# Patient Record
Sex: MALE | Race: WHITE | NOT HISPANIC OR LATINO | Employment: FULL TIME | URBAN - METROPOLITAN AREA
[De-identification: names, ages, dates, MRNs, and addresses within clinical notes are randomized per-mention and may not be internally consistent; named-entity substitution may affect disease eponyms.]

---

## 2017-10-18 ENCOUNTER — HOSPITAL ENCOUNTER (EMERGENCY)
Facility: HOSPITAL | Age: 62
Discharge: HOME | End: 2017-10-18
Attending: EMERGENCY MEDICINE | Admitting: EMERGENCY MEDICINE

## 2017-10-18 ENCOUNTER — APPOINTMENT (OUTPATIENT)
Dept: GENERAL RADIOLOGY | Facility: HOSPITAL | Age: 62
End: 2017-10-18

## 2017-10-18 VITALS
SYSTOLIC BLOOD PRESSURE: 97 MMHG | WEIGHT: 230 LBS | HEIGHT: 70 IN | BODY MASS INDEX: 32.93 KG/M2 | OXYGEN SATURATION: 94 % | HEART RATE: 94 BPM | DIASTOLIC BLOOD PRESSURE: 63 MMHG | TEMPERATURE: 98.7 F | RESPIRATION RATE: 20 BRPM

## 2017-10-18 DIAGNOSIS — Z72.0 TOBACCO ABUSE: ICD-10-CM

## 2017-10-18 DIAGNOSIS — J98.01 ACUTE BRONCHOSPASM: Primary | ICD-10-CM

## 2017-10-18 LAB
ALBUMIN SERPL-MCNC: 4.7 G/DL (ref 3.2–4.8)
ALBUMIN/GLOB SERPL: 1.5 G/DL (ref 1.5–2.5)
ALP SERPL-CCNC: 82 U/L (ref 25–100)
ALT SERPL W P-5'-P-CCNC: 38 U/L (ref 7–40)
ANION GAP SERPL CALCULATED.3IONS-SCNC: 10 MMOL/L (ref 3–11)
AST SERPL-CCNC: 37 U/L (ref 0–33)
BASOPHILS # BLD AUTO: 0.02 10*3/MM3 (ref 0–0.2)
BASOPHILS NFR BLD AUTO: 0.3 % (ref 0–1)
BILIRUB SERPL-MCNC: 0.4 MG/DL (ref 0.3–1.2)
BUN BLD-MCNC: 14 MG/DL (ref 9–23)
BUN/CREAT SERPL: 15.6 (ref 7–25)
CALCIUM SPEC-SCNC: 9.4 MG/DL (ref 8.7–10.4)
CHLORIDE SERPL-SCNC: 99 MMOL/L (ref 99–109)
CO2 SERPL-SCNC: 26 MMOL/L (ref 20–31)
CREAT BLD-MCNC: 0.9 MG/DL (ref 0.6–1.3)
D-LACTATE SERPL-SCNC: 0.7 MMOL/L (ref 0.5–2)
DEPRECATED RDW RBC AUTO: 43.2 FL (ref 37–54)
EOSINOPHIL # BLD AUTO: 0.04 10*3/MM3 (ref 0–0.3)
EOSINOPHIL NFR BLD AUTO: 0.6 % (ref 0–3)
ERYTHROCYTE [DISTWIDTH] IN BLOOD BY AUTOMATED COUNT: 13.1 % (ref 11.3–14.5)
GFR SERPL CREATININE-BSD FRML MDRD: 85 ML/MIN/1.73
GLOBULIN UR ELPH-MCNC: 3.2 GM/DL
GLUCOSE BLD-MCNC: 115 MG/DL (ref 70–100)
HCT VFR BLD AUTO: 48.2 % (ref 38.9–50.9)
HGB BLD-MCNC: 16.8 G/DL (ref 13.1–17.5)
HOLD SPECIMEN: NORMAL
HOLD SPECIMEN: NORMAL
IMM GRANULOCYTES # BLD: 0.04 10*3/MM3 (ref 0–0.03)
IMM GRANULOCYTES NFR BLD: 0.6 % (ref 0–0.6)
LYMPHOCYTES # BLD AUTO: 0.99 10*3/MM3 (ref 0.6–4.8)
LYMPHOCYTES NFR BLD AUTO: 14.6 % (ref 24–44)
MCH RBC QN AUTO: 31.4 PG (ref 27–31)
MCHC RBC AUTO-ENTMCNC: 34.9 G/DL (ref 32–36)
MCV RBC AUTO: 90.1 FL (ref 80–99)
MONOCYTES # BLD AUTO: 1.11 10*3/MM3 (ref 0–1)
MONOCYTES NFR BLD AUTO: 16.3 % (ref 0–12)
NEUTROPHILS # BLD AUTO: 4.6 10*3/MM3 (ref 1.5–8.3)
NEUTROPHILS NFR BLD AUTO: 67.6 % (ref 41–71)
PLATELET # BLD AUTO: 199 10*3/MM3 (ref 150–450)
PMV BLD AUTO: 9.9 FL (ref 6–12)
POTASSIUM BLD-SCNC: 4.4 MMOL/L (ref 3.5–5.5)
PROT SERPL-MCNC: 7.9 G/DL (ref 5.7–8.2)
RBC # BLD AUTO: 5.35 10*6/MM3 (ref 4.2–5.76)
SODIUM BLD-SCNC: 135 MMOL/L (ref 132–146)
TROPONIN I SERPL-MCNC: 0 NG/ML (ref 0–0.07)
TROPONIN I SERPL-MCNC: <0.006 NG/ML
WBC NRBC COR # BLD: 6.8 10*3/MM3 (ref 3.5–10.8)
WHOLE BLOOD HOLD SPECIMEN: NORMAL
WHOLE BLOOD HOLD SPECIMEN: NORMAL

## 2017-10-18 PROCEDURE — 84484 ASSAY OF TROPONIN QUANT: CPT

## 2017-10-18 PROCEDURE — 84484 ASSAY OF TROPONIN QUANT: CPT | Performed by: EMERGENCY MEDICINE

## 2017-10-18 PROCEDURE — 83605 ASSAY OF LACTIC ACID: CPT | Performed by: EMERGENCY MEDICINE

## 2017-10-18 PROCEDURE — 93005 ELECTROCARDIOGRAM TRACING: CPT | Performed by: EMERGENCY MEDICINE

## 2017-10-18 PROCEDURE — 63710000001 PREDNISONE PER 1 MG: Performed by: EMERGENCY MEDICINE

## 2017-10-18 PROCEDURE — 94760 N-INVAS EAR/PLS OXIMETRY 1: CPT

## 2017-10-18 PROCEDURE — 87040 BLOOD CULTURE FOR BACTERIA: CPT | Performed by: EMERGENCY MEDICINE

## 2017-10-18 PROCEDURE — 80053 COMPREHEN METABOLIC PANEL: CPT | Performed by: EMERGENCY MEDICINE

## 2017-10-18 PROCEDURE — 71020 HC CHEST PA AND LATERAL: CPT

## 2017-10-18 PROCEDURE — 85025 COMPLETE CBC W/AUTO DIFF WBC: CPT | Performed by: EMERGENCY MEDICINE

## 2017-10-18 PROCEDURE — 99284 EMERGENCY DEPT VISIT MOD MDM: CPT

## 2017-10-18 PROCEDURE — 94640 AIRWAY INHALATION TREATMENT: CPT

## 2017-10-18 PROCEDURE — 94799 UNLISTED PULMONARY SVC/PX: CPT

## 2017-10-18 RX ORDER — PREDNISONE 20 MG/1
60 TABLET ORAL ONCE
Status: COMPLETED | OUTPATIENT
Start: 2017-10-18 | End: 2017-10-18

## 2017-10-18 RX ORDER — IPRATROPIUM BROMIDE AND ALBUTEROL SULFATE 2.5; .5 MG/3ML; MG/3ML
3 SOLUTION RESPIRATORY (INHALATION) ONCE
Status: COMPLETED | OUTPATIENT
Start: 2017-10-18 | End: 2017-10-18

## 2017-10-18 RX ORDER — SODIUM CHLORIDE 0.9 % (FLUSH) 0.9 %
10 SYRINGE (ML) INJECTION AS NEEDED
Status: DISCONTINUED | OUTPATIENT
Start: 2017-10-18 | End: 2017-10-19 | Stop reason: HOSPADM

## 2017-10-18 RX ORDER — PREDNISONE 20 MG/1
40 TABLET ORAL DAILY
Qty: 8 TABLET | Refills: 0 | Status: SHIPPED | OUTPATIENT
Start: 2017-10-18 | End: 2017-11-02

## 2017-10-18 RX ORDER — ALBUTEROL SULFATE 90 UG/1
2 AEROSOL, METERED RESPIRATORY (INHALATION) EVERY 4 HOURS PRN
Qty: 18 G | Refills: 0 | Status: SHIPPED | OUTPATIENT
Start: 2017-10-18 | End: 2017-11-02 | Stop reason: SDUPTHER

## 2017-10-18 RX ORDER — ALBUTEROL SULFATE 2.5 MG/3ML
2.5 SOLUTION RESPIRATORY (INHALATION) ONCE
Status: COMPLETED | OUTPATIENT
Start: 2017-10-18 | End: 2017-10-18

## 2017-10-18 RX ADMIN — ALBUTEROL SULFATE 2.5 MG: 2.5 SOLUTION RESPIRATORY (INHALATION) at 23:05

## 2017-10-18 RX ADMIN — PREDNISONE 60 MG: 20 TABLET ORAL at 22:53

## 2017-10-18 RX ADMIN — IPRATROPIUM BROMIDE AND ALBUTEROL SULFATE 3 ML: .5; 3 SOLUTION RESPIRATORY (INHALATION) at 21:21

## 2017-10-19 NOTE — DISCHARGE INSTRUCTIONS
Start the prednisone tomorrow evening.  Use the chamber device with your inhaler.  You need to stop smoking.  Call 1 [phone] for assistance.  Return to the ER if you have worsening problem breathing or any other worrisome health problems.    Follow up with one of the [facility] physician groups below to setup primary care. If you have trouble following up, please call Maral Borrero, our transitional care nurse, at [phone].    (Dr. Bruno, Dr. Walden, Dr. Reilly, and Dr. Maradiaga.)  [facility], Primary Care, [phone], [address], [place] [address]    [facility], Primary Care, [phone], [address] [place], [address] [facility], Primary Care, [phone], [address] [place], [address] [facility], Primary Care, [phone], [address] [place], [address]     [place] [address] [facility], Primary Care, [phone], [address], [address] [place], [address]    [place] [address] [facility], Primary Care, [phone], [address]    [place], [address] [facility], Primary Care, [phone], [address], [address] [place], [address] [facility], Primary Care, [phone], [address] [place], [address] [facility], Primary Care, [phone], [address] [place], [address] [facility], Primary Care, [phone], [address], [address] [place], [address] [facility], Primary Care, [phone], [address], [address], [place], [address]     [facility]  [facility], Primary Care, [phone], [address] [id number]

## 2017-10-19 NOTE — ED PROVIDER NOTES
Subjective   HPI Comments: Thien Gray is a 62 y.o. male who presents to the ED with c/o SoA. The patient reports that he has been SoA for about 1 week now and also notes of associated congestion and a cough with onset yesterday. He also complains of diarrhea with onset 3 days ago, but denies any N/V, chest pain, fever, chills, or any other complaints at this time. He has no hx of breathing problems or CHF. The patient does have a hx of smoking and had an MI in 2004 with a stent placement afterwards.       Patient is a 62 y.o. male presenting with shortness of breath.   History provided by:  Patient  Shortness of Breath   Severity:  Unable to specify  Onset quality:  Gradual  Duration:  1 week  Timing:  Constant  Progression:  Worsening  Chronicity:  New  Relieved by:  None tried  Worsened by:  Nothing  Ineffective treatments:  None tried  Associated symptoms: cough    Associated symptoms: no chest pain, no fever and no vomiting    Risk factors: tobacco use        Review of Systems   Constitutional: Negative for chills and fever.   HENT: Positive for congestion.    Respiratory: Positive for cough and shortness of breath.    Cardiovascular: Negative for chest pain.   Gastrointestinal: Positive for diarrhea. Negative for nausea and vomiting.   All other systems reviewed and are negative.      Past Medical History:   Diagnosis Date   • Coronary artery disease    • Hyperlipidemia    • Myocardial infarction        Allergies   Allergen Reactions   • Augmentin [Amoxicillin-Pot Clavulanate] Confusion   • Plavix [Clopidogrel Bisulfate] Hives       History reviewed. No pertinent surgical history.    History reviewed. No pertinent family history.    Social History     Social History   • Marital status:      Spouse name: N/A   • Number of children: N/A   • Years of education: N/A     Social History Main Topics   • Smoking status: Current Every Day Smoker     Packs/day: 1.50     Types: Cigarettes   • Smokeless tobacco:  None   • Alcohol use 3.6 oz/week     6 Cans of beer per week      Comment: 3 nights a week   • Drug use: No   • Sexual activity: Not Asked     Other Topics Concern   • None     Social History Narrative   • None         Objective   Physical Exam   Constitutional: He is oriented to person, place, and time. He appears well-developed and well-nourished.   HENT:   Head: Normocephalic and atraumatic.   Nose: Nose normal.   Pharynx benign. Airway patent     Eyes: Conjunctivae are normal. No scleral icterus.   Good color in conjunctivae.    Neck: Normal range of motion. Neck supple.   Cardiovascular: Regular rhythm and normal heart sounds.  Tachycardia present.    Pulmonary/Chest: Effort normal. No respiratory distress. He has decreased breath sounds (Moderately). He has wheezes (faint). He has rhonchi (faint).   Abdominal: Soft. There is no tenderness.   Musculoskeletal: He exhibits no edema (Traces of pretibial edema with small hints of fluid collection).   Lymphadenopathy:     He has no cervical adenopathy.   Neurological: He is alert and oriented to person, place, and time.   Skin: Skin is warm and dry.   Psychiatric: He has a normal mood and affect. His behavior is normal.   Nursing note and vitals reviewed.      Procedures         ED Course  ED Course   Comment By Time   He met tachycardic and tachypneic parameters for sepsis but his presentation appears to me to be that of a bronchospastic problem or possibly CHF.   Doubt sepsis at this time. Diaz Mitchell MD 10/18 2106   He feels that his breathing is a little better after the Duoneb.  Auscultation reveals better air movement, almost no wheezing, but some diffuse rhonchi.  O2 sats staying upper 80s, low 90s. Diaz Mitchell MD 10/18 2212   He likely has COPD with this being his first exacerbation but no history of same and no PFTs to confirm.  He needs a PCP. Diaz Mitchell MD 10/18 2214   Well's criteria for PE risk scored 1.5 points for HR > 100, indicating low  risk at 1.3% chance of PE in an ED population, per [email or web address]. Diaz Mitchell MD 10/18 2320                     [facility]    Final diagnoses:   Acute bronchospasm   Tobacco abuse       Documentation assistance provided by fabio Espinoza.  Information recorded by the scribe was done at my direction and has been verified and validated by me.     Mando Espinoza  10/18/17 2107       Diaz Mitchell MD  10/19/17 0001

## 2017-10-23 LAB
BACTERIA SPEC AEROBE CULT: NORMAL
BACTERIA SPEC AEROBE CULT: NORMAL

## 2017-11-02 ENCOUNTER — OFFICE VISIT (OUTPATIENT)
Dept: FAMILY MEDICINE CLINIC | Facility: CLINIC | Age: 62
End: 2017-11-02

## 2017-11-02 VITALS
RESPIRATION RATE: 18 BRPM | OXYGEN SATURATION: 99 % | DIASTOLIC BLOOD PRESSURE: 74 MMHG | HEART RATE: 96 BPM | WEIGHT: 230 LBS | HEIGHT: 70 IN | BODY MASS INDEX: 32.93 KG/M2 | SYSTOLIC BLOOD PRESSURE: 132 MMHG

## 2017-11-02 DIAGNOSIS — R63.4 WEIGHT LOSS: ICD-10-CM

## 2017-11-02 DIAGNOSIS — Z87.891 HISTORY OF TOBACCO ABUSE: ICD-10-CM

## 2017-11-02 DIAGNOSIS — E78.49 OTHER HYPERLIPIDEMIA: ICD-10-CM

## 2017-11-02 DIAGNOSIS — Z11.59 NEED FOR HEPATITIS C SCREENING TEST: ICD-10-CM

## 2017-11-02 DIAGNOSIS — Z12.11 SCREENING FOR COLON CANCER: ICD-10-CM

## 2017-11-02 DIAGNOSIS — R74.01 ELEVATED AST (SGOT): ICD-10-CM

## 2017-11-02 DIAGNOSIS — R06.02 SHORTNESS OF BREATH: ICD-10-CM

## 2017-11-02 DIAGNOSIS — Z76.89 ENCOUNTER TO ESTABLISH CARE WITH NEW DOCTOR: ICD-10-CM

## 2017-11-02 DIAGNOSIS — I25.2 HISTORY OF MI (MYOCARDIAL INFARCTION): ICD-10-CM

## 2017-11-02 DIAGNOSIS — I25.10 CORONARY ARTERY DISEASE INVOLVING NATIVE HEART WITHOUT ANGINA PECTORIS, UNSPECIFIED VESSEL OR LESION TYPE: ICD-10-CM

## 2017-11-02 PROCEDURE — 99204 OFFICE O/P NEW MOD 45 MIN: CPT | Performed by: FAMILY MEDICINE

## 2017-11-02 RX ORDER — ALBUTEROL SULFATE 90 UG/1
2 AEROSOL, METERED RESPIRATORY (INHALATION) EVERY 4 HOURS PRN
Qty: 18 G | Refills: 5 | Status: SHIPPED | OUTPATIENT
Start: 2017-11-02 | End: 2019-06-07

## 2017-11-02 NOTE — PATIENT INSTRUCTIONS
Go to the nearest ER or return to clinic if symptoms worsen, fever/chill develop      Chronic Obstructive Pulmonary Disease  Chronic obstructive pulmonary disease (COPD) is a common lung problem. In COPD, the flow of air from the lungs is limited. The way your lungs work will probably never return to normal, but there are things you can do to improve your lungs and make yourself feel better. Your doctor may treat your condition with:  · Medicines.  · Oxygen.  · Lung surgery.  · Changes to your diet.  · Rehabilitation. This may involve a team of specialists.  HOME CARE  · Take all medicines as told by your doctor.  · Avoid medicines or cough syrups that dry up your airway (such as antihistamines) and do not allow you to get rid of thick spit. You do not need to avoid them if told differently by your doctor.  · If you smoke, stop. Smoking makes the problem worse.  · Avoid being around things that make your breathing worse (like smoke, chemicals, and fumes).  · Use oxygen therapy and therapy to help improve your lungs (pulmonary rehabilitation) if told by your doctor. If you need home oxygen therapy, ask your doctor if you should buy a tool to measure your oxygen level (oximeter).  · Avoid people who have a sickness you can catch (contagious).  · Avoid going outside when it is very hot, cold, or humid.  · Eat healthy foods. Eat smaller meals more often. Rest before meals.  · Stay active, but remember to also rest.  · Make sure to get all the shots (vaccines) your doctor recommends. Ask your doctor if you need a pneumonia shot.  · Learn and use tips on how to relax.  · Learn and use tips on how to control your breathing as told by your doctor. Try:    Breathing in (inhaling) through your nose for 1 second. Then, pucker your lips and breath out (exhale) through your lips for 2 seconds.    Putting one hand on your belly (abdomen). Breathe in slowly through your nose for 1 second. Your hand on your belly should move out.  Pucker your lips and breathe out slowly through your lips. Your hand on your belly should move in as you breathe out.  · Learn and use controlled coughing to clear thick spit from your lungs. The steps are:  . Lean your head a little forward.  . Breathe in deeply.  . Try to hold your breath for 3 seconds.  . Keep your mouth slightly open while coughing 2 times.  . Spit any thick spit out into a tissue.  . Rest and do the steps again 1 or 2 times as needed.  GET HELP IF:  · You cough up more thick spit than usual.  · There is a change in the color or thickness of the spit.  · It is harder to breathe than usual.  · Your breathing is faster than usual.  GET HELP RIGHT AWAY IF:  · You have shortness of breath while resting.  · You have shortness of breath that stops you from:    Being able to talk.    Doing normal activities.  · You chest hurts for longer than 5 minutes.  · Your skin color is more blue than usual.  · Your pulse oximeter shows that you have low oxygen for longer than 5 minutes.  MAKE SURE YOU:  · Understand these instructions.  · Will watch your condition.  · Will get help right away if you are not doing well or get worse.     This information is not intended to replace advice given to you by your health care provider. Make sure you discuss any questions you have with your health care provider.     Document Released: 06/05/2009 Document Revised: 01/08/2016 Document Reviewed: 08/14/2014  [employer] Interactive Interactive Patient Education ©2017 [employer]

## 2017-11-02 NOTE — PROGRESS NOTES
Subjective   Thien Gray is a 62 y.o. male.     History of Present Illness   Here to establish care  He was recently seen and diagnosed with acute bronchitis [facility] Jose Raul. He was treated with Albuterol and prednisone.  Continues to have a dry cough, treating with Nyquil at night because was unable to sleep. Concerned about allergies related to his apartment, infested with cockroaches. At home, he has dry cough, sneezing, watery eyes, itchy eyes. At work, symptoms improve.     Hx of tobacco abuse, recently stopped again on 10/30/17. He uses nicotine lozenges periodically to help with cravings, no having any issues quitting.   Started smoking at age 16, 1-1.5ppd.     Concerned about recently weight loss. He lost 10 lbs quickly without any effort.   He has been sick recently.   Has gained 4 lbs of the 10 lbs lost.     History of MI with 1 stent placed. He currently isn't taking any statin therapy.     The following portions of the patient's history were reviewed and updated as appropriate: allergies, current medications, past family history, past medical history, past social history, past surgical history and problem list.    Review of Systems   Constitutional: Positive for unexpected weight change. Negative for chills and fever.   HENT: Negative for congestion, ear pain and hearing loss.    Eyes: Negative for pain and visual disturbance.   Respiratory: Positive for cough and shortness of breath. Negative for chest tightness.    Cardiovascular: Negative for chest pain, palpitations and leg swelling.   Gastrointestinal: Negative for abdominal pain, blood in stool, constipation, diarrhea and vomiting.   Endocrine: Negative for cold intolerance and heat intolerance.   Genitourinary: Negative for dysuria, frequency and hematuria.   Musculoskeletal: Negative for back pain and gait problem.   Skin: Negative for color change, rash and wound.   Allergic/Immunologic: Positive for environmental allergies. Negative for food allergies.    Neurological: Negative for dizziness, weakness, numbness and headaches.   Hematological: Negative for adenopathy. Does not bruise/bleed easily.   Psychiatric/Behavioral: Negative for confusion and sleep disturbance.       Objective   Physical Exam   Constitutional: He is oriented to person, place, and time. He appears well-developed and well-nourished.   HENT:   Head: Normocephalic and atraumatic.   Right Ear: Hearing, tympanic membrane, external ear and ear canal normal.   Left Ear: Hearing, tympanic membrane, external ear and ear canal normal.   Nose: Nose normal.   Mouth/Throat: Uvula is midline, oropharynx is clear and moist and mucous membranes are normal.   Eyes: Conjunctivae and EOM are normal.   Neck: Normal range of motion. Neck supple. No tracheal deviation present. No thyromegaly present.   Cardiovascular: Normal rate, regular rhythm and normal heart sounds.    No murmur heard.  Pulmonary/Chest: Effort normal. No respiratory distress. He has decreased breath sounds in the right upper field and the left upper field. He has no wheezes. He has no rhonchi.   Abdominal: Soft. Bowel sounds are normal. He exhibits no distension. There is no tenderness.   Musculoskeletal: Normal range of motion. He exhibits no edema, tenderness or deformity.   Lymphadenopathy:     He has no cervical adenopathy.   Neurological: He is alert and oriented to person, place, and time. No cranial nerve deficit.   Skin: Skin is warm and dry. No rash noted.   Psychiatric: He has a normal mood and affect. His behavior is normal. Judgment and thought content normal.   Nursing note and vitals reviewed.      Assessment/Plan   Thien was seen today for establish care and shortness of breath.    Diagnoses and all orders for this visit:    Shortness of breath  -     albuterol (PROVENTIL HFA;VENTOLIN HFA) 108 (90 Base) MCG/ACT inhaler; Inhale 2 puffs Every 4 (Four) Hours As Needed for Wheezing or Shortness of Air.  -     Comprehensive Metabolic  Panel; Future  -     Tiotropium Bromide Monohydrate 2.5 MCG/ACT aerosol solution; Inhale 2 puffs Daily.    Coronary artery disease involving native heart without angina pectoris, unspecified vessel or lesion type  -     Lipid Panel; Future  -     Comprehensive Metabolic Panel; Future    Other hyperlipidemia  -     Lipid Panel; Future  -     Comprehensive Metabolic Panel; Future    Weight loss  -     Thyroid Panel With TSH; Future  -     Comprehensive Metabolic Panel; Future    Elevated AST (SGOT)  -     Comprehensive Metabolic Panel; Future    Encounter to establish care with new doctor  -     Comprehensive Metabolic Panel; Future    History of tobacco abuse  -     Comprehensive Metabolic Panel; Future  -     Tiotropium Bromide Monohydrate 2.5 MCG/ACT aerosol solution; Inhale 2 puffs Daily.    History of MI (myocardial infarction)  -     Comprehensive Metabolic Panel; Future    Need for hepatitis C screening test  -     Hepatitis C Antibody; Future    Screening for colon cancer  -     Ambulatory Referral For Screening Colonoscopy    With years of tobacco abuse, it is likely that he has underlying COPD. He doesn't want to proceed with PFT at this time, still recovering from acute illness. Refilled prn albuterol inhaler, sample of Spiriva given to patient   Start daily ASA 81mg  Labs will be completed when fasting. Will start statin therapy once liver function is known.

## 2017-11-07 ENCOUNTER — RESULTS ENCOUNTER (OUTPATIENT)
Dept: FAMILY MEDICINE CLINIC | Facility: CLINIC | Age: 62
End: 2017-11-07

## 2017-11-07 DIAGNOSIS — Z11.59 NEED FOR HEPATITIS C SCREENING TEST: ICD-10-CM

## 2017-11-07 DIAGNOSIS — E78.49 OTHER HYPERLIPIDEMIA: ICD-10-CM

## 2017-11-07 DIAGNOSIS — I25.10 CORONARY ARTERY DISEASE INVOLVING NATIVE HEART WITHOUT ANGINA PECTORIS, UNSPECIFIED VESSEL OR LESION TYPE: ICD-10-CM

## 2017-11-07 DIAGNOSIS — R06.02 SHORTNESS OF BREATH: ICD-10-CM

## 2017-11-07 DIAGNOSIS — Z87.891 HISTORY OF TOBACCO ABUSE: ICD-10-CM

## 2017-11-07 DIAGNOSIS — R63.4 WEIGHT LOSS: ICD-10-CM

## 2017-11-07 DIAGNOSIS — Z76.89 ENCOUNTER TO ESTABLISH CARE WITH NEW DOCTOR: ICD-10-CM

## 2017-11-07 DIAGNOSIS — R74.01 ELEVATED AST (SGOT): ICD-10-CM

## 2017-11-07 DIAGNOSIS — I25.2 HISTORY OF MI (MYOCARDIAL INFARCTION): ICD-10-CM

## 2017-11-08 LAB
ALBUMIN SERPL-MCNC: 4.4 G/DL (ref 3.2–4.8)
ALBUMIN/GLOB SERPL: 1.4 G/DL (ref 1.5–2.5)
ALP SERPL-CCNC: 79 U/L (ref 25–100)
ALT SERPL-CCNC: 32 U/L (ref 7–40)
AST SERPL-CCNC: 26 U/L (ref 0–33)
BILIRUB SERPL-MCNC: 0.5 MG/DL (ref 0.3–1.2)
BUN SERPL-MCNC: 11 MG/DL (ref 9–23)
BUN/CREAT SERPL: 15.7 (ref 7–25)
CALCIUM SERPL-MCNC: 9.5 MG/DL (ref 8.7–10.4)
CHLORIDE SERPL-SCNC: 100 MMOL/L (ref 99–109)
CHOLEST SERPL-MCNC: 206 MG/DL (ref 0–200)
CO2 SERPL-SCNC: 26 MMOL/L (ref 20–31)
CREAT SERPL-MCNC: 0.7 MG/DL (ref 0.6–1.3)
FT4I SERPL CALC-MCNC: 2.1 TBI
GFR SERPLBLD CREATININE-BSD FMLA CKD-EPI: 114 ML/MIN/1.73
GFR SERPLBLD CREATININE-BSD FMLA CKD-EPI: 138 ML/MIN/1.73
GLOBULIN SER CALC-MCNC: 3.2 GM/DL
GLUCOSE SERPL-MCNC: 91 MG/DL (ref 70–100)
HCV AB S/CO SERPL IA: <0.1 S/CO RATIO (ref 0–0.9)
HDLC SERPL-MCNC: 42 MG/DL (ref 40–60)
LDLC SERPL CALC-MCNC: 121 MG/DL (ref 0–100)
POTASSIUM SERPL-SCNC: 4.5 MMOL/L (ref 3.5–5.5)
PROT SERPL-MCNC: 7.6 G/DL (ref 5.7–8.2)
SODIUM SERPL-SCNC: 135 MMOL/L (ref 132–146)
T3RU NFR SERPL: 33.5 % (ref 23–37)
T4 SERPL-MCNC: 6.4 MCG/DL (ref 4.7–11.4)
TRIGL SERPL-MCNC: 217 MG/DL (ref 0–150)
TSH SERPL DL<=0.005 MIU/L-ACNC: 1.75 MIU/ML (ref 0.35–5.35)
VLDLC SERPL CALC-MCNC: 43.4 MG/DL

## 2017-11-08 RX ORDER — ATORVASTATIN CALCIUM 40 MG/1
40 TABLET, FILM COATED ORAL DAILY
Qty: 30 TABLET | Refills: 5 | Status: SHIPPED | OUTPATIENT
Start: 2017-11-08 | End: 2018-05-30 | Stop reason: SINTOL

## 2017-11-30 ENCOUNTER — OFFICE VISIT (OUTPATIENT)
Dept: FAMILY MEDICINE CLINIC | Facility: CLINIC | Age: 62
End: 2017-11-30

## 2017-11-30 VITALS
WEIGHT: 238 LBS | TEMPERATURE: 96.8 F | RESPIRATION RATE: 20 BRPM | DIASTOLIC BLOOD PRESSURE: 68 MMHG | SYSTOLIC BLOOD PRESSURE: 114 MMHG | HEART RATE: 100 BPM | HEIGHT: 70 IN | BODY MASS INDEX: 34.07 KG/M2

## 2017-11-30 DIAGNOSIS — E78.2 MIXED HYPERLIPIDEMIA: Primary | ICD-10-CM

## 2017-11-30 DIAGNOSIS — Z87.891 HISTORY OF TOBACCO ABUSE: ICD-10-CM

## 2017-11-30 DIAGNOSIS — Z23 NEED FOR STREPTOCOCCUS PNEUMONIAE VACCINATION: ICD-10-CM

## 2017-11-30 DIAGNOSIS — L85.3 DRY SKIN DERMATITIS: ICD-10-CM

## 2017-11-30 PROCEDURE — 3008F BODY MASS INDEX DOCD: CPT | Performed by: FAMILY MEDICINE

## 2017-11-30 PROCEDURE — 99214 OFFICE O/P EST MOD 30 MIN: CPT | Performed by: FAMILY MEDICINE

## 2017-11-30 PROCEDURE — 90471 IMMUNIZATION ADMIN: CPT | Performed by: FAMILY MEDICINE

## 2017-11-30 PROCEDURE — 90670 PCV13 VACCINE IM: CPT | Performed by: FAMILY MEDICINE

## 2017-11-30 RX ORDER — ASPIRIN 81 MG/1
81 TABLET ORAL DAILY
COMMUNITY
End: 2021-07-09 | Stop reason: HOSPADM

## 2017-11-30 RX ORDER — EMOLLIENT BASE
CREAM (GRAM) TOPICAL AS NEEDED
Qty: 454 G | Refills: 1 | Status: SHIPPED | OUTPATIENT
Start: 2017-11-30 | End: 2018-05-30

## 2017-11-30 NOTE — PROGRESS NOTES
Subjective   Thien Gray is a 62 y.o. male.     History of Present Illness   He has recently stopped smoking, doing well. Currently replacing with nicotine lozenges.   Overall, breathing is doing well. On his initial visit, he was just getting over bronchitis, so still was experiencing some dyspnea while using Albuterol inhaler throughout the day. I started him on Tudorza due to shortness of breath, however he states that he hasn't been using it and breathing is back at his baseline. He denies dyspnea with exertion, chronic cough. Only using Albuterol inhaler prn now.     He completed labs in Nov 2017, elevated cholesterol panel. I restarted him on Lipitor, he has had history of MI. Tolerating treatment without side effect.    He does complain of dry skin, worse in the winter. Sometimes, the skin on his hands will crack due to dryness. Currently not applying any moisturizer to treat.    The following portions of the patient's history were reviewed and updated as appropriate: allergies, current medications, past family history, past medical history, past social history, past surgical history and problem list.    Review of Systems   Constitutional: Negative.    Respiratory: Negative for cough, chest tightness, shortness of breath and wheezing.    Cardiovascular: Negative for chest pain, palpitations and leg swelling.   Gastrointestinal: Negative.    Musculoskeletal: Negative for myalgias.   Skin:        Dry skin   Allergic/Immunologic: Negative for environmental allergies, food allergies and immunocompromised state.   Hematological: Negative.    Psychiatric/Behavioral: Negative.        Objective   Physical Exam   Constitutional: He is oriented to person, place, and time. He appears well-developed and well-nourished.   HENT:   Head: Normocephalic and atraumatic.   Right Ear: External ear normal.   Left Ear: External ear normal.   Nose: Nose normal.   Eyes: Conjunctivae and EOM are normal.   Neck: Normal range of  motion. Neck supple.   Cardiovascular: Normal rate, regular rhythm and normal heart sounds.    Pulmonary/Chest: Effort normal. He has decreased breath sounds in the right upper field, the right middle field, the left upper field and the left middle field. He has no wheezes.   Musculoskeletal: He exhibits no edema or deformity.   Lymphadenopathy:     He has no cervical adenopathy.   Neurological: He is alert and oriented to person, place, and time. No cranial nerve deficit.   Skin: Skin is warm and dry.   Psychiatric: He has a normal mood and affect. His behavior is normal. Judgment and thought content normal.   Nursing note and vitals reviewed.      Assessment/Plan   Thien was seen today for bronchitis.    Diagnoses and all orders for this visit:    Mixed hyperlipidemia    Dry skin dermatitis  -     emollient (BIAFINE) cream; Apply  topically As Needed for Dry Skin.    History of tobacco abuse    Need for Streptococcus pneumoniae vaccination  -     pneumococcal conj. 13-valent (PREVNAR-13) vaccine 0.5 mL; Inject 0.5 mL into the shoulder, thigh, or buttocks 1 (One) Time.    BMI 34.0-34.9,adult      Emollient to use prn for dry skin  Continue statin therapy to improve HLD. Repeat FLP on next visit, in 6 months. Encouraged overall healthy diet and routine exercise as tolerated.   Prevnar given today, tolerated well.  Referred for screening colonoscopy last visit, states that he was contacted to schedule but he didn't.

## 2017-11-30 NOTE — PATIENT INSTRUCTIONS
Go to the nearest ER or return to clinic if symptoms worsen, fever/chill develop      Cholesterol  Cholesterol is a fat. Your body needs a small amount of cholesterol. Cholesterol may build up in your blood vessels. This increases your chance of having a heart attack or stroke.  You cannot feel your cholesterol levels. The only way to know your cholesterol level is high is with a blood test. Keep your test results. Work with your doctor to keep your cholesterol at a good level.  WHAT DO THE TEST RESULTS MEAN?  · Total cholesterol is how much cholesterol is in your blood.  · LDL is bad cholesterol. This is the type that can build up. You want LDL to be low.  · HDL is good cholesterol. It cleans your blood vessels and carries LDL away. You want HDL to be high.  · Triglycerides are fat that the body can burn for energy or store.  WHAT ARE GOOD LEVELS OF CHOLESTEROL?  · Total cholesterol below 200.  · LDL below 100 for people at risk. Below 70 for those at very high risk.  · HDL above 50 is good. Above 60 is best.  · Triglycerides below 150.  HOW CAN I LOWER MY CHOLESTEROL?  · Diet. Follow your diet programs as told by your doctor.    Choose fish, white meat chicken, roasted turkey, or baked turkey. Try not to eat red meat, fried foods, or processed meats such as sausage and lunch meats.    Eat lots of fresh fruits and vegetables.    Choose whole grains, beans, pasta, potatoes, and cereals.    Use only small amounts of olive, corn, or canola oils.    Try not to eat butter, mayonnaise, shortening, or palm kernel oils.    Try not to eat foods with trans fats.    Drink skim or nonfat milk. Eat low-fat or nonfat yogurt and cheeses. Try not to drink whole milk or cream. Try not to eat ice cream, egg yolks, and full-fat cheeses.    Healthy desserts include catalina food cake, jose armando snaps, animal crackers, hard candy, popsicles, and low-fat or nonfat frozen yogurt. Try not to eat pastries, cakes, pies, and cookies.  · Exercise.  Follow your exercise programs as told by your doctor.    Be more active. You can try gardening, walking, or taking the stairs. Ask your doctor about how you can be more active.  · Medicine. Take medicine as told by your doctor.     This information is not intended to replace advice given to you by your health care provider. Make sure you discuss any questions you have with your health care provider.     Document Released: 03/16/2010 Document Revised: 01/08/2016 Document Reviewed: 10/01/2014  [employer] Interactive Patient Education ©2017 [employer].

## 2018-05-30 ENCOUNTER — OFFICE VISIT (OUTPATIENT)
Dept: FAMILY MEDICINE CLINIC | Facility: CLINIC | Age: 63
End: 2018-05-30

## 2018-05-30 VITALS
TEMPERATURE: 97.7 F | DIASTOLIC BLOOD PRESSURE: 70 MMHG | BODY MASS INDEX: 36.48 KG/M2 | RESPIRATION RATE: 20 BRPM | WEIGHT: 254.8 LBS | HEART RATE: 72 BPM | SYSTOLIC BLOOD PRESSURE: 106 MMHG | HEIGHT: 70 IN

## 2018-05-30 DIAGNOSIS — E78.2 MIXED HYPERLIPIDEMIA: Primary | ICD-10-CM

## 2018-05-30 DIAGNOSIS — I25.2 HISTORY OF MI (MYOCARDIAL INFARCTION): ICD-10-CM

## 2018-05-30 DIAGNOSIS — I25.10 CORONARY ARTERY DISEASE INVOLVING NATIVE HEART WITHOUT ANGINA PECTORIS, UNSPECIFIED VESSEL OR LESION TYPE: ICD-10-CM

## 2018-05-30 DIAGNOSIS — J06.9 ACUTE URI: ICD-10-CM

## 2018-05-30 DIAGNOSIS — Z23 NEED FOR SHINGLES VACCINE: ICD-10-CM

## 2018-05-30 LAB
ALBUMIN SERPL-MCNC: 4.5 G/DL (ref 3.2–4.8)
ALBUMIN/GLOB SERPL: 1.6 G/DL (ref 1.5–2.5)
ALP SERPL-CCNC: 73 U/L (ref 25–100)
ALT SERPL-CCNC: 38 U/L (ref 7–40)
AST SERPL-CCNC: 25 U/L (ref 0–33)
BASOPHILS # BLD AUTO: 0.02 10*3/MM3 (ref 0–0.2)
BASOPHILS NFR BLD AUTO: 0.3 % (ref 0–1)
BILIRUB SERPL-MCNC: 0.6 MG/DL (ref 0.3–1.2)
BUN SERPL-MCNC: 13 MG/DL (ref 9–23)
BUN/CREAT SERPL: 16.3 (ref 7–25)
CALCIUM SERPL-MCNC: 9.2 MG/DL (ref 8.7–10.4)
CHLORIDE SERPL-SCNC: 105 MMOL/L (ref 99–109)
CHOLEST SERPL-MCNC: 162 MG/DL (ref 0–200)
CO2 SERPL-SCNC: 29 MMOL/L (ref 20–31)
CREAT SERPL-MCNC: 0.8 MG/DL (ref 0.6–1.3)
EOSINOPHIL # BLD AUTO: 1.05 10*3/MM3 (ref 0–0.3)
EOSINOPHIL NFR BLD AUTO: 14.1 % (ref 0–3)
ERYTHROCYTE [DISTWIDTH] IN BLOOD BY AUTOMATED COUNT: 13.4 % (ref 11.3–14.5)
GFR SERPLBLD CREATININE-BSD FMLA CKD-EPI: 118 ML/MIN/1.73
GFR SERPLBLD CREATININE-BSD FMLA CKD-EPI: 97 ML/MIN/1.73
GLOBULIN SER CALC-MCNC: 2.8 GM/DL
GLUCOSE SERPL-MCNC: 96 MG/DL (ref 70–100)
HCT VFR BLD AUTO: 46.9 % (ref 38.9–50.9)
HDLC SERPL-MCNC: 38 MG/DL (ref 40–60)
HGB BLD-MCNC: 15.6 G/DL (ref 13.1–17.5)
IMM GRANULOCYTES # BLD: 0.03 10*3/MM3 (ref 0–0.03)
IMM GRANULOCYTES NFR BLD: 0.4 % (ref 0–0.6)
LDLC SERPL CALC-MCNC: 86 MG/DL (ref 0–100)
LYMPHOCYTES # BLD AUTO: 2.04 10*3/MM3 (ref 0.6–4.8)
LYMPHOCYTES NFR BLD AUTO: 27.4 % (ref 24–44)
MCH RBC QN AUTO: 30.5 PG (ref 27–31)
MCHC RBC AUTO-ENTMCNC: 33.3 G/DL (ref 32–36)
MCV RBC AUTO: 91.6 FL (ref 80–99)
MONOCYTES # BLD AUTO: 0.6 10*3/MM3 (ref 0–1)
MONOCYTES NFR BLD AUTO: 8.1 % (ref 0–12)
NEUTROPHILS # BLD AUTO: 3.71 10*3/MM3 (ref 1.5–8.3)
NEUTROPHILS NFR BLD AUTO: 49.7 % (ref 41–71)
PLATELET # BLD AUTO: 258 10*3/MM3 (ref 150–450)
POTASSIUM SERPL-SCNC: 4.9 MMOL/L (ref 3.5–5.5)
PROT SERPL-MCNC: 7.3 G/DL (ref 5.7–8.2)
RBC # BLD AUTO: 5.12 10*6/MM3 (ref 4.2–5.76)
SODIUM SERPL-SCNC: 141 MMOL/L (ref 132–146)
TRIGL SERPL-MCNC: 192 MG/DL (ref 0–150)
VLDLC SERPL CALC-MCNC: 38.4 MG/DL
WBC # BLD AUTO: 7.45 10*3/MM3 (ref 3.5–10.8)

## 2018-05-30 PROCEDURE — 99214 OFFICE O/P EST MOD 30 MIN: CPT | Performed by: FAMILY MEDICINE

## 2018-05-30 RX ORDER — AZITHROMYCIN 250 MG/1
TABLET, FILM COATED ORAL
Qty: 6 TABLET | Refills: 0 | Status: SHIPPED | OUTPATIENT
Start: 2018-05-30 | End: 2018-06-11

## 2018-05-30 NOTE — PATIENT INSTRUCTIONS
Go to the nearest ER or return  Cholesterol  Cholesterol is a fat. Your body needs a small amount of cholesterol. Cholesterol (plaque) may build up in your blood vessels (arteries). That makes you more likely to have a heart attack or stroke.  You cannot feel your cholesterol level. Having a blood test is the only way to find out if your level is high. Keep your test results. Work with your doctor to keep your cholesterol at a good level.  What do the results mean?  · Total cholesterol is how much cholesterol is in your blood.  · LDL is bad cholesterol. This is the type that can build up. Try to have low LDL.  · HDL is good cholesterol. It cleans your blood vessels and carries LDL away. Try to have high HDL.  · Triglycerides are fat that the body can store or burn for energy.  What are good levels of cholesterol?  · Total cholesterol below 200.  · LDL below 100 is good for people who have health risks. LDL below 70 is good for people who have very high risks.  · HDL above 40 is good. It is best to have HDL of 60 or higher.  · Triglycerides below 150.  How can I lower my cholesterol?  Diet   Follow your diet program as told by your doctor.  · Choose fish, white meat chicken, or turkey that is roasted or baked. Try not to eat red meat, fried foods, sausage, or lunch meats.  · Eat lots of fresh fruits and vegetables.  · Choose whole grains, beans, pasta, potatoes, and cereals.  · Choose olive oil, corn oil, or canola oil. Only use small amounts.  · Try not to eat butter, mayonnaise, shortening, or palm kernel oils.  · Try not to eat foods with trans fats.  · Choose low-fat or nonfat dairy foods.  ¨ Drink skim or nonfat milk.  ¨ Eat low-fat or nonfat yogurt and cheeses.  ¨ Try not to drink whole milk or cream.  ¨ Try not to eat ice cream, egg yolks, or full-fat cheeses.  · Healthy desserts include catalina food cake, jose armando snaps, animal crackers, hard candy, popsicles, and low-fat or nonfat frozen yogurt. Try not to eat  pastries, cakes, pies, and cookies.  Exercise   Follow your exercise program as told by your doctor.  · Be more active. Try gardening, walking, and taking the stairs.  · Ask your doctor about ways that you can be more active.  Medicine  · Take over-the-counter and prescription medicines only as told by your doctor.  This information is not intended to replace advice given to you by your health care provider. Make sure you discuss any questions you have with your health care provider.  Document Released: 03/16/2010 Document Revised: 07/19/2017 Document Reviewed: 06/29/2017  [employer] Interactive Patient Education © 2017 [employer]   to clinic if symptoms worsen, fever/chill develop

## 2018-05-30 NOTE — PROGRESS NOTES
Subjective    Chief Complaint   Patient presents with   • Hyperlipidemia     6 mo f/u, labs, refills     Thien Gray is a 62 y.o. male.     Hyperlipidemia   This is a chronic problem. The current episode started more than 1 year ago. The problem is uncontrolled. Recent lipid tests were reviewed and are high. Exacerbating diseases include obesity. He has no history of chronic renal disease or hypothyroidism. Factors aggravating his hyperlipidemia include fatty foods. Associated symptoms include myalgias (joint pain). Pertinent negatives include no chest pain or shortness of breath. (Stopped atorvastatin due to joint pain  ) He is currently on no antihyperlipidemic treatment. Compliance problems include medication side effects, adherence to diet and adherence to exercise.  Risk factors for coronary artery disease include dyslipidemia, male sex, obesity and a sedentary lifestyle.   URI    This is a new problem. The current episode started 1 to 4 weeks ago (2 weeks). The problem has been unchanged. There has been no fever. Associated symptoms include congestion, a plugged ear sensation, rhinorrhea and sneezing. Pertinent negatives include no abdominal pain, chest pain, coughing, diarrhea, ear pain, rash, sore throat, vomiting or wheezing. He has tried antihistamine (Flonase nasal spray) for the symptoms. The treatment provided no relief.        The following portions of the patient's history were reviewed and updated as appropriate: allergies, current medications, past family history, past medical history, past social history, past surgical history and problem list.    Review of Systems   Constitutional: Negative for chills, fatigue and fever.   HENT: Positive for congestion, postnasal drip, rhinorrhea and sneezing. Negative for ear pain and sore throat.    Eyes: Negative for pain and visual disturbance.   Respiratory: Negative for cough, chest tightness, shortness of breath and wheezing.    Cardiovascular: Negative  for chest pain and palpitations.   Gastrointestinal: Negative for abdominal pain, diarrhea and vomiting.   Endocrine: Negative for polydipsia, polyphagia and polyuria.   Musculoskeletal: Positive for myalgias (joint pain). Negative for gait problem and joint swelling.   Skin: Negative for rash.   Allergic/Immunologic: Positive for environmental allergies.   Neurological: Negative for dizziness, weakness and confusion.   Hematological: Negative for adenopathy. Does not bruise/bleed easily.   Psychiatric/Behavioral: Negative for agitation and stress. The patient is not nervous/anxious.        Objective   Physical Exam   Constitutional: He is oriented to person, place, and time. He appears well-developed and well-nourished. No distress.   HENT:   Head: Normocephalic.   Right Ear: Hearing, external ear and ear canal normal. Tympanic membrane is erythematous. No middle ear effusion.   Left Ear: Hearing, tympanic membrane, external ear and ear canal normal.   Nose: Mucosal edema and rhinorrhea present.   Mouth/Throat: Uvula is midline, oropharynx is clear and moist and mucous membranes are normal.   Eyes: Conjunctivae are normal.   Neck: Normal range of motion. Neck supple.   Cardiovascular: Normal rate, regular rhythm and normal heart sounds.    No murmur heard.  Pulmonary/Chest: Effort normal and breath sounds normal. He has no wheezes.   Musculoskeletal: He exhibits no edema or deformity.   Lymphadenopathy:     He has no cervical adenopathy.   Neurological: He is alert and oriented to person, place, and time.   Skin: Skin is warm and dry.   Psychiatric: He has a normal mood and affect. His behavior is normal.   Nursing note and vitals reviewed.        Assessment/Plan   Thien was seen today for hyperlipidemia.    Diagnoses and all orders for this visit:    Mixed hyperlipidemia  -     CBC & Differential  -     Comprehensive Metabolic Panel  -     Lipid Panel    Coronary artery disease involving native heart without  angina pectoris, unspecified vessel or lesion type  -     CBC & Differential  -     Comprehensive Metabolic Panel  -     Lipid Panel    History of MI (myocardial infarction)  -     CBC & Differential  -     Comprehensive Metabolic Panel  -     Lipid Panel    Acute URI  -     azithromycin (ZITHROMAX Z-DANIEL) 250 MG tablet; Take 2 tablets the first day, then 1 tablet daily for 4 days.    Need for shingles vaccine  -     Zoster Vac Recomb Adjuvanted 50 MCG reconstituted suspension; Inject 50 mcg into the shoulder, thigh, or buttocks 1 (One) Time for 1 dose. Repeat 2nd dose in 2-6 months after the initial    BMI 36.0-36.9,adult      Will need to resume statin therapy, will determine which one once lab results are known. I advised him to stop medication if side effects occur and notify me.   Encouraged low fat diet and routine exercise as tolerated.   Zpak to improve URI. Ok to continue OTC benadryl and flonase nasal spray as directed.   Labs completed today.

## 2018-06-02 RX ORDER — PRAVASTATIN SODIUM 40 MG
40 TABLET ORAL DAILY
Qty: 90 TABLET | Refills: 1 | Status: SHIPPED | OUTPATIENT
Start: 2018-06-02 | End: 2018-11-30 | Stop reason: SDUPTHER

## 2018-06-07 ENCOUNTER — TELEPHONE (OUTPATIENT)
Dept: FAMILY MEDICINE CLINIC | Facility: CLINIC | Age: 63
End: 2018-06-07

## 2018-06-07 NOTE — TELEPHONE ENCOUNTER
----- Message from Amanda Meza sent at 6/7/2018  3:35 PM EDT -----  Contact: VALDEMAR  PATIENT HAS A QUESTION:  ASKING IF YOU CAN WRITE HIM A RX FOR A DECONGESTANT:  HIS HEAD AND NOSE IS REALLY STUFFED UP      [phone]  [facility];[address].

## 2018-06-11 ENCOUNTER — OFFICE VISIT (OUTPATIENT)
Dept: FAMILY MEDICINE CLINIC | Facility: CLINIC | Age: 63
End: 2018-06-11

## 2018-06-11 VITALS
WEIGHT: 254.2 LBS | BODY MASS INDEX: 36.39 KG/M2 | HEART RATE: 100 BPM | DIASTOLIC BLOOD PRESSURE: 80 MMHG | TEMPERATURE: 97.8 F | SYSTOLIC BLOOD PRESSURE: 100 MMHG | RESPIRATION RATE: 20 BRPM | HEIGHT: 70 IN

## 2018-06-11 DIAGNOSIS — J06.9 ACUTE URI: ICD-10-CM

## 2018-06-11 DIAGNOSIS — L03.213 PRESEPTAL CELLULITIS: Primary | ICD-10-CM

## 2018-06-11 DIAGNOSIS — H10.32 ACUTE BACTERIAL CONJUNCTIVITIS OF LEFT EYE: ICD-10-CM

## 2018-06-11 PROCEDURE — 99213 OFFICE O/P EST LOW 20 MIN: CPT | Performed by: FAMILY MEDICINE

## 2018-06-11 RX ORDER — CLINDAMYCIN HYDROCHLORIDE 300 MG/1
300 CAPSULE ORAL 4 TIMES DAILY
Qty: 28 CAPSULE | Refills: 0 | Status: SHIPPED | OUTPATIENT
Start: 2018-06-11 | End: 2018-06-18

## 2018-06-11 RX ORDER — PREDNISONE 10 MG/1
TABLET ORAL
Qty: 21 TABLET | Refills: 0 | Status: SHIPPED | OUTPATIENT
Start: 2018-06-11 | End: 2018-10-10

## 2018-06-11 RX ORDER — CIPROFLOXACIN HYDROCHLORIDE 3.5 MG/ML
SOLUTION/ DROPS TOPICAL
Qty: 5 ML | Refills: 0 | Status: SHIPPED | OUTPATIENT
Start: 2018-06-11 | End: 2018-10-10

## 2018-06-11 NOTE — PROGRESS NOTES
Subjective   Thien Gray is a 62 y.o. male.   Chief Complaint   Patient presents with   • Facial Swelling     L eye edema, redness. It was swollen shut. Denies itching, pain. he has had allergy Sx for a few weeks and has been taking antihistamines. The L side of his face got very congested, which the antihistamines did not help. The congestion eventually got better     History of Present Illness   Symptoms started 2 days ago with left eye edema, left sided facial congestion.   Treating with Stahist AD, warm compresses without resolution. He recently completed Zpak for treatment of URI.   States that he has had some improvement since symptoms started, able to open left eye today.   Vision isn't affected. He is having purulent discharge from the left eye.   Doesn't wear contact lenses.     The following portions of the patient's history were reviewed and updated as appropriate: allergies, current medications, past family history, past medical history, past social history, past surgical history and problem list.    Review of Systems   Constitutional: Negative for diaphoresis, fatigue and fever.   HENT: Positive for congestion and sinus pressure. Negative for postnasal drip and sore throat.    Eyes: Positive for discharge and redness. Negative for blurred vision and visual disturbance.   Respiratory: Negative for cough and shortness of breath.    Cardiovascular: Negative for chest pain and palpitations.   Gastrointestinal: Negative for nausea and vomiting.   Allergic/Immunologic: Positive for environmental allergies.   Neurological: Negative for facial asymmetry, headache and confusion.       Objective   Physical Exam   Constitutional: He is oriented to person, place, and time. He appears well-developed and well-nourished. No distress.   HENT:   Head: Normocephalic.   Right Ear: External ear normal.   Left Ear: External ear normal.   Nose: Nose normal.   Eyes: EOM are normal. Left eye exhibits discharge and exudate.  Left conjunctiva is injected. Right pupil is round. Left pupil is round. Pupils are equal.   Left eye: periorbital edema and erythema    Neck: Normal range of motion.   Cardiovascular: Normal rate, regular rhythm and normal heart sounds.    No murmur heard.  Pulmonary/Chest: Effort normal and breath sounds normal. He has no wheezes.   Musculoskeletal: He exhibits no deformity.   Neurological: He is alert and oriented to person, place, and time.   Skin: Skin is warm and dry.   Psychiatric: His behavior is normal.   Nursing note and vitals reviewed.        Assessment/Plan   Thien was seen today for facial swelling.    Diagnoses and all orders for this visit:    Preseptal cellulitis  -     clindamycin (CLEOCIN) 300 MG capsule; Take 1 capsule by mouth 4 (Four) Times a Day for 7 days.    Acute bacterial conjunctivitis of left eye  -     ciprofloxacin (CILOXAN) 0.3 % ophthalmic solution; 1 gtt left eye every 2 hours while awake x 2 days, then every 4 hours x 5 days  -     predniSONE (DELTASONE) 10 MG tablet; Take 60 mg po day 1, 50 mg po day 2, 40 mg po day 3, 30 mg po day 4, 20 mg po day 5, 10 mg po day 6    Acute URI  -     ciprofloxacin (CILOXAN) 0.3 % ophthalmic solution; 1 gtt left eye every 2 hours while awake x 2 days, then every 4 hours x 5 days  -     predniSONE (DELTASONE) 10 MG tablet; Take 60 mg po day 1, 50 mg po day 2, 40 mg po day 3, 30 mg po day 4, 20 mg po day 5, 10 mg po day 6      Cipro eye drop, clindamycin and prednisone taper to improve condition. If symptoms worse, go to ER for evaluation.

## 2018-06-11 NOTE — PATIENT INSTRUCTIONS
Go to the nearest ER or return to clinic if symptoms worsen, fever/chill develop      Bacterial Conjunctivitis  Bacterial conjunctivitis is an infection of your conjunctiva. This is the clear membrane that covers the white part of your eye and the inner surface of your eyelid. This condition can make your eye:  · Red or pink.  · Itchy.  This condition is caused by bacteria. This condition spreads very easily from person to person (is contagious) and from one eye to the other eye.  Follow these instructions at home:  Medicines   · Take or apply your antibiotic medicine as told by your doctor. Do not stop taking or applying the antibiotic even if you start to feel better.  · Take or apply over-the-counter and prescription medicines only as told by your doctor.  · Do not touch your eyelid with the eye drop bottle or the ointment tube.  Managing discomfort   · Wipe any fluid from your eye with a warm, wet washcloth or a cotton ball.  · Place a cool, clean washcloth on your eye. Do this for 10-20 minutes, 3-4 times per day.  General instructions   · Do not wear contact lenses until the irritation is gone. Wear glasses until your doctor says it is okay to wear contacts.  · Do not wear eye makeup until your symptoms are gone. Throw away any old makeup.  · Change or wash your pillowcase every day.  · Do not share towels or washcloths with anyone.  · Wash your hands often with soap and water. Use paper towels to dry your hands.  · Do not touch or rub your eyes.  · Do not drive or use heavy machinery if your vision is blurry.  Contact a doctor if:  · You have a fever.  · Your symptoms do not get better after 10 days.  Get help right away if:  · You have a fever and your symptoms suddenly get worse.  · You have very bad pain when you move your eye.  · Your face:  ¨ Hurts.  ¨ Is red.  ¨ Is swollen.  · You have sudden loss of vision.  This information is not intended to replace advice given to you by your health care provider.  Make sure you discuss any questions you have with your health care provider.  Document Released: 09/26/2009 Document Revised: 05/25/2017 Document Reviewed: 09/29/2016  [employer] Interactive Patient Education © 2017 [employer]

## 2018-10-10 ENCOUNTER — OFFICE VISIT (OUTPATIENT)
Dept: FAMILY MEDICINE CLINIC | Facility: CLINIC | Age: 63
End: 2018-10-10

## 2018-10-10 VITALS
SYSTOLIC BLOOD PRESSURE: 122 MMHG | OXYGEN SATURATION: 91 % | TEMPERATURE: 98.6 F | BODY MASS INDEX: 37.22 KG/M2 | HEART RATE: 79 BPM | DIASTOLIC BLOOD PRESSURE: 64 MMHG | WEIGHT: 260 LBS | HEIGHT: 70 IN

## 2018-10-10 DIAGNOSIS — Z23 NEED FOR IMMUNIZATION AGAINST INFLUENZA: ICD-10-CM

## 2018-10-10 DIAGNOSIS — Z12.11 SCREENING FOR COLON CANCER: ICD-10-CM

## 2018-10-10 DIAGNOSIS — H60.332 ACUTE SWIMMER'S EAR OF LEFT SIDE: Primary | ICD-10-CM

## 2018-10-10 DIAGNOSIS — Z87.891 H/O TOBACCO USE, PRESENTING HAZARDS TO HEALTH: ICD-10-CM

## 2018-10-10 PROCEDURE — G0008 ADMIN INFLUENZA VIRUS VAC: HCPCS | Performed by: FAMILY MEDICINE

## 2018-10-10 PROCEDURE — 99213 OFFICE O/P EST LOW 20 MIN: CPT | Performed by: FAMILY MEDICINE

## 2018-10-10 PROCEDURE — 90662 IIV NO PRSV INCREASED AG IM: CPT | Performed by: FAMILY MEDICINE

## 2018-10-10 NOTE — PROGRESS NOTES
Subjective   Thien Gray is a 63 y.o. male.   Chief Complaint   Patient presents with   • Earache     Left ear     Earache    There is pain in the left ear. This is a new problem. The current episode started in the past 7 days. The problem occurs constantly. The problem has been unchanged. There has been no fever. Associated symptoms include ear discharge. Pertinent negatives include no coughing, headaches, hearing loss, rash, rhinorrhea or sore throat. Treatments tried: Debrox, Peroxide. The treatment provided no relief.     He would also like to update screening colonoscopy.      He has been tobacco free x 1 year.  He hasn't completed screening US AAA.    The following portions of the patient's history were reviewed and updated as appropriate: allergies, current medications, past family history, past medical history, past social history, past surgical history and problem list.    Review of Systems   Constitutional: Negative for chills, fatigue and fever.   HENT: Positive for ear discharge and ear pain. Negative for hearing loss, postnasal drip, rhinorrhea, sinus pressure and sore throat.    Eyes: Negative.    Respiratory: Negative for cough, chest tightness and shortness of breath.    Cardiovascular: Negative for chest pain and palpitations.   Gastrointestinal: Negative.    Skin: Negative for rash.   Neurological: Negative for light-headedness, headache and confusion.   Hematological: Negative for adenopathy.       Objective   Physical Exam   Constitutional: He is oriented to person, place, and time. He appears well-developed and well-nourished. No distress.   HENT:   Head: Normocephalic.   Right Ear: Hearing, tympanic membrane, external ear and ear canal normal.   Left Ear: Hearing, tympanic membrane and external ear normal. There is drainage and swelling.   Nose: Nose normal.   Eyes: Conjunctivae are normal.   Neck: Normal range of motion. Neck supple.   Cardiovascular: Normal rate, regular rhythm and normal  heart sounds.    No murmur heard.  Pulmonary/Chest: Effort normal and breath sounds normal. He has no wheezes.   Musculoskeletal: He exhibits no edema or deformity.   Lymphadenopathy:     He has no cervical adenopathy.   Neurological: He is alert and oriented to person, place, and time. No cranial nerve deficit.   Skin: Skin is warm and dry.   Psychiatric: His behavior is normal.   Nursing note and vitals reviewed.        Assessment/Plan   Thien was seen today for earache.    Diagnoses and all orders for this visit:    Acute swimmer's ear of left side  -     ciprofloxacin-hydrocortisone (CIPRO HC) 0.2-1 % otic suspension; Administer 3 drops into the left ear 2 (Two) Times a Day for 7 days.    Screening for colon cancer  -     Ambulatory Referral For Screening Colonoscopy    H/O tobacco use, presenting hazards to health  -     US aorta limited    Need for immunization against influenza  -     Flu Vaccine High Dose PF 65YR+ ([id number])      Abx gtt to improve otitis externa. Follow up if no improvement.   Screening colonoscopy ordered, along with screening AAA US  Influenza updated today.

## 2018-10-10 NOTE — PATIENT INSTRUCTIONS
"Go to the nearest ER or return to clinic if symptoms worsen, fever/chill develop      Otitis Externa  Otitis externa is an infection of the outer ear canal. The outer ear canal is the area between the outside of the ear and the eardrum. Otitis externa is sometimes called \"swimmer's ear.\"  Follow these instructions at home:  · If you were given antibiotic ear drops, use them as told by your doctor. Do not stop using them even if your condition gets better.  · Take over-the-counter and prescription medicines only as told by your doctor.  · Keep all follow-up visits as told by your doctor. This is important.  How is this prevented?  · Keep your ear dry. Use the corner of a towel to dry your ear after you swim or bathe.  · Try not to scratch or put things in your ear. Doing these things makes it easier for germs to grow in your ear.  · Avoid swimming in lakes, dirty water, or pools that may not have the right amount of a chemical called chlorine.  · Consider making ear drops and putting 3 or 4 drops in each ear after you swim. Ask your doctor about how you can make ear drops.  Contact a doctor if:  · You have a fever.  · After 3 days your ear is still red, swollen, or painful.  · After 3 days you still have pus coming from your ear.  · Your redness, swelling, or pain gets worse.  · You have a really bad headache.  · You have redness, swelling, pain, or tenderness behind your ear.  This information is not intended to replace advice given to you by your health care provider. Make sure you discuss any questions you have with your health care provider.  Document Released: 06/05/2009 Document Revised: 01/12/2017 Document Reviewed: 09/26/2016  [employer] Interactive Patient Education © 2018 [employer]    "

## 2018-10-17 ENCOUNTER — HOSPITAL ENCOUNTER (OUTPATIENT)
Dept: ULTRASOUND IMAGING | Facility: HOSPITAL | Age: 63
Discharge: HOME | End: 2018-10-17
Attending: FAMILY MEDICINE | Admitting: FAMILY MEDICINE

## 2018-10-17 PROCEDURE — 76775 US EXAM ABDO BACK WALL LIM: CPT

## 2018-11-30 ENCOUNTER — OFFICE VISIT (OUTPATIENT)
Dept: FAMILY MEDICINE CLINIC | Facility: CLINIC | Age: 63
End: 2018-11-30

## 2018-11-30 ENCOUNTER — RESULTS ENCOUNTER (OUTPATIENT)
Dept: FAMILY MEDICINE CLINIC | Facility: CLINIC | Age: 63
End: 2018-11-30

## 2018-11-30 VITALS
SYSTOLIC BLOOD PRESSURE: 128 MMHG | DIASTOLIC BLOOD PRESSURE: 76 MMHG | HEART RATE: 64 BPM | BODY MASS INDEX: 36.45 KG/M2 | RESPIRATION RATE: 20 BRPM | TEMPERATURE: 98.8 F | WEIGHT: 254 LBS

## 2018-11-30 DIAGNOSIS — Z23 NEED FOR 23-POLYVALENT PNEUMOCOCCAL POLYSACCHARIDE VACCINE: ICD-10-CM

## 2018-11-30 DIAGNOSIS — Z12.11 SCREENING FOR COLON CANCER: ICD-10-CM

## 2018-11-30 DIAGNOSIS — E78.2 MIXED HYPERLIPIDEMIA: Primary | ICD-10-CM

## 2018-11-30 DIAGNOSIS — I25.10 CORONARY ARTERY DISEASE INVOLVING NATIVE HEART WITHOUT ANGINA PECTORIS, UNSPECIFIED VESSEL OR LESION TYPE: ICD-10-CM

## 2018-11-30 DIAGNOSIS — H60.332 ACUTE SWIMMER'S EAR OF LEFT SIDE: ICD-10-CM

## 2018-11-30 PROCEDURE — 99214 OFFICE O/P EST MOD 30 MIN: CPT | Performed by: FAMILY MEDICINE

## 2018-11-30 PROCEDURE — G0009 ADMIN PNEUMOCOCCAL VACCINE: HCPCS | Performed by: FAMILY MEDICINE

## 2018-11-30 PROCEDURE — 90732 PPSV23 VACC 2 YRS+ SUBQ/IM: CPT | Performed by: FAMILY MEDICINE

## 2018-11-30 RX ORDER — PRAVASTATIN SODIUM 40 MG
40 TABLET ORAL DAILY
Qty: 90 TABLET | Refills: 3 | Status: SHIPPED | OUTPATIENT
Start: 2018-11-30 | End: 2019-12-11 | Stop reason: SDUPTHER

## 2018-11-30 NOTE — PROGRESS NOTES
Subjective   Thien Gray is a 63 y.o. male.   Chief Complaint   Patient presents with   • Follow-up     cholesterol      Hyperlipidemia   This is a chronic problem. The current episode started more than 1 year ago. The problem is controlled. Recent lipid tests were reviewed and are normal. Exacerbating diseases include obesity. Factors aggravating his hyperlipidemia include fatty foods (History of tobacco abuse). Pertinent negatives include no myalgias or shortness of breath. Current antihyperlipidemic treatment includes statins. The current treatment provides significant improvement of lipids. Compliance problems include adherence to exercise.  Risk factors for coronary artery disease include obesity, male sex, dyslipidemia and a sedentary lifestyle.     He continues to have pain in his left ear. He had left over abx gtt from a previous treatment of otitis externa, resumed that and it did improve. He now feels like fluid is built up in his left ear, some drainage is still present.     The following portions of the patient's history were reviewed and updated as appropriate: allergies, current medications, past family history, past medical history, past social history, past surgical history and problem list.    Review of Systems   Constitutional: Negative for chills, fatigue and fever.   HENT: Positive for ear pain.    Eyes: Negative.    Respiratory: Negative for cough, chest tightness and shortness of breath.    Musculoskeletal: Negative for myalgias.   Skin: Negative for rash.   Neurological: Negative for light-headedness, headache and confusion.   Hematological: Negative for adenopathy. Does not bruise/bleed easily.   Psychiatric/Behavioral: Negative.        Objective   Physical Exam   Constitutional: He is oriented to person, place, and time. He appears well-developed and well-nourished. No distress.   HENT:   Head: Normocephalic.   Right Ear: Hearing, tympanic membrane, external ear and ear canal normal.    Left Ear: Tympanic membrane and external ear normal. There is drainage (purulent white drainage).   Nose: Nose normal.   Eyes: Conjunctivae are normal.   Neck: Normal range of motion. Neck supple.   Cardiovascular: Normal rate, regular rhythm and normal heart sounds.   No murmur heard.  Pulmonary/Chest: Effort normal and breath sounds normal. He has no wheezes.   Abdominal: Soft.   Musculoskeletal: He exhibits no edema or deformity.   Lymphadenopathy:     He has no cervical adenopathy.   Neurological: He is alert and oriented to person, place, and time.   Skin: Skin is warm and dry.   Psychiatric: He has a normal mood and affect. His behavior is normal.   Nursing note and vitals reviewed.        Assessment/Plan   Thien was seen today for follow-up.    Diagnoses and all orders for this visit:    Mixed hyperlipidemia  -     CBC & Differential; Future  -     Comprehensive Metabolic Panel; Future  -     Lipid Panel; Future  -     pravastatin (PRAVACHOL) 40 MG tablet; Take 1 tablet by mouth Daily.  -     Lipid Panel  -     Comprehensive Metabolic Panel  -     CBC & Differential    Coronary artery disease involving native heart without angina pectoris, unspecified vessel or lesion type  -     CBC & Differential; Future  -     Comprehensive Metabolic Panel; Future  -     Lipid Panel; Future  -     pravastatin (PRAVACHOL) 40 MG tablet; Take 1 tablet by mouth Daily.  -     Lipid Panel  -     Comprehensive Metabolic Panel  -     CBC & Differential    Acute swimmer's ear of left side    Screening for colon cancer  -     Cologuard - Stool, Per Rectum; Future    Need for 23-polyvalent pneumococcal polysaccharide vaccine  -     Pneumococcal Polysaccharide Vaccine 23-Valent (PPSV23) Greater Than or Equal To 1yo Subcutaneous / IM      Labs ordered, will complete fasting.   Pneumovax 23 updated.   Advised him to resume antibiotic treatment of left ear to resolve otitis externa. Advised to keep ear canal dry to prevent future  infections.   He only recently started pravastatin, advised to continue.   Unable to complete screening colonoscopy due to no transportation. He would like to complete cologuard instead.

## 2018-11-30 NOTE — PATIENT INSTRUCTIONS
"Go to the nearest ER or return to clinic if symptoms worsen, fever/chill develop      Otitis Externa  Otitis externa is an infection of the outer ear canal. The outer ear canal is the area between the outside of the ear and the eardrum. Otitis externa is sometimes called \"swimmer's ear.\"  Follow these instructions at home:  · If you were given antibiotic ear drops, use them as told by your doctor. Do not stop using them even if your condition gets better.  · Take over-the-counter and prescription medicines only as told by your doctor.  · Keep all follow-up visits as told by your doctor. This is important.  How is this prevented?  · Keep your ear dry. Use the corner of a towel to dry your ear after you swim or bathe.  · Try not to scratch or put things in your ear. Doing these things makes it easier for germs to grow in your ear.  · Avoid swimming in lakes, dirty water, or pools that may not have the right amount of a chemical called chlorine.  · Consider making ear drops and putting 3 or 4 drops in each ear after you swim. Ask your doctor about how you can make ear drops.  Contact a doctor if:  · You have a fever.  · After 3 days your ear is still red, swollen, or painful.  · After 3 days you still have pus coming from your ear.  · Your redness, swelling, or pain gets worse.  · You have a really bad headache.  · You have redness, swelling, pain, or tenderness behind your ear.  This information is not intended to replace advice given to you by your health care provider. Make sure you discuss any questions you have with your health care provider.  Document Released: 06/05/2009 Document Revised: 01/12/2017 Document Reviewed: 09/26/2016  [employer] Interactive Patient Education © 2018 [employer]    "

## 2019-01-29 ENCOUNTER — RESULTS ENCOUNTER (OUTPATIENT)
Dept: FAMILY MEDICINE CLINIC | Facility: CLINIC | Age: 64
End: 2019-01-29

## 2019-01-29 ENCOUNTER — TELEPHONE (OUTPATIENT)
Dept: FAMILY MEDICINE CLINIC | Facility: CLINIC | Age: 64
End: 2019-01-29

## 2019-01-29 DIAGNOSIS — Z12.11 SCREENING FOR COLON CANCER: Primary | ICD-10-CM

## 2019-01-29 DIAGNOSIS — Z12.11 SCREENING FOR COLON CANCER: ICD-10-CM

## 2019-01-29 NOTE — TELEPHONE ENCOUNTER
He would need to be seen and have ears looked at again to determine if he still needs ear drops. I haven't seen him since Nov 2018    Cologuard ordered. Please fax.

## 2019-01-29 NOTE — TELEPHONE ENCOUNTER
----- Message from Fior Castaneda sent at 1/29/2019 11:03 AM EST -----  Contact: margareth, patient  Refill Cipro ear drops, [facility] [address] (usual pharm), [phone] may leave a message. Patient aware Margareth out today

## 2019-01-29 NOTE — TELEPHONE ENCOUNTER
----- Message from Fior Castaneda sent at 1/29/2019 11:06 AM EST -----  Contact: nataliia, patient  Patient would like to have a cologuard rather that a colonoscopy, can you order? [phone] may leave a message

## 2019-01-31 NOTE — TELEPHONE ENCOUNTER
Patient informed. He used the last of the drops and said it feels better. Will call back or appt if it doesn't continue to improve. Informed him order sent for cologuard as well.

## 2019-03-27 ENCOUNTER — OFFICE VISIT (OUTPATIENT)
Dept: FAMILY MEDICINE CLINIC | Facility: CLINIC | Age: 64
End: 2019-03-27

## 2019-03-27 VITALS
HEART RATE: 94 BPM | BODY MASS INDEX: 37.51 KG/M2 | SYSTOLIC BLOOD PRESSURE: 130 MMHG | OXYGEN SATURATION: 98 % | HEIGHT: 70 IN | DIASTOLIC BLOOD PRESSURE: 78 MMHG | WEIGHT: 262 LBS | RESPIRATION RATE: 18 BRPM | TEMPERATURE: 97.6 F

## 2019-03-27 DIAGNOSIS — L08.9 INFECTION OF SKIN OF FINGER: Primary | ICD-10-CM

## 2019-03-27 PROCEDURE — 99213 OFFICE O/P EST LOW 20 MIN: CPT | Performed by: PHYSICIAN ASSISTANT

## 2019-03-27 RX ORDER — CEPHALEXIN 500 MG/1
500 CAPSULE ORAL 2 TIMES DAILY
Qty: 20 CAPSULE | Refills: 0 | Status: SHIPPED | OUTPATIENT
Start: 2019-03-27 | End: 2019-06-07

## 2019-03-27 NOTE — PROGRESS NOTES
"Priyanka Gray is a 63 y.o. male.     History of Present Illness   Skin of R index finger around nail has been red, inflamed, and tender X 2 days. Hx of similar infections. No drainage. No nail abnormalities. No cuts in skin or suspected injury    No systemic symptoms   Pt is not a diabetic   Has not been doing anything for current symptoms     The following portions of the patient's history were reviewed and updated as appropriate: allergies, current medications, past family history, past medical history, past social history, past surgical history and problem list.    Review of Systems   Constitutional: Negative.  Negative for chills, diaphoresis, fatigue and fever.   HENT: Negative.  Negative for congestion, ear discharge, ear pain, hearing loss, nosebleeds, postnasal drip, sinus pressure, sneezing and sore throat.    Eyes: Negative.    Respiratory: Negative.  Negative for cough, chest tightness, shortness of breath and wheezing.    Cardiovascular: Negative.  Negative for chest pain, palpitations and leg swelling.   Gastrointestinal: Negative for abdominal distention, abdominal pain, blood in stool, constipation, diarrhea, nausea and vomiting.   Genitourinary: Negative.  Negative for difficulty urinating, dysuria, flank pain, frequency, hematuria and urgency.   Musculoskeletal: Negative.  Negative for arthralgias, back pain, gait problem, joint swelling, myalgias, neck pain and neck stiffness.   Skin:        As noted per HPI    Neurological: Negative for dizziness, syncope, weakness, light-headedness, numbness and headaches.       Objective    Blood pressure 130/78, pulse 94, temperature 97.6 °F (36.4 °C), temperature source Temporal, resp. rate 18, height 177.8 cm (70\"), weight 119 kg (262 lb), SpO2 98 %.     Physical Exam   Constitutional: He is oriented to person, place, and time. He appears well-developed and well-nourished.   HENT:   Head: Normocephalic and atraumatic.   Right Ear: External ear " normal.   Left Ear: External ear normal.   Nose: Nose normal.   Mouth/Throat: Oropharynx is clear and moist.   Eyes: Conjunctivae are normal.   Cardiovascular: Normal rate, regular rhythm, normal heart sounds and intact distal pulses. Exam reveals no gallop and no friction rub.   No murmur heard.  Pulmonary/Chest: Effort normal and breath sounds normal. No respiratory distress. He has no wheezes. He has no rales. He exhibits no tenderness.   Neurological: He is alert and oriented to person, place, and time.   Skin: Skin is warm and dry.   Erythema and edema of skin surrounding nail of R index finger   No drainage  Mild TTP   Skin is intact   No obvious nail deformity or ingrown nail    Psychiatric: He has a normal mood and affect. His behavior is normal. Judgment and thought content normal.   Nursing note and vitals reviewed.      Assessment/Plan   Thien was seen today for hand pain.    Diagnoses and all orders for this visit:    Infection of skin of finger  -     cephalexin (KEFLEX) 500 MG capsule; Take 1 capsule by mouth 2 (Two) Times a Day.      Treatment as outlined in plan along with warm epsom water soaks as directed. F/U INB

## 2019-04-01 ENCOUNTER — TELEPHONE (OUTPATIENT)
Dept: FAMILY MEDICINE CLINIC | Facility: CLINIC | Age: 64
End: 2019-04-01

## 2019-04-01 RX ORDER — SULFAMETHOXAZOLE AND TRIMETHOPRIM 800; 160 MG/1; MG/1
1 TABLET ORAL 2 TIMES DAILY
Qty: 14 TABLET | Refills: 0 | Status: SHIPPED | OUTPATIENT
Start: 2019-04-01 | End: 2019-04-08

## 2019-04-01 NOTE — TELEPHONE ENCOUNTER
----- Message from Huey Martin Rep sent at 4/1/2019  8:54 AM EDT -----  Contact: PT; VALDEMAR  PATIENT SAW [facility] LAST WEEK FOR A FINGER INFECTION AND HE STATES IT HAS GOT WORSE HE WANTS TO KNOW CAN HE GET ANOTHER ANTIBIOTIC CALLED IN.    PENNY ALEJANDRE    PT: [phone]

## 2019-05-17 DIAGNOSIS — Z12.11 COLON CANCER SCREENING: ICD-10-CM

## 2019-05-17 DIAGNOSIS — R19.5 POSITIVE COLORECTAL CANCER SCREENING USING COLOGUARD TEST: Primary | ICD-10-CM

## 2019-06-07 ENCOUNTER — OFFICE VISIT (OUTPATIENT)
Dept: FAMILY MEDICINE CLINIC | Facility: CLINIC | Age: 64
End: 2019-06-07

## 2019-06-07 VITALS
RESPIRATION RATE: 20 BRPM | TEMPERATURE: 97.5 F | WEIGHT: 260 LBS | HEIGHT: 70 IN | SYSTOLIC BLOOD PRESSURE: 118 MMHG | HEART RATE: 90 BPM | DIASTOLIC BLOOD PRESSURE: 76 MMHG | OXYGEN SATURATION: 96 % | BODY MASS INDEX: 37.22 KG/M2

## 2019-06-07 DIAGNOSIS — Z23 NEED FOR DIPHTHERIA-TETANUS-PERTUSSIS (TDAP) VACCINE: ICD-10-CM

## 2019-06-07 DIAGNOSIS — E78.2 MIXED HYPERLIPIDEMIA: Primary | ICD-10-CM

## 2019-06-07 DIAGNOSIS — I25.10 CORONARY ARTERY DISEASE INVOLVING NATIVE HEART WITHOUT ANGINA PECTORIS, UNSPECIFIED VESSEL OR LESION TYPE: ICD-10-CM

## 2019-06-07 DIAGNOSIS — L82.1 SEBORRHEIC KERATOSIS: ICD-10-CM

## 2019-06-07 DIAGNOSIS — Z12.5 SCREENING FOR PROSTATE CANCER: ICD-10-CM

## 2019-06-07 LAB
ALBUMIN SERPL-MCNC: 4.8 G/DL (ref 3.5–5.2)
ALBUMIN/GLOB SERPL: 1.7 G/DL
ALP SERPL-CCNC: 70 U/L (ref 39–117)
ALT SERPL-CCNC: 53 U/L (ref 1–41)
AST SERPL-CCNC: 32 U/L (ref 1–40)
BASOPHILS # BLD AUTO: 0.03 10*3/MM3 (ref 0–0.2)
BASOPHILS NFR BLD AUTO: 0.5 % (ref 0–1.5)
BILIRUB SERPL-MCNC: 0.5 MG/DL (ref 0.2–1.2)
BUN SERPL-MCNC: 14 MG/DL (ref 8–23)
BUN/CREAT SERPL: 16.5 (ref 7–25)
CALCIUM SERPL-MCNC: 10.3 MG/DL (ref 8.6–10.5)
CHLORIDE SERPL-SCNC: 101 MMOL/L (ref 98–107)
CHOLEST SERPL-MCNC: 151 MG/DL (ref 0–200)
CO2 SERPL-SCNC: 27.1 MMOL/L (ref 22–29)
CREAT SERPL-MCNC: 0.85 MG/DL (ref 0.76–1.27)
EOSINOPHIL # BLD AUTO: 0.35 10*3/MM3 (ref 0–0.4)
EOSINOPHIL NFR BLD AUTO: 5.4 % (ref 0.3–6.2)
ERYTHROCYTE [DISTWIDTH] IN BLOOD BY AUTOMATED COUNT: 13.3 % (ref 12.3–15.4)
GLOBULIN SER CALC-MCNC: 2.8 GM/DL
GLUCOSE SERPL-MCNC: 119 MG/DL (ref 65–99)
HCT VFR BLD AUTO: 48.9 % (ref 37.5–51)
HDLC SERPL-MCNC: 44 MG/DL (ref 40–60)
HGB BLD-MCNC: 15.8 G/DL (ref 13–17.7)
IMM GRANULOCYTES # BLD AUTO: 0.04 10*3/MM3 (ref 0–0.05)
IMM GRANULOCYTES NFR BLD AUTO: 0.6 % (ref 0–0.5)
LDLC SERPL CALC-MCNC: 74 MG/DL (ref 0–100)
LYMPHOCYTES # BLD AUTO: 1.66 10*3/MM3 (ref 0.7–3.1)
LYMPHOCYTES NFR BLD AUTO: 25.7 % (ref 19.6–45.3)
MCH RBC QN AUTO: 30.4 PG (ref 26.6–33)
MCHC RBC AUTO-ENTMCNC: 32.3 G/DL (ref 31.5–35.7)
MCV RBC AUTO: 94 FL (ref 79–97)
MONOCYTES # BLD AUTO: 0.64 10*3/MM3 (ref 0.1–0.9)
MONOCYTES NFR BLD AUTO: 9.9 % (ref 5–12)
NEUTROPHILS # BLD AUTO: 3.75 10*3/MM3 (ref 1.7–7)
NEUTROPHILS NFR BLD AUTO: 57.9 % (ref 42.7–76)
NRBC BLD AUTO-RTO: 0 /100 WBC (ref 0–0.2)
PLATELET # BLD AUTO: 243 10*3/MM3 (ref 140–450)
POTASSIUM SERPL-SCNC: 4.5 MMOL/L (ref 3.5–5.2)
PROT SERPL-MCNC: 7.6 G/DL (ref 6–8.5)
PSA SERPL-MCNC: 2.54 NG/ML (ref 0–4)
RBC # BLD AUTO: 5.2 10*6/MM3 (ref 4.14–5.8)
SODIUM SERPL-SCNC: 138 MMOL/L (ref 136–145)
TRIGL SERPL-MCNC: 165 MG/DL (ref 0–150)
VLDLC SERPL CALC-MCNC: 33 MG/DL
WBC # BLD AUTO: 6.47 10*3/MM3 (ref 3.4–10.8)

## 2019-06-07 PROCEDURE — 90471 IMMUNIZATION ADMIN: CPT | Performed by: FAMILY MEDICINE

## 2019-06-07 PROCEDURE — 99214 OFFICE O/P EST MOD 30 MIN: CPT | Performed by: FAMILY MEDICINE

## 2019-06-07 PROCEDURE — 90715 TDAP VACCINE 7 YRS/> IM: CPT | Performed by: FAMILY MEDICINE

## 2019-06-07 NOTE — PATIENT INSTRUCTIONS
"Go to the nearest ER or return to clinic if symptoms worsen, fever/chill develop  Seborrheic Keratosis  Seborrheic keratosis is a common, noncancerous (benign) skin growth. This condition causes waxy, rough, tan, brown, or black spots to appear on the skin. These skin growths can be flat or raised.  What are the causes?  The cause of this condition is not known.  What increases the risk?  This condition is more likely to develop in:  · People who have a family history of seborrheic keratosis.  · People who are 50 or older.  · People who are pregnant.  · People who have had estrogen replacement therapy.    What are the signs or symptoms?  This condition often occurs on the face, chest, shoulders, back, or other areas. These growths:  · Are usually painless, but may become irritated and itchy.  · Can be yellow, brown, black, or other colors.  · Are slightly raised or have a flat surface.  · Are sometimes rough or wart-like in texture.  · Are often waxy on the surface.  · Are round or oval-shaped.  · Sometimes look like they are \"stuck on.”  · Often occur in groups, but may occur as a single growth.    How is this diagnosed?  This condition is diagnosed with a medical history and physical exam. A sample of the growth may be tested (skin biopsy). You may need to see a skin specialist (dermatologist).  How is this treated?  Treatment is not usually needed for this condition, unless the growths are irritated or are often bleeding. You may also choose to have the growths removed if you do not like their appearance. Most commonly, these growths are treated with a procedure in which liquid nitrogen is applied to “freeze” off the growth (cryosurgery). They may also be burned off with electricity or cut off.  Follow these instructions at home:  · Watch your growth for any changes.  · Keep all follow-up visits as told by your health care provider. This is important.  · Do not scratch or pick at the growth or growths. This can " cause them to become irritated or infected.  Contact a health care provider if:  · You suddenly have many new growths.  · Your growth bleeds, itches, or hurts.  · Your growth suddenly becomes larger or changes color.  This information is not intended to replace advice given to you by your health care provider. Make sure you discuss any questions you have with your health care provider.  Document Released: 01/20/2012 Document Revised: 05/25/2017 Document Reviewed: 05/04/2016  Elsevier Interactive Patient Education © 2019 [employer]

## 2019-06-07 NOTE — PROGRESS NOTES
Subjective   Thien Gray is a 63 y.o. male.   Chief Complaint   Patient presents with   • Hyperlipidemia     6 month follow up      Hyperlipidemia   This is a chronic problem. The current episode started more than 1 year ago. The problem is controlled. Recent lipid tests were reviewed and are normal. Exacerbating diseases include obesity. There are no known factors aggravating his hyperlipidemia. Pertinent negatives include no chest pain or shortness of breath. Current antihyperlipidemic treatment includes statins. The current treatment provides significant improvement of lipids. Compliance problems include adherence to exercise.  Risk factors for coronary artery disease include dyslipidemia, male sex and obesity.      He has a skin lesion on his right lower back for 4-5 months. It hasn't been changing, but is itchy.   He is unable to monitor it for changes, since it is on his back.     Recently had +Cologuard screening. He is struggling to schedule a colonoscopy due to not having a . He has been instructed to contact [employer], which could possibly provide a volunteer or help from local churches. He has yet to do this.     The following portions of the patient's history were reviewed and updated as appropriate: allergies, current medications, past family history, past medical history, past social history, past surgical history and problem list.    Review of Systems   Constitutional: Negative for fatigue and fever.   HENT: Negative.    Eyes: Negative.    Respiratory: Negative for cough, chest tightness and shortness of breath.    Cardiovascular: Negative for chest pain, palpitations and leg swelling.   Skin: Positive for skin lesions. Negative for rash.   Neurological: Negative for light-headedness and headache.   Hematological: Negative for adenopathy.   Psychiatric/Behavioral: Negative.        Objective   Physical Exam   Constitutional: He is oriented to person, place, and time. He appears well-developed and  well-nourished. No distress.   HENT:   Head: Normocephalic.   Right Ear: External ear normal.   Left Ear: External ear normal.   Nose: Nose normal.   Eyes: Conjunctivae are normal.   Neck: Neck supple.   Cardiovascular: Normal rate, regular rhythm and normal heart sounds.   No murmur heard.  Pulmonary/Chest: Effort normal and breath sounds normal. He has no wheezes.   Musculoskeletal: He exhibits no edema.   Lymphadenopathy:     He has no cervical adenopathy.   Neurological: He is alert and oriented to person, place, and time.   Skin: Skin is warm and dry.        Psychiatric: He has a normal mood and affect. His behavior is normal.   Nursing note and vitals reviewed.        Assessment/Plan   Thien was seen today for hyperlipidemia.    Diagnoses and all orders for this visit:    Mixed hyperlipidemia  -     CBC & Differential  -     Comprehensive Metabolic Panel  -     Lipid Panel    Coronary artery disease involving native heart without angina pectoris, unspecified vessel or lesion type    Seborrheic keratosis    Screening for prostate cancer  -     PSA Screen    Need for diphtheria-tetanus-pertussis (Tdap) vaccine  -     Tdap Vaccine Greater Than or Equal To 8yo IM      Recommend cardiac diet, start routine exercise as tolerated.   Tdap updated today.   Reassured him about skin lesion on back. Cryotherapy offered to treat, however he declined at this time.

## 2019-06-10 LAB
HBA1C MFR BLD: 5.6 % (ref 4.8–5.6)
Lab: NORMAL
WRITTEN AUTHORIZATION: NORMAL

## 2019-08-15 ENCOUNTER — OFFICE VISIT (OUTPATIENT)
Dept: FAMILY MEDICINE CLINIC | Facility: CLINIC | Age: 64
End: 2019-08-15

## 2019-08-15 VITALS
WEIGHT: 260 LBS | RESPIRATION RATE: 18 BRPM | SYSTOLIC BLOOD PRESSURE: 112 MMHG | TEMPERATURE: 97.2 F | HEART RATE: 88 BPM | BODY MASS INDEX: 37.31 KG/M2 | DIASTOLIC BLOOD PRESSURE: 78 MMHG

## 2019-08-15 DIAGNOSIS — H60.332 ACUTE SWIMMER'S EAR OF LEFT SIDE: ICD-10-CM

## 2019-08-15 DIAGNOSIS — H25.9 SENILE CATARACT, UNSPECIFIED AGE-RELATED CATARACT TYPE, UNSPECIFIED LATERALITY: ICD-10-CM

## 2019-08-15 DIAGNOSIS — H66.002 ACUTE SUPPURATIVE OTITIS MEDIA OF LEFT EAR WITHOUT SPONTANEOUS RUPTURE OF TYMPANIC MEMBRANE, RECURRENCE NOT SPECIFIED: ICD-10-CM

## 2019-08-15 DIAGNOSIS — M17.0 PRIMARY OSTEOARTHRITIS OF BOTH KNEES: Primary | ICD-10-CM

## 2019-08-15 PROCEDURE — 99214 OFFICE O/P EST MOD 30 MIN: CPT | Performed by: FAMILY MEDICINE

## 2019-08-15 PROCEDURE — 20610 DRAIN/INJ JOINT/BURSA W/O US: CPT | Performed by: FAMILY MEDICINE

## 2019-08-15 RX ORDER — LEVOFLOXACIN 500 MG/1
500 TABLET, FILM COATED ORAL DAILY
Qty: 7 TABLET | Refills: 0 | Status: SHIPPED | OUTPATIENT
Start: 2019-08-15 | End: 2019-08-22

## 2019-08-15 RX ORDER — TRIAMCINOLONE ACETONIDE 40 MG/ML
40 INJECTION, SUSPENSION INTRA-ARTICULAR; INTRAMUSCULAR
Status: DISCONTINUED | OUTPATIENT
Start: 2019-08-15 | End: 2019-08-15 | Stop reason: HOSPADM

## 2019-08-15 RX ORDER — CIPROFLOXACIN AND DEXAMETHASONE 3; 1 MG/ML; MG/ML
4 SUSPENSION/ DROPS AURICULAR (OTIC) 2 TIMES DAILY
Qty: 7.5 ML | Refills: 0 | Status: SHIPPED | OUTPATIENT
Start: 2019-08-15 | End: 2019-08-22

## 2019-08-15 RX ORDER — LIDOCAINE HYDROCHLORIDE 10 MG/ML
1 INJECTION, SOLUTION INFILTRATION; PERINEURAL
Status: DISCONTINUED | OUTPATIENT
Start: 2019-08-15 | End: 2019-08-15 | Stop reason: HOSPADM

## 2019-08-15 RX ADMIN — TRIAMCINOLONE ACETONIDE 40 MG: 40 INJECTION, SUSPENSION INTRA-ARTICULAR; INTRAMUSCULAR at 14:32

## 2019-08-15 RX ADMIN — LIDOCAINE HYDROCHLORIDE 1 ML: 10 INJECTION, SOLUTION INFILTRATION; PERINEURAL at 14:32

## 2019-08-15 NOTE — PATIENT INSTRUCTIONS
"Go to the nearest ER or return to clinic if symptoms worsen, fever/chill develop    Otitis Externa    Otitis externa is an infection of the outer ear canal. The outer ear canal is the area between the outside of the ear and the eardrum. Otitis externa is sometimes called \"swimmer's ear.\"  Follow these instructions at home:  · If you were given antibiotic ear drops, use them as told by your doctor. Do not stop using them even if your condition gets better.  · Take over-the-counter and prescription medicines only as told by your doctor.  · Keep all follow-up visits as told by your doctor. This is important.  How is this prevented?  · Keep your ear dry. Use the corner of a towel to dry your ear after you swim or bathe.  · Try not to scratch or put things in your ear. Doing these things makes it easier for germs to grow in your ear.  · Avoid swimming in lakes, dirty water, or pools that may not have the right amount of a chemical called chlorine.  · Consider making ear drops and putting 3 or 4 drops in each ear after you swim. Ask your doctor about how you can make ear drops.  Contact a doctor if:  · You have a fever.  · After 3 days your ear is still red, swollen, or painful.  · After 3 days you still have pus coming from your ear.  · Your redness, swelling, or pain gets worse.  · You have a really bad headache.  · You have redness, swelling, pain, or tenderness behind your ear.  This information is not intended to replace advice given to you by your health care provider. Make sure you discuss any questions you have with your health care provider.  Document Released: 06/05/2009 Document Revised: 01/12/2017 Document Reviewed: 09/26/2016  [employer] Interactive Patient Education © 2019 [employer].    "

## 2019-08-15 NOTE — PROGRESS NOTES
Subjective   Thien Gray is a 64 y.o. male.   Chief Complaint   Patient presents with   • Knee Pain     requesting referral for injections    • Earache     left      Knee Pain    There was no injury mechanism. The pain is present in the right knee and left knee. The quality of the pain is described as aching. The pain is at a severity of 5/10. The pain is moderate. The pain has been constant since onset. Pertinent negatives include no inability to bear weight or numbness. He reports no foreign bodies present. The symptoms are aggravated by movement. He has tried nothing for the symptoms. The treatment provided no relief.   Earache    There is pain in the left ear. This is a new problem. The current episode started in the past 7 days. The problem occurs constantly. The problem has been unchanged. There has been no fever. Associated symptoms include ear discharge. Pertinent negatives include no coughing, rash, rhinorrhea or sore throat. He has tried nothing for the symptoms. The treatment provided no relief.     He was told years ago during a routine eye exam he does have cataracts present.  He is requesting a referral to ophthalmology for further evaluation and discuss removal of cataracts.    The following portions of the patient's history were reviewed and updated as appropriate: allergies, current medications, past family history, past medical history, past social history, past surgical history and problem list.    Review of Systems   Constitutional: Negative for fever.   HENT: Positive for ear discharge and ear pain. Negative for congestion, rhinorrhea and sore throat.    Respiratory: Negative for cough.    Musculoskeletal: Positive for arthralgias. Negative for gait problem and joint swelling.   Skin: Negative for color change and rash.   Neurological: Negative for numbness.       Objective   Physical Exam   Constitutional: He is oriented to person, place, and time. He appears well-developed and  "well-nourished. No distress.   HENT:   Head: Normocephalic.   Right Ear: Tympanic membrane, external ear and ear canal normal.   Left Ear: Hearing and external ear normal. There is drainage, swelling and tenderness. Tympanic membrane is injected. A middle ear effusion is present.   Nose: Nose normal.   Mouth/Throat: Uvula is midline, oropharynx is clear and moist and mucous membranes are normal.   Eyes: Conjunctivae are normal.   Neck: Neck supple.   Cardiovascular: Normal rate, regular rhythm and normal heart sounds.   No murmur heard.  Pulmonary/Chest: Effort normal and breath sounds normal. He has no wheezes.   Musculoskeletal: He exhibits no edema or deformity.        Right knee: He exhibits normal range of motion, no swelling and no effusion. No tenderness found.        Left knee: He exhibits normal range of motion, no swelling and no effusion. No tenderness found.   Crepitus present in bilateral knees   Lymphadenopathy:     He has no cervical adenopathy.   Neurological: He is alert and oriented to person, place, and time.   Skin: Skin is warm and dry.   Psychiatric: His behavior is normal.   Nursing note and vitals reviewed.    Arthrocentesis  Date/Time: 8/15/2019 2:32 PM  Performed by: Sanaz Zuluaga DO  Authorized by: Sanaz Zuluaga DO   Consent: Verbal consent obtained. Written consent obtained.  Risks and benefits: risks, benefits and alternatives were discussed  Consent given by: patient  Patient understanding: patient states understanding of the procedure being performed  Patient consent: the patient's understanding of the procedure matches consent given  Procedure consent: procedure consent matches procedure scheduled  Relevant documents: relevant documents present and verified  Required items: required blood products, implants, devices, and special equipment available  Patient identity confirmed: verbally with patient  Time out: Immediately prior to procedure a \"time out\" was called to verify " the correct patient, procedure, equipment, support staff and site/side marked as required.  Indications: pain   Body area: knee  Joint: right knee  Local anesthesia used: no    Anesthesia:  Local anesthesia used: no    Sedation:  Patient sedated: no    Preparation: Patient was prepped and draped in the usual sterile fashion.  Needle size: 22 G  Ultrasound guidance: no  Approach: anterior  Patient tolerance: Patient tolerated the procedure well with no immediate complications            Assessment/Plan   Thien was seen today for knee pain and earache.    Diagnoses and all orders for this visit:    Primary osteoarthritis of both knees  -     XR Knee 1 or 2 View Bilateral  -     Arthrocentesis    Senile cataract, unspecified age-related cataract type, unspecified laterality  -     Ambulatory Referral to Ophthalmology    Acute swimmer's ear of left side  -     ciprofloxacin-dexamethasone (CIPRODEX) 0.3-0.1 % otic suspension; Administer 4 drops into the left ear 2 (Two) Times a Day for 7 days.    Acute suppurative otitis media of left ear without spontaneous rupture of tympanic membrane, recurrence not specified  -     levoFLOXacin (LEVAQUIN) 500 MG tablet; Take 1 tablet by mouth Daily for 7 days.      Joint injection performed today on the right knee.  He will complete a x-ray of bilateral knees.  Consider consult with orthopedist if symptoms persist.  Antibiotic drops and oral antibiotic to treat both acute otitis externa and media.  Referred to ophthalmology per his request.

## 2019-12-11 ENCOUNTER — OFFICE VISIT (OUTPATIENT)
Dept: FAMILY MEDICINE CLINIC | Facility: CLINIC | Age: 64
End: 2019-12-11

## 2019-12-11 VITALS
HEIGHT: 70 IN | BODY MASS INDEX: 38.2 KG/M2 | RESPIRATION RATE: 16 BRPM | TEMPERATURE: 97.4 F | DIASTOLIC BLOOD PRESSURE: 66 MMHG | SYSTOLIC BLOOD PRESSURE: 118 MMHG | HEART RATE: 86 BPM | WEIGHT: 266.8 LBS | OXYGEN SATURATION: 97 %

## 2019-12-11 DIAGNOSIS — I25.10 CORONARY ARTERY DISEASE INVOLVING NATIVE HEART WITHOUT ANGINA PECTORIS, UNSPECIFIED VESSEL OR LESION TYPE: ICD-10-CM

## 2019-12-11 DIAGNOSIS — M17.0 PRIMARY OSTEOARTHRITIS OF BOTH KNEES: ICD-10-CM

## 2019-12-11 DIAGNOSIS — E78.2 MIXED HYPERLIPIDEMIA: Primary | ICD-10-CM

## 2019-12-11 DIAGNOSIS — R35.1 NOCTURIA: ICD-10-CM

## 2019-12-11 PROCEDURE — 99214 OFFICE O/P EST MOD 30 MIN: CPT | Performed by: FAMILY MEDICINE

## 2019-12-11 RX ORDER — PRAVASTATIN SODIUM 40 MG
40 TABLET ORAL DAILY
Qty: 90 TABLET | Refills: 3 | Status: SHIPPED | OUTPATIENT
Start: 2019-12-11 | End: 2020-05-15 | Stop reason: SDUPTHER

## 2019-12-11 NOTE — PATIENT INSTRUCTIONS
Go to the nearest ER or return to clinic if symptoms worsen, fever/chill develop    Benign Prostatic Hyperplasia    Benign prostatic hyperplasia (BPH) is an enlarged prostate gland that is caused by the normal aging process and not by cancer. The prostate is a walnut-sized gland that is involved in the production of semen. It is located in front of the rectum and below the bladder. The bladder stores urine and the urethra is the tube that carries the urine out of the body. The prostate may get bigger as a man gets older.  An enlarged prostate can press on the urethra. This can make it harder to pass urine. The build-up of urine in the bladder can cause infection. Back pressure and infection may progress to bladder damage and kidney (renal) failure.  What are the causes?  This condition is part of a normal aging process. However, not all men develop problems from this condition. If the prostate enlarges away from the urethra, urine flow will not be blocked. If it enlarges toward the urethra and compresses it, there will be problems passing urine.  What increases the risk?  This condition is more likely to develop in men over the age of 50 years.  What are the signs or symptoms?  Symptoms of this condition include:  · Getting up often during the night to urinate.  · Needing to urinate frequently during the day.  · Difficulty starting urine flow.  · Decrease in size and strength of your urine stream.  · Leaking (dribbling) after urinating.  · Inability to pass urine. This needs immediate treatment.  · Inability to completely empty your bladder.  · Pain when you pass urine. This is more common if there is also an infection.  · Urinary tract infection (UTI).  How is this diagnosed?  This condition is diagnosed based on your medical history, a physical exam, and your symptoms. Tests will also be done, such as:  · A post-void bladder scan. This measures any amount of urine that may remain in your bladder after you finish  urinating.  · A digital rectal exam. In a rectal exam, your health care provider checks your prostate by putting a lubricated, gloved finger into your rectum to feel the back of your prostate gland. This exam detects the size of your gland and any abnormal lumps or growths.  · An exam of your urine (urinalysis).  · A prostate specific antigen (PSA) screening. This is a blood test used to screen for prostate cancer.  · An ultrasound. This test uses sound waves to electronically produce a picture of your prostate gland.  Your health care provider may refer you to a specialist in kidney and prostate diseases (urologist).  How is this treated?  Once symptoms begin, your health care provider will monitor your condition (active surveillance or watchful waiting). Treatment for this condition will depend on the severity of your condition. Treatment may include:  · Observation and yearly exams. This may be the only treatment needed if your condition and symptoms are mild.  · Medicines to relieve your symptoms, including:  ? Medicines to shrink the prostate.  ? Medicines to relax the muscle of the prostate.  · Surgery in severe cases. Surgery may include:  ? Prostatectomy. In this procedure, the prostate tissue is removed completely through an open incision or with a laparascope or robotics.  ? Transurethral resection of the prostate (TURP). In this procedure, a tool is inserted through the opening at the tip of the penis (urethra). It is used to cut away tissue of the inner core of the prostate. The pieces are removed through the same opening of the penis. This removes the blockage.  ? Transurethral incision (TUIP). In this procedure, small cuts are made in the prostate. This lessens the prostate's pressure on the urethra.  ? Transurethral microwave thermotherapy (TUMT). This procedure uses microwaves to create heat. The heat destroys and removes a small amount of prostate tissue.  ? Transurethral needle ablation (TUNA).  This procedure uses radio frequencies to destroy and remove a small amount of prostate tissue.  ? Interstitial laser coagulation (ILC). This procedure uses a laser to destroy and remove a small amount of prostate tissue.  ? Transurethral electrovaporization (TUVP). This procedure uses electrodes to destroy and remove a small amount of prostate tissue.  ? Prostatic urethral lift. This procedure inserts an implant to push the lobes of the prostate away from the urethra.  Follow these instructions at home:  · Take over-the-counter and prescription medicines only as told by your health care provider.  · Monitor your symptoms for any changes. Contact your health care provider with any changes.  · Avoid drinking large amounts of liquid before going to bed or out in public.  · Avoid or reduce how much caffeine or alcohol you drink.  · Give yourself time when you urinate.  · Keep all follow-up visits as told by your health care provider. This is important.  Contact a health care provider if:  · You have unexplained back pain.  · Your symptoms do not get better with treatment.  · You develop side effects from the medicine you are taking.  · Your urine becomes very dark or has a bad smell.  · Your lower abdomen becomes distended and you have trouble passing your urine.  Get help right away if:  · You have a fever or chills.  · You suddenly cannot urinate.  · You feel lightheaded, or very dizzy, or you faint.  · There are large amounts of blood or clots in the urine.  · Your urinary problems become hard to manage.  · You develop moderate to severe low back or flank pain. The flank is the side of your body between the ribs and the hip.  These symptoms may represent a serious problem that is an emergency. Do not wait to see if the symptoms will go away. Get medical help right away. Call your local emergency services (911 in the [place] Do not drive yourself to the hospital.  Summary  · Benign prostatic hyperplasia (BPH) is an  enlarged prostate that is caused by the normal aging process and not by cancer.  · An enlarged prostate can press on the urethra. This can make it hard to pass urine.  · This condition is part of a normal aging process and is more likely to develop in men over the age of 50 years.  · Get help right away if you suddenly cannot urinate.  This information is not intended to replace advice given to you by your health care provider. Make sure you discuss any questions you have with your health care provider.  Document Released: 12/18/2006 Document Revised: 01/22/2018 Document Reviewed: 01/22/2018  [employer] Interactive Patient Education © 2019 [employer]

## 2019-12-11 NOTE — PROGRESS NOTES
Subjective   Thien Gray is a 64 y.o. male.   Chief Complaint   Patient presents with   • Hyperlipidemia     follow up    • Osteoarthritis   • Difficulty Urinating     at HS takes      Hyperlipidemia   This is a chronic problem. The current episode started more than 1 year ago. The problem is controlled. Recent lipid tests were reviewed and are normal. Exacerbating diseases include obesity. There are no known factors aggravating his hyperlipidemia. Pertinent negatives include no chest pain or shortness of breath. Current antihyperlipidemic treatment includes statins. The current treatment provides significant improvement of lipids. Compliance problems include adherence to exercise.  Risk factors for coronary artery disease include dyslipidemia, male sex and obesity.     He has chronic knee pain, bilateral. Completed steroid injection June 2019, which did provide relief.   Pain is worse after he sits for prolonged period, stiffness present. Once he gets up and walks, knee joints loosen up.   Currently, symptoms are doing well.    For years, he has been experiencing nocturia, getting more frequent.   He is experiencing dribbling, incomplete emptying of bladder, decreased urine stream.  Normal PSA June 2019. JOS is due.   The following portions of the patient's history were reviewed and updated as appropriate: allergies, current medications, past family history, past medical history, past social history, past surgical history and problem list.    Review of Systems   Constitutional: Negative for activity change, appetite change and fatigue.   HENT: Negative.    Respiratory: Negative for cough, chest tightness and shortness of breath.    Cardiovascular: Negative for chest pain and palpitations.   Genitourinary: Positive for difficulty urinating, frequency and nocturia. Negative for decreased urine volume and dysuria.   Musculoskeletal: Positive for arthralgias. Negative for gait problem and joint swelling.   Skin:  Negative for rash.   Neurological: Negative for light-headedness and headache.   Psychiatric/Behavioral: Negative.        Objective   Physical Exam   Constitutional: He is oriented to person, place, and time. He appears well-developed and well-nourished. No distress.   HENT:   Head: Normocephalic.   Right Ear: External ear normal.   Left Ear: External ear normal.   Nose: Nose normal.   Eyes: Conjunctivae are normal.   Cardiovascular: Normal rate, regular rhythm and normal heart sounds.   No murmur heard.  Pulmonary/Chest: Effort normal and breath sounds normal. He has no wheezes.   Musculoskeletal: He exhibits no edema or deformity.   Neurological: He is alert and oriented to person, place, and time.   Skin: Skin is warm and dry.   Psychiatric: He has a normal mood and affect. His behavior is normal.   Nursing note and vitals reviewed.        Assessment/Plan   Thien was seen today for hyperlipidemia, osteoarthritis and difficulty urinating.    Diagnoses and all orders for this visit:    Mixed hyperlipidemia  -     CBC & Differential  -     Comprehensive Metabolic Panel  -     Lipid Panel  -     pravastatin (PRAVACHOL) 40 MG tablet; Take 1 tablet by mouth Daily.    Nocturia  -     CBC & Differential  -     Comprehensive Metabolic Panel  -     Ambulatory Referral to Urology    Primary osteoarthritis of both knees    Coronary artery disease involving native heart without angina pectoris, unspecified vessel or lesion type  -     pravastatin (PRAVACHOL) 40 MG tablet; Take 1 tablet by mouth Daily.      Labs updated today.   OA is stable. Advised to take OTC acetaminophen as directed if pain returns. Avoid NSAID due to hx of MI.   Based on description, likely dealing with BPH. Discussed starting Flomax. He declined JOS today, prefers male provider. Will refer to urologist.

## 2019-12-12 LAB
ALBUMIN SERPL-MCNC: 4.7 G/DL (ref 3.5–5.2)
ALBUMIN/GLOB SERPL: 1.8 G/DL
ALP SERPL-CCNC: 69 U/L (ref 39–117)
ALT SERPL-CCNC: 91 U/L (ref 1–41)
AST SERPL-CCNC: 56 U/L (ref 1–40)
BASOPHILS # BLD AUTO: 0.04 10*3/MM3 (ref 0–0.2)
BASOPHILS NFR BLD AUTO: 0.7 % (ref 0–1.5)
BILIRUB SERPL-MCNC: 0.5 MG/DL (ref 0.2–1.2)
BUN SERPL-MCNC: 13 MG/DL (ref 8–23)
BUN/CREAT SERPL: 17.8 (ref 7–25)
CALCIUM SERPL-MCNC: 9.1 MG/DL (ref 8.6–10.5)
CHLORIDE SERPL-SCNC: 97 MMOL/L (ref 98–107)
CHOLEST SERPL-MCNC: 155 MG/DL (ref 0–200)
CO2 SERPL-SCNC: 27.3 MMOL/L (ref 22–29)
CREAT SERPL-MCNC: 0.73 MG/DL (ref 0.76–1.27)
EOSINOPHIL # BLD AUTO: 0.14 10*3/MM3 (ref 0–0.4)
EOSINOPHIL NFR BLD AUTO: 2.4 % (ref 0.3–6.2)
ERYTHROCYTE [DISTWIDTH] IN BLOOD BY AUTOMATED COUNT: 12.4 % (ref 12.3–15.4)
GLOBULIN SER CALC-MCNC: 2.6 GM/DL
GLUCOSE SERPL-MCNC: 101 MG/DL (ref 65–99)
HCT VFR BLD AUTO: 46.3 % (ref 37.5–51)
HDLC SERPL-MCNC: 50 MG/DL (ref 40–60)
HGB BLD-MCNC: 16 G/DL (ref 13–17.7)
IMM GRANULOCYTES # BLD AUTO: 0.02 10*3/MM3 (ref 0–0.05)
IMM GRANULOCYTES NFR BLD AUTO: 0.3 % (ref 0–0.5)
LDLC SERPL CALC-MCNC: 71 MG/DL (ref 0–100)
LYMPHOCYTES # BLD AUTO: 1.7 10*3/MM3 (ref 0.7–3.1)
LYMPHOCYTES NFR BLD AUTO: 28.9 % (ref 19.6–45.3)
MCH RBC QN AUTO: 30.9 PG (ref 26.6–33)
MCHC RBC AUTO-ENTMCNC: 34.6 G/DL (ref 31.5–35.7)
MCV RBC AUTO: 89.4 FL (ref 79–97)
MONOCYTES # BLD AUTO: 0.69 10*3/MM3 (ref 0.1–0.9)
MONOCYTES NFR BLD AUTO: 11.7 % (ref 5–12)
NEUTROPHILS # BLD AUTO: 3.3 10*3/MM3 (ref 1.7–7)
NEUTROPHILS NFR BLD AUTO: 56 % (ref 42.7–76)
NRBC BLD AUTO-RTO: 0 /100 WBC (ref 0–0.2)
PLATELET # BLD AUTO: 240 10*3/MM3 (ref 140–450)
POTASSIUM SERPL-SCNC: 4.5 MMOL/L (ref 3.5–5.2)
PROT SERPL-MCNC: 7.3 G/DL (ref 6–8.5)
RBC # BLD AUTO: 5.18 10*6/MM3 (ref 4.14–5.8)
SODIUM SERPL-SCNC: 137 MMOL/L (ref 136–145)
TRIGL SERPL-MCNC: 171 MG/DL (ref 0–150)
VLDLC SERPL CALC-MCNC: 34.2 MG/DL
WBC # BLD AUTO: 5.89 10*3/MM3 (ref 3.4–10.8)

## 2020-01-20 ENCOUNTER — OFFICE VISIT (OUTPATIENT)
Dept: FAMILY MEDICINE CLINIC | Facility: CLINIC | Age: 65
End: 2020-01-20

## 2020-01-20 VITALS
SYSTOLIC BLOOD PRESSURE: 118 MMHG | RESPIRATION RATE: 18 BRPM | OXYGEN SATURATION: 97 % | HEART RATE: 104 BPM | WEIGHT: 267 LBS | TEMPERATURE: 96.3 F | DIASTOLIC BLOOD PRESSURE: 70 MMHG | HEIGHT: 70 IN | BODY MASS INDEX: 38.22 KG/M2

## 2020-01-20 DIAGNOSIS — H25.9 SENILE CATARACT OF RIGHT EYE, UNSPECIFIED AGE-RELATED CATARACT TYPE: Primary | ICD-10-CM

## 2020-01-20 DIAGNOSIS — Z01.818 PRE-OP EXAMINATION: ICD-10-CM

## 2020-01-20 DIAGNOSIS — I45.10 RIGHT BUNDLE BRANCH BLOCK: ICD-10-CM

## 2020-01-20 PROCEDURE — 93000 ELECTROCARDIOGRAM COMPLETE: CPT | Performed by: FAMILY MEDICINE

## 2020-01-20 PROCEDURE — 99214 OFFICE O/P EST MOD 30 MIN: CPT | Performed by: FAMILY MEDICINE

## 2020-01-20 RX ORDER — TAMSULOSIN HYDROCHLORIDE 0.4 MG/1
CAPSULE ORAL
COMMUNITY
Start: 2020-01-02 | End: 2020-05-15 | Stop reason: SDUPTHER

## 2020-01-20 NOTE — PATIENT INSTRUCTIONS
Cataract Surgery  Cataract surgery is a procedure to remove a cloudy lens (cataract) in the eye. The lens focuses light inside the eye. When a lens becomes cloudy, your vision is affected. Another lens (intraocular lens or IOL) is usually inserted to replace the cloudy lens and to properly focus light in the eye. Cataract surgery is usually recommended when the cataract is causing problems with daily functioning, such as reading, watching television, or driving.  Tell a health care provider about:  · Any allergies you have.  · All medicines you are taking, including vitamins, herbs, eye drops, creams, and over-the-counter medicines.  · Any problems you or family members have had with anesthetic medicines.  · Any blood disorders you have.  · Any surgeries you have had, especially eye surgeries that include refractive surgery, such as PRK and LASIK.  · Any medical conditions you have.  · Whether you are pregnant or may be pregnant.  What are the risks?  Generally, this is a safe procedure. However, problems may occur, including:  · Infection.  · Bleeding.  · High pressure in the eye (glaucoma).  · Detachment of the retina.  · Corneal swelling.  · Allergic reactions to medicines.  · Damage to other structures or organs.  · Inflammation of the eye.  · Clouding of the part of your eye that holds an IOL in place (after-cataract). This is common and can be treated at a later date with laser surgery.  · An IOL moving out of position (rare).  · Loss of vision (rare).  What happens before the procedure?  Staying hydrated  Follow instructions from your health care provider about hydration, which may include:  · Up to 2 to 6 hours before the procedure - you may continue to drink clear liquids, such as water, clear fruit juice, black coffee, and plain tea. Exact instructions will be given by your health care provider.    Eating and drinking restrictions  Follow instructions from your health care provider about eating and  drinking, which may include:  · 8 hours before the procedure - stop eating heavy meals or foods, such as meat, fried foods, or fatty foods.  · 6-8 hours before the procedure - stop eating light meals or foods, such as toast or cereal.  · 6-8 hours before the procedure - stop drinking milk or drinks that contain milk.  · 2-6 hours before the procedure - stop drinking clear liquids.  Medicines  Ask your health care provider about:  · Changing or stopping your regular medicines. This is especially important if you are taking diabetes medicines or blood thinners.  · Taking medicines such as aspirin and ibuprofen. These medicines can thin your blood. Do not take these medicines unless your health care provider tells you to take them.  · Taking over-the-counter medicines, vitamins, herbs, and supplements.  General instructions  · Do not put contact lenses in either eye on the day of your surgery.  · Plan to have someone take you home from the hospital or clinic.  · If you will be going home right after the procedure, plan to have someone with you for 24 hours.  · Ask your health care provider what steps will be taken to help prevent infection. These may include:  ? Washing skin with a germ-killing soap.  ? Taking antibiotic medicine.  What happens during the procedure?         · An IV may be inserted into one of your veins.  · You will be given one or both of the following:  ? A medicine to help you relax (sedative).  ? A medicine to numb the area (local anesthetic). This may be numbing eye drops or an injection that is given behind the eye.  · A small cut (incision) will be made to the edge of the clear surface that covers the front of the eye (cornea).  · A small probe will be inserted into the eye. This device gives off ultrasound waves that soften and break up the cloudy center of the lens. This makes it easier for the cataract to be removed.  · The cataract will be removed by suction.  · An IOL may be  implanted.  · Part of the capsule that surrounds the lens will be left in the eye to support the IOL.  · Your surgeon may use stitches (sutures) to close the incision.  The procedure may vary among health care providers and hospitals.  What happens after the procedure?  · Your blood pressure, heart rate, breathing rate, and blood oxygen level will be monitored until you leave the hospital or clinic.  · You may be given a protective shield to wear over your eye.  · You may be given medicines to relieve discomfort and swelling. Some medicines may be in the form of eye drops.  · Do not drive for 24 hours if you were given a sedative during your procedure.  Summary  · Cataract surgery is a procedure to remove a cloudy lens (cataract) in the eye.  · This is a safe procedure. However, problems may occur, including infection, bleeding, glaucoma, inflammation, and loss of vision.  · Follow your health care provider's instructions about when to stop eating and drinking and whether to change or stop certain medicines.  · After the procedure, you may be given medicines or an eye shield to wear over your eye.  · Do not drive for 24 hours if you were given a sedative during your procedure.  This information is not intended to replace advice given to you by your health care provider. Make sure you discuss any questions you have with your health care provider.  Document Released: 12/06/2012 Document Revised: 06/17/2019 Document Reviewed: 06/17/2019  [email or web address] Interactive Patient Education © 2019 [employer]

## 2020-01-20 NOTE — PROGRESS NOTES
Subjective   Thien Gray is a 64 y.o. male.   Chief Complaint   Patient presents with   • Pre-op Exam     History of Present Illness   Pre-Op Evaluation  Thien Gray is a 64 y.o. male who presents to the office today for a preoperative consultation at the request of surgeon Dr. Mckeon who plans on performing cataract removal on right eye February 2020 (exact date is unknown). This consultation is requested for the specific conditions prompting preoperative evaluation (i.e. because of potential affect on operative risk): CAD, history of MI, hyperlipidemia, former tobacco user. Planned anesthesia is local and IV sedation. The patient has the following known anesthesia issues: none. Patient has a bleeding risk of: no recent abnormal bleeding and no use of Ca-channel blockers.       The following portions of the patient's history were reviewed and updated as appropriate: allergies, current medications, past family history, past medical history, past social history, past surgical history and problem list.    Review of Systems   Constitutional: Negative for fever.   Eyes: Positive for visual disturbance.   Respiratory: Negative for chest tightness and shortness of breath.    Cardiovascular: Negative for chest pain, palpitations and leg swelling.   Gastrointestinal: Negative.    Skin: Negative.    Allergic/Immunologic: Negative for immunocompromised state.   Neurological: Negative for syncope, light-headedness and headache.       Objective   Physical Exam   Constitutional: He is oriented to person, place, and time. He appears well-developed and well-nourished. No distress.   HENT:   Head: Normocephalic.   Right Ear: External ear normal.   Left Ear: External ear normal.   Nose: Nose normal.   Eyes: Conjunctivae are normal.   Neck: Neck supple.   Cardiovascular: Normal rate, regular rhythm and normal heart sounds.   No murmur heard.  Pulmonary/Chest: Effort normal. No respiratory distress. He has decreased breath  sounds in the right upper field and the left upper field. He has no wheezes. He has no rhonchi.   Abdominal:   obese   Musculoskeletal: He exhibits no edema.   Lymphadenopathy:     He has no cervical adenopathy.   Neurological: He is alert and oriented to person, place, and time.   Skin: Skin is warm and dry.   Psychiatric: He has a normal mood and affect. His behavior is normal.   Nursing note and vitals reviewed.      ECG 12 Lead  Date/Time: 1/20/2020 7:39 PM  Performed by: Sanaz Zuluaga DO  Authorized by: Sanaz Zuluaga DO   Comparison: compared with previous ECG from 10/18/2017  Comparison to previous ECG: Sinus tachycardia  Rhythm: sinus rhythm  Rate: normal  BPM: 89  Conduction: right bundle branch block  ST Segments: ST segments normal  T Waves: T waves normal  QRS axis: normal  Other: no other findings    Clinical impression: abnormal EKG              Assessment/Plan   Thien was seen today for pre-op exam.    Diagnoses and all orders for this visit:    Senile cataract of right eye, unspecified age-related cataract type  -     CBC & Differential  -     Comprehensive Metabolic Panel    Right bundle branch block  -     ECG 12 Lead  -     Ambulatory Referral to Cardiology    Pre-op examination  -     CBC & Differential  -     Comprehensive Metabolic Panel      Revised cardiac risk index  Estimated risk of adverse outcome with noncardiac surgery: Low risk  Estimated rate of MI, pulmonary edema, ventricular fibrillation, cardiac arrest, or complete heart block: 0.9%    Patient is okay to proceed with planned procedure.  Due to EKG changes and his history of coronary artery disease, he will reestablish care with cardiology for routine evaluation.

## 2020-01-21 LAB
ALBUMIN SERPL-MCNC: 4.7 G/DL (ref 3.5–5.2)
ALBUMIN/GLOB SERPL: 2 G/DL
ALP SERPL-CCNC: 65 U/L (ref 39–117)
ALT SERPL-CCNC: 58 U/L (ref 1–41)
AST SERPL-CCNC: 42 U/L (ref 1–40)
BASOPHILS # BLD AUTO: 0.02 10*3/MM3 (ref 0–0.2)
BASOPHILS NFR BLD AUTO: 0.4 % (ref 0–1.5)
BILIRUB SERPL-MCNC: 0.6 MG/DL (ref 0.2–1.2)
BUN SERPL-MCNC: 13 MG/DL (ref 8–23)
BUN/CREAT SERPL: 18.3 (ref 7–25)
CALCIUM SERPL-MCNC: 9.3 MG/DL (ref 8.6–10.5)
CHLORIDE SERPL-SCNC: 97 MMOL/L (ref 98–107)
CO2 SERPL-SCNC: 24.8 MMOL/L (ref 22–29)
CREAT SERPL-MCNC: 0.71 MG/DL (ref 0.76–1.27)
EOSINOPHIL # BLD AUTO: 0.12 10*3/MM3 (ref 0–0.4)
EOSINOPHIL NFR BLD AUTO: 2.4 % (ref 0.3–6.2)
ERYTHROCYTE [DISTWIDTH] IN BLOOD BY AUTOMATED COUNT: 12.7 % (ref 12.3–15.4)
GLOBULIN SER CALC-MCNC: 2.4 GM/DL
GLUCOSE SERPL-MCNC: 111 MG/DL (ref 65–99)
HCT VFR BLD AUTO: 47 % (ref 37.5–51)
HGB BLD-MCNC: 16.6 G/DL (ref 13–17.7)
IMM GRANULOCYTES # BLD AUTO: 0.02 10*3/MM3 (ref 0–0.05)
IMM GRANULOCYTES NFR BLD AUTO: 0.4 % (ref 0–0.5)
LYMPHOCYTES # BLD AUTO: 1.34 10*3/MM3 (ref 0.7–3.1)
LYMPHOCYTES NFR BLD AUTO: 26.7 % (ref 19.6–45.3)
MCH RBC QN AUTO: 30.6 PG (ref 26.6–33)
MCHC RBC AUTO-ENTMCNC: 35.3 G/DL (ref 31.5–35.7)
MCV RBC AUTO: 86.6 FL (ref 79–97)
MONOCYTES # BLD AUTO: 0.54 10*3/MM3 (ref 0.1–0.9)
MONOCYTES NFR BLD AUTO: 10.8 % (ref 5–12)
NEUTROPHILS # BLD AUTO: 2.98 10*3/MM3 (ref 1.7–7)
NEUTROPHILS NFR BLD AUTO: 59.3 % (ref 42.7–76)
NRBC BLD AUTO-RTO: 0 /100 WBC (ref 0–0.2)
PLATELET # BLD AUTO: 211 10*3/MM3 (ref 140–450)
POTASSIUM SERPL-SCNC: 4.3 MMOL/L (ref 3.5–5.2)
PROT SERPL-MCNC: 7.1 G/DL (ref 6–8.5)
RBC # BLD AUTO: 5.43 10*6/MM3 (ref 4.14–5.8)
SODIUM SERPL-SCNC: 137 MMOL/L (ref 136–145)
WBC # BLD AUTO: 5.02 10*3/MM3 (ref 3.4–10.8)

## 2020-01-24 DIAGNOSIS — M17.0 PRIMARY OSTEOARTHRITIS OF BOTH KNEES: Primary | ICD-10-CM

## 2020-01-27 ENCOUNTER — OFFICE VISIT (OUTPATIENT)
Dept: FAMILY MEDICINE CLINIC | Facility: CLINIC | Age: 65
End: 2020-01-27

## 2020-01-27 VITALS
SYSTOLIC BLOOD PRESSURE: 112 MMHG | HEIGHT: 70 IN | OXYGEN SATURATION: 99 % | BODY MASS INDEX: 38.22 KG/M2 | RESPIRATION RATE: 20 BRPM | TEMPERATURE: 97.8 F | HEART RATE: 92 BPM | WEIGHT: 267 LBS | DIASTOLIC BLOOD PRESSURE: 76 MMHG

## 2020-01-27 DIAGNOSIS — H25.9 SENILE CATARACT OF RIGHT EYE, UNSPECIFIED AGE-RELATED CATARACT TYPE: Primary | ICD-10-CM

## 2020-01-27 DIAGNOSIS — Z01.818 PREOP EXAMINATION: ICD-10-CM

## 2020-01-27 PROCEDURE — 99213 OFFICE O/P EST LOW 20 MIN: CPT | Performed by: FAMILY MEDICINE

## 2020-01-27 NOTE — PATIENT INSTRUCTIONS
Go to the nearest ER or return to clinic if symptoms worsen, fever/chill develop    Cataract    A cataract is a buildup of protein that causes the lens of your eye to become cloudy. The lens is normally clear. It is the part of the eye that is behind your iris and pupil. The lens focuses light on the retina, which lets you see clearly. When a lens becomes cloudy, your vision may become blurry. The clouding can range from a tiny dot to complete cloudiness.  As some cataracts develop, they can make it harder for you to see things that are far away. (You become more nearsighted.) Other cataracts increase glare. Cataracts can worsen over time, and sometimes the pupil can look white. As cataracts get worse, they cloud more of the lens, making it difficult to see. Cataracts can affect one eye or both eyes.  What are the causes?  This condition may be caused by age-related eye changes. The lens of the eye is mostly made up of water and protein. Normally, this protein is arranged in a way that keeps the lens clear. Cataracts develop when protein begins to clump together over time. This buildup of protein clouds the lens and lets less light pass through to the retina, which causes blurry vision.  What increases the risk?  You are more likely to develop this condition if you:  · Are 60 years of age or older.  · Have diabetes.  · Have high blood pressure.  · Take certain medicines, such as steroids or hormone replacement therapy.  · Have had an eye injury.  · Have or have had eye inflammation.  · Have a family history of cataracts.  · Smoke.  · Drink alcohol heavily.  · Are frequently exposed to sun or very strong light without eye protection.  · Are obese.  · Have been exposed to large amounts of radiation, lead, or other toxic substances.  · Have had eye surgery.  What are the signs or symptoms?  The main symptom of a cataract is blurry vision. Your vision may change or get worse over time. Other symptoms  "include:  · Increased glare.  · Seeing a bright ring or halo around light.  · Poor night vision.  · Double or \"shadow\" vision in one eye or both eyes.  · Having trouble seeing, even while wearing contact lenses or glasses.  · Seeing colors that appear faded.  · Having trouble telling the difference between blue and purple.  · Needing frequent changes to your prescription glasses or contacts.  How is this diagnosed?  This condition is diagnosed with a medical history and eye exam.  · You should see an eye specialist (optometrist or ophthalmologist).  · Your health care provider may enlarge (dilate) your pupils with eye drops to see the back of your eye more clearly and look for signs of cataracts or other eye damage.  You may also have tests, including:  · A visual acuity test. This uses a chart to determine the smallest letters that you can see from a specific distance.  · A slit-lamp exam. This uses a microscope to examine small sections of your eye for abnormalities.  · Tonometry. This test measures the pressure of the fluid inside your eye.  · Glare testing. This test shines a light in your eye while you view letters to see whether the bright light affects your vision.  How is this treated?  Treatment depends on the stage of your cataract. You may:  · Wear eyeglasses or use stronger light. This is for an early-stage cataract.  · Have surgery if the condition is severely affecting your vision. This is needed for late-stage cataract.  · Stop or change certain medicines. This is recommended if your health care provider thinks your cataract may be linked to your medicines.  Follow these instructions at home:  Lifestyle  · Use stronger or brighter lighting.  · Consider using a magnifying glass for reading or other activities.  · Become familiar with your surroundings. Having poor vision can put you at greater risk for tripping, falling, or bumping into things.  · Wear sunglasses and a hat if you are sensitive to " bright light or are having problems with glare.  · Do not use any products that contain nicotine or tobacco, such as cigarettes, e-cigarettes, and chewing tobacco. If you need help quitting, ask your health care provider.  General instructions  · If you are prescribed new eyeglasses, wear them as told by your health care provider.  · Take over-the-counter and prescription medicines only as told by your health care provider. Do not change your medicines unless told by your health care provider.  · Do not drive or use heavy machinery if your vision is blurry, particularly at night.  · Keep your blood sugar under control if you have diabetes.  · Keep all follow-up visits as told by your health care provider. This is important.  Contact a health care provider if:  · Your symptoms get worse.  · Your vision affects your ability to perform daily activities.  · You have new symptoms.  · You have a fever.  Get help right away if:  · You have sudden vision loss.  · You have redness, swelling, or increasing pain in your eye.  · You develop a headache and sensitivity to light.  Summary  · A cataract is a buildup of protein that causes the lens of your eye to become cloudy. Cataracts are very common, especially as people age.  · Mild cataracts cause mild visual symptoms, while more severe cataracts can cause a significant decrease in quality of life.  · Mild cataracts can often be treated with a prescription for new glasses or contact lenses, while surgery is often recommended for more severe cataracts.  · Contact a health care provider if your symptoms get worse, your vision affects your ability to do daily activities, or you have a fever.  · Get help right away if you have sudden vision loss, redness, swelling, or increasing pain in the eye, or you develop a headache or sensitivity to light.  This information is not intended to replace advice given to you by your health care provider. Make sure you discuss any questions you  have with your health care provider.  Document Released: 12/18/2006 Document Revised: 06/17/2019 Document Reviewed: 06/17/2019  [employer] Interactive Patient Education © 2019 [employer]

## 2020-01-27 NOTE — PROGRESS NOTES
Subjective   Thien Gray is a 64 y.o. male.   Chief Complaint   Patient presents with   • Pre-op Exam     R Cataract for mid february     History of Present Illness   Pre-Op Evaluation  Thien Gray is a 64 y.o. male who presents to the office today for a preoperative consultation at the request of surgeon Dr. Mckeon who plans on performing right cataract removal on February 20. This consultation is requested for the specific conditions prompting preoperative evaluation (i.e. because of potential affect on operative risk): CAD, history of MI, hyperlipidemia, former tobacco user.  Planned anesthesia is local. The patient has the following known anesthesia issues: none. Patient has a bleeding risk of: no recent abnormal bleeding, no remote history of abnormal bleeding and no use of Ca-channel blockers.     The following portions of the patient's history were reviewed and updated as appropriate: allergies, current medications, past family history, past medical history, past social history, past surgical history and problem list.    Review of Systems   Constitutional: Negative for fever.   Eyes: Positive for visual disturbance.   Respiratory: Negative for chest tightness and shortness of breath.    Cardiovascular: Negative for chest pain, palpitations and leg swelling.   Gastrointestinal: Negative.    Skin: Negative.    Allergic/Immunologic: Negative for immunocompromised state.   Neurological: Negative for syncope, light-headedness and headache.       Objective   Physical Exam   Constitutional: He is oriented to person, place, and time. He appears well-developed and well-nourished. No distress.   HENT:   Head: Normocephalic.   Right Ear: External ear normal.   Left Ear: External ear normal.   Nose: Nose normal.   Eyes: Conjunctivae are normal.   Neck: Neck supple.   Cardiovascular: Normal rate, regular rhythm and normal heart sounds.   No murmur heard.  Pulmonary/Chest: Effort normal. No respiratory distress. He  has decreased breath sounds in the right upper field and the left upper field. He has no wheezes. He has no rhonchi.   Abdominal:   obese   Musculoskeletal: He exhibits no edema.   Lymphadenopathy:     He has no cervical adenopathy.   Neurological: He is alert and oriented to person, place, and time.   Skin: Skin is warm and dry.   Psychiatric: He has a normal mood and affect. His behavior is normal.   Nursing note and vitals reviewed.        Assessment/Plan   Thien was seen today for pre-op exam.    Diagnoses and all orders for this visit:    Senile cataract of right eye, unspecified age-related cataract type  -     CBC & Differential  -     Comprehensive Metabolic Panel    Preop examination  -     CBC & Differential  -     Comprehensive Metabolic Panel    Revised cardiac risk index  Estimated risk of adverse outcome with noncardiac surgery: Low risk  Estimated rate of MI, pulmonary edema, ventricular fibrillation, cardiac arrest, or complete heart block: 0.9%     He does have EKG changes, RBBB. This was performed at encounter last week. He is scheduled with cardiology 2/10/2020 to establish care.

## 2020-01-28 LAB
ALBUMIN SERPL-MCNC: 4.6 G/DL (ref 3.5–5.2)
ALBUMIN/GLOB SERPL: 1.8 G/DL
ALP SERPL-CCNC: 65 U/L (ref 39–117)
ALT SERPL-CCNC: 61 U/L (ref 1–41)
AST SERPL-CCNC: 38 U/L (ref 1–40)
BASOPHILS # BLD AUTO: 0.04 10*3/MM3 (ref 0–0.2)
BASOPHILS NFR BLD AUTO: 0.7 % (ref 0–1.5)
BILIRUB SERPL-MCNC: 0.6 MG/DL (ref 0.2–1.2)
BUN SERPL-MCNC: 15 MG/DL (ref 8–23)
BUN/CREAT SERPL: 18.8 (ref 7–25)
CALCIUM SERPL-MCNC: 9.1 MG/DL (ref 8.6–10.5)
CHLORIDE SERPL-SCNC: 98 MMOL/L (ref 98–107)
CO2 SERPL-SCNC: 23.7 MMOL/L (ref 22–29)
CREAT SERPL-MCNC: 0.8 MG/DL (ref 0.76–1.27)
EOSINOPHIL # BLD AUTO: 0.13 10*3/MM3 (ref 0–0.4)
EOSINOPHIL NFR BLD AUTO: 2.4 % (ref 0.3–6.2)
ERYTHROCYTE [DISTWIDTH] IN BLOOD BY AUTOMATED COUNT: 12.7 % (ref 12.3–15.4)
GLOBULIN SER CALC-MCNC: 2.5 GM/DL
GLUCOSE SERPL-MCNC: 99 MG/DL (ref 65–99)
HCT VFR BLD AUTO: 46.3 % (ref 37.5–51)
HGB BLD-MCNC: 15.8 G/DL (ref 13–17.7)
IMM GRANULOCYTES # BLD AUTO: 0.03 10*3/MM3 (ref 0–0.05)
IMM GRANULOCYTES NFR BLD AUTO: 0.6 % (ref 0–0.5)
LYMPHOCYTES # BLD AUTO: 1.46 10*3/MM3 (ref 0.7–3.1)
LYMPHOCYTES NFR BLD AUTO: 26.9 % (ref 19.6–45.3)
MCH RBC QN AUTO: 29.6 PG (ref 26.6–33)
MCHC RBC AUTO-ENTMCNC: 34.1 G/DL (ref 31.5–35.7)
MCV RBC AUTO: 86.9 FL (ref 79–97)
MONOCYTES # BLD AUTO: 0.57 10*3/MM3 (ref 0.1–0.9)
MONOCYTES NFR BLD AUTO: 10.5 % (ref 5–12)
NEUTROPHILS # BLD AUTO: 3.2 10*3/MM3 (ref 1.7–7)
NEUTROPHILS NFR BLD AUTO: 58.9 % (ref 42.7–76)
NRBC BLD AUTO-RTO: 0.2 /100 WBC (ref 0–0.2)
PLATELET # BLD AUTO: 236 10*3/MM3 (ref 140–450)
POTASSIUM SERPL-SCNC: 4.2 MMOL/L (ref 3.5–5.2)
PROT SERPL-MCNC: 7.1 G/DL (ref 6–8.5)
RBC # BLD AUTO: 5.33 10*6/MM3 (ref 4.14–5.8)
SODIUM SERPL-SCNC: 139 MMOL/L (ref 136–145)
WBC # BLD AUTO: 5.43 10*3/MM3 (ref 3.4–10.8)

## 2020-01-30 ENCOUNTER — OFFICE VISIT (OUTPATIENT)
Dept: ORTHOPEDIC SURGERY | Facility: CLINIC | Age: 65
End: 2020-01-30

## 2020-01-30 VITALS — OXYGEN SATURATION: 98 % | HEART RATE: 79 BPM | BODY MASS INDEX: 38.37 KG/M2 | WEIGHT: 268 LBS | HEIGHT: 70 IN

## 2020-01-30 DIAGNOSIS — M25.561 PAIN IN BOTH KNEES, UNSPECIFIED CHRONICITY: ICD-10-CM

## 2020-01-30 DIAGNOSIS — M17.0 PRIMARY OSTEOARTHRITIS OF BOTH KNEES: Primary | ICD-10-CM

## 2020-01-30 DIAGNOSIS — M25.562 PAIN IN BOTH KNEES, UNSPECIFIED CHRONICITY: ICD-10-CM

## 2020-01-30 PROCEDURE — 99204 OFFICE O/P NEW MOD 45 MIN: CPT | Performed by: ORTHOPAEDIC SURGERY

## 2020-01-30 PROCEDURE — 20610 DRAIN/INJ JOINT/BURSA W/O US: CPT | Performed by: ORTHOPAEDIC SURGERY

## 2020-01-30 RX ORDER — MELOXICAM 7.5 MG/1
TABLET ORAL
Qty: 60 TABLET | Refills: 0 | Status: SHIPPED | OUTPATIENT
Start: 2020-01-30 | End: 2020-05-15 | Stop reason: SDUPTHER

## 2020-01-30 RX ORDER — BUPIVACAINE HYDROCHLORIDE 2.5 MG/ML
3 INJECTION, SOLUTION EPIDURAL; INFILTRATION; INTRACAUDAL
Status: COMPLETED | OUTPATIENT
Start: 2020-01-30 | End: 2020-01-30

## 2020-01-30 RX ORDER — TRIAMCINOLONE ACETONIDE 40 MG/ML
80 INJECTION, SUSPENSION INTRA-ARTICULAR; INTRAMUSCULAR
Status: COMPLETED | OUTPATIENT
Start: 2020-01-30 | End: 2020-01-30

## 2020-01-30 RX ORDER — LIDOCAINE HYDROCHLORIDE 10 MG/ML
3 INJECTION, SOLUTION EPIDURAL; INFILTRATION; INTRACAUDAL; PERINEURAL
Status: COMPLETED | OUTPATIENT
Start: 2020-01-30 | End: 2020-01-30

## 2020-01-30 RX ADMIN — TRIAMCINOLONE ACETONIDE 80 MG: 40 INJECTION, SUSPENSION INTRA-ARTICULAR; INTRAMUSCULAR at 08:53

## 2020-01-30 RX ADMIN — BUPIVACAINE HYDROCHLORIDE 3 ML: 2.5 INJECTION, SOLUTION EPIDURAL; INFILTRATION; INTRACAUDAL at 08:53

## 2020-01-30 RX ADMIN — LIDOCAINE HYDROCHLORIDE 3 ML: 10 INJECTION, SOLUTION EPIDURAL; INFILTRATION; INTRACAUDAL; PERINEURAL at 08:53

## 2020-01-30 NOTE — PROGRESS NOTES
Procedure   Large Joint Arthrocentesis: R knee  Date/Time: 1/30/2020 8:53 AM  Consent given by: patient  Site marked: site marked  Timeout: Immediately prior to procedure a time out was called to verify the correct patient, procedure, equipment, support staff and site/side marked as required   Supporting Documentation  Indications: pain   Procedure Details  Location: knee - R knee  Preparation: Patient was prepped and draped in the usual sterile fashion  Needle size: 22 G  Approach: anterolateral  Medications administered: 3 mL lidocaine PF 1% 1 %; 3 mL bupivacaine (PF) 0.25 %; 80 mg triamcinolone acetonide 40 MG/ML  Patient tolerance: patient tolerated the procedure well with no immediate complications

## 2020-02-10 ENCOUNTER — CONSULT (OUTPATIENT)
Dept: CARDIOLOGY | Facility: CLINIC | Age: 65
End: 2020-02-10

## 2020-02-10 VITALS
DIASTOLIC BLOOD PRESSURE: 70 MMHG | SYSTOLIC BLOOD PRESSURE: 132 MMHG | BODY MASS INDEX: 38.42 KG/M2 | HEART RATE: 78 BPM | HEIGHT: 70 IN | WEIGHT: 268.4 LBS | OXYGEN SATURATION: 98 %

## 2020-02-10 DIAGNOSIS — R06.09 DYSPNEA ON EXERTION: ICD-10-CM

## 2020-02-10 DIAGNOSIS — I25.10 CORONARY ARTERY DISEASE INVOLVING NATIVE CORONARY ARTERY OF NATIVE HEART WITHOUT ANGINA PECTORIS: ICD-10-CM

## 2020-02-10 DIAGNOSIS — E78.2 MIXED HYPERLIPIDEMIA: ICD-10-CM

## 2020-02-10 DIAGNOSIS — I45.10 RBBB: Primary | ICD-10-CM

## 2020-02-10 PROCEDURE — 93000 ELECTROCARDIOGRAM COMPLETE: CPT | Performed by: INTERNAL MEDICINE

## 2020-02-10 PROCEDURE — 99204 OFFICE O/P NEW MOD 45 MIN: CPT | Performed by: INTERNAL MEDICINE

## 2020-02-10 NOTE — PROGRESS NOTES
[facility] at [facility]  Consultation H&P  Thien Gray  1955    There is no work phone number on file..    VISIT DATE:  02/10/2020    PCP: Sanaz Zuluaga  [address]  [place] [address]    CC:  Chief Complaint   Patient presents with   • RBBB     Consult   • Surgery clearance       ASSESSMENT:   Diagnosis Plan   1. RBBB  ECG 12 Lead   2. Dyspnea on exertion  Adult Transthoracic Echo Complete W/ Cont if Necessary Per Protocol   3. Coronary artery disease involving native coronary artery of native heart without angina pectoris     4. Mixed hyperlipidemia           PLAN:  Coronary artery disease: Appears stable and asymptomatic.  Continue low-dose aspirin and pravastatin.  Afterload well controlled.    Right bundle branch block: Echo pending to assess underlying myocardial structure and function.  May consider sleep evaluation if there right sided structural or functional issues or any evidence of pulmonary pretension.    Hyperlipidemia: Goal LDL less than 70, currently reasonably well controlled.  Continue current dose of pravastatin.    Obesity: Dietary and lifestyle modifications to achieve weight loss.    Will be at low risk for major perioperative cardiovascular complications with upcoming cataract surgery.    History of Present Illness   64-year-old gentleman with a history of coronary artery disease, status post remote percutaneous intervention with stenting in the setting of myocardial infarction in 2004.  Currently denies chest discomfort, palpitations, presyncope or syncope.  Has some shortness of breath and a class II pattern.  Previous history of mild venous insufficiency.  Previous smoker.  Blood pressures running less than 130/80 mmHg.  He is compliant with medical therapy.  Reviewed most recent twelve-lead EKG which revealed a new onset right bundle branch block compared to EKG obtained in 2017.  He is unclear whether he is a prominent snorer or stops breathing  "in his sleep.  No previous sleep evaluation.  Upcoming cataract surgery.    PHYSICAL EXAMINATION:  Vitals:    02/10/20 0817   BP: 132/70   BP Location: Left arm   Patient Position: Sitting   Pulse: 78   SpO2: 98%   Weight: 122 kg (268 lb 6.4 oz)   Height: 177.8 cm (70\")     General Appearance:    Alert, cooperative, no distress, appears stated age   Head:    Normocephalic, without obvious abnormality, atraumatic   Eyes:    conjunctiva/corneas clear, EOM's intact, fundi     benign, both eyes   Ears:    Normal TM's and external ear canals, both ears   Nose:   Nares normal, septum midline, mucosa normal, no drainage    or sinus tenderness   Throat:   Lips, mucosa, and tongue normal; teeth and gums normal   Neck:   Supple, symmetrical, trachea midline, no adenopathy;     thyroid:  no enlargement/tenderness/nodules; no carotid    bruit or JVD   Back:     Symmetric, no curvature, ROM normal, no CVA tenderness   Lungs:     Clear to auscultation bilaterally, respirations unlabored   Chest Wall:    No tenderness or deformity    Heart:    Regular rate and rhythm, S1 and S2 normal, no murmur, rub   or gallop, normal carotid impulse bilaterally without bruit.   Abdomen:     Soft, non-tender, bowel sounds active all four quadrants,     no masses, no organomegaly   Extremities:   Extremities normal, atraumatic, no cyanosis or edema   Pulses:   2+ and symmetric all extremities   Skin:   Skin color, texture, turgor normal, no rashes or lesions   Lymph nodes:   Cervical, supraclavicular, and axillary nodes normal   Neurologic:   normal strength, sensation intact     throughout       Diagnostic Data:    ECG 12 Lead  Date/Time: 2/10/2020 8:25 AM  Performed by: Phillip Eason III, MD  Authorized by: Phillip Eason III, MD   Previous ECG: no previous ECG available  Rhythm: sinus tachycardia  Conduction: right bundle branch block    Clinical impression: abnormal EKG          Lab Results   Component Value Date    CHLPL 155 12/11/2019    " TRIG 171 (H) 12/11/2019    HDL 50 12/11/2019     Lab Results   Component Value Date    GLUCOSE 115 (H) 10/18/2017    BUN 15 01/27/2020    CREATININE 0.80 01/27/2020     01/27/2020    K 4.2 01/27/2020    CL 98 01/27/2020    CO2 23.7 01/27/2020     Lab Results   Component Value Date    HGBA1C 5.60 06/07/2019     Lab Results   Component Value Date    WBC 5.43 01/27/2020    HGB 15.8 01/27/2020    HCT 46.3 01/27/2020     01/27/2020       PROBLEM LIST:  Patient Active Problem List   Diagnosis   • History of tobacco abuse   • Mixed hyperlipidemia   • Coronary artery disease involving native coronary artery of native heart without angina pectoris   • Dyspnea on exertion   • RBBB       PAST MEDICAL HX  Past Medical History:   Diagnosis Date   • Abnormal ECG 1/20/2020    Get from Dr. Kayser   • Coronary artery disease    • History of heart attack 2004   • History of MI (myocardial infarction)    • Hyperlipidemia    • Myocardial infarction (CMS/HCC)    • RBBB 2/10/2020       Allergies  Allergies   Allergen Reactions   • Bactrim [Sulfamethoxazole-Trimethoprim] Other (See Comments)     Causes weakness, fatigue   • Plavix [Clopidogrel Bisulfate] Hives   • Augmentin [Amoxicillin-Pot Clavulanate] Confusion       Current Medications    Current Outpatient Medications:   •  aspirin 81 MG EC tablet, Take 81 mg by mouth Daily., Disp: , Rfl:   •  pravastatin (PRAVACHOL) 40 MG tablet, Take 1 tablet by mouth Daily., Disp: 90 tablet, Rfl: 3  •  tamsulosin (FLOMAX) 0.4 MG capsule 24 hr capsule, TAKE 1 CAPSULE BY MOUTH ONCE DAILY FOR 30 DAYS, Disp: , Rfl:   •  meloxicam (MOBIC) 7.5 MG tablet, 1 Oral Daily with food., Disp: 60 tablet, Rfl: 0         ROS  Review of Systems   Constitution: Positive for weight gain.   Cardiovascular: Positive for dyspnea on exertion and leg swelling. Negative for irregular heartbeat, near-syncope, orthopnea and palpitations.   Respiratory: Positive for shortness of breath. Negative for cough and  wheezing.    Musculoskeletal: Positive for arthritis.     All other body systems reviewed and are negative    SOCIAL HX  Social History     Socioeconomic History   • Marital status:      Spouse name: Not on file   • Number of children: Not on file   • Years of education: Not on file   • Highest education level: Not on file   Tobacco Use   • Smoking status: Former Smoker     Packs/day: 1.50     Years: 45.00     Pack years: 67.50     Types: Cigarettes     Start date: 1971     Last attempt to quit: 2017     Years since quittin.5   • Smokeless tobacco: Never Used   Substance and Sexual Activity   • Alcohol use: Yes     Alcohol/week: 6.0 standard drinks     Types: 6 Cans of beer per week     Comment: 3 nights a week   • Drug use: No   • Sexual activity: Never       FAMILY HX  Family History   Problem Relation Age of Onset   • Cancer Mother    • Hypertension Mother    • Hyperlipidemia Mother         Takes medication   • Heart attack Mother    • Diabetes Sister    • Heart failure Father         .   • Stroke Father              Phillip Eason III, MD, FACC

## 2020-03-03 ENCOUNTER — HOSPITAL ENCOUNTER (OUTPATIENT)
Dept: CARDIOLOGY | Facility: HOSPITAL | Age: 65
Discharge: HOME | End: 2020-03-03
Admitting: INTERNAL MEDICINE

## 2020-03-03 VITALS — HEIGHT: 70 IN | WEIGHT: 268 LBS | BODY MASS INDEX: 38.37 KG/M2

## 2020-03-03 DIAGNOSIS — R06.09 DYSPNEA ON EXERTION: ICD-10-CM

## 2020-03-03 LAB
BH CV ECHO MEAS - AO MAX PG (FULL): 1.2 MMHG
BH CV ECHO MEAS - AO MAX PG: 8 MMHG
BH CV ECHO MEAS - AO MEAN PG (FULL): 1 MMHG
BH CV ECHO MEAS - AO MEAN PG: 5 MMHG
BH CV ECHO MEAS - AO V2 MAX: 145 CM/SEC
BH CV ECHO MEAS - AO V2 MEAN: 107 CM/SEC
BH CV ECHO MEAS - AO V2 VTI: 24 CM
BH CV ECHO MEAS - AVA(I,A): 3.2 CM^2
BH CV ECHO MEAS - AVA(I,D): 3.2 CM^2
BH CV ECHO MEAS - AVA(V,A): 3.4 CM^2
BH CV ECHO MEAS - AVA(V,D): 3.4 CM^2
BH CV ECHO MEAS - BSA(HAYCOCK): 2.5 M^2
BH CV ECHO MEAS - BSA: 2.4 M^2
BH CV ECHO MEAS - BZI_BMI: 38.5 KILOGRAMS/M^2
BH CV ECHO MEAS - BZI_METRIC_HEIGHT: 177.8 CM
BH CV ECHO MEAS - BZI_METRIC_WEIGHT: 121.6 KG
BH CV ECHO MEAS - EDV(CUBED): 66.9 ML
BH CV ECHO MEAS - EDV(TEICH): 72.5 ML
BH CV ECHO MEAS - EF(CUBED): 62 %
BH CV ECHO MEAS - EF(TEICH): 54.1 %
BH CV ECHO MEAS - ESV(CUBED): 25.4 ML
BH CV ECHO MEAS - ESV(TEICH): 33.3 ML
BH CV ECHO MEAS - FS: 27.6 %
BH CV ECHO MEAS - IVS/LVPW: 0.96
BH CV ECHO MEAS - IVSD: 1.2 CM
BH CV ECHO MEAS - LA DIMENSION: 3.4 CM
BH CV ECHO MEAS - LAD MAJOR: 4 CM
BH CV ECHO MEAS - LAT PEAK E' VEL: 9.9 CM/SEC
BH CV ECHO MEAS - LATERAL E/E' RATIO: 7.8
BH CV ECHO MEAS - LV MASS(C)D: 168.2 GRAMS
BH CV ECHO MEAS - LV MASS(C)DI: 71.1 GRAMS/M^2
BH CV ECHO MEAS - LV MAX PG: 6.8 MMHG
BH CV ECHO MEAS - LV MEAN PG: 4 MMHG
BH CV ECHO MEAS - LV V1 MAX: 130 CM/SEC
BH CV ECHO MEAS - LV V1 MEAN: 89.9 CM/SEC
BH CV ECHO MEAS - LV V1 VTI: 20 CM
BH CV ECHO MEAS - LVIDD: 4.1 CM
BH CV ECHO MEAS - LVIDS: 2.9 CM
BH CV ECHO MEAS - LVOT AREA (M): 3.8 CM^2
BH CV ECHO MEAS - LVOT AREA: 3.8 CM^2
BH CV ECHO MEAS - LVOT DIAM: 2.2 CM
BH CV ECHO MEAS - LVPWD: 1.2 CM
BH CV ECHO MEAS - MED PEAK E' VEL: 5.9 CM/SEC
BH CV ECHO MEAS - MEDIAL E/E' RATIO: 12.9
BH CV ECHO MEAS - MV A MAX VEL: 111 CM/SEC
BH CV ECHO MEAS - MV DEC TIME: 0.1 SEC
BH CV ECHO MEAS - MV E MAX VEL: 76.5 CM/SEC
BH CV ECHO MEAS - MV E/A: 0.69
BH CV ECHO MEAS - PA ACC SLOPE: 1604 CM/SEC^2
BH CV ECHO MEAS - PA ACC TIME: 0.07 SEC
BH CV ECHO MEAS - PA MAX PG: 9.4 MMHG
BH CV ECHO MEAS - PA PR(ACCEL): 45.7 MMHG
BH CV ECHO MEAS - PA V2 MAX: 153 CM/SEC
BH CV ECHO MEAS - SI(CUBED): 17.6 ML/M^2
BH CV ECHO MEAS - SI(LVOT): 32.2 ML/M^2
BH CV ECHO MEAS - SI(TEICH): 16.6 ML/M^2
BH CV ECHO MEAS - SV(CUBED): 41.5 ML
BH CV ECHO MEAS - SV(LVOT): 76 ML
BH CV ECHO MEAS - SV(TEICH): 39.2 ML
BH CV ECHO MEASUREMENTS AVERAGE E/E' RATIO: 9.68
BH CV VAS BP RIGHT ARM: NORMAL MMHG

## 2020-03-03 PROCEDURE — 93306 TTE W/DOPPLER COMPLETE: CPT

## 2020-03-03 PROCEDURE — 93306 TTE W/DOPPLER COMPLETE: CPT | Performed by: INTERNAL MEDICINE

## 2020-03-18 ENCOUNTER — PATIENT MESSAGE (OUTPATIENT)
Dept: CARDIOLOGY | Facility: CLINIC | Age: 65
End: 2020-03-18

## 2020-03-18 ENCOUNTER — TELEPHONE (OUTPATIENT)
Dept: CARDIOLOGY | Facility: CLINIC | Age: 65
End: 2020-03-18

## 2020-03-18 NOTE — TELEPHONE ENCOUNTER
Strength of heart muscle is normal.  Mild stiffening of the heart muscle and mild thickening of the heart muscle.

## 2020-05-11 ENCOUNTER — E-VISIT (OUTPATIENT)
Dept: FAMILY MEDICINE CLINIC | Facility: CLINIC | Age: 65
End: 2020-05-11

## 2020-05-11 DIAGNOSIS — Z76.89 RETURN TO WORK EVALUATION: Primary | ICD-10-CM

## 2020-05-11 PROCEDURE — 99421 OL DIG E/M SVC 5-10 MIN: CPT | Performed by: FAMILY MEDICINE

## 2020-05-11 NOTE — PATIENT INSTRUCTIONS
Cough, Adult    A cough helps to clear your throat and lungs. A cough may last only 2-3 weeks (acute), or it may last longer than 8 weeks (chronic). Many different things can cause a cough. A cough may be a sign of an illness or another medical condition.  Follow these instructions at home:  · Pay attention to any changes in your cough.  · Take medicines only as told by your doctor.  ? If you were prescribed an antibiotic medicine, take it as told by your doctor. Do not stop taking it even if you start to feel better.  ? Talk with your doctor before you try using a cough medicine.  · Drink enough fluid to keep your pee (urine) clear or pale yellow.  · If the air is dry, use a cold steam vaporizer or humidifier in your home.  · Stay away from things that make you cough at work or at home.  · If your cough is worse at night, try using extra pillows to raise your head up higher while you sleep.  · Do not smoke, and try not to be around smoke. If you need help quitting, ask your doctor.  · Do not have caffeine.  · Do not drink alcohol.  · Rest as needed.  Contact a doctor if:  · You have new problems (symptoms).  · You cough up yellow fluid (pus).  · Your cough does not get better after 2-3 weeks, or your cough gets worse.  · Medicine does not help your cough and you are not sleeping well.  · You have pain that gets worse or pain that is not helped with medicine.  · You have a fever.  · You are losing weight and you do not know why.  · You have night sweats.  Get help right away if:  · You cough up blood.  · You have trouble breathing.  · Your heartbeat is very fast.  This information is not intended to replace advice given to you by your health care provider. Make sure you discuss any questions you have with your health care provider.  Document Released: 08/30/2012 Document Revised: 05/25/2017 Document Reviewed: 02/24/2016  [employer] Interactive Patient Education © 2019 [employer]

## 2020-05-11 NOTE — PROGRESS NOTES
I have reviewed the patient entered information above. Based on this review my recommendations, orders and follow up are noted below.       Time spent during encounter: 5 minutes

## 2020-05-15 ENCOUNTER — OFFICE VISIT (OUTPATIENT)
Dept: FAMILY MEDICINE CLINIC | Facility: CLINIC | Age: 65
End: 2020-05-15

## 2020-05-15 ENCOUNTER — TELEPHONE (OUTPATIENT)
Dept: ORTHOPEDIC SURGERY | Facility: CLINIC | Age: 65
End: 2020-05-15

## 2020-05-15 VITALS
TEMPERATURE: 97.7 F | HEIGHT: 70 IN | WEIGHT: 273 LBS | OXYGEN SATURATION: 98 % | SYSTOLIC BLOOD PRESSURE: 106 MMHG | HEART RATE: 102 BPM | BODY MASS INDEX: 39.08 KG/M2 | DIASTOLIC BLOOD PRESSURE: 74 MMHG

## 2020-05-15 DIAGNOSIS — I25.10 CORONARY ARTERY DISEASE INVOLVING NATIVE HEART WITHOUT ANGINA PECTORIS, UNSPECIFIED VESSEL OR LESION TYPE: ICD-10-CM

## 2020-05-15 DIAGNOSIS — Z87.891 PERSONAL HISTORY OF TOBACCO USE, PRESENTING HAZARDS TO HEALTH: ICD-10-CM

## 2020-05-15 DIAGNOSIS — E78.2 MIXED HYPERLIPIDEMIA: ICD-10-CM

## 2020-05-15 DIAGNOSIS — Z12.2 ENCOUNTER FOR SCREENING FOR LUNG CANCER: ICD-10-CM

## 2020-05-15 DIAGNOSIS — M17.0 PRIMARY OSTEOARTHRITIS OF BOTH KNEES: ICD-10-CM

## 2020-05-15 DIAGNOSIS — Z00.00 MEDICARE ANNUAL WELLNESS VISIT, INITIAL: ICD-10-CM

## 2020-05-15 DIAGNOSIS — R06.02 SHORTNESS OF BREATH: Primary | ICD-10-CM

## 2020-05-15 DIAGNOSIS — E66.9 OBESITY (BMI 30-39.9): ICD-10-CM

## 2020-05-15 PROCEDURE — G0438 PPPS, INITIAL VISIT: HCPCS | Performed by: FAMILY MEDICINE

## 2020-05-15 RX ORDER — ALBUTEROL SULFATE 90 UG/1
2 AEROSOL, METERED RESPIRATORY (INHALATION) EVERY 4 HOURS PRN
Qty: 8 G | Refills: 2 | Status: CANCELLED | OUTPATIENT
Start: 2020-05-15

## 2020-05-15 RX ORDER — MELOXICAM 7.5 MG/1
TABLET ORAL
Qty: 60 TABLET | Refills: 0 | Status: SHIPPED | OUTPATIENT
Start: 2020-05-15 | End: 2020-09-28 | Stop reason: SDUPTHER

## 2020-05-15 RX ORDER — PRAVASTATIN SODIUM 40 MG
40 TABLET ORAL DAILY
Qty: 90 TABLET | Refills: 3 | Status: SHIPPED | OUTPATIENT
Start: 2020-05-15 | End: 2020-05-22 | Stop reason: SDUPTHER

## 2020-05-15 RX ORDER — TAMSULOSIN HYDROCHLORIDE 0.4 MG/1
1 CAPSULE ORAL DAILY
Qty: 90 CAPSULE | Refills: 3 | Status: SHIPPED | OUTPATIENT
Start: 2020-05-15 | End: 2020-05-22 | Stop reason: SDUPTHER

## 2020-05-15 NOTE — PATIENT INSTRUCTIONS
Medicare Wellness  Personal Prevention Plan of Service     Date of Office Visit:  05/15/2020  Encounter Provider:  Sanaz Zuluaga DO  Place of Service:  [facility]  Patient Name: Thien Gray  :  1955    As part of the Medicare Wellness portion of your visit today, we are providing you with this personalized preventive plan of services (PPPS). This plan is based upon recommendations of the [place] Preventive Services Task Force (USPSTF) and the Advisory Committee on Immunization Practices (ACIP).    This lists the preventive care services that should be considered, and provides dates of when you are due. Items listed as completed are up-to-date and do not require any further intervention.    Health Maintenance   Topic Date Due   • LUNG CANCER SCREENING  2008   • INFLUENZA VACCINE  2020   • LIPID PANEL  2020   • MEDICARE ANNUAL WELLNESS  05/15/2021   • COLONOSCOPY  2027   • TDAP/TD VACCINES (2 - Td) 2029   • HEPATITIS C SCREENING  Completed   • Pneumococcal Vaccine Once at 65 Years Old  Completed   • AAA SCREEN (ONE-TIME)  Completed   • ZOSTER VACCINE  Discontinued       No orders of the defined types were placed in this encounter.      No follow-ups on file.        Please review the decision aid used during our discussion regarding the Low dose lung cancer screening visit today.

## 2020-05-15 NOTE — PROGRESS NOTES
The ABCs of the Annual Wellness Visit  Initial Medicare Wellness Visit    Chief Complaint   Patient presents with   • Medicare Wellness-subsequent       Subjective   History of Present Illness:  Thien Gray is a 64 y.o. male who presents for an Initial Medicare Wellness Visit.    HEALTH RISK ASSESSMENT    Recent Hospitalizations:  No hospitalization(s) within the last year.    Current Medical Providers:  Patient Care Team:  Sanaz Zuluaga DO as PCP - General (Family Medicine)    Smoking Status:  Social History     Tobacco Use   Smoking Status Former Smoker   • Packs/day: 1.50   • Years: 45.00   • Pack years: 67.50   • Types: Cigarettes   • Start date: 1971   • Last attempt to quit: 2017   • Years since quittin.8   Smokeless Tobacco Never Used       Alcohol Consumption:  Social History     Substance and Sexual Activity   Alcohol Use Yes   • Alcohol/week: 15.0 standard drinks   • Types: 15 drink(s) per week    Comment: 3 nights a week       Depression Screen:   PHQ-2/PHQ-9 Depression Screening 5/15/2020   Little interest or pleasure in doing things 0   Feeling down, depressed, or hopeless 0   Total Score 0       Fall Risk Screen:  STEADI Fall Risk Assessment was completed, and patient is at LOW risk for falls.Assessment completed on:5/15/2020    Health Habits and Functional and Cognitive Screening:  Functional & Cognitive Status 5/15/2020   Do you have difficulty preparing food and eating? No   Do you have difficulty bathing yourself, getting dressed or grooming yourself? No   Do you have difficulty using the toilet? No   Do you have difficulty moving around from place to place? No   Do you have trouble with steps or getting out of a bed or a chair? No   Current Diet Limited Junk Food   Dental Exam Not up to date   Eye Exam Up to date   Exercise (times per week) 0 times per week   Current Exercise Activities Include None   Do you need help using the phone?  No   Are you deaf or do you have serious  difficulty hearing?  No   Do you need help with transportation? No   Do you need help shopping? No   Do you need help preparing meals?  No   Do you need help with housework?  No   Do you need help with laundry? No   Do you need help taking your medications? No   Do you need help managing money? No   Do you ever drive or ride in a car without wearing a seat belt? No   Have you felt unusual stress, anger or loneliness in the last month? No   Who do you live with? Alone   Have you been bothered in the last four weeks by sexual problems? No   Do you have difficulty concentrating, remembering or making decisions? No         Does the patient have evidence of cognitive impairment? No    Asprin use counseling:Taking ASA appropriately as indicated    Age-appropriate Screening Schedule:  Refer to the list below for future screening recommendations based on patient's age, sex and/or medical conditions. Orders for these recommended tests are listed in the plan section. The patient has been provided with a written plan.    Health Maintenance   Topic Date Due   • INFLUENZA VACCINE  08/01/2020   • LIPID PANEL  12/11/2020   • COLONOSCOPY  11/30/2027   • TDAP/TD VACCINES (2 - Td) 06/07/2029   • ZOSTER VACCINE  Discontinued          He has been experiencing shortness of breath, present for years but worse over the last 2-3 weeks.   Recently, has had to use albuterol inhaler, which does relieve symptoms.   No wheezing, but feels like he is unable to take a full breath.   History of tobacco use, stopped 3 years ago.  Echocardiogram updated March 2020.     The following portions of the patient's history were reviewed and updated as appropriate: allergies, current medications, past family history, past medical history, past social history, past surgical history and problem list.    Outpatient Medications Prior to Visit   Medication Sig Dispense Refill   • aspirin 81 MG EC tablet Take 81 mg by mouth Daily.     • meloxicam (MOBIC) 7.5 MG  "tablet 1 Oral Daily with food. 60 tablet 0   • pravastatin (PRAVACHOL) 40 MG tablet Take 1 tablet by mouth Daily. 90 tablet 3   • tamsulosin (FLOMAX) 0.4 MG capsule 24 hr capsule TAKE 1 CAPSULE BY MOUTH ONCE DAILY FOR 30 DAYS       No facility-administered medications prior to visit.        Patient Active Problem List   Diagnosis   • History of tobacco abuse   • Mixed hyperlipidemia   • Coronary artery disease involving native coronary artery of native heart without angina pectoris   • Dyspnea on exertion   • RBBB       Advanced Care Planning:  ACP discussion was held with the patient during this visit. Patient does not have an advance directive, information provided.    Review of Systems   Constitutional: Negative for appetite change, chills and fever.   HENT: Negative.    Eyes: Negative.    Respiratory: Positive for chest tightness and shortness of breath. Negative for choking and wheezing.    Cardiovascular: Negative for chest pain, palpitations and leg swelling.   Gastrointestinal: Negative for abdominal pain.   Endocrine: Negative for cold intolerance and heat intolerance.   Musculoskeletal: Negative.    Neurological: Negative for dizziness and numbness.   Hematological: Negative.    Psychiatric/Behavioral: Negative.        Compared to one year ago, the patient feels his physical health is the same.  Compared to one year ago, the patient feels his mental health is the same.    Reviewed chart for potential of high risk medication in the elderly: yes  Reviewed chart for potential of harmful drug interactions in the elderly:yes    Objective         Vitals:    05/15/20 0832   BP: 106/74   Pulse: 102   Temp: 97.7 °F (36.5 °C)   SpO2: 98%   Weight: 124 kg (273 lb)   Height: 177.8 cm (70\")   PainSc: 0-No pain       Body mass index is 39.17 kg/m².  Discussed the patient's BMI with him. The BMI is above average; BMI management plan is completed.    Physical Exam   Constitutional: He is oriented to person, place, and " time. He appears well-developed and well-nourished. No distress.   HENT:   Head: Normocephalic and atraumatic.   Right Ear: External ear normal.   Left Ear: External ear normal.   Eyes: Conjunctivae are normal.   Neck: Neck supple.   Cardiovascular: Normal rate, regular rhythm and normal heart sounds.   No murmur heard.  Pulmonary/Chest: Effort normal. He has decreased breath sounds in the right upper field and the left upper field. He has no wheezes. He has no rhonchi.   Musculoskeletal: He exhibits no edema or deformity.   Neurological: He is alert and oriented to person, place, and time.   Psychiatric: He has a normal mood and affect. His behavior is normal.   Nursing note and vitals reviewed.            Assessment/Plan   Medicare Risks and Personalized Health Plan  CMS Preventative Services Quick Reference  Advance Directive Discussion  Cardiovascular risk  Lung Cancer Risk  Obesity/Overweight     The above risks/problems have been discussed with the patient.  Pertinent information has been shared with the patient in the After Visit Summary.  Follow up plans and orders are seen below in the Assessment/Plan Section.    Diagnoses and all orders for this visit:    1. Shortness of breath (Primary)  -     Full Pulmonary Function Test With Bronchodilator    2. Obesity (BMI 30-39.9)    3. Encounter for screening for lung cancer  -     CT chest low dose wo    4. Personal history of tobacco use, presenting hazards to health  -     CT chest low dose wo  -     Full Pulmonary Function Test With Bronchodilator    5. Medicare annual wellness visit, initial    6. BMI 39.0-39.9,adult    Other orders  -     Cancel: albuterol sulfate  (90 Base) MCG/ACT inhaler; Inhale 2 puffs Every 4 (Four) Hours As Needed for Shortness of Air.  Dispense: 8 g; Refill: 2      Follow Up:  Return in about 1 year (around 5/15/2021) for Medicare Wellness.     An After Visit Summary and PPPS were given to the patient.

## 2020-05-15 NOTE — TELEPHONE ENCOUNTER
PATIENT CALLED AND ASKED IF HE SHOULD CALL AND REQUEST A PRESCRIPTION FOR MELOXICAM OR NOT. PATIENT FINISHED TAKING MELOXICAM 2 DAYS AGO. PATIENT USES [facility]. PATIENT WOULD LIKE A CALL BACK -[phone].

## 2020-05-15 NOTE — TELEPHONE ENCOUNTER
Patient would like to know if you can refill the Meloxicam. He said he thinks it is helping along with the cortisone injection he got. PLease advise thank you!  Zaynab

## 2020-05-22 DIAGNOSIS — I25.10 CORONARY ARTERY DISEASE INVOLVING NATIVE HEART WITHOUT ANGINA PECTORIS, UNSPECIFIED VESSEL OR LESION TYPE: ICD-10-CM

## 2020-05-22 DIAGNOSIS — E78.2 MIXED HYPERLIPIDEMIA: ICD-10-CM

## 2020-05-22 RX ORDER — TAMSULOSIN HYDROCHLORIDE 0.4 MG/1
1 CAPSULE ORAL DAILY
Qty: 90 CAPSULE | Refills: 3 | Status: SHIPPED | OUTPATIENT
Start: 2020-05-22 | End: 2020-09-28

## 2020-05-22 RX ORDER — PRAVASTATIN SODIUM 40 MG
40 TABLET ORAL DAILY
Qty: 90 TABLET | Refills: 3 | Status: SHIPPED | OUTPATIENT
Start: 2020-05-22 | End: 2021-02-15 | Stop reason: SDUPTHER

## 2020-05-22 NOTE — TELEPHONE ENCOUNTER
[facility] called requesting a verbal refill request for tamsulosin and pravastatin. Please advise and call back    [phone]  Ref: [id number]

## 2020-05-28 ENCOUNTER — TELEPHONE (OUTPATIENT)
Dept: FAMILY MEDICINE CLINIC | Facility: CLINIC | Age: 65
End: 2020-05-28

## 2020-05-28 NOTE — TELEPHONE ENCOUNTER
Hannah from [facility] mailservice is calling stating that they faxed over a request for two medications on May 15th and they have not heard back. It looks like they were sent on May 22nd but was a blank fax. Hannah stated that they can take a verbal over the phone. Please advise and call back    Tamsulosin 40mg  Pravastatin 40mg    [facility]    Ref: [id number]

## 2020-05-31 ENCOUNTER — APPOINTMENT (OUTPATIENT)
Dept: PREADMISSION TESTING | Facility: HOSPITAL | Age: 65
End: 2020-05-31

## 2020-05-31 PROCEDURE — U0002 COVID-19 LAB TEST NON-CDC: HCPCS

## 2020-05-31 PROCEDURE — C9803 HOPD COVID-19 SPEC COLLECT: HCPCS

## 2020-05-31 PROCEDURE — U0004 COV-19 TEST NON-CDC HGH THRU: HCPCS

## 2020-06-01 LAB
REF LAB TEST METHOD: NORMAL
SARS-COV-2 RNA RESP QL NAA+PROBE: NOT DETECTED

## 2020-06-03 ENCOUNTER — HOSPITAL ENCOUNTER (OUTPATIENT)
Dept: CT IMAGING | Facility: HOSPITAL | Age: 65
Discharge: HOME | End: 2020-06-03

## 2020-06-03 ENCOUNTER — HOSPITAL ENCOUNTER (OUTPATIENT)
Dept: PULMONOLOGY | Facility: HOSPITAL | Age: 65
Discharge: HOME | End: 2020-06-03
Admitting: FAMILY MEDICINE

## 2020-06-03 PROCEDURE — 94727 GAS DIL/WSHOT DETER LNG VOL: CPT | Performed by: INTERNAL MEDICINE

## 2020-06-03 PROCEDURE — 94729 DIFFUSING CAPACITY: CPT

## 2020-06-03 PROCEDURE — G0297 LDCT FOR LUNG CA SCREEN: HCPCS

## 2020-06-03 PROCEDURE — 94010 BREATHING CAPACITY TEST: CPT | Performed by: INTERNAL MEDICINE

## 2020-06-03 PROCEDURE — 94729 DIFFUSING CAPACITY: CPT | Performed by: INTERNAL MEDICINE

## 2020-06-03 PROCEDURE — 94727 GAS DIL/WSHOT DETER LNG VOL: CPT

## 2020-06-03 PROCEDURE — 94010 BREATHING CAPACITY TEST: CPT

## 2020-06-15 ENCOUNTER — OFFICE VISIT (OUTPATIENT)
Dept: CARDIOLOGY | Facility: CLINIC | Age: 65
End: 2020-06-15

## 2020-06-15 VITALS
OXYGEN SATURATION: 98 % | SYSTOLIC BLOOD PRESSURE: 118 MMHG | DIASTOLIC BLOOD PRESSURE: 70 MMHG | BODY MASS INDEX: 38.94 KG/M2 | WEIGHT: 272 LBS | HEIGHT: 70 IN | HEART RATE: 98 BPM | TEMPERATURE: 97.4 F

## 2020-06-15 DIAGNOSIS — E78.2 MIXED HYPERLIPIDEMIA: ICD-10-CM

## 2020-06-15 DIAGNOSIS — R06.09 DYSPNEA ON EXERTION: ICD-10-CM

## 2020-06-15 DIAGNOSIS — I25.10 CORONARY ARTERY DISEASE INVOLVING NATIVE CORONARY ARTERY OF NATIVE HEART WITHOUT ANGINA PECTORIS: Primary | ICD-10-CM

## 2020-06-15 PROCEDURE — 99214 OFFICE O/P EST MOD 30 MIN: CPT | Performed by: PHYSICIAN ASSISTANT

## 2020-06-15 NOTE — PROGRESS NOTES
"     [facility] - Office Note  Thien Gray         [address] [facility] [address]  1955   [phone] (home)      LOCATION:  [place] office.  Visit Type: Follow Up.    PCP:  Sanaz Zuluaga,     06/15/20   Thien Gray is a 64 y.o.  male  retired.      Chief Complaint:   FU on CAD, Dyspnea, HLP    PROBLEM LIST/PMHx:  1.  Coronary Artery Disease   A.  Remote MI and stenting, 2004;idb   B.  Exertional dyspnea:  Echo 3/3/20,  Left ventricular systolic function is hyperdynamic (EF > 70).  Left ventricular diastolic dysfunction (grade I) consistent with impaired relaxation.  Left ventricular wall thickness is consistent with mild concentric hypertrophy.  2.  Right Bundle Branch Block (RBBB)  3.  Mixed Hyperlipidemia  4.  Former Heavy smoking tobacco abuse  5.  BPH  6.  Lung Nodules           Allergies   Allergen Reactions   • Bactrim [Sulfamethoxazole-Trimethoprim] Other (See Comments)     Causes weakness, fatigue   • Plavix [Clopidogrel Bisulfate] Hives   • Augmentin [Amoxicillin-Pot Clavulanate] Confusion         Current Outpatient Medications:   •  aspirin 81 MG EC tablet, Take 81 mg by mouth Daily., Disp: , Rfl:   •  meloxicam (MOBIC) 7.5 MG tablet, 1 Oral Daily with food., Disp: 60 tablet, Rfl: 0  •  pravastatin (Pravachol) 40 MG tablet, Take 1 tablet by mouth Daily., Disp: 90 tablet, Rfl: 3  •  tamsulosin (FLOMAX) 0.4 MG capsule 24 hr capsule, Take 1 capsule by mouth Daily., Disp: 90 capsule, Rfl: 3    HPI  Thien Gray is here today for a 6 week follow up on exertional dyspnea with known RBBB.  Patient underwent echo for pre operative clearance back in February - normal EF, grade I diastolic dysfunction.  He was cleared but did not have the surgery - and states he wants to continue to wait.  He still has exertional dyspnea - sometimes worse than others.  Denies chest pain, but really states the dyspnea can \"get to him.\"  He has some BLE edema " "and mentions tingling of his lateral toes on the right side.  He denies numbness or falls.    Since we saw him last, he has undergone evaluation for respiratory status with CT Chest and PFTs.  He has follow up on both of these with his PCP this week.          The following portions of the patient's history were reviewed in the chart and updated as appropriate: allergies, current medications, past family history, past medical history, past social history, past surgical history and problem list.    Review of Systems   Constitution: Negative for fever, malaise/fatigue and weight loss.   Cardiovascular: Positive for dyspnea on exertion and leg swelling. Negative for chest pain, near-syncope and palpitations.   Respiratory: Positive for shortness of breath. Negative for cough, hemoptysis, sleep disturbances due to breathing and wheezing.    Musculoskeletal: Positive for arthritis.   Neurological: Negative for numbness.   All other systems reviewed and are negative.            height is 177.8 cm (70\") and weight is 123 kg (272 lb). His temperature is 97.4 °F (36.3 °C). His blood pressure is 118/70 and his pulse is 98. His oxygen saturation is 98%.   Physical Exam   Constitutional: Vital signs are normal. He appears well-developed and well-nourished.   Cardiovascular: Normal rate, regular rhythm, S1 normal, S2 normal, normal heart sounds, intact distal pulses and normal pulses.   Pulmonary/Chest: Effort normal and breath sounds normal. He has no wheezes. He has no rhonchi. He has no rales.   Abdominal: Soft. Normal appearance and bowel sounds are normal. There is no hepatosplenomegaly.   Musculoskeletal: He exhibits edema.   Neurological: He is alert.         Procedures     Assessment/ Plan   Coronary artery disease involving native coronary artery of native heart without angina pectoris:  Patient does not currently have chest pain but has persistent exertional dyspnea that he claims is bad at times.  He was cleared for " cataract removal based on echo/consultation, but did not pursue the actual surgery.  At this time, I will schedule him for nuclear MPS.    Mixed hyperlipidemia:  Continue pravachol and appropriate diet.  Body mass index is 39.03 kg/m².  Weight loss encouraged.      Dyspnea on exertion:  Echo from 2/2020 reviewed with patient once again, along with discussing what a RBBB finding was.  He continues to have symptoms of dyspnea, at times significant.  I will proceed with nuclear MPS, Dr. Eason to read.  We discussed the continuation of ASA and statin.  He has been educated on weight loss as well.  He states he may have knee surgery in future and would like to pursue this to get ready for that.  RTC 6 months with EKG or sooner PRN.  We will call patient with results.        Mercy Dukes PA-C functioning independently.  6/15/2020 10:34

## 2020-06-16 ENCOUNTER — TELEMEDICINE (OUTPATIENT)
Dept: ORTHOPEDIC SURGERY | Facility: CLINIC | Age: 65
End: 2020-06-16

## 2020-06-16 DIAGNOSIS — M17.11 PRIMARY OSTEOARTHRITIS OF RIGHT KNEE: Primary | ICD-10-CM

## 2020-06-16 PROCEDURE — 99212 OFFICE O/P EST SF 10 MIN: CPT | Performed by: ORTHOPAEDIC SURGERY

## 2020-06-16 NOTE — PROGRESS NOTES
Orthopaedic Clinic Note: Knee Established Patient    You have chosen to receive care through a telehealth visit.  Do you consent to use a video/audio connection for your medical care today? Yes    Chief Complaint   Patient presents with   • Follow-up     right knee osteoarthritis        HPI    It has been 5  month(s) since Mr. Gray's last visit. He returns for video encounter today for follow-up of right knee osteoarthritis.  He rates his pain 2/10 on the pain scale.  He states that since the right knee cortisone injection given at his last clinic visit, his pain has improved.  He is not taking meloxicam on an as-needed basis 7.5 mg.  He does have some occasional discomfort but is overall able to do most activities without any significant limitations.  Overall he is doing better.  Denies fevers chills or constitutional symptoms.  He is ambulatory with no assistive device.     Past Medical History:   Diagnosis Date   • Abnormal ECG 2020    Get from Dr. Kayser   • Coronary artery disease    • History of heart attack    • History of MI (myocardial infarction)    • Hyperlipidemia    • Myocardial infarction (CMS/HCC)    • RBBB 2/10/2020      Past Surgical History:   Procedure Laterality Date   • APPENDECTOMY     • CARDIAC CATHETERIZATION  2004    They used a catheter to put in my stent.   • CORONARY ANGIOPLASTY      I think that's what the bill for my stent said.   • CORONARY STENT PLACEMENT        Family History   Problem Relation Age of Onset   • Cancer Mother    • Hypertension Mother    • Hyperlipidemia Mother         Takes medication   • Heart attack Mother    • Diabetes Sister    • Heart failure Father         .   • Stroke Father      Social History     Socioeconomic History   • Marital status:      Spouse name: Not on file   • Number of children: Not on file   • Years of education: Not on file   • Highest education level: Not on file   Tobacco Use   • Smoking status: Former  Smoker     Packs/day: 1.50     Years: 45.00     Pack years: 67.50     Types: Cigarettes     Start date: 1971     Last attempt to quit: 2017     Years since quittin.9   • Smokeless tobacco: Never Used   Substance and Sexual Activity   • Alcohol use: Yes     Alcohol/week: 15.0 standard drinks     Types: 15 Standard drinks or equivalent per week     Comment: 3 nights a week   • Drug use: No   • Sexual activity: Not Currently     Partners: Female     Birth control/protection: Diaphragm, Post-menopausal, Spermicide     Comment:  twice, both . No STVs. Not planning to resume.      Current Outpatient Medications on File Prior to Visit   Medication Sig Dispense Refill   • aspirin 81 MG EC tablet Take 81 mg by mouth Daily.     • meloxicam (MOBIC) 7.5 MG tablet 1 Oral Daily with food. 60 tablet 0   • pravastatin (Pravachol) 40 MG tablet Take 1 tablet by mouth Daily. 90 tablet 3   • tamsulosin (FLOMAX) 0.4 MG capsule 24 hr capsule Take 1 capsule by mouth Daily. 90 capsule 3     No current facility-administered medications on file prior to visit.       Allergies   Allergen Reactions   • Bactrim [Sulfamethoxazole-Trimethoprim] Other (See Comments)     Causes weakness, fatigue   • Plavix [Clopidogrel Bisulfate] Hives   • Augmentin [Amoxicillin-Pot Clavulanate] Confusion        Review of Systems   Constitutional: Negative.    HENT: Negative.    Eyes: Negative.    Respiratory: Negative.    Cardiovascular: Negative.    Gastrointestinal: Negative.    Endocrine: Negative.    Genitourinary: Negative.    Musculoskeletal: Positive for arthralgias and gait problem.   Skin: Negative.    Allergic/Immunologic: Negative.    Hematological: Negative.    Psychiatric/Behavioral: Negative.         The patient's Review of Systems was personally reviewed and confirmed as accurate.    Physical Exam  There were no vitals taken for this visit.    There is no height or weight on file to calculate BMI.    GENERAL APPEARANCE:  awake, alert, oriented, in no acute distress and well developed, well nourished  LUNGS:  breathing nonlabored  EXTREMITIES: no clubbing, cyanosis  PERIPHERAL PULSES: palpable dorsalis pedis and posterior tibial pulses bilaterally.    GAIT:  Normal        ----------  Right Knee Exam:  ----------  ALIGNMENT: Varus alignment  ----------  RANGE OF MOTION:   Near full range of motion  LIGAMENTOUS STABILITY:   Unable to assess  ----------  STRENGTH:   Unable to assess  ----------  PAIN WITH PALPATION:medial joint line  KNEE EFFUSION: No visible effusion  PAIN WITH KNEE ROM: yes  PATELLAR CREPITUS: Unable to assess   ----------  SENSATION TO LIGHT TOUCH:  DEEP PERONEAL/SUPERFICIAL PERONEAL/SURAL/SAPHENOUS/TIBIAL:    Grossly intact per patient  ----------  EDEMA:  no  ERYTHEMA:    no  WOUNDS/INCISIONS:   no  _____________________________________________________________________  _____________________________________________________________________    RADIOGRAPHIC FINDINGS:   No new imaging today    Assessment/Plan:   Diagnosis Plan   1. Primary osteoarthritis of right knee       I discussed with patient that his symptoms appear to be adequately improved with a prescription anti-inflammatory and a prior injection.  At this point, no further intervention warranted at this time.  Patient will follow-up as needed.    A total of 5 minutes 22 seconds was spent via video encounter for this patient.      Pavan Suazo MD  06/16/20  12:37

## 2020-06-17 ENCOUNTER — OFFICE VISIT (OUTPATIENT)
Dept: FAMILY MEDICINE CLINIC | Facility: CLINIC | Age: 65
End: 2020-06-17

## 2020-06-17 VITALS
TEMPERATURE: 97.5 F | RESPIRATION RATE: 20 BRPM | OXYGEN SATURATION: 98 % | BODY MASS INDEX: 38.94 KG/M2 | HEIGHT: 70 IN | SYSTOLIC BLOOD PRESSURE: 116 MMHG | DIASTOLIC BLOOD PRESSURE: 68 MMHG | WEIGHT: 272 LBS | HEART RATE: 96 BPM

## 2020-06-17 DIAGNOSIS — L40.9 PSORIASIS: ICD-10-CM

## 2020-06-17 DIAGNOSIS — E78.2 MIXED HYPERLIPIDEMIA: Primary | ICD-10-CM

## 2020-06-17 DIAGNOSIS — Z12.5 SCREENING FOR PROSTATE CANCER: ICD-10-CM

## 2020-06-17 DIAGNOSIS — L30.9 DERMATITIS: ICD-10-CM

## 2020-06-17 DIAGNOSIS — I25.10 CORONARY ARTERY DISEASE INVOLVING NATIVE CORONARY ARTERY OF NATIVE HEART WITHOUT ANGINA PECTORIS: ICD-10-CM

## 2020-06-17 PROCEDURE — 99214 OFFICE O/P EST MOD 30 MIN: CPT | Performed by: FAMILY MEDICINE

## 2020-06-17 RX ORDER — TRIAMCINOLONE ACETONIDE 0.25 MG/G
CREAM TOPICAL 2 TIMES DAILY
Qty: 15 G | Refills: 0 | Status: SHIPPED | OUTPATIENT
Start: 2020-06-17 | End: 2020-06-27

## 2020-06-17 NOTE — PROGRESS NOTES
Subjective   Thien Gray is a 64 y.o. male.   Chief Complaint   Patient presents with   • 6mo f/u CAD   • Red itchy spot-L arm     x 2d      Hyperlipidemia   This is a chronic problem. The current episode started more than 1 year ago. The problem is controlled. Recent lipid tests were reviewed and are normal. Exacerbating diseases include obesity. He has no history of hypothyroidism. Factors aggravating his hyperlipidemia include fatty foods. Associated symptoms include shortness of breath. Pertinent negatives include no chest pain. Current antihyperlipidemic treatment includes statins. The current treatment provides significant improvement of lipids. Compliance problems include adherence to exercise and adherence to diet.  Risk factors for coronary artery disease include dyslipidemia, male sex, obesity and a sedentary lifestyle.      He has a rash on left forearm x 2 days. Rash is bumpy, only a little itching. Hasn't had any new exposures to medications, plants, foods, soaps, lotions.   Not applied any treatment.   Also, has patches of psoriasis on forearm.    He has had some dyspnea, but has been improving over time. Recently complete PFT for evaluation.   Screening CT lung revealed lung nodules and emphysema changes, recommended to repeat imaging in 1 year.   Uses albuterol inhaler sparingly to improve shortness of breath, doesn't require this daily.   Saw cardiology recently, they have ordered a stress test for further evaluation of dyspnea.     The following portions of the patient's history were reviewed and updated as appropriate: allergies, current medications, past family history, past medical history, past social history, past surgical history and problem list.    Review of Systems   Constitutional: Negative for chills, fatigue and fever.   HENT: Negative.    Eyes: Negative.    Respiratory: Positive for shortness of breath. Negative for cough and chest tightness.    Cardiovascular: Negative for chest  pain and palpitations.   Gastrointestinal: Negative for abdominal pain.   Skin: Positive for rash.   Neurological: Negative for light-headedness, headache and confusion.   Hematological: Negative for adenopathy. Does not bruise/bleed easily.   Psychiatric/Behavioral: Negative.        Objective   Physical Exam   Constitutional: He is oriented to person, place, and time. He appears well-developed and well-nourished. No distress.   HENT:   Head: Normocephalic.   Right Ear: External ear normal.   Left Ear: External ear normal.   Nose: Nose normal.   Eyes: Conjunctivae are normal.   Cardiovascular: Normal rate, regular rhythm and normal heart sounds.   No murmur heard.  Pulmonary/Chest: Effort normal and breath sounds normal. No respiratory distress. He has no wheezes. He has no rhonchi.   Musculoskeletal: He exhibits no edema or deformity.   Neurological: He is alert and oriented to person, place, and time.   Skin: Skin is warm and dry. Rash noted. Rash is papular (left dorsal forearm, single small patch).        Psychiatric: He has a normal mood and affect. His behavior is normal.   Nursing note and vitals reviewed.        Assessment/Plan   Thien was seen today for 6mo f/u cad and red itchy spot-l arm.    Diagnoses and all orders for this visit:    Mixed hyperlipidemia  Comments:  Continue statin. Will return to update fasting labs.  Orders:  -     Comprehensive Metabolic Panel; Future  -     CBC & Differential; Future  -     Lipid Panel; Future    Coronary artery disease involving native coronary artery of native heart without angina pectoris    Dermatitis  Comments:  Topical steroid to improve  Orders:  -     triamcinolone (KENALOG) 0.025 % cream; Apply  topically to the appropriate area as directed 2 (Two) Times a Day for 10 days. Avoid applying to face, axilla, and groin    Psoriasis  Comments:  Topical steroid prn to improve    Screening for prostate cancer  -     PSA Screen; Future      Dyspnea has improved with  time. Only uses albuterol inhaler as needed, doesn't require daily.

## 2020-06-17 NOTE — PATIENT INSTRUCTIONS
Go to the nearest ER or return to clinic if symptoms worsen, fever/chill develop    Chronic Obstructive Pulmonary Disease    Chronic obstructive pulmonary disease (COPD) is a long-term (chronic) condition that affects the lungs. COPD is a general term that can be used to describe many different lung problems that cause lung swelling (inflammation) and limit airflow, including chronic bronchitis and emphysema. If you have COPD, your lung function will probably never return to normal. In most cases, it gets worse over time. However, there are steps you can take to slow the progression of the disease and improve your quality of life.  What are the causes?  This condition may be caused by:  · Smoking. This is the most common cause.  · Certain genes passed down through families.  What increases the risk?  The following factors may make you more likely to develop this condition:  · Secondhand smoke from cigarettes, pipes, or cigars.  · Exposure to chemicals and other irritants such as fumes and dust in the work environment.  · Chronic lung conditions or infections.  What are the signs or symptoms?  Symptoms of this condition include:  · Shortness of breath, especially during physical activity.  · Chronic cough with a large amount of thick mucus. Sometimes the cough may not have any mucus (dry cough).  · Wheezing.  · Rapid breaths.  · Gray or bluish discoloration (cyanosis) of the skin, especially in your fingers, toes, or lips.  · Feeling tired (fatigue).  · Weight loss.  · Chest tightness.  · Frequent infections.  · Episodes when breathing symptoms become much worse (exacerbations).  · Swelling in the ankles, feet, or legs. This may occur in later stages of the disease.  How is this diagnosed?  This condition is diagnosed based on:  · Your medical history.  · A physical exam.  You may also have tests, including:  · Lung (pulmonary) function tests. This may include a spirometry test, which measures your ability to exhale  properly.  · Chest X-ray.  · CT scan.  · Blood tests.  How is this treated?  This condition may be treated with:  · Medicines. These may include inhaled rescue medicines to treat acute exacerbations as well as long-term, or maintenance, medicines to prevent flare-ups of COPD.  ? Bronchodilators help treat COPD by dilating the airways to allow increased airflow and make your breathing more comfortable.  ? Steroids can reduce airway inflammation and help prevent exacerbations.  · Smoking cessation. If you smoke, your health care provider may ask you to quit, and may also recommend therapy or replacement products to help you quit.  · Pulmonary rehabilitation. This may involve working with a team of health care providers and specialists, such as respiratory, occupational, and physical therapists.  · Exercise and physical activity. These are beneficial for nearly all people with COPD.  · Nutrition therapy to gain weight, if you are underweight.  · Oxygen. Supplemental oxygen therapy is only helpful if you have a low oxygen level in your blood (hypoxemia).  · Lung surgery or transplant.  · Palliative care. This is to help people with COPD feel comfortable when treatment is no longer working.  Follow these instructions at home:  Medicines  · Take over-the-counter and prescription medicines (inhaled or pills) only as told by your health care provider.  · Talk to your health care provider before taking any cough or allergy medicines. You may need to avoid certain medicines that dry out your airways.  Lifestyle  · If you are a smoker, the most important thing that you can do is to stop smoking. Do not use any products that contain nicotine or tobacco, such as cigarettes and e-cigarettes. If you need help quitting, ask your health care provider. Continuing to smoke will cause the disease to progress faster.  · Avoid exposure to things that irritate your lungs, such as smoke, chemicals, and fumes.  · Stay active, but balance  activity with periods of rest. Exercise and physical activity will help you maintain your ability to do things you want to do.  · Learn and use relaxation techniques to manage stress and to control your breathing.  · Get the right amount of sleep and get quality sleep. Most adults need 7 or more hours per night.  · Eat healthy foods. Eating smaller, more frequent meals and resting before meals may help you maintain your strength.  Controlled breathing  Learn and use controlled breathing techniques as directed by your health care provider. Controlled breathing techniques include:  · Pursed lip breathing. Start by breathing in (inhaling) through your nose for 1 second. Then, purse your lips as if you were going to whistle and breathe out (exhale) through the pursed lips for 2 seconds.  · Diaphragmatic breathing. Start by putting one hand on your abdomen just above your waist. Inhale slowly through your nose. The hand on your abdomen should move out. Then purse your lips and exhale slowly. You should be able to feel the hand on your abdomen moving in as you exhale.  Controlled coughing  Learn and use controlled coughing to clear mucus from your lungs. Controlled coughing is a series of short, progressive coughs. The steps of controlled coughing are:  1. Lean your head slightly forward.  2. Breathe in deeply using diaphragmatic breathing.  3. Try to hold your breath for 3 seconds.  4. Keep your mouth slightly open while coughing twice.  5. Spit any mucus out into a tissue.  6. Rest and repeat the steps once or twice as needed.  General instructions  · Make sure you receive all the vaccines that your health care provider recommends, especially the pneumococcal and influenza vaccines. Preventing infection and hospitalization is very important when you have COPD.  · Use oxygen therapy and pulmonary rehabilitation if directed to by your health care provider. If you require home oxygen therapy, ask your health care provider  whether you should purchase a pulse oximeter to measure your oxygen level at home.  · Work with your health care provider to develop a COPD action plan. This will help you know what steps to take if your condition gets worse.  · Keep other chronic health conditions under control as told by your health care provider.  · Avoid extreme temperature and humidity changes.  · Avoid contact with people who have an illness that spreads from person to person (is contagious), such as viral infections or pneumonia.  · Keep all follow-up visits as told by your health care provider. This is important.  Contact a health care provider if:  · You are coughing up more mucus than usual.  · There is a change in the color or thickness of your mucus.  · Your breathing is more labored than usual.  · Your breathing is faster than usual.  · You have difficulty sleeping.  · You need to use your rescue medicines or inhalers more often than expected.  · You have trouble doing routine activities such as getting dressed or walking around the house.  Get help right away if:  · You have shortness of breath while you are resting.  · You have shortness of breath that prevents you from:  ? Being able to talk.  ? Performing your usual physical activities.  · You have chest pain lasting longer than 5 minutes.  · Your skin color is more blue (cyanotic) than usual.  · You measure low oxygen saturations for longer than 5 minutes with a pulse oximeter.  · You have a fever.  · You feel too tired to breathe normally.  Summary  · Chronic obstructive pulmonary disease (COPD) is a long-term (chronic) condition that affects the lungs.  · Your lung function will probably never return to normal. In most cases, it gets worse over time. However, there are steps you can take to slow the progression of the disease and improve your quality of life.  · Treatment for COPD may include taking medicines, quitting smoking, pulmonary rehabilitation, and changes to diet and  exercise. As the disease progresses, you may need oxygen therapy, a lung transplant, or palliative care.  · To help manage your condition, do not smoke, avoid exposure to things that irritate your lungs, stay up to date on all vaccines, and follow your health care provider's instructions for taking medicines.  This information is not intended to replace advice given to you by your health care provider. Make sure you discuss any questions you have with your health care provider.  Document Released: 09/27/2006 Document Revised: 11/30/2018 Document Reviewed: 01/22/2018  [employer] Patient Education © 2020 [employer]

## 2020-07-01 ENCOUNTER — RESULTS ENCOUNTER (OUTPATIENT)
Dept: FAMILY MEDICINE CLINIC | Facility: CLINIC | Age: 65
End: 2020-07-01

## 2020-07-01 DIAGNOSIS — Z12.5 SCREENING FOR PROSTATE CANCER: ICD-10-CM

## 2020-07-01 DIAGNOSIS — E78.2 MIXED HYPERLIPIDEMIA: ICD-10-CM

## 2020-07-15 ENCOUNTER — TELEPHONE (OUTPATIENT)
Dept: FAMILY MEDICINE CLINIC | Facility: CLINIC | Age: 65
End: 2020-07-15

## 2020-07-15 RX ORDER — ALBUTEROL SULFATE 2.5 MG/3ML
2.5 SOLUTION RESPIRATORY (INHALATION) EVERY 4 HOURS PRN
Qty: 120 ML | Refills: 2 | Status: SHIPPED | OUTPATIENT
Start: 2020-07-15 | End: 2020-07-16 | Stop reason: ALTCHOICE

## 2020-07-15 NOTE — TELEPHONE ENCOUNTER
PATIENT IS HOPING YOU CAN SEND IN REFILL OF HIS      albuterol (PROVENTIL) nebulizer solution 0.083% 2.5 mg/3mL [250]    HE HAS STARTED USING IT AGAIN BUT IT'S BEEN ABOUT THREE YEARS SINCE LAST REFILL    NAN ALEJANDRE PH: [phone]

## 2020-07-16 ENCOUNTER — TELEPHONE (OUTPATIENT)
Dept: FAMILY MEDICINE CLINIC | Facility: CLINIC | Age: 65
End: 2020-07-16

## 2020-07-16 RX ORDER — ALBUTEROL SULFATE 90 UG/1
2 AEROSOL, METERED RESPIRATORY (INHALATION) EVERY 4 HOURS PRN
Qty: 8 G | Refills: 5 | Status: SHIPPED | OUTPATIENT
Start: 2020-07-16 | End: 2021-09-09

## 2020-07-16 NOTE — TELEPHONE ENCOUNTER
Pharmacy Name: [id number] - [place], [place] - [address]  AT [address] [place] [facility] - [phone]  - [phone]      Pharmacy representative name: Donnell     Pharmacy representative phone number: [phone]    What medication are you calling in regards to: Albuterol nebulizer solution    What question does the pharmacy have: Patient is asking for an albuterol inhaler not nebulizer solution     Who is the provider that prescribed the medication: Margareth    Additional notes:

## 2020-07-16 NOTE — TELEPHONE ENCOUNTER
Nebulizer solution refill requested yesterday. Please cancel this at pharmacy.   Albuterol inhaler sent to pharmacy.

## 2020-07-23 ENCOUNTER — HOSPITAL ENCOUNTER (OUTPATIENT)
Dept: CARDIOLOGY | Facility: HOSPITAL | Age: 65
Discharge: HOME | End: 2020-07-23
Admitting: PHYSICIAN ASSISTANT

## 2020-07-23 VITALS
HEIGHT: 70 IN | BODY MASS INDEX: 38.94 KG/M2 | RESPIRATION RATE: 18 BRPM | WEIGHT: 272 LBS | DIASTOLIC BLOOD PRESSURE: 70 MMHG | HEART RATE: 85 BPM | SYSTOLIC BLOOD PRESSURE: 101 MMHG | OXYGEN SATURATION: 94 %

## 2020-07-23 PROCEDURE — 0 RUBIDIUM CHLORIDE: Performed by: PHYSICIAN ASSISTANT

## 2020-07-23 PROCEDURE — A9555 RB82 RUBIDIUM: HCPCS | Performed by: PHYSICIAN ASSISTANT

## 2020-07-23 PROCEDURE — 78492 MYOCRD IMG PET MLT RST&STRS: CPT | Performed by: INTERNAL MEDICINE

## 2020-07-23 PROCEDURE — 78492 MYOCRD IMG PET MLT RST&STRS: CPT

## 2020-07-23 PROCEDURE — 93018 CV STRESS TEST I&R ONLY: CPT | Performed by: INTERNAL MEDICINE

## 2020-07-23 PROCEDURE — 93017 CV STRESS TEST TRACING ONLY: CPT

## 2020-07-23 PROCEDURE — 25010000002 REGADENOSON 0.4 MG/5ML SOLUTION: Performed by: PHYSICIAN ASSISTANT

## 2020-07-23 RX ADMIN — RUBIDIUM CHLORIDE RB-82 1 DOSE: 150 INJECTION, SOLUTION INTRAVENOUS at 13:40

## 2020-07-23 RX ADMIN — RUBIDIUM CHLORIDE RB-82 1 DOSE: 150 INJECTION, SOLUTION INTRAVENOUS at 13:53

## 2020-07-23 RX ADMIN — REGADENOSON 0.4 MG: 0.08 INJECTION, SOLUTION INTRAVENOUS at 13:55

## 2020-07-24 LAB
BH CV STRESS BP STAGE 1: NORMAL
BH CV STRESS BP STAGE 3: NORMAL
BH CV STRESS BP STAGE 4: NORMAL
BH CV STRESS COMMENTS STAGE 1: NORMAL
BH CV STRESS DOSE REGADENOSON STAGE 1: 0.4
BH CV STRESS DURATION MIN STAGE 1: 1
BH CV STRESS DURATION MIN STAGE 2: 1
BH CV STRESS DURATION MIN STAGE 3: 1
BH CV STRESS DURATION MIN STAGE 4: 1
BH CV STRESS HR STAGE 1: 100
BH CV STRESS HR STAGE 2: 97
BH CV STRESS HR STAGE 3: 90
BH CV STRESS HR STAGE 4: 91
BH CV STRESS O2 STAGE 1: 97
BH CV STRESS O2 STAGE 2: 97
BH CV STRESS O2 STAGE 3: 94
BH CV STRESS O2 STAGE 4: 94
BH CV STRESS PROTOCOL 1: NORMAL
BH CV STRESS RECOVERY BP: NORMAL MMHG
BH CV STRESS RECOVERY HR: 89 BPM
BH CV STRESS RECOVERY O2: 94 %
BH CV STRESS STAGE 1: 1
BH CV STRESS STAGE 2: 2
BH CV STRESS STAGE 3: 3
BH CV STRESS STAGE 4: 4
MAXIMAL PREDICTED HEART RATE: 154 BPM
PERCENT MAX PREDICTED HR: 64.94 %
STRESS BASELINE BP: NORMAL MMHG
STRESS BASELINE HR: 82 BPM
STRESS O2 SAT REST: 94 %
STRESS PERCENT HR: 76 %
STRESS POST O2 SAT PEAK: 97 %
STRESS POST PEAK BP: NORMAL MMHG
STRESS POST PEAK HR: 100 BPM
STRESS TARGET HR: 131 BPM

## 2020-07-28 ENCOUNTER — TELEPHONE (OUTPATIENT)
Dept: CARDIOLOGY | Facility: CLINIC | Age: 65
End: 2020-07-28

## 2020-07-28 NOTE — TELEPHONE ENCOUNTER
Mercy Dukes PA [place], Susan D RN             Please let patient know that his stress test shows no evidence of ischemia, normal EF.  Read as a low risk study.   Thanks.   Mercy FONTANEZ. GEO Dukes      Patient notified of message above from Nette GUARDADO. Verbalized understanding.

## 2020-09-28 ENCOUNTER — OFFICE VISIT (OUTPATIENT)
Dept: FAMILY MEDICINE CLINIC | Facility: CLINIC | Age: 65
End: 2020-09-28

## 2020-09-28 VITALS
BODY MASS INDEX: 38.65 KG/M2 | HEART RATE: 104 BPM | WEIGHT: 270 LBS | SYSTOLIC BLOOD PRESSURE: 106 MMHG | HEIGHT: 70 IN | TEMPERATURE: 97.1 F | DIASTOLIC BLOOD PRESSURE: 64 MMHG | RESPIRATION RATE: 18 BRPM | OXYGEN SATURATION: 98 %

## 2020-09-28 DIAGNOSIS — M17.11 PRIMARY OSTEOARTHRITIS OF RIGHT KNEE: Primary | ICD-10-CM

## 2020-09-28 DIAGNOSIS — N40.1 BENIGN PROSTATIC HYPERPLASIA WITH URINARY FREQUENCY: ICD-10-CM

## 2020-09-28 DIAGNOSIS — Z23 NEED FOR IMMUNIZATION AGAINST INFLUENZA: ICD-10-CM

## 2020-09-28 DIAGNOSIS — R35.0 BENIGN PROSTATIC HYPERPLASIA WITH URINARY FREQUENCY: ICD-10-CM

## 2020-09-28 PROCEDURE — 99214 OFFICE O/P EST MOD 30 MIN: CPT | Performed by: FAMILY MEDICINE

## 2020-09-28 PROCEDURE — 90694 VACC AIIV4 NO PRSRV 0.5ML IM: CPT | Performed by: FAMILY MEDICINE

## 2020-09-28 PROCEDURE — 90471 IMMUNIZATION ADMIN: CPT | Performed by: FAMILY MEDICINE

## 2020-09-28 PROCEDURE — 20610 DRAIN/INJ JOINT/BURSA W/O US: CPT | Performed by: FAMILY MEDICINE

## 2020-09-28 RX ORDER — MELOXICAM 7.5 MG/1
TABLET ORAL
Qty: 90 TABLET | Refills: 2 | Status: SHIPPED | OUTPATIENT
Start: 2020-09-28 | End: 2021-04-20

## 2020-09-28 RX ORDER — TRIAMCINOLONE ACETONIDE 40 MG/ML
40 INJECTION, SUSPENSION INTRA-ARTICULAR; INTRAMUSCULAR
Status: COMPLETED | OUTPATIENT
Start: 2020-09-28 | End: 2020-09-28

## 2020-09-28 RX ORDER — TAMSULOSIN HYDROCHLORIDE 0.4 MG/1
2 CAPSULE ORAL DAILY
Qty: 180 CAPSULE | Refills: 3 | Status: SHIPPED | OUTPATIENT
Start: 2020-09-28 | End: 2021-10-28

## 2020-09-28 RX ADMIN — TRIAMCINOLONE ACETONIDE 40 MG: 40 INJECTION, SUSPENSION INTRA-ARTICULAR; INTRAMUSCULAR at 10:15

## 2020-09-28 NOTE — PROGRESS NOTES
Subjective   Thien Gray is a 65 y.o. male.   Chief Complaint   Patient presents with   • Shot in R knee   • Flu Vaccine     History of Present Illness   He has osteoarthritis in right knee, chronic condition. Established with Dr. Suazo for care, following prn at this point.   January 2020 was the last injection, which does give him improvement of joint pain for a couple of months.   Pain has worsened over the last month. No new injury.   Managing pain with topical Blue-emu cream and Meloxicam. Needs refill of Meloxicam.   Sitting for prolonged periods makes joint stiff and pain worse. Once he is up and moving some, pain improves.  No edema of joint, no skin color change.     He has BPH, treated with Flomax. When he initially started treatment, had improvement of urinary frequency, hesitancy.   Over the last few months, medication doesn't seem to be as effective, as he is urinating more often.   No dysuria or fever.   Normal PSA June 2020: 2.54    The following portions of the patient's history were reviewed and updated as appropriate: allergies, current medications, past family history, past medical history, past social history, past surgical history and problem list.    Review of Systems   Constitutional: Negative for appetite change and fever.   Respiratory: Negative.    Cardiovascular: Negative.    Genitourinary: Positive for frequency. Negative for dysuria and hematuria.   Musculoskeletal: Positive for arthralgias. Negative for joint swelling.   Neurological: Negative for headache.       Objective   Physical Exam  Vitals signs and nursing note reviewed.   Constitutional:       Appearance: He is well-developed.   HENT:      Head: Normocephalic and atraumatic.      Nose: Nose normal.   Eyes:      Conjunctiva/sclera: Conjunctivae normal.   Cardiovascular:      Rate and Rhythm: Normal rate and regular rhythm.      Heart sounds: Normal heart sounds.   Pulmonary:      Effort: Pulmonary effort is normal. No  "respiratory distress.      Breath sounds: Normal breath sounds. No wheezing.   Musculoskeletal:         General: No swelling or deformity.   Skin:     General: Skin is warm and dry.   Neurological:      General: No focal deficit present.      Mental Status: He is alert and oriented to person, place, and time.   Psychiatric:         Behavior: Behavior normal.       Arthrocentesis    Date/Time: 9/28/2020 10:15 AM  Performed by: Sanaz Zuluaga DO  Authorized by: Sanaz Zuluaga DO   Consent: Verbal consent obtained. Written consent obtained.  Risks and benefits: risks, benefits and alternatives were discussed  Consent given by: patient  Patient understanding: patient states understanding of the procedure being performed  Patient consent: the patient's understanding of the procedure matches consent given  Procedure consent: procedure consent matches procedure scheduled  Patient identity confirmed: verbally with patient  Time out: Immediately prior to procedure a \"time out\" was called to verify the correct patient, procedure, equipment, support staff and site/side marked as required.  Indications: pain   Body area: knee  Joint: right knee  Local anesthesia used: yes    Anesthesia:  Local anesthesia used: yes  Local Anesthetic: topical anesthetic  Needle size: 22 G  Ultrasound guidance: no  Approach: anterior  Patient tolerance: patient tolerated the procedure well with no immediate complications              Assessment/Plan   Thien was seen today for shot in r knee and flu vaccine.    Diagnoses and all orders for this visit:    Primary osteoarthritis of right knee  -     meloxicam (MOBIC) 7.5 MG tablet; 1 Oral Daily with food.  -     Arthrocentesis    Benign prostatic hyperplasia with urinary frequency  -     tamsulosin (FLOMAX) 0.4 MG capsule 24 hr capsule; Take 2 capsules by mouth Daily.    Need for immunization against influenza  -     Fluad Quad 65+ yrs ([id number])      Joint arthrocentesis right knee " provided today, tolerated without complication.   Continue meloxicam prn for joint pain relief.   Increase dose of tamsulosin to 0.8 mg daily. Needs to update labs, which includes PSA. If symptoms persist, will refer to urology for prostate exam.

## 2020-09-28 NOTE — PATIENT INSTRUCTIONS
"Go to the nearest ER or return to clinic if symptoms worsen, fever/chill develop    Journal for Nurse Practitioners, 15(2), [id number]. Retrieved October 7, 2019 from [email or web address]">   Knee Exercises  Ask your health care provider which exercises are safe for you. Do exercises exactly as told by your health care provider and adjust them as directed. It is normal to feel mild stretching, pulling, tightness, or discomfort as you do these exercises. Stop right away if you feel sudden pain or your pain gets worse. Do not begin these exercises until told by your health care provider.  Stretching and range-of-motion exercises  These exercises warm up your muscles and joints and improve the movement and flexibility of your knee. These exercises also help to relieve pain and swelling.  Knee extension, prone  1. Lie on your abdomen (prone position) on a bed.  2. Place your left / right knee just beyond the edge of the surface so your knee is not on the bed. You can put a towel under your left / right thigh just above your kneecap for comfort.  3. Relax your leg muscles and allow gravity to straighten your knee (extension). You should feel a stretch behind your left / right knee.  4. Hold this position for __________ seconds.  5. Scoot up so your knee is supported between repetitions.  Repeat __________ times. Complete this exercise __________ times a day.  Knee flexion, active    1. Lie on your back with both legs straight. If this causes back discomfort, bend your left / right knee so your foot is flat on the floor.  2. Slowly slide your left / right heel back toward your buttocks. Stop when you feel a gentle stretch in the front of your knee or thigh (flexion).  3. Hold this position for __________ seconds.  4. Slowly slide your left / right heel back to the starting position.  Repeat __________ times. Complete this exercise __________ times a day.  Quadriceps stretch, prone    1. Lie on your abdomen on a " firm surface, such as a bed or padded floor.  2. Bend your left / right knee and hold your ankle. If you cannot reach your ankle or pant leg, loop a belt around your foot and grab the belt instead.  3. Gently pull your heel toward your buttocks. Your knee should not slide out to the side. You should feel a stretch in the front of your thigh and knee (quadriceps).  4. Hold this position for __________ seconds.  Repeat __________ times. Complete this exercise __________ times a day.  Hamstring, supine  1. Lie on your back (supine position).  2. Loop a belt or towel over the ball of your left / right foot. The ball of your foot is on the walking surface, right under your toes.  3. Straighten your left / right knee and slowly pull on the belt to raise your leg until you feel a gentle stretch behind your knee (hamstring).  ? Do not let your knee bend while you do this.  ? Keep your other leg flat on the floor.  4. Hold this position for __________ seconds.  Repeat __________ times. Complete this exercise __________ times a day.  Strengthening exercises  These exercises build strength and endurance in your knee. Endurance is the ability to use your muscles for a long time, even after they get tired.  Quadriceps, isometric  This exercise stretches the muscles in front of your thigh (quadriceps) without moving your knee joint (isometric).  1. Lie on your back with your left / right leg extended and your other knee bent. Put a rolled towel or small pillow under your knee if told by your health care provider.  2. Slowly tense the muscles in the front of your left / right thigh. You should see your kneecap slide up toward your hip or see increased dimpling just above the knee. This motion will push the back of the knee toward the floor.  3. For __________ seconds, hold the muscle as tight as you can without increasing your pain.  4. Relax the muscles slowly and completely.  Repeat __________ times. Complete this exercise  "__________ times a day.  Straight leg raises  This exercise stretches the muscles in front of your thigh (quadriceps) and the muscles that move your hips (hip flexors).  1. Lie on your back with your left / right leg extended and your other knee bent.  2. Tense the muscles in the front of your left / right thigh. You should see your kneecap slide up or see increased dimpling just above the knee. Your thigh may even shake a bit.  3. Keep these muscles tight as you raise your leg 4-6 inches (10-15 cm) off the floor. Do not let your knee bend.  4. Hold this position for __________ seconds.  5. Keep these muscles tense as you lower your leg.  6. Relax your muscles slowly and completely after each repetition.  Repeat __________ times. Complete this exercise __________ times a day.  Hamstring, isometric  1. Lie on your back on a firm surface.  2. Bend your left / right knee about __________ degrees.  3. Dig your left / right heel into the surface as if you are trying to pull it toward your buttocks. Tighten the muscles in the back of your thighs (hamstring) to \"dig\" as hard as you can without increasing any pain.  4. Hold this position for __________ seconds.  5. Release the tension gradually and allow your muscles to relax completely for __________ seconds after each repetition.  Repeat __________ times. Complete this exercise __________ times a day.  Hamstring curls  If told by your health care provider, do this exercise while wearing ankle weights. Begin with __________ lb weights. Then increase the weight by 1 lb (0.5 kg) increments. Do not wear ankle weights that are more than __________ lb.  1. Lie on your abdomen with your legs straight.  2. Bend your left / right knee as far as you can without feeling pain. Keep your hips flat against the floor.  3. Hold this position for __________ seconds.  4. Slowly lower your leg to the starting position.  Repeat __________ times. Complete this exercise __________ times a " day.  Squats  This exercise strengthens the muscles in front of your thigh and knee (quadriceps).  1.  front of a table, with your feet and knees pointing straight ahead. You may rest your hands on the table for balance but not for support.  2. Slowly bend your knees and lower your hips like you are going to sit in a chair.  ? Keep your weight over your heels, not over your toes.  ? Keep your lower legs upright so they are parallel with the table legs.  ? Do not let your hips go lower than your knees.  ? Do not bend lower than told by your health care provider.  ? If your knee pain increases, do not bend as low.  3. Hold the squat position for __________ seconds.  4. Slowly push with your legs to return to standing. Do not use your hands to pull yourself to standing.  Repeat __________ times. Complete this exercise __________ times a day.  Wall slides  This exercise strengthens the muscles in front of your thigh and knee (quadriceps).  1. Lean your back against a smooth wall or door, and walk your feet out 18-24 inches (46-61 cm) from it.  2. Place your feet hip-width apart.  3. Slowly slide down the wall or door until your knees bend __________ degrees. Keep your knees over your heels, not over your toes. Keep your knees in line with your hips.  4. Hold this position for __________ seconds.  Repeat __________ times. Complete this exercise __________ times a day.  Straight leg raises  This exercise strengthens the muscles that rotate the leg at the hip and move it away from your body (hip abductors).  1. Lie on your side with your left / right leg in the top position. Lie so your head, shoulder, knee, and hip line up. You may bend your bottom knee to help you keep your balance.  2. Roll your hips slightly forward so your hips are stacked directly over each other and your left / right knee is facing forward.  3. Leading with your heel, lift your top leg 4-6 inches (10-15 cm). You should feel the muscles in  your outer hip lifting.  ? Do not let your foot drift forward.  ? Do not let your knee roll toward the ceiling.  4. Hold this position for __________ seconds.  5. Slowly return your leg to the starting position.  6. Let your muscles relax completely after each repetition.  Repeat __________ times. Complete this exercise __________ times a day.  Straight leg raises  This exercise stretches the muscles that move your hips away from the front of the pelvis (hip extensors).  1. Lie on your abdomen on a firm surface. You can put a pillow under your hips if that is more comfortable.  2. Tense the muscles in your buttocks and lift your left / right leg about 4-6 inches (10-15 cm). Keep your knee straight as you lift your leg.  3. Hold this position for __________ seconds.  4. Slowly lower your leg to the starting position.  5. Let your leg relax completely after each repetition.  Repeat __________ times. Complete this exercise __________ times a day.  This information is not intended to replace advice given to you by your health care provider. Make sure you discuss any questions you have with your health care provider.  Document Released: 11/01/2006 Document Revised: 10/08/2019 Document Reviewed: 10/08/2019  Elsevier Patient Education © 2020 [employer]

## 2020-10-19 ENCOUNTER — TELEPHONE (OUTPATIENT)
Dept: FAMILY MEDICINE CLINIC | Facility: CLINIC | Age: 65
End: 2020-10-19

## 2020-10-19 NOTE — TELEPHONE ENCOUNTER
PATIENT CALLED AND SAID HE HAS BEEN RUNNING A FEVER SINCE EARLY THIS MORNING AND HAS NO OTHER SYMTPOMS; PATIENT WOULD LIKE TO KNOW WHERE HE CAN COULD GO TO GET RAPID COVD TESTING; PLEASE ADVISE    PATT:  [phone]

## 2020-10-19 NOTE — TELEPHONE ENCOUNTER
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 Id encourage him to try calling [facility] to see if they have any of the rapid testing, they will usually set him up with an appointment time to come get tested. He can also try contacting local health department for other locations near him. ANDREEA

## 2020-11-24 ENCOUNTER — LAB (OUTPATIENT)
Dept: FAMILY MEDICINE CLINIC | Facility: CLINIC | Age: 65
End: 2020-11-24

## 2020-11-25 LAB
ALBUMIN SERPL-MCNC: 4.8 G/DL (ref 3.5–5.2)
ALBUMIN/GLOB SERPL: 1.9 G/DL
ALP SERPL-CCNC: 70 U/L (ref 39–117)
ALT SERPL-CCNC: 55 U/L (ref 1–41)
AST SERPL-CCNC: 45 U/L (ref 1–40)
BASOPHILS # BLD AUTO: 0.03 10*3/MM3 (ref 0–0.2)
BASOPHILS NFR BLD AUTO: 0.5 % (ref 0–1.5)
BILIRUB SERPL-MCNC: 0.7 MG/DL (ref 0–1.2)
BUN SERPL-MCNC: 11 MG/DL (ref 8–23)
BUN/CREAT SERPL: 13.9 (ref 7–25)
CALCIUM SERPL-MCNC: 9.3 MG/DL (ref 8.6–10.5)
CHLORIDE SERPL-SCNC: 97 MMOL/L (ref 98–107)
CHOLEST SERPL-MCNC: 144 MG/DL (ref 0–200)
CO2 SERPL-SCNC: 25.1 MMOL/L (ref 22–29)
CREAT SERPL-MCNC: 0.79 MG/DL (ref 0.76–1.27)
EOSINOPHIL # BLD AUTO: 0.14 10*3/MM3 (ref 0–0.4)
EOSINOPHIL NFR BLD AUTO: 2.2 % (ref 0.3–6.2)
ERYTHROCYTE [DISTWIDTH] IN BLOOD BY AUTOMATED COUNT: 12.9 % (ref 12.3–15.4)
GLOBULIN SER CALC-MCNC: 2.5 GM/DL
GLUCOSE SERPL-MCNC: 103 MG/DL (ref 65–99)
HCT VFR BLD AUTO: 47.3 % (ref 37.5–51)
HDLC SERPL-MCNC: 64 MG/DL (ref 40–60)
HGB BLD-MCNC: 16.7 G/DL (ref 13–17.7)
IMM GRANULOCYTES # BLD AUTO: 0.05 10*3/MM3 (ref 0–0.05)
IMM GRANULOCYTES NFR BLD AUTO: 0.8 % (ref 0–0.5)
LDLC SERPL CALC-MCNC: 56 MG/DL (ref 0–100)
LYMPHOCYTES # BLD AUTO: 1.7 10*3/MM3 (ref 0.7–3.1)
LYMPHOCYTES NFR BLD AUTO: 26.2 % (ref 19.6–45.3)
MCH RBC QN AUTO: 31.2 PG (ref 26.6–33)
MCHC RBC AUTO-ENTMCNC: 35.3 G/DL (ref 31.5–35.7)
MCV RBC AUTO: 88.2 FL (ref 79–97)
MONOCYTES # BLD AUTO: 0.57 10*3/MM3 (ref 0.1–0.9)
MONOCYTES NFR BLD AUTO: 8.8 % (ref 5–12)
NEUTROPHILS # BLD AUTO: 4.01 10*3/MM3 (ref 1.7–7)
NEUTROPHILS NFR BLD AUTO: 61.5 % (ref 42.7–76)
NRBC BLD AUTO-RTO: 0 /100 WBC (ref 0–0.2)
PLATELET # BLD AUTO: 231 10*3/MM3 (ref 140–450)
POTASSIUM SERPL-SCNC: 4.4 MMOL/L (ref 3.5–5.2)
PROT SERPL-MCNC: 7.3 G/DL (ref 6–8.5)
PSA SERPL-MCNC: 2.73 NG/ML (ref 0–4)
RBC # BLD AUTO: 5.36 10*6/MM3 (ref 4.14–5.8)
SODIUM SERPL-SCNC: 135 MMOL/L (ref 136–145)
TRIGL SERPL-MCNC: 139 MG/DL (ref 0–150)
VLDLC SERPL CALC-MCNC: 24 MG/DL (ref 5–40)
WBC # BLD AUTO: 6.5 10*3/MM3 (ref 3.4–10.8)

## 2020-12-01 LAB
HBA1C MFR BLD: 6 % (ref 4.8–5.6)
Lab: NORMAL
WRITTEN AUTHORIZATION: NORMAL

## 2020-12-17 ENCOUNTER — OFFICE VISIT (OUTPATIENT)
Dept: FAMILY MEDICINE CLINIC | Facility: CLINIC | Age: 65
End: 2020-12-17

## 2020-12-17 VITALS
SYSTOLIC BLOOD PRESSURE: 112 MMHG | WEIGHT: 274 LBS | BODY MASS INDEX: 39.22 KG/M2 | TEMPERATURE: 97.4 F | RESPIRATION RATE: 16 BRPM | HEART RATE: 98 BPM | DIASTOLIC BLOOD PRESSURE: 74 MMHG | OXYGEN SATURATION: 98 % | HEIGHT: 70 IN

## 2020-12-17 DIAGNOSIS — R73.03 PREDIABETES: ICD-10-CM

## 2020-12-17 DIAGNOSIS — E78.2 MIXED HYPERLIPIDEMIA: Primary | ICD-10-CM

## 2020-12-17 PROCEDURE — 99213 OFFICE O/P EST LOW 20 MIN: CPT | Performed by: FAMILY MEDICINE

## 2020-12-17 NOTE — PATIENT INSTRUCTIONS
Go to the nearest ER or return to clinic if symptoms worsen, fever/chill develop    Diabetes Mellitus and Nutrition, Adult  When you have diabetes (diabetes mellitus), it is very important to have healthy eating habits because your blood sugar (glucose) levels are greatly affected by what you eat and drink. Eating healthy foods in the appropriate amounts, at about the same times every day, can help you:  · Control your blood glucose.  · Lower your risk of heart disease.  · Improve your blood pressure.  · Reach or maintain a healthy weight.  Every person with diabetes is different, and each person has different needs for a meal plan. Your health care provider may recommend that you work with a diet and nutrition specialist (dietitian) to make a meal plan that is best for you. Your meal plan may vary depending on factors such as:  · The calories you need.  · The medicines you take.  · Your weight.  · Your blood glucose, blood pressure, and cholesterol levels.  · Your activity level.  · Other health conditions you have, such as heart or kidney disease.  How do carbohydrates affect me?  Carbohydrates, also called carbs, affect your blood glucose level more than any other type of food. Eating carbs naturally raises the amount of glucose in your blood. Carb counting is a method for keeping track of how many carbs you eat. Counting carbs is important to keep your blood glucose at a healthy level, especially if you use insulin or take certain oral diabetes medicines.  It is important to know how many carbs you can safely have in each meal. This is different for every person. Your dietitian can help you calculate how many carbs you should have at each meal and for each snack.  Foods that contain carbs include:  · Bread, cereal, rice, pasta, and crackers.  · Potatoes and corn.  · Peas, beans, and lentils.  · Milk and yogurt.  · Fruit and juice.  · Desserts, such as cakes, cookies, ice cream, and candy.  How does alcohol  "affect me?  Alcohol can cause a sudden decrease in blood glucose (hypoglycemia), especially if you use insulin or take certain oral diabetes medicines. Hypoglycemia can be a life-threatening condition. Symptoms of hypoglycemia (sleepiness, dizziness, and confusion) are similar to symptoms of having too much alcohol.  If your health care provider says that alcohol is safe for you, follow these guidelines:  · Limit alcohol intake to no more than 1 drink per day for nonpregnant women and 2 drinks per day for men. One drink equals 12 oz of beer, 5 oz of wine, or 1½ oz of hard liquor.  · Do not drink on an empty stomach.  · Keep yourself hydrated with water, diet soda, or unsweetened iced tea.  · Keep in mind that regular soda, juice, and other mixers may contain a lot of sugar and must be counted as carbs.  What are tips for following this plan?    Reading food labels  · Start by checking the serving size on the \"Nutrition Facts\" label of packaged foods and drinks. The amount of calories, carbs, fats, and other nutrients listed on the label is based on one serving of the item. Many items contain more than one serving per package.  · Check the total grams (g) of carbs in one serving. You can calculate the number of servings of carbs in one serving by dividing the total carbs by 15. For example, if a food has 30 g of total carbs, it would be equal to 2 servings of carbs.  · Check the number of grams (g) of saturated and trans fats in one serving. Choose foods that have low or no amount of these fats.  · Check the number of milligrams (mg) of salt (sodium) in one serving. Most people should limit total sodium intake to less than 2,300 mg per day.  · Always check the nutrition information of foods labeled as \"low-fat\" or \"nonfat\". These foods may be higher in added sugar or refined carbs and should be avoided.  · Talk to your dietitian to identify your daily goals for nutrients listed on the label.  Shopping  · Avoid buying " canned, premade, or processed foods. These foods tend to be high in fat, sodium, and added sugar.  · Shop around the outside edge of the grocery store. This includes fresh fruits and vegetables, bulk grains, fresh meats, and fresh dairy.  Cooking  · Use low-heat cooking methods, such as baking, instead of high-heat cooking methods like deep frying.  · Cook using healthy oils, such as olive, canola, or sunflower oil.  · Avoid cooking with butter, cream, or high-fat meats.  Meal planning  · Eat meals and snacks regularly, preferably at the same times every day. Avoid going long periods of time without eating.  · Eat foods high in fiber, such as fresh fruits, vegetables, beans, and whole grains. Talk to your dietitian about how many servings of carbs you can eat at each meal.  · Eat 4-6 ounces (oz) of lean protein each day, such as lean meat, chicken, fish, eggs, or tofu. One oz of lean protein is equal to:  ? 1 oz of meat, chicken, or fish.  ? 1 egg.  ? ¼ cup of tofu.  · Eat some foods each day that contain healthy fats, such as avocado, nuts, seeds, and fish.  Lifestyle  · Check your blood glucose regularly.  · Exercise regularly as told by your health care provider. This may include:  ? 150 minutes of moderate-intensity or vigorous-intensity exercise each week. This could be brisk walking, biking, or water aerobics.  ? Stretching and doing strength exercises, such as yoga or weightlifting, at least 2 times a week.  · Take medicines as told by your health care provider.  · Do not use any products that contain nicotine or tobacco, such as cigarettes and e-cigarettes. If you need help quitting, ask your health care provider.  · Work with a counselor or diabetes educator to identify strategies to manage stress and any emotional and social challenges.  Questions to ask a health care provider  · Do I need to meet with a diabetes educator?  · Do I need to meet with a dietitian?  · What number can I call if I have  questions?  · When are the best times to check my blood glucose?  Where to find more information:  · American Diabetes Association: diabetes.org  · Academy of Nutrition and Dietetics: www.eatright.org  · National [place] of Diabetes and Digestive and Kidney Diseases (NIH): www.niddk.nih.gov  Summary  · A healthy meal plan will help you control your blood glucose and maintain a healthy lifestyle.  · Working with a diet and nutrition specialist (dietitian) can help you make a meal plan that is best for you.  · Keep in mind that carbohydrates (carbs) and alcohol have immediate effects on your blood glucose levels. It is important to count carbs and to use alcohol carefully.  This information is not intended to replace advice given to you by your health care provider. Make sure you discuss any questions you have with your health care provider.  Document Revised: 11/30/2018 Document Reviewed: 01/22/2018  Elsevier Patient Education © 2020 [employer]

## 2020-12-17 NOTE — PROGRESS NOTES
Subjective   Thien Gray is a 65 y.o. male.   Chief Complaint   Patient presents with   • 6mo f/u hyperlipidemia     pt is fasting      Hyperlipidemia  This is a chronic problem. The current episode started more than 1 year ago. The problem is controlled. Recent lipid tests were reviewed and are normal. Exacerbating diseases include obesity. He has no history of diabetes. There are no known factors aggravating his hyperlipidemia. Pertinent negatives include no chest pain or shortness of breath. Current antihyperlipidemic treatment includes statins. The current treatment provides significant improvement of lipids. Compliance problems include adherence to diet and adherence to exercise.  Risk factors for coronary artery disease include dyslipidemia, obesity and male sex.     HgA1c elevated on recent labs from Nov 2020  No history of diabetes  He has changed to low carb bread and low carb beer.      The following portions of the patient's history were reviewed and updated as appropriate: allergies, current medications, past family history, past medical history, past social history, past surgical history and problem list.    Review of Systems   Constitutional: Negative for chills, fatigue and fever.   HENT: Negative.    Eyes: Negative.    Respiratory: Negative for cough, chest tightness and shortness of breath.    Cardiovascular: Negative for chest pain and palpitations.   Skin: Negative for rash.   Neurological: Negative for light-headedness, headache and confusion.   Hematological: Negative for adenopathy. Does not bruise/bleed easily.   Psychiatric/Behavioral: Negative.        Objective   Physical Exam  Vitals signs and nursing note reviewed.   Constitutional:       Appearance: He is well-developed.   HENT:      Head: Normocephalic and atraumatic.      Nose: Nose normal.   Eyes:      Conjunctiva/sclera: Conjunctivae normal.   Neck:      Musculoskeletal: Normal range of motion and neck supple.    Cardiovascular:      Rate and Rhythm: Normal rate and regular rhythm.      Heart sounds: Normal heart sounds.   Pulmonary:      Effort: Pulmonary effort is normal. No respiratory distress.      Breath sounds: Normal breath sounds. No wheezing.   Abdominal:      General: Bowel sounds are normal.      Palpations: Abdomen is soft.   Musculoskeletal:         General: No deformity.   Skin:     General: Skin is warm and dry.   Neurological:      Mental Status: He is alert and oriented to person, place, and time.      Cranial Nerves: No cranial nerve deficit.   Psychiatric:         Behavior: Behavior normal.           Assessment/Plan   Diagnoses and all orders for this visit:    1. Mixed hyperlipidemia (Primary)  Comments:  Continue statin therapy. Follow up with cardiology as scheduled     2. Prediabetes  Comments:  Encourage low carb diet, decrease alcohol intake               Answers for HPI/ROS submitted by the patient on 12/15/2020   What is the primary reason for your visit?: Other  Please describe your symptoms.: None, feeling great. This is a follow-up. Maybe for my prostrate? my knees? my pre-diabetic problem? my alcohol abuse?  Have you had these symptoms before?: Yes  How long have you been having these symptoms?: Greater than 2 weeks  Please list any medications you are currently taking for this condition.: Pravastatin for cholesterol, generic Flowmax for my prostate, some anti-inflammatory for my knees, baby aspirin because everybody tells me to. I switched to low-carb/low-alcohol beer for the rest of it.  Please describe any probable cause for these symptoms. : Lack of exercise, gluttony, old age, and I like beer.

## 2021-01-25 ENCOUNTER — OFFICE VISIT (OUTPATIENT)
Dept: CARDIOLOGY | Facility: CLINIC | Age: 66
End: 2021-01-25

## 2021-01-25 VITALS
HEART RATE: 105 BPM | DIASTOLIC BLOOD PRESSURE: 68 MMHG | SYSTOLIC BLOOD PRESSURE: 94 MMHG | BODY MASS INDEX: 38.51 KG/M2 | WEIGHT: 269 LBS | HEIGHT: 70 IN

## 2021-01-25 DIAGNOSIS — E78.2 MIXED HYPERLIPIDEMIA: ICD-10-CM

## 2021-01-25 DIAGNOSIS — I45.10 RBBB: ICD-10-CM

## 2021-01-25 DIAGNOSIS — I25.10 CORONARY ARTERY DISEASE INVOLVING NATIVE CORONARY ARTERY OF NATIVE HEART WITHOUT ANGINA PECTORIS: Primary | ICD-10-CM

## 2021-01-25 PROCEDURE — 99214 OFFICE O/P EST MOD 30 MIN: CPT | Performed by: INTERNAL MEDICINE

## 2021-01-25 NOTE — PROGRESS NOTES
[facility] at [facility]  Office visit  Thien Gray  1955    There is no work phone number on file.    VISIT DATE:  1/25/2021    PCP: Sanaz Zuluaga  [address]  [place] [address]    CC:  Chief Complaint   Patient presents with   • Coronary Artery Disease       Previous cardiac studies and procedures:  March 2020 transthoracic echo  · Left ventricular systolic function is hyperdynamic (EF > 70).  · Left ventricular diastolic dysfunction (grade I) consistent with impaired relaxation.  · Left ventricular wall thickness is consistent with mild concentric hypertrophy.    July 2020 myocardial perfusion imaging  · Extensive calcification of all 3 major epicardial vessels.  · REST EF = 61% STRESS EF = 68%.  · Left ventricular ejection fraction is normal.  · Myocardial perfusion imaging indicates a normal myocardial perfusion study with no evidence of ischemia.    ASSESSMENT:   Diagnosis Plan   1. Coronary artery disease involving native coronary artery of native heart without angina pectoris     2. Mixed hyperlipidemia     3. RBBB         PLAN:  Coronary artery disease: Appears stable and asymptomatic.  Continue low-dose aspirin and pravastatin.  Afterload well controlled.     Right bundle branch block: Stable, continue clinical follow-up.  No evidence of significant structural or functional heart disease noted on most recent transthoracic echo.     Hyperlipidemia: Goal LDL less than 70, currently reasonably well controlled.  Continue current dose of pravastatin.     Obesity/prediabetes: Dietary and lifestyle modifications to achieve weight loss.      Subjective  Interval assessment: Stable functional capacity.  Denies chest pain, palpitations or dyspnea on exertion.  Reviewed most recent CMP, lipid profile, CBC and hemoglobin A1c.  Compliant with medical therapy.    Initial evaluation: 64-year-old gentleman with a history of coronary artery disease, status post remote  "percutaneous intervention with stenting in the setting of myocardial infarction in 2004.  Currently denies chest discomfort, palpitations, presyncope or syncope.  Has some shortness of breath and a class II pattern.  Previous history of mild venous insufficiency.  Previous smoker.  Blood pressures running less than 130/80 mmHg.  He is compliant with medical therapy.  Reviewed most recent twelve-lead EKG which revealed a new onset right bundle branch block compared to EKG obtained in 2017.  He is unclear whether he is a prominent snorer or stops breathing in his sleep.  No previous sleep evaluation.  Upcoming cataract surgery.    PHYSICAL EXAMINATION:  Vitals:    01/25/21 0839   BP: 94/68   BP Location: Left arm   Patient Position: Sitting   Pulse: 105   Weight: 122 kg (269 lb)   Height: 177.8 cm (70\")     General Appearance:    Alert, cooperative, no distress, appears stated age   Head:    Normocephalic, without obvious abnormality, atraumatic   Eyes:    conjunctiva/corneas clear   Nose:   Nares normal, septum midline, mucosa normal, no drainage   Throat:   Lips, teeth and gums normal   Neck:   Supple, symmetrical, trachea midline, no carotid    bruit or JVD   Lungs:     Clear to auscultation bilaterally, respirations unlabored   Chest Wall:    No tenderness or deformity    Heart:    Regular rate and rhythm, S1 and S2 normal, no murmur, rub   or gallop, normal carotid impulse bilaterally without bruit.   Abdomen:     Soft, non-tender   Extremities:   Extremities normal, atraumatic, no cyanosis or edema   Pulses:   2+ and symmetric all extremities   Skin:   Skin color, texture, turgor normal, no rashes or lesions       Diagnostic Data:  Procedures  Lab Results   Component Value Date    CHLPL 144 11/24/2020    TRIG 139 11/24/2020    HDL 64 (H) 11/24/2020     Lab Results   Component Value Date    GLUCOSE 115 (H) 10/18/2017    BUN 11 11/24/2020    CREATININE 0.79 11/24/2020     (L) 11/24/2020    K 4.4 11/24/2020 "    CL 97 (L) 11/24/2020    CO2 25.1 11/24/2020     Lab Results   Component Value Date    HGBA1C 6.00 (H) 11/24/2020     Lab Results   Component Value Date    WBC 6.50 11/24/2020    HGB 16.7 11/24/2020    HCT 47.3 11/24/2020     11/24/2020       Allergies  Allergies   Allergen Reactions   • Bactrim [Sulfamethoxazole-Trimethoprim] Other (See Comments)     Causes weakness, fatigue   • Plavix [Clopidogrel Bisulfate] Hives   • Augmentin [Amoxicillin-Pot Clavulanate] Confusion       Current Medications    Current Outpatient Medications:   •  albuterol sulfate  (90 Base) MCG/ACT inhaler, Inhale 2 puffs Every 4 (Four) Hours As Needed for Wheezing or Shortness of Air., Disp: 8 g, Rfl: 5  •  aspirin 81 MG EC tablet, Take 81 mg by mouth Daily., Disp: , Rfl:   •  meloxicam (MOBIC) 7.5 MG tablet, 1 Oral Daily with food., Disp: 90 tablet, Rfl: 2  •  pravastatin (Pravachol) 40 MG tablet, Take 1 tablet by mouth Daily., Disp: 90 tablet, Rfl: 3  •  tamsulosin (FLOMAX) 0.4 MG capsule 24 hr capsule, Take 2 capsules by mouth Daily., Disp: 180 capsule, Rfl: 3          ROS  Review of Systems   Cardiovascular: Negative for chest pain, dyspnea on exertion and irregular heartbeat.   Respiratory: Negative for shortness of breath.          SOCIAL HX  Social History     Socioeconomic History   • Marital status:      Spouse name: Not on file   • Number of children: Not on file   • Years of education: Not on file   • Highest education level: Not on file   Tobacco Use   • Smoking status: Former Smoker     Packs/day: 1.50     Years: 45.00     Pack years: 67.50     Types: Cigarettes     Start date: 1/1/1971     Quit date: 7/14/2017     Years since quitting: 3.5   • Smokeless tobacco: Never Used   Substance and Sexual Activity   • Alcohol use: Yes     Alcohol/week: 15.0 standard drinks     Types: 15 Standard drinks or equivalent per week     Comment: 3 nights a week   • Drug use: No   • Sexual activity: Not Currently      Partners: Female     Birth control/protection: Diaphragm, Post-menopausal, Spermicide     Comment:  twice, both . No STVs. Not planning to resume.       FAMILY HX  Family History   Problem Relation Age of Onset   • Cancer Mother    • Hypertension Mother    • Hyperlipidemia Mother         Takes medication   • Heart attack Mother    • Diabetes Sister    • Heart failure Father         .   • Stroke Father              Phillip Eason III, MD, FACC

## 2021-02-15 DIAGNOSIS — I25.10 CORONARY ARTERY DISEASE INVOLVING NATIVE HEART WITHOUT ANGINA PECTORIS, UNSPECIFIED VESSEL OR LESION TYPE: ICD-10-CM

## 2021-02-15 DIAGNOSIS — E78.2 MIXED HYPERLIPIDEMIA: ICD-10-CM

## 2021-02-15 RX ORDER — PRAVASTATIN SODIUM 40 MG
40 TABLET ORAL DAILY
Qty: 90 TABLET | Refills: 3 | Status: SHIPPED | OUTPATIENT
Start: 2021-02-15 | End: 2021-07-09 | Stop reason: HOSPADM

## 2021-02-15 NOTE — TELEPHONE ENCOUNTER
PT CALLED TO REQUEST REFILL FOR RX  pravastatin (Pravachol) 40 MG tablet.    PLEASE ADVISE.  CALL BACK:[phone]      [facility] - [place], [place] - [address] AT [address] [facility]

## 2021-03-08 ENCOUNTER — TELEPHONE (OUTPATIENT)
Dept: FAMILY MEDICINE CLINIC | Facility: CLINIC | Age: 66
End: 2021-03-08

## 2021-03-08 RX ORDER — TRIAMCINOLONE ACETONIDE 0.25 MG/G
OINTMENT TOPICAL
Qty: 15 G | Refills: 1 | Status: SHIPPED | OUTPATIENT
Start: 2021-03-08 | End: 2022-11-29 | Stop reason: SDUPTHER

## 2021-03-08 NOTE — TELEPHONE ENCOUNTER
Caller: MarinaThien    Relationship: Self    Best call back number:     [phone]     Medication needed:     TRIAMCINOLONE ACETONIDE (.025%) TOPICAL CREAM    When do you need the refill by:     ASAP    What details did the patient provide when requesting the medication:     PATIENT STATED THIS IS A PRESCRIPTION THAT DR ALDRICH HAD PRESCRIBED IN THE PAST, AND SYMPTOMS HAVE RETURNED. PATIENT REQUESTS DR ALDRICH TO RENEW THIS MEDICATION CREAM    What is the patient's preferred pharmacy:      [facility] - [place], [place]    TELEPHONE CONTACT:    [phone]    FAX:    [phone]    DR ALDRICH, DO

## 2021-04-03 DIAGNOSIS — R73.03 PREDIABETES: ICD-10-CM

## 2021-04-03 DIAGNOSIS — H25.9 SENILE CATARACT OF RIGHT EYE, UNSPECIFIED AGE-RELATED CATARACT TYPE: Primary | ICD-10-CM

## 2021-04-03 DIAGNOSIS — I25.10 CORONARY ARTERY DISEASE INVOLVING NATIVE HEART WITHOUT ANGINA PECTORIS, UNSPECIFIED VESSEL OR LESION TYPE: ICD-10-CM

## 2021-04-03 DIAGNOSIS — E78.2 MIXED HYPERLIPIDEMIA: ICD-10-CM

## 2021-04-20 ENCOUNTER — OFFICE VISIT (OUTPATIENT)
Dept: ORTHOPEDIC SURGERY | Facility: CLINIC | Age: 66
End: 2021-04-20

## 2021-04-20 ENCOUNTER — IMMUNIZATION (OUTPATIENT)
Dept: VACCINE CLINIC | Facility: HOSPITAL | Age: 66
End: 2021-04-20

## 2021-04-20 VITALS
HEIGHT: 70 IN | WEIGHT: 256 LBS | SYSTOLIC BLOOD PRESSURE: 154 MMHG | DIASTOLIC BLOOD PRESSURE: 86 MMHG | BODY MASS INDEX: 36.65 KG/M2 | HEART RATE: 127 BPM

## 2021-04-20 DIAGNOSIS — M17.0 PRIMARY OSTEOARTHRITIS OF BOTH KNEES: Primary | ICD-10-CM

## 2021-04-20 PROCEDURE — 99214 OFFICE O/P EST MOD 30 MIN: CPT | Performed by: ORTHOPAEDIC SURGERY

## 2021-04-20 PROCEDURE — 91300 HC SARSCOV02 VAC 30MCG/0.3ML IM: CPT | Performed by: INTERNAL MEDICINE

## 2021-04-20 PROCEDURE — 0001A: CPT | Performed by: INTERNAL MEDICINE

## 2021-04-20 RX ORDER — MELOXICAM 7.5 MG/1
TABLET ORAL
Qty: 60 TABLET | Refills: 0 | Status: SHIPPED | OUTPATIENT
Start: 2021-04-20 | End: 2021-06-14

## 2021-04-20 NOTE — PROGRESS NOTES
Orthopaedic Clinic Note: Knee Established Patient    Chief Complaint   Patient presents with   • Follow-up     7 month recheck - Primary osteoarthritis of right knee   Left knee pain    HPI    It has been 7  month(s) since Mr. Gray's last visit. He returns to clinic today for follow-up right knee osteoarthritis.  He was last seen approximately 7 months ago at which point he was given a prescription anti-inflammatory and had previously undergone cortisone injection.  Since his last visit, he has stopped taking the anti-inflammatory several months ago as his prescription was completed.  He has since noticed a recurrence of swelling and stiffness in the knee and pain that he rates a 5/10 on the pain scale.  He is ambulating with no assistive device.  He is complaining of swelling and stiffness in the knee and pain is worse with walking weightbearing activities.  He is here today due to his worsening knee pain which is limiting daily activities.    Past Medical History:   Diagnosis Date   • Abnormal ECG 2020    Get from Dr. Kayser   • Coronary artery disease    • History of heart attack    • History of MI (myocardial infarction)    • Hyperlipidemia    • Myocardial infarction (CMS/HCC)    • RBBB 2/10/2020      Past Surgical History:   Procedure Laterality Date   • APPENDECTOMY     • CARDIAC CATHETERIZATION  2004    They used a catheter to put in my stent.   • CORONARY ANGIOPLASTY      I think that's what the bill for my stent said.   • CORONARY STENT PLACEMENT        Family History   Problem Relation Age of Onset   • Cancer Mother    • Hypertension Mother    • Hyperlipidemia Mother         Takes medication   • Heart attack Mother    • Diabetes Sister    • Heart failure Father         .   • Stroke Father      Social History     Socioeconomic History   • Marital status:      Spouse name: Not on file   • Number of children: Not on file   • Years of education: Not on file   • Highest  education level: Not on file   Tobacco Use   • Smoking status: Former Smoker     Packs/day: 1.50     Years: 45.00     Pack years: 67.50     Types: Cigarettes     Start date: 1971     Quit date: 2017     Years since quitting: 3.7   • Smokeless tobacco: Never Used   Substance and Sexual Activity   • Alcohol use: Yes     Alcohol/week: 15.0 standard drinks     Types: 15 Standard drinks or equivalent per week     Comment: 3 nights a week   • Drug use: No   • Sexual activity: Not Currently     Partners: Female     Birth control/protection: Diaphragm, Post-menopausal, Spermicide     Comment:  twice, both . No STVs. Not planning to resume.      Current Outpatient Medications on File Prior to Visit   Medication Sig Dispense Refill   • albuterol sulfate  (90 Base) MCG/ACT inhaler Inhale 2 puffs Every 4 (Four) Hours As Needed for Wheezing or Shortness of Air. 8 g 5   • aspirin 81 MG EC tablet Take 81 mg by mouth Daily.     • pravastatin (Pravachol) 40 MG tablet Take 1 tablet by mouth Daily. 90 tablet 3   • tamsulosin (FLOMAX) 0.4 MG capsule 24 hr capsule Take 2 capsules by mouth Daily. 180 capsule 3   • triamcinolone (KENALOG) 0.025 % ointment Apply  topically to the appropriate area as directed 2 Times a Day for 10 days. Avoid applying to face, axilla, and groin 15 g 1   • [DISCONTINUED] meloxicam (MOBIC) 7.5 MG tablet 1 Oral Daily with food. 90 tablet 2     No current facility-administered medications on file prior to visit.      Allergies   Allergen Reactions   • Bactrim [Sulfamethoxazole-Trimethoprim] Other (See Comments)     Causes weakness, fatigue   • Plavix [Clopidogrel Bisulfate] Hives   • Augmentin [Amoxicillin-Pot Clavulanate] Confusion        Review of Systems   Constitutional: Negative.    HENT: Negative.    Eyes: Negative.    Respiratory: Negative.    Cardiovascular: Negative.    Gastrointestinal: Negative.    Endocrine: Negative.    Genitourinary: Negative.    Musculoskeletal: Positive  "for arthralgias and joint swelling.   Skin: Negative.    Allergic/Immunologic: Negative.    Neurological: Negative.    Hematological: Negative.    Psychiatric/Behavioral: Negative.         The patient's Review of Systems was personally reviewed and confirmed as accurate.    Physical Exam  Blood pressure 154/86, pulse (!) 127, height 177.8 cm (70\"), weight 116 kg (256 lb).    Body mass index is 36.73 kg/m².    GENERAL APPEARANCE: awake, alert, oriented, in no acute distress and well developed, well nourished  LUNGS:  breathing nonlabored  EXTREMITIES: no clubbing, cyanosis  PERIPHERAL PULSES: palpable dorsalis pedis and posterior tibial pulses bilaterally.    GAIT:  Antalgic        ----------  Right Knee Exam:  ----------  ALIGNMENT: severe varus, correctable to neutral  ----------  RANGE OF MOTION:  Decreased (5 - 115 degrees) with no extensor lag  LIGAMENTOUS STABILITY:   stable to varus and valgus stress at terminal extension and 30 degrees; retensioning of the MCL is appreciated with valgus stress at 30 degrees consistent with medial compartment degeneration  ----------  STRENGTH:  KNEE FLEXION 4/5  KNEE EXTENSION  4/5  ANKLE DORSIFLEXION  5/5  ANKLE PLANTARFLEXION  5/5  ----------  PAIN WITH PALPATION:global  KNEE EFFUSION: yes, moderate effusion  PAIN WITH KNEE ROM: yes  PATELLAR CREPITUS:  yes, painful and symptomatic  ----------  SENSATION TO LIGHT TOUCH:  DEEP PERONEAL/SUPERFICIAL PERONEAL/SURAL/SAPHENOUS/TIBIAL:    intact  ----------  EDEMA:  no  ERYTHEMA:    no  WOUNDS/INCISIONS:   No  -------  Left knee Exam:  ----------  ALIGNMENT: Moderate varus, correctable to neutral  ----------  RANGE OF MOTION:  Decreased (3 - 120 degrees) with no extensor lag  LIGAMENTOUS STABILITY:   stable to varus and valgus stress at terminal extension and 30 degrees; retensioning of the MCL is appreciated with valgus stress at 30 degrees consistent with medial compartment degeneration  ----------  STRENGTH:  KNEE FLEXION " 5/5  KNEE EXTENSION  5/5  ANKLE DORSIFLEXION  5/5  ANKLE PLANTARFLEXION  5/5  ----------  PAIN WITH PALPATION: Slight tenderness globally  KNEE EFFUSION: yes, trace effusion  PAIN WITH KNEE ROM: yes  PATELLAR CREPITUS:  yes, painful and symptomatic  ----------  SENSATION TO LIGHT TOUCH:  DEEP PERONEAL/SUPERFICIAL PERONEAL/SURAL/SAPHENOUS/TIBIAL:    intact  ----------  EDEMA:  no  ERYTHEMA:    no  WOUNDS/INCISIONS:   no    _____________________________________________________________________  _____________________________________________________________________    RADIOGRAPHIC FINDINGS:   Indication: Right knee pain    Comparison: Todays xrays were compared to previous xrays from 1/30/2020    Knee films: Right: Moderate to severe tricompartmental arthritis with genu varum alignment, periarticular osteophytes visualized in all compartments and Radiographs demonstrate worsening deformity with advancing arthritic changes and wear compared to prior radiographs.  Left: Mild to moderate tricompartmental osteoarthritis with genu varum alignment and medial compartment joint space narrowing.  Small periarticular osteophytes visualized in all compartments.  No significant change compared to prior radiographs.    Assessment/Plan:   Diagnosis Plan   1. Primary osteoarthritis of both knees  XR Knee 4+ View Bilateral    meloxicam (MOBIC) 7.5 MG tablet     Patient has advanced arthritis of the right knee and moderate arthritis of the left knee.  I discussed surgical treatment versus continued conservative treatment options.  Patient is scheduled to have cataract surgery next month.  He is interested in total knee arthroplasty and therefore I suggested we not proceed to cortisone injection.  We discussed alternative treatment options.  He is agreeable to meloxicam prescription.  This will be provided.  He will follow-up in 6 weeks for repeat evaluation and possible scheduling of surgery at that time.      Pavan Suazo,  MD  04/20/21  09:28 EDT

## 2021-06-01 ENCOUNTER — OFFICE VISIT (OUTPATIENT)
Dept: ORTHOPEDIC SURGERY | Facility: CLINIC | Age: 66
End: 2021-06-01

## 2021-06-01 VITALS
SYSTOLIC BLOOD PRESSURE: 117 MMHG | DIASTOLIC BLOOD PRESSURE: 84 MMHG | BODY MASS INDEX: 36.71 KG/M2 | HEART RATE: 127 BPM | HEIGHT: 70 IN | WEIGHT: 256.4 LBS

## 2021-06-01 DIAGNOSIS — M17.0 PRIMARY OSTEOARTHRITIS OF BOTH KNEES: Primary | ICD-10-CM

## 2021-06-01 PROCEDURE — 99214 OFFICE O/P EST MOD 30 MIN: CPT | Performed by: ORTHOPAEDIC SURGERY

## 2021-06-01 RX ORDER — ERYTHROMYCIN 5 MG/G
OINTMENT OPHTHALMIC
Status: ON HOLD | COMMUNITY
Start: 2021-05-05 | End: 2021-07-06

## 2021-06-01 RX ORDER — ACETAMINOPHEN 325 MG/1
1000 TABLET ORAL ONCE
Status: CANCELLED | OUTPATIENT
Start: 2021-06-01 | End: 2021-06-01

## 2021-06-01 RX ORDER — MELOXICAM 7.5 MG/1
15 TABLET ORAL ONCE
Status: CANCELLED | OUTPATIENT
Start: 2021-06-01 | End: 2021-06-01

## 2021-06-01 RX ORDER — PREGABALIN 75 MG/1
75 CAPSULE ORAL ONCE
Status: CANCELLED | OUTPATIENT
Start: 2021-06-01 | End: 2021-06-01

## 2021-06-01 RX ORDER — ATROPINE SULFATE 10 MG/ML
SOLUTION/ DROPS OPHTHALMIC
Status: ON HOLD | COMMUNITY
Start: 2021-05-05 | End: 2021-07-06

## 2021-06-01 RX ORDER — CHLORHEXIDINE GLUCONATE 4 G/100ML
SOLUTION TOPICAL DAILY PRN
Qty: 236 ML | Refills: 0 | Status: ON HOLD | OUTPATIENT
Start: 2021-06-01 | End: 2021-07-06

## 2021-06-01 NOTE — PROGRESS NOTES
Orthopaedic Clinic Note: Knee Established Patient    Chief Complaint   Patient presents with   • Follow-up     6 week f/u Primary osteoarthritis of both knees        HPI    It has been 6  week(s) since Mr. Gray's last visit. He returns to clinic today for follow-up bilateral knee osteoarthritis.  He underwent treatment with anti-inflammatory at his last visit.  He comes to clinic today about 6 weeks from his last visit continuing to complain of severe pain in the knee that is limiting daily activities.  He rates his pain a 5/10 on the pain scale today.  He is ambulating with no assistive device.  He denies fevers chills or constitutional symptoms.  Overall he is doing worse and is ready to proceed to surgery.    Past Medical History:   Diagnosis Date   • Abnormal ECG 2020    Get from Dr. Kayser   • Coronary artery disease    • History of heart attack    • History of MI (myocardial infarction)    • Hyperlipidemia    • Myocardial infarction (CMS/HCC)    • RBBB 2/10/2020      Past Surgical History:   Procedure Laterality Date   • APPENDECTOMY     • CARDIAC CATHETERIZATION  2004    They used a catheter to put in my stent.   • CORONARY ANGIOPLASTY      I think that's what the bill for my stent said.   • CORONARY STENT PLACEMENT        Family History   Problem Relation Age of Onset   • Cancer Mother    • Hypertension Mother    • Hyperlipidemia Mother         Takes medication   • Heart attack Mother    • Diabetes Sister    • Heart failure Father         .   • Stroke Father      Social History     Socioeconomic History   • Marital status:      Spouse name: Not on file   • Number of children: Not on file   • Years of education: Not on file   • Highest education level: Not on file   Tobacco Use   • Smoking status: Former Smoker     Packs/day: 1.50     Years: 45.00     Pack years: 67.50     Types: Cigarettes     Start date: 1971     Quit date: 2017     Years since quitting: 3.8    • Smokeless tobacco: Never Used   Substance and Sexual Activity   • Alcohol use: Yes     Alcohol/week: 15.0 standard drinks     Types: 15 Standard drinks or equivalent per week     Comment: 3 nights a week   • Drug use: No   • Sexual activity: Not Currently     Partners: Female     Birth control/protection: Diaphragm, Post-menopausal, Spermicide     Comment:  twice, both . No STVs. Not planning to resume.      Current Outpatient Medications on File Prior to Visit   Medication Sig Dispense Refill   • albuterol sulfate  (90 Base) MCG/ACT inhaler Inhale 2 puffs Every 4 (Four) Hours As Needed for Wheezing or Shortness of Air. 8 g 5   • aspirin 81 MG EC tablet Take 81 mg by mouth Daily.     • atropine 1 % ophthalmic solution      • erythromycin (ROMYCIN) 5 MG/GM ophthalmic ointment      • meloxicam (MOBIC) 7.5 MG tablet 1 Oral Daily with food. 60 tablet 0   • pravastatin (Pravachol) 40 MG tablet Take 1 tablet by mouth Daily. 90 tablet 3   • tamsulosin (FLOMAX) 0.4 MG capsule 24 hr capsule Take 2 capsules by mouth Daily. 180 capsule 3   • triamcinolone (KENALOG) 0.025 % ointment Apply  topically to the appropriate area as directed 2 Times a Day for 10 days. Avoid applying to face, axilla, and groin 15 g 1     No current facility-administered medications on file prior to visit.      Allergies   Allergen Reactions   • Bactrim [Sulfamethoxazole-Trimethoprim] Other (See Comments)     Causes weakness, fatigue   • Plavix [Clopidogrel Bisulfate] Hives   • Augmentin [Amoxicillin-Pot Clavulanate] Confusion        Review of Systems   Constitutional: Negative.    HENT: Negative.    Eyes: Negative.    Respiratory: Negative.    Cardiovascular: Negative.    Gastrointestinal: Negative.    Endocrine: Negative.    Genitourinary: Negative.    Musculoskeletal: Positive for arthralgias.   Skin: Negative.    Allergic/Immunologic: Negative.    Neurological: Negative.    Hematological: Negative.    Psychiatric/Behavioral:  "Negative.         The patient's Review of Systems was personally reviewed and confirmed as accurate.    Physical Exam  Blood pressure 117/84, pulse (!) 127, height 177.8 cm (70\"), weight 116 kg (256 lb 6.4 oz).    Body mass index is 36.79 kg/m².    GENERAL APPEARANCE: awake, alert, oriented, in no acute distress and well developed, well nourished  LUNGS:  breathing nonlabored  EXTREMITIES: no clubbing, cyanosis  PERIPHERAL PULSES: palpable dorsalis pedis and posterior tibial pulses bilaterally.    GAIT:  Antalgic        ----------  Right Knee Exam:  ----------  ALIGNMENT: severe varus, correctable to neutral  ----------  RANGE OF MOTION:  Decreased (5 - 115 degrees) with no extensor lag  LIGAMENTOUS STABILITY:   stable to varus and valgus stress at terminal extension and 30 degrees; retensioning of the MCL is appreciated with valgus stress at 30 degrees consistent with medial compartment degeneration  ----------  STRENGTH:  KNEE FLEXION 4/5  KNEE EXTENSION  4/5  ANKLE DORSIFLEXION  5/5  ANKLE PLANTARFLEXION  5/5  ----------  PAIN WITH PALPATION:global  KNEE EFFUSION: yes, mild effusion  PAIN WITH KNEE ROM: yes  PATELLAR CREPITUS:  yes, painful and symptomatic  ----------  SENSATION TO LIGHT TOUCH:  DEEP PERONEAL/SUPERFICIAL PERONEAL/SURAL/SAPHENOUS/TIBIAL:    intact  ----------  EDEMA:   1+ edema in ankle  ERYTHEMA:    no  WOUNDS/INCISIONS:   No  -------  Left knee Exam:  ----------  ALIGNMENT: Moderate varus, correctable to neutral  ----------  RANGE OF MOTION:  Decreased (3 - 120 degrees) with no extensor lag  LIGAMENTOUS STABILITY:   stable to varus and valgus stress at terminal extension and 30 degrees; retensioning of the MCL is appreciated with valgus stress at 30 degrees consistent with medial compartment degeneration  ----------  STRENGTH:  KNEE FLEXION 5/5  KNEE EXTENSION  5/5  ANKLE DORSIFLEXION  5/5  ANKLE PLANTARFLEXION  5/5  ----------  PAIN WITH PALPATION: Slight tenderness globally  KNEE EFFUSION: " yes, trace effusion  PAIN WITH KNEE ROM: yes  PATELLAR CREPITUS:  yes, painful and symptomatic  ----------  SENSATION TO LIGHT TOUCH:  DEEP PERONEAL/SUPERFICIAL PERONEAL/SURAL/SAPHENOUS/TIBIAL:    intact  ----------  EDEMA:   1+ edema in ankle  ERYTHEMA:    no  WOUNDS/INCISIONS:   no  _____________________________________________________________________  _____________________________________________________________________    RADIOGRAPHIC FINDINGS:   No new imaging today.  Prior imaging from 4/20/2021 demonstrated end-stage osteoarthritis of the right knee with bone-on-bone articulation medial compartment.  Left knee demonstrated moderate varus arthritis with significant medial compartment joint space narrowing.    Assessment/Plan:   Diagnosis Plan   1. Primary osteoarthritis of both knees  Case Request    CBC and Differential    Basic metabolic panel    Protime-INR    APTT    Hemoglobin A1c    ECG 12 Lead    Nicotine & Metabolite, Quant    Tranexamic Acid 1,000 mg in sodium chloride 0.9 % 100 mL    Tranexamic Acid 1,000 mg in sodium chloride 0.9 % 100 mL    ceFAZolin (ANCEF) 2 g in sodium chloride 0.9 % 100 mL IVPB    acetaminophen (TYLENOL) tablet 975 mg    meloxicam (MOBIC) tablet 15 mg    pregabalin (LYRICA) capsule 75 mg    mupirocin (BACTROBAN) 2 % nasal ointment 1 application    Case Request     Patient is wishing to pursue surgical intervention of the right knee as it is the most symptomatic.  He has failed conservative treatment.  We will proceed with scheduling surgery at his convenience.    The patient has clinical and radiographic evidence of end-stage right knee joint degeneration. Conservative measures have been tried for 3 months or longer, but have failed to adequately treat or improve the patient's symptoms. Pain is restricting the patient's daily activities as well as quality of life. The recommendation at this time is to proceed with a right total knee arthroplasty with the goal to improve patient  function and pain. The risks, benefits, potential complications, and alternatives were discussed with the patient in detail. Risks included but were not limited to bleeding, infection, anesthesia risks, damage to neurovascular structures, osteolysis, aseptic loosening, instability, dislocation, pain, continued pain, iatrogenic fracture, possible need for future surgery including the potential for amputation, blood clots, myocardial infarction, stroke, and death. Yulissa-operative blood management and the potential for blood transfusion were discussed with risks and options clearly outlined. Specific details of the surgical procedure, hospitalization, recovery, rehabilitation, and long-term precautions were also presented. Pre-operative teaching was provided. Implant/prosthesis selection was outlined, and the many options available were explained; the final choice will be made at the time of the procedure to match the anatomy and condition of the bone, ligaments, tendons, and muscles. Given this instruction, the patient elected to proceed with the right total knee arthroplasty. The patient will be seen by pre-admission testing for pre-operative optimization and risk assessment and will be scheduled for surgery once this is completed.    The patient is considered standard risk for DVT based on patient risk factors and will be placed on aspirin postoperatively for DVT prophylaxis.        Pavan Suazo MD  06/01/21  09:39 EDT

## 2021-06-12 DIAGNOSIS — M17.0 PRIMARY OSTEOARTHRITIS OF BOTH KNEES: ICD-10-CM

## 2021-06-14 RX ORDER — MELOXICAM 7.5 MG/1
TABLET ORAL
Qty: 90 TABLET | Refills: 2 | Status: SHIPPED | OUTPATIENT
Start: 2021-06-14 | End: 2022-03-02

## 2021-06-16 ENCOUNTER — LAB (OUTPATIENT)
Dept: FAMILY MEDICINE CLINIC | Facility: CLINIC | Age: 66
End: 2021-06-16

## 2021-06-16 DIAGNOSIS — E78.2 MIXED HYPERLIPIDEMIA: ICD-10-CM

## 2021-06-16 DIAGNOSIS — I25.10 CORONARY ARTERY DISEASE INVOLVING NATIVE HEART WITHOUT ANGINA PECTORIS, UNSPECIFIED VESSEL OR LESION TYPE: ICD-10-CM

## 2021-06-16 DIAGNOSIS — R73.03 PREDIABETES: ICD-10-CM

## 2021-06-16 DIAGNOSIS — H25.9 SENILE CATARACT OF RIGHT EYE, UNSPECIFIED AGE-RELATED CATARACT TYPE: ICD-10-CM

## 2021-06-17 ENCOUNTER — OFFICE VISIT (OUTPATIENT)
Dept: FAMILY MEDICINE CLINIC | Facility: CLINIC | Age: 66
End: 2021-06-17

## 2021-06-17 VITALS
BODY MASS INDEX: 37.22 KG/M2 | WEIGHT: 260 LBS | OXYGEN SATURATION: 97 % | HEART RATE: 82 BPM | SYSTOLIC BLOOD PRESSURE: 132 MMHG | DIASTOLIC BLOOD PRESSURE: 84 MMHG | HEIGHT: 70 IN | TEMPERATURE: 97.1 F | RESPIRATION RATE: 20 BRPM

## 2021-06-17 DIAGNOSIS — R19.5 POSITIVE COLORECTAL CANCER SCREENING USING COLOGUARD TEST: ICD-10-CM

## 2021-06-17 DIAGNOSIS — Z87.891 PERSONAL HISTORY OF TOBACCO USE, PRESENTING HAZARDS TO HEALTH: ICD-10-CM

## 2021-06-17 DIAGNOSIS — E66.9 OBESITY (BMI 30-39.9): ICD-10-CM

## 2021-06-17 DIAGNOSIS — Z00.00 MEDICARE ANNUAL WELLNESS VISIT, SUBSEQUENT: Primary | ICD-10-CM

## 2021-06-17 DIAGNOSIS — R22.1 NECK MASS: ICD-10-CM

## 2021-06-17 LAB
ALBUMIN SERPL-MCNC: 4.6 G/DL (ref 3.5–5.2)
ALBUMIN/GLOB SERPL: 2.1 G/DL
ALP SERPL-CCNC: 63 U/L (ref 39–117)
ALT SERPL-CCNC: 34 U/L (ref 1–41)
AST SERPL-CCNC: 23 U/L (ref 1–40)
BASOPHILS # BLD AUTO: 0.03 10*3/MM3 (ref 0–0.2)
BASOPHILS NFR BLD AUTO: 0.7 % (ref 0–1.5)
BILIRUB SERPL-MCNC: 0.8 MG/DL (ref 0–1.2)
BUN SERPL-MCNC: 10 MG/DL (ref 8–23)
BUN/CREAT SERPL: 13 (ref 7–25)
CALCIUM SERPL-MCNC: 9.6 MG/DL (ref 8.6–10.5)
CHLORIDE SERPL-SCNC: 100 MMOL/L (ref 98–107)
CHOLEST SERPL-MCNC: 125 MG/DL (ref 0–200)
CHOLEST/HDLC SERPL: 2.36 {RATIO}
CO2 SERPL-SCNC: 29.4 MMOL/L (ref 22–29)
CREAT SERPL-MCNC: 0.77 MG/DL (ref 0.76–1.27)
EOSINOPHIL # BLD AUTO: 0.06 10*3/MM3 (ref 0–0.4)
EOSINOPHIL NFR BLD AUTO: 1.3 % (ref 0.3–6.2)
ERYTHROCYTE [DISTWIDTH] IN BLOOD BY AUTOMATED COUNT: 13 % (ref 12.3–15.4)
GLOBULIN SER CALC-MCNC: 2.2 GM/DL
GLUCOSE SERPL-MCNC: 96 MG/DL (ref 65–99)
HBA1C MFR BLD: 5.7 % (ref 4.8–5.6)
HCT VFR BLD AUTO: 48.1 % (ref 37.5–51)
HDLC SERPL-MCNC: 53 MG/DL (ref 40–60)
HGB BLD-MCNC: 16.6 G/DL (ref 13–17.7)
IMM GRANULOCYTES # BLD AUTO: 0.03 10*3/MM3 (ref 0–0.05)
IMM GRANULOCYTES NFR BLD AUTO: 0.7 % (ref 0–0.5)
LDLC SERPL CALC-MCNC: 52 MG/DL (ref 0–100)
LYMPHOCYTES # BLD AUTO: 1.51 10*3/MM3 (ref 0.7–3.1)
LYMPHOCYTES NFR BLD AUTO: 33.9 % (ref 19.6–45.3)
MCH RBC QN AUTO: 31.5 PG (ref 26.6–33)
MCHC RBC AUTO-ENTMCNC: 34.5 G/DL (ref 31.5–35.7)
MCV RBC AUTO: 91.3 FL (ref 79–97)
MONOCYTES # BLD AUTO: 0.44 10*3/MM3 (ref 0.1–0.9)
MONOCYTES NFR BLD AUTO: 9.9 % (ref 5–12)
NEUTROPHILS # BLD AUTO: 2.38 10*3/MM3 (ref 1.7–7)
NEUTROPHILS NFR BLD AUTO: 53.5 % (ref 42.7–76)
NRBC BLD AUTO-RTO: 0 /100 WBC (ref 0–0.2)
PLATELET # BLD AUTO: 203 10*3/MM3 (ref 140–450)
POTASSIUM SERPL-SCNC: 5.1 MMOL/L (ref 3.5–5.2)
PROT SERPL-MCNC: 6.8 G/DL (ref 6–8.5)
RBC # BLD AUTO: 5.27 10*6/MM3 (ref 4.14–5.8)
SODIUM SERPL-SCNC: 138 MMOL/L (ref 136–145)
TRIGL SERPL-MCNC: 111 MG/DL (ref 0–150)
VLDLC SERPL CALC-MCNC: 20 MG/DL (ref 5–40)
WBC # BLD AUTO: 4.45 10*3/MM3 (ref 3.4–10.8)

## 2021-06-17 PROCEDURE — 99397 PER PM REEVAL EST PAT 65+ YR: CPT | Performed by: FAMILY MEDICINE

## 2021-06-17 NOTE — PATIENT INSTRUCTIONS
Go to the nearest ER or return to clinic if symptoms worsen, fever/chill develop    Medicare Wellness  Personal Prevention Plan of Service     Date of Office Visit:  2021  Encounter Provider:  Sanaz Zuluaga DO  Place of Service:  [facility]  Patient Name: Thien Gray  :  1955    As part of the Medicare Wellness portion of your visit today, we are providing you with this personalized preventive plan of services (PPPS). This plan is based upon recommendations of the [place] Preventive Services Task Force (USPSTF) and the Advisory Committee on Immunization Practices (ACIP).    This lists the preventive care services that should be considered, and provides dates of when you are due. Items listed as completed are up-to-date and do not require any further intervention.    Health Maintenance   Topic Date Due   • LUNG CANCER SCREENING  2021   • INFLUENZA VACCINE  2021   • LIPID PANEL  2022   • ANNUAL WELLNESS VISIT  2022   • COLORECTAL CANCER SCREENING  2027   • TDAP/TD VACCINES (2 - Td or Tdap) 2029   • HEPATITIS C SCREENING  Completed   • COVID-19 Vaccine  Completed   • Pneumococcal Vaccine 65+  Completed   • AAA SCREEN (ONE-TIME)  Completed   • ZOSTER VACCINE  Discontinued       Orders Placed This Encounter   Procedures   • CT Chest Low Dose Wo     Standing Status:   Future     Standing Expiration Date:   2022     Order Specific Question:   The patient is age 50-80:     Answer:   65     Order Specific Question:   The patient is a current smoker?     Answer:   No     Order Specific Question:   The patient is a former smoker who has quit within the last 15 years?     Answer:   Yes     Order Specific Question:   The number of years since quitting smoking.     Answer:   4     Order Specific Question:   The patient has a smoking history of 20 pack-years or greater:     Answer:   Yes     Order Specific Question:    Actual pack - year smoking history (number):     Answer:   67.5     Order Specific Question:   Has the Patient had a Chest CT scan within the past 12 months?     Answer:   No     Order Specific Question:   Does the patient have any clinical signs/symptoms of lung cancer?     Answer:   No     Order Specific Question:   The patient was engaged in shared decision-making for this test:     Answer:   Yes     Order Specific Question:   Release to patient     Answer:   Immediate   • US Head Neck Soft Tissue     Standing Status:   Future     Standing Expiration Date:   6/17/2022     Order Specific Question:   Reason for Exam:     Answer:   anterior right side lump     Order Specific Question:   Release to patient     Answer:   Immediate   • Ambulatory Referral For Screening Colonoscopy     Referral Priority:   Routine     Referral Type:   Diagnostic Medical     Referral Reason:   Specialty Services Required     Number of Visits Requested:   1       Return in about 1 year (around 6/17/2022) for Medicare Wellness, 6 months for routine follow up .

## 2021-06-17 NOTE — PROGRESS NOTES
The ABCs of the Annual Wellness Visit  Subsequent Medicare Wellness Visit    Chief Complaint   Patient presents with   • Medicare Wellness-subsequent       Subjective   History of Present Illness:  Thien Gray is a 65 y.o. male who presents for a Subsequent Medicare Wellness Visit.    HEALTH RISK ASSESSMENT    Recent Hospitalizations:  No hospitalization(s) within the last year.    Current Medical Providers:  Patient Care Team:  Sanaz Zuluaga DO as PCP - General (Family Medicine)   Pavan Suazo MD - orthopedist  Phillip Eason MD - cardiologist    Smoking Status:  Social History     Tobacco Use   Smoking Status Former Smoker   • Packs/day: 1.50   • Years: 45.00   • Pack years: 67.50   • Types: Cigarettes   • Start date: 1/1/1971   • Quit date: 7/14/2017   • Years since quitting: 3.9   Smokeless Tobacco Never Used       Alcohol Consumption:  Social History     Substance and Sexual Activity   Alcohol Use Yes   • Alcohol/week: 15.0 standard drinks   • Types: 15 Standard drinks or equivalent per week    Comment: 3 nights a week       Depression Screen:   PHQ-2/PHQ-9 Depression Screening 6/17/2021   Little interest or pleasure in doing things 0   Feeling down, depressed, or hopeless 0   Total Score 0       Fall Risk Screen:  STEADI Fall Risk Assessment has not been completed.    Health Habits and Functional and Cognitive Screening:  Functional & Cognitive Status 6/17/2021   Do you have difficulty preparing food and eating? No   Do you have difficulty bathing yourself, getting dressed or grooming yourself? No   Do you have difficulty using the toilet? No   Do you have difficulty moving around from place to place? Yes   Do you have trouble with steps or getting out of a bed or a chair? Yes   Current Diet Low Carb Diet   Dental Exam Not up to date   Eye Exam Up to date   Exercise (times per week) 0 times per week   Current Exercises Include No Regular Exercise   Current Exercise Activities Include -   Do  you need help using the phone?  No   Are you deaf or do you have serious difficulty hearing?  No   Do you need help with transportation? No   Do you need help shopping? No   Do you need help preparing meals?  No   Do you need help with housework?  No   Do you need help with laundry? No   Do you need help taking your medications? No   Do you need help managing money? No   Do you ever drive or ride in a car without wearing a seat belt? No   Have you felt unusual stress, anger or loneliness in the last month? No   Who do you live with? Alone   If you need help, do you have trouble finding someone available to you? No   Have you been bothered in the last four weeks by sexual problems? No   Do you have difficulty concentrating, remembering or making decisions? Yes         Does the patient have evidence of cognitive impairment? No    Asprin use counseling:Taking ASA appropriately as indicated    Age-appropriate Screening Schedule:  Refer to the list below for future screening recommendations based on patient's age, sex and/or medical conditions. Orders for these recommended tests are listed in the plan section. The patient has been provided with a written plan.    Health Maintenance   Topic Date Due   • INFLUENZA VACCINE  08/01/2021   • LIPID PANEL  06/16/2022   • TDAP/TD VACCINES (2 - Td or Tdap) 06/07/2029   • ZOSTER VACCINE  Discontinued          The following portions of the patient's history were reviewed and updated as appropriate: allergies, current medications, past family history, past medical history, past social history, past surgical history and problem list.    Outpatient Medications Prior to Visit   Medication Sig Dispense Refill   • albuterol sulfate  (90 Base) MCG/ACT inhaler Inhale 2 puffs Every 4 (Four) Hours As Needed for Wheezing or Shortness of Air. 8 g 5   • aspirin 81 MG EC tablet Take 81 mg by mouth Daily.     • atropine 1 % ophthalmic solution      • chlorhexidine (HIBICLENS) 4 % external  liquid Apply  topically to the appropriate area as directed Daily As Needed for Wound Care. Shower daily with hibiclens solution as directed 5 days prior to surgery. 236 mL 0   • erythromycin (ROMYCIN) 5 MG/GM ophthalmic ointment      • meloxicam (MOBIC) 7.5 MG tablet TAKE 1 TABLET DAILY WITH   FOOD 90 tablet 2   • mupirocin (Bactroban Nasal) 2 % nasal ointment Apply pea-sized amount to each nostril twice daily for 5 days prior to surgery 22 g 0   • pravastatin (Pravachol) 40 MG tablet Take 1 tablet by mouth Daily. 90 tablet 3   • tamsulosin (FLOMAX) 0.4 MG capsule 24 hr capsule Take 2 capsules by mouth Daily. 180 capsule 3   • triamcinolone (KENALOG) 0.025 % ointment Apply  topically to the appropriate area as directed 2 Times a Day for 10 days. Avoid applying to face, axilla, and groin 15 g 1     No facility-administered medications prior to visit.       Patient Active Problem List   Diagnosis   • History of tobacco abuse   • Mixed hyperlipidemia   • Coronary artery disease involving native coronary artery of native heart without angina pectoris   • Dyspnea on exertion   • RBBB   • Psoriasis   • Primary osteoarthritis of both knees       Advanced Care Planning:  ACP discussion was held with the patient during this visit. Patient has an advance directive (not in EMR), copy requested.    Review of Systems   Constitutional: Negative for activity change and unexpected weight change.   Respiratory: Negative for cough and shortness of breath.    Cardiovascular: Negative for chest pain.   Musculoskeletal: Positive for arthralgias.       Compared to one year ago, the patient feels his physical health is better.  Compared to one year ago, the patient feels his mental health is the same.    Reviewed chart for potential of high risk medication in the elderly: yes  Reviewed chart for potential of harmful drug interactions in the elderly:yes    Objective         Vitals:    06/17/21 0812   BP: 132/84   Pulse: 82   Resp: 20  "  Temp: 97.1 °F (36.2 °C)   SpO2: 97%   Weight: 118 kg (260 lb)   Height: 177.8 cm (70\")   PainSc: 3  Comment: R knee       Body mass index is 37.31 kg/m².  Discussed the patient's BMI with him. The BMI is above average; BMI management plan is completed.    Physical Exam  Vitals and nursing note reviewed.   Constitutional:       General: He is not in acute distress.     Appearance: He is well-developed.   HENT:      Head: Normocephalic and atraumatic.      Right Ear: External ear normal.      Left Ear: External ear normal.   Eyes:      Conjunctiva/sclera: Conjunctivae normal.   Neck:     Cardiovascular:      Rate and Rhythm: Normal rate and regular rhythm.      Heart sounds: No murmur heard.     Pulmonary:      Effort: Pulmonary effort is normal.      Breath sounds: Normal breath sounds. No wheezing.   Musculoskeletal:         General: No swelling or deformity.      Cervical back: Neck supple. No tenderness.   Neurological:      Mental Status: He is alert and oriented to person, place, and time.   Psychiatric:         Mood and Affect: Mood normal.         Behavior: Behavior normal.         Lab Results   Component Value Date    GLU 96 06/16/2021    CHLPL 125 06/16/2021    TRIG 111 06/16/2021    HDL 53 06/16/2021    LDL 52 06/16/2021    VLDL 20 06/16/2021    HGBA1C 5.70 (H) 06/16/2021        Assessment/Plan   Medicare Risks and Personalized Health Plan  CMS Preventative Services Quick Reference  Cardiovascular risk  Lung Cancer Risk  Obesity/Overweight     The above risks/problems have been discussed with the patient.  Pertinent information has been shared with the patient in the After Visit Summary.  Follow up plans and orders are seen below in the Assessment/Plan Section.    Diagnoses and all orders for this visit:    1. Medicare annual wellness visit, subsequent (Primary)    2. Positive colorectal cancer screening using Cologuard test  -     Ambulatory Referral For Screening Colonoscopy    3. Neck mass  -     US " Head Neck Soft Tissue; Future    4. Obesity (BMI 30-39.9)    5. Body mass index (BMI) of 37.0 to 37.9 in adult    6. Personal history of tobacco use, presenting hazards to health  -     CT Chest Low Dose Wo; Future    Recommended that he follow through with screening colonoscopy since he does have a history of polyps positive Cologuard test.  He is due for routine screening CT chest.  Abnormality palpated on today's exam of his neck, will evaluate this with ultrasound.    Follow Up:  Return in about 1 year (around 6/17/2022) for Medicare Wellness, 6 months for routine follow up .     An After Visit Summary and PPPS were given to the patient.

## 2021-06-21 DIAGNOSIS — M17.0 PRIMARY OSTEOARTHRITIS OF BOTH KNEES: Primary | ICD-10-CM

## 2021-06-22 ENCOUNTER — HOSPITAL ENCOUNTER (INPATIENT)
Facility: HOSPITAL | Age: 66
LOS: 17 days | Discharge: NURSING, REHAB OR LONG-TERM CARE | End: 2021-07-09
Attending: EMERGENCY MEDICINE | Admitting: INTERNAL MEDICINE

## 2021-06-22 ENCOUNTER — APPOINTMENT (OUTPATIENT)
Dept: GENERAL RADIOLOGY | Facility: HOSPITAL | Age: 66
End: 2021-06-22

## 2021-06-22 ENCOUNTER — ANCILLARY PROCEDURE (OUTPATIENT)
Dept: PERIOP | Facility: HOSPITAL | Age: 66
End: 2021-06-22

## 2021-06-22 ENCOUNTER — ANESTHESIA EVENT (OUTPATIENT)
Dept: PERIOP | Facility: HOSPITAL | Age: 66
End: 2021-06-22

## 2021-06-22 ENCOUNTER — ANESTHESIA (OUTPATIENT)
Dept: PERIOP | Facility: HOSPITAL | Age: 66
End: 2021-06-22

## 2021-06-22 DIAGNOSIS — M17.0 PRIMARY OSTEOARTHRITIS OF BOTH KNEES: ICD-10-CM

## 2021-06-22 DIAGNOSIS — I21.3 ST ELEVATION MYOCARDIAL INFARCTION (STEMI), UNSPECIFIED ARTERY (HCC): ICD-10-CM

## 2021-06-22 DIAGNOSIS — R07.9 CHEST PAIN, UNSPECIFIED TYPE: ICD-10-CM

## 2021-06-22 DIAGNOSIS — R41.841 COGNITIVE COMMUNICATION DEFICIT: ICD-10-CM

## 2021-06-22 DIAGNOSIS — I25.10 CORONARY ARTERY DISEASE INVOLVING NATIVE CORONARY ARTERY OF NATIVE HEART WITHOUT ANGINA PECTORIS: Primary | ICD-10-CM

## 2021-06-22 PROBLEM — E66.9 OBESITY (BMI 30-39.9): Status: ACTIVE | Noted: 2021-06-22

## 2021-06-22 PROBLEM — E78.5 DYSLIPIDEMIA: Chronic | Status: ACTIVE | Noted: 2021-06-22

## 2021-06-22 PROBLEM — E78.5 DYSLIPIDEMIA: Status: ACTIVE | Noted: 2021-06-22

## 2021-06-22 LAB
ABO GROUP BLD: NORMAL
ABO GROUP BLD: NORMAL
ACT BLD: 109 SECONDS (ref 82–152)
ACT BLD: 164 SECONDS (ref 82–152)
ACT BLD: 428 SECONDS (ref 82–152)
ACT BLD: 450 SECONDS (ref 82–152)
ACT BLD: 532 SECONDS (ref 82–152)
ACT BLD: 549 SECONDS (ref 82–152)
ACT BLD: 593 SECONDS (ref 82–152)
ALBUMIN SERPL-MCNC: 4.1 G/DL (ref 3.5–5.2)
ALBUMIN SERPL-MCNC: 4.3 G/DL (ref 3.5–5.2)
ALBUMIN SERPL-MCNC: 4.4 G/DL (ref 3.5–5.2)
ALBUMIN/GLOB SERPL: 1.5 G/DL
ALP SERPL-CCNC: 68 U/L (ref 39–117)
ALT SERPL W P-5'-P-CCNC: 37 U/L (ref 1–41)
ANION GAP SERPL CALCULATED.3IONS-SCNC: 12 MMOL/L (ref 5–15)
ANION GAP SERPL CALCULATED.3IONS-SCNC: 15 MMOL/L (ref 5–15)
ANION GAP SERPL CALCULATED.3IONS-SCNC: 15 MMOL/L (ref 5–15)
APTT PPP: 34.9 SECONDS (ref 22–39)
ARTERIAL PATENCY WRIST A: ABNORMAL
ARTERIAL PATENCY WRIST A: ABNORMAL
AST SERPL-CCNC: 33 U/L (ref 1–40)
ATMOSPHERIC PRESS: ABNORMAL MM[HG]
ATMOSPHERIC PRESS: ABNORMAL MM[HG]
BASE EXCESS BLDA CALC-SCNC: -0.2 MMOL/L (ref 0–2)
BASE EXCESS BLDA CALC-SCNC: -1 MMOL/L (ref -5–5)
BASE EXCESS BLDA CALC-SCNC: -4 MMOL/L (ref -5–5)
BASE EXCESS BLDA CALC-SCNC: -4 MMOL/L (ref -5–5)
BASE EXCESS BLDA CALC-SCNC: -4.5 MMOL/L (ref 0–2)
BASE EXCESS BLDA CALC-SCNC: 0 MMOL/L (ref -5–5)
BASE EXCESS BLDA CALC-SCNC: 1 MMOL/L (ref -5–5)
BASE EXCESS BLDA CALC-SCNC: 3 MMOL/L (ref -5–5)
BASE EXCESS BLDA CALC-SCNC: 4 MMOL/L (ref -5–5)
BASOPHILS # BLD AUTO: 0.04 10*3/MM3 (ref 0–0.2)
BASOPHILS NFR BLD AUTO: 0.6 % (ref 0–1.5)
BDY SITE: ABNORMAL
BDY SITE: ABNORMAL
BILIRUB SERPL-MCNC: 0.7 MG/DL (ref 0–1.2)
BLD GP AB SCN SERPL QL: NEGATIVE
BODY TEMPERATURE: 37 C
BODY TEMPERATURE: 37 C
BUN SERPL-MCNC: 13 MG/DL (ref 8–23)
BUN SERPL-MCNC: 14 MG/DL (ref 8–23)
BUN SERPL-MCNC: 15 MG/DL (ref 8–23)
BUN/CREAT SERPL: 14.3 (ref 7–25)
BUN/CREAT SERPL: 15.6 (ref 7–25)
BUN/CREAT SERPL: 16.5 (ref 7–25)
CA-I BLDA-SCNC: 0.92 MMOL/L (ref 1.2–1.32)
CA-I BLDA-SCNC: 0.96 MMOL/L (ref 1.2–1.32)
CA-I BLDA-SCNC: 1.03 MMOL/L (ref 1.2–1.32)
CA-I BLDA-SCNC: 1.04 MMOL/L (ref 1.2–1.32)
CA-I BLDA-SCNC: 1.07 MMOL/L (ref 1.2–1.32)
CA-I BLDA-SCNC: 1.12 MMOL/L (ref 1.2–1.32)
CA-I BLDA-SCNC: 1.14 MMOL/L (ref 1.2–1.32)
CA-I BLDA-SCNC: 1.39 MMOL/L (ref 1.2–1.32)
CA-I BLDA-SCNC: 1.45 MMOL/L (ref 1.2–1.32)
CA-I SERPL ISE-MCNC: 1.35 MMOL/L (ref 1.12–1.32)
CALCIUM SPEC-SCNC: 8.9 MG/DL (ref 8.6–10.5)
CALCIUM SPEC-SCNC: 9.7 MG/DL (ref 8.6–10.5)
CALCIUM SPEC-SCNC: 9.9 MG/DL (ref 8.6–10.5)
CHLORIDE SERPL-SCNC: 101 MMOL/L (ref 98–107)
CHLORIDE SERPL-SCNC: 103 MMOL/L (ref 98–107)
CHLORIDE SERPL-SCNC: 104 MMOL/L (ref 98–107)
CO2 BLDA-SCNC: 24 MMOL/L (ref 22–33)
CO2 BLDA-SCNC: 24 MMOL/L (ref 24–29)
CO2 BLDA-SCNC: 26.4 MMOL/L (ref 22–33)
CO2 BLDA-SCNC: 27 MMOL/L (ref 24–29)
CO2 BLDA-SCNC: 27 MMOL/L (ref 24–29)
CO2 BLDA-SCNC: 28 MMOL/L (ref 24–29)
CO2 BLDA-SCNC: 29 MMOL/L (ref 24–29)
CO2 BLDA-SCNC: 30 MMOL/L (ref 24–29)
CO2 BLDA-SCNC: 30 MMOL/L (ref 24–29)
CO2 SERPL-SCNC: 21 MMOL/L (ref 22–29)
CO2 SERPL-SCNC: 22 MMOL/L (ref 22–29)
CO2 SERPL-SCNC: 25 MMOL/L (ref 22–29)
COHGB MFR BLD: 0.7 % (ref 0–2)
COHGB MFR BLD: 1 % (ref 0–2)
CREAT SERPL-MCNC: 0.9 MG/DL (ref 0.76–1.27)
CREAT SERPL-MCNC: 0.91 MG/DL (ref 0.76–1.27)
CREAT SERPL-MCNC: 0.91 MG/DL (ref 0.76–1.27)
DEPRECATED RDW RBC AUTO: 43.3 FL (ref 37–54)
DEPRECATED RDW RBC AUTO: 43.5 FL (ref 37–54)
DEPRECATED RDW RBC AUTO: 43.6 FL (ref 37–54)
EOSINOPHIL # BLD AUTO: 0.06 10*3/MM3 (ref 0–0.4)
EOSINOPHIL NFR BLD AUTO: 0.9 % (ref 0.3–6.2)
EPAP: 0
ERYTHROCYTE [DISTWIDTH] IN BLOOD BY AUTOMATED COUNT: 13.1 % (ref 12.3–15.4)
ERYTHROCYTE [DISTWIDTH] IN BLOOD BY AUTOMATED COUNT: 13.1 % (ref 12.3–15.4)
ERYTHROCYTE [DISTWIDTH] IN BLOOD BY AUTOMATED COUNT: 13.2 % (ref 12.3–15.4)
FIBRINOGEN PPP-MCNC: 182 MG/DL (ref 220–470)
FSP PPP LA-ACNC: NORMAL
GFR SERPL CREATININE-BSD FRML MDRD: 83 ML/MIN/1.73
GFR SERPL CREATININE-BSD FRML MDRD: 83 ML/MIN/1.73
GFR SERPL CREATININE-BSD FRML MDRD: 84 ML/MIN/1.73
GLOBULIN UR ELPH-MCNC: 2.8 GM/DL
GLUCOSE BLDC GLUCOMTR-MCNC: 130 MG/DL (ref 70–130)
GLUCOSE BLDC GLUCOMTR-MCNC: 136 MG/DL (ref 70–130)
GLUCOSE BLDC GLUCOMTR-MCNC: 142 MG/DL (ref 70–130)
GLUCOSE BLDC GLUCOMTR-MCNC: 145 MG/DL (ref 70–130)
GLUCOSE BLDC GLUCOMTR-MCNC: 147 MG/DL (ref 70–130)
GLUCOSE BLDC GLUCOMTR-MCNC: 170 MG/DL (ref 70–130)
GLUCOSE BLDC GLUCOMTR-MCNC: 171 MG/DL (ref 70–130)
GLUCOSE BLDC GLUCOMTR-MCNC: 176 MG/DL (ref 70–130)
GLUCOSE BLDC GLUCOMTR-MCNC: 181 MG/DL (ref 70–130)
GLUCOSE BLDC GLUCOMTR-MCNC: 196 MG/DL (ref 70–130)
GLUCOSE BLDC GLUCOMTR-MCNC: 197 MG/DL (ref 70–130)
GLUCOSE BLDC GLUCOMTR-MCNC: 198 MG/DL (ref 70–130)
GLUCOSE BLDC GLUCOMTR-MCNC: 206 MG/DL (ref 70–130)
GLUCOSE BLDC GLUCOMTR-MCNC: 215 MG/DL (ref 70–130)
GLUCOSE BLDC GLUCOMTR-MCNC: 217 MG/DL (ref 70–130)
GLUCOSE BLDC GLUCOMTR-MCNC: 225 MG/DL (ref 70–130)
GLUCOSE SERPL-MCNC: 143 MG/DL (ref 65–99)
GLUCOSE SERPL-MCNC: 185 MG/DL (ref 65–99)
GLUCOSE SERPL-MCNC: 221 MG/DL (ref 65–99)
HCO3 BLDA-SCNC: 22.1 MMOL/L (ref 22–26)
HCO3 BLDA-SCNC: 22.5 MMOL/L (ref 20–26)
HCO3 BLDA-SCNC: 22.9 MMOL/L (ref 22–26)
HCO3 BLDA-SCNC: 23 MMOL/L (ref 22–26)
HCO3 BLDA-SCNC: 25.1 MMOL/L (ref 20–26)
HCO3 BLDA-SCNC: 25.6 MMOL/L (ref 22–26)
HCO3 BLDA-SCNC: 26.1 MMOL/L (ref 22–26)
HCO3 BLDA-SCNC: 26.2 MMOL/L (ref 22–26)
HCO3 BLDA-SCNC: 26.9 MMOL/L (ref 22–26)
HCO3 BLDA-SCNC: 28.2 MMOL/L (ref 22–26)
HCO3 BLDA-SCNC: 28.8 MMOL/L (ref 22–26)
HCT VFR BLD AUTO: 30.1 % (ref 37.5–51)
HCT VFR BLD AUTO: 36 % (ref 37.5–51)
HCT VFR BLD AUTO: 49.3 % (ref 37.5–51)
HCT VFR BLD CALC: 32.1 %
HCT VFR BLD CALC: 39.5 %
HCT VFR BLDA CALC: 31 % (ref 38–51)
HCT VFR BLDA CALC: 31 % (ref 38–51)
HCT VFR BLDA CALC: 33 % (ref 38–51)
HCT VFR BLDA CALC: 33 % (ref 38–51)
HCT VFR BLDA CALC: 34 % (ref 38–51)
HCT VFR BLDA CALC: 34 % (ref 38–51)
HCT VFR BLDA CALC: 35 % (ref 38–51)
HCT VFR BLDA CALC: 38 % (ref 38–51)
HCT VFR BLDA CALC: 45 % (ref 38–51)
HGB BLD-MCNC: 10.2 G/DL (ref 13–17.7)
HGB BLD-MCNC: 12.3 G/DL (ref 13–17.7)
HGB BLD-MCNC: 16.8 G/DL (ref 13–17.7)
HGB BLDA-MCNC: 10.5 G/DL (ref 12–17)
HGB BLDA-MCNC: 10.5 G/DL (ref 12–17)
HGB BLDA-MCNC: 10.5 G/DL (ref 13.5–17.5)
HGB BLDA-MCNC: 11.2 G/DL (ref 12–17)
HGB BLDA-MCNC: 11.2 G/DL (ref 12–17)
HGB BLDA-MCNC: 11.6 G/DL (ref 12–17)
HGB BLDA-MCNC: 11.6 G/DL (ref 12–17)
HGB BLDA-MCNC: 11.9 G/DL (ref 12–17)
HGB BLDA-MCNC: 12.9 G/DL (ref 12–17)
HGB BLDA-MCNC: 12.9 G/DL (ref 13.5–17.5)
HGB BLDA-MCNC: 15.3 G/DL (ref 12–17)
HOLD SPECIMEN: NORMAL
IMM GRANULOCYTES # BLD AUTO: 0.04 10*3/MM3 (ref 0–0.05)
IMM GRANULOCYTES NFR BLD AUTO: 0.6 % (ref 0–0.5)
INHALED O2 CONCENTRATION: 100 %
INHALED O2 CONCENTRATION: 40 %
INR PPP: 1.47 (ref 0.85–1.16)
IPAP: 0
LIPASE SERPL-CCNC: 37 U/L (ref 13–60)
LYMPHOCYTES # BLD AUTO: 2.74 10*3/MM3 (ref 0.7–3.1)
LYMPHOCYTES NFR BLD AUTO: 40.5 % (ref 19.6–45.3)
MAGNESIUM SERPL-MCNC: 1.8 MG/DL (ref 1.6–2.4)
MAGNESIUM SERPL-MCNC: 2.1 MG/DL (ref 1.6–2.4)
MCH RBC QN AUTO: 30.7 PG (ref 26.6–33)
MCH RBC QN AUTO: 31 PG (ref 26.6–33)
MCH RBC QN AUTO: 31 PG (ref 26.6–33)
MCHC RBC AUTO-ENTMCNC: 33.9 G/DL (ref 31.5–35.7)
MCHC RBC AUTO-ENTMCNC: 34.1 G/DL (ref 31.5–35.7)
MCHC RBC AUTO-ENTMCNC: 34.2 G/DL (ref 31.5–35.7)
MCV RBC AUTO: 90.1 FL (ref 79–97)
MCV RBC AUTO: 90.7 FL (ref 79–97)
MCV RBC AUTO: 91.5 FL (ref 79–97)
METHGB BLD QL: 0.9 % (ref 0–1.5)
METHGB BLD QL: 1 % (ref 0–1.5)
MODALITY: ABNORMAL
MODALITY: ABNORMAL
MONOCYTES # BLD AUTO: 0.68 10*3/MM3 (ref 0.1–0.9)
MONOCYTES NFR BLD AUTO: 10.1 % (ref 5–12)
NEUTROPHILS NFR BLD AUTO: 3.2 10*3/MM3 (ref 1.7–7)
NEUTROPHILS NFR BLD AUTO: 47.3 % (ref 42.7–76)
NOTE: ABNORMAL
NOTE: ABNORMAL
NRBC BLD AUTO-RTO: 0 /100 WBC (ref 0–0.2)
NT-PROBNP SERPL-MCNC: 18 PG/ML (ref 0–900)
OXYHGB MFR BLDV: 97 % (ref 94–99)
OXYHGB MFR BLDV: 98.2 % (ref 94–99)
PAW @ PEAK INSP FLOW SETTING VENT: 0 CMH2O
PCO2 BLDA: 24.7 MM HG (ref 35–45)
PCO2 BLDA: 42.7 MM HG (ref 35–45)
PCO2 BLDA: 45.3 MM HG (ref 35–45)
PCO2 BLDA: 45.9 MM HG (ref 35–45)
PCO2 BLDA: 47.3 MM HG (ref 35–45)
PCO2 BLDA: 47.9 MM HG (ref 35–45)
PCO2 BLDA: 48.6 MM HG (ref 35–45)
PCO2 BLDA: 48.6 MM HG (ref 35–45)
PCO2 BLDA: 49.1 MM HG (ref 35–45)
PCO2 BLDA: 52.4 MM HG (ref 35–45)
PCO2 BLDA: 52.5 MM HG (ref 35–45)
PCO2 TEMP ADJ BLD: 42.7 MM HG (ref 35–48)
PCO2 TEMP ADJ BLD: 48.6 MM HG (ref 35–48)
PEEP RESPIRATORY: 10 CM[H2O]
PEEP RESPIRATORY: 10 CM[H2O]
PH BLDA: 7.27 PH UNITS (ref 7.35–7.45)
PH BLDA: 7.29 PH UNITS (ref 7.35–7.6)
PH BLDA: 7.3 PH UNITS (ref 7.35–7.6)
PH BLDA: 7.3 PH UNITS (ref 7.35–7.6)
PH BLDA: 7.32 PH UNITS (ref 7.35–7.6)
PH BLDA: 7.34 PH UNITS (ref 7.35–7.6)
PH BLDA: 7.36 PH UNITS (ref 7.35–7.6)
PH BLDA: 7.37 PH UNITS (ref 7.35–7.6)
PH BLDA: 7.38 PH UNITS (ref 7.35–7.45)
PH BLDA: 7.39 PH UNITS (ref 7.35–7.6)
PH BLDA: 7.58 PH UNITS (ref 7.35–7.6)
PH, TEMP CORRECTED: 7.27 PH UNITS
PH, TEMP CORRECTED: 7.38 PH UNITS
PHOSPHATE SERPL-MCNC: 2.9 MG/DL (ref 2.5–4.5)
PHOSPHATE SERPL-MCNC: 5.3 MG/DL (ref 2.5–4.5)
PLATELET # BLD AUTO: 220 10*3/MM3 (ref 140–450)
PLATELET # BLD AUTO: 230 10*3/MM3 (ref 140–450)
PLATELET # BLD AUTO: 241 10*3/MM3 (ref 140–450)
PMV BLD AUTO: 10.2 FL (ref 6–12)
PMV BLD AUTO: 10.3 FL (ref 6–12)
PMV BLD AUTO: 9.8 FL (ref 6–12)
PO2 BLDA: 116 MM HG (ref 83–108)
PO2 BLDA: 183 MMHG (ref 80–105)
PO2 BLDA: 208 MM HG (ref 83–108)
PO2 BLDA: 243 MMHG (ref 80–105)
PO2 BLDA: 320 MMHG (ref 80–105)
PO2 BLDA: 321 MMHG (ref 80–105)
PO2 BLDA: 329 MMHG (ref 80–105)
PO2 BLDA: 330 MMHG (ref 80–105)
PO2 BLDA: 361 MMHG (ref 80–105)
PO2 BLDA: 389 MMHG (ref 80–105)
PO2 BLDA: 50 MMHG (ref 80–105)
PO2 TEMP ADJ BLD: 116 MM HG (ref 83–108)
PO2 TEMP ADJ BLD: 208 MM HG (ref 83–108)
POTASSIUM BLDA-SCNC: 3.5 MMOL/L (ref 3.5–4.9)
POTASSIUM BLDA-SCNC: 4 MMOL/L (ref 3.5–4.9)
POTASSIUM BLDA-SCNC: 4.2 MMOL/L (ref 3.5–4.9)
POTASSIUM BLDA-SCNC: 5 MMOL/L (ref 3.5–4.9)
POTASSIUM BLDA-SCNC: 5.7 MMOL/L (ref 3.5–4.9)
POTASSIUM BLDA-SCNC: 5.8 MMOL/L (ref 3.5–4.9)
POTASSIUM BLDA-SCNC: 5.9 MMOL/L (ref 3.5–4.9)
POTASSIUM BLDA-SCNC: 6.1 MMOL/L (ref 3.5–4.9)
POTASSIUM BLDA-SCNC: 6.7 MMOL/L (ref 3.5–4.9)
POTASSIUM SERPL-SCNC: 3.7 MMOL/L (ref 3.5–5.2)
POTASSIUM SERPL-SCNC: 3.9 MMOL/L (ref 3.5–5.2)
POTASSIUM SERPL-SCNC: 4.8 MMOL/L (ref 3.5–5.2)
PROT SERPL-MCNC: 7.1 G/DL (ref 6–8.5)
PROTHROMBIN TIME: 17.3 SECONDS (ref 11.4–14.4)
PSV: 10 CMH2O
QT INTERVAL: 440 MS
QT INTERVAL: 454 MS
QTC INTERVAL: 513 MS
QTC INTERVAL: 544 MS
RBC # BLD AUTO: 3.29 10*6/MM3 (ref 4.14–5.8)
RBC # BLD AUTO: 3.97 10*6/MM3 (ref 4.14–5.8)
RBC # BLD AUTO: 5.47 10*6/MM3 (ref 4.14–5.8)
RH BLD: POSITIVE
RH BLD: POSITIVE
SAO2 % BLDA: 100 % (ref 95–98)
SAO2 % BLDA: 83 % (ref 95–98)
SAO2 % BLDA: 99 % (ref 95–98)
SARS-COV-2 RDRP RESP QL NAA+PROBE: NORMAL
SET MECH RESP RATE: 14
SODIUM BLD-SCNC: 136 MMOL/L (ref 138–146)
SODIUM BLD-SCNC: 137 MMOL/L (ref 138–146)
SODIUM BLD-SCNC: 137 MMOL/L (ref 138–146)
SODIUM BLD-SCNC: 138 MMOL/L (ref 138–146)
SODIUM SERPL-SCNC: 137 MMOL/L (ref 136–145)
SODIUM SERPL-SCNC: 140 MMOL/L (ref 136–145)
SODIUM SERPL-SCNC: 141 MMOL/L (ref 136–145)
T&S EXPIRATION DATE: NORMAL
TOTAL RATE: 0 BREATHS/MINUTE
TOTAL RATE: 14 BREATHS/MINUTE
TROPONIN T SERPL-MCNC: 1.73 NG/ML (ref 0–0.03)
TROPONIN T SERPL-MCNC: <0.01 NG/ML (ref 0–0.03)
VT ON VENT VENT: 600 ML
WBC # BLD AUTO: 12.88 10*3/MM3 (ref 3.4–10.8)
WBC # BLD AUTO: 6.76 10*3/MM3 (ref 3.4–10.8)
WBC # BLD AUTO: 9.98 10*3/MM3 (ref 3.4–10.8)
WHOLE BLOOD HOLD SPECIMEN: NORMAL

## 2021-06-22 PROCEDURE — 25010000003 LIDOCAINE 1 % SOLUTION: Performed by: INTERNAL MEDICINE

## 2021-06-22 PROCEDURE — 93005 ELECTROCARDIOGRAM TRACING: CPT | Performed by: PHYSICIAN ASSISTANT

## 2021-06-22 PROCEDURE — 85384 FIBRINOGEN ACTIVITY: CPT | Performed by: THORACIC SURGERY (CARDIOTHORACIC VASCULAR SURGERY)

## 2021-06-22 PROCEDURE — 85347 COAGULATION TIME ACTIVATED: CPT

## 2021-06-22 PROCEDURE — B2151ZZ FLUOROSCOPY OF LEFT HEART USING LOW OSMOLAR CONTRAST: ICD-10-PCS | Performed by: INTERNAL MEDICINE

## 2021-06-22 PROCEDURE — 99284 EMERGENCY DEPT VISIT MOD MDM: CPT

## 2021-06-22 PROCEDURE — 83735 ASSAY OF MAGNESIUM: CPT | Performed by: PHYSICIAN ASSISTANT

## 2021-06-22 PROCEDURE — 94799 UNLISTED PULMONARY SVC/PX: CPT

## 2021-06-22 PROCEDURE — 06BP4ZZ EXCISION OF RIGHT SAPHENOUS VEIN, PERCUTANEOUS ENDOSCOPIC APPROACH: ICD-10-PCS | Performed by: THORACIC SURGERY (CARDIOTHORACIC VASCULAR SURGERY)

## 2021-06-22 PROCEDURE — 87635 SARS-COV-2 COVID-19 AMP PRB: CPT | Performed by: EMERGENCY MEDICINE

## 2021-06-22 PROCEDURE — 83690 ASSAY OF LIPASE: CPT | Performed by: EMERGENCY MEDICINE

## 2021-06-22 PROCEDURE — 99255 IP/OBS CONSLTJ NEW/EST HI 80: CPT | Performed by: THORACIC SURGERY (CARDIOTHORACIC VASCULAR SURGERY)

## 2021-06-22 PROCEDURE — 25810000003 DEXTROSE 5 % WITH KCL 20 MEQ 20-5 MEQ/L-% SOLUTION: Performed by: PHYSICIAN ASSISTANT

## 2021-06-22 PROCEDURE — 25010000003 MILRINONE LACTATE 10 MG/10ML SOLUTION: Performed by: ANESTHESIOLOGY

## 2021-06-22 PROCEDURE — 33508 ENDOSCOPIC VEIN HARVEST: CPT | Performed by: THORACIC SURGERY (CARDIOTHORACIC VASCULAR SURGERY)

## 2021-06-22 PROCEDURE — C1751 CATH, INF, PER/CENT/MIDLINE: HCPCS | Performed by: ANESTHESIOLOGY

## 2021-06-22 PROCEDURE — A4648 IMPLANTABLE TISSUE MARKER: HCPCS | Performed by: THORACIC SURGERY (CARDIOTHORACIC VASCULAR SURGERY)

## 2021-06-22 PROCEDURE — 021209W BYPASS CORONARY ARTERY, THREE ARTERIES FROM AORTA WITH AUTOLOGOUS VENOUS TISSUE, OPEN APPROACH: ICD-10-PCS | Performed by: THORACIC SURGERY (CARDIOTHORACIC VASCULAR SURGERY)

## 2021-06-22 PROCEDURE — 86927 PLASMA FRESH FROZEN: CPT

## 2021-06-22 PROCEDURE — C1729 CATH, DRAINAGE: HCPCS | Performed by: THORACIC SURGERY (CARDIOTHORACIC VASCULAR SURGERY)

## 2021-06-22 PROCEDURE — 25010000002 ALBUMIN HUMAN 5% PER 50 ML: Performed by: PHYSICIAN ASSISTANT

## 2021-06-22 PROCEDURE — 94002 VENT MGMT INPAT INIT DAY: CPT

## 2021-06-22 PROCEDURE — 84295 ASSAY OF SERUM SODIUM: CPT

## 2021-06-22 PROCEDURE — P9041 ALBUMIN (HUMAN),5%, 50ML: HCPCS | Performed by: PHYSICIAN ASSISTANT

## 2021-06-22 PROCEDURE — 25010000002 ANTI-INHIBITOR COAGULANT COMPLEX: Performed by: THORACIC SURGERY (CARDIOTHORACIC VASCULAR SURGERY)

## 2021-06-22 PROCEDURE — P9017 PLASMA 1 DONOR FRZ W/IN 8 HR: HCPCS

## 2021-06-22 PROCEDURE — 82803 BLOOD GASES ANY COMBINATION: CPT

## 2021-06-22 PROCEDURE — 86900 BLOOD TYPING SEROLOGIC ABO: CPT | Performed by: INTERNAL MEDICINE

## 2021-06-22 PROCEDURE — 25010000002 FENTANYL CITRATE (PF) 50 MCG/ML SOLUTION: Performed by: INTERNAL MEDICINE

## 2021-06-22 PROCEDURE — 85025 COMPLETE CBC W/AUTO DIFF WBC: CPT | Performed by: EMERGENCY MEDICINE

## 2021-06-22 PROCEDURE — 25010000002 CEFUROXIME: Performed by: PHYSICIAN ASSISTANT

## 2021-06-22 PROCEDURE — P9035 PLATELET PHERES LEUKOREDUCED: HCPCS

## 2021-06-22 PROCEDURE — 25010000002 PROTAMINE SULFATE PER 10 MG: Performed by: PHYSICIAN ASSISTANT

## 2021-06-22 PROCEDURE — P9037 PLATE PHERES LEUKOREDU IRRAD: HCPCS

## 2021-06-22 PROCEDURE — 82375 ASSAY CARBOXYHB QUANT: CPT

## 2021-06-22 PROCEDURE — 71045 X-RAY EXAM CHEST 1 VIEW: CPT

## 2021-06-22 PROCEDURE — 99222 1ST HOSP IP/OBS MODERATE 55: CPT | Performed by: INTERNAL MEDICINE

## 2021-06-22 PROCEDURE — 0 IOPAMIDOL PER 1 ML: Performed by: INTERNAL MEDICINE

## 2021-06-22 PROCEDURE — 86901 BLOOD TYPING SEROLOGIC RH(D): CPT

## 2021-06-22 PROCEDURE — 25010000002 MORPHINE PER 10 MG: Performed by: EMERGENCY MEDICINE

## 2021-06-22 PROCEDURE — 33518 CABG ARTERY-VEIN TWO: CPT | Performed by: PHYSICIAN ASSISTANT

## 2021-06-22 PROCEDURE — 83050 HGB METHEMOGLOBIN QUAN: CPT

## 2021-06-22 PROCEDURE — C1889 IMPLANT/INSERT DEVICE, NOC: HCPCS | Performed by: THORACIC SURGERY (CARDIOTHORACIC VASCULAR SURGERY)

## 2021-06-22 PROCEDURE — 25010000002 PROTAMINE SULFATE PER 10 MG: Performed by: ANESTHESIOLOGY

## 2021-06-22 PROCEDURE — 25010000002 HEPARIN (PORCINE) PER 1000 UNITS: Performed by: THORACIC SURGERY (CARDIOTHORACIC VASCULAR SURGERY)

## 2021-06-22 PROCEDURE — 33533 CABG ARTERIAL SINGLE: CPT | Performed by: THORACIC SURGERY (CARDIOTHORACIC VASCULAR SURGERY)

## 2021-06-22 PROCEDURE — 93010 ELECTROCARDIOGRAM REPORT: CPT | Performed by: INTERNAL MEDICINE

## 2021-06-22 PROCEDURE — 33518 CABG ARTERY-VEIN TWO: CPT | Performed by: THORACIC SURGERY (CARDIOTHORACIC VASCULAR SURGERY)

## 2021-06-22 PROCEDURE — 25010000002 MIDAZOLAM PER 1 MG: Performed by: ANESTHESIOLOGY

## 2021-06-22 PROCEDURE — 93005 ELECTROCARDIOGRAM TRACING: CPT | Performed by: EMERGENCY MEDICINE

## 2021-06-22 PROCEDURE — C1751 CATH, INF, PER/CENT/MIDLINE: HCPCS | Performed by: THORACIC SURGERY (CARDIOTHORACIC VASCULAR SURGERY)

## 2021-06-22 PROCEDURE — 25010000002 HEPARIN (PORCINE) PER 1000 UNITS: Performed by: EMERGENCY MEDICINE

## 2021-06-22 PROCEDURE — 86900 BLOOD TYPING SEROLOGIC ABO: CPT

## 2021-06-22 PROCEDURE — 4A023N7 MEASUREMENT OF CARDIAC SAMPLING AND PRESSURE, LEFT HEART, PERCUTANEOUS APPROACH: ICD-10-PCS | Performed by: INTERNAL MEDICINE

## 2021-06-22 PROCEDURE — 82947 ASSAY GLUCOSE BLOOD QUANT: CPT

## 2021-06-22 PROCEDURE — 25010000002 SUCCINYLCHOLINE PER 20 MG: Performed by: ANESTHESIOLOGY

## 2021-06-22 PROCEDURE — 85730 THROMBOPLASTIN TIME PARTIAL: CPT | Performed by: PHYSICIAN ASSISTANT

## 2021-06-22 PROCEDURE — 86901 BLOOD TYPING SEROLOGIC RH(D): CPT | Performed by: INTERNAL MEDICINE

## 2021-06-22 PROCEDURE — 85027 COMPLETE CBC AUTOMATED: CPT | Performed by: THORACIC SURGERY (CARDIOTHORACIC VASCULAR SURGERY)

## 2021-06-22 PROCEDURE — 02100Z9 BYPASS CORONARY ARTERY, ONE ARTERY FROM LEFT INTERNAL MAMMARY, OPEN APPROACH: ICD-10-PCS | Performed by: THORACIC SURGERY (CARDIOTHORACIC VASCULAR SURGERY)

## 2021-06-22 PROCEDURE — 25010000002 PROPOFOL 10 MG/ML EMULSION: Performed by: ANESTHESIOLOGY

## 2021-06-22 PROCEDURE — 25010000002 FENTANYL CITRATE (PF) 50 MCG/ML SOLUTION: Performed by: THORACIC SURGERY (CARDIOTHORACIC VASCULAR SURGERY)

## 2021-06-22 PROCEDURE — 83880 ASSAY OF NATRIURETIC PEPTIDE: CPT | Performed by: EMERGENCY MEDICINE

## 2021-06-22 PROCEDURE — C1769 GUIDE WIRE: HCPCS | Performed by: INTERNAL MEDICINE

## 2021-06-22 PROCEDURE — 33533 CABG ARTERIAL SINGLE: CPT | Performed by: PHYSICIAN ASSISTANT

## 2021-06-22 PROCEDURE — 85027 COMPLETE CBC AUTOMATED: CPT | Performed by: PHYSICIAN ASSISTANT

## 2021-06-22 PROCEDURE — 86850 RBC ANTIBODY SCREEN: CPT | Performed by: INTERNAL MEDICINE

## 2021-06-22 PROCEDURE — 85362 FIBRIN DEGRADATION PRODUCTS: CPT | Performed by: THORACIC SURGERY (CARDIOTHORACIC VASCULAR SURGERY)

## 2021-06-22 PROCEDURE — 25010000002 PROPOFOL 10 MG/ML EMULSION: Performed by: THORACIC SURGERY (CARDIOTHORACIC VASCULAR SURGERY)

## 2021-06-22 PROCEDURE — 25010000002 PAPAVERINE PER 60 MG: Performed by: THORACIC SURGERY (CARDIOTHORACIC VASCULAR SURGERY)

## 2021-06-22 PROCEDURE — 80053 COMPREHEN METABOLIC PANEL: CPT | Performed by: EMERGENCY MEDICINE

## 2021-06-22 PROCEDURE — 93458 L HRT ARTERY/VENTRICLE ANGIO: CPT | Performed by: INTERNAL MEDICINE

## 2021-06-22 PROCEDURE — C1894 INTRO/SHEATH, NON-LASER: HCPCS | Performed by: THORACIC SURGERY (CARDIOTHORACIC VASCULAR SURGERY)

## 2021-06-22 PROCEDURE — 25010000003 MAGNESIUM SULFATE 4 GM/100ML SOLUTION: Performed by: NURSE PRACTITIONER

## 2021-06-22 PROCEDURE — 33508 ENDOSCOPIC VEIN HARVEST: CPT | Performed by: PHYSICIAN ASSISTANT

## 2021-06-22 PROCEDURE — 84132 ASSAY OF SERUM POTASSIUM: CPT

## 2021-06-22 PROCEDURE — C1894 INTRO/SHEATH, NON-LASER: HCPCS | Performed by: INTERNAL MEDICINE

## 2021-06-22 PROCEDURE — 85610 PROTHROMBIN TIME: CPT | Performed by: PHYSICIAN ASSISTANT

## 2021-06-22 PROCEDURE — B2111ZZ FLUOROSCOPY OF MULTIPLE CORONARY ARTERIES USING LOW OSMOLAR CONTRAST: ICD-10-PCS | Performed by: INTERNAL MEDICINE

## 2021-06-22 PROCEDURE — 5A02210 ASSISTANCE WITH CARDIAC OUTPUT USING BALLOON PUMP, CONTINUOUS: ICD-10-PCS | Performed by: THORACIC SURGERY (CARDIOTHORACIC VASCULAR SURGERY)

## 2021-06-22 PROCEDURE — 33967 INSERT I-AORT PERCUT DEVICE: CPT | Performed by: THORACIC SURGERY (CARDIOTHORACIC VASCULAR SURGERY)

## 2021-06-22 PROCEDURE — 93318 ECHO TRANSESOPHAGEAL INTRAOP: CPT | Performed by: ANESTHESIOLOGY

## 2021-06-22 PROCEDURE — 84484 ASSAY OF TROPONIN QUANT: CPT | Performed by: EMERGENCY MEDICINE

## 2021-06-22 PROCEDURE — 36430 TRANSFUSION BLD/BLD COMPNT: CPT

## 2021-06-22 PROCEDURE — 85014 HEMATOCRIT: CPT

## 2021-06-22 PROCEDURE — 25010000002 DOBUTAMINE 250 MG/20ML SOLUTION 20 ML VIAL: Performed by: ANESTHESIOLOGY

## 2021-06-22 PROCEDURE — 82330 ASSAY OF CALCIUM: CPT

## 2021-06-22 PROCEDURE — 86923 COMPATIBILITY TEST ELECTRIC: CPT

## 2021-06-22 PROCEDURE — 82805 BLOOD GASES W/O2 SATURATION: CPT

## 2021-06-22 PROCEDURE — 82330 ASSAY OF CALCIUM: CPT | Performed by: PHYSICIAN ASSISTANT

## 2021-06-22 PROCEDURE — 25010000002 HEPARIN (PORCINE) PER 1000 UNITS: Performed by: ANESTHESIOLOGY

## 2021-06-22 PROCEDURE — 25010000002 MIDAZOLAM PER 1 MG: Performed by: INTERNAL MEDICINE

## 2021-06-22 PROCEDURE — 80069 RENAL FUNCTION PANEL: CPT | Performed by: PHYSICIAN ASSISTANT

## 2021-06-22 DEVICE — LIGACLIP MCA MULTIPLE CLIP APPLIERS, 20 SMALL CLIPS
Type: IMPLANTABLE DEVICE | Site: HEART | Status: FUNCTIONAL
Brand: LIGACLIP

## 2021-06-22 DEVICE — DISK-SHAPED STYLE, SILICONE (1 PER STERILE PKG)
Type: IMPLANTABLE DEVICE | Site: HEART | Status: FUNCTIONAL
Brand: SCANLAN® RADIOMARK® GRAFT MARKERS

## 2021-06-22 RX ORDER — POTASSIUM CHLORIDE 1.5 G/1.77G
40 POWDER, FOR SOLUTION ORAL AS NEEDED
Status: DISCONTINUED | OUTPATIENT
Start: 2021-06-22 | End: 2021-06-29

## 2021-06-22 RX ORDER — TAMSULOSIN HYDROCHLORIDE 0.4 MG/1
0.8 CAPSULE ORAL DAILY
Status: DISCONTINUED | OUTPATIENT
Start: 2021-06-22 | End: 2021-06-25

## 2021-06-22 RX ORDER — DOBUTAMINE HYDROCHLORIDE 100 MG/100ML
2-20 INJECTION INTRAVENOUS CONTINUOUS PRN
Status: DISCONTINUED | OUTPATIENT
Start: 2021-06-22 | End: 2021-06-24

## 2021-06-22 RX ORDER — PROPOFOL 10 MG/ML
VIAL (ML) INTRAVENOUS AS NEEDED
Status: DISCONTINUED | OUTPATIENT
Start: 2021-06-22 | End: 2021-06-22 | Stop reason: SURG

## 2021-06-22 RX ORDER — MAGNESIUM SULFATE HEPTAHYDRATE 40 MG/ML
2 INJECTION, SOLUTION INTRAVENOUS AS NEEDED
Status: DISCONTINUED | OUTPATIENT
Start: 2021-06-22 | End: 2021-06-23

## 2021-06-22 RX ORDER — MAGNESIUM HYDROXIDE 1200 MG/15ML
LIQUID ORAL AS NEEDED
Status: DISCONTINUED | OUTPATIENT
Start: 2021-06-22 | End: 2021-06-22 | Stop reason: HOSPADM

## 2021-06-22 RX ORDER — NALOXONE HCL 0.4 MG/ML
0.4 VIAL (ML) INJECTION
Status: DISCONTINUED | OUTPATIENT
Start: 2021-06-22 | End: 2021-06-23

## 2021-06-22 RX ORDER — MIDAZOLAM HYDROCHLORIDE 1 MG/ML
INJECTION INTRAMUSCULAR; INTRAVENOUS AS NEEDED
Status: DISCONTINUED | OUTPATIENT
Start: 2021-06-22 | End: 2021-06-22 | Stop reason: HOSPADM

## 2021-06-22 RX ORDER — ALBUTEROL SULFATE 2.5 MG/3ML
2.5 SOLUTION RESPIRATORY (INHALATION) EVERY 6 HOURS PRN
Status: DISCONTINUED | OUTPATIENT
Start: 2021-06-22 | End: 2021-07-09 | Stop reason: HOSPADM

## 2021-06-22 RX ORDER — POTASSIUM CHLORIDE 750 MG/1
40 CAPSULE, EXTENDED RELEASE ORAL AS NEEDED
Status: DISCONTINUED | OUTPATIENT
Start: 2021-06-22 | End: 2021-06-29

## 2021-06-22 RX ORDER — MEPERIDINE HYDROCHLORIDE 25 MG/ML
25 INJECTION INTRAMUSCULAR; INTRAVENOUS; SUBCUTANEOUS EVERY 4 HOURS PRN
Status: DISCONTINUED | OUTPATIENT
Start: 2021-06-22 | End: 2021-06-23

## 2021-06-22 RX ORDER — METOPROLOL TARTRATE 5 MG/5ML
INJECTION INTRAVENOUS AS NEEDED
Status: DISCONTINUED | OUTPATIENT
Start: 2021-06-22 | End: 2021-06-22 | Stop reason: HOSPADM

## 2021-06-22 RX ORDER — CHLORHEXIDINE GLUCONATE 0.12 MG/ML
15 RINSE ORAL EVERY 12 HOURS SCHEDULED
Status: DISCONTINUED | OUTPATIENT
Start: 2021-06-22 | End: 2021-06-24

## 2021-06-22 RX ORDER — PROPOFOL 10 MG/ML
VIAL (ML) INTRAVENOUS CONTINUOUS PRN
Status: DISCONTINUED | OUTPATIENT
Start: 2021-06-22 | End: 2021-06-22 | Stop reason: SURG

## 2021-06-22 RX ORDER — SODIUM CHLORIDE 9 MG/ML
INJECTION, SOLUTION INTRAVENOUS CONTINUOUS PRN
Status: DISCONTINUED | OUTPATIENT
Start: 2021-06-22 | End: 2021-06-22 | Stop reason: SURG

## 2021-06-22 RX ORDER — MILRINONE LACTATE 1 MG/ML
INJECTION, SOLUTION INTRAVENOUS AS NEEDED
Status: DISCONTINUED | OUTPATIENT
Start: 2021-06-22 | End: 2021-06-22 | Stop reason: SURG

## 2021-06-22 RX ORDER — PROTAMINE SULFATE 10 MG/ML
INJECTION, SOLUTION INTRAVENOUS AS NEEDED
Status: DISCONTINUED | OUTPATIENT
Start: 2021-06-22 | End: 2021-06-22 | Stop reason: SURG

## 2021-06-22 RX ORDER — NOREPINEPHRINE BIT/0.9 % NACL 8 MG/250ML
.02-.3 INFUSION BOTTLE (ML) INTRAVENOUS CONTINUOUS PRN
Status: DISCONTINUED | OUTPATIENT
Start: 2021-06-22 | End: 2021-06-27

## 2021-06-22 RX ORDER — NITROGLYCERIN 20 MG/100ML
5-200 INJECTION INTRAVENOUS CONTINUOUS PRN
Status: DISCONTINUED | OUTPATIENT
Start: 2021-06-22 | End: 2021-06-26

## 2021-06-22 RX ORDER — ASPIRIN 325 MG
325 TABLET, DELAYED RELEASE (ENTERIC COATED) ORAL DAILY
Status: DISCONTINUED | OUTPATIENT
Start: 2021-06-23 | End: 2021-06-25

## 2021-06-22 RX ORDER — DOPAMINE HYDROCHLORIDE 160 MG/100ML
2-20 INJECTION, SOLUTION INTRAVENOUS CONTINUOUS PRN
Status: DISCONTINUED | OUTPATIENT
Start: 2021-06-22 | End: 2021-06-24

## 2021-06-22 RX ORDER — NITROGLYCERIN 0.4 MG/1
TABLET SUBLINGUAL
Status: COMPLETED | OUTPATIENT
Start: 2021-06-22 | End: 2021-06-22

## 2021-06-22 RX ORDER — MORPHINE SULFATE 4 MG/ML
INJECTION, SOLUTION INTRAMUSCULAR; INTRAVENOUS
Status: COMPLETED | OUTPATIENT
Start: 2021-06-22 | End: 2021-06-22

## 2021-06-22 RX ORDER — NOREPINEPHRINE BITARTRATE 1 MG/ML
INJECTION, SOLUTION INTRAVENOUS CONTINUOUS PRN
Status: DISCONTINUED | OUTPATIENT
Start: 2021-06-22 | End: 2021-06-22 | Stop reason: SURG

## 2021-06-22 RX ORDER — MIDAZOLAM HYDROCHLORIDE 1 MG/ML
INJECTION INTRAMUSCULAR; INTRAVENOUS AS NEEDED
Status: DISCONTINUED | OUTPATIENT
Start: 2021-06-22 | End: 2021-06-22 | Stop reason: SURG

## 2021-06-22 RX ORDER — MAGNESIUM SULFATE HEPTAHYDRATE 40 MG/ML
4 INJECTION, SOLUTION INTRAVENOUS AS NEEDED
Status: DISCONTINUED | OUTPATIENT
Start: 2021-06-22 | End: 2021-06-23

## 2021-06-22 RX ORDER — FENTANYL CITRATE 50 UG/ML
INJECTION, SOLUTION INTRAMUSCULAR; INTRAVENOUS AS NEEDED
Status: DISCONTINUED | OUTPATIENT
Start: 2021-06-22 | End: 2021-06-22 | Stop reason: HOSPADM

## 2021-06-22 RX ORDER — ALBUTEROL SULFATE 2.5 MG/3ML
2.5 SOLUTION RESPIRATORY (INHALATION) EVERY 4 HOURS PRN
Status: ACTIVE | OUTPATIENT
Start: 2021-06-22 | End: 2021-06-23

## 2021-06-22 RX ORDER — SODIUM CHLORIDE 0.9 % (FLUSH) 0.9 %
30 SYRINGE (ML) INJECTION ONCE AS NEEDED
Status: DISCONTINUED | OUTPATIENT
Start: 2021-06-22 | End: 2021-06-22

## 2021-06-22 RX ORDER — AMINOCAPROIC ACID 250 MG/ML
INJECTION, SOLUTION INTRAVENOUS AS NEEDED
Status: DISCONTINUED | OUTPATIENT
Start: 2021-06-22 | End: 2021-06-22 | Stop reason: SURG

## 2021-06-22 RX ORDER — SODIUM CHLORIDE 0.9 % (FLUSH) 0.9 %
10 SYRINGE (ML) INJECTION AS NEEDED
Status: DISCONTINUED | OUTPATIENT
Start: 2021-06-22 | End: 2021-06-22

## 2021-06-22 RX ORDER — PAPAVERINE HYDROCHLORIDE 30 MG/ML
INJECTION INTRAMUSCULAR; INTRAVENOUS AS NEEDED
Status: DISCONTINUED | OUTPATIENT
Start: 2021-06-22 | End: 2021-06-22 | Stop reason: HOSPADM

## 2021-06-22 RX ORDER — ALBUMIN, HUMAN INJ 5% 5 %
500 SOLUTION INTRAVENOUS AS NEEDED
Status: COMPLETED | OUTPATIENT
Start: 2021-06-22 | End: 2021-06-22

## 2021-06-22 RX ORDER — HEPARIN SODIUM 5000 [USP'U]/ML
INJECTION, SOLUTION INTRAVENOUS; SUBCUTANEOUS
Status: COMPLETED | OUTPATIENT
Start: 2021-06-22 | End: 2021-06-22

## 2021-06-22 RX ORDER — SUFENTANIL CITRATE 50 UG/ML
INJECTION EPIDURAL; INTRAVENOUS AS NEEDED
Status: DISCONTINUED | OUTPATIENT
Start: 2021-06-22 | End: 2021-06-22 | Stop reason: SURG

## 2021-06-22 RX ORDER — OXYCODONE HYDROCHLORIDE AND ACETAMINOPHEN 5; 325 MG/1; MG/1
2 TABLET ORAL EVERY 4 HOURS PRN
Status: DISCONTINUED | OUTPATIENT
Start: 2021-06-22 | End: 2021-06-23

## 2021-06-22 RX ORDER — LIDOCAINE HYDROCHLORIDE 10 MG/ML
INJECTION, SOLUTION INFILTRATION; PERINEURAL AS NEEDED
Status: DISCONTINUED | OUTPATIENT
Start: 2021-06-22 | End: 2021-06-22 | Stop reason: HOSPADM

## 2021-06-22 RX ORDER — FENTANYL CITRATE 50 UG/ML
25 INJECTION, SOLUTION INTRAMUSCULAR; INTRAVENOUS
Status: DISCONTINUED | OUTPATIENT
Start: 2021-06-22 | End: 2021-06-23

## 2021-06-22 RX ORDER — ROCURONIUM BROMIDE 10 MG/ML
INJECTION, SOLUTION INTRAVENOUS AS NEEDED
Status: DISCONTINUED | OUTPATIENT
Start: 2021-06-22 | End: 2021-06-22 | Stop reason: SURG

## 2021-06-22 RX ORDER — VECURONIUM BROMIDE 1 MG/ML
INJECTION, POWDER, LYOPHILIZED, FOR SOLUTION INTRAVENOUS AS NEEDED
Status: DISCONTINUED | OUTPATIENT
Start: 2021-06-22 | End: 2021-06-22 | Stop reason: SURG

## 2021-06-22 RX ORDER — ALBUMIN, HUMAN INJ 5% 5 %
SOLUTION INTRAVENOUS
Status: COMPLETED
Start: 2021-06-22 | End: 2021-06-23

## 2021-06-22 RX ORDER — CALCIUM CHLORIDE 100 MG/ML
INJECTION INTRAVENOUS; INTRAVENTRICULAR AS NEEDED
Status: DISCONTINUED | OUTPATIENT
Start: 2021-06-22 | End: 2021-06-22 | Stop reason: SURG

## 2021-06-22 RX ORDER — DEXMEDETOMIDINE HYDROCHLORIDE 4 UG/ML
.2-1.5 INJECTION, SOLUTION INTRAVENOUS CONTINUOUS PRN
Status: DISCONTINUED | OUTPATIENT
Start: 2021-06-22 | End: 2021-06-24

## 2021-06-22 RX ORDER — POTASSIUM CHLORIDE 29.8 MG/ML
20 INJECTION INTRAVENOUS
Status: DISCONTINUED | OUTPATIENT
Start: 2021-06-22 | End: 2021-06-23

## 2021-06-22 RX ORDER — ONDANSETRON 2 MG/ML
4 INJECTION INTRAMUSCULAR; INTRAVENOUS EVERY 6 HOURS PRN
Status: DISCONTINUED | OUTPATIENT
Start: 2021-06-22 | End: 2021-07-09 | Stop reason: HOSPADM

## 2021-06-22 RX ORDER — METOPROLOL TARTRATE 5 MG/5ML
2.5 INJECTION INTRAVENOUS EVERY 6 HOURS SCHEDULED
Status: ACTIVE | OUTPATIENT
Start: 2021-06-22 | End: 2021-06-23

## 2021-06-22 RX ORDER — ASPIRIN 325 MG
325 TABLET ORAL ONCE
Status: DISCONTINUED | OUTPATIENT
Start: 2021-06-22 | End: 2021-06-23

## 2021-06-22 RX ORDER — SUCCINYLCHOLINE CHLORIDE 20 MG/ML
INJECTION INTRAMUSCULAR; INTRAVENOUS AS NEEDED
Status: DISCONTINUED | OUTPATIENT
Start: 2021-06-22 | End: 2021-06-22 | Stop reason: SURG

## 2021-06-22 RX ORDER — SODIUM CHLORIDE 9 MG/ML
30 INJECTION, SOLUTION INTRAVENOUS CONTINUOUS PRN
Status: DISCONTINUED | OUTPATIENT
Start: 2021-06-22 | End: 2021-06-22

## 2021-06-22 RX ORDER — HEPARIN SODIUM 1000 [USP'U]/ML
INJECTION, SOLUTION INTRAVENOUS; SUBCUTANEOUS AS NEEDED
Status: DISCONTINUED | OUTPATIENT
Start: 2021-06-22 | End: 2021-06-22 | Stop reason: SURG

## 2021-06-22 RX ORDER — ATORVASTATIN CALCIUM 40 MG/1
40 TABLET, FILM COATED ORAL NIGHTLY
Status: DISCONTINUED | OUTPATIENT
Start: 2021-06-22 | End: 2021-06-25

## 2021-06-22 RX ORDER — POTASSIUM CHLORIDE, DEXTROSE MONOHYDRATE 150; 5 MG/100ML; G/100ML
30 INJECTION, SOLUTION INTRAVENOUS CONTINUOUS
Status: DISCONTINUED | OUTPATIENT
Start: 2021-06-22 | End: 2021-06-24

## 2021-06-22 RX ORDER — HYDROCODONE BITARTRATE AND ACETAMINOPHEN 7.5; 325 MG/1; MG/1
1 TABLET ORAL EVERY 4 HOURS PRN
Status: DISCONTINUED | OUTPATIENT
Start: 2021-06-22 | End: 2021-06-23

## 2021-06-22 RX ORDER — MORPHINE SULFATE 2 MG/ML
2 INJECTION, SOLUTION INTRAMUSCULAR; INTRAVENOUS
Status: DISCONTINUED | OUTPATIENT
Start: 2021-06-22 | End: 2021-06-23

## 2021-06-22 RX ORDER — PRAVASTATIN SODIUM 20 MG
40 TABLET ORAL DAILY
Status: DISCONTINUED | OUTPATIENT
Start: 2021-06-22 | End: 2021-06-22 | Stop reason: ALTCHOICE

## 2021-06-22 RX ORDER — SODIUM CHLORIDE 9 MG/ML
INJECTION, SOLUTION INTRAVENOUS AS NEEDED
Status: DISCONTINUED | OUTPATIENT
Start: 2021-06-22 | End: 2021-06-22 | Stop reason: HOSPADM

## 2021-06-22 RX ORDER — PROTAMINE SULFATE 10 MG/ML
50 INJECTION, SOLUTION INTRAVENOUS ONCE
Status: COMPLETED | OUTPATIENT
Start: 2021-06-22 | End: 2021-06-22

## 2021-06-22 RX ORDER — PHENYLEPHRINE HCL IN 0.9% NACL 0.5 MG/5ML
.5-3 SYRINGE (ML) INTRAVENOUS CONTINUOUS PRN
Status: DISCONTINUED | OUTPATIENT
Start: 2021-06-22 | End: 2021-06-26

## 2021-06-22 RX ADMIN — ROCURONIUM BROMIDE 95 MG: 10 INJECTION, SOLUTION INTRAVENOUS at 11:11

## 2021-06-22 RX ADMIN — ANTI-INHIBITOR COAGULANT COMPLEX 1000 UNITS: KIT at 20:45

## 2021-06-22 RX ADMIN — PROTAMINE SULFATE 400 MG: 10 INJECTION, SOLUTION INTRAVENOUS at 15:46

## 2021-06-22 RX ADMIN — ALBUMIN HUMAN 500 ML: 0.05 INJECTION, SOLUTION INTRAVENOUS at 20:05

## 2021-06-22 RX ADMIN — SUFENTANIL CITRATE 50 MCG: 50 INJECTION, SOLUTION EPIDURAL; INTRAVENOUS at 11:41

## 2021-06-22 RX ADMIN — POTASSIUM CHLORIDE AND DEXTROSE MONOHYDRATE 30 ML/HR: 150; 5 INJECTION, SOLUTION INTRAVENOUS at 18:18

## 2021-06-22 RX ADMIN — PROPOFOL 70 MCG/KG/MIN: 10 INJECTION, EMULSION INTRAVENOUS at 20:39

## 2021-06-22 RX ADMIN — MILRINONE LACTATE 6 MCG: 1 INJECTION, SOLUTION INTRAVENOUS at 15:23

## 2021-06-22 RX ADMIN — SUFENTANIL CITRATE 100 MCG: 50 INJECTION, SOLUTION EPIDURAL; INTRAVENOUS at 11:07

## 2021-06-22 RX ADMIN — PROPOFOL 100 MG: 10 INJECTION, EMULSION INTRAVENOUS at 11:07

## 2021-06-22 RX ADMIN — INSULIN HUMAN 2.8 UNITS/HR: 1 INJECTION, SOLUTION INTRAVENOUS at 18:00

## 2021-06-22 RX ADMIN — AMINOCAPROIC ACID 10 G: 250 INJECTION, SOLUTION INTRAVENOUS at 15:48

## 2021-06-22 RX ADMIN — ROCURONIUM BROMIDE 5 MG: 10 INJECTION, SOLUTION INTRAVENOUS at 11:06

## 2021-06-22 RX ADMIN — ALBUMIN HUMAN 500 ML: 0.05 INJECTION, SOLUTION INTRAVENOUS at 23:23

## 2021-06-22 RX ADMIN — CALCIUM CHLORIDE 1 G: 100 INJECTION INTRAVENOUS; INTRAVENTRICULAR at 15:46

## 2021-06-22 RX ADMIN — SUCCINYLCHOLINE CHLORIDE 160 MG: 20 INJECTION, SOLUTION INTRAMUSCULAR; INTRAVENOUS at 11:07

## 2021-06-22 RX ADMIN — MIDAZOLAM HYDROCHLORIDE 2 MG: 1 INJECTION, SOLUTION INTRAMUSCULAR; INTRAVENOUS at 13:17

## 2021-06-22 RX ADMIN — ATORVASTATIN CALCIUM 40 MG: 40 TABLET, FILM COATED ORAL at 20:21

## 2021-06-22 RX ADMIN — SODIUM CHLORIDE: 9 INJECTION, SOLUTION INTRAVENOUS at 11:00

## 2021-06-22 RX ADMIN — DOBUTAMINE 5 MCG/KG/MIN: 12.5 INJECTION, SOLUTION, CONCENTRATE INTRAVENOUS at 15:19

## 2021-06-22 RX ADMIN — SUFENTANIL CITRATE 50 MCG: 50 INJECTION, SOLUTION EPIDURAL; INTRAVENOUS at 12:04

## 2021-06-22 RX ADMIN — NOREPINEPHRINE BITARTRATE 0.1 MCG/KG/MIN: 1 INJECTION, SOLUTION, CONCENTRATE INTRAVENOUS at 15:19

## 2021-06-22 RX ADMIN — VECURONIUM BROMIDE 12 MG: 1 INJECTION, POWDER, LYOPHILIZED, FOR SOLUTION INTRAVENOUS at 13:17

## 2021-06-22 RX ADMIN — HEPARIN SODIUM 10000 UNITS: 1000 INJECTION, SOLUTION INTRAVENOUS; SUBCUTANEOUS at 13:15

## 2021-06-22 RX ADMIN — PROPOFOL 70 MCG/KG/MIN: 10 INJECTION, EMULSION INTRAVENOUS at 22:38

## 2021-06-22 RX ADMIN — PROTAMINE SULFATE 50 MG: 10 INJECTION, SOLUTION INTRAVENOUS at 17:05

## 2021-06-22 RX ADMIN — PROPOFOL 25 MCG/KG/MIN: 10 INJECTION, EMULSION INTRAVENOUS at 16:20

## 2021-06-22 RX ADMIN — Medication 0.1 MCG/KG/MIN: at 17:05

## 2021-06-22 RX ADMIN — NITROGLYCERIN 0.4 MG: 0.4 TABLET SUBLINGUAL at 10:12

## 2021-06-22 RX ADMIN — FENTANYL CITRATE 25 MCG: 50 INJECTION INTRAMUSCULAR; INTRAVENOUS at 17:22

## 2021-06-22 RX ADMIN — CEFUROXIME 1.5 G: 1.5 INJECTION, POWDER, FOR SOLUTION INTRAVENOUS at 11:30

## 2021-06-22 RX ADMIN — PROPOFOL 50 MCG/KG/MIN: 10 INJECTION, EMULSION INTRAVENOUS at 17:05

## 2021-06-22 RX ADMIN — MIDAZOLAM HYDROCHLORIDE 3 MG: 1 INJECTION, SOLUTION INTRAMUSCULAR; INTRAVENOUS at 11:07

## 2021-06-22 RX ADMIN — AMINOCAPROIC ACID 10 G: 250 INJECTION, SOLUTION INTRAVENOUS at 12:05

## 2021-06-22 RX ADMIN — HEPARIN SODIUM 7080 UNITS: 5000 INJECTION, SOLUTION INTRAVENOUS; SUBCUTANEOUS at 10:06

## 2021-06-22 RX ADMIN — SODIUM BICARBONATE 50 MEQ: 84 INJECTION, SOLUTION INTRAVENOUS at 17:35

## 2021-06-22 RX ADMIN — CHLORHEXIDINE GLUCONATE 15 ML: 1.2 SOLUTION ORAL at 20:21

## 2021-06-22 RX ADMIN — HEPARIN SODIUM 42000 UNITS: 1000 INJECTION, SOLUTION INTRAVENOUS; SUBCUTANEOUS at 12:22

## 2021-06-22 RX ADMIN — MAGNESIUM SULFATE HEPTAHYDRATE 4 G: 40 INJECTION, SOLUTION INTRAVENOUS at 20:30

## 2021-06-22 RX ADMIN — METOPROLOL TARTRATE 2.5 MG: 5 INJECTION INTRAVENOUS at 11:07

## 2021-06-22 RX ADMIN — MORPHINE SULFATE 4 MG: 4 INJECTION, SOLUTION INTRAMUSCULAR; INTRAVENOUS at 10:15

## 2021-06-22 RX ADMIN — CEFUROXIME 1.5 G: 1.5 INJECTION, POWDER, FOR SOLUTION INTRAVENOUS at 15:30

## 2021-06-22 RX ADMIN — SUFENTANIL CITRATE 50 MCG: 50 INJECTION, SOLUTION EPIDURAL; INTRAVENOUS at 13:17

## 2021-06-22 RX ADMIN — MORPHINE SULFATE 4 MG: 4 INJECTION, SOLUTION INTRAMUSCULAR; INTRAVENOUS at 10:11

## 2021-06-22 RX ADMIN — PROPOFOL 50 MCG/KG/MIN: 10 INJECTION, EMULSION INTRAVENOUS at 18:31

## 2021-06-22 NOTE — ANESTHESIA PROCEDURE NOTES
Airway  Urgency: elective    Date/Time: 6/22/2021 11:08 AM  Airway not difficult    General Information and Staff    Patient location during procedure: OR  Anesthesiologist: Jose Sprague MD    Indications and Patient Condition  Indications for airway management: airway protection    Preoxygenated: yes  MILS not maintained throughout  Mask difficulty assessment: 0 - not attempted    Final Airway Details  Final airway type: endotracheal airway      Successful airway: ETT  Cuffed: yes   Successful intubation technique: direct laryngoscopy  Facilitating devices/methods: cricoid pressure  Endotracheal tube insertion site: oral  Blade: Hull  Blade size: 2  ETT size (mm): 7.5  Cormack-Lehane Classification: grade I - full view of glottis  Placement verified by: chest auscultation and capnometry   Measured from: lips  ETT/EBT  to lips (cm): 23  Number of attempts at approach: 1  Assessment: lips, teeth, and gum same as pre-op and atraumatic intubation    Additional Comments  Negative epigastric sounds, Breath sound equal bilaterally with symmetric chest rise and fall, RSI,  7.5 EVAC, EASY

## 2021-06-22 NOTE — ANESTHESIA PREPROCEDURE EVALUATION
Anesthesia Evaluation     Patient summary reviewed and Nursing notes reviewed                Airway   Mallampati: II  TM distance: >3 FB  Neck ROM: full  No difficulty expected  Dental - normal exam     Pulmonary - normal exam   (+) a smoker Former, shortness of breath,   Cardiovascular - normal exam    (+) past MI , CAD, cardiac stents dysrhythmias (RBBB), hyperlipidemia,       Neuro/Psych- negative ROS  GI/Hepatic/Renal/Endo    (+) obesity,       Musculoskeletal     Abdominal  - normal exam    Bowel sounds: normal.   Substance History   (+) alcohol use,      OB/GYN negative ob/gyn ROS         Other   arthritis,                      Anesthesia Plan    ASA 4 - emergent     general   (GEO HOLLEY CATH, IVAN)  intravenous induction     Anesthetic plan, all risks, benefits, and alternatives have been provided, discussed and informed consent has been obtained with: patient.

## 2021-06-22 NOTE — ANESTHESIA PROCEDURE NOTES
Preanesthesia Checklist:  Patient identified, IV assessed, risks and benefits discussed, monitors and equipment assessed, procedure being performed at surgeon's request and anesthesia consent obtained.    General Procedure Information  Diagnostic Indications for Echo:  assessment of ascending aorta, assessment of surgical repair, defect repair evaluation and hemodynamic monitoring  Intubated  Bite block not placed  Heart visualized  Probe Insertion:  Easy  Probe Type:  Multiplane  Modalities:  Color flow mapping and 2D only    Echocardiographic and Doppler Measurements    Ventricles    Left Ventricle:  Cavity size normal.  Hypertrophy not present.  Thrombus not present.  Global Function normal.  Ejection Fraction 50%.      Ventricular Regional Function:    Wall Motion Comments:       NEAR NORMAL LV SYSTOLIC FUNCTION, EF 50%    Valves    Aortic Valve:  Annulus normal.  Stenosis not present.  Regurgitation absent.  Leaflets normal.  Leaflet motions normal.      Mitral Valve:  Annulus normal.  Stenosis not present.  Regurgitation absent.  Leaflets normal.  Leaflet motions normal.      Tricuspid Valve:  Annulus normal.  Stenosis not present.  Regurgitation trace.  Leaflets normal.  Leaflet motions normal.          Aorta    Ascending Aorta:  Size normal.  Dissection not present.  Plaque thickness less than 3 mm.  Mobile plaque not present.    Aortic Arch:  Size normal.  Diameter 3.1 cm.  Dissection not present.  Plaque thickness less than 3 mm.  Mobile plaque not present.    Descending Aorta:  Size normal.  Diameter 2.6 cm.  Dissection not present.  Plaque thickness less than 3 mm.  Mobile plaque not present.                          Anesthesia Information  Performed Personally  Anesthesiologist:  Jose Sprague MD      Echocardiogram Comments:         POST CPB:  NEAR NORMAL EF ON PRESSORS  NO SWMAS  EXAM O/W UNCHANGED

## 2021-06-22 NOTE — ANESTHESIA POSTPROCEDURE EVALUATION
Patient: Thien Gray    Procedure Summary     Date: 06/22/21 Room / Location:  [facility] / [facility]    Anesthesia Start: 1100 Anesthesia Stop: 1655    Procedure: MEDIAN STERNOTOMY, CORONARY ARTERY BYPASS GRAFTING X 4 WITH LEFT INTERNAL MAMMARY ARTERY GRAFT, ENDOSCOPIC VEIN HARVESTING OF THE RIGHT GREATER SAPHENOUS VEIN, INTRA-AORTIC BALLOON PUMP INSERTION, IVAN PER ANESTHESIA (N/A Chest) Diagnosis:     Surgeons: Miguel Angel Acosta MD Provider: Jose Sprague MD    Anesthesia Type: general ASA Status: 4 - Emergent          Anesthesia Type: general    Vitals  Vitals Value Taken Time   /64 06/22/21 1652   Temp     Pulse 122 06/22/21 1656   Resp     SpO2     Vitals shown include unvalidated device data.        Post Anesthesia Care and Evaluation    Patient location during evaluation: ICU  Patient participation: complete - patient cannot participate  Level of consciousness: obtunded/minimal responses  Pain management: adequate  Airway patency: patent  Anesthetic complications: No anesthetic complications  PONV Status: none  Cardiovascular status: hemodynamically stable and acceptable  Respiratory status: acceptable, ETT, intubated and ventilator  Hydration status: acceptable

## 2021-06-22 NOTE — ANESTHESIA PROCEDURE NOTES
Central Line      Patient reassessed immediately prior to procedure    Patient location during procedure: OR  Indications: vascular access  Staff  Anesthesiologist: Jose Sprague MD  Preanesthetic Checklist  Completed: patient identified, IV checked, site marked, risks and benefits discussed, surgical consent, monitors and equipment checked, pre-op evaluation and timeout performed  Central Line Prep  Sterile Tech:cap, gloves, gown, mask and sterile barriers  Prep: chloraprep  Patient monitoring: blood pressure monitoring, continuous pulse oximetry and EKG  Central Line Procedure  Laterality:right  Location:internal jugular  Catheter Type:Cordis, [place]isha, MAC and double lumen  Catheter Size:9 Fr  Guidance:ultrasound guided  PROCEDURE NOTE/ULTRASOUND INTERPRETATION.  Using ultrasound guidance the potential vascular sites for insertion of the catheter were visualized to determine the patency of the vessel to be used for vascular access.  After selecting the appropriate site for insertion, the needle was visualized under ultrasound being inserted into the internal jugular vein, followed by ultrasound confirmation of wire and catheter placement. There were no abnormalities seen on ultrasound; an image was taken; and the patient tolerated the procedure with no complications. Images: still images obtained, printed/placed on chart  Assessment  Post procedure:biopatch applied, line sutured, occlusive dressing applied and secured with tape  Assessement:blood return through all ports, free fluid flow, chest x-ray ordered and Jeremias Test  Complications:no  Patient Tolerance:patient tolerated the procedure well with no apparent complications

## 2021-06-23 ENCOUNTER — ANESTHESIA EVENT (OUTPATIENT)
Dept: PERIOP | Facility: HOSPITAL | Age: 66
End: 2021-06-23

## 2021-06-23 ENCOUNTER — APPOINTMENT (OUTPATIENT)
Dept: GENERAL RADIOLOGY | Facility: HOSPITAL | Age: 66
End: 2021-06-23

## 2021-06-23 ENCOUNTER — TELEPHONE (OUTPATIENT)
Dept: GASTROENTEROLOGY | Facility: CLINIC | Age: 66
End: 2021-06-23

## 2021-06-23 ENCOUNTER — ANESTHESIA (OUTPATIENT)
Dept: PERIOP | Facility: HOSPITAL | Age: 66
End: 2021-06-23

## 2021-06-23 LAB
ACT BLD: 125 SECONDS (ref 82–152)
ALBUMIN SERPL-MCNC: 3.8 G/DL (ref 3.5–5.2)
ALBUMIN SERPL-MCNC: 4.2 G/DL (ref 3.5–5.2)
ANION GAP SERPL CALCULATED.3IONS-SCNC: 10 MMOL/L (ref 5–15)
ANION GAP SERPL CALCULATED.3IONS-SCNC: 12 MMOL/L (ref 5–15)
APTT PPP: 33.6 SECONDS (ref 22–39)
ARTERIAL PATENCY WRIST A: ABNORMAL
ARTERIAL PATENCY WRIST A: ABNORMAL
ATMOSPHERIC PRESS: ABNORMAL MM[HG]
ATMOSPHERIC PRESS: ABNORMAL MM[HG]
BASE EXCESS BLDA CALC-SCNC: 2.6 MMOL/L (ref 0–2)
BASE EXCESS BLDA CALC-SCNC: 7.5 MMOL/L (ref 0–2)
BASOPHILS # BLD MANUAL: 0 10*3/MM3 (ref 0–0.2)
BASOPHILS NFR BLD AUTO: 0 % (ref 0–1.5)
BDY SITE: ABNORMAL
BDY SITE: ABNORMAL
BH BB BLOOD EXPIRATION DATE: NORMAL
BH BB BLOOD TYPE BARCODE: 600
BH BB BLOOD TYPE BARCODE: 600
BH BB BLOOD TYPE BARCODE: 6200
BH BB DISPENSE STATUS: NORMAL
BH BB PRODUCT CODE: NORMAL
BH BB UNIT NUMBER: NORMAL
BODY TEMPERATURE: 37 C
BODY TEMPERATURE: 37 C
BUN SERPL-MCNC: 12 MG/DL (ref 8–23)
BUN SERPL-MCNC: 15 MG/DL (ref 8–23)
BUN/CREAT SERPL: 16.7 (ref 7–25)
BUN/CREAT SERPL: 17.9 (ref 7–25)
CALCIUM SPEC-SCNC: 8.7 MG/DL (ref 8.6–10.5)
CALCIUM SPEC-SCNC: 8.7 MG/DL (ref 8.6–10.5)
CHLORIDE SERPL-SCNC: 104 MMOL/L (ref 98–107)
CHLORIDE SERPL-SCNC: 104 MMOL/L (ref 98–107)
CO2 BLDA-SCNC: 29.3 MMOL/L (ref 22–33)
CO2 BLDA-SCNC: 32.4 MMOL/L (ref 22–33)
CO2 SERPL-SCNC: 27 MMOL/L (ref 22–29)
CO2 SERPL-SCNC: 28 MMOL/L (ref 22–29)
COHGB MFR BLD: 0.8 % (ref 0–2)
COHGB MFR BLD: 1.2 % (ref 0–2)
CREAT SERPL-MCNC: 0.67 MG/DL (ref 0.76–1.27)
CREAT SERPL-MCNC: 0.9 MG/DL (ref 0.76–1.27)
CROSSMATCH INTERPRETATION: NORMAL
CROSSMATCH INTERPRETATION: NORMAL
DEPRECATED RDW RBC AUTO: 42.9 FL (ref 37–54)
DEPRECATED RDW RBC AUTO: 49.1 FL (ref 37–54)
EOSINOPHIL # BLD MANUAL: 0 10*3/MM3 (ref 0–0.4)
EOSINOPHIL NFR BLD MANUAL: 0 % (ref 0.3–6.2)
EPAP: 0
EPAP: 0
ERYTHROCYTE [DISTWIDTH] IN BLOOD BY AUTOMATED COUNT: 13 % (ref 12.3–15.4)
ERYTHROCYTE [DISTWIDTH] IN BLOOD BY AUTOMATED COUNT: 15.6 % (ref 12.3–15.4)
FIBRINOGEN PPP-MCNC: 198 MG/DL (ref 220–470)
FSP PPP LA-ACNC: NORMAL
GFR SERPL CREATININE-BSD FRML MDRD: 118 ML/MIN/1.73
GFR SERPL CREATININE-BSD FRML MDRD: 84 ML/MIN/1.73
GLUCOSE BLDC GLUCOMTR-MCNC: 112 MG/DL (ref 70–130)
GLUCOSE BLDC GLUCOMTR-MCNC: 113 MG/DL (ref 70–130)
GLUCOSE BLDC GLUCOMTR-MCNC: 113 MG/DL (ref 70–130)
GLUCOSE BLDC GLUCOMTR-MCNC: 115 MG/DL (ref 70–130)
GLUCOSE BLDC GLUCOMTR-MCNC: 118 MG/DL (ref 70–130)
GLUCOSE BLDC GLUCOMTR-MCNC: 119 MG/DL (ref 70–130)
GLUCOSE BLDC GLUCOMTR-MCNC: 123 MG/DL (ref 70–130)
GLUCOSE BLDC GLUCOMTR-MCNC: 130 MG/DL (ref 70–130)
GLUCOSE BLDC GLUCOMTR-MCNC: 131 MG/DL (ref 70–130)
GLUCOSE BLDC GLUCOMTR-MCNC: 132 MG/DL (ref 70–130)
GLUCOSE BLDC GLUCOMTR-MCNC: 133 MG/DL (ref 70–130)
GLUCOSE BLDC GLUCOMTR-MCNC: 134 MG/DL (ref 70–130)
GLUCOSE BLDC GLUCOMTR-MCNC: 137 MG/DL (ref 70–130)
GLUCOSE BLDC GLUCOMTR-MCNC: 144 MG/DL (ref 70–130)
GLUCOSE BLDC GLUCOMTR-MCNC: 159 MG/DL (ref 70–130)
GLUCOSE BLDC GLUCOMTR-MCNC: 167 MG/DL (ref 70–130)
GLUCOSE BLDC GLUCOMTR-MCNC: 171 MG/DL (ref 70–130)
GLUCOSE BLDC GLUCOMTR-MCNC: 173 MG/DL (ref 70–130)
GLUCOSE BLDC GLUCOMTR-MCNC: 173 MG/DL (ref 70–130)
GLUCOSE BLDC GLUCOMTR-MCNC: 175 MG/DL (ref 70–130)
GLUCOSE BLDC GLUCOMTR-MCNC: 178 MG/DL (ref 70–130)
GLUCOSE BLDC GLUCOMTR-MCNC: 188 MG/DL (ref 70–130)
GLUCOSE SERPL-MCNC: 110 MG/DL (ref 65–99)
GLUCOSE SERPL-MCNC: 148 MG/DL (ref 65–99)
HCO3 BLDA-SCNC: 27.9 MMOL/L (ref 20–26)
HCO3 BLDA-SCNC: 31.2 MMOL/L (ref 20–26)
HCT VFR BLD AUTO: 23.9 % (ref 37.5–51)
HCT VFR BLD AUTO: 30 % (ref 37.5–51)
HCT VFR BLD CALC: 26.7 %
HCT VFR BLD CALC: 31.7 %
HGB BLD-MCNC: 10 G/DL (ref 13–17.7)
HGB BLD-MCNC: 8.3 G/DL (ref 13–17.7)
HGB BLDA-MCNC: 10.3 G/DL (ref 13.5–17.5)
HGB BLDA-MCNC: 8.7 G/DL (ref 13.5–17.5)
INHALED O2 CONCENTRATION: 30 %
INHALED O2 CONCENTRATION: 60 %
INR PPP: 1.17 (ref 0.85–1.16)
INR PPP: 1.28 (ref 0.85–1.16)
IPAP: 0
IPAP: 0
LYMPHOCYTES # BLD MANUAL: 0.63 10*3/MM3 (ref 0.7–3.1)
LYMPHOCYTES NFR BLD MANUAL: 21 % (ref 5–12)
LYMPHOCYTES NFR BLD MANUAL: 8 % (ref 19.6–45.3)
MAGNESIUM SERPL-MCNC: 2.5 MG/DL (ref 1.6–2.4)
MCH RBC QN AUTO: 28.9 PG (ref 26.6–33)
MCH RBC QN AUTO: 31.4 PG (ref 26.6–33)
MCHC RBC AUTO-ENTMCNC: 33.3 G/DL (ref 31.5–35.7)
MCHC RBC AUTO-ENTMCNC: 34.7 G/DL (ref 31.5–35.7)
MCV RBC AUTO: 86.7 FL (ref 79–97)
MCV RBC AUTO: 90.5 FL (ref 79–97)
METHGB BLD QL: 0.5 % (ref 0–1.5)
METHGB BLD QL: 0.7 % (ref 0–1.5)
MODALITY: ABNORMAL
MODALITY: ABNORMAL
MONOCYTES # BLD AUTO: 1.65 10*3/MM3 (ref 0.1–0.9)
NEUTROPHILS # BLD AUTO: 5.59 10*3/MM3 (ref 1.7–7)
NEUTROPHILS NFR BLD MANUAL: 61 % (ref 42.7–76)
NEUTS BAND NFR BLD MANUAL: 10 % (ref 0–5)
NOTE: ABNORMAL
NOTE: ABNORMAL
OXYHGB MFR BLDV: 96.9 % (ref 94–99)
OXYHGB MFR BLDV: 97.2 % (ref 94–99)
PAW @ PEAK INSP FLOW SETTING VENT: 0 CMH2O
PAW @ PEAK INSP FLOW SETTING VENT: 0 CMH2O
PCO2 BLDA: 39.7 MM HG (ref 35–45)
PCO2 BLDA: 45.4 MM HG (ref 35–45)
PCO2 TEMP ADJ BLD: 39.7 MM HG (ref 35–48)
PCO2 TEMP ADJ BLD: 45.4 MM HG (ref 35–48)
PEEP RESPIRATORY: 5 CM[H2O]
PH BLDA: 7.4 PH UNITS (ref 7.35–7.45)
PH BLDA: 7.5 PH UNITS (ref 7.35–7.45)
PH, TEMP CORRECTED: 7.4 PH UNITS
PH, TEMP CORRECTED: 7.5 PH UNITS
PHOSPHATE SERPL-MCNC: 3 MG/DL (ref 2.5–4.5)
PHOSPHATE SERPL-MCNC: 4.7 MG/DL (ref 2.5–4.5)
PLAT MORPH BLD: NORMAL
PLATELET # BLD AUTO: 210 10*3/MM3 (ref 140–450)
PLATELET # BLD AUTO: 225 10*3/MM3 (ref 140–450)
PMV BLD AUTO: 10.2 FL (ref 6–12)
PMV BLD AUTO: 10.5 FL (ref 6–12)
PO2 BLDA: 113 MM HG (ref 83–108)
PO2 BLDA: 87.2 MM HG (ref 83–108)
PO2 TEMP ADJ BLD: 113 MM HG (ref 83–108)
PO2 TEMP ADJ BLD: 87.2 MM HG (ref 83–108)
POTASSIUM SERPL-SCNC: 3.5 MMOL/L (ref 3.5–5.2)
POTASSIUM SERPL-SCNC: 4 MMOL/L (ref 3.5–5.2)
PROTHROMBIN TIME: 14.6 SECONDS (ref 11.4–14.4)
PROTHROMBIN TIME: 15.6 SECONDS (ref 11.4–14.4)
QT INTERVAL: 328 MS
QT INTERVAL: 398 MS
QTC INTERVAL: 471 MS
QTC INTERVAL: 518 MS
RBC # BLD AUTO: 2.64 10*6/MM3 (ref 4.14–5.8)
RBC # BLD AUTO: 3.46 10*6/MM3 (ref 4.14–5.8)
RBC MORPH BLD: NORMAL
SODIUM SERPL-SCNC: 142 MMOL/L (ref 136–145)
SODIUM SERPL-SCNC: 143 MMOL/L (ref 136–145)
TOTAL RATE: 0 BREATHS/MINUTE
TOTAL RATE: 0 BREATHS/MINUTE
UNIT  ABO: NORMAL
UNIT  RH: NORMAL
VENTILATOR MODE: ABNORMAL
WBC # BLD AUTO: 11.8 10*3/MM3 (ref 3.4–10.8)
WBC # BLD AUTO: 7.87 10*3/MM3 (ref 3.4–10.8)
WBC MORPH BLD: NORMAL

## 2021-06-23 PROCEDURE — 94003 VENT MGMT INPAT SUBQ DAY: CPT

## 2021-06-23 PROCEDURE — 71045 X-RAY EXAM CHEST 1 VIEW: CPT

## 2021-06-23 PROCEDURE — 82803 BLOOD GASES ANY COMBINATION: CPT

## 2021-06-23 PROCEDURE — 85610 PROTHROMBIN TIME: CPT | Performed by: THORACIC SURGERY (CARDIOTHORACIC VASCULAR SURGERY)

## 2021-06-23 PROCEDURE — 82805 BLOOD GASES W/O2 SATURATION: CPT

## 2021-06-23 PROCEDURE — 99024 POSTOP FOLLOW-UP VISIT: CPT | Performed by: THORACIC SURGERY (CARDIOTHORACIC VASCULAR SURGERY)

## 2021-06-23 PROCEDURE — 85347 COAGULATION TIME ACTIVATED: CPT

## 2021-06-23 PROCEDURE — 83735 ASSAY OF MAGNESIUM: CPT | Performed by: PHYSICIAN ASSISTANT

## 2021-06-23 PROCEDURE — 82330 ASSAY OF CALCIUM: CPT

## 2021-06-23 PROCEDURE — 93005 ELECTROCARDIOGRAM TRACING: CPT | Performed by: THORACIC SURGERY (CARDIOTHORACIC VASCULAR SURGERY)

## 2021-06-23 PROCEDURE — P9017 PLASMA 1 DONOR FRZ W/IN 8 HR: HCPCS

## 2021-06-23 PROCEDURE — 85362 FIBRIN DEGRADATION PRODUCTS: CPT | Performed by: THORACIC SURGERY (CARDIOTHORACIC VASCULAR SURGERY)

## 2021-06-23 PROCEDURE — 36620 INSERTION CATHETER ARTERY: CPT | Performed by: NURSE PRACTITIONER

## 2021-06-23 PROCEDURE — 36430 TRANSFUSION BLD/BLD COMPNT: CPT

## 2021-06-23 PROCEDURE — 99291 CRITICAL CARE FIRST HOUR: CPT | Performed by: INTERNAL MEDICINE

## 2021-06-23 PROCEDURE — 85007 BL SMEAR W/DIFF WBC COUNT: CPT | Performed by: PHYSICIAN ASSISTANT

## 2021-06-23 PROCEDURE — 33971 AORTIC CIRCULATION ASSIST: CPT | Performed by: THORACIC SURGERY (CARDIOTHORACIC VASCULAR SURGERY)

## 2021-06-23 PROCEDURE — 85384 FIBRINOGEN ACTIVITY: CPT | Performed by: THORACIC SURGERY (CARDIOTHORACIC VASCULAR SURGERY)

## 2021-06-23 PROCEDURE — 85014 HEMATOCRIT: CPT

## 2021-06-23 PROCEDURE — 25010000002 ALBUMIN HUMAN 5% PER 50 ML: Performed by: ANESTHESIOLOGY

## 2021-06-23 PROCEDURE — P9016 RBC LEUKOCYTES REDUCED: HCPCS

## 2021-06-23 PROCEDURE — 86900 BLOOD TYPING SEROLOGIC ABO: CPT

## 2021-06-23 PROCEDURE — 35820 EXPLORE CHEST VESSELS: CPT | Performed by: THORACIC SURGERY (CARDIOTHORACIC VASCULAR SURGERY)

## 2021-06-23 PROCEDURE — 82947 ASSAY GLUCOSE BLOOD QUANT: CPT

## 2021-06-23 PROCEDURE — 63710000001 INSULIN REGULAR HUMAN PER 5 UNITS: Performed by: ANESTHESIOLOGY

## 2021-06-23 PROCEDURE — 25010000003 CEFUROXIME SODIUM 1.5 G RECONSTITUTED SOLUTION: Performed by: ANESTHESIOLOGY

## 2021-06-23 PROCEDURE — 85730 THROMBOPLASTIN TIME PARTIAL: CPT | Performed by: THORACIC SURGERY (CARDIOTHORACIC VASCULAR SURGERY)

## 2021-06-23 PROCEDURE — 82962 GLUCOSE BLOOD TEST: CPT

## 2021-06-23 PROCEDURE — 25010000002 MORPHINE PER 10 MG: Performed by: THORACIC SURGERY (CARDIOTHORACIC VASCULAR SURGERY)

## 2021-06-23 PROCEDURE — P9035 PLATELET PHERES LEUKOREDUCED: HCPCS

## 2021-06-23 PROCEDURE — P9041 ALBUMIN (HUMAN),5%, 50ML: HCPCS | Performed by: ANESTHESIOLOGY

## 2021-06-23 PROCEDURE — 35820 EXPLORE CHEST VESSELS: CPT | Performed by: PHYSICIAN ASSISTANT

## 2021-06-23 PROCEDURE — 94799 UNLISTED PULMONARY SVC/PX: CPT

## 2021-06-23 PROCEDURE — 80069 RENAL FUNCTION PANEL: CPT | Performed by: PHYSICIAN ASSISTANT

## 2021-06-23 PROCEDURE — 82375 ASSAY CARBOXYHB QUANT: CPT

## 2021-06-23 PROCEDURE — 25010000002 PROPOFOL 10 MG/ML EMULSION: Performed by: THORACIC SURGERY (CARDIOTHORACIC VASCULAR SURGERY)

## 2021-06-23 PROCEDURE — 83050 HGB METHEMOGLOBIN QUAN: CPT

## 2021-06-23 PROCEDURE — 85025 COMPLETE CBC W/AUTO DIFF WBC: CPT | Performed by: PHYSICIAN ASSISTANT

## 2021-06-23 PROCEDURE — 84132 ASSAY OF SERUM POTASSIUM: CPT

## 2021-06-23 PROCEDURE — 0WJC0ZZ INSPECTION OF MEDIASTINUM, OPEN APPROACH: ICD-10-PCS | Performed by: THORACIC SURGERY (CARDIOTHORACIC VASCULAR SURGERY)

## 2021-06-23 PROCEDURE — 25010000002 FENTANYL CITRATE (PF) 50 MCG/ML SOLUTION: Performed by: THORACIC SURGERY (CARDIOTHORACIC VASCULAR SURGERY)

## 2021-06-23 PROCEDURE — 84295 ASSAY OF SERUM SODIUM: CPT

## 2021-06-23 PROCEDURE — 80069 RENAL FUNCTION PANEL: CPT | Performed by: THORACIC SURGERY (CARDIOTHORACIC VASCULAR SURGERY)

## 2021-06-23 PROCEDURE — 85610 PROTHROMBIN TIME: CPT | Performed by: PHYSICIAN ASSISTANT

## 2021-06-23 PROCEDURE — 86927 PLASMA FRESH FROZEN: CPT

## 2021-06-23 PROCEDURE — 25010000002 FENTANYL CITRATE (PF) 50 MCG/ML SOLUTION: Performed by: ANESTHESIOLOGY

## 2021-06-23 PROCEDURE — 93010 ELECTROCARDIOGRAM REPORT: CPT | Performed by: INTERNAL MEDICINE

## 2021-06-23 PROCEDURE — 85027 COMPLETE CBC AUTOMATED: CPT | Performed by: THORACIC SURGERY (CARDIOTHORACIC VASCULAR SURGERY)

## 2021-06-23 PROCEDURE — 25010000003 CEFUROXIME SODIUM 1.5 G RECONSTITUTED SOLUTION: Performed by: PHYSICIAN ASSISTANT

## 2021-06-23 PROCEDURE — 25010000002 PROPOFOL 10 MG/ML EMULSION: Performed by: ANESTHESIOLOGY

## 2021-06-23 RX ORDER — MIDAZOLAM HYDROCHLORIDE 1 MG/ML
0.5 INJECTION INTRAMUSCULAR; INTRAVENOUS
Status: DISCONTINUED | OUTPATIENT
Start: 2021-06-23 | End: 2021-06-23

## 2021-06-23 RX ORDER — ROCURONIUM BROMIDE 10 MG/ML
INJECTION, SOLUTION INTRAVENOUS AS NEEDED
Status: DISCONTINUED | OUTPATIENT
Start: 2021-06-23 | End: 2021-06-23 | Stop reason: SURG

## 2021-06-23 RX ORDER — AMOXICILLIN 250 MG
2 CAPSULE ORAL 2 TIMES DAILY
Status: DISCONTINUED | OUTPATIENT
Start: 2021-06-23 | End: 2021-06-25

## 2021-06-23 RX ORDER — LIDOCAINE HYDROCHLORIDE 10 MG/ML
0.5 INJECTION, SOLUTION EPIDURAL; INFILTRATION; INTRACAUDAL; PERINEURAL ONCE AS NEEDED
Status: DISCONTINUED | OUTPATIENT
Start: 2021-06-23 | End: 2021-06-23

## 2021-06-23 RX ORDER — CEFUROXIME SODIUM 1.5 G/16ML
INJECTION, POWDER, FOR SOLUTION INTRAVENOUS AS NEEDED
Status: DISCONTINUED | OUTPATIENT
Start: 2021-06-23 | End: 2021-06-23 | Stop reason: SURG

## 2021-06-23 RX ORDER — PROPOFOL 10 MG/ML
VIAL (ML) INTRAVENOUS CONTINUOUS PRN
Status: DISCONTINUED | OUTPATIENT
Start: 2021-06-23 | End: 2021-06-23 | Stop reason: SURG

## 2021-06-23 RX ORDER — FAMOTIDINE 10 MG/ML
20 INJECTION, SOLUTION INTRAVENOUS ONCE
Status: COMPLETED | OUTPATIENT
Start: 2021-06-23 | End: 2021-06-23

## 2021-06-23 RX ORDER — OXYCODONE HYDROCHLORIDE AND ACETAMINOPHEN 5; 325 MG/1; MG/1
2 TABLET ORAL EVERY 4 HOURS PRN
Status: DISCONTINUED | OUTPATIENT
Start: 2021-06-23 | End: 2021-06-25

## 2021-06-23 RX ORDER — FENTANYL CITRATE 50 UG/ML
25 INJECTION, SOLUTION INTRAMUSCULAR; INTRAVENOUS
Status: DISCONTINUED | OUTPATIENT
Start: 2021-06-23 | End: 2021-06-24

## 2021-06-23 RX ORDER — MAGNESIUM HYDROXIDE 1200 MG/15ML
LIQUID ORAL AS NEEDED
Status: DISCONTINUED | OUTPATIENT
Start: 2021-06-23 | End: 2021-06-23 | Stop reason: HOSPADM

## 2021-06-23 RX ORDER — FENTANYL CITRATE 50 UG/ML
INJECTION, SOLUTION INTRAMUSCULAR; INTRAVENOUS AS NEEDED
Status: DISCONTINUED | OUTPATIENT
Start: 2021-06-23 | End: 2021-06-23 | Stop reason: SURG

## 2021-06-23 RX ORDER — NALOXONE HCL 0.4 MG/ML
0.4 VIAL (ML) INJECTION
Status: DISCONTINUED | OUTPATIENT
Start: 2021-06-23 | End: 2021-06-24

## 2021-06-23 RX ORDER — HYDROCODONE BITARTRATE AND ACETAMINOPHEN 7.5; 325 MG/1; MG/1
1 TABLET ORAL EVERY 4 HOURS PRN
Status: DISCONTINUED | OUTPATIENT
Start: 2021-06-23 | End: 2021-06-25

## 2021-06-23 RX ORDER — FAMOTIDINE 10 MG/ML
20 INJECTION, SOLUTION INTRAVENOUS EVERY 12 HOURS SCHEDULED
Status: DISCONTINUED | OUTPATIENT
Start: 2021-06-23 | End: 2021-06-25

## 2021-06-23 RX ORDER — MORPHINE SULFATE 2 MG/ML
2 INJECTION, SOLUTION INTRAMUSCULAR; INTRAVENOUS
Status: DISCONTINUED | OUTPATIENT
Start: 2021-06-23 | End: 2021-06-24

## 2021-06-23 RX ORDER — SODIUM CHLORIDE, SODIUM LACTATE, POTASSIUM CHLORIDE, CALCIUM CHLORIDE 600; 310; 30; 20 MG/100ML; MG/100ML; MG/100ML; MG/100ML
9 INJECTION, SOLUTION INTRAVENOUS CONTINUOUS
Status: DISCONTINUED | OUTPATIENT
Start: 2021-06-23 | End: 2021-06-23

## 2021-06-23 RX ORDER — SODIUM CHLORIDE 0.9 % (FLUSH) 0.9 %
10 SYRINGE (ML) INJECTION EVERY 12 HOURS SCHEDULED
Status: DISCONTINUED | OUTPATIENT
Start: 2021-06-23 | End: 2021-06-24

## 2021-06-23 RX ORDER — MAGNESIUM SULFATE HEPTAHYDRATE 40 MG/ML
4 INJECTION, SOLUTION INTRAVENOUS AS NEEDED
Status: DISCONTINUED | OUTPATIENT
Start: 2021-06-23 | End: 2021-06-25

## 2021-06-23 RX ORDER — FAMOTIDINE 20 MG/1
20 TABLET, FILM COATED ORAL ONCE
Status: DISCONTINUED | OUTPATIENT
Start: 2021-06-23 | End: 2021-06-23

## 2021-06-23 RX ORDER — ALBUMIN, HUMAN INJ 5% 5 %
SOLUTION INTRAVENOUS CONTINUOUS PRN
Status: DISCONTINUED | OUTPATIENT
Start: 2021-06-23 | End: 2021-06-23 | Stop reason: SURG

## 2021-06-23 RX ORDER — MAGNESIUM SULFATE HEPTAHYDRATE 40 MG/ML
2 INJECTION, SOLUTION INTRAVENOUS AS NEEDED
Status: DISCONTINUED | OUTPATIENT
Start: 2021-06-23 | End: 2021-06-25

## 2021-06-23 RX ORDER — SODIUM CHLORIDE 0.9 % (FLUSH) 0.9 %
10 SYRINGE (ML) INJECTION AS NEEDED
Status: DISCONTINUED | OUTPATIENT
Start: 2021-06-23 | End: 2021-06-24

## 2021-06-23 RX ADMIN — PROPOFOL 35 MCG/KG/MIN: 10 INJECTION, EMULSION INTRAVENOUS at 15:48

## 2021-06-23 RX ADMIN — PROPOFOL 50 MCG/KG/MIN: 10 INJECTION, EMULSION INTRAVENOUS at 02:20

## 2021-06-23 RX ADMIN — CHLORHEXIDINE GLUCONATE 15 ML: 1.2 SOLUTION ORAL at 20:11

## 2021-06-23 RX ADMIN — FENTANYL CITRATE 50 MCG: 50 INJECTION, SOLUTION INTRAMUSCULAR; INTRAVENOUS at 02:30

## 2021-06-23 RX ADMIN — ASPIRIN 325 MG: 325 TABLET, COATED ORAL at 10:07

## 2021-06-23 RX ADMIN — ALBUMIN HUMAN: 0.05 INJECTION, SOLUTION INTRAVENOUS at 03:40

## 2021-06-23 RX ADMIN — INSULIN HUMAN 8.4 UNITS/HR: 1 INJECTION, SOLUTION INTRAVENOUS at 01:46

## 2021-06-23 RX ADMIN — FENTANYL CITRATE 25 MCG: 50 INJECTION INTRAMUSCULAR; INTRAVENOUS at 13:40

## 2021-06-23 RX ADMIN — HYDROCODONE BITARTRATE AND ACETAMINOPHEN 1 TABLET: 7.5; 325 TABLET ORAL at 16:23

## 2021-06-23 RX ADMIN — MORPHINE SULFATE 2 MG: 2 INJECTION, SOLUTION INTRAMUSCULAR; INTRAVENOUS at 16:34

## 2021-06-23 RX ADMIN — FENTANYL CITRATE 25 MCG: 50 INJECTION INTRAMUSCULAR; INTRAVENOUS at 18:04

## 2021-06-23 RX ADMIN — SODIUM CHLORIDE 9.6 UNITS/HR: 9 INJECTION, SOLUTION INTRAVENOUS at 02:22

## 2021-06-23 RX ADMIN — OXYCODONE HYDROCHLORIDE AND ACETAMINOPHEN 2 TABLET: 5; 325 TABLET ORAL at 22:06

## 2021-06-23 RX ADMIN — CEFUROXIME SODIUM 1.5 G: 1.5 INJECTION, POWDER, FOR SOLUTION INTRAVENOUS at 23:15

## 2021-06-23 RX ADMIN — NOREPINEPHRINE BITARTRATE 0.15 MCG/KG/MIN: 1 INJECTION, SOLUTION, CONCENTRATE INTRAVENOUS at 02:20

## 2021-06-23 RX ADMIN — PROPOFOL 50 MCG/KG/MIN: 10 INJECTION, EMULSION INTRAVENOUS at 20:14

## 2021-06-23 RX ADMIN — CEFUROXIME SODIUM 1.5 G: 1.5 INJECTION, POWDER, FOR SOLUTION INTRAVENOUS at 00:32

## 2021-06-23 RX ADMIN — CEFUROXIME SODIUM 1.5 EACH: 1.5 INJECTION, POWDER, FOR SOLUTION INTRAVENOUS at 02:40

## 2021-06-23 RX ADMIN — SODIUM CHLORIDE, PRESERVATIVE FREE 10 ML: 5 INJECTION INTRAVENOUS at 20:13

## 2021-06-23 RX ADMIN — PROPOFOL 50 MCG/KG/MIN: 10 INJECTION, EMULSION INTRAVENOUS at 19:18

## 2021-06-23 RX ADMIN — DOCUSATE SODIUM 50MG AND SENNOSIDES 8.6MG 2 TABLET: 8.6; 5 TABLET, FILM COATED ORAL at 10:07

## 2021-06-23 RX ADMIN — ROCURONIUM BROMIDE 100 MG: 10 INJECTION INTRAVENOUS at 02:33

## 2021-06-23 RX ADMIN — PROPOFOL 70 MCG/KG/MIN: 10 INJECTION, EMULSION INTRAVENOUS at 00:34

## 2021-06-23 RX ADMIN — PROPOFOL 50 MCG/KG/MIN: 10 INJECTION, EMULSION INTRAVENOUS at 23:15

## 2021-06-23 RX ADMIN — CEFUROXIME SODIUM 1.5 G: 1.5 INJECTION, POWDER, FOR SOLUTION INTRAVENOUS at 10:06

## 2021-06-23 RX ADMIN — FENTANYL CITRATE 50 MCG: 50 INJECTION, SOLUTION INTRAMUSCULAR; INTRAVENOUS at 02:58

## 2021-06-23 RX ADMIN — FENTANYL CITRATE 50 MCG: 50 INJECTION, SOLUTION INTRAMUSCULAR; INTRAVENOUS at 02:48

## 2021-06-23 RX ADMIN — ATORVASTATIN CALCIUM 40 MG: 40 TABLET, FILM COATED ORAL at 20:11

## 2021-06-23 RX ADMIN — PROPOFOL 50 MCG/KG/MIN: 10 INJECTION, EMULSION INTRAVENOUS at 12:45

## 2021-06-23 RX ADMIN — DEXMEDETOMIDINE HYDROCHLORIDE 0.2 MCG/KG/HR: 4 INJECTION, SOLUTION INTRAVENOUS at 16:23

## 2021-06-23 RX ADMIN — FAMOTIDINE 20 MG: 10 INJECTION, SOLUTION INTRAVENOUS at 06:34

## 2021-06-23 RX ADMIN — CEFUROXIME SODIUM 1.5 G: 1.5 INJECTION, POWDER, FOR SOLUTION INTRAVENOUS at 15:48

## 2021-06-23 RX ADMIN — Medication 0.07 MCG/KG/MIN: at 16:24

## 2021-06-23 RX ADMIN — FENTANYL CITRATE 50 MCG: 50 INJECTION, SOLUTION INTRAMUSCULAR; INTRAVENOUS at 02:25

## 2021-06-23 RX ADMIN — CHLORHEXIDINE GLUCONATE 15 ML: 1.2 SOLUTION ORAL at 10:06

## 2021-06-23 RX ADMIN — FAMOTIDINE 20 MG: 10 INJECTION, SOLUTION INTRAVENOUS at 20:11

## 2021-06-23 RX ADMIN — DOCUSATE SODIUM 50MG AND SENNOSIDES 8.6MG 2 TABLET: 8.6; 5 TABLET, FILM COATED ORAL at 20:11

## 2021-06-23 RX ADMIN — PROPOFOL 50 MCG/KG/MIN: 10 INJECTION, EMULSION INTRAVENOUS at 07:20

## 2021-06-23 RX ADMIN — Medication 0.16 MCG/KG/MIN: at 00:33

## 2021-06-23 RX ADMIN — PROPOFOL 50 MCG/KG/MIN: 10 INJECTION, EMULSION INTRAVENOUS at 05:11

## 2021-06-23 RX ADMIN — INSULIN HUMAN 8 UNITS/HR: 1 INJECTION, SOLUTION INTRAVENOUS at 11:31

## 2021-06-23 RX ADMIN — FENTANYL CITRATE 25 MCG: 50 INJECTION INTRAMUSCULAR; INTRAVENOUS at 20:12

## 2021-06-23 RX ADMIN — PROPOFOL 50 MCG/KG/MIN: 10 INJECTION, EMULSION INTRAVENOUS at 10:07

## 2021-06-23 NOTE — ANESTHESIA POSTPROCEDURE EVALUATION
Patient: Thien Gray    Procedure Summary     Date: 06/23/21 Room / Location:  [facility]    Anesthesia Start: 0220 Anesthesia Stop: 0419    Procedure: BRING BACK FOR EXPLORATION OPEN HEART (N/A Chest) Diagnosis:     Surgeons: Miguel Angel Acosta MD Provider: Rigoberto Hooper MD    Anesthesia Type: general ASA Status: 4 - Emergent          Anesthesia Type: general    Vitals  No vitals data found for the desired time range.          Post Anesthesia Care and Evaluation    Patient location during evaluation: ICU  Patient participation: complete - patient cannot participate  Level of consciousness: obtunded/minimal responses  Pain management: adequate  Airway patency: patent  Anesthetic complications: No anesthetic complications  PONV Status: none  Cardiovascular status: acceptable  Respiratory status: acceptable  Hydration status: acceptable

## 2021-06-23 NOTE — ANESTHESIA PREPROCEDURE EVALUATION
Anesthesia Evaluation     Patient summary reviewed and Nursing notes reviewed   NPO Solid Status: > 8 hours  NPO Liquid Status: > 8 hours           Airway   Dental      Pulmonary    (+) decreased breath sounds,   Cardiovascular   Exercise tolerance: poor (<4 METS)    Rhythm: regular  Rate: normal        Neuro/Psych  GI/Hepatic/Renal/Endo      Musculoskeletal     Abdominal    Substance History      OB/GYN          Other        ROS/Med Hx Other: Pt is intubated                  Anesthesia Plan    ASA 4 - emergent     general         return to icu post op  Post op bleeding

## 2021-06-24 ENCOUNTER — APPOINTMENT (OUTPATIENT)
Dept: GENERAL RADIOLOGY | Facility: HOSPITAL | Age: 66
End: 2021-06-24

## 2021-06-24 LAB
ANION GAP SERPL CALCULATED.3IONS-SCNC: 6 MMOL/L (ref 5–15)
ARTERIAL PATENCY WRIST A: ABNORMAL
ATMOSPHERIC PRESS: ABNORMAL MM[HG]
BASE EXCESS BLDA CALC-SCNC: 6.5 MMOL/L (ref 0–2)
BDY SITE: ABNORMAL
BH BB BLOOD EXPIRATION DATE: NORMAL
BH BB BLOOD TYPE BARCODE: 6200
BH BB DISPENSE STATUS: NORMAL
BH BB PRODUCT CODE: NORMAL
BH BB UNIT NUMBER: NORMAL
BODY TEMPERATURE: 37 C
BUN SERPL-MCNC: 10 MG/DL (ref 8–23)
BUN/CREAT SERPL: 15.9 (ref 7–25)
CALCIUM SPEC-SCNC: 8.4 MG/DL (ref 8.6–10.5)
CHLORIDE SERPL-SCNC: 104 MMOL/L (ref 98–107)
CO2 BLDA-SCNC: 31.4 MMOL/L (ref 22–33)
CO2 SERPL-SCNC: 30 MMOL/L (ref 22–29)
COHGB MFR BLD: 1.1 % (ref 0–2)
CREAT SERPL-MCNC: 0.63 MG/DL (ref 0.76–1.27)
CROSSMATCH INTERPRETATION: NORMAL
DEPRECATED RDW RBC AUTO: 52 FL (ref 37–54)
ERYTHROCYTE [DISTWIDTH] IN BLOOD BY AUTOMATED COUNT: 16.4 % (ref 12.3–15.4)
GFR SERPL CREATININE-BSD FRML MDRD: 127 ML/MIN/1.73
GLUCOSE BLDC GLUCOMTR-MCNC: 100 MG/DL (ref 70–130)
GLUCOSE BLDC GLUCOMTR-MCNC: 108 MG/DL (ref 70–130)
GLUCOSE BLDC GLUCOMTR-MCNC: 109 MG/DL (ref 70–130)
GLUCOSE BLDC GLUCOMTR-MCNC: 109 MG/DL (ref 70–130)
GLUCOSE BLDC GLUCOMTR-MCNC: 113 MG/DL (ref 70–130)
GLUCOSE BLDC GLUCOMTR-MCNC: 116 MG/DL (ref 70–130)
GLUCOSE BLDC GLUCOMTR-MCNC: 118 MG/DL (ref 70–130)
GLUCOSE BLDC GLUCOMTR-MCNC: 118 MG/DL (ref 70–130)
GLUCOSE BLDC GLUCOMTR-MCNC: 119 MG/DL (ref 70–130)
GLUCOSE BLDC GLUCOMTR-MCNC: 130 MG/DL (ref 70–130)
GLUCOSE BLDC GLUCOMTR-MCNC: 131 MG/DL (ref 70–130)
GLUCOSE BLDC GLUCOMTR-MCNC: 140 MG/DL (ref 70–130)
GLUCOSE BLDC GLUCOMTR-MCNC: 89 MG/DL (ref 70–130)
GLUCOSE BLDC GLUCOMTR-MCNC: 95 MG/DL (ref 70–130)
GLUCOSE BLDC GLUCOMTR-MCNC: 97 MG/DL (ref 70–130)
GLUCOSE SERPL-MCNC: 105 MG/DL (ref 65–99)
HCO3 BLDA-SCNC: 30.3 MMOL/L (ref 20–26)
HCT VFR BLD AUTO: 24.9 % (ref 37.5–51)
HCT VFR BLD CALC: 25.8 %
HGB BLD-MCNC: 8.5 G/DL (ref 13–17.7)
HGB BLDA-MCNC: 8.4 G/DL (ref 13.5–17.5)
INHALED O2 CONCENTRATION: 30 %
MCH RBC QN AUTO: 29.6 PG (ref 26.6–33)
MCHC RBC AUTO-ENTMCNC: 34.1 G/DL (ref 31.5–35.7)
MCV RBC AUTO: 86.8 FL (ref 79–97)
METHGB BLD QL: 0.4 % (ref 0–1.5)
MODALITY: ABNORMAL
NOTE: ABNORMAL
OXYHGB MFR BLDV: 93.6 % (ref 94–99)
PCO2 BLDA: 39 MM HG (ref 35–45)
PCO2 TEMP ADJ BLD: 39 MM HG (ref 35–48)
PEEP RESPIRATORY: 5 CM[H2O]
PH BLDA: 7.5 PH UNITS (ref 7.35–7.45)
PH, TEMP CORRECTED: 7.5 PH UNITS
PLATELET # BLD AUTO: 170 10*3/MM3 (ref 140–450)
PMV BLD AUTO: 10.4 FL (ref 6–12)
PO2 BLDA: 64.5 MM HG (ref 83–108)
PO2 TEMP ADJ BLD: 64.5 MM HG (ref 83–108)
POTASSIUM SERPL-SCNC: 3.5 MMOL/L (ref 3.5–5.2)
POTASSIUM SERPL-SCNC: 3.9 MMOL/L (ref 3.5–5.2)
PSV: 10 CMH2O
QT INTERVAL: 332 MS
QTC INTERVAL: 436 MS
RBC # BLD AUTO: 2.87 10*6/MM3 (ref 4.14–5.8)
SET MECH RESP RATE: 0
SODIUM SERPL-SCNC: 140 MMOL/L (ref 136–145)
TOTAL RATE: 28 BREATHS/MINUTE
UNIT  ABO: NORMAL
UNIT  RH: NORMAL
VENTILATOR MODE: ABNORMAL
WBC # BLD AUTO: 8.82 10*3/MM3 (ref 3.4–10.8)

## 2021-06-24 PROCEDURE — 71045 X-RAY EXAM CHEST 1 VIEW: CPT

## 2021-06-24 PROCEDURE — 99291 CRITICAL CARE FIRST HOUR: CPT | Performed by: INTERNAL MEDICINE

## 2021-06-24 PROCEDURE — 99024 POSTOP FOLLOW-UP VISIT: CPT | Performed by: THORACIC SURGERY (CARDIOTHORACIC VASCULAR SURGERY)

## 2021-06-24 PROCEDURE — 25010000003 POTASSIUM CHLORIDE PER 2 MEQ: Performed by: THORACIC SURGERY (CARDIOTHORACIC VASCULAR SURGERY)

## 2021-06-24 PROCEDURE — 25010000002 HALOPERIDOL LACTATE PER 5 MG: Performed by: INTERNAL MEDICINE

## 2021-06-24 PROCEDURE — 84132 ASSAY OF SERUM POTASSIUM: CPT | Performed by: THORACIC SURGERY (CARDIOTHORACIC VASCULAR SURGERY)

## 2021-06-24 PROCEDURE — 94799 UNLISTED PULMONARY SVC/PX: CPT

## 2021-06-24 PROCEDURE — 82805 BLOOD GASES W/O2 SATURATION: CPT

## 2021-06-24 PROCEDURE — 25010000002 MORPHINE PER 10 MG: Performed by: THORACIC SURGERY (CARDIOTHORACIC VASCULAR SURGERY)

## 2021-06-24 PROCEDURE — 25010000003 CEFUROXIME SODIUM 1.5 G RECONSTITUTED SOLUTION: Performed by: PHYSICIAN ASSISTANT

## 2021-06-24 PROCEDURE — 82962 GLUCOSE BLOOD TEST: CPT

## 2021-06-24 PROCEDURE — 80048 BASIC METABOLIC PNL TOTAL CA: CPT | Performed by: PHYSICIAN ASSISTANT

## 2021-06-24 PROCEDURE — 94003 VENT MGMT INPAT SUBQ DAY: CPT

## 2021-06-24 PROCEDURE — 83050 HGB METHEMOGLOBIN QUAN: CPT

## 2021-06-24 PROCEDURE — 25010000002 FENTANYL CITRATE (PF) 50 MCG/ML SOLUTION: Performed by: THORACIC SURGERY (CARDIOTHORACIC VASCULAR SURGERY)

## 2021-06-24 PROCEDURE — 93005 ELECTROCARDIOGRAM TRACING: CPT | Performed by: INTERNAL MEDICINE

## 2021-06-24 PROCEDURE — 82375 ASSAY CARBOXYHB QUANT: CPT

## 2021-06-24 PROCEDURE — 85027 COMPLETE CBC AUTOMATED: CPT | Performed by: PHYSICIAN ASSISTANT

## 2021-06-24 PROCEDURE — 25010000002 PROPOFOL 10 MG/ML EMULSION: Performed by: THORACIC SURGERY (CARDIOTHORACIC VASCULAR SURGERY)

## 2021-06-24 RX ORDER — POTASSIUM CHLORIDE 29.8 MG/ML
20 INJECTION INTRAVENOUS
Status: DISCONTINUED | OUTPATIENT
Start: 2021-06-24 | End: 2021-06-24

## 2021-06-24 RX ORDER — HALOPERIDOL 5 MG/ML
5 INJECTION INTRAMUSCULAR ONCE
Status: COMPLETED | OUTPATIENT
Start: 2021-06-24 | End: 2021-06-24

## 2021-06-24 RX ORDER — MORPHINE SULFATE 2 MG/ML
2 INJECTION, SOLUTION INTRAMUSCULAR; INTRAVENOUS
Status: DISCONTINUED | OUTPATIENT
Start: 2021-06-24 | End: 2021-07-09 | Stop reason: HOSPADM

## 2021-06-24 RX ORDER — METOPROLOL TARTRATE 5 MG/5ML
5 INJECTION INTRAVENOUS EVERY 6 HOURS
Status: DISCONTINUED | OUTPATIENT
Start: 2021-06-24 | End: 2021-06-25

## 2021-06-24 RX ORDER — METOPROLOL TARTRATE 5 MG/5ML
2.5 INJECTION INTRAVENOUS ONCE
Status: COMPLETED | OUTPATIENT
Start: 2021-06-24 | End: 2021-06-24

## 2021-06-24 RX ADMIN — POTASSIUM CHLORIDE 20 MEQ: 400 INJECTION, SOLUTION INTRAVENOUS at 05:17

## 2021-06-24 RX ADMIN — METOPROLOL TARTRATE 5 MG: 5 INJECTION INTRAVENOUS at 17:12

## 2021-06-24 RX ADMIN — PROPOFOL 50 MCG/KG/MIN: 10 INJECTION, EMULSION INTRAVENOUS at 05:18

## 2021-06-24 RX ADMIN — INSULIN HUMAN 5.6 UNITS/HR: 1 INJECTION, SOLUTION INTRAVENOUS at 20:19

## 2021-06-24 RX ADMIN — FAMOTIDINE 20 MG: 10 INJECTION, SOLUTION INTRAVENOUS at 09:35

## 2021-06-24 RX ADMIN — ASPIRIN 325 MG: 325 TABLET, COATED ORAL at 09:35

## 2021-06-24 RX ADMIN — MORPHINE SULFATE 2 MG: 2 INJECTION, SOLUTION INTRAMUSCULAR; INTRAVENOUS at 14:10

## 2021-06-24 RX ADMIN — PROPOFOL 50 MCG/KG/MIN: 10 INJECTION, EMULSION INTRAVENOUS at 03:30

## 2021-06-24 RX ADMIN — Medication 0.07 MCG/KG/MIN: at 04:31

## 2021-06-24 RX ADMIN — FENTANYL CITRATE 25 MCG: 50 INJECTION INTRAMUSCULAR; INTRAVENOUS at 01:00

## 2021-06-24 RX ADMIN — FENTANYL CITRATE 25 MCG: 50 INJECTION INTRAMUSCULAR; INTRAVENOUS at 06:39

## 2021-06-24 RX ADMIN — INSULIN HUMAN 6 UNITS/HR: 1 INJECTION, SOLUTION INTRAVENOUS at 03:29

## 2021-06-24 RX ADMIN — TAMSULOSIN HYDROCHLORIDE 0.8 MG: 0.4 CAPSULE ORAL at 09:35

## 2021-06-24 RX ADMIN — OXYCODONE HYDROCHLORIDE AND ACETAMINOPHEN 2 TABLET: 5; 325 TABLET ORAL at 09:35

## 2021-06-24 RX ADMIN — ATORVASTATIN CALCIUM 40 MG: 40 TABLET, FILM COATED ORAL at 21:25

## 2021-06-24 RX ADMIN — FAMOTIDINE 20 MG: 10 INJECTION, SOLUTION INTRAVENOUS at 20:18

## 2021-06-24 RX ADMIN — DOCUSATE SODIUM 50MG AND SENNOSIDES 8.6MG 2 TABLET: 8.6; 5 TABLET, FILM COATED ORAL at 09:35

## 2021-06-24 RX ADMIN — PROPOFOL 50 MCG/KG/MIN: 10 INJECTION, EMULSION INTRAVENOUS at 08:10

## 2021-06-24 RX ADMIN — CEFUROXIME SODIUM 1.5 G: 1.5 INJECTION, POWDER, FOR SOLUTION INTRAVENOUS at 08:29

## 2021-06-24 RX ADMIN — MORPHINE SULFATE 2 MG: 2 INJECTION, SOLUTION INTRAMUSCULAR; INTRAVENOUS at 15:25

## 2021-06-24 RX ADMIN — CHLORHEXIDINE GLUCONATE 15 ML: 1.2 SOLUTION ORAL at 09:35

## 2021-06-24 RX ADMIN — HALOPERIDOL LACTATE 5 MG: 5 INJECTION, SOLUTION INTRAMUSCULAR at 15:22

## 2021-06-24 RX ADMIN — DOCUSATE SODIUM 50MG AND SENNOSIDES 8.6MG 2 TABLET: 8.6; 5 TABLET, FILM COATED ORAL at 21:25

## 2021-06-24 RX ADMIN — POTASSIUM CHLORIDE 20 MEQ: 400 INJECTION, SOLUTION INTRAVENOUS at 06:11

## 2021-06-24 RX ADMIN — MORPHINE SULFATE 2 MG: 2 INJECTION, SOLUTION INTRAMUSCULAR; INTRAVENOUS at 20:18

## 2021-06-24 RX ADMIN — MORPHINE SULFATE 2 MG: 2 INJECTION, SOLUTION INTRAMUSCULAR; INTRAVENOUS at 12:09

## 2021-06-24 RX ADMIN — FENTANYL CITRATE 25 MCG: 50 INJECTION INTRAMUSCULAR; INTRAVENOUS at 00:36

## 2021-06-24 RX ADMIN — METOPROLOL TARTRATE 2.5 MG: 5 INJECTION INTRAVENOUS at 11:31

## 2021-06-25 ENCOUNTER — APPOINTMENT (OUTPATIENT)
Dept: GENERAL RADIOLOGY | Facility: HOSPITAL | Age: 66
End: 2021-06-25

## 2021-06-25 LAB
ACT BLD: 131 SECONDS (ref 82–152)
ANION GAP SERPL CALCULATED.3IONS-SCNC: 8 MMOL/L (ref 5–15)
BASE EXCESS BLDA CALC-SCNC: 1 MMOL/L (ref -5–5)
BASE EXCESS BLDA CALC-SCNC: 2 MMOL/L (ref -5–5)
BASOPHILS # BLD MANUAL: 0.07 10*3/MM3 (ref 0–0.2)
BASOPHILS NFR BLD AUTO: 1 % (ref 0–1.5)
BUN SERPL-MCNC: 10 MG/DL (ref 8–23)
BUN/CREAT SERPL: 14.5 (ref 7–25)
CA-I BLDA-SCNC: 1.09 MMOL/L (ref 1.2–1.32)
CA-I BLDA-SCNC: 1.15 MMOL/L (ref 1.2–1.32)
CALCIUM SPEC-SCNC: 8.5 MG/DL (ref 8.6–10.5)
CHLORIDE SERPL-SCNC: 104 MMOL/L (ref 98–107)
CO2 BLDA-SCNC: 27 MMOL/L (ref 24–29)
CO2 BLDA-SCNC: 29 MMOL/L (ref 24–29)
CO2 SERPL-SCNC: 29 MMOL/L (ref 22–29)
CREAT SERPL-MCNC: 0.69 MG/DL (ref 0.76–1.27)
DEPRECATED RDW RBC AUTO: 53.2 FL (ref 37–54)
EOSINOPHIL # BLD MANUAL: 0 10*3/MM3 (ref 0–0.4)
EOSINOPHIL NFR BLD MANUAL: 0 % (ref 0.3–6.2)
ERYTHROCYTE [DISTWIDTH] IN BLOOD BY AUTOMATED COUNT: 15.9 % (ref 12.3–15.4)
GFR SERPL CREATININE-BSD FRML MDRD: 114 ML/MIN/1.73
GLUCOSE BLDC GLUCOMTR-MCNC: 110 MG/DL (ref 70–130)
GLUCOSE BLDC GLUCOMTR-MCNC: 111 MG/DL (ref 70–130)
GLUCOSE BLDC GLUCOMTR-MCNC: 111 MG/DL (ref 70–130)
GLUCOSE BLDC GLUCOMTR-MCNC: 117 MG/DL (ref 70–130)
GLUCOSE BLDC GLUCOMTR-MCNC: 120 MG/DL (ref 70–130)
GLUCOSE BLDC GLUCOMTR-MCNC: 120 MG/DL (ref 70–130)
GLUCOSE BLDC GLUCOMTR-MCNC: 124 MG/DL (ref 70–130)
GLUCOSE BLDC GLUCOMTR-MCNC: 128 MG/DL (ref 70–130)
GLUCOSE BLDC GLUCOMTR-MCNC: 131 MG/DL (ref 70–130)
GLUCOSE BLDC GLUCOMTR-MCNC: 160 MG/DL (ref 70–130)
GLUCOSE BLDC GLUCOMTR-MCNC: 166 MG/DL (ref 70–130)
GLUCOSE BLDC GLUCOMTR-MCNC: 98 MG/DL (ref 70–130)
GLUCOSE SERPL-MCNC: 99 MG/DL (ref 65–99)
HCO3 BLDA-SCNC: 26.2 MMOL/L (ref 22–26)
HCO3 BLDA-SCNC: 27.4 MMOL/L (ref 22–26)
HCT VFR BLD AUTO: 23 % (ref 37.5–51)
HCT VFR BLDA CALC: 23 % (ref 38–51)
HCT VFR BLDA CALC: 29 % (ref 38–51)
HGB BLD-MCNC: 7.4 G/DL (ref 13–17.7)
HGB BLDA-MCNC: 7.8 G/DL (ref 12–17)
HGB BLDA-MCNC: 9.9 G/DL (ref 12–17)
LYMPHOCYTES # BLD MANUAL: 1.02 10*3/MM3 (ref 0.7–3.1)
LYMPHOCYTES NFR BLD MANUAL: 15 % (ref 19.6–45.3)
LYMPHOCYTES NFR BLD MANUAL: 6 % (ref 5–12)
MAGNESIUM SERPL-MCNC: 2 MG/DL (ref 1.6–2.4)
MCH RBC QN AUTO: 29.2 PG (ref 26.6–33)
MCHC RBC AUTO-ENTMCNC: 32.2 G/DL (ref 31.5–35.7)
MCV RBC AUTO: 90.9 FL (ref 79–97)
MONOCYTES # BLD AUTO: 0.41 10*3/MM3 (ref 0.1–0.9)
NEUTROPHILS # BLD AUTO: 5.33 10*3/MM3 (ref 1.7–7)
NEUTROPHILS NFR BLD MANUAL: 65 % (ref 42.7–76)
NEUTS BAND NFR BLD MANUAL: 13 % (ref 0–5)
PCO2 BLDA: 41.9 MM HG (ref 35–45)
PCO2 BLDA: 45.6 MM HG (ref 35–45)
PH BLDA: 7.39 PH UNITS (ref 7.35–7.6)
PH BLDA: 7.4 PH UNITS (ref 7.35–7.6)
PLAT MORPH BLD: NORMAL
PLATELET # BLD AUTO: 133 10*3/MM3 (ref 140–450)
PMV BLD AUTO: 11 FL (ref 6–12)
PO2 BLDA: 133 MMHG (ref 80–105)
PO2 BLDA: 358 MMHG (ref 80–105)
POTASSIUM BLDA-SCNC: 3.3 MMOL/L (ref 3.5–4.9)
POTASSIUM BLDA-SCNC: 3.7 MMOL/L (ref 3.5–4.9)
POTASSIUM SERPL-SCNC: 3.8 MMOL/L (ref 3.5–5.2)
RBC # BLD AUTO: 2.53 10*6/MM3 (ref 4.14–5.8)
RBC MORPH BLD: NORMAL
SAO2 % BLDA: 100 % (ref 95–98)
SAO2 % BLDA: 99 % (ref 95–98)
SCAN SLIDE: NORMAL
SODIUM BLD-SCNC: 142 MMOL/L (ref 138–146)
SODIUM BLD-SCNC: 143 MMOL/L (ref 138–146)
SODIUM SERPL-SCNC: 141 MMOL/L (ref 136–145)
WBC # BLD AUTO: 6.83 10*3/MM3 (ref 3.4–10.8)
WBC MORPH BLD: NORMAL

## 2021-06-25 PROCEDURE — 25010000002 THIAMINE PER 100 MG: Performed by: INTERNAL MEDICINE

## 2021-06-25 PROCEDURE — 83735 ASSAY OF MAGNESIUM: CPT | Performed by: INTERNAL MEDICINE

## 2021-06-25 PROCEDURE — 25010000002 LORAZEPAM PER 2 MG: Performed by: INTERNAL MEDICINE

## 2021-06-25 PROCEDURE — 97162 PT EVAL MOD COMPLEX 30 MIN: CPT

## 2021-06-25 PROCEDURE — 80048 BASIC METABOLIC PNL TOTAL CA: CPT | Performed by: PHYSICIAN ASSISTANT

## 2021-06-25 PROCEDURE — 25010000002 MAGNESIUM SULFATE 2 GM/50ML SOLUTION: Performed by: INTERNAL MEDICINE

## 2021-06-25 PROCEDURE — 92612 ENDOSCOPY SWALLOW (FEES) VID: CPT

## 2021-06-25 PROCEDURE — 85025 COMPLETE CBC W/AUTO DIFF WBC: CPT | Performed by: INTERNAL MEDICINE

## 2021-06-25 PROCEDURE — 63710000001 INSULIN DETEMIR PER 5 UNITS: Performed by: INTERNAL MEDICINE

## 2021-06-25 PROCEDURE — 99291 CRITICAL CARE FIRST HOUR: CPT | Performed by: INTERNAL MEDICINE

## 2021-06-25 PROCEDURE — 99024 POSTOP FOLLOW-UP VISIT: CPT | Performed by: THORACIC SURGERY (CARDIOTHORACIC VASCULAR SURGERY)

## 2021-06-25 PROCEDURE — 85007 BL SMEAR W/DIFF WBC COUNT: CPT | Performed by: INTERNAL MEDICINE

## 2021-06-25 PROCEDURE — 25010000002 MORPHINE PER 10 MG: Performed by: THORACIC SURGERY (CARDIOTHORACIC VASCULAR SURGERY)

## 2021-06-25 PROCEDURE — 71045 X-RAY EXAM CHEST 1 VIEW: CPT

## 2021-06-25 PROCEDURE — 92610 EVALUATE SWALLOWING FUNCTION: CPT

## 2021-06-25 PROCEDURE — 82962 GLUCOSE BLOOD TEST: CPT

## 2021-06-25 RX ORDER — LORAZEPAM 2 MG/ML
1 INJECTION INTRAMUSCULAR
Status: DISCONTINUED | OUTPATIENT
Start: 2021-06-25 | End: 2021-06-29

## 2021-06-25 RX ORDER — TERAZOSIN 5 MG/1
5 CAPSULE ORAL DAILY
Status: DISCONTINUED | OUTPATIENT
Start: 2021-06-26 | End: 2021-06-26

## 2021-06-25 RX ORDER — ASPIRIN 325 MG
325 TABLET ORAL DAILY
Status: DISCONTINUED | OUTPATIENT
Start: 2021-06-26 | End: 2021-07-05

## 2021-06-25 RX ORDER — MAGNESIUM SULFATE HEPTAHYDRATE 40 MG/ML
6 INJECTION, SOLUTION INTRAVENOUS ONCE
Status: COMPLETED | OUTPATIENT
Start: 2021-06-25 | End: 2021-06-25

## 2021-06-25 RX ORDER — DOCUSATE SODIUM 50 MG/5 ML
100 LIQUID (ML) ORAL 2 TIMES DAILY
Status: DISCONTINUED | OUTPATIENT
Start: 2021-06-25 | End: 2021-06-26

## 2021-06-25 RX ORDER — ATORVASTATIN CALCIUM 40 MG/1
40 TABLET, FILM COATED ORAL NIGHTLY
Status: DISCONTINUED | OUTPATIENT
Start: 2021-06-25 | End: 2021-07-05

## 2021-06-25 RX ORDER — OXYCODONE HYDROCHLORIDE AND ACETAMINOPHEN 5; 325 MG/1; MG/1
2 TABLET ORAL EVERY 4 HOURS PRN
Status: DISPENSED | OUTPATIENT
Start: 2021-06-25 | End: 2021-07-03

## 2021-06-25 RX ORDER — HYDROCODONE BITARTRATE AND ACETAMINOPHEN 7.5; 325 MG/1; MG/1
1 TABLET ORAL EVERY 4 HOURS PRN
Status: DISPENSED | OUTPATIENT
Start: 2021-06-25 | End: 2021-07-03

## 2021-06-25 RX ORDER — LORAZEPAM 2 MG/ML
2 INJECTION INTRAMUSCULAR
Status: DISCONTINUED | OUTPATIENT
Start: 2021-06-25 | End: 2021-06-29

## 2021-06-25 RX ORDER — FAMOTIDINE 20 MG/1
20 TABLET, FILM COATED ORAL 2 TIMES DAILY
Status: DISCONTINUED | OUTPATIENT
Start: 2021-06-25 | End: 2021-07-05

## 2021-06-25 RX ORDER — LORAZEPAM 2 MG/ML
1 INJECTION INTRAMUSCULAR
Status: DISCONTINUED | OUTPATIENT
Start: 2021-06-25 | End: 2021-06-25

## 2021-06-25 RX ORDER — LORAZEPAM 2 MG/ML
0.5 INJECTION INTRAMUSCULAR
Status: DISCONTINUED | OUTPATIENT
Start: 2021-06-25 | End: 2021-06-29

## 2021-06-25 RX ADMIN — MORPHINE SULFATE 2 MG: 2 INJECTION, SOLUTION INTRAMUSCULAR; INTRAVENOUS at 01:06

## 2021-06-25 RX ADMIN — FAMOTIDINE 20 MG: 20 TABLET ORAL at 21:43

## 2021-06-25 RX ADMIN — FOLIC ACID 1 MG: 5 INJECTION, SOLUTION INTRAMUSCULAR; INTRAVENOUS; SUBCUTANEOUS at 11:14

## 2021-06-25 RX ADMIN — FAMOTIDINE 20 MG: 10 INJECTION, SOLUTION INTRAVENOUS at 08:32

## 2021-06-25 RX ADMIN — METOPROLOL TARTRATE 25 MG: 25 TABLET, FILM COATED ORAL at 21:43

## 2021-06-25 RX ADMIN — METOPROLOL TARTRATE 25 MG: 25 TABLET, FILM COATED ORAL at 11:14

## 2021-06-25 RX ADMIN — LORAZEPAM 2 MG: 2 INJECTION INTRAMUSCULAR; INTRAVENOUS at 17:07

## 2021-06-25 RX ADMIN — INSULIN DETEMIR 30 UNITS: 100 INJECTION, SOLUTION SUBCUTANEOUS at 11:14

## 2021-06-25 RX ADMIN — METOPROLOL TARTRATE 5 MG: 5 INJECTION INTRAVENOUS at 00:11

## 2021-06-25 RX ADMIN — OXYCODONE HYDROCHLORIDE AND ACETAMINOPHEN 2 TABLET: 5; 325 TABLET ORAL at 08:32

## 2021-06-25 RX ADMIN — DOCUSATE SODIUM 100 MG: 50 LIQUID ORAL at 21:43

## 2021-06-25 RX ADMIN — THIAMINE HYDROCHLORIDE 500 MG: 100 INJECTION, SOLUTION INTRAMUSCULAR; INTRAVENOUS at 15:23

## 2021-06-25 RX ADMIN — MAGNESIUM SULFATE HEPTAHYDRATE 6 G: 2 INJECTION, SOLUTION INTRAVENOUS at 15:38

## 2021-06-25 RX ADMIN — OXYCODONE HYDROCHLORIDE AND ACETAMINOPHEN 2 TABLET: 5; 325 TABLET ORAL at 01:13

## 2021-06-25 RX ADMIN — THIAMINE HYDROCHLORIDE 500 MG: 100 INJECTION, SOLUTION INTRAMUSCULAR; INTRAVENOUS at 21:44

## 2021-06-25 RX ADMIN — METOPROLOL TARTRATE 5 MG: 5 INJECTION INTRAVENOUS at 06:15

## 2021-06-25 RX ADMIN — ATORVASTATIN CALCIUM 40 MG: 40 TABLET, FILM COATED ORAL at 21:43

## 2021-06-25 RX ADMIN — LORAZEPAM 2 MG: 2 INJECTION INTRAMUSCULAR; INTRAVENOUS at 21:09

## 2021-06-25 RX ADMIN — ASPIRIN 325 MG: 325 TABLET, COATED ORAL at 08:32

## 2021-06-25 RX ADMIN — LORAZEPAM 2 MG: 2 INJECTION INTRAMUSCULAR; INTRAVENOUS at 23:32

## 2021-06-26 LAB
ANION GAP SERPL CALCULATED.3IONS-SCNC: 6 MMOL/L (ref 5–15)
BASOPHILS # BLD MANUAL: 0 10*3/MM3 (ref 0–0.2)
BASOPHILS NFR BLD AUTO: 0 % (ref 0–1.5)
BH BB BLOOD EXPIRATION DATE: NORMAL
BH BB BLOOD EXPIRATION DATE: NORMAL
BH BB BLOOD TYPE BARCODE: 6200
BH BB BLOOD TYPE BARCODE: 6200
BH BB DISPENSE STATUS: NORMAL
BH BB DISPENSE STATUS: NORMAL
BH BB PRODUCT CODE: NORMAL
BH BB PRODUCT CODE: NORMAL
BH BB UNIT NUMBER: NORMAL
BH BB UNIT NUMBER: NORMAL
BUN SERPL-MCNC: 13 MG/DL (ref 8–23)
BUN/CREAT SERPL: 19.7 (ref 7–25)
CALCIUM SPEC-SCNC: 8.6 MG/DL (ref 8.6–10.5)
CHLORIDE SERPL-SCNC: 104 MMOL/L (ref 98–107)
CO2 SERPL-SCNC: 29 MMOL/L (ref 22–29)
CREAT SERPL-MCNC: 0.66 MG/DL (ref 0.76–1.27)
CROSSMATCH INTERPRETATION: NORMAL
CROSSMATCH INTERPRETATION: NORMAL
DEPRECATED RDW RBC AUTO: 50.6 FL (ref 37–54)
EOSINOPHIL # BLD MANUAL: 0.07 10*3/MM3 (ref 0–0.4)
EOSINOPHIL NFR BLD MANUAL: 1 % (ref 0.3–6.2)
ERYTHROCYTE [DISTWIDTH] IN BLOOD BY AUTOMATED COUNT: 15.4 % (ref 12.3–15.4)
GFR SERPL CREATININE-BSD FRML MDRD: 120 ML/MIN/1.73
GLUCOSE BLDC GLUCOMTR-MCNC: 111 MG/DL (ref 70–130)
GLUCOSE BLDC GLUCOMTR-MCNC: 113 MG/DL (ref 70–130)
GLUCOSE BLDC GLUCOMTR-MCNC: 119 MG/DL (ref 70–130)
GLUCOSE BLDC GLUCOMTR-MCNC: 125 MG/DL (ref 70–130)
GLUCOSE SERPL-MCNC: 111 MG/DL (ref 65–99)
HCT VFR BLD AUTO: 23.1 % (ref 37.5–51)
HGB BLD-MCNC: 7.5 G/DL (ref 13–17.7)
LYMPHOCYTES # BLD MANUAL: 0.94 10*3/MM3 (ref 0.7–3.1)
LYMPHOCYTES NFR BLD MANUAL: 14 % (ref 19.6–45.3)
LYMPHOCYTES NFR BLD MANUAL: 8 % (ref 5–12)
MAGNESIUM SERPL-MCNC: 2.4 MG/DL (ref 1.6–2.4)
MCH RBC QN AUTO: 29.5 PG (ref 26.6–33)
MCHC RBC AUTO-ENTMCNC: 32.5 G/DL (ref 31.5–35.7)
MCV RBC AUTO: 90.9 FL (ref 79–97)
MONOCYTES # BLD AUTO: 0.54 10*3/MM3 (ref 0.1–0.9)
NEUTROPHILS # BLD AUTO: 5.19 10*3/MM3 (ref 1.7–7)
NEUTROPHILS NFR BLD MANUAL: 69 % (ref 42.7–76)
NEUTS BAND NFR BLD MANUAL: 8 % (ref 0–5)
PLAT MORPH BLD: NORMAL
PLATELET # BLD AUTO: 171 10*3/MM3 (ref 140–450)
PMV BLD AUTO: 10.4 FL (ref 6–12)
POTASSIUM SERPL-SCNC: 3.5 MMOL/L (ref 3.5–5.2)
POTASSIUM SERPL-SCNC: 4.3 MMOL/L (ref 3.5–5.2)
RBC # BLD AUTO: 2.54 10*6/MM3 (ref 4.14–5.8)
RBC MORPH BLD: NORMAL
SCAN SLIDE: NORMAL
SODIUM SERPL-SCNC: 139 MMOL/L (ref 136–145)
TOXIC GRANULATION: ABNORMAL
UNIT  ABO: NORMAL
UNIT  ABO: NORMAL
UNIT  RH: NORMAL
UNIT  RH: NORMAL
WBC # BLD AUTO: 6.74 10*3/MM3 (ref 3.4–10.8)

## 2021-06-26 PROCEDURE — 25010000002 LORAZEPAM PER 2 MG: Performed by: INTERNAL MEDICINE

## 2021-06-26 PROCEDURE — 83735 ASSAY OF MAGNESIUM: CPT | Performed by: INTERNAL MEDICINE

## 2021-06-26 PROCEDURE — 25010000002 ALBUMIN HUMAN 25% PER 50 ML: Performed by: NURSE PRACTITIONER

## 2021-06-26 PROCEDURE — 85007 BL SMEAR W/DIFF WBC COUNT: CPT | Performed by: INTERNAL MEDICINE

## 2021-06-26 PROCEDURE — 82962 GLUCOSE BLOOD TEST: CPT

## 2021-06-26 PROCEDURE — 99233 SBSQ HOSP IP/OBS HIGH 50: CPT | Performed by: INTERNAL MEDICINE

## 2021-06-26 PROCEDURE — 84132 ASSAY OF SERUM POTASSIUM: CPT | Performed by: INTERNAL MEDICINE

## 2021-06-26 PROCEDURE — 85025 COMPLETE CBC W/AUTO DIFF WBC: CPT | Performed by: INTERNAL MEDICINE

## 2021-06-26 PROCEDURE — 25010000002 THIAMINE PER 100 MG: Performed by: INTERNAL MEDICINE

## 2021-06-26 PROCEDURE — 99291 CRITICAL CARE FIRST HOUR: CPT | Performed by: INTERNAL MEDICINE

## 2021-06-26 PROCEDURE — 63710000001 INSULIN DETEMIR PER 5 UNITS: Performed by: INTERNAL MEDICINE

## 2021-06-26 PROCEDURE — 80048 BASIC METABOLIC PNL TOTAL CA: CPT | Performed by: PHYSICIAN ASSISTANT

## 2021-06-26 PROCEDURE — P9047 ALBUMIN (HUMAN), 25%, 50ML: HCPCS | Performed by: NURSE PRACTITIONER

## 2021-06-26 RX ORDER — TAMSULOSIN HYDROCHLORIDE 0.4 MG/1
0.8 CAPSULE ORAL DAILY
Status: DISCONTINUED | OUTPATIENT
Start: 2021-06-26 | End: 2021-06-29

## 2021-06-26 RX ORDER — LORAZEPAM 2 MG/ML
2 INJECTION INTRAMUSCULAR EVERY 6 HOURS
Status: DISCONTINUED | OUTPATIENT
Start: 2021-06-26 | End: 2021-06-27

## 2021-06-26 RX ORDER — DIGOXIN 0.25 MG/ML
125 INJECTION INTRAMUSCULAR; INTRAVENOUS ONCE
Status: COMPLETED | OUTPATIENT
Start: 2021-06-27 | End: 2021-06-27

## 2021-06-26 RX ORDER — DEXMEDETOMIDINE HYDROCHLORIDE 4 UG/ML
.2-1.5 INJECTION, SOLUTION INTRAVENOUS CONTINUOUS
Status: DISCONTINUED | OUTPATIENT
Start: 2021-06-26 | End: 2021-06-29

## 2021-06-26 RX ORDER — METOPROLOL TARTRATE 50 MG/1
50 TABLET, FILM COATED ORAL EVERY 12 HOURS SCHEDULED
Status: DISCONTINUED | OUTPATIENT
Start: 2021-06-26 | End: 2021-06-27

## 2021-06-26 RX ORDER — ALBUMIN (HUMAN) 12.5 G/50ML
50 SOLUTION INTRAVENOUS ONCE
Status: COMPLETED | OUTPATIENT
Start: 2021-06-26 | End: 2021-06-26

## 2021-06-26 RX ADMIN — FAMOTIDINE 20 MG: 20 TABLET ORAL at 09:36

## 2021-06-26 RX ADMIN — FOLIC ACID 1 MG: 5 INJECTION, SOLUTION INTRAMUSCULAR; INTRAVENOUS; SUBCUTANEOUS at 09:39

## 2021-06-26 RX ADMIN — THIAMINE HYDROCHLORIDE 500 MG: 100 INJECTION, SOLUTION INTRAMUSCULAR; INTRAVENOUS at 15:14

## 2021-06-26 RX ADMIN — ATORVASTATIN CALCIUM 40 MG: 40 TABLET, FILM COATED ORAL at 21:49

## 2021-06-26 RX ADMIN — FAMOTIDINE 20 MG: 20 TABLET ORAL at 21:49

## 2021-06-26 RX ADMIN — LORAZEPAM 1 MG: 2 INJECTION INTRAMUSCULAR; INTRAVENOUS at 03:31

## 2021-06-26 RX ADMIN — METOPROLOL TARTRATE 25 MG: 25 TABLET, FILM COATED ORAL at 06:51

## 2021-06-26 RX ADMIN — POTASSIUM CHLORIDE 40 MEQ: 1.5 POWDER, FOR SOLUTION ORAL at 09:39

## 2021-06-26 RX ADMIN — SENNOSIDES 10 ML: 8.8 LIQUID ORAL at 09:36

## 2021-06-26 RX ADMIN — LORAZEPAM 1 MG: 2 INJECTION INTRAMUSCULAR; INTRAVENOUS at 06:50

## 2021-06-26 RX ADMIN — INSULIN DETEMIR 30 UNITS: 100 INJECTION, SOLUTION SUBCUTANEOUS at 09:36

## 2021-06-26 RX ADMIN — ASPIRIN 325 MG ORAL TABLET 325 MG: 325 PILL ORAL at 09:36

## 2021-06-26 RX ADMIN — POTASSIUM CHLORIDE 40 MEQ: 1.5 POWDER, FOR SOLUTION ORAL at 13:01

## 2021-06-26 RX ADMIN — TERAZOSIN HYDROCHLORIDE 5 MG: 5 CAPSULE ORAL at 09:36

## 2021-06-26 RX ADMIN — THIAMINE HYDROCHLORIDE 500 MG: 100 INJECTION, SOLUTION INTRAMUSCULAR; INTRAVENOUS at 21:50

## 2021-06-26 RX ADMIN — ALBUMIN HUMAN 50 G: 0.25 SOLUTION INTRAVENOUS at 21:49

## 2021-06-26 RX ADMIN — THIAMINE HYDROCHLORIDE 500 MG: 100 INJECTION, SOLUTION INTRAMUSCULAR; INTRAVENOUS at 06:51

## 2021-06-26 RX ADMIN — LORAZEPAM 2 MG: 2 INJECTION INTRAMUSCULAR; INTRAVENOUS at 19:54

## 2021-06-26 RX ADMIN — LORAZEPAM 2 MG: 2 INJECTION INTRAMUSCULAR; INTRAVENOUS at 00:56

## 2021-06-26 RX ADMIN — SENNOSIDES 10 ML: 8.8 LIQUID ORAL at 21:49

## 2021-06-27 ENCOUNTER — APPOINTMENT (OUTPATIENT)
Dept: GENERAL RADIOLOGY | Facility: HOSPITAL | Age: 66
End: 2021-06-27

## 2021-06-27 LAB
ABO GROUP BLD: NORMAL
ABO GROUP BLD: NORMAL
ANION GAP SERPL CALCULATED.3IONS-SCNC: 9 MMOL/L (ref 5–15)
BASOPHILS # BLD MANUAL: 0 10*3/MM3 (ref 0–0.2)
BASOPHILS NFR BLD AUTO: 0 % (ref 0–1.5)
BH BB BLOOD EXPIRATION DATE: NORMAL
BH BB BLOOD EXPIRATION DATE: NORMAL
BH BB BLOOD TYPE BARCODE: 6200
BH BB BLOOD TYPE BARCODE: 6200
BH BB DISPENSE STATUS: NORMAL
BH BB DISPENSE STATUS: NORMAL
BH BB PRODUCT CODE: NORMAL
BH BB PRODUCT CODE: NORMAL
BH BB UNIT NUMBER: NORMAL
BH BB UNIT NUMBER: NORMAL
BLD GP AB SCN SERPL QL: NEGATIVE
BLD GP AB SCN SERPL QL: NEGATIVE
BUN SERPL-MCNC: 16 MG/DL (ref 8–23)
BUN/CREAT SERPL: 25 (ref 7–25)
CALCIUM SPEC-SCNC: 9.1 MG/DL (ref 8.6–10.5)
CHLORIDE SERPL-SCNC: 107 MMOL/L (ref 98–107)
CO2 SERPL-SCNC: 25 MMOL/L (ref 22–29)
CREAT SERPL-MCNC: 0.64 MG/DL (ref 0.76–1.27)
CROSSMATCH INTERPRETATION: NORMAL
CROSSMATCH INTERPRETATION: NORMAL
DEPRECATED RDW RBC AUTO: 50.5 FL (ref 37–54)
EOSINOPHIL # BLD MANUAL: 0.05 10*3/MM3 (ref 0–0.4)
EOSINOPHIL NFR BLD MANUAL: 1 % (ref 0.3–6.2)
ERYTHROCYTE [DISTWIDTH] IN BLOOD BY AUTOMATED COUNT: 15.4 % (ref 12.3–15.4)
GFR SERPL CREATININE-BSD FRML MDRD: 125 ML/MIN/1.73
GLUCOSE BLDC GLUCOMTR-MCNC: 121 MG/DL (ref 70–130)
GLUCOSE BLDC GLUCOMTR-MCNC: 126 MG/DL (ref 70–130)
GLUCOSE BLDC GLUCOMTR-MCNC: 130 MG/DL (ref 70–130)
GLUCOSE BLDC GLUCOMTR-MCNC: 132 MG/DL (ref 70–130)
GLUCOSE SERPL-MCNC: 128 MG/DL (ref 65–99)
HCT VFR BLD AUTO: 21.1 % (ref 37.5–51)
HCT VFR BLD AUTO: 26.1 % (ref 37.5–51)
HGB BLD-MCNC: 6.8 G/DL (ref 13–17.7)
HGB BLD-MCNC: 8.3 G/DL (ref 13–17.7)
LYMPHOCYTES # BLD MANUAL: 0.69 10*3/MM3 (ref 0.7–3.1)
LYMPHOCYTES NFR BLD MANUAL: 13 % (ref 19.6–45.3)
LYMPHOCYTES NFR BLD MANUAL: 6 % (ref 5–12)
MAGNESIUM SERPL-MCNC: 1.8 MG/DL (ref 1.6–2.4)
MCH RBC QN AUTO: 29.3 PG (ref 26.6–33)
MCHC RBC AUTO-ENTMCNC: 32.2 G/DL (ref 31.5–35.7)
MCV RBC AUTO: 90.9 FL (ref 79–97)
MONOCYTES # BLD AUTO: 0.32 10*3/MM3 (ref 0.1–0.9)
NEUTROPHILS # BLD AUTO: 4.24 10*3/MM3 (ref 1.7–7)
NEUTROPHILS NFR BLD MANUAL: 67 % (ref 42.7–76)
NEUTS BAND NFR BLD MANUAL: 13 % (ref 0–5)
PHOSPHATE SERPL-MCNC: 2.6 MG/DL (ref 2.5–4.5)
PLAT MORPH BLD: NORMAL
PLATELET # BLD AUTO: 173 10*3/MM3 (ref 140–450)
PMV BLD AUTO: 10.5 FL (ref 6–12)
POTASSIUM SERPL-SCNC: 3.8 MMOL/L (ref 3.5–5.2)
PROCALCITONIN SERPL-MCNC: 0.33 NG/ML (ref 0–0.25)
RBC # BLD AUTO: 2.32 10*6/MM3 (ref 4.14–5.8)
RBC MORPH BLD: NORMAL
RH BLD: POSITIVE
RH BLD: POSITIVE
SCAN SLIDE: NORMAL
SODIUM SERPL-SCNC: 141 MMOL/L (ref 136–145)
T&S EXPIRATION DATE: NORMAL
T&S EXPIRATION DATE: NORMAL
UNIT  ABO: NORMAL
UNIT  ABO: NORMAL
UNIT  RH: NORMAL
UNIT  RH: NORMAL
WBC # BLD AUTO: 5.3 10*3/MM3 (ref 3.4–10.8)
WBC MORPH BLD: NORMAL

## 2021-06-27 PROCEDURE — 86923 COMPATIBILITY TEST ELECTRIC: CPT

## 2021-06-27 PROCEDURE — 86850 RBC ANTIBODY SCREEN: CPT | Performed by: PHYSICIAN ASSISTANT

## 2021-06-27 PROCEDURE — 85014 HEMATOCRIT: CPT | Performed by: INTERNAL MEDICINE

## 2021-06-27 PROCEDURE — 85007 BL SMEAR W/DIFF WBC COUNT: CPT | Performed by: INTERNAL MEDICINE

## 2021-06-27 PROCEDURE — 71045 X-RAY EXAM CHEST 1 VIEW: CPT

## 2021-06-27 PROCEDURE — 84145 PROCALCITONIN (PCT): CPT | Performed by: INTERNAL MEDICINE

## 2021-06-27 PROCEDURE — 25010000002 DIGOXIN PER 500 MCG: Performed by: NURSE PRACTITIONER

## 2021-06-27 PROCEDURE — 86900 BLOOD TYPING SEROLOGIC ABO: CPT

## 2021-06-27 PROCEDURE — 86900 BLOOD TYPING SEROLOGIC ABO: CPT | Performed by: PHYSICIAN ASSISTANT

## 2021-06-27 PROCEDURE — 63710000001 INSULIN DETEMIR PER 5 UNITS: Performed by: INTERNAL MEDICINE

## 2021-06-27 PROCEDURE — 85025 COMPLETE CBC W/AUTO DIFF WBC: CPT | Performed by: INTERNAL MEDICINE

## 2021-06-27 PROCEDURE — 25010000003 MAGNESIUM SULFATE 4 GM/100ML SOLUTION: Performed by: INTERNAL MEDICINE

## 2021-06-27 PROCEDURE — 36430 TRANSFUSION BLD/BLD COMPNT: CPT

## 2021-06-27 PROCEDURE — 83735 ASSAY OF MAGNESIUM: CPT | Performed by: INTERNAL MEDICINE

## 2021-06-27 PROCEDURE — P9016 RBC LEUKOCYTES REDUCED: HCPCS

## 2021-06-27 PROCEDURE — 99233 SBSQ HOSP IP/OBS HIGH 50: CPT | Performed by: INTERNAL MEDICINE

## 2021-06-27 PROCEDURE — 86850 RBC ANTIBODY SCREEN: CPT | Performed by: THORACIC SURGERY (CARDIOTHORACIC VASCULAR SURGERY)

## 2021-06-27 PROCEDURE — 25010000002 THIAMINE PER 100 MG: Performed by: INTERNAL MEDICINE

## 2021-06-27 PROCEDURE — 25010000002 LORAZEPAM PER 2 MG: Performed by: INTERNAL MEDICINE

## 2021-06-27 PROCEDURE — 86901 BLOOD TYPING SEROLOGIC RH(D): CPT | Performed by: THORACIC SURGERY (CARDIOTHORACIC VASCULAR SURGERY)

## 2021-06-27 PROCEDURE — 99291 CRITICAL CARE FIRST HOUR: CPT | Performed by: INTERNAL MEDICINE

## 2021-06-27 PROCEDURE — 86901 BLOOD TYPING SEROLOGIC RH(D): CPT | Performed by: PHYSICIAN ASSISTANT

## 2021-06-27 PROCEDURE — 82962 GLUCOSE BLOOD TEST: CPT

## 2021-06-27 PROCEDURE — 80048 BASIC METABOLIC PNL TOTAL CA: CPT | Performed by: INTERNAL MEDICINE

## 2021-06-27 PROCEDURE — 84100 ASSAY OF PHOSPHORUS: CPT | Performed by: INTERNAL MEDICINE

## 2021-06-27 PROCEDURE — 85018 HEMOGLOBIN: CPT | Performed by: INTERNAL MEDICINE

## 2021-06-27 PROCEDURE — 86900 BLOOD TYPING SEROLOGIC ABO: CPT | Performed by: THORACIC SURGERY (CARDIOTHORACIC VASCULAR SURGERY)

## 2021-06-27 RX ORDER — METOPROLOL TARTRATE 50 MG/1
100 TABLET, FILM COATED ORAL EVERY 12 HOURS SCHEDULED
Status: DISCONTINUED | OUTPATIENT
Start: 2021-06-27 | End: 2021-07-03

## 2021-06-27 RX ORDER — LORAZEPAM 1 MG/1
1 TABLET ORAL EVERY 6 HOURS
Status: DISCONTINUED | OUTPATIENT
Start: 2021-06-27 | End: 2021-06-28

## 2021-06-27 RX ORDER — CHLORHEXIDINE GLUCONATE 0.12 MG/ML
15 RINSE ORAL EVERY 12 HOURS SCHEDULED
Status: CANCELLED | OUTPATIENT
Start: 2021-06-27

## 2021-06-27 RX ORDER — MAGNESIUM SULFATE HEPTAHYDRATE 40 MG/ML
4 INJECTION, SOLUTION INTRAVENOUS AS NEEDED
Status: DISCONTINUED | OUTPATIENT
Start: 2021-06-27 | End: 2021-07-09 | Stop reason: HOSPADM

## 2021-06-27 RX ADMIN — FOLIC ACID 1 MG: 5 INJECTION, SOLUTION INTRAMUSCULAR; INTRAVENOUS; SUBCUTANEOUS at 08:48

## 2021-06-27 RX ADMIN — DIGOXIN 125 MCG: 0.25 INJECTION INTRAMUSCULAR; INTRAVENOUS at 00:17

## 2021-06-27 RX ADMIN — THIAMINE HYDROCHLORIDE 500 MG: 100 INJECTION, SOLUTION INTRAMUSCULAR; INTRAVENOUS at 06:02

## 2021-06-27 RX ADMIN — LORAZEPAM 2 MG: 2 INJECTION INTRAMUSCULAR; INTRAVENOUS at 08:48

## 2021-06-27 RX ADMIN — FAMOTIDINE 20 MG: 20 TABLET ORAL at 20:09

## 2021-06-27 RX ADMIN — LORAZEPAM 1 MG: 1 TABLET ORAL at 14:53

## 2021-06-27 RX ADMIN — LORAZEPAM 1 MG: 1 TABLET ORAL at 20:08

## 2021-06-27 RX ADMIN — METOPROLOL TARTRATE 50 MG: 50 TABLET, FILM COATED ORAL at 08:48

## 2021-06-27 RX ADMIN — FAMOTIDINE 20 MG: 20 TABLET ORAL at 08:48

## 2021-06-27 RX ADMIN — INSULIN DETEMIR 30 UNITS: 100 INJECTION, SOLUTION SUBCUTANEOUS at 08:49

## 2021-06-27 RX ADMIN — THIAMINE HYDROCHLORIDE 500 MG: 100 INJECTION, SOLUTION INTRAMUSCULAR; INTRAVENOUS at 21:17

## 2021-06-27 RX ADMIN — THIAMINE HYDROCHLORIDE 500 MG: 100 INJECTION, SOLUTION INTRAMUSCULAR; INTRAVENOUS at 14:14

## 2021-06-27 RX ADMIN — ATORVASTATIN CALCIUM 40 MG: 40 TABLET, FILM COATED ORAL at 20:08

## 2021-06-27 RX ADMIN — ASPIRIN 325 MG ORAL TABLET 325 MG: 325 PILL ORAL at 08:48

## 2021-06-27 RX ADMIN — MAGNESIUM SULFATE HEPTAHYDRATE 4 G: 40 INJECTION, SOLUTION INTRAVENOUS at 11:14

## 2021-06-27 RX ADMIN — LORAZEPAM 1 MG: 2 INJECTION INTRAMUSCULAR; INTRAVENOUS at 11:00

## 2021-06-27 RX ADMIN — METOPROLOL TARTRATE 100 MG: 50 TABLET, FILM COATED ORAL at 20:09

## 2021-06-27 RX ADMIN — SENNOSIDES 10 ML: 8.8 LIQUID ORAL at 08:48

## 2021-06-28 ENCOUNTER — APPOINTMENT (OUTPATIENT)
Dept: NEUROLOGY | Facility: HOSPITAL | Age: 66
End: 2021-06-28

## 2021-06-28 ENCOUNTER — APPOINTMENT (OUTPATIENT)
Dept: CARDIOLOGY | Facility: HOSPITAL | Age: 66
End: 2021-06-28

## 2021-06-28 ENCOUNTER — APPOINTMENT (OUTPATIENT)
Dept: GENERAL RADIOLOGY | Facility: HOSPITAL | Age: 66
End: 2021-06-28

## 2021-06-28 PROBLEM — R73.9 HYPERGLYCEMIA: Status: ACTIVE | Noted: 2021-06-28

## 2021-06-28 LAB
ALBUMIN SERPL-MCNC: 3.5 G/DL (ref 3.5–5.2)
ALBUMIN/GLOB SERPL: 1.4 G/DL
ALP SERPL-CCNC: 56 U/L (ref 39–117)
ALT SERPL W P-5'-P-CCNC: 18 U/L (ref 1–41)
ANION GAP SERPL CALCULATED.3IONS-SCNC: 10 MMOL/L (ref 5–15)
AST SERPL-CCNC: 26 U/L (ref 1–40)
BASOPHILS # BLD AUTO: 0.02 10*3/MM3 (ref 0–0.2)
BASOPHILS NFR BLD AUTO: 0.3 % (ref 0–1.5)
BH BB BLOOD EXPIRATION DATE: NORMAL
BH BB BLOOD TYPE BARCODE: 6200
BH BB DISPENSE STATUS: NORMAL
BH BB PRODUCT CODE: NORMAL
BH BB UNIT NUMBER: NORMAL
BILIRUB SERPL-MCNC: 0.8 MG/DL (ref 0–1.2)
BUN SERPL-MCNC: 16 MG/DL (ref 8–23)
BUN/CREAT SERPL: 26.2 (ref 7–25)
CALCIUM SPEC-SCNC: 8.9 MG/DL (ref 8.6–10.5)
CHLORIDE SERPL-SCNC: 105 MMOL/L (ref 98–107)
CO2 SERPL-SCNC: 24 MMOL/L (ref 22–29)
CREAT SERPL-MCNC: 0.61 MG/DL (ref 0.76–1.27)
CROSSMATCH INTERPRETATION: NORMAL
CRP SERPL-MCNC: 12.86 MG/DL (ref 0–0.5)
DEPRECATED RDW RBC AUTO: 50.8 FL (ref 37–54)
EOSINOPHIL # BLD AUTO: 0.03 10*3/MM3 (ref 0–0.4)
EOSINOPHIL NFR BLD AUTO: 0.4 % (ref 0.3–6.2)
ERYTHROCYTE [DISTWIDTH] IN BLOOD BY AUTOMATED COUNT: 15.9 % (ref 12.3–15.4)
GFR SERPL CREATININE-BSD FRML MDRD: 132 ML/MIN/1.73
GLOBULIN UR ELPH-MCNC: 2.5 GM/DL
GLUCOSE BLDC GLUCOMTR-MCNC: 125 MG/DL (ref 70–130)
GLUCOSE BLDC GLUCOMTR-MCNC: 136 MG/DL (ref 70–130)
GLUCOSE BLDC GLUCOMTR-MCNC: 154 MG/DL (ref 70–130)
GLUCOSE BLDC GLUCOMTR-MCNC: 166 MG/DL (ref 70–130)
GLUCOSE SERPL-MCNC: 122 MG/DL (ref 65–99)
HCT VFR BLD AUTO: 26.3 % (ref 37.5–51)
HGB BLD-MCNC: 8.4 G/DL (ref 13–17.7)
IMM GRANULOCYTES # BLD AUTO: 0.19 10*3/MM3 (ref 0–0.05)
IMM GRANULOCYTES NFR BLD AUTO: 2.4 % (ref 0–0.5)
LYMPHOCYTES # BLD AUTO: 0.77 10*3/MM3 (ref 0.7–3.1)
LYMPHOCYTES NFR BLD AUTO: 9.8 % (ref 19.6–45.3)
MAGNESIUM SERPL-MCNC: 1.8 MG/DL (ref 1.6–2.4)
MCH RBC QN AUTO: 28.7 PG (ref 26.6–33)
MCHC RBC AUTO-ENTMCNC: 31.9 G/DL (ref 31.5–35.7)
MCV RBC AUTO: 89.8 FL (ref 79–97)
MONOCYTES # BLD AUTO: 1.01 10*3/MM3 (ref 0.1–0.9)
MONOCYTES NFR BLD AUTO: 12.8 % (ref 5–12)
NEUTROPHILS NFR BLD AUTO: 5.87 10*3/MM3 (ref 1.7–7)
NEUTROPHILS NFR BLD AUTO: 74.3 % (ref 42.7–76)
NRBC BLD AUTO-RTO: 1.5 /100 WBC (ref 0–0.2)
PHOSPHATE SERPL-MCNC: 4.3 MG/DL (ref 2.5–4.5)
PLATELET # BLD AUTO: 234 10*3/MM3 (ref 140–450)
PMV BLD AUTO: 10.5 FL (ref 6–12)
POTASSIUM SERPL-SCNC: 3.8 MMOL/L (ref 3.5–5.2)
PREALB SERPL-MCNC: 7.2 MG/DL (ref 20–40)
PROCALCITONIN SERPL-MCNC: 0.23 NG/ML (ref 0–0.25)
PROT SERPL-MCNC: 6 G/DL (ref 6–8.5)
RBC # BLD AUTO: 2.93 10*6/MM3 (ref 4.14–5.8)
SODIUM SERPL-SCNC: 139 MMOL/L (ref 136–145)
TRIGL SERPL-MCNC: 70 MG/DL (ref 0–150)
UNIT  ABO: NORMAL
UNIT  RH: NORMAL
WBC # BLD AUTO: 7.89 10*3/MM3 (ref 3.4–10.8)

## 2021-06-28 PROCEDURE — 25010000003 MAGNESIUM SULFATE 4 GM/100ML SOLUTION: Performed by: INTERNAL MEDICINE

## 2021-06-28 PROCEDURE — 93306 TTE W/DOPPLER COMPLETE: CPT

## 2021-06-28 PROCEDURE — 99253 IP/OBS CNSLTJ NEW/EST LOW 45: CPT | Performed by: PSYCHIATRY & NEUROLOGY

## 2021-06-28 PROCEDURE — 83735 ASSAY OF MAGNESIUM: CPT | Performed by: INTERNAL MEDICINE

## 2021-06-28 PROCEDURE — 84134 ASSAY OF PREALBUMIN: CPT | Performed by: INTERNAL MEDICINE

## 2021-06-28 PROCEDURE — 25010000002 THIAMINE PER 100 MG: Performed by: INTERNAL MEDICINE

## 2021-06-28 PROCEDURE — 63710000001 INSULIN DETEMIR PER 5 UNITS: Performed by: INTERNAL MEDICINE

## 2021-06-28 PROCEDURE — 93306 TTE W/DOPPLER COMPLETE: CPT | Performed by: INTERNAL MEDICINE

## 2021-06-28 PROCEDURE — 63710000001 INSULIN REGULAR HUMAN PER 5 UNITS: Performed by: INTERNAL MEDICINE

## 2021-06-28 PROCEDURE — 99233 SBSQ HOSP IP/OBS HIGH 50: CPT | Performed by: INTERNAL MEDICINE

## 2021-06-28 PROCEDURE — 85025 COMPLETE CBC W/AUTO DIFF WBC: CPT | Performed by: INTERNAL MEDICINE

## 2021-06-28 PROCEDURE — 80053 COMPREHEN METABOLIC PANEL: CPT | Performed by: INTERNAL MEDICINE

## 2021-06-28 PROCEDURE — 84145 PROCALCITONIN (PCT): CPT | Performed by: INTERNAL MEDICINE

## 2021-06-28 PROCEDURE — 84478 ASSAY OF TRIGLYCERIDES: CPT | Performed by: INTERNAL MEDICINE

## 2021-06-28 PROCEDURE — 82962 GLUCOSE BLOOD TEST: CPT

## 2021-06-28 PROCEDURE — 97530 THERAPEUTIC ACTIVITIES: CPT

## 2021-06-28 PROCEDURE — 71045 X-RAY EXAM CHEST 1 VIEW: CPT

## 2021-06-28 PROCEDURE — 86140 C-REACTIVE PROTEIN: CPT | Performed by: INTERNAL MEDICINE

## 2021-06-28 PROCEDURE — 95816 EEG AWAKE AND DROWSY: CPT

## 2021-06-28 PROCEDURE — 84100 ASSAY OF PHOSPHORUS: CPT | Performed by: INTERNAL MEDICINE

## 2021-06-28 PROCEDURE — 25010000002 FUROSEMIDE PER 20 MG: Performed by: INTERNAL MEDICINE

## 2021-06-28 PROCEDURE — 99024 POSTOP FOLLOW-UP VISIT: CPT | Performed by: PHYSICIAN ASSISTANT

## 2021-06-28 RX ORDER — QUETIAPINE FUMARATE 25 MG/1
25 TABLET, FILM COATED ORAL ONCE
Status: COMPLETED | OUTPATIENT
Start: 2021-06-28 | End: 2021-06-28

## 2021-06-28 RX ORDER — POTASSIUM CHLORIDE 1.5 G/1.77G
40 POWDER, FOR SOLUTION ORAL ONCE
Status: COMPLETED | OUTPATIENT
Start: 2021-06-28 | End: 2021-06-28

## 2021-06-28 RX ORDER — QUETIAPINE FUMARATE 25 MG/1
25 TABLET, FILM COATED ORAL ONCE
Status: DISCONTINUED | OUTPATIENT
Start: 2021-06-28 | End: 2021-06-29

## 2021-06-28 RX ORDER — FOLIC ACID 1 MG/1
1 TABLET ORAL DAILY
Status: DISCONTINUED | OUTPATIENT
Start: 2021-06-29 | End: 2021-06-28

## 2021-06-28 RX ORDER — LORAZEPAM 1 MG/1
1 TABLET ORAL EVERY 4 HOURS PRN
Status: DISCONTINUED | OUTPATIENT
Start: 2021-06-28 | End: 2021-06-29

## 2021-06-28 RX ORDER — LORAZEPAM 1 MG/1
2 TABLET ORAL NIGHTLY
Status: DISCONTINUED | OUTPATIENT
Start: 2021-06-28 | End: 2021-06-29

## 2021-06-28 RX ORDER — FUROSEMIDE 10 MG/ML
40 INJECTION INTRAMUSCULAR; INTRAVENOUS ONCE
Status: COMPLETED | OUTPATIENT
Start: 2021-06-28 | End: 2021-06-28

## 2021-06-28 RX ORDER — FOLIC ACID 1 MG/1
1 TABLET ORAL DAILY
Status: DISCONTINUED | OUTPATIENT
Start: 2021-06-29 | End: 2021-07-05

## 2021-06-28 RX ADMIN — ASPIRIN 325 MG ORAL TABLET 325 MG: 325 PILL ORAL at 08:18

## 2021-06-28 RX ADMIN — FOLIC ACID 1 MG: 5 INJECTION, SOLUTION INTRAMUSCULAR; INTRAVENOUS; SUBCUTANEOUS at 09:16

## 2021-06-28 RX ADMIN — NICOTINE 1 PATCH: 7 PATCH, EXTENDED RELEASE TRANSDERMAL at 08:17

## 2021-06-28 RX ADMIN — QUETIAPINE FUMARATE 25 MG: 25 TABLET ORAL at 22:24

## 2021-06-28 RX ADMIN — ATORVASTATIN CALCIUM 40 MG: 40 TABLET, FILM COATED ORAL at 20:58

## 2021-06-28 RX ADMIN — FUROSEMIDE 40 MG: 10 INJECTION INTRAMUSCULAR; INTRAVENOUS at 10:27

## 2021-06-28 RX ADMIN — LORAZEPAM 1 MG: 1 TABLET ORAL at 14:19

## 2021-06-28 RX ADMIN — INSULIN DETEMIR 30 UNITS: 100 INJECTION, SOLUTION SUBCUTANEOUS at 08:16

## 2021-06-28 RX ADMIN — INSULIN HUMAN 3 UNITS: 100 INJECTION, SOLUTION PARENTERAL at 12:48

## 2021-06-28 RX ADMIN — THIAMINE HYDROCHLORIDE 500 MG: 100 INJECTION, SOLUTION INTRAMUSCULAR; INTRAVENOUS at 21:04

## 2021-06-28 RX ADMIN — THIAMINE HYDROCHLORIDE 500 MG: 100 INJECTION, SOLUTION INTRAMUSCULAR; INTRAVENOUS at 05:14

## 2021-06-28 RX ADMIN — METOPROLOL TARTRATE 100 MG: 50 TABLET, FILM COATED ORAL at 20:58

## 2021-06-28 RX ADMIN — SENNOSIDES 10 ML: 8.8 LIQUID ORAL at 20:58

## 2021-06-28 RX ADMIN — TAMSULOSIN HYDROCHLORIDE 0.8 MG: 0.4 CAPSULE ORAL at 08:17

## 2021-06-28 RX ADMIN — MAGNESIUM SULFATE HEPTAHYDRATE 4 G: 40 INJECTION, SOLUTION INTRAVENOUS at 06:07

## 2021-06-28 RX ADMIN — LORAZEPAM 2 MG: 1 TABLET ORAL at 20:58

## 2021-06-28 RX ADMIN — LORAZEPAM 1 MG: 1 TABLET ORAL at 01:55

## 2021-06-28 RX ADMIN — POTASSIUM CHLORIDE 40 MEQ: 1.5 POWDER, FOR SOLUTION ORAL at 10:26

## 2021-06-28 RX ADMIN — METOPROLOL TARTRATE 100 MG: 50 TABLET, FILM COATED ORAL at 08:17

## 2021-06-28 RX ADMIN — FAMOTIDINE 20 MG: 20 TABLET ORAL at 20:58

## 2021-06-28 RX ADMIN — FAMOTIDINE 20 MG: 20 TABLET ORAL at 08:18

## 2021-06-28 RX ADMIN — THIAMINE HYDROCHLORIDE 500 MG: 100 INJECTION, SOLUTION INTRAMUSCULAR; INTRAVENOUS at 13:59

## 2021-06-29 ENCOUNTER — APPOINTMENT (OUTPATIENT)
Dept: GENERAL RADIOLOGY | Facility: HOSPITAL | Age: 66
End: 2021-06-29

## 2021-06-29 LAB
ANION GAP SERPL CALCULATED.3IONS-SCNC: 8 MMOL/L (ref 5–15)
ASCENDING AORTA: 2.9 CM
BH CV ECHO MEAS - AO MAX PG (FULL): 1.7 MMHG
BH CV ECHO MEAS - AO MAX PG: 3.2 MMHG
BH CV ECHO MEAS - AO MEAN PG (FULL): 1 MMHG
BH CV ECHO MEAS - AO MEAN PG: 1.8 MMHG
BH CV ECHO MEAS - AO ROOT AREA (BSA CORRECTED): 1.6
BH CV ECHO MEAS - AO ROOT AREA: 10.7 CM^2
BH CV ECHO MEAS - AO ROOT DIAM: 3.7 CM
BH CV ECHO MEAS - AO V2 MAX: 89.6 CM/SEC
BH CV ECHO MEAS - AO V2 MEAN: 64 CM/SEC
BH CV ECHO MEAS - AO V2 VTI: 15 CM
BH CV ECHO MEAS - ASC AORTA: 2.9 CM
BH CV ECHO MEAS - AVA(I,A): 1.8 CM^2
BH CV ECHO MEAS - AVA(I,D): 1.8 CM^2
BH CV ECHO MEAS - AVA(V,A): 2.3 CM^2
BH CV ECHO MEAS - AVA(V,D): 2.3 CM^2
BH CV ECHO MEAS - BSA(HAYCOCK): 2.5 M^2
BH CV ECHO MEAS - BSA: 2.3 M^2
BH CV ECHO MEAS - BZI_BMI: 37.3 KILOGRAMS/M^2
BH CV ECHO MEAS - BZI_METRIC_HEIGHT: 177.8 CM
BH CV ECHO MEAS - BZI_METRIC_WEIGHT: 117.9 KG
BH CV ECHO MEAS - EDV(CUBED): 58.9 ML
BH CV ECHO MEAS - EDV(MOD-SP2): 133 ML
BH CV ECHO MEAS - EDV(MOD-SP4): 173 ML
BH CV ECHO MEAS - EDV(TEICH): 65.5 ML
BH CV ECHO MEAS - EF(CUBED): 76.8 %
BH CV ECHO MEAS - EF(MOD-BP): 42 %
BH CV ECHO MEAS - EF(MOD-SP2): 39.1 %
BH CV ECHO MEAS - EF(MOD-SP4): 43.4 %
BH CV ECHO MEAS - EF(TEICH): 69.5 %
BH CV ECHO MEAS - ESV(CUBED): 13.7 ML
BH CV ECHO MEAS - ESV(MOD-SP2): 81 ML
BH CV ECHO MEAS - ESV(MOD-SP4): 98 ML
BH CV ECHO MEAS - ESV(TEICH): 20 ML
BH CV ECHO MEAS - FS: 38.5 %
BH CV ECHO MEAS - IVS/LVPW: 0.98
BH CV ECHO MEAS - IVSD: 1.3 CM
BH CV ECHO MEAS - LA DIMENSION: 4 CM
BH CV ECHO MEAS - LA/AO: 1.1
BH CV ECHO MEAS - LAD MAJOR: 5.6 CM
BH CV ECHO MEAS - LAT PEAK E' VEL: 8.4 CM/SEC
BH CV ECHO MEAS - LV DIASTOLIC VOL/BSA (35-75): 74.1 ML/M^2
BH CV ECHO MEAS - LV IVRT: 0.09 SEC
BH CV ECHO MEAS - LV MASS(C)D: 180.3 GRAMS
BH CV ECHO MEAS - LV MASS(C)DI: 77.2 GRAMS/M^2
BH CV ECHO MEAS - LV MAX PG: 1.5 MMHG
BH CV ECHO MEAS - LV MEAN PG: 0.81 MMHG
BH CV ECHO MEAS - LV SYSTOLIC VOL/BSA (12-30): 42 ML/M^2
BH CV ECHO MEAS - LV V1 MAX: 61.7 CM/SEC
BH CV ECHO MEAS - LV V1 MEAN: 41 CM/SEC
BH CV ECHO MEAS - LV V1 VTI: 8.2 CM
BH CV ECHO MEAS - LVIDD: 3.9 CM
BH CV ECHO MEAS - LVIDS: 2.4 CM
BH CV ECHO MEAS - LVLD AP2: 9.1 CM
BH CV ECHO MEAS - LVLD AP4: 9.8 CM
BH CV ECHO MEAS - LVLS AP2: 8.9 CM
BH CV ECHO MEAS - LVLS AP4: 9.4 CM
BH CV ECHO MEAS - LVOT AREA (M): 3.1 CM^2
BH CV ECHO MEAS - LVOT AREA: 3.3 CM^2
BH CV ECHO MEAS - LVOT DIAM: 2 CM
BH CV ECHO MEAS - LVPWD: 1.3 CM
BH CV ECHO MEAS - MED PEAK E' VEL: 9.3 CM/SEC
BH CV ECHO MEAS - PA ACC SLOPE: 632.5 CM/SEC^2
BH CV ECHO MEAS - PA ACC TIME: 0.12 SEC
BH CV ECHO MEAS - PA MAX PG: 4.7 MMHG
BH CV ECHO MEAS - PA PR(ACCEL): 25.1 MMHG
BH CV ECHO MEAS - PA V2 MAX: 108.1 CM/SEC
BH CV ECHO MEAS - RAP SYSTOLE: 8 MMHG
BH CV ECHO MEAS - RVSP: 29 MMHG
BH CV ECHO MEAS - SI(AO): 69.2 ML/M^2
BH CV ECHO MEAS - SI(CUBED): 19.4 ML/M^2
BH CV ECHO MEAS - SI(LVOT): 11.5 ML/M^2
BH CV ECHO MEAS - SI(MOD-SP2): 22.3 ML/M^2
BH CV ECHO MEAS - SI(MOD-SP4): 32.1 ML/M^2
BH CV ECHO MEAS - SI(TEICH): 19.5 ML/M^2
BH CV ECHO MEAS - SV(AO): 161.5 ML
BH CV ECHO MEAS - SV(CUBED): 45.2 ML
BH CV ECHO MEAS - SV(LVOT): 26.9 ML
BH CV ECHO MEAS - SV(MOD-SP2): 52 ML
BH CV ECHO MEAS - SV(MOD-SP4): 75 ML
BH CV ECHO MEAS - SV(TEICH): 45.5 ML
BH CV ECHO MEAS - TAPSE (>1.6): 2 CM
BH CV ECHO MEAS - TR MAX PG: 21 MMHG
BH CV ECHO MEAS - TR MAX VEL: 231 CM/SEC
BH CV VAS BP RIGHT ARM: NORMAL MMHG
BH CV XLRA - RV BASE: 4.5 CM
BH CV XLRA - RV LENGTH: 8.4 CM
BH CV XLRA - RV MID: 3.4 CM
BH CV XLRA - TDI S': 9.5 CM/SEC
BUN SERPL-MCNC: 21 MG/DL (ref 8–23)
BUN/CREAT SERPL: 38.2 (ref 7–25)
CALCIUM SPEC-SCNC: 8.6 MG/DL (ref 8.6–10.5)
CHLORIDE SERPL-SCNC: 105 MMOL/L (ref 98–107)
CO2 SERPL-SCNC: 26 MMOL/L (ref 22–29)
CREAT SERPL-MCNC: 0.55 MG/DL (ref 0.76–1.27)
DEPRECATED RDW RBC AUTO: 50.7 FL (ref 37–54)
ERYTHROCYTE [DISTWIDTH] IN BLOOD BY AUTOMATED COUNT: 15.9 % (ref 12.3–15.4)
GFR SERPL CREATININE-BSD FRML MDRD: 149 ML/MIN/1.73
GLUCOSE BLDC GLUCOMTR-MCNC: 129 MG/DL (ref 70–130)
GLUCOSE BLDC GLUCOMTR-MCNC: 144 MG/DL (ref 70–130)
GLUCOSE BLDC GLUCOMTR-MCNC: 145 MG/DL (ref 70–130)
GLUCOSE BLDC GLUCOMTR-MCNC: 162 MG/DL (ref 70–130)
GLUCOSE SERPL-MCNC: 130 MG/DL (ref 65–99)
HCT VFR BLD AUTO: 26.3 % (ref 37.5–51)
HGB BLD-MCNC: 8.5 G/DL (ref 13–17.7)
IVRT: 90 MSEC
LEFT ATRIUM VOLUME INDEX: 24.9 ML/M^2
LEFT ATRIUM VOLUME: 58 ML
MAGNESIUM SERPL-MCNC: 2 MG/DL (ref 1.6–2.4)
MCH RBC QN AUTO: 28.6 PG (ref 26.6–33)
MCHC RBC AUTO-ENTMCNC: 32.3 G/DL (ref 31.5–35.7)
MCV RBC AUTO: 88.6 FL (ref 79–97)
PLATELET # BLD AUTO: 281 10*3/MM3 (ref 140–450)
PMV BLD AUTO: 10.3 FL (ref 6–12)
POTASSIUM SERPL-SCNC: 3.7 MMOL/L (ref 3.5–5.2)
QT INTERVAL: 282 MS
QT INTERVAL: 326 MS
QTC INTERVAL: 454 MS
QTC INTERVAL: 501 MS
RBC # BLD AUTO: 2.97 10*6/MM3 (ref 4.14–5.8)
SODIUM SERPL-SCNC: 139 MMOL/L (ref 136–145)
WBC # BLD AUTO: 9.17 10*3/MM3 (ref 3.4–10.8)

## 2021-06-29 PROCEDURE — 63710000001 INSULIN REGULAR HUMAN PER 5 UNITS: Performed by: INTERNAL MEDICINE

## 2021-06-29 PROCEDURE — 97530 THERAPEUTIC ACTIVITIES: CPT

## 2021-06-29 PROCEDURE — 25010000002 AMIODARONE IN DEXTROSE 5% 360-4.14 MG/200ML-% SOLUTION: Performed by: INTERNAL MEDICINE

## 2021-06-29 PROCEDURE — 63710000001 INSULIN DETEMIR PER 5 UNITS: Performed by: INTERNAL MEDICINE

## 2021-06-29 PROCEDURE — 99233 SBSQ HOSP IP/OBS HIGH 50: CPT | Performed by: INTERNAL MEDICINE

## 2021-06-29 PROCEDURE — 83735 ASSAY OF MAGNESIUM: CPT | Performed by: THORACIC SURGERY (CARDIOTHORACIC VASCULAR SURGERY)

## 2021-06-29 PROCEDURE — 25010000002 AMIODARONE IN DEXTROSE 5% 150-4.21 MG/100ML-% SOLUTION: Performed by: INTERNAL MEDICINE

## 2021-06-29 PROCEDURE — 85027 COMPLETE CBC AUTOMATED: CPT | Performed by: INTERNAL MEDICINE

## 2021-06-29 PROCEDURE — 82962 GLUCOSE BLOOD TEST: CPT

## 2021-06-29 PROCEDURE — 93005 ELECTROCARDIOGRAM TRACING: CPT | Performed by: INTERNAL MEDICINE

## 2021-06-29 PROCEDURE — 25010000002 MAGNESIUM SULFATE 2 GM/50ML SOLUTION: Performed by: NURSE PRACTITIONER

## 2021-06-29 PROCEDURE — 99024 POSTOP FOLLOW-UP VISIT: CPT | Performed by: PHYSICIAN ASSISTANT

## 2021-06-29 PROCEDURE — 71045 X-RAY EXAM CHEST 1 VIEW: CPT

## 2021-06-29 PROCEDURE — 99231 SBSQ HOSP IP/OBS SF/LOW 25: CPT | Performed by: PSYCHIATRY & NEUROLOGY

## 2021-06-29 PROCEDURE — 93010 ELECTROCARDIOGRAM REPORT: CPT | Performed by: INTERNAL MEDICINE

## 2021-06-29 PROCEDURE — 93005 ELECTROCARDIOGRAM TRACING: CPT | Performed by: THORACIC SURGERY (CARDIOTHORACIC VASCULAR SURGERY)

## 2021-06-29 PROCEDURE — 80048 BASIC METABOLIC PNL TOTAL CA: CPT | Performed by: INTERNAL MEDICINE

## 2021-06-29 RX ORDER — POTASSIUM CHLORIDE 1.5 G/1.77G
40 POWDER, FOR SOLUTION ORAL AS NEEDED
Status: DISCONTINUED | OUTPATIENT
Start: 2021-06-29 | End: 2021-07-05

## 2021-06-29 RX ORDER — DOCUSATE SODIUM 50 MG/5 ML
100 LIQUID (ML) ORAL 2 TIMES DAILY
Status: DISCONTINUED | OUTPATIENT
Start: 2021-06-29 | End: 2021-06-29

## 2021-06-29 RX ORDER — AMIODARONE HYDROCHLORIDE 200 MG/1
200 TABLET ORAL EVERY 12 HOURS
Status: DISCONTINUED | OUTPATIENT
Start: 2021-07-07 | End: 2021-07-05

## 2021-06-29 RX ORDER — AMIODARONE HYDROCHLORIDE 200 MG/1
200 TABLET ORAL EVERY 8 HOURS
Status: DISCONTINUED | OUTPATIENT
Start: 2021-06-30 | End: 2021-06-29

## 2021-06-29 RX ORDER — AMIODARONE HYDROCHLORIDE 200 MG/1
200 TABLET ORAL ONCE
Status: COMPLETED | OUTPATIENT
Start: 2021-06-30 | End: 2021-06-30

## 2021-06-29 RX ORDER — AMIODARONE HYDROCHLORIDE 200 MG/1
200 TABLET ORAL ONCE
Status: DISCONTINUED | OUTPATIENT
Start: 2021-06-30 | End: 2021-06-29

## 2021-06-29 RX ORDER — AMIODARONE HYDROCHLORIDE 200 MG/1
200 TABLET ORAL DAILY
Status: DISCONTINUED | OUTPATIENT
Start: 2021-07-21 | End: 2021-06-29

## 2021-06-29 RX ORDER — LORAZEPAM 1 MG/1
1 TABLET ORAL EVERY 6 HOURS PRN
Status: DISCONTINUED | OUTPATIENT
Start: 2021-06-29 | End: 2021-07-03

## 2021-06-29 RX ORDER — LORAZEPAM 1 MG/1
2 TABLET ORAL NIGHTLY
Status: DISCONTINUED | OUTPATIENT
Start: 2021-06-29 | End: 2021-07-03

## 2021-06-29 RX ORDER — POTASSIUM CHLORIDE 1.5 G/1.77G
40 POWDER, FOR SOLUTION ORAL ONCE
Status: DISCONTINUED | OUTPATIENT
Start: 2021-06-29 | End: 2021-06-29

## 2021-06-29 RX ORDER — AMIODARONE HYDROCHLORIDE 200 MG/1
200 TABLET ORAL DAILY
Status: DISCONTINUED | OUTPATIENT
Start: 2021-07-21 | End: 2021-07-05

## 2021-06-29 RX ORDER — TERAZOSIN 1 MG/1
2 CAPSULE ORAL NIGHTLY
Status: DISCONTINUED | OUTPATIENT
Start: 2021-06-29 | End: 2021-07-03

## 2021-06-29 RX ORDER — CHOLECALCIFEROL (VITAMIN D3) 125 MCG
5 CAPSULE ORAL NIGHTLY
Status: DISCONTINUED | OUTPATIENT
Start: 2021-06-29 | End: 2021-06-30

## 2021-06-29 RX ORDER — QUETIAPINE FUMARATE 25 MG/1
25 TABLET, FILM COATED ORAL NIGHTLY
Status: DISCONTINUED | OUTPATIENT
Start: 2021-06-29 | End: 2021-07-02

## 2021-06-29 RX ORDER — AMIODARONE HYDROCHLORIDE 200 MG/1
200 TABLET ORAL EVERY 12 HOURS
Status: DISCONTINUED | OUTPATIENT
Start: 2021-07-07 | End: 2021-06-29

## 2021-06-29 RX ORDER — METOPROLOL TARTRATE 5 MG/5ML
2.5 INJECTION INTRAVENOUS ONCE
Status: COMPLETED | OUTPATIENT
Start: 2021-06-29 | End: 2021-06-29

## 2021-06-29 RX ORDER — QUETIAPINE FUMARATE 25 MG/1
25 TABLET, FILM COATED ORAL NIGHTLY
Status: DISCONTINUED | OUTPATIENT
Start: 2021-06-29 | End: 2021-06-29

## 2021-06-29 RX ORDER — MAGNESIUM SULFATE HEPTAHYDRATE 40 MG/ML
2 INJECTION, SOLUTION INTRAVENOUS ONCE
Status: COMPLETED | OUTPATIENT
Start: 2021-06-29 | End: 2021-06-29

## 2021-06-29 RX ORDER — LORAZEPAM 1 MG/1
1 TABLET ORAL EVERY 6 HOURS PRN
Status: DISCONTINUED | OUTPATIENT
Start: 2021-06-29 | End: 2021-06-29

## 2021-06-29 RX ORDER — QUETIAPINE FUMARATE 25 MG/1
25 TABLET, FILM COATED ORAL ONCE
Status: COMPLETED | OUTPATIENT
Start: 2021-06-30 | End: 2021-06-29

## 2021-06-29 RX ORDER — AMIODARONE HYDROCHLORIDE 200 MG/1
200 TABLET ORAL EVERY 8 HOURS
Status: DISCONTINUED | OUTPATIENT
Start: 2021-06-30 | End: 2021-07-05

## 2021-06-29 RX ADMIN — FAMOTIDINE 20 MG: 20 TABLET ORAL at 20:30

## 2021-06-29 RX ADMIN — METOPROLOL TARTRATE 100 MG: 50 TABLET, FILM COATED ORAL at 20:30

## 2021-06-29 RX ADMIN — AMIODARONE HYDROCHLORIDE 150 MG: 1.5 INJECTION, SOLUTION INTRAVENOUS at 07:44

## 2021-06-29 RX ADMIN — INSULIN HUMAN 3 UNITS: 100 INJECTION, SOLUTION PARENTERAL at 13:25

## 2021-06-29 RX ADMIN — ASPIRIN 325 MG ORAL TABLET 325 MG: 325 PILL ORAL at 08:46

## 2021-06-29 RX ADMIN — MAGNESIUM SULFATE HEPTAHYDRATE 2 G: 2 INJECTION, SOLUTION INTRAVENOUS at 06:18

## 2021-06-29 RX ADMIN — AMIODARONE HYDROCHLORIDE 1 MG/MIN: 1.8 INJECTION, SOLUTION INTRAVENOUS at 07:45

## 2021-06-29 RX ADMIN — Medication 5 MG: at 22:11

## 2021-06-29 RX ADMIN — QUETIAPINE FUMARATE 25 MG: 25 TABLET ORAL at 23:17

## 2021-06-29 RX ADMIN — LORAZEPAM 2 MG: 1 TABLET ORAL at 22:11

## 2021-06-29 RX ADMIN — POTASSIUM CHLORIDE 40 MEQ: 1.5 POWDER, FOR SOLUTION ORAL at 06:18

## 2021-06-29 RX ADMIN — QUETIAPINE FUMARATE 25 MG: 25 TABLET ORAL at 20:30

## 2021-06-29 RX ADMIN — METOPROLOL TARTRATE 2.5 MG: 1 INJECTION, SOLUTION INTRAVENOUS at 05:58

## 2021-06-29 RX ADMIN — THIAMINE HCL TAB 100 MG 100 MG: 100 TAB at 08:46

## 2021-06-29 RX ADMIN — INSULIN HUMAN 3 UNITS: 100 INJECTION, SOLUTION PARENTERAL at 00:08

## 2021-06-29 RX ADMIN — FAMOTIDINE 20 MG: 20 TABLET ORAL at 08:47

## 2021-06-29 RX ADMIN — INSULIN DETEMIR 20 UNITS: 100 INJECTION, SOLUTION SUBCUTANEOUS at 08:47

## 2021-06-29 RX ADMIN — METOPROLOL TARTRATE 100 MG: 50 TABLET, FILM COATED ORAL at 08:46

## 2021-06-29 RX ADMIN — METOPROLOL TARTRATE 2.5 MG: 1 INJECTION, SOLUTION INTRAVENOUS at 06:10

## 2021-06-29 RX ADMIN — ATORVASTATIN CALCIUM 40 MG: 40 TABLET, FILM COATED ORAL at 20:30

## 2021-06-29 RX ADMIN — FOLIC ACID 1 MG: 1 TABLET ORAL at 08:47

## 2021-06-29 RX ADMIN — NICOTINE 1 PATCH: 7 PATCH, EXTENDED RELEASE TRANSDERMAL at 08:47

## 2021-06-29 RX ADMIN — AMIODARONE HYDROCHLORIDE 1 MG/MIN: 1.8 INJECTION, SOLUTION INTRAVENOUS at 13:25

## 2021-06-30 ENCOUNTER — APPOINTMENT (OUTPATIENT)
Dept: GENERAL RADIOLOGY | Facility: HOSPITAL | Age: 66
End: 2021-06-30

## 2021-06-30 LAB
ANION GAP SERPL CALCULATED.3IONS-SCNC: 11 MMOL/L (ref 5–15)
BUN SERPL-MCNC: 26 MG/DL (ref 8–23)
BUN/CREAT SERPL: 49.1 (ref 7–25)
CALCIUM SPEC-SCNC: 8.3 MG/DL (ref 8.6–10.5)
CHLORIDE SERPL-SCNC: 108 MMOL/L (ref 98–107)
CO2 SERPL-SCNC: 22 MMOL/L (ref 22–29)
CREAT SERPL-MCNC: 0.53 MG/DL (ref 0.76–1.27)
DEPRECATED RDW RBC AUTO: 51.9 FL (ref 37–54)
ERYTHROCYTE [DISTWIDTH] IN BLOOD BY AUTOMATED COUNT: 15.7 % (ref 12.3–15.4)
GFR SERPL CREATININE-BSD FRML MDRD: >150 ML/MIN/1.73
GLUCOSE BLDC GLUCOMTR-MCNC: 117 MG/DL (ref 70–130)
GLUCOSE BLDC GLUCOMTR-MCNC: 149 MG/DL (ref 70–130)
GLUCOSE BLDC GLUCOMTR-MCNC: 159 MG/DL (ref 70–130)
GLUCOSE BLDC GLUCOMTR-MCNC: 160 MG/DL (ref 70–130)
GLUCOSE SERPL-MCNC: 125 MG/DL (ref 65–99)
HCT VFR BLD AUTO: 26.2 % (ref 37.5–51)
HGB BLD-MCNC: 8.2 G/DL (ref 13–17.7)
MAGNESIUM SERPL-MCNC: 2.4 MG/DL (ref 1.6–2.4)
MCH RBC QN AUTO: 28.4 PG (ref 26.6–33)
MCHC RBC AUTO-ENTMCNC: 31.3 G/DL (ref 31.5–35.7)
MCV RBC AUTO: 90.7 FL (ref 79–97)
PHOSPHATE SERPL-MCNC: 3.7 MG/DL (ref 2.5–4.5)
PLATELET # BLD AUTO: 305 10*3/MM3 (ref 140–450)
PMV BLD AUTO: 10.1 FL (ref 6–12)
POTASSIUM SERPL-SCNC: 3.9 MMOL/L (ref 3.5–5.2)
RBC # BLD AUTO: 2.89 10*6/MM3 (ref 4.14–5.8)
SODIUM SERPL-SCNC: 141 MMOL/L (ref 136–145)
WBC # BLD AUTO: 10.38 10*3/MM3 (ref 3.4–10.8)

## 2021-06-30 PROCEDURE — 99024 POSTOP FOLLOW-UP VISIT: CPT | Performed by: PHYSICIAN ASSISTANT

## 2021-06-30 PROCEDURE — 94660 CPAP INITIATION&MGMT: CPT

## 2021-06-30 PROCEDURE — 93005 ELECTROCARDIOGRAM TRACING: CPT | Performed by: INTERNAL MEDICINE

## 2021-06-30 PROCEDURE — 25010000002 LORAZEPAM PER 2 MG: Performed by: INTERNAL MEDICINE

## 2021-06-30 PROCEDURE — 25010000002 HALOPERIDOL LACTATE PER 5 MG: Performed by: NURSE PRACTITIONER

## 2021-06-30 PROCEDURE — C1751 CATH, INF, PER/CENT/MIDLINE: HCPCS

## 2021-06-30 PROCEDURE — 99233 SBSQ HOSP IP/OBS HIGH 50: CPT | Performed by: INTERNAL MEDICINE

## 2021-06-30 PROCEDURE — 80048 BASIC METABOLIC PNL TOTAL CA: CPT | Performed by: INTERNAL MEDICINE

## 2021-06-30 PROCEDURE — 82962 GLUCOSE BLOOD TEST: CPT

## 2021-06-30 PROCEDURE — 71045 X-RAY EXAM CHEST 1 VIEW: CPT

## 2021-06-30 PROCEDURE — 63710000001 INSULIN REGULAR HUMAN PER 5 UNITS: Performed by: INTERNAL MEDICINE

## 2021-06-30 PROCEDURE — 84100 ASSAY OF PHOSPHORUS: CPT | Performed by: INTERNAL MEDICINE

## 2021-06-30 PROCEDURE — 85027 COMPLETE CBC AUTOMATED: CPT | Performed by: INTERNAL MEDICINE

## 2021-06-30 PROCEDURE — 25010000002 AMIODARONE IN DEXTROSE 5% 360-4.14 MG/200ML-% SOLUTION: Performed by: INTERNAL MEDICINE

## 2021-06-30 PROCEDURE — 83735 ASSAY OF MAGNESIUM: CPT | Performed by: INTERNAL MEDICINE

## 2021-06-30 PROCEDURE — 97530 THERAPEUTIC ACTIVITIES: CPT

## 2021-06-30 PROCEDURE — C1894 INTRO/SHEATH, NON-LASER: HCPCS

## 2021-06-30 PROCEDURE — 94799 UNLISTED PULMONARY SVC/PX: CPT

## 2021-06-30 RX ORDER — CHOLECALCIFEROL (VITAMIN D3) 125 MCG
5 CAPSULE ORAL NIGHTLY
Status: DISCONTINUED | OUTPATIENT
Start: 2021-06-30 | End: 2021-07-05

## 2021-06-30 RX ORDER — SODIUM CHLORIDE 0.9 % (FLUSH) 0.9 %
10 SYRINGE (ML) INJECTION EVERY 12 HOURS SCHEDULED
Status: DISCONTINUED | OUTPATIENT
Start: 2021-06-30 | End: 2021-07-09 | Stop reason: HOSPADM

## 2021-06-30 RX ORDER — LORAZEPAM 2 MG/ML
1 INJECTION INTRAMUSCULAR ONCE
Status: COMPLETED | OUTPATIENT
Start: 2021-06-30 | End: 2021-06-30

## 2021-06-30 RX ORDER — HALOPERIDOL 5 MG/ML
5 INJECTION INTRAMUSCULAR ONCE
Status: COMPLETED | OUTPATIENT
Start: 2021-06-30 | End: 2021-06-30

## 2021-06-30 RX ORDER — SODIUM CHLORIDE 0.9 % (FLUSH) 0.9 %
10 SYRINGE (ML) INJECTION AS NEEDED
Status: DISCONTINUED | OUTPATIENT
Start: 2021-06-30 | End: 2021-07-09 | Stop reason: HOSPADM

## 2021-06-30 RX ADMIN — FAMOTIDINE 20 MG: 20 TABLET ORAL at 21:23

## 2021-06-30 RX ADMIN — NICOTINE 1 PATCH: 7 PATCH, EXTENDED RELEASE TRANSDERMAL at 08:38

## 2021-06-30 RX ADMIN — Medication 5 MG: at 21:23

## 2021-06-30 RX ADMIN — ATORVASTATIN CALCIUM 40 MG: 40 TABLET, FILM COATED ORAL at 21:23

## 2021-06-30 RX ADMIN — AMIODARONE HYDROCHLORIDE 0.5 MG/MIN: 1.8 INJECTION, SOLUTION INTRAVENOUS at 00:33

## 2021-06-30 RX ADMIN — AMIODARONE HYDROCHLORIDE 200 MG: 200 TABLET ORAL at 08:40

## 2021-06-30 RX ADMIN — INSULIN HUMAN 2 UNITS: 100 INJECTION, SOLUTION PARENTERAL at 11:32

## 2021-06-30 RX ADMIN — QUETIAPINE FUMARATE 25 MG: 25 TABLET ORAL at 21:23

## 2021-06-30 RX ADMIN — LORAZEPAM 2 MG: 1 TABLET ORAL at 21:23

## 2021-06-30 RX ADMIN — HALOPERIDOL LACTATE 5 MG: 5 INJECTION, SOLUTION INTRAMUSCULAR at 00:22

## 2021-06-30 RX ADMIN — FOLIC ACID 1 MG: 1 TABLET ORAL at 08:36

## 2021-06-30 RX ADMIN — METOPROLOL TARTRATE 100 MG: 50 TABLET, FILM COATED ORAL at 08:41

## 2021-06-30 RX ADMIN — AMIODARONE HYDROCHLORIDE 200 MG: 200 TABLET ORAL at 18:31

## 2021-06-30 RX ADMIN — SODIUM CHLORIDE, PRESERVATIVE FREE 10 ML: 5 INJECTION INTRAVENOUS at 21:24

## 2021-06-30 RX ADMIN — LORAZEPAM 1 MG: 1 TABLET ORAL at 01:31

## 2021-06-30 RX ADMIN — METOPROLOL TARTRATE 100 MG: 50 TABLET, FILM COATED ORAL at 21:23

## 2021-06-30 RX ADMIN — THIAMINE HCL TAB 100 MG 100 MG: 100 TAB at 08:39

## 2021-06-30 RX ADMIN — FAMOTIDINE 20 MG: 20 TABLET ORAL at 08:41

## 2021-06-30 RX ADMIN — ASPIRIN 325 MG ORAL TABLET 325 MG: 325 PILL ORAL at 08:37

## 2021-06-30 RX ADMIN — LORAZEPAM 1 MG: 2 INJECTION INTRAMUSCULAR; INTRAVENOUS at 13:05

## 2021-07-01 ENCOUNTER — APPOINTMENT (OUTPATIENT)
Dept: GENERAL RADIOLOGY | Facility: HOSPITAL | Age: 66
End: 2021-07-01

## 2021-07-01 ENCOUNTER — APPOINTMENT (OUTPATIENT)
Dept: CARDIOLOGY | Facility: HOSPITAL | Age: 66
End: 2021-07-01

## 2021-07-01 ENCOUNTER — TRANSCRIBE ORDERS (OUTPATIENT)
Dept: CARDIAC REHAB | Facility: HOSPITAL | Age: 66
End: 2021-07-01

## 2021-07-01 DIAGNOSIS — Z95.1 S/P CABG (CORONARY ARTERY BYPASS GRAFT): Primary | ICD-10-CM

## 2021-07-01 LAB
ANION GAP SERPL CALCULATED.3IONS-SCNC: 9 MMOL/L (ref 5–15)
BUN SERPL-MCNC: 27 MG/DL (ref 8–23)
BUN/CREAT SERPL: 48.2 (ref 7–25)
CALCIUM SPEC-SCNC: 8.9 MG/DL (ref 8.6–10.5)
CHLORIDE SERPL-SCNC: 107 MMOL/L (ref 98–107)
CO2 SERPL-SCNC: 23 MMOL/L (ref 22–29)
CREAT SERPL-MCNC: 0.56 MG/DL (ref 0.76–1.27)
DEPRECATED RDW RBC AUTO: 51.8 FL (ref 37–54)
ERYTHROCYTE [DISTWIDTH] IN BLOOD BY AUTOMATED COUNT: 15.6 % (ref 12.3–15.4)
GFR SERPL CREATININE-BSD FRML MDRD: 146 ML/MIN/1.73
GLUCOSE BLDC GLUCOMTR-MCNC: 131 MG/DL (ref 70–130)
GLUCOSE BLDC GLUCOMTR-MCNC: 145 MG/DL (ref 70–130)
GLUCOSE BLDC GLUCOMTR-MCNC: 145 MG/DL (ref 70–130)
GLUCOSE BLDC GLUCOMTR-MCNC: 163 MG/DL (ref 70–130)
GLUCOSE SERPL-MCNC: 137 MG/DL (ref 65–99)
HCT VFR BLD AUTO: 24.4 % (ref 37.5–51)
HGB BLD-MCNC: 7.7 G/DL (ref 13–17.7)
MAGNESIUM SERPL-MCNC: 2.1 MG/DL (ref 1.6–2.4)
MCH RBC QN AUTO: 28.5 PG (ref 26.6–33)
MCHC RBC AUTO-ENTMCNC: 31.6 G/DL (ref 31.5–35.7)
MCV RBC AUTO: 90.4 FL (ref 79–97)
PHOSPHATE SERPL-MCNC: 4.2 MG/DL (ref 2.5–4.5)
PLATELET # BLD AUTO: 323 10*3/MM3 (ref 140–450)
PMV BLD AUTO: 10.2 FL (ref 6–12)
POTASSIUM SERPL-SCNC: 4.1 MMOL/L (ref 3.5–5.2)
QT INTERVAL: 400 MS
QT INTERVAL: 466 MS
QTC INTERVAL: 502 MS
QTC INTERVAL: 513 MS
RBC # BLD AUTO: 2.7 10*6/MM3 (ref 4.14–5.8)
SODIUM SERPL-SCNC: 139 MMOL/L (ref 136–145)
WBC # BLD AUTO: 10.03 10*3/MM3 (ref 3.4–10.8)

## 2021-07-01 PROCEDURE — 80048 BASIC METABOLIC PNL TOTAL CA: CPT | Performed by: INTERNAL MEDICINE

## 2021-07-01 PROCEDURE — 82962 GLUCOSE BLOOD TEST: CPT

## 2021-07-01 PROCEDURE — 94799 UNLISTED PULMONARY SVC/PX: CPT

## 2021-07-01 PROCEDURE — 99024 POSTOP FOLLOW-UP VISIT: CPT | Performed by: PHYSICIAN ASSISTANT

## 2021-07-01 PROCEDURE — 71045 X-RAY EXAM CHEST 1 VIEW: CPT

## 2021-07-01 PROCEDURE — 99232 SBSQ HOSP IP/OBS MODERATE 35: CPT | Performed by: INTERNAL MEDICINE

## 2021-07-01 PROCEDURE — 94660 CPAP INITIATION&MGMT: CPT

## 2021-07-01 PROCEDURE — 92526 ORAL FUNCTION THERAPY: CPT

## 2021-07-01 PROCEDURE — 93926 LOWER EXTREMITY STUDY: CPT

## 2021-07-01 PROCEDURE — 63710000001 INSULIN REGULAR HUMAN PER 5 UNITS: Performed by: INTERNAL MEDICINE

## 2021-07-01 PROCEDURE — 83735 ASSAY OF MAGNESIUM: CPT | Performed by: INTERNAL MEDICINE

## 2021-07-01 PROCEDURE — 93005 ELECTROCARDIOGRAM TRACING: CPT | Performed by: INTERNAL MEDICINE

## 2021-07-01 PROCEDURE — 92523 SPEECH SOUND LANG COMPREHEN: CPT

## 2021-07-01 PROCEDURE — 97110 THERAPEUTIC EXERCISES: CPT

## 2021-07-01 PROCEDURE — 85027 COMPLETE CBC AUTOMATED: CPT | Performed by: INTERNAL MEDICINE

## 2021-07-01 PROCEDURE — 84100 ASSAY OF PHOSPHORUS: CPT | Performed by: INTERNAL MEDICINE

## 2021-07-01 PROCEDURE — 97116 GAIT TRAINING THERAPY: CPT

## 2021-07-01 PROCEDURE — 25010000002 MORPHINE PER 10 MG: Performed by: THORACIC SURGERY (CARDIOTHORACIC VASCULAR SURGERY)

## 2021-07-01 RX ORDER — AMLODIPINE BESYLATE 5 MG/1
5 TABLET ORAL
Status: DISCONTINUED | OUTPATIENT
Start: 2021-07-01 | End: 2021-07-02

## 2021-07-01 RX ORDER — FUROSEMIDE 40 MG/1
40 TABLET ORAL DAILY
Status: DISCONTINUED | OUTPATIENT
Start: 2021-07-02 | End: 2021-07-04

## 2021-07-01 RX ORDER — LOSARTAN POTASSIUM 50 MG/1
100 TABLET ORAL
Status: DISCONTINUED | OUTPATIENT
Start: 2021-07-01 | End: 2021-07-02

## 2021-07-01 RX ORDER — FUROSEMIDE 40 MG/1
40 TABLET ORAL DAILY
Status: DISCONTINUED | OUTPATIENT
Start: 2021-07-01 | End: 2021-07-01

## 2021-07-01 RX ADMIN — NICARDIPINE HYDROCHLORIDE 5 MG/HR: 0.2 INJECTION, SOLUTION INTRAVENOUS at 14:49

## 2021-07-01 RX ADMIN — INSULIN HUMAN 2 UNITS: 100 INJECTION, SOLUTION PARENTERAL at 05:30

## 2021-07-01 RX ADMIN — Medication 5 MG: at 21:29

## 2021-07-01 RX ADMIN — METOPROLOL TARTRATE 100 MG: 50 TABLET, FILM COATED ORAL at 09:10

## 2021-07-01 RX ADMIN — AMIODARONE HYDROCHLORIDE 200 MG: 200 TABLET ORAL at 09:12

## 2021-07-01 RX ADMIN — MORPHINE SULFATE 2 MG: 2 INJECTION, SOLUTION INTRAMUSCULAR; INTRAVENOUS at 23:21

## 2021-07-01 RX ADMIN — SENNOSIDES 10 ML: 8.8 LIQUID ORAL at 21:29

## 2021-07-01 RX ADMIN — FUROSEMIDE 40 MG: 40 TABLET ORAL at 12:56

## 2021-07-01 RX ADMIN — LORAZEPAM 2 MG: 1 TABLET ORAL at 21:30

## 2021-07-01 RX ADMIN — FOLIC ACID 1 MG: 1 TABLET ORAL at 09:10

## 2021-07-01 RX ADMIN — QUETIAPINE FUMARATE 25 MG: 25 TABLET ORAL at 21:30

## 2021-07-01 RX ADMIN — AMIODARONE HYDROCHLORIDE 200 MG: 200 TABLET ORAL at 01:49

## 2021-07-01 RX ADMIN — AMIODARONE HYDROCHLORIDE 200 MG: 200 TABLET ORAL at 17:10

## 2021-07-01 RX ADMIN — AMLODIPINE BESYLATE 5 MG: 5 TABLET ORAL at 17:10

## 2021-07-01 RX ADMIN — THIAMINE HCL TAB 100 MG 100 MG: 100 TAB at 09:09

## 2021-07-01 RX ADMIN — LORAZEPAM 1 MG: 1 TABLET ORAL at 23:22

## 2021-07-01 RX ADMIN — ASPIRIN 325 MG ORAL TABLET 325 MG: 325 PILL ORAL at 09:09

## 2021-07-01 RX ADMIN — LOSARTAN POTASSIUM 100 MG: 50 TABLET, FILM COATED ORAL at 17:10

## 2021-07-01 RX ADMIN — ATORVASTATIN CALCIUM 40 MG: 40 TABLET, FILM COATED ORAL at 21:29

## 2021-07-01 RX ADMIN — METOPROLOL TARTRATE 100 MG: 50 TABLET, FILM COATED ORAL at 21:29

## 2021-07-01 RX ADMIN — TERAZOSIN HYDROCHLORIDE 2 MG: 1 CAPSULE ORAL at 21:29

## 2021-07-01 RX ADMIN — FAMOTIDINE 20 MG: 20 TABLET ORAL at 09:13

## 2021-07-01 RX ADMIN — NICOTINE 1 PATCH: 7 PATCH, EXTENDED RELEASE TRANSDERMAL at 09:27

## 2021-07-01 RX ADMIN — INSULIN HUMAN 2 UNITS: 100 INJECTION, SOLUTION PARENTERAL at 00:06

## 2021-07-01 RX ADMIN — OXYCODONE HYDROCHLORIDE AND ACETAMINOPHEN 2 TABLET: 5; 325 TABLET ORAL at 21:29

## 2021-07-01 RX ADMIN — FAMOTIDINE 20 MG: 20 TABLET ORAL at 21:30

## 2021-07-01 NOTE — PLAN OF CARE
Goal Outcome Evaluation:  Plan of Care Reviewed With: (P) patient        Progress: (P) improving  Outcome Summary: (P) Pt demonstrated increased independence with transfers and ambulation this session and improved ability to follow commands. Pt performed STS with ModAx2 and ambulated 20' + 5' with ModAx2 +1 for instability. PT plans to continue progressing PT POC as tolerated.

## 2021-07-01 NOTE — PROGRESS NOTES
CTS Progress Note      POD #9 s/p CABG x4  POD #8 s/p Mediastinal exploration      Subjective  In bed, conversant, pleasant.  Still with soft restraints      Objective    Physical Exam:   Vital Signs   Temp:  [97.6 °F (36.4 °C)-98.9 °F (37.2 °C)] 98.9 °F (37.2 °C)  Heart Rate:  [64-99] 72  Resp:  [16-28] 24  BP: ()/(41-97) 146/68   GEN: NAD   CV: Regular rate and rhythm sinus rhythm on telemetry    RESP: Unlabored   EXT: Warm to the touch trace of peripheral edema   Incision: Sternum stable, surgical incisions clean and dry     Results     Results from last 7 days   Lab Units 07/01/21  0447   WBC 10*3/mm3 10.03   HEMOGLOBIN g/dL 7.7*   HEMATOCRIT % 24.4*   PLATELETS 10*3/mm3 323     Results from last 7 days   Lab Units 07/01/21  0447   SODIUM mmol/L 139   POTASSIUM mmol/L 4.1   CHLORIDE mmol/L 107   CO2 mmol/L 23.0   BUN mg/dL 27*   CREATININE mg/dL 0.56*   GLUCOSE mg/dL 137*   CALCIUM mg/dL 8.9           CXR: IMPRESSION:  1. Mild overall improvement in pulmonary interstitial disease, compared  to 06/30/2021 exam. No evidence of pneumothorax or other new chest  pathology.  2. PICC line placement into the mid SVC.      Assessment/Plan   POD #9 s/p CABG x4  POD #8 s/p Mediastinal exploration        CABG 06/22/21    History of tobacco abuse    STEMI    Dyslipidemia    Obesity (BMI 30-39.9)    ST elevation myocardial infarction (STEMI) (CMS/[facility])    Hyperglycemia        Plan   Continue care with nutritional supplementation and tube feeds  Speech pathology possible to see today to reevaluate dysphagia considering the patient's noted improvement in neurological status  Continue evaluation of left and right small stable hematomas.  Patient's intravenous currently controlled on oral amiodarone.  Patient currently not on anticoagulation therapy  Continue medical management with aspirin, beta-blocker and statin    GEO Parekh  07/01/21  08:42 EDT

## 2021-07-01 NOTE — PROGRESS NOTES
Clinical Nutrition     Multidisciplinary Rounds      Patient Name: Thien Gray  Date of Encounter: 07/01/21 10:05 EDT  MRN: [id number]  Admission date: 6/22/2021      Reason for visit: MDR. RD to continue to follow per protocol.     Pt resting in bed, on room air, just taken off Bipap, is more alert, garbled speech    Per RN: pt doing much better today, completely oriented, asking pertinent questions, tolerating TF, has L. groin hematoma    Current diet: NPO Diet  Orders Placed This Encounter      Diet, Tube Feeding Tube Feeding Formula: Peptamen Intense VHP (Peptide Based, Very High Protein)  goal rate @85ml/hr. Free water 30ml Q 2 hours    Intake: 1560ml, 92% goal volume    Intervention:  Follow treatment plan  Care plan reviewed    Suggest SLP zabrina as pt more appropriate today    Follow up:   Per protocol      Negar Corral RD  10:05 EDT  Time: 20min

## 2021-07-01 NOTE — PROGRESS NOTES
"                                 [facility] Progress Note      LOS: 9 days   Patient Care Team:  Sanaz Zuluaga DO as PCP - General (Family Medicine)    Chief Complaint:    Chief Complaint   Patient presents with   • Chest Pain       Subjective     Interval History:     Patient Complaints: Sedated this morning      Review of Systems:   A 14 point review of systems was negative except as was stated in the HPI      Objective     Vital Sign Min/Max for last 24 hours  Temp  Min: 97.6 °F (36.4 °C)  Max: 98.9 °F (37.2 °C)   BP  Min: 71/52  Max: 152/68   Pulse  Min: 64  Max: 99   Resp  Min: 16  Max: 28   SpO2  Min: 78 %  Max: 100 %   Flow (L/min)  Min: 2  Max: 2   Weight  Min: 112 kg (246 lb 4.1 oz)  Max: 112 kg (246 lb 4.1 oz)     Flowsheet Rows      First Filed Value   Admission Height  177.8 cm (70\") Documented at 06/22/2021 1400   Admission Weight  118 kg (260 lb 2.3 oz) Documented at 06/22/2021 2000          Physical Exam:  General Appearance: Poorly responsive  Lungs: Coarse bilateral breath sounds  Heart:: Regular rate and rhythm this morning; no Murmurs, Rubs or Gallops  Abdomen: Soft and nontender with adequate bowel sounds.  No organomegaly  Extremities: No cyanosis, clubbing.  1+ edema  Pulses: Pulses palpable and equal bilaterally  Skin: Warm and dry with no rash  Psych: Normal     Results Review:     I reviewed the patient's new clinical results.  Results from last 7 days   Lab Units 07/01/21 0447 06/30/21  0641 06/29/21  0416   SODIUM mmol/L 139 141 139   POTASSIUM mmol/L 4.1 3.9 3.7   CHLORIDE mmol/L 107 108* 105   CO2 mmol/L 23.0 22.0 26.0   BUN mg/dL 27* 26* 21   CREATININE mg/dL 0.56* 0.53* 0.55*   GLUCOSE mg/dL 137* 125* 130*   CALCIUM mg/dL 8.9 8.3* 8.6     Results from last 7 days   Lab Units 07/01/21 0447 06/30/21  0327 06/29/21  0416   WBC 10*3/mm3 10.03 10.38 9.17   HEMOGLOBIN g/dL 7.7* 8.2* 8.5*   HEMATOCRIT % 24.4* 26.2* 26.3*   PLATELETS 10*3/mm3 323 305 281     Lab Results "   Lab Value Date/Time    TROPONINT 1.730 (C) 06/22/2021 1705    TROPONINT <0.010 06/22/2021 1009     Results from last 7 days   Lab Units 06/28/21  0346   TRIGLYCERIDES mg/dL 70         Results from last 7 days   Lab Units 06/24/21  1104   PH, ARTERIAL pH units 7.498*   PO2 ART mm Hg 64.5*   PCO2, ARTERIAL mm Hg 39.0   HCO3 ART mmol/L 30.3*           Medication Review: yes  Current Facility-Administered Medications   Medication Dose Route Frequency Provider Last Rate Last Admin   • albuterol (PROVENTIL) nebulizer solution 0.083% 2.5 mg/3mL  2.5 mg Nebulization Q6H PRN Zoraida Leon PA-C       • amiodarone (PACERONE) tablet 200 mg  200 mg Nasogastric Q8H Mikey Conrad MD   200 mg at 07/01/21 0149    Followed by   • [START ON 7/7/2021] amiodarone (PACERONE) tablet 200 mg  200 mg Nasogastric Q12H Mikey Conrad MD        Followed by   • [START ON 7/21/2021] amiodarone (PACERONE) tablet 200 mg  200 mg Nasogastric Daily Mikey Conrad MD       • aspirin tablet 325 mg  325 mg Nasogastric Daily Miguel Angel Acosta MD   325 mg at 06/30/21 0837   • atorvastatin (LIPITOR) tablet 40 mg  40 mg Nasogastric Nightly Miguel Angel Acosta MD   40 mg at 06/30/21 2123   • famotidine (PEPCID) tablet 20 mg  20 mg Nasogastric BID Miguel Angel Acosta MD   20 mg at 06/30/21 2123   • folic acid (FOLVITE) tablet 1 mg  1 mg Nasogastric Daily Cedric Murphy MD   1 mg at 06/30/21 0836   • HYDROcodone-acetaminophen (NORCO) 7.5-325 MG per tablet 1 tablet  1 tablet Nasogastric Q4H PRN Miguel Angel Acosta MD       • insulin regular (humuLIN R,novoLIN R) injection 0-9 Units  0-9 Units Subcutaneous Q6H Cedric Murphy MD   2 Units at 07/01/21 0530   • LORazepam (ATIVAN) tablet 1 mg  1 mg Nasogastric Q6H PRN Cedric Murphy MD   1 mg at 06/30/21 0131   • LORazepam (ATIVAN) tablet 2 mg  2 mg Nasogastric Nightly Cedric Murphy MD   2 mg at 06/30/21 2123   • magnesium sulfate 4g/100mL (PREMIX) infusion  4 g Intravenous PRN Dontrell,  MD Glen   4 g at 06/28/21 0607   • melatonin tablet 5 mg  5 mg Nasogastric Nightly Mark Anthony Hawkins, Zack   5 mg at 06/30/21 2123   • metoprolol tartrate (LOPRESSOR) tablet 100 mg  100 mg Nasogastric Q12H Tracy Torres MD   100 mg at 06/30/21 2123   • morphine injection 2 mg  2 mg Intravenous Q2H PRN Miguel Angel Acosta MD   2 mg at 06/25/21 0106   • nicotine (NICODERM CQ) 7 MG/24HR patch 1 patch  1 patch Transdermal Q24H Adria Singh APRN   1 patch at 06/30/21 0838   • ondansetron (ZOFRAN) injection 4 mg  4 mg Intravenous Q6H PRN Zoraida Leon PA-C       • oxyCODONE-acetaminophen (PERCOCET) 5-325 MG per tablet 2 tablet  2 tablet Nasogastric Q4H PRN Miguel Angel Acosta MD       • Pharmacy Consult - MTM   Does not apply Daily Mark Anthony Hawkins, Zack       • potassium chloride (KLOR-CON) packet 40 mEq  40 mEq Nasogastric PRN Cedric Murphy MD       • QUEtiapine (SEROquel) tablet 25 mg  25 mg Nasogastric Nightly Cedric Murphy MD   25 mg at 06/30/21 2123   • sennosides (SENOKOT) 8.8 MG/5ML syrup 10 mL  10 mL Nasogastric BID Miguel Angel Acosta MD   10 mL at 06/28/21 2058   • sodium chloride 0.9 % flush 10 mL  10 mL Intravenous Q12H Cedric Murphy MD   10 mL at 06/30/21 2124   • sodium chloride 0.9 % flush 10 mL  10 mL Intravenous PRN Cedric Murphy MD       • terazosin (HYTRIN) capsule 2 mg  2 mg Nasogastric Nightly Cedric Murphy MD       • thiamine (VITAMIN B-1) tablet 100 mg  100 mg Nasogastric Daily Cedric Murphy MD   100 mg at 06/30/21 0839         CABG 06/22/21    History of tobacco abuse    STEMI    Dyslipidemia    Obesity (BMI 30-39.9)    ST elevation myocardial infarction (STEMI) (CMS/[facility])    Hyperglycemia        Impression      Inferior STEMI 6/22/2021  Emergent CABG for left main and three-vessel coronary artery disease with preserved LV function 6/22/2021  reop for bleeding 6/23/2021 early a.m.  Hypertension  Hyperlipidemia  Remote coronary artery stent [place]  Postop  delirium/EtOH withdrawal  Postop atrial fibrillation/flutter; back in sinus rhythm this morning  Echocardiogram shows mild LV dysfunction with no significant pericardial effusion      Plan     Continue beta-blocker  Pulmonary toilet  Continue amiodarone  Continue aspirin and statin  Gentle diuresis    Mikey Conrad MD   07/01/21  09:08 EDT

## 2021-07-01 NOTE — NURSING NOTE
Pt. Referred for Phase II Cardiac Rehab. Staff discussed benefits of exercise, program protocol, and educational material provided. Teach back verified.  Patient scheduled for orientation at [facility] on 8/23/21 at 1300.

## 2021-07-01 NOTE — THERAPY TREATMENT NOTE
Patient Name: Thien Gray  : 1955    MRN: [id number]                              Today's Date: 2021       Admit Date: 2021    Visit Dx:     ICD-10-CM ICD-9-CM   1. ST elevation myocardial infarction (STEMI), unspecified artery (CMS/HCC)  I21.3 410.90   2. Chest pain, unspecified type  R07.9 786.50   3. Pharyngeal dysphagia  R13.13 787.23   4. Cognitive communication deficit  R41.841 799.52     Patient Active Problem List   Diagnosis   • History of tobacco abuse   • CABG 21   • Dyspnea on exertion   • RBBB   • Psoriasis   • Primary osteoarthritis of both knees   • STEMI   • Dyslipidemia   • Obesity (BMI 30-39.9)   • ST elevation myocardial infarction (STEMI) (CMS/HCC)   • Hyperglycemia     Past Medical History:   Diagnosis Date   • Abnormal ECG 2020    Get from Dr. Kayser   • Coronary artery disease    • History of heart attack    • History of MI (myocardial infarction)    • Hyperlipidemia    • Myocardial infarction (CMS/HCC)    • RBBB 2/10/2020     Past Surgical History:   Procedure Laterality Date   • APPENDECTOMY     • CARDIAC CATHETERIZATION  2004    They used a catheter to put in my stent.   • CARDIAC CATHETERIZATION N/A 2021    Procedure: Left Heart Cath;  Surgeon: Mikey Conrad MD;  Location: [facility] CATH INVASIVE LOCATION;  Service: Cardiovascular;  Laterality: N/A;   • CORONARY ANGIOPLASTY      I think that's what the bill for my stent said.   • CORONARY ARTERY BYPASS BRING BACK FOR EXPLORATION N/A 2021    Procedure: BRING BACK FOR EXPLORATION OPEN HEART;  Surgeon: Miguel Angel Acosta MD;  Location: [facility] OR;  Service: Cardiothoracic;  Laterality: N/A;   • CORONARY ARTERY BYPASS GRAFT N/A 2021    Procedure: MEDIAN STERNOTOMY, CORONARY ARTERY BYPASS GRAFTING X 4 WITH LEFT INTERNAL MAMMARY ARTERY GRAFT, ENDOSCOPIC VEIN HARVESTING OF THE RIGHT GREATER SAPHENOUS VEIN, INTRA-AORTIC BALLOON PUMP INSERTION, IVAN PER ANESTHESIA;  Surgeon: Dave  MD Miguel Angel;  Location: [facility];  Service: Cardiothoracic;  Laterality: N/A;   • CORONARY STENT PLACEMENT  2004     General Information     Row Name 07/01/21 1513          Physical Therapy Time and Intention    Document Type  therapy note (daily note)  (Pended)   -     Mode of Treatment  physical therapy  (Pended)   -[facility] Name 07/01/21 1513          General Information    Existing Precautions/Restrictions  cardiac;fall;oxygen therapy device and L/min;sternal;other (see comments)  (Pended)  NG, confusion, difficulty following commands, impulsive  -[facility] Name 07/01/21 1513          Cognition    Orientation Status (Cognition)  oriented to;person  (Pended)   -       User Key  (r) = Recorded By, (t) = Taken By, (c) = Cosigned By    Initials Name Provider Type     Ned Ortez, PT Student PT Student        Mobility     [facility] Name 07/01/21 1513          Bed Mobility    Bed Mobility  supine-sit  (Pended)   -     Supine-Sit [place] (Bed Mobility)  maximum assist (25% patient effort);2 person assist;verbal cues  (Pended)   -     Sit-Supine [place] (Bed Mobility)  maximum assist (25% patient effort);2 person assist;verbal cues  (Pended)   -     Assistive Device (Bed Mobility)  draw sheet;head of bed elevated  (Pended)   -     Comment (Bed Mobility)  Pt required VCs for proper sequencing and hand placement. Pt with difficulty following commands for sternal precautions. Pt required assist for trunk support and positioning of BLEs in bed.  (Pended)   -[facility] Name 07/01/21 1513          Transfers    Comment (Transfers)  Pt performed STS from EOB x2 and from recliner x1. Pt requires VCs for proper technique and for sternal precautions. Pt initially required ModAx2 for STS although regressed to requiring MaxAx2 for stand pivot transfer from chair to bed secondary to fatigue. Pt demonstrated forward flexed posture and anterior weight shift during standing, requiring VCs to correct.  (Pended)    -     Row Name 07/01/21 1513          Bed-Chair Transfer    Bed-Chair [place] (Transfers)  maximum assist (25% patient effort);2 person assist;1 person to manage equipment;verbal cues  (Pended)   -     Assistive Device (Bed-Chair Transfers)  other (see comments)  (Pended)  BUE support  -     Row Name 07/01/21 1513          Sit-Stand Transfer    Sit-Stand [place] (Transfers)  moderate assist (50% patient effort);2 person assist;verbal cues;1 person to manage equipment  (Pended)   -     Assistive Device (Sit-Stand Transfers)  other (see comments)  (Pended)  BUE support  -     Row Name 07/01/21 1513          Gait/Stairs (Locomotion)    [place] Level (Gait)  moderate assist (50% patient effort);2 person assist;1 person to manage equipment;verbal cues  (Pended)   -     Assistive Device (Gait)  other (see comments)  (Pended)  BUE support  -     Distance in Feet (Gait)  20 + 5  (Pended)   -     Deviations/Abnormal Patterns (Gait)  bilateral deviations;base of support, wide;jp decreased;festinating/shuffling;stride length decreased;gait speed decreased  (Pended)   -     Bilateral Gait Deviations  forward flexed posture;heel strike decreased  (Pended)   -     Comment (Gait/Stairs)  Pt demonstrates significantly slowed gait speed and decreased step length throughout ambulation. Pt ambulated 5 feet forward/backward, with difficulty clearing bilateral feet from floor during retro walking. Pt required ModAx2 +1 due to instability, with distance limited by fatigue.  (Pended)   -       User Key  (r) = Recorded By, (t) = Taken By, (c) = Cosigned By    Initials Name Provider Type     Ned Ortez, PT Student PT Student        Obj/Interventions     [facility] Name 07/01/21 1520          Motor Skills    Therapeutic Exercise  hip;knee;ankle  (Pended)   -[facility] Name 07/01/21 1520          Hip (Therapeutic Exercise)    Hip (Therapeutic Exercise)  AROM (active range of motion)  (Pended)   -      Hip AROM (Therapeutic Exercise)  bilateral;supine;aBduction;aDduction;flexion;extension;10 repetitions  (Pended)   -Count includes the [facility] Name 07/01/21 1520          Knee (Therapeutic Exercise)    Knee (Therapeutic Exercise)  AROM (active range of motion)  (Pended)   -     Knee AROM (Therapeutic Exercise)  bilateral;supine;flexion;extension;10 repetitions  (Pended)   -Count includes the [facility] Name 07/01/21 1520          Ankle (Therapeutic Exercise)    Ankle (Therapeutic Exercise)  AROM (active range of motion)  (Pended)   -     Ankle AROM (Therapeutic Exercise)  bilateral;supine;dorsiflexion;plantarflexion;10 repetitions  (Pended)   -Count includes the [facility] Name 07/01/21 1520          Balance    Balance Assessment  sitting static balance;standing static balance  (Pended)   -     Static Sitting Balance  mild impairment;supported;sitting, edge of bed  (Pended)   -     Static Standing Balance  moderate impairment;supported;standing  (Pended)   -     Comment, Balance  Pt required CGA while sitting EOB  (Pended)   -       User Key  (r) = Recorded By, (t) = Taken By, (c) = Cosigned By    Initials Name Provider Type     Ned Ortez, PT Student PT Student        Goals/Plan    No documentation.       Clinical Impression     [facility] Name 07/01/21 1522          Pain Scale: FACES Pre/Post-Treatment    Pain: FACES Scale, Pretreatment  0-->no hurt  (Pended)   -     Posttreatment Pain Rating  0-->no hurt  (Pended)   -Count includes the [facility] Name 07/01/21 1522          Plan of Care Review    Plan of Care Reviewed With  patient  (Pended)   [facility]     Progress  improving  (Pended)   -     Outcome Summary  Pt demonstrated increased independence with transfers and ambulation this session and improved ability to follow commands. Pt performed STS with ModAx2 and ambulated 20' + 5' with ModAx2 +1 for instability. PT plans to continue progressing PT POC as tolerated.  (Pended)   -Count includes the [facility] Name 07/01/21 1522          Vital Signs    Pre Systolic BP Rehab  170  (Pended)    -     Pre Treatment Diastolic BP  61  (Pended)   -     Post Systolic BP Rehab  154  (Pended)   -     Post Treatment Diastolic BP  77  (Pended)   -     Pretreatment Heart Rate (beats/min)  86  (Pended)   -     Posttreatment Heart Rate (beats/min)  85  (Pended)   -     Pre SpO2 (%)  100  (Pended)   -     O2 Delivery Pre Treatment  room air  (Pended)   -     Post SpO2 (%)  92  (Pended)   -     O2 Delivery Post Treatment  room air  (Pended)   -     Pre Patient Position  Supine  (Pended)   -     Post Patient Position  Supine  (Pended)   -     Row Name 07/01/21 1522          Positioning and Restraints    Pre-Treatment Position  in bed  (Pended)   -     Post Treatment Position  bed  (Pended)   -     In Bed  notified nsg;fowlers;call light within reach;encouraged to call for assist;exit alarm on;RUE elevated;LUE elevated  (Pended)   -       User Key  (r) = Recorded By, (t) = Taken By, (c) = Cosigned By    Initials Name Provider Type    Ned Severino, PT Student PT Student        Outcome Measures     Row Name 07/01/21 1528 07/01/21 0800       How much help from another person do you currently need...    Turning from your back to your side while in flat bed without using bedrails?  2  (Pended)   -LH  2  -MM    Moving from lying on back to sitting on the side of a flat bed without bedrails?  2  (Pended)   -LH  2  -MM    Moving to and from a bed to a chair (including a wheelchair)?  2  (Pended)   -LH  2  -MM    Standing up from a chair using your arms (e.g., wheelchair, bedside chair)?  2  (Pended)   -  2  -MM    Climbing 3-5 steps with a railing?  1  (Pended)   -  1  -MM    To walk in hospital room?  2  (Pended)   -  1  -MM    AM-PAC 6 Clicks Score (PT)  11  (Pended)   -  10  -MM      User Key  (r) = Recorded By, (t) = Taken By, (c) = Cosigned By    Initials Name Provider Type    Ant Barney, RN Registered Nurse    Ned Severino, PT Student PT Student        Physical  Therapy Education                 Title: PT OT SLP Therapies (In Progress)     Topic: Physical Therapy (Done)     Point: Mobility training (Done)     Learning Progress Summary           Patient Acceptance, E, NR,DU by  at 7/1/2021 1529    Acceptance, E, NR by KG at 6/30/2021 1421    Acceptance, E, NR by KG at 6/29/2021 1101    Acceptance, E, NR by KG at 6/28/2021 1040    Acceptance, E, NR by KG at 6/25/2021 1121                   Point: Home exercise program (Done)     Learning Progress Summary           Patient Acceptance, E, NR,DU by  at 7/1/2021 1529    Acceptance, E, NR by KG at 6/30/2021 1421    Acceptance, E, NR by KG at 6/29/2021 1101    Acceptance, E, NR by KG at 6/28/2021 1040                   Point: Body mechanics (Done)     Learning Progress Summary           Patient Acceptance, E, NR,DU by  at 7/1/2021 1529    Acceptance, E, NR by KG at 6/30/2021 1421    Acceptance, E, NR by KG at 6/29/2021 1101    Acceptance, E, NR by KG at 6/28/2021 1040    Acceptance, E, NR by KG at 6/25/2021 1121                   Point: Precautions (Done)     Learning Progress Summary           Patient Acceptance, E, NR,DU by  at 7/1/2021 1529    Acceptance, E, NR by KG at 6/30/2021 1421    Acceptance, E, NR by KG at 6/29/2021 1101    Acceptance, E, NR by KG at 6/28/2021 1040    Acceptance, E, NR by KG at 6/25/2021 1121                               User Key     Initials Effective Dates Name Provider Type Discipline     05/22/20 -  Kassie Comer, PT Physical Therapist PT     05/17/21 -  Ned Ortez, PT Student PT Student PT              PT Recommendation and Plan     Plan of Care Reviewed With: (P) patient  Progress: (P) improving  Outcome Summary: (P) Pt demonstrated increased independence with transfers and ambulation this session and improved ability to follow commands. Pt performed STS with ModAx2 and ambulated 20' + 5' with ModAx2 +1 for instability. PT plans to continue progressing PT POC as  tolerated.     Time Calculation:   PT Charges     Row Name 07/01/21 1530             Time Calculation    Start Time  1409  (Pended)   -      PT Received On  07/01/21  (Pended)   -         Timed Charges    24783 - PT Therapeutic Exercise Minutes  7  (Pended)   -      29128 - Gait Training Minutes   14  (Pended)   -      26925 - PT Therapeutic Activity Minutes  6  (Pended)   -         Total Minutes    Timed Charges Total Minutes  27  (Pended)   -       Total Minutes  27  (Pended)   -        User Key  (r) = Recorded By, (t) = Taken By, (c) = Cosigned By    Initials Name Provider Type     Annie Ortez, PT Student PT Student        Therapy Charges for Today     Code Description Service Date Service Provider Modifiers Qty    28989312048 HC GAIT TRAINING EA 15 MIN 7/1/2021 Annie Ortez, PT Student GP 1    [id number]  PT THER PROC EA 15 MIN 7/1/2021 Annie Ortez, PT Student GP 1          PT G-Codes  Outcome Measure Options: AM-PAC 6 Clicks Basic Mobility (PT)  AM-PAC 6 Clicks Score (PT): (P) 11    ANNIE ORTEZ PT Student  7/1/2021

## 2021-07-01 NOTE — PLAN OF CARE
Goal Outcome Evaluation:  Plan of Care Reviewed With: patient           Outcome Summary: remains confused alternating drowsy/restless, follow commands most times, no prns given. slept pretty well with bipap on, adequate urine.

## 2021-07-01 NOTE — PROGRESS NOTES
Pulmonary/Critical Care Follow-up     LOS: 9 days   Patient Care Team:  Sanaz Zuluaga DO as PCP - General (Family Medicine)    Chief Complaint/reason: Chest pain/medical management of issues including delirium, respiratory management, electrolyte management postoperatively after CABG.      Chief Complaint   Patient presents with   • Chest Pain     Subjective      Initial history (from ICU note 6/22/2021):    65-year-old male with a PMH of prior tobacco use (quit 34 yrs ago), HTN, CAD s/p stent (2004), and obesity.      Per report, approximately 1.5 hrs prior to presentation patient developed severe burning chest pain / pressure with associated shortness of breath and nausea. EKG on arrival revealed inferior ST elevation. He was emergently taken to Cath Lab by Dr. Conrad where he was found to have severe multivessel CAD (60-70% ostial LM with haziness in proximal suggesting thrombus, 70-80% mid-LAD, 70% RCA) not amenable to PCI. LV function was relatively well-preserved at 55%.      CTS was contacted and he was subsequently taken to OR where he underwent a CABG x4 with IABP placement per Dr. Acosta.     (End of copied text).    Patient went back to the operating room on 6/23/2021 due to bleeding and had evacuation of blood from the right pleural space.  Patient extubated 6/24/2021.  Patient developed confusion/agitation on 6/25/2021.  He has been treated for presumed alcohol withdrawal.    Interval History:     Alternating drowsiness/restlessness overnight.  He is oriented and more appropriate today than he has been.      Spoke to the patient's sister, Radha, by phone.    History taken from: Chart, staff    PMH/FH/Social History were reviewed and updated appropriately in the electronic medical record.     Review of Systems:    Review of 14 systems was completed with positives and pertinent negatives noted in the subjective section.  All other systems reviewed and are negative.         Objective     Vital  Signs  Temp:  [98 °F (36.7 °C)-99.1 °F (37.3 °C)] 99.1 °F (37.3 °C)  Heart Rate:  [66-99] 81  Resp:  [16-26] 20  BP: ()/(41-93) 173/68  06/30 0701 - 07/01 0700  In: 2130 [I.V.:110]  Out: 409   Body mass index is 34.48 kg/m².     IV drips:  niCARdipine, Last Rate: 5 mg/hr (07/01/21 1449)       Physical Exam:     Constitutional:   Awake.  Somewhat drowsy.  Very slightly confused.  No acute distress.   Head:   Normocephalic, without obvious abnormality, atraumatic   Eyes:           Lids and lashes normal, conjunctivae and sclerae normal.  No icterus, no pallor, corneas clear, PER   ENMT:  Ears appear intact with no abnormalities noted        Neck:  No adenopathy, supple, trachea midline   Lungs/Resp:    Normal effort, symmetric chest rise, no crepitus, clear bilaterally                Heart/CV:    Regular rate, normal S1 and S2, no murmur   Abdomen/GI:    Normal bowel sounds, no masses, no organomegaly, soft,        nontender, nondistended   :    Deferred   Extremities/MSK:  No clubbing or cyanosis.  Trace bilateral lower extremity edema.  Normal tone.    No deformities.    Pulses:  Pulses palpable and equal bilaterally   Skin:  No bleeding, bruising or rash   Heme/Lymph:  No cervical or supraclavicular adenopathy.   Neurologic:    Psychiatric:    Moves all extremities with no obvious focal motor deficit.  Cranial nerves 2 - 12 grossly intact, dysarthric speech persists.  Much more calm today.    The above physical exam findings were reviewed and reflect exam findings as of today's exam.   Electronically signed by:Cedric Murphy MD 07/01/21 15:09 EDT    Results Review:     I reviewed the patient's new clinical results.   Results from last 7 days   Lab Units 07/01/21  0447 06/30/21  0641 06/29/21  0416 06/28/21  0346   SODIUM mmol/L 139 141 139 139   POTASSIUM mmol/L 4.1 3.9 3.7 3.8   CHLORIDE mmol/L 107 108* 105 105   CO2 mmol/L 23.0 22.0 26.0 24.0   BUN mg/dL 27* 26* 21 16   CREATININE mg/dL 0.56* 0.53*  0.55* 0.61*   CALCIUM mg/dL 8.9 8.3* 8.6 8.9   BILIRUBIN mg/dL  --   --   --  0.8   ALK PHOS U/L  --   --   --  56   ALT (SGPT) U/L  --   --   --  18   AST (SGOT) U/L  --   --   --  26   GLUCOSE mg/dL 137* 125* 130* 122*     Results from last 7 days   Lab Units 07/01/21  0447 06/30/21  0327 06/29/21  0416 06/27/21  1802 06/27/21  0420 06/26/21  0432 06/25/21  0356   WBC 10*3/mm3 10.03 10.38 9.17   < > 5.30 6.74 6.83   HEMOGLOBIN g/dL 7.7* 8.2* 8.5*  --  6.8* 7.5* 7.4*   HEMATOCRIT % 24.4* 26.2* 26.3*  --  21.1* 23.1* 23.0*   PLATELETS 10*3/mm3 323 305 281   < > 173 171 133*   MONOCYTES % %  --   --   --   --  6.0 8.0 6.0    < > = values in this interval not displayed.         Results from last 7 days   Lab Units 07/01/21 0447 06/30/21  0641 06/29/21  0416 06/28/21  0346   MAGNESIUM mg/dL 2.1 2.4 2.0 1.8   PHOSPHORUS mg/dL 4.2 3.7  --  4.3       I reviewed the patient's new imaging including images and reports.    Chest x-ray 6/30/2021 shows cardiomegaly with small pleural effusions and pulmonary congestion which appears slightly worse to me from yesterday.    Medication Review:   amiodarone, 200 mg, Nasogastric, Q8H   Followed by  [START ON 7/7/2021] amiodarone, 200 mg, Nasogastric, Q12H   Followed by  [START ON 7/21/2021] amiodarone, 200 mg, Nasogastric, Daily  aspirin, 325 mg, Nasogastric, Daily  atorvastatin, 40 mg, Nasogastric, Nightly  famotidine, 20 mg, Nasogastric, BID  folic acid, 1 mg, Nasogastric, Daily  furosemide, 40 mg, Oral, Daily  insulin regular, 0-9 Units, Subcutaneous, Q6H  LORazepam, 2 mg, Nasogastric, Nightly  melatonin, 5 mg, Nasogastric, Nightly  metoprolol tartrate, 100 mg, Nasogastric, Q12H  nicotine, 1 patch, Transdermal, Q24H  pharmacy consult - MTM, , Does not apply, Daily  QUEtiapine, 25 mg, Nasogastric, Nightly  sennosides, 10 mL, Nasogastric, BID  sodium chloride, 10 mL, Intravenous, Q12H  terazosin, 2 mg, Nasogastric, Nightly  thiamine, 100 mg, Nasogastric, Daily      niCARdipine,  5-15 mg/hr, Last Rate: 5 mg/hr (07/01/21 1449)        Assessment/Plan         CABG 06/22/21    History of tobacco abuse    STEMI    Dyslipidemia    Obesity (BMI 30-39.9)    ST elevation myocardial infarction (STEMI) (CMS/[facility])    Hyperglycemia    65 y.o. male remote smoker with history of hypertension, coronary artery disease status post stent in 2004, obesity, BPH, alcohol use, admitted on 6/22/2021 with chest pain and found to have severe multivessel coronary artery disease.    Patient underwent CABG x4 with intra-aortic balloon pump placement by Dr. Acosta on 6/22/2021.  He went back to the operating room on 6/23/2021 for bleeding and had evacuation of blood from the right pleural space.    Postoperative issues include delirium/agitation possibly secondary to alcohol withdrawal, hyperglycemia, hypoxemia requiring supplemental oxygen.    Agitation much better today.    Plan:  1.  Continue PICC line.  2.  Management of agitation/delirium per neurology.  3.  Continue correction insulin. Patient does not have history of diabetes.  4.  Supplemental oxygen, wean as tolerated.  5.  Continue vitamin replacement.  6.  Continue nicotine patch.  7.  Continue terazosin.    Level of Risk High due to:  illness with threat to life or bodily function tenuous respiratory and neurologic status in the setting of recent CABG, probable volume overload, possible alcohol withdrawal and delirium.      Cedric Murphy MD  07/01/21  15:09 EDT      *. Please note that portions of this note were completed with [employer] - a voice recognition program.

## 2021-07-01 NOTE — NURSING NOTE
Prior to central line removal, order for the removal of catheter was verified, patient was assessed, necessary materials were gathered and patient was educated regarding procedure .    Patient was positioned supine to ensure that the insertion site was at or below the level of the heart.    Hands were washed, clean gloves were applied and central line dressing was gently removed. Catheter exit site was not cultured.     A new pair of clean gloves were then applied. Insertion site was cleansed with 2% Chlorhexidine swab using a circular motion beginning at the insertion site and moving outward for 30 seconds and allowed to dry.     Clamp on line was present and clamped.     Patient was instructed to perform Valsalva maneuver as catheter was withdrawn.     The central line was grasped at the insertion site and slowly pulled outward parallel to the skin. Resistance was not met.    After central line was completely removed, a sterile 4x4 gauze pad was used to apply light pressure until bleeding stopped. At that time, petroleum-based gauze and a sterile occlusive dressing was applied to exit site.     Patient was instructed to keep dressing in place for at least 24 hours and to remain in a flat or reclining position for 30 minutes post-removal.     Catheter was inspected after removal and was intact. Tip of central line was not sent for culture. Patient tolerated procedure.

## 2021-07-01 NOTE — PLAN OF CARE
Goal Outcome Evaluation:      Pt is alert and oriented, speech at times is garbled but he is asking appropriate questions and behavior is appropriate. Moves all extremities equally, ambulated in gonz[place] with assist of three. Sats >92% on room air. NSR. Hypertensive at times, cardene started and P.O. losartan and norvasc started to maintain SBP <150. Left groin hematoma noted and assessed frequently. Will continue to monitor.

## 2021-07-01 NOTE — THERAPY EVALUATION
Acute Care - Speech Language Pathology Initial Evaluation  [facility]   Cognitive-Communication Evaluation  & Dysphagia Treatment     Patient Name: Thien Gray  : 1955  MRN: [id number]  Today's Date: 2021               Admit Date: 2021     Visit Dx:    ICD-10-CM ICD-9-CM   1. ST elevation myocardial infarction (STEMI), unspecified artery (CMS/HCC)  I21.3 410.90   2. Chest pain, unspecified type  R07.9 786.50   3. Pharyngeal dysphagia  R13.13 787.23   4. Cognitive communication deficit  R41.841 799.52     Patient Active Problem List   Diagnosis   • History of tobacco abuse   • CABG 21   • Dyspnea on exertion   • RBBB   • Psoriasis   • Primary osteoarthritis of both knees   • STEMI   • Dyslipidemia   • Obesity (BMI 30-39.9)   • ST elevation myocardial infarction (STEMI) (CMS/HCC)   • Hyperglycemia     Past Medical History:   Diagnosis Date   • Abnormal ECG 2020    Get from Dr. Kayser   • Coronary artery disease    • History of heart attack    • History of MI (myocardial infarction)    • Hyperlipidemia    • Myocardial infarction (CMS/HCC)    • RBBB 2/10/2020     Past Surgical History:   Procedure Laterality Date   • APPENDECTOMY     • CARDIAC CATHETERIZATION  2004    They used a catheter to put in my stent.   • CARDIAC CATHETERIZATION N/A 2021    Procedure: Left Heart Cath;  Surgeon: Mikey Conrad MD;  Location: [facility] CATH INVASIVE LOCATION;  Service: Cardiovascular;  Laterality: N/A;   • CORONARY ANGIOPLASTY      I think that's what the bill for my stent said.   • CORONARY ARTERY BYPASS BRING BACK FOR EXPLORATION N/A 2021    Procedure: BRING BACK FOR EXPLORATION OPEN HEART;  Surgeon: Miguel Angel Acosta MD;  Location: [facility] OR;  Service: Cardiothoracic;  Laterality: N/A;   • CORONARY ARTERY BYPASS GRAFT N/A 2021    Procedure: MEDIAN STERNOTOMY, CORONARY ARTERY BYPASS GRAFTING X 4 WITH LEFT INTERNAL MAMMARY ARTERY GRAFT, ENDOSCOPIC VEIN  HARVESTING OF THE RIGHT GREATER SAPHENOUS VEIN, INTRA-AORTIC BALLOON PUMP INSERTION, IVAN PER ANESTHESIA;  Surgeon: Miguel Angel Acosta MD;  Location: [facility], [facility]–1983;  Service: Cardiothoracic;  Laterality: N/A;   • CORONARY STENT PLACEMENT  2004        SLP EVALUATION (last 72 hours)      SLP SLC Evaluation     Row Name 07/01/21 0940                   Communication Assessment/Intervention    Document Type  evaluation  -AC        Subjective Information  no complaints  -AC        Patient Observations  alert;cooperative  -AC        Patient/Family/Caregiver Comments/Observations  No family present.  -AC        Patient Effort  adequate  -AC           General Information    Patient Profile Reviewed  yes  -AC        Pertinent History Of Current Problem  See previous eval. Per neuro--alcohol w/d w/ delirium.  -AC        Prior Level of Function-Communication  unknown  -AC        Plans/Goals Discussed with  patient;agreed upon  -AC        Barriers to Rehab  medically complex  -AC        Patient's Goals for Discharge  return to home;return to work  -AC           Pain Scale: FACES Pre/Post-Treatment    Pain: FACES Scale, Pretreatment  0-->no hurt  -AC        Posttreatment Pain Rating  0-->no hurt  -AC           Comprehension Assessment/Intervention    Comprehension Assessment/Intervention  Auditory Comprehension  -AC           Auditory Comprehension Assessment/Intervention    Auditory Comprehension (Communication)  moderate impairment  -AC        Answers Questions (Communication)  moderate impairment;yes/no;wh questions;personal  -AC        Able to Follow Commands (Communication)  moderate impairment;2-step  -AC        Auditory Comprehension Communication, Comment  Affected by poor attention/cog.  -AC           Expression Assessment/Intervention    Expression Assessment/Intervention  verbal expression  -AC           Verbal Expression Assessment/Intervention    Verbal Expression  unable/difficult to assess;other (see comments)  dysarthria/excess oral secretions affecting intelligibility  -AC        Automatic Speech (Communication)  WFL;response to greeting  -AC           Oral Motor Structure and Function    Dentition Assessment  other (see comments) no lower dentition  -AC        Mucosal Quality  dry  -AC           Oral Musculature and Cranial Nerve Assessment    Oral Motor General Assessment  generalized oral motor weakness  -AC           Motor Speech Assessment/Intervention    Motor Speech Function  moderate impairment;severe impairment;unfamiliar listener  -AC        Characteristics Consistent with Dysarthria  decreased articulation  -AC        Motor Speech, Comment  Suspect affected by poor secretion mgt.  -AC           Cognitive Assessment Intervention- SLP    Cognitive Function (Cognition)  severe impairment  -AC        Orientation Status (Cognition)  severe impairment;place;situation;time;WFL;person  -AC        Attention (Cognitive)  severe impairment;sustained;quiet environment  -AC           SLP Clinical Impressions    SLP Diagnosis  Severe cognitive-communication disorder and moderate-severe dysarthria (which appeared to be at least somewhat affected by poor secretion management). Pt would benefit from cont'd assessment of cognitive-linguistic skills in dx tx.  -AC        Rehab Potential/Prognosis  good  -AC        SLC Criteria for Skilled Therapy Interventions Met  yes  -AC        Functional Impact  difficulty communicating wants, needs;difficulty communicating in an emergency;difficulty in expressing complex messages;functional impact in ADLs;restrictions in personal and social life;decreased ability to respond to situations safely  -AC           Recommendations    Therapy Frequency (SLP SLC)  5 days per week  -AC        Predicted Duration Therapy Intervention (Days)  until discharge  -AC        Anticipated Discharge Disposition (SLP)  anticipate therapy at next level of care  -AC          User Key  (r) = Recorded By, (t) =  Taken By, (c) = Cosigned By    Initials Name Effective Dates    AC Calista Agarwal, MS CCC-SLP 06/16/21 -              EDUCATION  The patient has been educated in the following areas:     Cognitive Impairment Communication Impairment.    SLP Recommendation and Plan  SLP Diagnosis: Severe cognitive-communication disorder and moderate-severe dysarthria (which appeared to be at least somewhat affected by poor secretion management). Pt would benefit from cont'd assessment of cognitive-linguistic skills in dx tx.        SLC Criteria for Skilled Therapy Interventions Met: yes  Anticipated Discharge Disposition (SLP): anticipate therapy at next level of care        Predicted Duration Therapy Intervention (Days): until discharge          Plan of Care Reviewed With: patient  Progress: no change      SLP GOALS     Row Name 07/01/21 0940             Oral Nutrition/Hydration Goal 1 (SLP)    Oral Nutrition/Hydration Goal 1, SLP  LTG: Pt will improve swallow function in order to return to PO diet w/ no overt s/sxs aspiration/distress w/ 100% acc and no cues  -AC      Time Frame (Oral Nutrition/Hydration Goal 1, SLP)  by discharge  -AC      Progress/Outcomes (Oral Nutrition/Hydration Goal 1, SLP)  continuing progress toward goal  -AC         Oral Nutrition/Hydration Goal 2 (SLP)    Oral Nutrition/Hydration Goal 2, SLP  Pt will tolerate therapeutic trials of thin H2O & puree w/ no overt s/sxs aspiration/distress w/ 60% acc and no cues in order to assess appropriateness for repeat instrumental eval.  -AC      Time Frame (Oral Nutrition/Hydration Goal 2, SLP)  short term goal (STG)  -AC      Barriers (Oral Nutrition/Hydration Goal 2, SLP)  Trialed ice chips following oral care. Pt exhibited wet vocal quality and required oral suctioning. Poor secretion mgt.  -AC      Progress/Outcomes (Oral Nutrition/Hydration Goal 2, SLP)  continuing progress toward goal  -AC         Pharyngeal Strengthening Exercise Goal 1 (SLP)    Increase Superior  Movement of the Hyolaryngeal Complex  effortful pitch glide (falsetto + pharyngeal squeeze)  -AC      Increase Anterior Movement of the Hyolaryngeal Complex  chin tuck against resistance (CTAR)  -AC      Increase Closure at Entrance to Airway/Closure of Airway at Glottis  supraglottic swallow  -AC      Increase Squeeze/Positive Pressure Generation  hard effortful swallow  -AC      Henry/Accuracy (Pharyngeal Strengthening Goal 1, SLP)  with minimal cues (75-90% accuracy)  -AC      Time Frame (Pharyngeal Strengthening Goal 1, SLP)  short term goal (STG)  -AC      Barriers (Pharyngeal Strengthening Goal 1, SLP)  Effortful swallow x10, Falsetto x5, SSG x5 w/ max cues. Despite max cues, pt u/a to successfully execute CTAR.  -AC      Progress/Outcomes (Pharyngeal Strengthening Goal 1, SLP)  continuing progress toward goal  -AC         Articulation Goal 1 (SLP)    Improve Articulation Goal 1 (SLP)  by over-articulating at word level;90%;with minimal cues (75-90%)  -AC      Time Frame (Articulation Goal 1, SLP)  short term goal (STG)  -AC         Attention Goal 1 (SLP)    Improve Attention by Goal 1 (SLP)  looking at speaker;attending to task;90%;with moderate cues (50-74%)  -AC      Time Frame (Attention Goal 1, SLP)  short term goal (STG)  -AC         Orientation Goal 1 (SLP)    Improve Orientation Through Goal 1 (SLP)  demonstrating orientation to year;demonstrating orientation to place;demonstrating orientation to disease/impairment;demonstrating orientation to month;demonstrating orientation to day;90%;with moderate cues (50-74%)  -AC      Time Frame (Orientation Goal 1, SLP)  short term goal (STG)  -AC         Additional Goal 1 (SLP)    Additional Goal 1, SLP  LTG: Pt will improve cognitive-communication skills in order to participate in care w/ 100% acc w/o cues.  -AC      Time Frame (Additional Goal 1, SLP)  by discharge  -AC        User Key  (r) = Recorded By, (t) = Taken By, (c) = Cosigned By    Initials  Name Provider Type    Calista Mock MS CCC-SLP Speech and Language Pathologist                  Time Calculation:     Time Calculation- SLP     Row Name 21 1248             Time Calculation- SLP    SLP Start Time  0940  -AC      SLP Received On  21  -AC         Untimed Charges    28164-PV Eval Speech and Production w/ Language Minutes  25  -AC      77964-EO Treatment Swallow Minutes  30  -AC         Total Minutes    Untimed Charges Total Minutes  55  -AC       Total Minutes  55  -AC        User Key  (r) = Recorded By, (t) = Taken By, (c) = Cosigned By    Initials Name Provider Type    Calista Mock, MS CCC-SLP Speech and Language Pathologist          Therapy Charges for Today     Code Description Service Date Service Provider Modifiers Qty    09474305611 HC ST EVAL SPEECH AND PROD W LANG  2 2021 Calista Agarwal, MS CCC-SLP GN 1    18845429683 HC ST TREATMENT SWALLOW 2 2021 Calista Agarwal MS CCC-SLP GN 1                     Calista Agarwal MS CCC-SLP  2021 and Acute Care - Speech Language Pathology   Swallow Treatment Note [facility]     Patient Name: Thien Gray  : 1955  MRN: [id number]  Today's Date: 2021               Admit Date: 2021    Visit Dx:     ICD-10-CM ICD-9-CM   1. ST elevation myocardial infarction (STEMI), unspecified artery (CMS/HCC)  I21.3 410.90   2. Chest pain, unspecified type  R07.9 786.50   3. Pharyngeal dysphagia  R13.13 787.23   4. Cognitive communication deficit  R41.841 799.52     Patient Active Problem List   Diagnosis   • History of tobacco abuse   • CABG 21   • Dyspnea on exertion   • RBBB   • Psoriasis   • Primary osteoarthritis of both knees   • STEMI   • Dyslipidemia   • Obesity (BMI 30-39.9)   • ST elevation myocardial infarction (STEMI) (CMS/HCC)   • Hyperglycemia     Past Medical History:   Diagnosis Date   • Abnormal ECG 2020    Get from Dr. Kayser   • Coronary artery disease    • History of heart attack     • History of MI (myocardial infarction)    • Hyperlipidemia    • Myocardial infarction (CMS/HCC)    • RBBB 2/10/2020     Past Surgical History:   Procedure Laterality Date   • APPENDECTOMY  1999   • CARDIAC CATHETERIZATION  1/1/2004    They used a catheter to put in my stent.   • CARDIAC CATHETERIZATION N/A 6/22/2021    Procedure: Left Heart Cath;  Surgeon: Mikey Conrad MD;  Location:  [facility] CATH INVASIVE LOCATION;  Service: Cardiovascular;  Laterality: N/A;   • CORONARY ANGIOPLASTY  2004    I think that's what the bill for my stent said.   • CORONARY ARTERY BYPASS BRING BACK FOR EXPLORATION N/A 6/23/2021    Procedure: BRING BACK FOR EXPLORATION OPEN HEART;  Surgeon: Miguel Angel Acosta MD;  Location:  [facility] OR;  Service: Cardiothoracic;  Laterality: N/A;   • CORONARY ARTERY BYPASS GRAFT N/A 6/22/2021    Procedure: MEDIAN STERNOTOMY, CORONARY ARTERY BYPASS GRAFTING X 4 WITH LEFT INTERNAL MAMMARY ARTERY GRAFT, ENDOSCOPIC VEIN HARVESTING OF THE RIGHT GREATER SAPHENOUS VEIN, INTRA-AORTIC BALLOON PUMP INSERTION, IVAN PER ANESTHESIA;  Surgeon: Miguel Angel Acosta MD;  Location:  [facility] OR;  Service: Cardiothoracic;  Laterality: N/A;   • CORONARY STENT PLACEMENT  2004        SWALLOW EVALUATION (last 72 hours)      SLP Adult Swallow Evaluation     Row Name 07/01/21 0955                   Rehab Evaluation    Document Type  therapy note (daily note)  -AC        Care Plan Review  evaluation/treatment results reviewed;care plan/treatment goals reviewed;risks/benefits reviewed;current/potential barriers reviewed;patient/other agree to care plan  -AC        Patient Effort  adequate  -AC           Pain Scale: FACES Pre/Post-Treatment    Pain: FACES Scale, Pretreatment  0-->no hurt  -AC        Posttreatment Pain Rating  0-->no hurt  -AC           Clinical Impression    Daily Summary of Progress (SLP)  progress towards functional goals is fair  -AC        Barriers to Overall Progress (SLP)  Cog status, poor secretion mgt  -AC         Plan for Continued Treatment (SLP)  Pt cont to exhibit clinical s/sxs aspiration and poor secretion management. Not safe for PO diet. Would benefit from cont'd dysphagia tx.   -AC           Recommendations    Anticipated Discharge Disposition (SLP)  anticipate therapy at next level of care  -AC          User Key  (r) = Recorded By, (t) = Taken By, (c) = Cosigned By    Initials Name Effective Dates     Calista Agarwal, MS CCC-SLP 06/16/21 -           EDUCATION  The patient has been educated in the following areas:   Dysphagia (Swallowing Impairment) Oral Care/Hydration NPO rationale.    SLP Recommendation and Plan      Anticipated Discharge Disposition (SLP): anticipate therapy at next level of care        Predicted Duration Therapy Intervention (Days): until discharge     Daily Summary of Progress (SLP): progress towards functional goals is fair    Plan for Continued Treatment (SLP): Pt cont to exhibit clinical s/sxs aspiration and poor secretion management. Not safe for PO diet. Would benefit from cont'd dysphagia tx.           Plan of Care Reviewed With: patient  Progress: no change    SLP GOALS     Row Name 07/01/21 0940             Oral Nutrition/Hydration Goal 1 (SLP)    Oral Nutrition/Hydration Goal 1, SLP  LTG: Pt will improve swallow function in order to return to PO diet w/ no overt s/sxs aspiration/distress w/ 100% acc and no cues  -AC      Time Frame (Oral Nutrition/Hydration Goal 1, SLP)  by discharge  -AC      Progress/Outcomes (Oral Nutrition/Hydration Goal 1, SLP)  continuing progress toward goal  -AC         Oral Nutrition/Hydration Goal 2 (SLP)    Oral Nutrition/Hydration Goal 2, SLP  Pt will tolerate therapeutic trials of thin H2O & puree w/ no overt s/sxs aspiration/distress w/ 60% acc and no cues in order to assess appropriateness for repeat instrumental eval.  -AC      Time Frame (Oral Nutrition/Hydration Goal 2, SLP)  short term goal (STG)  -AC      Barriers (Oral Nutrition/Hydration Goal  2, SLP)  Trialed ice chips following oral care. Pt exhibited wet vocal quality and required oral suctioning. Poor secretion mgt.  -AC      Progress/Outcomes (Oral Nutrition/Hydration Goal 2, SLP)  continuing progress toward goal  -AC         Pharyngeal Strengthening Exercise Goal 1 (SLP)    Increase Superior Movement of the Hyolaryngeal Complex  effortful pitch glide (falsetto + pharyngeal squeeze)  -AC      Increase Anterior Movement of the Hyolaryngeal Complex  chin tuck against resistance (CTAR)  -AC      Increase Closure at Entrance to Airway/Closure of Airway at Glottis  supraglottic swallow  -AC      Increase Squeeze/Positive Pressure Generation  hard effortful swallow  -AC      Todd/Accuracy (Pharyngeal Strengthening Goal 1, SLP)  with minimal cues (75-90% accuracy)  -AC      Time Frame (Pharyngeal Strengthening Goal 1, SLP)  short term goal (STG)  -AC      Barriers (Pharyngeal Strengthening Goal 1, SLP)  Effortful swallow x10, Falsetto x5, SSG x5 w/ max cues. Despite max cues, pt u/a to successfully execute CTAR.  -AC      Progress/Outcomes (Pharyngeal Strengthening Goal 1, SLP)  continuing progress toward goal  -AC         Articulation Goal 1 (SLP)    Improve Articulation Goal 1 (SLP)  by over-articulating at word level;90%;with minimal cues (75-90%)  -AC      Time Frame (Articulation Goal 1, SLP)  short term goal (STG)  -AC         Attention Goal 1 (SLP)    Improve Attention by Goal 1 (SLP)  looking at speaker;attending to task;90%;with moderate cues (50-74%)  -AC      Time Frame (Attention Goal 1, SLP)  short term goal (STG)  -AC         Orientation Goal 1 (SLP)    Improve Orientation Through Goal 1 (SLP)  demonstrating orientation to year;demonstrating orientation to place;demonstrating orientation to disease/impairment;demonstrating orientation to month;demonstrating orientation to day;90%;with moderate cues (50-74%)  -AC      Time Frame (Orientation Goal 1, SLP)  short term goal (STG)  -AC          Additional Goal 1 (SLP)    Additional Goal 1, SLP  LTG: Pt will improve cognitive-communication skills in order to participate in care w/ 100% acc w/o cues.  -AC      Time Frame (Additional Goal 1, SLP)  by discharge  -AC        User Key  (r) = Recorded By, (t) = Taken By, (c) = Cosigned By    Initials Name Provider Type    Calista Mock MS CCC-SLP Speech and Language Pathologist             Time Calculation:   Time Calculation- SLP     Row Name 07/01/21 1248             Time Calculation- SLP    SLP Start Time  0940  -AC      SLP Received On  07/01/21  -AC         Untimed Charges    71171-ES Eval Speech and Production w/ Language Minutes  25  -AC      [id number]-PU Treatment Swallow Minutes  30  -AC         Total Minutes    Untimed Charges Total Minutes  55  -AC       Total Minutes  55  -AC        User Key  (r) = Recorded By, (t) = Taken By, (c) = Cosigned By    Initials Name Provider Type    Calista Mock MS CCC-SLP Speech and Language Pathologist          Therapy Charges for Today     Code Description Service Date Service Provider Modifiers Qty    36950728555 HC ST EVAL SPEECH AND PROD W LANG  2 7/1/2021 Calista Agarwal MS CCC-SLP GN 1    [id number] HC ST TREATMENT SWALLOW 2 7/1/2021 Calista Agarwal MS CCC-SLP GN 1        Patient was not wearing a face mask and did exhibit coughing during this therapy encounter.  Procedure performed was aerosolizing, involved close contact (within 6 feet for at least 15 minutes or longer), and involved contact with infectious secretions or specimens.  Therapist used appropriate personal protective equipment including gloves, standard procedure mask and eye protection.  Appropriate PPE was worn during the entire therapy session.  Hand hygiene was completed before and after therapy session.          MS DOC Pastor  7/1/2021

## 2021-07-01 NOTE — PLAN OF CARE
Goal Outcome Evaluation:  Plan of Care Reviewed With: patient        Progress: no change   SLP evaluation and treatment completed. Will continue to address dysphagia and cognitive-communication disorder in tx. Please see note for further details and recommendations.

## 2021-07-02 ENCOUNTER — APPOINTMENT (OUTPATIENT)
Dept: GENERAL RADIOLOGY | Facility: HOSPITAL | Age: 66
End: 2021-07-02

## 2021-07-02 ENCOUNTER — APPOINTMENT (OUTPATIENT)
Dept: CARDIOLOGY | Facility: HOSPITAL | Age: 66
End: 2021-07-02

## 2021-07-02 LAB
DEPRECATED RDW RBC AUTO: 57.4 FL (ref 37–54)
ERYTHROCYTE [DISTWIDTH] IN BLOOD BY AUTOMATED COUNT: 16.2 % (ref 12.3–15.4)
GLUCOSE BLDC GLUCOMTR-MCNC: 104 MG/DL (ref 70–130)
GLUCOSE BLDC GLUCOMTR-MCNC: 143 MG/DL (ref 70–130)
GLUCOSE BLDC GLUCOMTR-MCNC: 145 MG/DL (ref 70–130)
HCT VFR BLD AUTO: 28.7 % (ref 37.5–51)
HGB BLD-MCNC: 8.4 G/DL (ref 13–17.7)
MCH RBC QN AUTO: 28.2 PG (ref 26.6–33)
MCHC RBC AUTO-ENTMCNC: 29.3 G/DL (ref 31.5–35.7)
MCV RBC AUTO: 96.3 FL (ref 79–97)
PLATELET # BLD AUTO: 351 10*3/MM3 (ref 140–450)
PMV BLD AUTO: 10.4 FL (ref 6–12)
QT INTERVAL: 422 MS
QTC INTERVAL: 471 MS
RBC # BLD AUTO: 2.98 10*6/MM3 (ref 4.14–5.8)
WBC # BLD AUTO: 12.47 10*3/MM3 (ref 3.4–10.8)

## 2021-07-02 PROCEDURE — 85027 COMPLETE CBC AUTOMATED: CPT | Performed by: THORACIC SURGERY (CARDIOTHORACIC VASCULAR SURGERY)

## 2021-07-02 PROCEDURE — 76942 ECHO GUIDE FOR BIOPSY: CPT | Performed by: RADIOLOGY

## 2021-07-02 PROCEDURE — 74018 RADEX ABDOMEN 1 VIEW: CPT

## 2021-07-02 PROCEDURE — 25010000002 MORPHINE PER 10 MG: Performed by: RADIOLOGY

## 2021-07-02 PROCEDURE — 93005 ELECTROCARDIOGRAM TRACING: CPT | Performed by: INTERNAL MEDICINE

## 2021-07-02 PROCEDURE — 93926 LOWER EXTREMITY STUDY: CPT

## 2021-07-02 PROCEDURE — 94660 CPAP INITIATION&MGMT: CPT

## 2021-07-02 PROCEDURE — 82962 GLUCOSE BLOOD TEST: CPT

## 2021-07-02 PROCEDURE — 36002 PSEUDOANEURYSM INJECTION TRT: CPT | Performed by: RADIOLOGY

## 2021-07-02 PROCEDURE — 99231 SBSQ HOSP IP/OBS SF/LOW 25: CPT | Performed by: PSYCHIATRY & NEUROLOGY

## 2021-07-02 PROCEDURE — 94799 UNLISTED PULMONARY SVC/PX: CPT

## 2021-07-02 PROCEDURE — 04FL3Z0 FRAGMENTATION OF LEFT FEMORAL ARTERY, PERCUTANEOUS APPROACH, ULTRASONIC: ICD-10-PCS | Performed by: RADIOLOGY

## 2021-07-02 PROCEDURE — 97530 THERAPEUTIC ACTIVITIES: CPT

## 2021-07-02 PROCEDURE — 99233 SBSQ HOSP IP/OBS HIGH 50: CPT | Performed by: INTERNAL MEDICINE

## 2021-07-02 PROCEDURE — 99024 POSTOP FOLLOW-UP VISIT: CPT | Performed by: THORACIC SURGERY (CARDIOTHORACIC VASCULAR SURGERY)

## 2021-07-02 PROCEDURE — 92526 ORAL FUNCTION THERAPY: CPT

## 2021-07-02 PROCEDURE — 36002 PSEUDOANEURYSM INJECTION TRT: CPT

## 2021-07-02 RX ORDER — QUETIAPINE FUMARATE 25 MG/1
50 TABLET, FILM COATED ORAL NIGHTLY
Status: DISCONTINUED | OUTPATIENT
Start: 2021-07-02 | End: 2021-07-03

## 2021-07-02 RX ORDER — LIDOCAINE HYDROCHLORIDE 10 MG/ML
INJECTION, SOLUTION EPIDURAL; INFILTRATION; INTRACAUDAL; PERINEURAL AS NEEDED
Status: DISCONTINUED | OUTPATIENT
Start: 2021-07-02 | End: 2021-07-02 | Stop reason: HOSPADM

## 2021-07-02 RX ORDER — LOSARTAN POTASSIUM 50 MG/1
100 TABLET ORAL
Status: DISCONTINUED | OUTPATIENT
Start: 2021-07-03 | End: 2021-07-05

## 2021-07-02 RX ORDER — AMLODIPINE BESYLATE 5 MG/1
5 TABLET ORAL
Status: DISCONTINUED | OUTPATIENT
Start: 2021-07-03 | End: 2021-07-05

## 2021-07-02 RX ADMIN — AMIODARONE HYDROCHLORIDE 200 MG: 200 TABLET ORAL at 09:27

## 2021-07-02 RX ADMIN — QUETIAPINE FUMARATE 50 MG: 25 TABLET ORAL at 21:51

## 2021-07-02 RX ADMIN — FUROSEMIDE 40 MG: 40 TABLET ORAL at 08:39

## 2021-07-02 RX ADMIN — ATORVASTATIN CALCIUM 40 MG: 40 TABLET, FILM COATED ORAL at 20:56

## 2021-07-02 RX ADMIN — METOPROLOL TARTRATE 100 MG: 50 TABLET, FILM COATED ORAL at 20:56

## 2021-07-02 RX ADMIN — THIAMINE HCL TAB 100 MG 100 MG: 100 TAB at 08:39

## 2021-07-02 RX ADMIN — MORPHINE SULFATE 2 MG: 2 INJECTION, SOLUTION INTRAMUSCULAR; INTRAVENOUS at 23:42

## 2021-07-02 RX ADMIN — FAMOTIDINE 20 MG: 20 TABLET ORAL at 08:39

## 2021-07-02 RX ADMIN — FAMOTIDINE 20 MG: 20 TABLET ORAL at 20:57

## 2021-07-02 RX ADMIN — LORAZEPAM 1 MG: 1 TABLET ORAL at 23:42

## 2021-07-02 RX ADMIN — FOLIC ACID 1 MG: 1 TABLET ORAL at 08:39

## 2021-07-02 RX ADMIN — LOSARTAN POTASSIUM 100 MG: 50 TABLET, FILM COATED ORAL at 08:42

## 2021-07-02 RX ADMIN — SENNOSIDES 10 ML: 8.8 LIQUID ORAL at 20:56

## 2021-07-02 RX ADMIN — METOPROLOL TARTRATE 100 MG: 50 TABLET, FILM COATED ORAL at 08:42

## 2021-07-02 RX ADMIN — OXYCODONE HYDROCHLORIDE AND ACETAMINOPHEN 2 TABLET: 5; 325 TABLET ORAL at 03:14

## 2021-07-02 RX ADMIN — LORAZEPAM 2 MG: 1 TABLET ORAL at 20:56

## 2021-07-02 RX ADMIN — AMLODIPINE BESYLATE 5 MG: 5 TABLET ORAL at 08:39

## 2021-07-02 RX ADMIN — Medication 5 MG: at 20:57

## 2021-07-02 RX ADMIN — TERAZOSIN HYDROCHLORIDE 2 MG: 1 CAPSULE ORAL at 20:56

## 2021-07-02 RX ADMIN — ASPIRIN 325 MG ORAL TABLET 325 MG: 325 PILL ORAL at 08:39

## 2021-07-02 RX ADMIN — AMIODARONE HYDROCHLORIDE 200 MG: 200 TABLET ORAL at 20:59

## 2021-07-02 RX ADMIN — AMIODARONE HYDROCHLORIDE 200 MG: 200 TABLET ORAL at 01:33

## 2021-07-02 RX ADMIN — THROMBIN, TOPICAL (BOVINE) 300 UNITS: KIT at 12:58

## 2021-07-02 RX ADMIN — MORPHINE SULFATE 2 MG: 2 INJECTION, SOLUTION INTRAMUSCULAR; INTRAVENOUS at 20:56

## 2021-07-02 NOTE — PROGRESS NOTES
"                                 [facility] Progress Note      LOS: 10 days   Patient Care Team:  Sanaz Zuluaga DO as PCP - General (Family Medicine)    Chief Complaint:    Chief Complaint   Patient presents with   • Chest Pain       Subjective     Interval History:     Patient Complaints: Easily arousable      Review of Systems:   A 14 point review of systems was negative except as was stated in the HPI      Objective     Vital Sign Min/Max for last 24 hours  Temp  Min: 98 °F (36.7 °C)  Max: 99.1 °F (37.3 °C)   BP  Min: 93/52  Max: 174/116   Pulse  Min: 63  Max: 92   Resp  Min: 16  Max: 28   SpO2  Min: 90 %  Max: 100 %   Flow (L/min)  Min: 2  Max: 2   No data recorded     Flowsheet Rows      First Filed Value   Admission Height  177.8 cm (70\") Documented at 06/22/2021 1400   Admission Weight  118 kg (260 lb 2.3 oz) Documented at 06/22/2021 2000          Physical Exam:  General Appearance: More intelligible today  Lungs: Coarse bilateral breath sounds  Heart:: Regular rate and rhythm this morning; no Murmurs, Rubs or Gallops  Abdomen: Soft and nontender with adequate bowel sounds.  No organomegaly  Extremities: No cyanosis, clubbing.  1+ edema  Pulses: Pulses palpable and equal bilaterally/pulsatile mass left groin  Skin: Warm and dry with no rash  Psych: Normal     Results Review:     I reviewed the patient's new clinical results.  Results from last 7 days   Lab Units 07/01/21  0447 06/30/21  0641 06/29/21  0416   SODIUM mmol/L 139 141 139   POTASSIUM mmol/L 4.1 3.9 3.7   CHLORIDE mmol/L 107 108* 105   CO2 mmol/L 23.0 22.0 26.0   BUN mg/dL 27* 26* 21   CREATININE mg/dL 0.56* 0.53* 0.55*   GLUCOSE mg/dL 137* 125* 130*   CALCIUM mg/dL 8.9 8.3* 8.6     Results from last 7 days   Lab Units 07/02/21  0639 07/01/21  0447 06/30/21  0327   WBC 10*3/mm3 12.47* 10.03 10.38   HEMOGLOBIN g/dL 8.4* 7.7* 8.2*   HEMATOCRIT % 28.7* 24.4* 26.2*   PLATELETS 10*3/mm3 351 323 305     Lab Results   Lab Value " Date/Time    TROPONINT 1.730 (C) 06/22/2021 1705    TROPONINT <0.010 06/22/2021 1009     Results from last 7 days   Lab Units 06/28/21  0346   TRIGLYCERIDES mg/dL 70                   Medication Review: yes  Current Facility-Administered Medications   Medication Dose Route Frequency Provider Last Rate Last Admin   • albuterol (PROVENTIL) nebulizer solution 0.083% 2.5 mg/3mL  2.5 mg Nebulization Q6H PRN Zoraida Leon PA-C       • amiodarone (PACERONE) tablet 200 mg  200 mg Nasogastric Q8H Mikey Conrad MD   200 mg at 07/02/21 0133    Followed by   • [START ON 7/7/2021] amiodarone (PACERONE) tablet 200 mg  200 mg Nasogastric Q12H Mikey Conrad MD        Followed by   • [START ON 7/21/2021] amiodarone (PACERONE) tablet 200 mg  200 mg Nasogastric Daily Mikey Conrad MD       • amLODIPine (NORVASC) tablet 5 mg  5 mg Oral Q24H Denilson Shetty PA   5 mg at 07/01/21 1710   • aspirin tablet 325 mg  325 mg Nasogastric Daily Miguel Angel Acosta MD   325 mg at 07/01/21 0909   • atorvastatin (LIPITOR) tablet 40 mg  40 mg Nasogastric Nightly Miguel Angel Acosta MD   40 mg at 07/01/21 2129   • famotidine (PEPCID) tablet 20 mg  20 mg Nasogastric BID Miguel Angel Acosta MD   20 mg at 07/01/21 2130   • folic acid (FOLVITE) tablet 1 mg  1 mg Nasogastric Daily Cedric Murphy MD   1 mg at 07/01/21 0910   • furosemide (LASIX) tablet 40 mg  40 mg Nasogastric Daily Mikey Conrad MD       • HYDROcodone-acetaminophen (NORCO) 7.5-325 MG per tablet 1 tablet  1 tablet Nasogastric Q4H PRN Miguel Angel Acosta MD       • insulin regular (humuLIN R,novoLIN R) injection 0-9 Units  0-9 Units Subcutaneous Q6H Cedric Murphy MD   2 Units at 07/01/21 0530   • LORazepam (ATIVAN) tablet 1 mg  1 mg Nasogastric Q6H PRN Cedric Mruphy MD   1 mg at 07/01/21 2322   • LORazepam (ATIVAN) tablet 2 mg  2 mg Nasogastric Nightly Cedric Murphy MD   2 mg at 07/01/21 2130   • losartan (COZAAR) tablet 100 mg  100 mg Oral Q24H Sena  GEO Mccormick   100 mg at 07/01/21 1710   • magnesium sulfate 4g/100mL (PREMIX) infusion  4 g Intravenous PRN Glen Jon MD   4 g at 06/28/21 0607   • melatonin tablet 5 mg  5 mg Nasogastric Nightly Mark Anthony Hawkins, PharmD   5 mg at 07/01/21 2129   • metoprolol tartrate (LOPRESSOR) tablet 100 mg  100 mg Nasogastric Q12H Tracy Torres MD   100 mg at 07/01/21 2129   • morphine injection 2 mg  2 mg Intravenous Q2H PRN Miguel Angel Acosta MD   2 mg at 07/01/21 2321   • niCARdipine (CARDENE) 40 mg in 200 mL NS infusion  5-15 mg/hr Intravenous Titrated Zoraida Leon PA-C 25 mL/hr at 07/01/21 1449 5 mg/hr at 07/01/21 1449   • nicotine (NICODERM CQ) 7 MG/24HR patch 1 patch  1 patch Transdermal Q24H Adria Singh APRN   1 patch at 07/01/21 0927   • ondansetron (ZOFRAN) injection 4 mg  4 mg Intravenous Q6H PRN Zoraida Leon PA-C       • oxyCODONE-acetaminophen (PERCOCET) 5-325 MG per tablet 2 tablet  2 tablet Nasogastric Q4H PRN Miguel Angel Acosta MD   2 tablet at 07/02/21 0314   • Pharmacy Consult - MTM   Does not apply Daily Mark Anthony Hawkins, PharmD       • potassium chloride (KLOR-CON) packet 40 mEq  40 mEq Nasogastric PRN Cedric Murphy MD       • QUEtiapine (SEROquel) tablet 25 mg  25 mg Nasogastric Nightly Cedric Murphy MD   25 mg at 07/01/21 2130   • sennosides (SENOKOT) 8.8 MG/5ML syrup 10 mL  10 mL Nasogastric BID Miguel Angel Acosta MD   10 mL at 07/01/21 2129   • sodium chloride 0.9 % flush 10 mL  10 mL Intravenous Q12H Cedric Murphy MD   10 mL at 06/30/21 2124   • sodium chloride 0.9 % flush 10 mL  10 mL Intravenous PRN Cedric Murphy MD       • terazosin (HYTRIN) capsule 2 mg  2 mg Nasogastric Nightly Cedric Murphy MD   2 mg at 07/01/21 2129   • thiamine (VITAMIN B-1) tablet 100 mg  100 mg Nasogastric Daily Cedric Murphy MD   100 mg at 07/01/21 0909         CABG 06/22/21    History of tobacco abuse    STEMI    Dyslipidemia    Obesity (BMI 30-39.9)    ST elevation myocardial  infarction (STEMI) (CMS/HCC)    Hyperglycemia        Impression      Inferior STEMI 6/22/2021  Emergent CABG for left main and three-vessel coronary artery disease with preserved LV function 6/22/2021  reop for bleeding 6/23/2021 early a.m.  Hypertension  Hyperlipidemia  Remote coronary artery stent [place]  Postop delirium/EtOH withdrawal  Postop atrial fibrillation/flutter; back in sinus rhythm this morning  Echocardiogram shows mild LV dysfunction with no significant pericardial effusion  Left femoral pseudoaneurysm by duplex 7/1/2021      Plan     Continue beta-blocker  Pulmonary toilet  Continue amiodarone  Continue aspirin and statin  Gentle diuresis  We will discuss pseudoaneurysm treatment with CT surgery    Mikey Conrad MD   07/02/21  07:51 EDT

## 2021-07-02 NOTE — PROGRESS NOTES
Thien Gray  [id number]  1955    POD#10/9 S/P CABG x4/mediastinal exploration    CC: I feel better today    Subjective: Awake and reasonably alert.  Still mildly confused but conversant.  Answers questions appropriately.  Hemodynamics are stable vital signs are normal.  Still unable to take oral feedings.  Duplex of the left groin---> small pseudoaneurysm.        CABG 06/22/21    History of tobacco abuse    STEMI    Dyslipidemia    Obesity (BMI 30-39.9)    ST elevation myocardial infarction (STEMI) (CMS/HCC)    Hyperglycemia      Objective:    Vital Signs:  Temp:  [98 °F (36.7 °C)-99.1 °F (37.3 °C)] 98 °F (36.7 °C)  Heart Rate:  [63-92] 84  Resp:  [16-28] 24  BP: ()/() 134/55    Physical Exam:   General Appearance: Awake, alert, appears stated age, in good spirits with no new complaints   Lungs: clear to auscultation anterior   Heart: regular rhythm & normal rate, normal S1, S2,no murmur    Skin:warm and dry    Ext:pulses intact with Doppler   Neuro: normal       Results from last 7 days   Lab Units 07/02/21  0639   WBC 10*3/mm3 12.47*   HEMOGLOBIN g/dL 8.4*   HEMATOCRIT % 28.7*   PLATELETS 10*3/mm3 351     Results from last 7 days   Lab Units 07/01/21  0447   SODIUM mmol/L 139   POTASSIUM mmol/L 4.1   CHLORIDE mmol/L 107   CO2 mmol/L 23.0   BUN mg/dL 27*   CREATININE mg/dL 0.56*   GLUCOSE mg/dL 137*   CALCIUM mg/dL 8.9       Imaging Results (Last 24 Hours)     Procedure Component Value Units Date/Time    XR Chest 1 View [[id number]] Collected: 07/01/21 0832     Updated: 07/01/21 2331    Narrative:      EXAMINATION: XR CHEST 1 VW- 07/01/2021     INDICATION: f/u respiratory illness.; I21.3-ST elevation (STEMI)  myocardial infarction of unspecified site; R07.9-Chest pain,  unspecified; R13.13-Dysphagia, pharyngeal phase     COMPARISON: 06/30/2021     FINDINGS: NG tube remains below the left hemidiaphragm. Right IJ  catheter has been removed and a right upper extremity PICC line placed,  tip in  the mid SVC. Heart is enlarged. Perihilar interstitial disease  appears mildly improved overall. No pneumothorax or effusion is seen.       Impression:      1. Mild overall improvement in pulmonary interstitial disease, compared  to 06/30/2021 exam. No evidence of pneumothorax or other new chest  pathology.  2. PICC line placement into the mid SVC.      D:  07/01/2021  E:  07/01/2021     This report was finalized on 7/1/2021 11:28 PM by Dr. Domingo Rivera MD.              Diagnosis:    CABG 06/22/21    History of tobacco abuse    STEMI    Dyslipidemia    Obesity (BMI 30-39.9)    ST elevation myocardial infarction (STEMI) (CMS/[facility])    Hyperglycemia      Assessment: Improving course.  Still mild [place] confused but cooperative awake and alert.  Answers questions appropriately.  Left pseudoaneurysm per duplex scan.      Plan: Continue current treatment regimen.  Patient may be moved to another intensive care unit as needed for bed availability into age.  I will discuss pseudoaneurysm treatment with Dr. Conrad before proceeding with a treatment regimen.  I have reviewed, verified, and confirmed the above history and current status.  I have examined the patient and confirmed the above physical findings.         Miguel Angel Acosta MD  CTSurgery  07/02/21   09:12 EDT

## 2021-07-02 NOTE — PROGRESS NOTES
Neurology       Patient Care Team:  Sanaz Zuluaga DO as PCP - General (Family Medicine)    Chief complaint: Delirium    History: The patient continues to wax and wane.    He had a good day yesterday when he slept well overnight.    Today he tends to ramble but is doing reasonably well otherwise.    His blood pressure dropped into the 90s after medication adjustments.    He has been found to have a pseudoaneurysm which is likely be attended to today by Dr. Acosta.      Past Medical History:   Diagnosis Date   • Abnormal ECG 1/20/2020    Get from Dr. Kayser   • Coronary artery disease    • History of heart attack 2004   • History of MI (myocardial infarction)    • Hyperlipidemia    • Myocardial infarction (CMS/HCC)    • RBBB 2/10/2020       Vital Signs   Vitals:    07/02/21 1000 07/02/21 1030 07/02/21 1100 07/02/21 1130   BP: 124/58      BP Location: Left leg      Patient Position: Lying      Pulse: 65 59 56 59   Resp: 22      Temp:       TempSrc:       SpO2: 92% 93% 100% 100%   Weight:       Height:           Physical Exam:   General: Awake and alert              Neuro: Conversational but somewhat rambling and talking inappropriately about going home to do his taxes.    Speech is articulate.    Cranial nerves show loss to be conjugate.  Face is symmetrical.    Moves all extremities well.            Results Review:  Reviewed  Results from last 7 days   Lab Units 07/02/21  0639   WBC 10*3/mm3 12.47*   HEMOGLOBIN g/dL 8.4*   HEMATOCRIT % 28.7*   PLATELETS 10*3/mm3 351     Results from last 7 days   Lab Units 07/01/21  0447 06/30/21  0641 06/29/21  0416 06/28/21  0346   SODIUM mmol/L 139 141 139 139   POTASSIUM mmol/L 4.1 3.9 3.7 3.8   CHLORIDE mmol/L 107 108* 105 105   CO2 mmol/L 23.0 22.0 26.0 24.0   BUN mg/dL 27* 26* 21 16   CREATININE mg/dL 0.56* 0.53* 0.55* 0.61*   CALCIUM mg/dL 8.9 8.3* 8.6 8.9   BILIRUBIN mg/dL  --   --   --  0.8   ALK PHOS U/L  --   --   --  56   ALT (SGPT) U/L  --   --   --  18   AST  (SGOT) U/L  --   --   --  26   GLUCOSE mg/dL 137* 125* 130* 122*       Imaging Results (Last 24 Hours)     Procedure Component Value Units Date/Time    XR Chest 1 View [[id number]] Collected: 07/01/21 0832     Updated: 07/01/21 2331    Narrative:      EXAMINATION: XR CHEST 1 VW- 07/01/2021     INDICATION: f/u respiratory illness.; I21.3-ST elevation (STEMI)  myocardial infarction of unspecified site; R07.9-Chest pain,  unspecified; R13.13-Dysphagia, pharyngeal phase     COMPARISON: 06/30/2021     FINDINGS: NG tube remains below the left hemidiaphragm. Right IJ  catheter has been removed and a right upper extremity PICC line placed,  tip in the mid SVC. Heart is enlarged. Perihilar interstitial disease  appears mildly improved overall. No pneumothorax or effusion is seen.       Impression:      1. Mild overall improvement in pulmonary interstitial disease, compared  to 06/30/2021 exam. No evidence of pneumothorax or other new chest  pathology.  2. PICC line placement into the mid SVC.      D:  07/01/2021  E:  07/01/2021     This report was finalized on 7/1/2021 11:28 PM by Dr. Domingo Rivera MD.             Assessment:  Slowly resolving delirium aggravated by sleep deprivation    Plan:  Continue scheduled Ativan 2 mg at bedtime as before and a milligram of Ativan as needed during the day.  Recently has not needed much of anything.    Seroquel has been discontinued which is appropriate.    Comment:  Slowly improving         I discussed the patients findings and my recommendations with patient and nursing staff    Hector Hernandez MD  07/02/21  12:13 EDT

## 2021-07-02 NOTE — CASE MANAGEMENT/SOCIAL WORK
Continued Stay Note   [facility]     Patient Name: Thien Gray  MRN: [id number]  Today's Date: 7/2/2021    Admit Date: 6/22/2021    Discharge Plan     Row Name 07/02/21 1310       Plan    Plan  ongoing/tbd    Patient/Family in Agreement with Plan  yes    Plan Comments  Patient remains in ICU, confused, and NG in place. Needs Keofeed if he can't pass swallowing test. Duplex showed a lt groin psuedoaneurysm. To be followed up later today. Neurology following for wax/wane confusion. CM following for discharge disposition. Patient may need some kind of placement. Linda @ 3335    Final Discharge Disposition Code  30 - still a patient        Discharge Codes    No documentation.             Roxane Roman RN

## 2021-07-02 NOTE — PROGRESS NOTES
Clinical Nutrition     Nutrition Support Assessment  Reason for Visit:   Identified at risk by screening criteria      Patient Name: Thien Gray  YOB: 1955  MRN: [id number]  Date of Encounter: 07/02/21 10:21 EDT  Admission date: 6/22/2021    Nutrition Assessment   Assessment   STEMI  CAD  Intubated 6-22-21  s/p CABG x4, IABP 6-22-21  Post-op Bleed  s/p Mediastinal Exploration with evacuation of blood + clot from R. pleural space  Removal of IABP 6-23-21  Encephalopathy  Extubated 6-24-21  L. groin pseudoaneurysm    h/o CAD, MI, HTN, HLP, Obesity  PMH: He  has a past medical history of Abnormal ECG (1/20/2020), Coronary artery disease, History of heart attack (2004), History of MI (myocardial infarction), Hyperlipidemia, Myocardial infarction (CMS/[facility]), and RBBB (2/10/2020).   PSxH: He  has a past surgical history that includes Appendectomy (1999); Coronary stent placement (2004); Cardiac catheterization (1/1/2004); Coronary angioplasty (2004); Cardiac catheterization (N/A, 6/22/2021); Coronary artery bypass graft (N/A, 6/22/2021); and Coronary Artery Bypass Graft (N/A, 6/23/2021).     Substance history: Etoh, Tobacco remote    Reported/Observed/Food/Nutrition Related History:     Pt resting in bed, on nasal cannula, pleasantly confused    Per RN: pt tolerating TF, plan to remove ngt and place keofeed, pt intermittently confused, walked yesterday    Anthropometrics     Height: 70in  Weight: 260lb ( MD office)  Weight Method: Bed scale    BMI: 37  Obese Class II: 35-39.9kg/m2    Labs reviewed     Results from last 7 days   Lab Units 07/01/21  0447 06/30/21  0641 06/29/21  0416 06/28/21  0346   GLUCOSE mg/dL 137* 125* 130* 122*   BUN mg/dL 27* 26* 21 16   CREATININE mg/dL 0.56* 0.53* 0.55* 0.61*   SODIUM mmol/L 139 141 139 139   CHLORIDE mmol/L 107 108* 105 105   POTASSIUM mmol/L 4.1 3.9 3.7 3.8   PHOSPHORUS mg/dL 4.2 3.7  --  4.3   MAGNESIUM mg/dL 2.1 2.4 2.0 1.8   ALT (SGPT)  U/L  --   --   --  18     Results from last 7 days   Lab Units 06/28/21  0346   ALBUMIN g/dL 3.50   PREALBUMIN mg/dL 7.2*   CRP mg/dL 12.86*   TRIGLYCERIDES mg/dL 70       Results from last 7 days   Lab Units 07/02/21  0600 07/01/21  2308 07/01/21  1800 07/01/21  1158 07/01/21  0518 06/30/21  2314   GLUCOSE mg/dL 143* 145* 131* 145* 163* 159*     Lab Results   Lab Value Date/Time    HGBA1C 5.70 (H) 06/16/2021 0845    HGBA1C 6.00 (H) 11/24/2020 0852         Medications reviewed   Pertinent: folate, thiamine, pepcid, senokot, insulin, amio, lasix, cardene      Intake/Ouptut 24 hrs (7:00AM - 6:59 AM)     Intake & Output (last day)       07/01 0701 - 07/02 0700 07/02 0701 - 07/03 0700    I.V. (mL/kg)      Other 421     NG/GT 1839     Total Intake(mL/kg) 2260 (20.2)     Urine (mL/kg/hr) 300 (0.1)     Emesis/NG output      Total Output 300     Net +1960           Urine Unmeasured Occurrence 7 x                GI:wnl    SKIN: surgical sites, L. arm- abrasion, L. groin pseudoaneurysm    Needs Assessment 6-23-21       Height used 70in   Weight used ABW 260lb   IBW 166lb     Estimated need Method/Equation used Result    Energy/Calorie need   kcals/d 11-14cal per kg ABW 1300-1655kcal    PSU ABW 2190kcal     Goal ~1700kcal   Protein   g/day 1.2g protein per kg ABW 142g protein    2g protein per kg IBW 151g protein     Goal ~151g protein                    Current Nutrition Prescription     PO: NPO Diet  Orders Placed This Encounter      Diet, Tube Feeding Tube Feeding Formula: Peptamen Intense VHP (Peptide Based, Very High Protein)  goal rate @85ml/hr, free water 30ml Q 2 hours    Intake:1839ml, 108% goal volume    TF at goal volume will provide 1700ml, 1700kcal, 100% kcal needs, 156g protein, 103% protein needs, 1788ml free water including flush    Nutrition Diagnosis     6-24-21, 7-1  Problem Inadequate energy intake   Etiology Per Clinical Status: Encephalopathy/ Dysphagia   Signs/Symptoms 108% goal volume EN   Status:  improved, En started 6-25    Nutrition Intervention   1.  Follow treatment progress    Goal:   General: Nutrition support treatment    Monitoring/Evaluation:   Per protocol, I&O, Pertinent labs, Weight, Skin status, GI status, Symptoms, Hemodynamic stability    Negar Corral RD, [facility]  Time Spent: 30min

## 2021-07-02 NOTE — PLAN OF CARE
Goal Outcome Evaluation:  Plan of Care Reviewed With: patient        Progress: no change  Outcome Summary: Pt performed STSx2 and amb 2ft laterally towards HOB with mod Ax2. Further ambulation deferred d/t lethargy and decreased safety awareness. Pt limited by impaired cognition, decreased functional endurance, generalized weakness, and balance deficit. Continue to progress as able. d/c rec for IRF.

## 2021-07-02 NOTE — BRIEF OP NOTE
FLUOROSCOPY GUIDED NEEDLE PLACEMENT  Progress Note    Thien Gray  7/2/2021    Pre-op Diagnosis:   Left common femoral artery pseudoaneurysm       Post-Op Diagnosis Codes:  Left common femoral artery pseudoaneurysm    Procedure/CPT® Codes:        Procedure(s):  Percutaneous thrombin injection, left common femoral artery pseudoaneurysm.    Surgeon(s):  Abdiel Tamez MD    Anesthesia: * No anesthesia type entered *    Staff:   Scrub Person: Nupur Short; Gayle Jansen  Documenter: Khadra Nguyen  Invasive Nurse: Tammie Trimble RN         Estimated Blood Loss: none    Urine Voided: * No values recorded between 7/2/2021 12:37 PM and 7/2/2021  1:04 PM *    Specimens:                None          Drains:   [REMOVED] Chest Tube 3 Right Pleural (Removed)   Function -20 cm H2O 06/25/21 0800   Air Leak/Fluctuation air leak not present;connections tightened;dependent drainage cleared;fluctuation not present 06/25/21 0800   Patency Intervention Tip/tilt 06/25/21 0800   Drainage Description Serous 06/25/21 0800   Dressing Type Gauze 06/25/21 0800   Dressing Status Clean;Dry;Intact 06/25/21 0800   Dressing Intervention Dressing changed 06/24/21 2200   Site Assessment Not assessed 06/25/21 0800   Surrounding Skin Unable to view 06/25/21 0800   Securement tubing anchored to body distal to insertion site with tape 06/25/21 0800   Left Subcutaneous Emphysema none present 06/25/21 0800   Right Subcutaneous Emphysema none present 06/25/21 0800   Safety all connections secured;suction checked 06/25/21 0800   Output (mL) 10 mL 06/25/21 0800       [REMOVED] Chest Tube 4 Left Pleural (Removed)   Function -20 cm H2O 06/25/21 0800   Air Leak/Fluctuation air leak not present;connections tightened;dependent drainage cleared;fluctuation not present 06/25/21 0800   Patency Intervention Tip/tilt 06/25/21 0800   Drainage Description Serosanguineous 06/25/21 0800   Dressing Type Gauze 06/25/21 0800   Dressing Status Clean;Dry;Intact  06/25/21 0800   Dressing Intervention Dressing changed 06/24/21 2200   Site Assessment Not assessed 06/25/21 0800   Surrounding Skin Unable to view 06/25/21 0800   Securement tubing anchored to body distal to insertion site with tape 06/25/21 0800   Left Subcutaneous Emphysema none present 06/25/21 0800   Right Subcutaneous Emphysema none present 06/25/21 0800   Safety all connections secured;suction checked 06/25/21 0800   Output (mL) 0 mL 06/25/21 0800       [REMOVED] NG/OG Tube Nasogastric Right nostril (Removed)   Placement Verification X-ray 07/01/21 0400   Site Assessment Clean;Dry;Intact 07/02/21 1000   Securement nasal septal tie 07/02/21 1000   Secured at (cm) 82 07/02/21 1000   NG Site Interventions Site assessed;Nasal care performed;Pressure offloaded 07/01/21 2000   Dressing Intervention New dressing 06/22/21 2100   Status Other (Comment) 07/02/21 1000   Drainage Appearance None 07/02/21 1000   Surrounding Skin Dry;Intact 07/02/21 1000   Tube Feeding Frequency Continuous 07/02/21 1000   Tube Feeding Product Peptamen VHP 07/02/21 1000   Tube Feeding Strength Full strength 07/02/21 1000   Tube Feeding Method Continuous per pump 07/02/21 1000   Tube Feeding Rate (mL) 85 mL 07/02/21 1000   Tube Feeding Bag Changed Yes 07/02/21 0800   Tube Feeding Residual Returned (mL) 40 mL 07/01/21 2200   Feeding Tube Flushed With Sterile water 07/01/21 2200   Flush/ Irrigation Intake (mL) 150 07/02/21 0600   Tube Feeding Intake (mL) 850 07/02/21 0600   Intake (mL) 40 mL 07/01/21 0200   Output (mL) 129 mL 06/30/21 1800       [REMOVED] Urethral Catheter Temperature probe;Silicone 16 Fr. (Removed)   Daily Indications Hourly Output in Critical Unstable Patient requiring Frequent Intervention (hemodynamics, titration or life supportive therapy) 06/29/21 1200   Site Assessment Clean;Skin intact 06/29/21 1200   Collection Container Standard drainage bag 06/29/21 1200   Securement Method Securing device 06/29/21 1200   Catheter  care complete Yes 06/29/21 0836   Intake (mL) 200 mL 06/29/21 1200   Output (mL) 325 mL 06/29/21 0600       [REMOVED] Y Chest Tube 1 and 2 1 Mediastinal 32 Fr. 2 Mediastinal 32 Fr. (Removed)   Function -20 cm H2O 06/25/21 0800   Patency Intervention Tip/tilt 06/25/21 0800   Left Subcutaneous Emphysema none present 06/25/21 0800   Right Subcutaneous Emphysema none present 06/25/21 0800   Safety all connections secured;suction checked 06/25/21 0800   Drainage Description 1 Serosanguineous 06/25/21 0800   Air Leak/Fluctuation 1 air leak not present;connections tightened;dependent drainage cleared;fluctuation not present 06/25/21 0800   Dressing Type 1 Gauze 06/25/21 0800   Dressing Status 1 Clean;Dry;Intact 06/25/21 0800   Dressing Intervention 1 Dressing changed 06/24/21 2200   Site Assessment 1 Not assessed 06/25/21 0800   Surrounding Skin 1 Unable to view 06/25/21 0800   Securement 1 tubing anchored to body distal to insertion site with tape 06/25/21 0800   Drainage Description 2 Serosanguineous 06/25/21 0800   Air Leak/Fluctuation 2 air leak not present;connections tightened;dependent drainage cleared;fluctuation not present 06/25/21 0800   Dressing Type 2 Gauze 06/25/21 0800   Dressing Status 2 Clean;Dry;Intact 06/25/21 0800   Dressing Intervention 2 Dressing changed 06/24/21 2200   Site Assessment 2 Clean;Dry;Intact 06/25/21 0800   Surrounding Skin 2 Dry;Intact 06/25/21 0800   Securement 2 tubing anchored to body distal to insertion site with tape 06/25/21 0800   Output (mL) 10 mL 06/25/21 0800       [REMOVED] Y Chest Tube 3 and 4 3 Mediastinal 32 Fr. 4 Mediastinal 32 Fr. (Removed)   Function -20 cm H2O 06/23/21 0415   Patency Intervention Tip/tilt 06/23/21 0200   Left Subcutaneous Emphysema none present 06/23/21 0415   Right Subcutaneous Emphysema none present 06/23/21 0415   Safety all connections secured;suction checked 06/23/21 0200   Drainage Description 3 Sanguineous 06/23/21 0415   Air Leak/Fluctuation 3  air leak not present;fluctuation not present 06/23/21 0415   Dressing Type 3 Gauze 06/23/21 0415   Dressing Status 3 Clean;Dry;Intact 06/23/21 0415   Dressing Intervention 3 New dressing 06/22/21 1425   Site Assessment 3 Clean;Dry;Intact 06/23/21 0415   Surrounding Skin 3 Dry;Intact 06/23/21 0415   Securement 3 tubing anchored to body distal to insertion site with tape 06/23/21 0200   Drainage Description 4 Sanguineous 06/23/21 0415   Air Leak/Fluctuation 4 air leak not present;fluctuation not present 06/23/21 0415   Dressing Type 4 Gauze 06/23/21 0415   Dressing Status 4 Clean;Dry;Intact 06/23/21 0415   Dressing Intervention 4 New dressing 06/22/21 1425   Site Assessment 4 Clean;Dry;Intact 06/23/21 0415   Surrounding Skin 4 Dry;Intact 06/23/21 0415   Securement 4 tubing anchored to body distal to insertion site with tape 06/23/21 0100   Output (mL) 5 mL 06/23/21 0200       Findings: The patient is nearly 3 cm pseudoaneurysm within the left groin, emanating from the left common femoral artery was clearly identified under ultrasound.  With ultrasound guidance, a 22-gauge spinal needle was placed into the pseudoaneurysm, and approximately 300 units of thrombin was injected into the pseudoaneurysm until there was thrombosis and occlusion on color ultrasound.  There is preservation of flow in all the parent vasculature.    Complications: None apparent.    Recommendations: Bedrest today.  Repeat ultrasound tomorrow to document durable result.          Abdiel Tamez MD     Date: 7/2/2021  Time: 13:06 EDT

## 2021-07-02 NOTE — THERAPY TREATMENT NOTE
Patient Name: Thien Gray  : 1955    MRN: [id number]                              Today's Date: 2021       Admit Date: 2021    Visit Dx:     ICD-10-CM ICD-9-CM   1. ST elevation myocardial infarction (STEMI), unspecified artery (CMS/HCC)  I21.3 410.90   2. Chest pain, unspecified type  R07.9 786.50   3. Pharyngeal dysphagia  R13.13 787.23   4. Cognitive communication deficit  R41.841 799.52     Patient Active Problem List   Diagnosis   • History of tobacco abuse   • CABG 21   • Dyspnea on exertion   • RBBB   • Psoriasis   • Primary osteoarthritis of both knees   • STEMI   • Dyslipidemia   • Obesity (BMI 30-39.9)   • ST elevation myocardial infarction (STEMI) (CMS/HCC)   • Hyperglycemia     Past Medical History:   Diagnosis Date   • Abnormal ECG 2020    Get from Dr. Kayser   • Coronary artery disease    • History of heart attack    • History of MI (myocardial infarction)    • Hyperlipidemia    • Myocardial infarction (CMS/HCC)    • RBBB 2/10/2020     Past Surgical History:   Procedure Laterality Date   • APPENDECTOMY     • CARDIAC CATHETERIZATION  2004    They used a catheter to put in my stent.   • CARDIAC CATHETERIZATION N/A 2021    Procedure: Left Heart Cath;  Surgeon: Mikey Conrad MD;  Location: [facility] CATH INVASIVE LOCATION;  Service: Cardiovascular;  Laterality: N/A;   • CORONARY ANGIOPLASTY      I think that's what the bill for my stent said.   • CORONARY ARTERY BYPASS BRING BACK FOR EXPLORATION N/A 2021    Procedure: BRING BACK FOR EXPLORATION OPEN HEART;  Surgeon: Miguel Angel Acosta MD;  Location: [facility] OR;  Service: Cardiothoracic;  Laterality: N/A;   • CORONARY ARTERY BYPASS GRAFT N/A 2021    Procedure: MEDIAN STERNOTOMY, CORONARY ARTERY BYPASS GRAFTING X 4 WITH LEFT INTERNAL MAMMARY ARTERY GRAFT, ENDOSCOPIC VEIN HARVESTING OF THE RIGHT GREATER SAPHENOUS VEIN, INTRA-AORTIC BALLOON PUMP INSERTION, IVAN PER ANESTHESIA;  Surgeon: Dave  MD Miguel Angel;  Location: [facility];  Service: Cardiothoracic;  Laterality: N/A;   • CORONARY STENT PLACEMENT  2004     General Information     Row Name 07/02/21 1504          Physical Therapy Time and Intention    Document Type  therapy note (daily note)  -AY     Mode of Treatment  physical therapy  -AY     Row Name 07/02/21 1503          General Information    Patient Profile Reviewed  yes  -AY     Existing Precautions/Restrictions  cardiac;fall;oxygen therapy device and L/min;sternal;other (see comments) NG, confusion, difficulty following commands, impulsive  -AY     Barriers to Rehab  cognitive status  -AY     Row Name 07/02/21 1506          Cognition    Orientation Status (Cognition)  oriented to;person;other (see comments);disoriented to;place;time Pt able to state name, but further assessment limited d/t lethargy.  -AY     Row Name 07/02/21 1507          Safety Issues, Functional Mobility    Safety Issues Affecting Function (Mobility)  ability to follow commands;insight into deficits/self-awareness;safety precautions follow-through/compliance;sequencing abilities;awareness of need for assistance;safety precaution awareness;impulsivity  -AY     Impairments Affecting Function (Mobility)  balance;cognition;coordination;endurance/activity tolerance;motor control;motor planning;postural/trunk control;shortness of breath;strength  -AY     Cognitive Impairments, Mobility Safety/Performance  awareness, need for assistance;attention;impulsivity;safety precaution awareness;safety precaution follow-through;insight into deficits/self-awareness;sequencing abilities  -AY     Comment, Safety Issues/Impairments (Mobility)  impulsive; decreased safety awareness; lethargic  -AY       User Key  (r) = Recorded By, (t) = Taken By, (c) = Cosigned By    Initials Name Provider Type    AY Guera Hooper PT Physical Therapist        Mobility     Row Name 07/02/21 150          Bed Mobility    Bed Mobility   supine-sit;sit-supine;scooting/bridging  -AY     Scooting/Bridging [place] (Bed Mobility)  dependent (less than 25% patient effort);2 person assist;verbal cues  -AY     Supine-Sit [place] (Bed Mobility)  maximum assist (25% patient effort);2 person assist;verbal cues  -AY     Sit-Supine [place] (Bed Mobility)  maximum assist (25% patient effort);2 person assist;verbal cues  -AY     Assistive Device (Bed Mobility)  draw sheet;head of bed elevated  -AY     Comment (Bed Mobility)  VC for sequencing and hand placement. Command following limited by lethargy and signficant confusion.  -AY     Row Name 07/02/21 1506          Transfers    Comment (Transfers)  STS performed x2 from EOB with B knee/foot blocking required. VC for sequencing, safety awareness, and sternal precautions. Pt very impuslive with standing.  -AY     Row Name 07/02/21 1506          Sit-Stand Transfer    Sit-Stand [place] (Transfers)  moderate assist (50% patient effort);2 person assist;verbal cues;1 person to manage equipment  -AY     Assistive Device (Sit-Stand Transfers)  other (see comments) BUE support  -AY     Row Name 07/02/21 1506          Gait/Stairs (Locomotion)    [place] Level (Gait)  moderate assist (50% patient effort);2 person assist;verbal cues  -AY     Assistive Device (Gait)  other (see comments) BUE support  -AY     Distance in Feet (Gait)  2  -AY     Deviations/Abnormal Patterns (Gait)  bilateral deviations;base of support, wide;jp decreased;festinating/shuffling;stride length decreased;gait speed decreased  -AY     Bilateral Gait Deviations  forward flexed posture;heel strike decreased  -AY     Comment (Gait/Stairs)  pt took steps laterally towards HOB with max VC for sequencing and alertness. Further ambulation deferred d/t lethargy.  -AY       User Key  (r) = Recorded By, (t) = Taken By, (c) = Cosigned By    Initials Name Provider Type    AY Guera Hooper, PT Physical Therapist         Obj/Interventions     Row Name 07/02/21 1508          Balance    Balance Assessment  sitting static balance;sitting dynamic balance;standing static balance;standing dynamic balance  -AY     Static Sitting Balance  mild impairment;sitting, edge of bed  -AY     Dynamic Sitting Balance  moderate impairment;sitting, edge of bed  -AY     Static Standing Balance  moderate impairment;standing;supported  -AY     Dynamic Standing Balance  moderate impairment;supported;standing  -AY     Row Name 07/02/21 1508          Sensory Assessment (Somatosensory)    Sensory Assessment (Somatosensory)  LE sensation intact  -AY       User Key  (r) = Recorded By, (t) = Taken By, (c) = Cosigned By    Initials Name Provider Type    AY Guera Hooper, PT Physical Therapist        Goals/Plan    No documentation.       Clinical Impression     Row Name 07/02/21 1509          Pain    Additional Documentation  Pain Scale: FACES Pre/Post-Treatment (Group)  -AY     [facility] Name 07/02/21 1509          Pain Scale: Numbers Pre/Post-Treatment    Pain Intervention(s)  Ambulation/increased activity;Repositioned  -AY     Row Name 07/02/21 1509          Pain Scale: FACES Pre/Post-Treatment    Pain: FACES Scale, Pretreatment  2-->hurts little bit  -AY     Posttreatment Pain Rating  2-->hurts little bit  -AY     Pain Location - Orientation  incisional  -AY     Pain Location  chest  -AY     Pre/Posttreatment Pain Comment  RN present and aware  -AY     Row Name 07/02/21 1501          Plan of Care Review    Plan of Care Reviewed With  patient  -AY     Progress  no change  -AY     Outcome Summary  Pt performed STSx2 and amb 2ft laterally towards HOB with mod Ax2. Further ambulation deferred d/t lethargy and decreased safety awareness. Pt limited by impaired cognition, decreased functional endurance, generalized weakness, and balance deficit. Continue to progress as able. d/c rec for IRF.  -AY     Row Name 07/02/21 1500          Vital Signs    Pre Systolic BP Rehab   124  -AY     Pre Treatment Diastolic BP  58  -AY     Post Systolic BP Rehab  125  -AY     Post Treatment Diastolic BP  62  -AY     Pretreatment Heart Rate (beats/min)  56  -AY     Posttreatment Heart Rate (beats/min)  61  -AY     Pre SpO2 (%)  96  -AY     O2 Delivery Pre Treatment  room air  -AY     O2 Delivery Intra Treatment  room air  -AY     Post SpO2 (%)  95  -AY     O2 Delivery Post Treatment  room air  -AY     Pre Patient Position  Supine  -AY     Intra Patient Position  Standing  -AY     Post Patient Position  Supine  -AY     Row Name 07/02/21 1509          Positioning and Restraints    Pre-Treatment Position  in bed  -AY     Post Treatment Position  bed  -AY     In Bed  notified nsg;supine;fowlers;patient within staff view;exit alarm on;encouraged to call for assist;call light within reach;legs elevated;side rails up x3;with nsg  -AY       User Key  (r) = Recorded By, (t) = Taken By, (c) = Cosigned By    Initials Name Provider Type    Guera Huston, PT Physical Therapist        Outcome Measures     Row Name 07/02/21 1519 07/02/21 0800       How much help from another person do you currently need...    Turning from your back to your side while in flat bed without using bedrails?  2  -AY  3  -MM    Moving from lying on back to sitting on the side of a flat bed without bedrails?  2  -AY  2  -MM    Moving to and from a bed to a chair (including a wheelchair)?  2  -AY  2  -MM    Standing up from a chair using your arms (e.g., wheelchair, bedside chair)?  2  -AY  1  -MM    Climbing 3-5 steps with a railing?  1  -AY  1  -MM    To walk in hospital room?  2  -AY  2  -MM    AM-PAC 6 Clicks Score (PT)  11  -AY  11  -MM    Row Name 07/02/21 1519          Functional Assessment    Outcome Measure Options  AM-PAC 6 Clicks Basic Mobility (PT)  -AY       User Key  (r) = Recorded By, (t) = Taken By, (c) = Cosigned By    Initials Name Provider Type    Ant Barney, RN Registered Nurse    Guera Huston, PT  Physical Therapist        Physical Therapy Education                 Title: PT OT SLP Therapies (In Progress)     Topic: Physical Therapy (Done)     Point: Mobility training (Done)     Learning Progress Summary           Patient Acceptance, E,TB, VU,NR by  at 7/2/2021 1520    Acceptance, E, NR,DU by  at 7/1/2021 1529    Acceptance, E, NR by KG at 6/30/2021 1421    Acceptance, E, NR by KG at 6/29/2021 1101    Acceptance, E, NR by KG at 6/28/2021 1040    Acceptance, E, NR by KG at 6/25/2021 1121                   Point: Home exercise program (Done)     Learning Progress Summary           Patient Acceptance, E, NR,DU by  at 7/1/2021 1529    Acceptance, E, NR by KG at 6/30/2021 1421    Acceptance, E, NR by KG at 6/29/2021 1101    Acceptance, E, NR by KG at 6/28/2021 1040                   Point: Body mechanics (Done)     Learning Progress Summary           Patient Acceptance, E,TB, VU,NR by AY at 7/2/2021 1520    Acceptance, E, NR,DU by  at 7/1/2021 1529    Acceptance, E, NR by KG at 6/30/2021 1421    Acceptance, E, NR by KG at 6/29/2021 1101    Acceptance, E, NR by KG at 6/28/2021 1040    Acceptance, E, NR by KG at 6/25/2021 1121                   Point: Precautions (Done)     Learning Progress Summary           Patient Acceptance, E,TB, VU,NR by  at 7/2/2021 1520    Acceptance, E, NR,DU by  at 7/1/2021 1529    Acceptance, E, NR by KG at 6/30/2021 1421    Acceptance, E, NR by KG at 6/29/2021 1101    Acceptance, E, NR by KG at 6/28/2021 1040    Acceptance, E, NR by KG at 6/25/2021 1121                               User Key     Initials Effective Dates Name Provider Type Discipline     05/22/20 -  Kassie Comer, PT Physical Therapist PT    AY 11/10/20 -  Guera Hooper, PT Physical Therapist PT     05/17/21 -  Ned Ortez, PT Student PT Student PT              PT Recommendation and Plan     Plan of Care Reviewed With: patient  Progress: no change  Outcome Summary: Pt performed STSx2 and amb  2ft laterally towards HOB with mod Ax2. Further ambulation deferred d/t lethargy and decreased safety awareness. Pt limited by impaired cognition, decreased functional endurance, generalized weakness, and balance deficit. Continue to progress as able. d/c rec for IRF.     Time Calculation:   PT Charges     Row Name 07/02/21 1521             Time Calculation    Start Time  1010  -AY      PT Received On  07/02/21  -AY         Time Calculation- PT    Total Timed Code Minutes- PT  38 minute(s)  -AY         Timed Charges    08738 - PT Therapeutic Activity Minutes  38  -AY         Total Minutes    Timed Charges Total Minutes  38  -AY       Total Minutes  38  -AY        User Key  (r) = Recorded By, (t) = Taken By, (c) = Cosigned By    Initials Name Provider Type    AY Guera Hooper, PT Physical Therapist        Therapy Charges for Today     Code Description Service Date Service Provider Modifiers Qty    46713709380 HC PT THERAPEUTIC ACT EA 15 MIN 7/2/2021 Guera Hooper, PT GP 3    59039888578 HC PT THER SUPP EA 15 MIN 7/2/2021 Guera Hooper, PT GP 3          PT G-Codes  Outcome Measure Options: AM-PAC 6 Clicks Basic Mobility (PT)  AM-PAC 6 Clicks Score (PT): 11    Guera Hooper PT  7/2/2021

## 2021-07-02 NOTE — PLAN OF CARE
Goal Outcome Evaluation:      Pt is intermittently confused and drowsy. Sats >92% on Room air, NSR with BBB. Pt became hypotensive with standing, normotensive throughout shift with scheduled antihypertensives. Pt taken to Cath lab for thrombin injection of left groin hematoma.  Site assessed, no issues. NG d/c'd, Keofeed initiated. Tube feeding continues. Multiple incontinent episodes, no BM. Pt very receptive to diversional activities. Will continue to monitor.

## 2021-07-02 NOTE — THERAPY TREATMENT NOTE
Acute Care - Speech Language Pathology   Swallow Treatment Note [facility]     Patient Name: Thien Gray  : 1955  MRN: [id number]  Today's Date: 2021               Admit Date: 2021    Visit Dx:     ICD-10-CM ICD-9-CM   1. ST elevation myocardial infarction (STEMI), unspecified artery (CMS/HCC)  I21.3 410.90   2. Chest pain, unspecified type  R07.9 786.50   3. Pharyngeal dysphagia  R13.13 787.23   4. Cognitive communication deficit  R41.841 799.52     Patient Active Problem List   Diagnosis   • History of tobacco abuse   • CABG 21   • Dyspnea on exertion   • RBBB   • Psoriasis   • Primary osteoarthritis of both knees   • STEMI   • Dyslipidemia   • Obesity (BMI 30-39.9)   • ST elevation myocardial infarction (STEMI) (CMS/HCC)   • Hyperglycemia     Past Medical History:   Diagnosis Date   • Abnormal ECG 2020    Get from Dr. Kayser   • Coronary artery disease    • History of heart attack    • History of MI (myocardial infarction)    • Hyperlipidemia    • Myocardial infarction (CMS/HCC)    • RBBB 2/10/2020     Past Surgical History:   Procedure Laterality Date   • APPENDECTOMY     • CARDIAC CATHETERIZATION  2004    They used a catheter to put in my stent.   • CARDIAC CATHETERIZATION N/A 2021    Procedure: Left Heart Cath;  Surgeon: Mikey Conrad MD;  Location: [facility] CATH INVASIVE LOCATION;  Service: Cardiovascular;  Laterality: N/A;   • CORONARY ANGIOPLASTY      I think that's what the bill for my stent said.   • CORONARY ARTERY BYPASS BRING BACK FOR EXPLORATION N/A 2021    Procedure: BRING BACK FOR EXPLORATION OPEN HEART;  Surgeon: Miguel Angel Acosta MD;  Location: [facility] OR;  Service: Cardiothoracic;  Laterality: N/A;   • CORONARY ARTERY BYPASS GRAFT N/A 2021    Procedure: MEDIAN STERNOTOMY, CORONARY ARTERY BYPASS GRAFTING X 4 WITH LEFT INTERNAL MAMMARY ARTERY GRAFT, ENDOSCOPIC VEIN HARVESTING OF THE RIGHT GREATER SAPHENOUS VEIN, INTRA-AORTIC  BALLOON PUMP INSERTION, IVAN PER ANESTHESIA;  Surgeon: Miguel Angel Acosta MD;  Location: Critical access hospital;  Service: Cardiothoracic;  Laterality: N/A;   • CORONARY STENT PLACEMENT  2004        SWALLOW EVALUATION (last 72 hours)      SLP Adult Swallow Evaluation     Row Name 07/02/21 1625 07/01/21 0955                Rehab Evaluation    Document Type  therapy note (daily note)  -AC  therapy note (daily note)  -AC       Subjective Information  no complaints  -AC  --       Patient Observations  lethargic  -AC  --       Patient/Family/Caregiver Comments/Observations  VASILE arrived near end of tx.  -AC  --       Care Plan Review  evaluation/treatment results reviewed;risks/benefits reviewed;current/potential barriers reviewed;care plan/treatment goals reviewed;patient/other agree to care plan  -AC  evaluation/treatment results reviewed;care plan/treatment goals reviewed;risks/benefits reviewed;current/potential barriers reviewed;patient/other agree to care plan  -AC       Care Plan Review, Other Participant(s)  family  -AC  --       Patient Effort  poor  -AC  adequate  -AC          Pain Scale: FACES Pre/Post-Treatment    Pain: FACES Scale, Pretreatment  0-->no hurt  -AC  0-->no hurt  -AC       Posttreatment Pain Rating  0-->no hurt  -AC  0-->no hurt  -AC          Clinical Impression    Daily Summary of Progress (SLP)  progress towards functional goals is fair  -AC  progress towards functional goals is fair  -AC       Barriers to Overall Progress (SLP)  Lethargy  -AC  Cog status, poor secretion mgt  -AC       Plan for Continued Treatment (SLP)  Not safe for PO diet. Pt would benefit from cont'd dysphagia and cognitive-communication tx. Rec: NPO, alternate means of nutrition.   -AC  Pt cont to exhibit clinical s/sxs aspiration and poor secretion management. Not safe for PO diet. Would benefit from cont'd dysphagia tx.   -AC          Recommendations    Anticipated Discharge Disposition (SLP)  anticipate therapy at next level of care   -AC  anticipate therapy at next level of care  -AC         User Key  (r) = Recorded By, (t) = Taken By, (c) = Cosigned By    Initials Name Effective Dates    AC Calista Agarwal, MS CCC-SLP 06/16/21 -           EDUCATION  The patient has been educated in the following areas:   Dysphagia (Swallowing Impairment) Oral Care/Hydration NPO rationale.    SLP Recommendation and Plan            Anticipated Discharge Disposition (SLP): anticipate therapy at next level of care              Daily Summary of Progress (SLP): progress towards functional goals is fair    Plan for Continued Treatment (SLP): Not safe for PO diet. Pt would benefit from cont'd dysphagia and cognitive-communication tx. Rec: NPO, alternate means of nutrition.               Plan of Care Reviewed With: patient  Progress: no change    SLP GOALS     Row Name 07/02/21 1625 07/01/21 0940          Oral Nutrition/Hydration Goal 1 (SLP)    Oral Nutrition/Hydration Goal 1, SLP  LTG: Pt will improve swallow function in order to return to PO diet w/ no overt s/sxs aspiration/distress w/ 100% acc and no cues  -AC  LTG: Pt will improve swallow function in order to return to PO diet w/ no overt s/sxs aspiration/distress w/ 100% acc and no cues  -AC     Time Frame (Oral Nutrition/Hydration Goal 1, SLP)  by discharge  -AC  by discharge  -AC     Progress/Outcomes (Oral Nutrition/Hydration Goal 1, SLP)  continuing progress toward goal  -AC  continuing progress toward goal  -AC        Oral Nutrition/Hydration Goal 2 (SLP)    Oral Nutrition/Hydration Goal 2, SLP  Pt will tolerate therapeutic trials of thin H2O & puree w/ no overt s/sxs aspiration/distress w/ 60% acc and no cues in order to assess appropriateness for repeat instrumental eval.  -AC  Pt will tolerate therapeutic trials of thin H2O & puree w/ no overt s/sxs aspiration/distress w/ 60% acc and no cues in order to assess appropriateness for repeat instrumental eval.  -AC     Time Frame (Oral Nutrition/Hydration Goal  2, SLP)  short term goal (STG)  -AC  short term goal (STG)  -AC     Barriers (Oral Nutrition/Hydration Goal 2, SLP)  Trialed ice chip x2. Required max cues to manipulate ice & initiate swallow. U/a to initiate swallow 1/2 chips. DNT further 2' high risk for aspiration/lethargy.  -AC  Trialed ice chips following oral care. Pt exhibited wet vocal quality and required oral suctioning. Poor secretion mgt.  -AC     Progress/Outcomes (Oral Nutrition/Hydration Goal 2, SLP)  progress slower than expected  -AC  continuing progress toward goal  -AC        Pharyngeal Strengthening Exercise Goal 1 (SLP)    Increase Superior Movement of the Hyolaryngeal Complex  effortful pitch glide (falsetto + pharyngeal squeeze)  -AC  effortful pitch glide (falsetto + pharyngeal squeeze)  -AC     Increase Anterior Movement of the Hyolaryngeal Complex  chin tuck against resistance (CTAR)  -AC  chin tuck against resistance (CTAR)  -AC     Increase Closure at Entrance to Airway/Closure of Airway at Glottis  supraglottic swallow  -AC  supraglottic swallow  -AC     Increase Squeeze/Positive Pressure Generation  hard effortful swallow  -AC  hard effortful swallow  -AC     Vaughn/Accuracy (Pharyngeal Strengthening Goal 1, SLP)  with minimal cues (75-90% accuracy)  -AC  with minimal cues (75-90% accuracy)  -AC     Time Frame (Pharyngeal Strengthening Goal 1, SLP)  short term goal (STG)  -AC  short term goal (STG)  -AC     Barriers (Pharyngeal Strengthening Goal 1, SLP)  Too lethargic. Effortful swallow: 0% w/ max cues.  -AC  Effortful swallow x10, Falsetto x5, SSG x5 w/ max cues. Despite max cues, pt u/a to successfully execute CTAR.  -AC     Progress/Outcomes (Pharyngeal Strengthening Goal 1, SLP)  progress slower than expected  -AC  continuing progress toward goal  -AC        Articulation Goal 1 (SLP)    Improve Articulation Goal 1 (SLP)  by over-articulating at word level;90%;with minimal cues (75-90%)  -AC  by over-articulating at word  level;90%;with minimal cues (75-90%)  -AC     Time Frame (Articulation Goal 1, SLP)  short term goal (STG)  -AC  short term goal (STG)  -AC     Progress/Outcomes (Articulation Goal 1, SLP)  goal ongoing  -AC  --        Attention Goal 1 (SLP)    Improve Attention by Goal 1 (SLP)  looking at speaker;attending to task;90%;with moderate cues (50-74%)  -AC  looking at speaker;attending to task;90%;with moderate cues (50-74%)  -AC     Time Frame (Attention Goal 1, SLP)  short term goal (STG)  -AC  short term goal (STG)  -AC     Progress/Outcomes (Attention Goal 1, SLP)  goal ongoing  -AC  --        Orientation Goal 1 (SLP)    Improve Orientation Through Goal 1 (SLP)  demonstrating orientation to year;demonstrating orientation to place;demonstrating orientation to disease/impairment;demonstrating orientation to month;demonstrating orientation to day;90%;with moderate cues (50-74%)  -AC  demonstrating orientation to year;demonstrating orientation to place;demonstrating orientation to disease/impairment;demonstrating orientation to month;demonstrating orientation to day;90%;with moderate cues (50-74%)  -AC     Time Frame (Orientation Goal 1, SLP)  short term goal (STG)  -AC  short term goal (STG)  -AC     Progress/Outcomes (Orientation Goal 1, SLP)  goal ongoing  -AC  --        Additional Goal 1 (SLP)    Additional Goal 1, SLP  LTG: Pt will improve cognitive-communication skills in order to participate in care w/ 100% acc w/o cues.  -AC  LTG: Pt will improve cognitive-communication skills in order to participate in care w/ 100% acc w/o cues.  -AC     Time Frame (Additional Goal 1, SLP)  by discharge  -AC  by discharge  -AC     Progress/Outcomes (Additional Goal 1, SLP)  goal ongoing  -AC  --       User Key  (r) = Recorded By, (t) = Taken By, (c) = Cosigned By    Initials Name Provider Type    Calista Mock MS CCC-SLP Speech and Language Pathologist             Time Calculation:   Time Calculation- SLP     Row Name  07/02/21 1635             Time Calculation- SLP    SLP Start Time  1625  -AC      SLP Received On  07/02/21  -AC         Untimed Charges    59880-RI Treatment Swallow Minutes  15  -AC         Total Minutes    Untimed Charges Total Minutes  15  -AC       Total Minutes  15  -AC        User Key  (r) = Recorded By, (t) = Taken By, (c) = Cosigned By    Initials Name Provider Type    AC Calista Agarwal MS CCC-SLP Speech and Language Pathologist          Therapy Charges for Today     Code Description Service Date Service Provider Modifiers Qty    06680345515 HC ST EVAL SPEECH AND PROD W LANG  2 7/1/2021 Calista Agarwal MS CCC-SLP GN 1    11067780055 HC ST TREATMENT SWALLOW 2 7/1/2021 Calista Agarwal MS CCC-SLP GN 1    64151926897 HC ST TREATMENT SWALLOW 1 7/2/2021 Calista Agarwal MS CCC-SLP GN 1        Patient was not wearing a face mask and did exhibit coughing during this therapy encounter.  Procedure performed was aerosolizing, involved close contact (within 6 feet for at least 15 minutes or longer), and involved contact with infectious secretions or specimens.  Therapist used appropriate personal protective equipment including gloves, standard procedure mask and eye protection.  Appropriate PPE was worn during the entire therapy session.  Hand hygiene was completed before and after therapy session.          MS DOC Pastor  7/2/2021

## 2021-07-02 NOTE — PLAN OF CARE
Goal Outcome Evaluation:  Plan of Care Reviewed With: patient        Progress: no change   SLP treatment completed. Will continue to address dysphagia and cognitive-communication deficits in tx. Please see note for further details and recommendations.

## 2021-07-02 NOTE — PROGRESS NOTES
Pulmonary/Critical Care Follow-up     LOS: 10 days   Patient Care Team:  Sanaz Zuluaga DO as PCP - General (Family Medicine)    Chief Complaint/reason: Chest pain/medical management of issues including delirium, respiratory management, electrolyte management postoperatively after CABG.      Chief Complaint   Patient presents with   • Chest Pain     Subjective      Initial history (from ICU note 6/22/2021):    65-year-old male with a PMH of prior tobacco use (quit 34 yrs ago), HTN, CAD s/p stent (2004), and obesity.      Per report, approximately 1.5 hrs prior to presentation patient developed severe burning chest pain / pressure with associated shortness of breath and nausea. EKG on arrival revealed inferior ST elevation. He was emergently taken to Cath Lab by Dr. Conrad where he was found to have severe multivessel CAD (60-70% ostial LM with haziness in proximal suggesting thrombus, 70-80% mid-LAD, 70% RCA) not amenable to PCI. LV function was relatively well-preserved at 55%.      CTS was contacted and he was subsequently taken to OR where he underwent a CABG x4 with IABP placement per Dr. Acosta.     (End of copied text).    Patient went back to the operating room on 6/23/2021 due to bleeding and had evacuation of blood from the right pleural space.  Patient extubated 6/24/2021.  Patient developed confusion/agitation on 6/25/2021.  He has been treated for presumed alcohol withdrawal.     Interval History:     Still with some agitation overnight.  More appropriate today.  He is somewhat drowsy and slurring words a bit.  He is not at all agitated this morning.   Duplex evaluation 7/1/2021 of left groin showed pseudoaneurysm.    Spoke to the patient's sister, Radha, by phone.    History taken from: Chart, staff    PMH/FH/Social History were reviewed and updated appropriately in the electronic medical record.     Review of Systems:    Review of 14 systems was completed with positives and pertinent negatives  noted in the subjective section.  All other systems reviewed and are negative.         Objective     Vital Signs  Temp:  [98 °F (36.7 °C)-99.1 °F (37.3 °C)] 98.1 °F (36.7 °C)  Heart Rate:  [56-92] 59  Resp:  [16-28] 22  BP: ()/() 124/58  07/01 0701 - 07/02 0700  In: 2260   Out: 300 [Urine:300]  Body mass index is 34.48 kg/m².     IV drips:  niCARdipine, Last Rate: 5 mg/hr (07/01/21 1449)       Physical Exam:     Constitutional:   Awake.  Remains somewhat drowsy.  Very slightly confused.  No acute distress.   Head:   Normocephalic, without obvious abnormality, atraumatic   Eyes:           Lids and lashes normal, conjunctivae and sclerae normal.  No icterus, no pallor, corneas clear, PER   ENMT:  Ears appear intact with no abnormalities noted        Neck:  No adenopathy, supple, trachea midline   Lungs/Resp:    Normal effort, symmetric chest rise, no crepitus, clear bilaterally                Heart/CV:    Regular rhythm, normal S1 and S2, no murmur   Abdomen/GI:    Normal bowel sounds, no masses, no organomegaly, soft,        nontender, nondistended   :    Deferred   Extremities/MSK:  No clubbing or cyanosis.  Trace bilateral lower extremity edema.  Normal tone.    No deformities.    Pulses:  Pulses palpable and equal bilaterally   Skin:  No bleeding, bruising or rash   Heme/Lymph:  No cervical or supraclavicular adenopathy.   Neurologic:    Psychiatric:    Moves all extremities with no obvious focal motor deficit.  Cranial nerves 2 - 12 grossly intact, dysarthric speech persists.  Much more calm today.    The above physical exam findings were reviewed and reflect exam findings as of today's exam.   Electronically signed by:Cedric Murphy MD 07/02/21 11:45 EDT    Results Review:     I reviewed the patient's new clinical results.   Results from last 7 days   Lab Units 07/01/21  0447 06/30/21  0641 06/29/21  0416 06/28/21  0346   SODIUM mmol/L 139 141 139 139   POTASSIUM mmol/L 4.1 3.9 3.7 3.8    CHLORIDE mmol/L 107 108* 105 105   CO2 mmol/L 23.0 22.0 26.0 24.0   BUN mg/dL 27* 26* 21 16   CREATININE mg/dL 0.56* 0.53* 0.55* 0.61*   CALCIUM mg/dL 8.9 8.3* 8.6 8.9   BILIRUBIN mg/dL  --   --   --  0.8   ALK PHOS U/L  --   --   --  56   ALT (SGPT) U/L  --   --   --  18   AST (SGOT) U/L  --   --   --  26   GLUCOSE mg/dL 137* 125* 130* 122*     Results from last 7 days   Lab Units 07/02/21  0639 07/01/21  0447 06/30/21  0327 06/27/21  1802 06/27/21  0420 06/26/21  0432   WBC 10*3/mm3 12.47* 10.03 10.38   < > 5.30 6.74   HEMOGLOBIN g/dL 8.4* 7.7* 8.2*  --  6.8* 7.5*   HEMATOCRIT % 28.7* 24.4* 26.2*  --  21.1* 23.1*   PLATELETS 10*3/mm3 351 323 305   < > 173 171   MONOCYTES % %  --   --   --   --  6.0 8.0    < > = values in this interval not displayed.         Results from last 7 days   Lab Units 07/01/21  0447 06/30/21  0641 06/29/21  0416 06/28/21  0346   MAGNESIUM mg/dL 2.1 2.4 2.0 1.8   PHOSPHORUS mg/dL 4.2 3.7  --  4.3       I reviewed the patient's new imaging including images and reports.    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 Transverse length and width are 3 cm x 3 cm. Neck width 0.45 cm. Multiphasic flow throughout CFA, SFA, and PFA. Unable to visualize distal EIA and EIV due to body habitus. No evidence of DVT in the left groin. YOSHI not performed due to incision site and monitoring equipment on left leg.         Chest x-ray 6/30/2021 shows cardiomegaly with small pleural effusions and pulmonary congestion which appears slightly worse to me from yesterday.    Medication Review:   amiodarone, 200 mg, Nasogastric, Q8H   Followed by  [START ON 7/7/2021] amiodarone, 200 mg, Nasogastric, Q12H   Followed by  [START ON 7/21/2021] amiodarone, 200 mg, Nasogastric, Daily  [START ON 7/3/2021] amLODIPine, 5 mg, Nasogastric, Q24H  aspirin, 325 mg, Nasogastric, Daily  atorvastatin,  40 mg, Nasogastric, Nightly  famotidine, 20 mg, Nasogastric, BID  folic acid, 1 mg, Nasogastric, Daily  furosemide, 40 mg, Nasogastric, Daily  insulin regular, 0-9 Units, Subcutaneous, Q6H  LORazepam, 2 mg, Nasogastric, Nightly  [START ON 7/3/2021] losartan, 100 mg, Nasogastric, Q24H  melatonin, 5 mg, Nasogastric, Nightly  metoprolol tartrate, 100 mg, Nasogastric, Q12H  nicotine, 1 patch, Transdermal, Q24H  pharmacy consult - MTM, , Does not apply, Daily  QUEtiapine, 50 mg, Nasogastric, Nightly  sennosides, 10 mL, Nasogastric, BID  sodium chloride, 10 mL, Intravenous, Q12H  terazosin, 2 mg, Nasogastric, Nightly  thiamine, 100 mg, Nasogastric, Daily      niCARdipine, 5-15 mg/hr, Last Rate: 5 mg/hr (07/01/21 1449)        Assessment/Plan         CABG 06/22/21    History of tobacco abuse    STEMI    Dyslipidemia    Obesity (BMI 30-39.9)    ST elevation myocardial infarction (STEMI) (CMS/[facility])    Hyperglycemia    65 y.o. male remote smoker with history of hypertension, coronary artery disease status post stent in 2004, obesity, BPH, alcohol use, admitted on 6/22/2021 with chest pain and found to have severe multivessel coronary artery disease.    Patient underwent CABG x4 with intra-aortic balloon pump placement by Dr. Acosta on 6/22/2021.  He went back to the operating room on 6/23/2021 for bleeding and had evacuation of blood from the right pleural space.    Postoperative issues include delirium/agitation possibly secondary to alcohol withdrawal, hyperglycemia, hypoxemia requiring supplemental oxygen.    Agitation much better today.  Still having issues at night.    Plan:  1.  Continue PICC line.  2.  Management of agitation/delirium per neurology.  3.  Continue correction insulin. Patient does not have history of diabetes.  4.  Supplemental oxygen, wean as tolerated.  5.  Continue vitamin replacement.  6.  Continue nicotine patch.  7.  Continue terazosin.  8.  Increase nighttime Seroquel dose.  Monitoring QT.  9.   Defer to CTS/cardiology for management of pseudoaneurysm.    Level of Risk High due to:  illness with threat to life or bodily function tenuous respiratory and neurologic status in the setting of recent CABG, probable volume overload, possible alcohol withdrawal and delirium.      Cedric Murphy MD  07/02/21  11:45 EDT      *. Please note that portions of this note were completed with FAD ? IO - a voice recognition program.

## 2021-07-02 NOTE — PLAN OF CARE
Goal Outcome Evaluation:  Pts confusion exacerbated almost immediately following shift change. He began pulling at wires and tubes and speech became delusional. Scheduled meds did not help unfortunately. He also started to try to get up as he thought he was late for a meeting.At approximately midnight patient had to b e restrained.  When I was at the bedside he could answer most questions appropriately but as soon as I would leave the room he would repeat the same problems. When he was able to fall asleep he was restless and seemed to be having sharp pains and would wake immediately, treated with prn pain meds and he was able to get some sleep. Sats >94 % on2L. NSR.   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  Patient had multiple large incontinent voids.

## 2021-07-03 ENCOUNTER — APPOINTMENT (OUTPATIENT)
Dept: CARDIOLOGY | Facility: HOSPITAL | Age: 66
End: 2021-07-03

## 2021-07-03 LAB
ANION GAP SERPL CALCULATED.3IONS-SCNC: 14 MMOL/L (ref 5–15)
BUN SERPL-MCNC: 45 MG/DL (ref 8–23)
BUN/CREAT SERPL: 59.2 (ref 7–25)
CALCIUM SPEC-SCNC: 8.8 MG/DL (ref 8.6–10.5)
CHLORIDE SERPL-SCNC: 104 MMOL/L (ref 98–107)
CO2 SERPL-SCNC: 18 MMOL/L (ref 22–29)
CREAT SERPL-MCNC: 0.76 MG/DL (ref 0.76–1.27)
DEPRECATED RDW RBC AUTO: 58.3 FL (ref 37–54)
ERYTHROCYTE [DISTWIDTH] IN BLOOD BY AUTOMATED COUNT: 16.1 % (ref 12.3–15.4)
GFR SERPL CREATININE-BSD FRML MDRD: 102 ML/MIN/1.73
GLUCOSE BLDC GLUCOMTR-MCNC: 103 MG/DL (ref 70–130)
GLUCOSE BLDC GLUCOMTR-MCNC: 121 MG/DL (ref 70–130)
GLUCOSE BLDC GLUCOMTR-MCNC: 125 MG/DL (ref 70–130)
GLUCOSE BLDC GLUCOMTR-MCNC: 144 MG/DL (ref 70–130)
GLUCOSE SERPL-MCNC: 116 MG/DL (ref 65–99)
HCT VFR BLD AUTO: 28.2 % (ref 37.5–51)
HGB BLD-MCNC: 8 G/DL (ref 13–17.7)
MCH RBC QN AUTO: 28.1 PG (ref 26.6–33)
MCHC RBC AUTO-ENTMCNC: 28.4 G/DL (ref 31.5–35.7)
MCV RBC AUTO: 98.9 FL (ref 79–97)
PLATELET # BLD AUTO: 312 10*3/MM3 (ref 140–450)
PMV BLD AUTO: 10.6 FL (ref 6–12)
POTASSIUM SERPL-SCNC: 4 MMOL/L (ref 3.5–5.2)
RBC # BLD AUTO: 2.85 10*6/MM3 (ref 4.14–5.8)
SODIUM SERPL-SCNC: 136 MMOL/L (ref 136–145)
WBC # BLD AUTO: 8.96 10*3/MM3 (ref 3.4–10.8)

## 2021-07-03 PROCEDURE — 80048 BASIC METABOLIC PNL TOTAL CA: CPT | Performed by: RADIOLOGY

## 2021-07-03 PROCEDURE — 93926 LOWER EXTREMITY STUDY: CPT

## 2021-07-03 PROCEDURE — 97530 THERAPEUTIC ACTIVITIES: CPT

## 2021-07-03 PROCEDURE — 82962 GLUCOSE BLOOD TEST: CPT

## 2021-07-03 PROCEDURE — 99232 SBSQ HOSP IP/OBS MODERATE 35: CPT | Performed by: INTERNAL MEDICINE

## 2021-07-03 PROCEDURE — 85027 COMPLETE CBC AUTOMATED: CPT | Performed by: RADIOLOGY

## 2021-07-03 RX ORDER — DEXMEDETOMIDINE HYDROCHLORIDE 4 UG/ML
.2-1.5 INJECTION, SOLUTION INTRAVENOUS
Status: DISCONTINUED | OUTPATIENT
Start: 2021-07-03 | End: 2021-07-04

## 2021-07-03 RX ORDER — TERAZOSIN 5 MG/1
5 CAPSULE ORAL NIGHTLY
Status: DISCONTINUED | OUTPATIENT
Start: 2021-07-03 | End: 2021-07-05

## 2021-07-03 RX ORDER — METOPROLOL TARTRATE 50 MG/1
50 TABLET, FILM COATED ORAL EVERY 12 HOURS SCHEDULED
Status: DISCONTINUED | OUTPATIENT
Start: 2021-07-03 | End: 2021-07-05

## 2021-07-03 RX ORDER — DEXMEDETOMIDINE HYDROCHLORIDE 4 UG/ML
.2-1.5 INJECTION, SOLUTION INTRAVENOUS
Status: DISCONTINUED | OUTPATIENT
Start: 2021-07-03 | End: 2021-07-03

## 2021-07-03 RX ADMIN — Medication 5 MG: at 20:43

## 2021-07-03 RX ADMIN — FUROSEMIDE 40 MG: 40 TABLET ORAL at 08:33

## 2021-07-03 RX ADMIN — THIAMINE HCL TAB 100 MG 100 MG: 100 TAB at 08:33

## 2021-07-03 RX ADMIN — LOSARTAN POTASSIUM 100 MG: 50 TABLET, FILM COATED ORAL at 08:33

## 2021-07-03 RX ADMIN — FAMOTIDINE 20 MG: 20 TABLET ORAL at 20:44

## 2021-07-03 RX ADMIN — AMIODARONE HYDROCHLORIDE 200 MG: 200 TABLET ORAL at 18:45

## 2021-07-03 RX ADMIN — SODIUM CHLORIDE, PRESERVATIVE FREE 10 ML: 5 INJECTION INTRAVENOUS at 08:35

## 2021-07-03 RX ADMIN — METOPROLOL TARTRATE 100 MG: 50 TABLET, FILM COATED ORAL at 08:33

## 2021-07-03 RX ADMIN — AMIODARONE HYDROCHLORIDE 200 MG: 200 TABLET ORAL at 08:34

## 2021-07-03 RX ADMIN — AMLODIPINE BESYLATE 5 MG: 5 TABLET ORAL at 08:33

## 2021-07-03 RX ADMIN — METOPROLOL TARTRATE 50 MG: 50 TABLET, FILM COATED ORAL at 20:43

## 2021-07-03 RX ADMIN — SENNOSIDES 10 ML: 8.8 LIQUID ORAL at 20:44

## 2021-07-03 RX ADMIN — SODIUM CHLORIDE, PRESERVATIVE FREE 10 ML: 5 INJECTION INTRAVENOUS at 20:45

## 2021-07-03 RX ADMIN — TERAZOSIN HYDROCHLORIDE 5 MG: 5 CAPSULE ORAL at 20:43

## 2021-07-03 RX ADMIN — DEXMEDETOMIDINE HYDROCHLORIDE 0.2 MCG/KG/HR: 4 INJECTION, SOLUTION INTRAVENOUS at 20:44

## 2021-07-03 RX ADMIN — DEXMEDETOMIDINE HYDROCHLORIDE 0.4 MCG/KG/HR: 4 INJECTION, SOLUTION INTRAVENOUS at 21:17

## 2021-07-03 RX ADMIN — SENNOSIDES 10 ML: 8.8 LIQUID ORAL at 08:32

## 2021-07-03 RX ADMIN — FAMOTIDINE 20 MG: 20 TABLET ORAL at 08:33

## 2021-07-03 RX ADMIN — ATORVASTATIN CALCIUM 40 MG: 40 TABLET, FILM COATED ORAL at 20:43

## 2021-07-03 RX ADMIN — AMIODARONE HYDROCHLORIDE 200 MG: 200 TABLET ORAL at 04:19

## 2021-07-03 RX ADMIN — ASPIRIN 325 MG ORAL TABLET 325 MG: 325 PILL ORAL at 08:32

## 2021-07-03 RX ADMIN — FOLIC ACID 1 MG: 1 TABLET ORAL at 08:33

## 2021-07-03 RX ADMIN — NICOTINE 1 PATCH: 7 PATCH, EXTENDED RELEASE TRANSDERMAL at 08:35

## 2021-07-03 NOTE — PROGRESS NOTES
"                                 [facility] Progress Note      LOS: 11 days   Patient Care Team:  Sanaz Zuluaga DO as PCP - General (Family Medicine)    Chief Complaint:    Chief Complaint   Patient presents with   • Chest Pain       Subjective     Interval History:     Patient Complaints: Confused.  Somewhat more intelligible      Review of Systems:   A 14 point review of systems was negative except as was stated in the HPI      Objective     Vital Sign Min/Max for last 24 hours  Temp  Min: 98.1 °F (36.7 °C)  Max: 98.7 °F (37.1 °C)   BP  Min: 92/38  Max: 154/71   Pulse  Min: 56  Max: 85   Resp  Min: 16  Max: 26   SpO2  Min: 85 %  Max: 100 %   No data recorded   Weight  Min: 112 kg (246 lb)  Max: 112 kg (246 lb)     Flowsheet Rows      First Filed Value   Admission Height  177.8 cm (70\") Documented at 06/22/2021 1400   Admission Weight  118 kg (260 lb 2.3 oz) Documented at 06/22/2021 2000          Physical Exam:  General Appearance: Well-nourished well-developed white male no acute distress  Lungs: Coarse bilateral breath sounds  Heart:: Regular rate and rhythm this morning; no Murmurs, Rubs or Gallops  Abdomen: Soft and nontender with adequate bowel sounds.  No organomegaly  Extremities: No cyanosis, clubbing.  1+ edema  Pulses: Pulses palpable and equal bilaterally/pulsatile mass left groin  Skin: Warm and dry with no rash  Psych: Normal     Results Review:     I reviewed the patient's new clinical results.  Results from last 7 days   Lab Units 07/03/21 0415 07/01/21 0447 06/30/21  0641   SODIUM mmol/L 136 139 141   POTASSIUM mmol/L 4.0 4.1 3.9   CHLORIDE mmol/L 104 107 108*   CO2 mmol/L 18.0* 23.0 22.0   BUN mg/dL 45* 27* 26*   CREATININE mg/dL 0.76 0.56* 0.53*   GLUCOSE mg/dL 116* 137* 125*   CALCIUM mg/dL 8.8 8.9 8.3*     Results from last 7 days   Lab Units 07/03/21 0415 07/02/21  0639 07/01/21  0447   WBC 10*3/mm3 8.96 12.47* 10.03   HEMOGLOBIN g/dL 8.0* 8.4* 7.7*   HEMATOCRIT % 28.2* " 28.7* 24.4*   PLATELETS 10*3/mm3 312 351 323     Lab Results   Lab Value Date/Time    TROPONINT 1.730 (C) 06/22/2021 1705    TROPONINT <0.010 06/22/2021 1009     Results from last 7 days   Lab Units 06/28/21  0346   TRIGLYCERIDES mg/dL 70                   Medication Review: yes  Current Facility-Administered Medications   Medication Dose Route Frequency Provider Last Rate Last Admin   • albuterol (PROVENTIL) nebulizer solution 0.083% 2.5 mg/3mL  2.5 mg Nebulization Q6H PRN Given, Abdiel FONTANEZ MD       • amiodarone (PACERONE) tablet 200 mg  200 mg Nasogastric Q8H Given, Abdiel FONTANEZ MD   200 mg at 07/03/21 0419    Followed by   • [START ON 7/7/2021] amiodarone (PACERONE) tablet 200 mg  200 mg Nasogastric Q12H Given, Abdiel FONTANEZ MD        Followed by   • [START ON 7/21/2021] amiodarone (PACERONE) tablet 200 mg  200 mg Nasogastric Daily Given, Abdiel FONTANEZ MD       • amLODIPine (NORVASC) tablet 5 mg  5 mg Nasogastric Q24H Given, Abdiel FONTANEZ MD       • aspirin tablet 325 mg  325 mg Nasogastric Daily Given, Abdiel FONTANEZ MD   325 mg at 07/02/21 0839   • atorvastatin (LIPITOR) tablet 40 mg  40 mg Nasogastric Nightly Given, Abdiel FONTANEZ MD   40 mg at 07/02/21 2056   • famotidine (PEPCID) tablet 20 mg  20 mg Nasogastric BID Given, Abdiel FONTANEZ MD   20 mg at 07/02/21 2057   • folic acid (FOLVITE) tablet 1 mg  1 mg Nasogastric Daily Given, Abdiel FONTANEZ MD   1 mg at 07/02/21 0839   • furosemide (LASIX) tablet 40 mg  40 mg Nasogastric Daily Given, Abdiel FONTANEZ MD   40 mg at 07/02/21 0839   • [MAR Hold] HYDROcodone-acetaminophen (NORCO) 7.5-325 MG per tablet 1 tablet  1 tablet Nasogastric Q4H PRN Miguel Angel Acosta MD       • insulin regular (humuLIN R,novoLIN R) injection 0-9 Units  0-9 Units Subcutaneous Q6H Given, Abdiel FONTANEZ MD   2 Units at 07/01/21 0530   • LORazepam (ATIVAN) tablet 1 mg  1 mg Nasogastric Q6H PRN Given, Abdiel FONTANEZ MD   1 mg at 07/02/21 6582   • LORazepam (ATIVAN) tablet 2 mg  2 mg Nasogastric Nightly Given, Abdiel FONTANEZ MD   2 mg at 07/02/21  2056   • losartan (COZAAR) tablet 100 mg  100 mg Nasogastric Q24H Given, Abdiel FONTANEZ MD       • magnesium sulfate 4g/100mL (PREMIX) infusion  4 g Intravenous PRN Given, Abdiel FONTANEZ MD   4 g at 06/28/21 0607   • melatonin tablet 5 mg  5 mg Nasogastric Nightly Given, Abdiel FONTANEZ MD   5 mg at 07/02/21 2057   • metoprolol tartrate (LOPRESSOR) tablet 100 mg  100 mg Nasogastric Q12H Given, Abdiel FONTANEZ MD   100 mg at 07/02/21 2056   • morphine injection 2 mg  2 mg Intravenous Q2H PRN Given, Abdiel FONTANEZ MD   2 mg at 07/02/21 2342   • niCARdipine (CARDENE) 40 mg in 200 mL NS infusion  5-15 mg/hr Intravenous Titrated Given, Abdiel FONTANEZ MD 25 mL/hr at 07/01/21 1449 5 mg/hr at 07/01/21 1449   • nicotine (NICODERM CQ) 7 MG/24HR patch 1 patch  1 patch Transdermal Q24H Given, Abdiel FONTANEZ MD   1 patch at 07/01/21 0927   • ondansetron (ZOFRAN) injection 4 mg  4 mg Intravenous Q6H PRN Given, Abdiel FONTANEZ MD       • [MAR Hold] oxyCODONE-acetaminophen (PERCOCET) 5-325 MG per tablet 2 tablet  2 tablet Nasogastric Q4H PRN Miguel Angel Acosta MD   2 tablet at 07/02/21 0314   • Pharmacy Consult - MTM   Does not apply Daily Given, Abdiel FONTANEZ MD       • potassium chloride (KLOR-CON) packet 40 mEq  40 mEq Nasogastric PRN Given, Abdiel FONTANEZ MD       • QUEtiapine (SEROquel) tablet 50 mg  50 mg Nasogastric Nightly Given, Abdiel FONTANEZ MD   50 mg at 07/02/21 2151   • sennosides (SENOKOT) 8.8 MG/5ML syrup 10 mL  10 mL Nasogastric BID Given, Abdiel FONTANEZ MD   10 mL at 07/02/21 2056   • sodium chloride 0.9 % flush 10 mL  10 mL Intravenous Q12H Given, Abdiel FONTANEZ MD   10 mL at 06/30/21 2124   • sodium chloride 0.9 % flush 10 mL  10 mL Intravenous PRN Given, Abdiel FONTANEZ MD       • terazosin (HYTRIN) capsule 5 mg  5 mg Nasogastric Nightly Mikey Conrad MD       • thiamine (VITAMIN B-1) tablet 100 mg  100 mg Nasogastric Daily Given, Abdiel FONTANEZ MD   100 mg at 07/02/21 0839         CABG 06/22/21    History of tobacco abuse    STEMI    Dyslipidemia    Obesity (BMI 30-39.9)    ST  elevation myocardial infarction (STEMI) (CMS/HCC)    Hyperglycemia        Impression      Inferior STEMI 6/22/2021  Emergent CABG for left main and three-vessel coronary artery disease with preserved LV function 6/22/2021  reop for bleeding 6/23/2021 early a.m.  Hypertension  Hyperlipidemia  Remote coronary artery stent [place]  Postop delirium/EtOH withdrawal  Postop atrial fibrillation/flutter; back in sinus rhythm this morning  Echocardiogram shows mild LV dysfunction with no significant pericardial effusion  Left femoral pseudoaneurysm by duplex 7/1/2021 status post thrombin occlusion 7/2/21        Plan     Continue beta-blocker  Pulmonary toilet  Continue amiodarone  Continue aspirin and statin  Gentle diuresis  Mobilize      Mikey Conrad MD   07/03/21  07:27 EDT

## 2021-07-03 NOTE — PROGRESS NOTES
Cardiothoracic Surgery Progress Note      6/22 s/p CABG x 4, mediastinal exploration, PPD # 1 left groin pseudoaneurysm injection after IABP     LOS: 11 days      Subjective:  No complaints from patient.  He is yelling in the unit.      Objective:  Vital Signs  Temp:  [98.2 °F (36.8 °C)-98.5 °F (36.9 °C)] 98.4 °F (36.9 °C)  Heart Rate:  [56-92] 66  Resp:  [24-26] 26  BP: ()/(35-76) 133/47    Physical Exam:   General Appearance: alert, appears stated age and cooperative, oriented x 3   Lungs: clear to auscultation, respirations regular, respirations even and respirations unlabored   Heart: regular rhythm & normal rate, normal S1, S2 and no murmur, no gallop, no rub   Skin: Incision c/d/i     Results:  Results from last 7 days   Lab Units 07/03/21  0415   WBC 10*3/mm3 8.96   HEMOGLOBIN g/dL 8.0*   HEMATOCRIT % 28.2*   PLATELETS 10*3/mm3 312     Results from last 7 days   Lab Units 07/03/21  0415   SODIUM mmol/L 136   POTASSIUM mmol/L 4.0   CHLORIDE mmol/L 104   CO2 mmol/L 18.0*   BUN mg/dL 45*   CREATININE mg/dL 0.76   GLUCOSE mg/dL 116*   CALCIUM mg/dL 8.8       Assessment:  6/22 s/p CABG x 4, mediastinal exploration, PPD # 1 left groin pseudoaneurysm injection after IABP    Plan:  D/C ativan  Beer TID  Minimize narcotics  Ambulate  Pulmonary toilet  SLP evaluation  ASA, statin, BB    Phillip Jones MD  07/03/21  16:37 EDT

## 2021-07-03 NOTE — PLAN OF CARE
Goal Outcome Evaluation:  Plan of Care Reviewed With: patient        Progress: improving  Outcome Summary: Pt increased ambulation distance to 60ft with Indira x2 and B UE support on tele monitor. Pt required max cueing for upright posture and slower, increased strides bilaterally. Pt very unsteady throughout ambulation with impaired balance and coordination. Required one seated rest break. Mobility limited by fatigue and weakness. Continue to progress as appropriate.

## 2021-07-03 NOTE — PLAN OF CARE
Goal Outcome Evaluation:  Plan of Care Reviewed With: patient, family           Outcome Summary: Pt able to answer most orientation questions but still has lots of confused conversation throughout the day. Will call out and frequently forgets recent conversations and plan of care. Incontinent of urine today, no BM.  VSS- increased PO BP meds, HR down to 50s and BP down to 90s. Ambulated twice in hallway w 2 assist 60 feet then 180 feet. Unsuccessful finding the patient meaningful sleep today. Plan to give precedex at night only, turning off early in AM. Ativan dc'd per CT surgery. TF back at goal and tolerating well. Follow up psuedoanuerysm ultrasound.

## 2021-07-03 NOTE — THERAPY TREATMENT NOTE
Patient Name: Thien Gray  : 1955    MRN: [id number]                              Today's Date: 7/3/2021       Admit Date: 2021    Visit Dx:     ICD-10-CM ICD-9-CM   1. ST elevation myocardial infarction (STEMI), unspecified artery (CMS/HCC)  I21.3 410.90   2. Chest pain, unspecified type  R07.9 786.50   3. Pharyngeal dysphagia  R13.13 787.23   4. Cognitive communication deficit  R41.841 799.52     Patient Active Problem List   Diagnosis   • History of tobacco abuse   • CABG 21   • Dyspnea on exertion   • RBBB   • Psoriasis   • Primary osteoarthritis of both knees   • STEMI   • Dyslipidemia   • Obesity (BMI 30-39.9)   • ST elevation myocardial infarction (STEMI) (CMS/HCC)   • Hyperglycemia     Past Medical History:   Diagnosis Date   • Abnormal ECG 2020    Get from Dr. Kayser   • Coronary artery disease    • History of heart attack    • History of MI (myocardial infarction)    • Hyperlipidemia    • Myocardial infarction (CMS/HCC)    • RBBB 2/10/2020     Past Surgical History:   Procedure Laterality Date   • APPENDECTOMY     • CARDIAC CATHETERIZATION  2004    They used a catheter to put in my stent.   • CARDIAC CATHETERIZATION N/A 2021    Procedure: Left Heart Cath;  Surgeon: Mikey Conrad MD;  Location: On license of [facility] CATH INVASIVE LOCATION;  Service: Cardiovascular;  Laterality: N/A;   • CORONARY ANGIOPLASTY      I think that's what the bill for my stent said.   • CORONARY ARTERY BYPASS BRING BACK FOR EXPLORATION N/A 2021    Procedure: BRING BACK FOR EXPLORATION OPEN HEART;  Surgeon: Miguel Angel Acosta MD;  Location: On license of [facility] OR;  Service: Cardiothoracic;  Laterality: N/A;   • CORONARY ARTERY BYPASS GRAFT N/A 2021    Procedure: MEDIAN STERNOTOMY, CORONARY ARTERY BYPASS GRAFTING X 4 WITH LEFT INTERNAL MAMMARY ARTERY GRAFT, ENDOSCOPIC VEIN HARVESTING OF THE RIGHT GREATER SAPHENOUS VEIN, INTRA-AORTIC BALLOON PUMP INSERTION, IVAN PER ANESTHESIA;  Surgeon: Dave  MD Miguel Angel;  Location: [facility];  Service: Cardiothoracic;  Laterality: N/A;   • CORONARY STENT PLACEMENT  2004     General Information     Row Name 07/03/21 1349          Physical Therapy Time and Intention    Document Type  therapy note (daily note)  -KG     Mode of Treatment  physical therapy  -KG     Row Name 07/03/21 1349          General Information    Existing Precautions/Restrictions  cardiac;fall;sternal;other (see comments) NG; confusion  -KG     Row Name 07/03/21 1349          Cognition    Orientation Status (Cognition)  oriented to;person;place  -KG     Row Name 07/03/21 1349          Safety Issues, Functional Mobility    Safety Issues Affecting Function (Mobility)  ability to follow commands;awareness of need for assistance;insight into deficits/self-awareness;safety precaution awareness;safety precautions follow-through/compliance  -KG     Impairments Affecting Function (Mobility)  balance;cognition;coordination;endurance/activity tolerance;postural/trunk control;strength  -KG     Cognitive Impairments, Mobility Safety/Performance  attention;awareness, need for assistance;insight into deficits/self-awareness;safety precaution awareness;safety precaution follow-through  -KG       User Key  (r) = Recorded By, (t) = Taken By, (c) = Cosigned By    Initials Name Provider Type    KG Kassie Comer, PT Physical Therapist        Mobility     Row Name 07/03/21 1350          Bed Mobility    Bed Mobility  supine-sit;sit-supine  -KG     Supine-Sit [place] (Bed Mobility)  maximum assist (25% patient effort);2 person assist;verbal cues  -KG     Sit-Supine [place] (Bed Mobility)  moderate assist (50% patient effort);2 person assist;verbal cues  -KG     Assistive Device (Bed Mobility)  draw sheet;head of bed elevated  -KG     Comment (Bed Mobility)  VC's for sequencing and safe hand placement to maintain sternal precautions. Pt required increased assistance at trunk and BLEs. Upon sitting EOB pt  progressed to CGA for static sitting balance.  -KG     Row Name 07/03/21 1350          Transfers    Comment (Transfers)  VC's for sequencing and safe hand placement to maintain sternal precautions.  -KG     Row Name 07/03/21 1350          Sit-Stand Transfer    Sit-Stand [place] (Transfers)  minimum assist (75% patient effort);2 person assist;verbal cues  -KG     Row Name 07/03/21 1350          Gait/Stairs (Locomotion)    [place] Level (Gait)  minimum assist (75% patient effort);2 person assist;verbal cues  -KG     Assistive Device (Gait)  other (see comments) B UE support on tele monitor  -KG     Distance in Feet (Gait)  60  -KG     Deviations/Abnormal Patterns (Gait)  base of support, wide;stride length decreased  -KG     Bilateral Gait Deviations  forward flexed posture;heel strike decreased  -KG     Comment (Gait/Stairs)  Pt demonstrated step to gait pattern with quick speed and wide ADI. Pt unsteady throughout ambulation with tendency to push monitor too far in front. Pt required max cueing for upright posture with slower, increased stride length. Pt required one seated rest break. Mobility limited by fatigue.  -KG       User Key  (r) = Recorded By, (t) = Taken By, (c) = Cosigned By    Initials Name Provider Type    Kassie Molina PT Physical Therapist        Obj/Interventions     Row Name 07/03/21 1352          Balance    Balance Assessment  sitting static balance;standing static balance;standing dynamic balance  -KG     Static Sitting Balance  WFL;supported;sitting, edge of bed  -KG     Static Standing Balance  mild impairment;supported;standing  -KG     Dynamic Standing Balance  moderate impairment;supported;standing  -KG       User Key  (r) = Recorded By, (t) = Taken By, (c) = Cosigned By    Initials Name Provider Type    KG Kassie Comer, PT Physical Therapist        Goals/Plan    No documentation.       Clinical Impression     Row Name 07/03/21 1352          Pain    Additional  Documentation  Pain Scale: Numbers Pre/Post-Treatment (Group)  -KG     Row Name 07/03/21 1352          Pain Scale: Numbers Pre/Post-Treatment    Pretreatment Pain Rating  0/10 - no pain  -KG     Posttreatment Pain Rating  0/10 - no pain  -KG     Row Name 07/03/21 1352          Plan of Care Review    Plan of Care Reviewed With  patient  -KG     Progress  improving  -KG     Outcome Summary  Pt increased ambulation distance to 60ft with Indira x2 and B UE support on tele monitor. Pt required max cueing for upright posture and slower, increased strides bilaterally. Pt very unsteady throughout ambulation with impaired balance and coordination. Required one seated rest break. Mobility limited by fatigue and weakness. Continue to progress as appropriate.  -KG     Row Name 07/03/21 1352          Vital Signs    Pre Systolic BP Rehab  115  -KG     Pre Treatment Diastolic BP  53  -KG     Post Systolic BP Rehab  96  -KG     Post Treatment Diastolic BP  43  -KG     Pretreatment Heart Rate (beats/min)  59  -KG     Posttreatment Heart Rate (beats/min)  63  -KG     Pre SpO2 (%)  96  -KG     O2 Delivery Pre Treatment  room air  -KG     Post SpO2 (%)  100  -KG     O2 Delivery Post Treatment  room air  -KG     Pre Patient Position  Supine  -KG     Intra Patient Position  Standing  -KG     Post Patient Position  Supine  -KG     Row Name 07/03/21 1352          Positioning and Restraints    Pre-Treatment Position  in bed  -KG     Post Treatment Position  bed  -KG     In Bed  notified nsg;supine;call light within reach;encouraged to call for assist;side rails up x3;RUE elevated;LUE elevated  -KG       User Key  (r) = Recorded By, (t) = Taken By, (c) = Cosigned By    Initials Name Provider Type    KG Kassie Comer, PT Physical Therapist        Outcome Measures     Row Name 07/03/21 1357          How much help from another person do you currently need...    Turning from your back to your side while in flat bed without using bedrails?   2  -KG     Moving from lying on back to sitting on the side of a flat bed without bedrails?  2  -KG     Moving to and from a bed to a chair (including a wheelchair)?  3  -KG     Standing up from a chair using your arms (e.g., wheelchair, bedside chair)?  3  -KG     Climbing 3-5 steps with a railing?  1  -KG     To walk in hospital room?  3  -KG     AM-PAC 6 Clicks Score (PT)  14  -KG     Row Name 07/03/21 1354          Functional Assessment    Outcome Measure Options  AM-PAC 6 Clicks Basic Mobility (PT)  -KG       User Key  (r) = Recorded By, (t) = Taken By, (c) = Cosigned By    Initials Name Provider Type    KG Kassie Comer, PT Physical Therapist        Physical Therapy Education                 Title: PT OT SLP Therapies (In Progress)     Topic: Physical Therapy (In Progress)     Point: Mobility training (In Progress)     Learning Progress Summary           Patient Acceptance, E, NR by KG at 7/3/2021 1032    Acceptance, E,TB, VU,NR by  at 7/2/2021 1520    Acceptance, E, NR,DU by  at 7/1/2021 1529    Acceptance, E, NR by KG at 6/30/2021 1421    Acceptance, E, NR by KG at 6/29/2021 1101    Acceptance, E, NR by KG at 6/28/2021 1040    Acceptance, E, NR by KG at 6/25/2021 1121                   Point: Home exercise program (In Progress)     Learning Progress Summary           Patient Acceptance, E, NR by KG at 7/3/2021 1032    Acceptance, E, NR,DU by  at 7/1/2021 1529    Acceptance, E, NR by KG at 6/30/2021 1421    Acceptance, E, NR by KG at 6/29/2021 1101    Acceptance, E, NR by KG at 6/28/2021 1040                   Point: Body mechanics (In Progress)     Learning Progress Summary           Patient Acceptance, E, NR by KG at 7/3/2021 1032    Acceptance, E,TB, VU,NR by  at 7/2/2021 1520    Acceptance, E, NR,DU by  at 7/1/2021 1529    Acceptance, E, NR by KG at 6/30/2021 1421    Acceptance, E, NR by KG at 6/29/2021 1101    Acceptance, E, NR by KG at 6/28/2021 1040    Acceptance, E, NR by KG at  6/25/2021 1121                   Point: Precautions (In Progress)     Learning Progress Summary           Patient Acceptance, E, NR by KG at 7/3/2021 1032    Acceptance, E,TB, VU,NR by AY at 7/2/2021 1520    Acceptance, E, NR,DU by  at 7/1/2021 1529    Acceptance, E, NR by KG at 6/30/2021 1421    Acceptance, E, NR by KG at 6/29/2021 1101    Acceptance, E, NR by KG at 6/28/2021 1040    Acceptance, E, NR by KG at 6/25/2021 1121                               User Key     Initials Effective Dates Name Provider Type Discipline    KG 05/22/20 -  Kassie Comer, PT Physical Therapist PT    AY 11/10/20 -  Guera Hooper, PT Physical Therapist PT     05/17/21 -  Ned Ortez, PT Student PT Student PT              PT Recommendation and Plan  Planned Therapy Interventions (PT): balance training, bed mobility training, gait training, strengthening, transfer training  Plan of Care Reviewed With: patient  Progress: improving  Outcome Summary: Pt increased ambulation distance to 60ft with [place] x2 and B UE support on tele monitor. Pt required max cueing for upright posture and slower, increased strides bilaterally. Pt very unsteady throughout ambulation with impaired balance and coordination. Required one seated rest break. Mobility limited by fatigue and weakness. Continue to progress as appropriate.     Time Calculation:   PT Charges     Row Name 07/03/21 1032             Time Calculation    Start Time  1032  -KG      PT Received On  07/03/21  -KG      PT Goal Re-Cert Due Date  07/05/21  -KG         Time Calculation- PT    Total Timed Code Minutes- PT  24 minute(s)  -KG         Timed Charges    66844 - PT Therapeutic Activity Minutes  24  -KG         Total Minutes    Timed Charges Total Minutes  24  -KG       Total Minutes  24  -KG        User Key  (r) = Recorded By, (t) = Taken By, (c) = Cosigned By    Initials Name Provider Type    KG Kassie Comer, PT Physical Therapist        Therapy Charges for Today      Code Description Service Date Service Provider Modifiers Qty    [id number]  PT THERAPEUTIC ACT EA 15 MIN 7/3/2021 Kassie Comer, PT GP 2          PT G-Codes  Outcome Measure Options: AM-PAC 6 Clicks Basic Mobility (PT)  AM-PAC 6 Clicks Score (PT): 14    Ivania Comer, PT  7/3/2021

## 2021-07-03 NOTE — PROGRESS NOTES
Pulmonary/Critical Care Follow-up     LOS: 11 days   Patient Care Team:  Sanaz Zuluaga DO as PCP - General (Family Medicine)    Chief Complaint/reason: Chest pain/medical management of issues including delirium, respiratory management, electrolyte management postoperatively after CABG.      Chief Complaint   Patient presents with   • Chest Pain     Subjective      Initial history (from ICU note 6/22/2021):    65-year-old male with a PMH of prior tobacco use (quit 34 yrs ago), HTN, CAD s/p stent (2004), and obesity.      Per report, approximately 1.5 hrs prior to presentation patient developed severe burning chest pain / pressure with associated shortness of breath and nausea. EKG on arrival revealed inferior ST elevation. He was emergently taken to Cath Lab by Dr. Conrad where he was found to have severe multivessel CAD (60-70% ostial LM with haziness in proximal suggesting thrombus, 70-80% mid-LAD, 70% RCA) not amenable to PCI. LV function was relatively well-preserved at 55%.      CTS was contacted and he was subsequently taken to OR where he underwent a CABG x4 with IABP placement per Dr. Acosta.     (End of copied text).    Patient went back to the operating room on 6/23/2021 due to bleeding and had evacuation of blood from the right pleural space.  Patient extubated 6/24/2021.  Patient developed confusion/agitation on 6/25/2021.  He has been treated for presumed alcohol withdrawal.     Patient had pseudoaneurysm repair (thrombin injection by Dr. Tamez) on 7/2/2021.    Interval History:     Patient seems more drowsy today.  He is confused but not agitated.  He still drowsy and slurring words a bit.  He is not at all agitated this morning.   Ambulated a short distance in the gonzales.    History taken from: Chart, staff    PMH/FH/Social History were reviewed and updated appropriately in the electronic medical record.     Review of Systems:    Review of 14 systems was completed with positives and pertinent  negatives noted in the subjective section.  All other systems reviewed and are negative.         Objective     Vital Signs  Temp:  [98.2 °F (36.8 °C)-98.5 °F (36.9 °C)] 98.4 °F (36.9 °C)  Heart Rate:  [56-92] 66  Resp:  [24-26] 26  BP: ()/(35-76) 133/47  07/02 0701 - 07/03 0700  In: 258   Out: -   Body mass index is 34.44 kg/m².     IV drips:  niCARdipine, Last Rate: 5 mg/hr (07/01/21 1449)       Physical Exam:     Constitutional:   Awake.  Drowsy but arousable.  Very slightly confused.  No acute distress.   Head:   Normocephalic, without obvious abnormality, atraumatic   Eyes:           Lids and lashes normal, conjunctivae and sclerae normal.  No icterus, no pallor, corneas clear, PER   ENMT:  Ears appear intact with no abnormalities noted        Neck:  No adenopathy, supple, trachea midline   Lungs/Resp:    Normal effort, symmetric chest rise, no crepitus, clear bilaterally                Heart/CV:    Regular rhythm, normal S1 and S2, no murmur   Abdomen/GI:    Normal bowel sounds, no masses, no organomegaly, soft,        nontender, nondistended   :    Deferred   Extremities/MSK:  No clubbing or cyanosis.  Trace bilateral lower extremity edema.  Normal tone.    No deformities.    Pulses:  Pulses palpable and equal bilaterally   Skin:  No bleeding, bruising or rash   Heme/Lymph:  No cervical or supraclavicular adenopathy.   Neurologic:    Psychiatric:    Moves all extremities with no obvious focal motor deficit.  Cranial nerves 2 - 12 grossly intact, dysarthric speech persists.  Much more calm today.    The above physical exam findings were reviewed and reflect exam findings as of today's exam.   Electronically signed by:Cedric Murphy MD 07/03/21 16:26 EDT    Results Review:     I reviewed the patient's new clinical results.   Results from last 7 days   Lab Units 07/03/21  0415 07/01/21  0447 06/30/21  0641 06/28/21  0346   SODIUM mmol/L 136 139 141 139   POTASSIUM mmol/L 4.0 4.1 3.9 3.8   CHLORIDE  mmol/L 104 107 108* 105   CO2 mmol/L 18.0* 23.0 22.0 24.0   BUN mg/dL 45* 27* 26* 16   CREATININE mg/dL 0.76 0.56* 0.53* 0.61*   CALCIUM mg/dL 8.8 8.9 8.3* 8.9   BILIRUBIN mg/dL  --   --   --  0.8   ALK PHOS U/L  --   --   --  56   ALT (SGPT) U/L  --   --   --  18   AST (SGOT) U/L  --   --   --  26   GLUCOSE mg/dL 116* 137* 125* 122*     Results from last 7 days   Lab Units 07/03/21  0415 07/02/21  0639 07/01/21  0447 06/27/21  1802 06/27/21  0420   WBC 10*3/mm3 8.96 12.47* 10.03   < > 5.30   HEMOGLOBIN g/dL 8.0* 8.4* 7.7*  --  6.8*   HEMATOCRIT % 28.2* 28.7* 24.4*  --  21.1*   PLATELETS 10*3/mm3 312 351 323   < > 173   MONOCYTES % %  --   --   --   --  6.0    < > = values in this interval not displayed.         Results from last 7 days   Lab Units 07/01/21  0447 06/30/21  0641 06/29/21  0416 06/28/21  0346   MAGNESIUM mg/dL 2.1 2.4 2.0 1.8   PHOSPHORUS mg/dL 4.2 3.7  --  4.3       I reviewed the patient's new imaging including images and reports.      Chest x-ray 6/30/2021 shows cardiomegaly with small pleural effusions and pulmonary congestion which appears slightly worse to me from yesterday.    Medication Review:   amiodarone, 200 mg, Nasogastric, Q8H   Followed by  [START ON 7/7/2021] amiodarone, 200 mg, Nasogastric, Q12H   Followed by  [START ON 7/21/2021] amiodarone, 200 mg, Nasogastric, Daily  amLODIPine, 5 mg, Nasogastric, Q24H  aspirin, 325 mg, Nasogastric, Daily  atorvastatin, 40 mg, Nasogastric, Nightly  famotidine, 20 mg, Nasogastric, BID  folic acid, 1 mg, Nasogastric, Daily  furosemide, 40 mg, Nasogastric, Daily  insulin regular, 0-9 Units, Subcutaneous, Q6H  LORazepam, 2 mg, Nasogastric, Nightly  losartan, 100 mg, Nasogastric, Q24H  melatonin, 5 mg, Nasogastric, Nightly  metoprolol tartrate, 50 mg, Nasogastric, Q12H  nicotine, 1 patch, Transdermal, Q24H  pharmacy consult - MTM, , Does not apply, Daily  QUEtiapine, 50 mg, Nasogastric, Nightly  sennosides, 10 mL, Nasogastric, BID  sodium chloride, 10  mL, Intravenous, Q12H  terazosin, 5 mg, Nasogastric, Nightly  thiamine, 100 mg, Nasogastric, Daily      niCARdipine, 5-15 mg/hr, Last Rate: 5 mg/hr (07/01/21 1449)        Assessment/Plan         CABG 06/22/21    History of tobacco abuse    STEMI    Dyslipidemia    Obesity (BMI 30-39.9)    ST elevation myocardial infarction (STEMI) (CMS/[facility])    Hyperglycemia    65 y.o. male remote smoker with history of hypertension, coronary artery disease status post stent in 2004, obesity, BPH, alcohol use, admitted on 6/22/2021 with chest pain and found to have severe multivessel coronary artery disease.    Patient underwent CABG x4 with intra-aortic balloon pump placement by Dr. Acosta on 6/22/2021.  He went back to the operating room on 6/23/2021 for bleeding and had evacuation of blood from the right pleural space.    Postoperative issues include delirium/agitation possibly secondary to alcohol withdrawal, hyperglycemia, hypoxemia requiring supplemental oxygen.    Agitation overall improved.  Still some restlessness alternating with somnolence.  Will decrease oral medications.    Plan:  1.  Continue PICC line.  2.  Management of agitation/delirium per neurology.  3.  Continue correction insulin. Patient does not have history of diabetes.  4.  Supplemental oxygen, wean as tolerated.  5.  Continue vitamin replacement.  6.  Continue nicotine patch.  7.  Continue terazosin.  8.  Discontinue Seroquel and Ativan and use Precedex as needed tonight and hold at 4:30 AM.  9.  Continue tube feeding, speech following.  Periodic reassessment.    Level of Risk High due to:  illness with threat to life or bodily function tenuous respiratory and neurologic status in the setting of recent CABG, probable volume overload, possible alcohol withdrawal and delirium.      Cedric Murphy MD  07/03/21  16:26 EDT      *. Please note that portions of this note were completed with [employer] - a voice recognition program.

## 2021-07-04 LAB
ANION GAP SERPL CALCULATED.3IONS-SCNC: 10 MMOL/L (ref 5–15)
BUN SERPL-MCNC: 58 MG/DL (ref 8–23)
BUN/CREAT SERPL: 56.3 (ref 7–25)
CALCIUM SPEC-SCNC: 8.6 MG/DL (ref 8.6–10.5)
CHLORIDE SERPL-SCNC: 110 MMOL/L (ref 98–107)
CO2 SERPL-SCNC: 20 MMOL/L (ref 22–29)
CREAT SERPL-MCNC: 1.03 MG/DL (ref 0.76–1.27)
DEPRECATED RDW RBC AUTO: 54 FL (ref 37–54)
ERYTHROCYTE [DISTWIDTH] IN BLOOD BY AUTOMATED COUNT: 16.1 % (ref 12.3–15.4)
GFR SERPL CREATININE-BSD FRML MDRD: 72 ML/MIN/1.73
GLUCOSE BLDC GLUCOMTR-MCNC: 116 MG/DL (ref 70–130)
GLUCOSE BLDC GLUCOMTR-MCNC: 135 MG/DL (ref 70–130)
GLUCOSE BLDC GLUCOMTR-MCNC: 143 MG/DL (ref 70–130)
GLUCOSE BLDC GLUCOMTR-MCNC: 155 MG/DL (ref 70–130)
GLUCOSE BLDC GLUCOMTR-MCNC: 174 MG/DL (ref 70–130)
GLUCOSE SERPL-MCNC: 123 MG/DL (ref 65–99)
HCT VFR BLD AUTO: 27.4 % (ref 37.5–51)
HGB BLD-MCNC: 8.2 G/DL (ref 13–17.7)
MCH RBC QN AUTO: 27.6 PG (ref 26.6–33)
MCHC RBC AUTO-ENTMCNC: 29.9 G/DL (ref 31.5–35.7)
MCV RBC AUTO: 92.3 FL (ref 79–97)
PLATELET # BLD AUTO: 376 10*3/MM3 (ref 140–450)
PMV BLD AUTO: 10.8 FL (ref 6–12)
POTASSIUM SERPL-SCNC: 4.6 MMOL/L (ref 3.5–5.2)
RBC # BLD AUTO: 2.97 10*6/MM3 (ref 4.14–5.8)
SODIUM SERPL-SCNC: 140 MMOL/L (ref 136–145)
WBC # BLD AUTO: 8.94 10*3/MM3 (ref 3.4–10.8)

## 2021-07-04 PROCEDURE — 93005 ELECTROCARDIOGRAM TRACING: CPT | Performed by: THORACIC SURGERY (CARDIOTHORACIC VASCULAR SURGERY)

## 2021-07-04 PROCEDURE — 82962 GLUCOSE BLOOD TEST: CPT

## 2021-07-04 PROCEDURE — 63710000001 INSULIN REGULAR HUMAN PER 5 UNITS: Performed by: RADIOLOGY

## 2021-07-04 PROCEDURE — 99232 SBSQ HOSP IP/OBS MODERATE 35: CPT | Performed by: INTERNAL MEDICINE

## 2021-07-04 PROCEDURE — 80048 BASIC METABOLIC PNL TOTAL CA: CPT | Performed by: INTERNAL MEDICINE

## 2021-07-04 PROCEDURE — 93010 ELECTROCARDIOGRAM REPORT: CPT | Performed by: INTERNAL MEDICINE

## 2021-07-04 PROCEDURE — 85027 COMPLETE CBC AUTOMATED: CPT | Performed by: INTERNAL MEDICINE

## 2021-07-04 PROCEDURE — 97530 THERAPEUTIC ACTIVITIES: CPT

## 2021-07-04 PROCEDURE — 92610 EVALUATE SWALLOWING FUNCTION: CPT

## 2021-07-04 RX ADMIN — FOLIC ACID 1 MG: 1 TABLET ORAL at 08:08

## 2021-07-04 RX ADMIN — DEXMEDETOMIDINE HYDROCHLORIDE 0.2 MCG/KG/HR: 4 INJECTION, SOLUTION INTRAVENOUS at 03:02

## 2021-07-04 RX ADMIN — FAMOTIDINE 20 MG: 20 TABLET ORAL at 08:08

## 2021-07-04 RX ADMIN — AMIODARONE HYDROCHLORIDE 200 MG: 200 TABLET ORAL at 01:18

## 2021-07-04 RX ADMIN — AMIODARONE HYDROCHLORIDE 200 MG: 200 TABLET ORAL at 18:32

## 2021-07-04 RX ADMIN — METOPROLOL TARTRATE 50 MG: 50 TABLET, FILM COATED ORAL at 08:08

## 2021-07-04 RX ADMIN — AMIODARONE HYDROCHLORIDE 200 MG: 200 TABLET ORAL at 08:08

## 2021-07-04 RX ADMIN — SENNOSIDES 10 ML: 8.8 LIQUID ORAL at 20:30

## 2021-07-04 RX ADMIN — LOSARTAN POTASSIUM 100 MG: 50 TABLET, FILM COATED ORAL at 08:08

## 2021-07-04 RX ADMIN — Medication 5 MG: at 20:30

## 2021-07-04 RX ADMIN — AMLODIPINE BESYLATE 5 MG: 5 TABLET ORAL at 08:08

## 2021-07-04 RX ADMIN — ATORVASTATIN CALCIUM 40 MG: 40 TABLET, FILM COATED ORAL at 20:30

## 2021-07-04 RX ADMIN — THIAMINE HCL TAB 100 MG 100 MG: 100 TAB at 08:08

## 2021-07-04 RX ADMIN — SENNOSIDES 10 ML: 8.8 LIQUID ORAL at 08:09

## 2021-07-04 RX ADMIN — METOPROLOL TARTRATE 50 MG: 50 TABLET, FILM COATED ORAL at 20:30

## 2021-07-04 RX ADMIN — SODIUM CHLORIDE, PRESERVATIVE FREE 10 ML: 5 INJECTION INTRAVENOUS at 20:31

## 2021-07-04 RX ADMIN — ASPIRIN 325 MG ORAL TABLET 325 MG: 325 PILL ORAL at 08:08

## 2021-07-04 RX ADMIN — TERAZOSIN HYDROCHLORIDE 5 MG: 5 CAPSULE ORAL at 20:29

## 2021-07-04 RX ADMIN — INSULIN HUMAN 2 UNITS: 100 INJECTION, SOLUTION PARENTERAL at 01:18

## 2021-07-04 RX ADMIN — FAMOTIDINE 20 MG: 20 TABLET ORAL at 20:30

## 2021-07-04 RX ADMIN — NICOTINE 1 PATCH: 7 PATCH, EXTENDED RELEASE TRANSDERMAL at 08:09

## 2021-07-04 NOTE — THERAPY RE-EVALUATION
Acute Care - Speech Language Pathology   Swallow Re-Evaluation [facility]   Clinical Swallow Re-evaluation       Patient Name: Thien Gray  : 1955  MRN: [id number]  Today's Date: 2021               Admit Date: 2021    Visit Dx:     ICD-10-CM ICD-9-CM   1. ST elevation myocardial infarction (STEMI), unspecified artery (CMS/HCC)  I21.3 410.90   2. Chest pain, unspecified type  R07.9 786.50   3. Pharyngeal dysphagia  R13.13 787.23   4. Cognitive communication deficit  R41.841 799.52     Patient Active Problem List   Diagnosis   • History of tobacco abuse   • CABG 21   • Dyspnea on exertion   • RBBB   • Psoriasis   • Primary osteoarthritis of both knees   • STEMI   • Dyslipidemia   • Obesity (BMI 30-39.9)   • ST elevation myocardial infarction (STEMI) (CMS/HCC)   • Hyperglycemia     Past Medical History:   Diagnosis Date   • Abnormal ECG 2020    Get from Dr. Kayser   • Coronary artery disease    • History of heart attack    • History of MI (myocardial infarction)    • Hyperlipidemia    • Myocardial infarction (CMS/HCC)    • RBBB 2/10/2020     Past Surgical History:   Procedure Laterality Date   • APPENDECTOMY     • CARDIAC CATHETERIZATION  2004    They used a catheter to put in my stent.   • CARDIAC CATHETERIZATION N/A 2021    Procedure: Left Heart Cath;  Surgeon: Mikey Conrad MD;  Location: [facility] CATH INVASIVE LOCATION;  Service: Cardiovascular;  Laterality: N/A;   • CORONARY ANGIOPLASTY      I think that's what the bill for my stent said.   • CORONARY ARTERY BYPASS BRING BACK FOR EXPLORATION N/A 2021    Procedure: BRING BACK FOR EXPLORATION OPEN HEART;  Surgeon: Miguel Angel Acosta MD;  Location: [facility] OR;  Service: Cardiothoracic;  Laterality: N/A;   • CORONARY ARTERY BYPASS GRAFT N/A 2021    Procedure: MEDIAN STERNOTOMY, CORONARY ARTERY BYPASS GRAFTING X 4 WITH LEFT INTERNAL MAMMARY ARTERY GRAFT, ENDOSCOPIC VEIN HARVESTING OF THE RIGHT  GREATER SAPHENOUS VEIN, INTRA-AORTIC BALLOON PUMP INSERTION, IVAN PER ANESTHESIA;  Surgeon: Miguel Angel Acosta MD;  Location: [facility];  Service: Cardiothoracic;  Laterality: N/A;   • CORONARY STENT PLACEMENT  2004        SWALLOW EVALUATION (last 72 hours)      SLP Adult Swallow Evaluation     Row Name 07/04/21 1315 07/02/21 1625                Rehab Evaluation    Document Type  re-evaluation  -VO  therapy note (daily note)  -       Subjective Information  no complaints  -VO  no complaints  -AC       Patient Observations  alert;cooperative  -VO  lethargic  -AC       Patient/Family/Caregiver Comments/Observations  sitting in chair  -VO  VASILE arrived near end of tx.  -AC       Care Plan Review  evaluation/treatment results reviewed  -VO  evaluation/treatment results reviewed;risks/benefits reviewed;current/potential barriers reviewed;care plan/treatment goals reviewed;patient/other agree to care plan  -       Care Plan Review, Other Participant(s)  --  family  -AC       Patient Effort  good  -VO  poor  -AC          General Information    Patient Profile Reviewed  yes  -VO  --       Pertinent History Of Current Problem  s/p CABGx4 6/22, returned to OR 6/23 for mediastinal exploration w/ evacuation of blood & clot from R pleural space. Intubated 6/22 1108 - 6/24 1105. Hx HTN, CAD, former tobacco use.  -VO  --       Current Method of Nutrition  NPO;nasogastric feedings  -VO  --       Precautions/Limitations, Vision  WFL with corrective lenses  -VO  --       Precautions/Limitations, Hearing  WFL;for purposes of eval  -VO  --       Prior Level of Function-Communication  WFL  -VO  --       Prior Level of Function-Swallowing  no diet consistency restrictions  -VO  --       Plans/Goals Discussed with  patient;agreed upon  -VO  --       Barriers to Rehab  medically complex  -VO  --       Patient's Goals for Discharge  return to PO diet  -VO  --          Pain Scale: Numbers Pre/Post-Treatment    Pretreatment Pain Rating  0/10 -  no pain  -VO  --       Posttreatment Pain Rating  0/10 - no pain  -VO  --          Pain Scale: FACES Pre/Post-Treatment    Pain: FACES Scale, Pretreatment  --  0-->no hurt  -AC       Posttreatment Pain Rating  --  0-->no hurt  -AC          Oral Motor Structure and Function    Dentition Assessment  natural, present and adequate  -VO  --       Secretion Management  WNL/WFL  -VO  --       Mucosal Quality  moist, healthy  -VO  --          Oral Musculature and Cranial Nerve Assessment    Oral Motor General Assessment  WFL  -VO  --          General Eating/Swallowing Observations    Respiratory Support Currently in Use  room air  -VO  --       Eating/Swallowing Skills  self-fed;fed by SLP  -VO  --       Positioning During Eating  upright in chair  -VO  --       Utensils Used  spoon;cup;straw  -VO  --       Consistencies Trialed  ice chips;thin liquids;nectar/syrup-thick liquids;pureed  -VO  --          Respiratory    Respiratory Status  WFL  -VO  --          Clinical Swallow Eval    Oral Prep Phase  WFL for consistencies trialed  -VO  --       Oral Residue  WFL for consistencies trialed  -VO  --       Pharyngeal Phase  suspected pharyngeal impairment  -VO  --       Clinical Swallow Evaluation Summary  Repeat CSE per RN request. Reports drastic improvement in alertness and status. Upon arrival, pt alert and sitting in chair. Trialed thins via ice/cup/str, nectar via str, and puree. Delayed cough w/ thins via straw. No overt s/sxs aspiration w/ ice, nectar, or puree. Ok'd ice chips after oral care and will plan for FEES tomorrow to assess swallow function.  -VO  --          Pharyngeal Phase Concerns    Pharyngeal Phase Concerns  cough  -VO  --       Cough  thin  -VO  --          Clinical Impression    Daily Summary of Progress (SLP)  --  progress towards functional goals is fair  -AC       Barriers to Overall Progress (SLP)  --  Lethargy  -AC       Plan for Continued Treatment (SLP)  --  Not safe for PO diet. Pt would  benefit from cont'd dysphagia and cognitive-communication tx. Rec: NPO, alternate means of nutrition.   -AC          Recommendations    Therapy Frequency (Swallow)  5 days per week  -VO  --       Predicted Duration Therapy Intervention (Days)  until discharge  -VO  --       SLP Diet Recommendation  NPO;temporary alternate methods of nutrition/hydration;other (see comments) ok for ice chips after oral care  -VO  --       Recommended Diagnostics  reassess via FEES  -VO  --       Oral Care Recommendations  Oral Care BID/PRN;Suction toothbrush  -VO  --       SLP Rec. for Method of Medication Administration  meds via alternate route  -VO  --       Monitor for Signs of Aspiration  yes  -VO  --       Anticipated Discharge Disposition (SLP)  anticipate therapy at next level of care  -VO  anticipate therapy at next level of care  -         User Key  (r) = Recorded By, (t) = Taken By, (c) = Cosigned By    Initials Name Effective Dates    AC Calista Agarwal, MS CCC-SLP 06/16/21 -     VO Karolina Jiang MA,CCC-SLP 06/16/21 -           EDUCATION  The patient has been educated in the following areas:   Dysphagia (Swallowing Impairment) Oral Care/Hydration NPO rationale.    SLP Recommendation and Plan     SLP Diet Recommendation: NPO, temporary alternate methods of nutrition/hydration, other (see comments) (ok for ice chips after oral care)     SLP Rec. for Method of Medication Administration: meds via alternate route     Monitor for Signs of Aspiration: yes  Recommended Diagnostics: reassess via FEES     Anticipated Discharge Disposition (SLP): anticipate therapy at next level of care     Therapy Frequency (Swallow): 5 days per week  Predicted Duration Therapy Intervention (Days): until discharge                         Plan of Care Reviewed With: patient    SLP GOALS     Row Name 07/02/21 7708             Oral Nutrition/Hydration Goal 1 (SLP)    Oral Nutrition/Hydration Goal 1, SLP  LTG: Pt will improve swallow function in  order to return to PO diet w/ no overt s/sxs aspiration/distress w/ 100% acc and no cues  -AC      Time Frame (Oral Nutrition/Hydration Goal 1, SLP)  by discharge  -AC      Progress/Outcomes (Oral Nutrition/Hydration Goal 1, SLP)  continuing progress toward goal  -AC         Oral Nutrition/Hydration Goal 2 (SLP)    Oral Nutrition/Hydration Goal 2, SLP  Pt will tolerate therapeutic trials of thin H2O & puree w/ no overt s/sxs aspiration/distress w/ 60% acc and no cues in order to assess appropriateness for repeat instrumental eval.  -AC      Time Frame (Oral Nutrition/Hydration Goal 2, SLP)  short term goal (STG)  -AC      Barriers (Oral Nutrition/Hydration Goal 2, SLP)  Trialed ice chip x2. Required max cues to manipulate ice & initiate swallow. U/a to initiate swallow 1/2 chips. DNT further 2' high risk for aspiration/lethargy.  -AC      Progress/Outcomes (Oral Nutrition/Hydration Goal 2, SLP)  progress slower than expected  -AC         Pharyngeal Strengthening Exercise Goal 1 (SLP)    Increase Superior Movement of the Hyolaryngeal Complex  effortful pitch glide (falsetto + pharyngeal squeeze)  -AC      Increase Anterior Movement of the Hyolaryngeal Complex  chin tuck against resistance (CTAR)  -AC      Increase Closure at Entrance to Airway/Closure of Airway at Glottis  supraglottic swallow  -AC      Increase Squeeze/Positive Pressure Generation  hard effortful swallow  -AC      [place]/Accuracy (Pharyngeal Strengthening Goal 1, SLP)  with minimal cues (75-90% accuracy)  -AC      Time Frame (Pharyngeal Strengthening Goal 1, SLP)  short term goal (STG)  -AC      Barriers (Pharyngeal Strengthening Goal 1, SLP)  Too lethargic. Effortful swallow: 0% w/ max cues.  -AC      Progress/Outcomes (Pharyngeal Strengthening Goal 1, SLP)  progress slower than expected  -AC         Articulation Goal 1 (SLP)    Improve Articulation Goal 1 (SLP)  by over-articulating at word level;90%;with minimal cues (75-90%)  -AC      Time  Frame (Articulation Goal 1, SLP)  short term goal (STG)  -AC      Progress/Outcomes (Articulation Goal 1, SLP)  goal ongoing  -AC         Attention Goal 1 (SLP)    Improve Attention by Goal 1 (SLP)  looking at speaker;attending to task;90%;with moderate cues (50-74%)  -AC      Time Frame (Attention Goal 1, SLP)  short term goal (STG)  -AC      Progress/Outcomes (Attention Goal 1, SLP)  goal ongoing  -AC         Orientation Goal 1 (SLP)    Improve Orientation Through Goal 1 (SLP)  demonstrating orientation to year;demonstrating orientation to place;demonstrating orientation to disease/impairment;demonstrating orientation to month;demonstrating orientation to day;90%;with moderate cues (50-74%)  -AC      Time Frame (Orientation Goal 1, SLP)  short term goal (STG)  -AC      Progress/Outcomes (Orientation Goal 1, SLP)  goal ongoing  -AC         Additional Goal 1 (SLP)    Additional Goal 1, SLP  LTG: Pt will improve cognitive-communication skills in order to participate in care w/ 100% acc w/o cues.  -AC      Time Frame (Additional Goal 1, SLP)  by discharge  -AC      Progress/Outcomes (Additional Goal 1, SLP)  goal ongoing  -AC        User Key  (r) = Recorded By, (t) = Taken By, (c) = Cosigned By    Initials Name Provider Type    AC Calista Agarwal MS CCC-SLP Speech and Language Pathologist             Time Calculation:   Time Calculation- SLP     Row Name 07/04/21 1408             Time Calculation- SLP    SLP Start Time  1315  -VO      SLP Received On  07/04/21  -VO         Untimed Charges    90881-OA Eval Oral Pharyng Swallow Minutes  38  -VO         Total Minutes    Untimed Charges Total Minutes  38  -VO       Total Minutes  38  -VO        User Key  (r) = Recorded By, (t) = Taken By, (c) = Cosigned By    Initials Name Provider Type    VO Karolina Jiang MA,CCC-SLP Speech and Language Pathologist          Therapy Charges for Today     Code Description Service Date Service Provider Modifiers Qty    [id number]   ST EVAL ORAL PHARYNG SWALLOW 3 7/4/2021 Karolina Jiang MA,CCC-SLP GN 1               Karolina Jiang MA,BRIANNE-SLP  7/4/2021

## 2021-07-04 NOTE — PROGRESS NOTES
Pulmonary/Critical Care Follow-up     LOS: 12 days   Patient Care Team:  Sanaz Zuluaga DO as PCP - General (Family Medicine)    Chief Complaint/reason: Chest pain/medical management of issues including delirium, respiratory management, electrolyte management postoperatively after CABG.      Chief Complaint   Patient presents with   • Chest Pain     Subjective      Initial history (from ICU note 6/22/2021):    65-year-old male with a PMH of prior tobacco use (quit 34 yrs ago), HTN, CAD s/p stent (2004), and obesity.      Per report, approximately 1.5 hrs prior to presentation patient developed severe burning chest pain / pressure with associated shortness of breath and nausea. EKG on arrival revealed inferior ST elevation. He was emergently taken to Cath Lab by Dr. Conrad where he was found to have severe multivessel CAD (60-70% ostial LM with haziness in proximal suggesting thrombus, 70-80% mid-LAD, 70% RCA) not amenable to PCI. LV function was relatively well-preserved at 55%.      CTS was contacted and he was subsequently taken to OR where he underwent a CABG x4 with IABP placement per Dr. Acosta.     (End of copied text).    Patient went back to the operating room on 6/23/2021 due to bleeding and had evacuation of blood from the right pleural space.  Patient extubated 6/24/2021.  Patient developed confusion/agitation on 6/25/2021.  He has been treated for presumed alcohol withdrawal.     Patient had pseudoaneurysm repair (thrombin injection by Dr. Tamez) on 7/2/2021.    Interval History:     Patient slept overnight.  Seems appropriate today.  No dyspnea, nausea, vomiting.  No new complaints.    History taken from: Chart, staff    PMH/FH/Social History were reviewed and updated appropriately in the electronic medical record.     Review of Systems:    Review of 14 systems was completed with positives and pertinent negatives noted in the subjective section.  All other systems reviewed and are negative.          Objective     Vital Signs  Temp:  [97.7 °F (36.5 °C)] 97.7 °F (36.5 °C)  Heart Rate:  [51-80] 62  Resp:  [18-23] 18  BP: ()/() 127/59  07/03 0701 - 07/04 0700  In: 2104 [I.V.:76]  Out: 300 [Urine:300]  Body mass index is 34.44 kg/m².     IV drips:      Physical Exam:     Constitutional:   Awake.  Alert.  Remains very slightly confused.  No acute distress.   Head:   Normocephalic, without obvious abnormality, atraumatic   Eyes:           Lids and lashes normal, conjunctivae and sclerae normal.  No icterus, no pallor, corneas clear, PER   ENMT:  Ears appear intact with no abnormalities noted        Neck:  No adenopathy, supple, trachea midline   Lungs/Resp:    Normal effort, symmetric chest rise, no crepitus, clear bilaterally                Heart/CV:    Regular rhythm, normal S1 and S2, no murmur   Abdomen/GI:    Normal bowel sounds, no masses, no organomegaly, soft,        nontender, nondistended   :    Deferred   Extremities/MSK:  No clubbing or cyanosis.  Trace bilateral lower extremity edema.  Normal tone.    No deformities.    Pulses:  Pulses palpable and equal bilaterally   Skin:  No bleeding, bruising or rash   Heme/Lymph:  No cervical or supraclavicular adenopathy.   Neurologic:    Psychiatric:    Moves all extremities with no obvious focal motor deficit.  Cranial nerves 2 - 12 grossly intact, dysarthric speech persists.  Much more calm today.    The above physical exam findings were reviewed and reflect exam findings as of today's exam.   Electronically signed by:Cedric Murphy MD 07/04/21 13:48 EDT    Results Review:     I reviewed the patient's new clinical results.   Results from last 7 days   Lab Units 07/04/21  0632 07/03/21  0415 07/01/21  0447 06/28/21  0346   SODIUM mmol/L 140 136 139 139   POTASSIUM mmol/L 4.6 4.0 4.1 3.8   CHLORIDE mmol/L 110* 104 107 105   CO2 mmol/L 20.0* 18.0* 23.0 24.0   BUN mg/dL 58* 45* 27* 16   CREATININE mg/dL 1.03 0.76 0.56* 0.61*   CALCIUM mg/dL  8.6 8.8 8.9 8.9   BILIRUBIN mg/dL  --   --   --  0.8   ALK PHOS U/L  --   --   --  56   ALT (SGPT) U/L  --   --   --  18   AST (SGOT) U/L  --   --   --  26   GLUCOSE mg/dL 123* 116* 137* 122*     Results from last 7 days   Lab Units 07/04/21  0448 07/03/21  0415 07/02/21  0639   WBC 10*3/mm3 8.94 8.96 12.47*   HEMOGLOBIN g/dL 8.2* 8.0* 8.4*   HEMATOCRIT % 27.4* 28.2* 28.7*   PLATELETS 10*3/mm3 376 312 351         Results from last 7 days   Lab Units 07/01/21  0447 06/30/21  0641 06/29/21  0416 06/28/21  0346   MAGNESIUM mg/dL 2.1 2.4 2.0 1.8   PHOSPHORUS mg/dL 4.2 3.7  --  4.3       I reviewed the patient's new imaging including images and reports.      Chest x-ray 6/30/2021 shows cardiomegaly with small pleural effusions and pulmonary congestion which appears slightly worse to me from yesterday.    Medication Review:   amiodarone, 200 mg, Nasogastric, Q8H   Followed by  [START ON 7/7/2021] amiodarone, 200 mg, Nasogastric, Q12H   Followed by  [START ON 7/21/2021] amiodarone, 200 mg, Nasogastric, Daily  amLODIPine, 5 mg, Nasogastric, Q24H  aspirin, 325 mg, Nasogastric, Daily  atorvastatin, 40 mg, Nasogastric, Nightly  famotidine, 20 mg, Nasogastric, BID  folic acid, 1 mg, Nasogastric, Daily  insulin regular, 0-9 Units, Subcutaneous, Q6H  losartan, 100 mg, Nasogastric, Q24H  melatonin, 5 mg, Nasogastric, Nightly  metoprolol tartrate, 50 mg, Nasogastric, Q12H  nicotine, 1 patch, Transdermal, Q24H  pharmacy consult - [place], , Does not apply, Daily  sennosides, 10 mL, Nasogastric, BID  sodium chloride, 10 mL, Intravenous, Q12H  terazosin, 5 mg, Nasogastric, Nightly  thiamine, 100 mg, Nasogastric, Daily           Assessment/Plan         CABG 06/22/21    History of tobacco abuse    STEMI    Dyslipidemia    Obesity (BMI 30-39.9)    ST elevation myocardial infarction (STEMI) (CMS/HCC)    Hyperglycemia    65 y.o. male remote smoker with history of hypertension, coronary artery disease status post stent in 2004, obesity, BPH,  alcohol use, admitted on 6/22/2021 with chest pain and found to have severe multivessel coronary artery disease.    Patient underwent CABG x4 with intra-aortic balloon pump placement by Dr. Acosta on 6/22/2021.  He went back to the operating room on 6/23/2021 for bleeding and had evacuation of blood from the right pleural space.    Postoperative issues include delirium/agitation possibly secondary to alcohol withdrawal, hyperglycemia, hypoxemia requiring supplemental oxygen.    Agitation overall improved.  Calm overnight with Precedex which was stopped early this morning.    Plan to recheck swallow evaluation and hold sedatives.  Patient getting beer.    Plan:  1.  Continue PICC line.  2.  Holding sedatives.  3.  Correction insulin.  4.  Supplemental oxygen, wean as tolerated.  5.  Continue vitamin replacement.  6.  Continue nicotine patch.  7.  Continue terazosin.  8.  Speech dysphagia evaluation.  9.  Continue tube feeding, speech following.           Cedric Murphy MD  07/04/21  13:48 EDT      *. Please note that portions of this note were completed with [employer] - a voice recognition program.

## 2021-07-04 NOTE — PROGRESS NOTES
"                                 [facility] Progress Note      LOS: 12 days   Patient Care Team:  Sanaz Zuluaga DO as PCP - General (Family Medicine)    Chief Complaint:    Chief Complaint   Patient presents with   • Chest Pain       Subjective     Interval History:     Patient Complaints: Much more coherent this morning.  Still easily confused      Review of Systems:   A 14 point review of systems was negative except as was stated in the HPI      Objective     Vital Sign Min/Max for last 24 hours  No data recorded   BP  Min: 83/26  Max: 167/62   Pulse  Min: 51  Max: 92   Resp  Min: 19  Max: 23   SpO2  Min: 85 %  Max: 98 %   No data recorded   No data recorded     Flowsheet Rows      First Filed Value   Admission Height  177.8 cm (70\") Documented at 06/22/2021 1400   Admission Weight  118 kg (260 lb 2.3 oz) Documented at 06/22/2021 2000          Physical Exam:  General Appearance: Well-nourished well-developed white male no acute distress  Lungs: Coarse bilateral breath sounds  Heart:: Regular rate and rhythm this morning; no Murmurs, Rubs or Gallops  Abdomen: Soft and nontender with adequate bowel sounds.  No organomegaly  Extremities: No cyanosis, clubbing.  1+ edema  Pulses: Pulses palpable and equal bilaterally; left groin stable  Skin: Warm and dry with no rash  Psych: Normal     Results Review:     I reviewed the patient's new clinical results.  Results from last 7 days   Lab Units 07/04/21  0632 07/03/21 0415 07/01/21  0447   SODIUM mmol/L 140 136 139   POTASSIUM mmol/L 4.6 4.0 4.1   CHLORIDE mmol/L 110* 104 107   CO2 mmol/L 20.0* 18.0* 23.0   BUN mg/dL 58* 45* 27*   CREATININE mg/dL 1.03 0.76 0.56*   GLUCOSE mg/dL 123* 116* 137*   CALCIUM mg/dL 8.6 8.8 8.9     Results from last 7 days   Lab Units 07/04/21  0448 07/03/21  0415 07/02/21  0639   WBC 10*3/mm3 8.94 8.96 12.47*   HEMOGLOBIN g/dL 8.2* 8.0* 8.4*   HEMATOCRIT % 27.4* 28.2* 28.7*   PLATELETS 10*3/mm3 376 312 351     Lab Results "   Lab Value Date/Time    TROPONINT 1.730 (C) 06/22/2021 1705    TROPONINT <0.010 06/22/2021 1009     Results from last 7 days   Lab Units 06/28/21  0346   TRIGLYCERIDES mg/dL 70                   Medication Review: yes  Current Facility-Administered Medications   Medication Dose Route Frequency Provider Last Rate Last Admin   • albuterol (PROVENTIL) nebulizer solution 0.083% 2.5 mg/3mL  2.5 mg Nebulization Q6H PRN Given, Abdiel FONTANEZ MD       • amiodarone (PACERONE) tablet 200 mg  200 mg Nasogastric Q8H Given, Abdiel FONTANEZ MD   200 mg at 07/04/21 0118    Followed by   • [START ON 7/7/2021] amiodarone (PACERONE) tablet 200 mg  200 mg Nasogastric Q12H Given, Abdiel FONTANEZ MD        Followed by   • [START ON 7/21/2021] amiodarone (PACERONE) tablet 200 mg  200 mg Nasogastric Daily Given, Abdiel FONTANEZ MD       • amLODIPine (NORVASC) tablet 5 mg  5 mg Nasogastric Q24H Given, Abdiel FONTANEZ MD   5 mg at 07/03/21 0833   • aspirin tablet 325 mg  325 mg Nasogastric Daily Given, Abdiel FONTANEZ MD   325 mg at 07/03/21 0832   • atorvastatin (LIPITOR) tablet 40 mg  40 mg Nasogastric Nightly Given, Abdiel FONTANEZ MD   40 mg at 07/03/21 2043   • beer 1 each  1 each Oral TID PRN Phillip Jones MD       • dexmedetomidine (PRECEDEX) 400 mcg in 100 mL NS infusion  0.2-1.5 mcg/kg/hr Intravenous Titrated Cedric Murphy MD 5.6 mL/hr at 07/04/21 0302 0.2 mcg/kg/hr at 07/04/21 0302   • famotidine (PEPCID) tablet 20 mg  20 mg Nasogastric BID Given, Abdiel FONTANEZ MD   20 mg at 07/03/21 2044   • folic acid (FOLVITE) tablet 1 mg  1 mg Nasogastric Daily Given, Abdiel FONTANEZ MD   1 mg at 07/03/21 0833   • insulin regular (humuLIN R,novoLIN R) injection 0-9 Units  0-9 Units Subcutaneous Q6H Given, Abdiel FONTANEZ MD   Stopped at 07/04/21 0553   • losartan (COZAAR) tablet 100 mg  100 mg Nasogastric Q24H Given, Abdiel FONTANEZ MD   100 mg at 07/03/21 0833   • magnesium sulfate 4g/100mL (PREMIX) infusion  4 g Intravenous PRN Given, Abdiel FONTANEZ MD   4 g at 06/28/21 0607   • melatonin tablet 5 mg   5 mg Nasogastric Nightly Given, Abdiel FONTANEZ MD   5 mg at 07/03/21 2043   • metoprolol tartrate (LOPRESSOR) tablet 50 mg  50 mg Nasogastric Q12H Cedric Murphy MD   50 mg at 07/03/21 2043   • morphine injection 2 mg  2 mg Intravenous Q2H PRN Given, Abdiel FONTANEZ MD   2 mg at 07/02/21 2342   • nicotine (NICODERM CQ) 7 MG/24HR patch 1 patch  1 patch Transdermal Q24H Given, Abdiel FONTANEZ MD   1 patch at 07/03/21 0835   • ondansetron (ZOFRAN) injection 4 mg  4 mg Intravenous Q6H PRN Given, Abdiel FONTANEZ MD       • Pharmacy Consult - [facility]   Does not apply Daily Given, Abdiel FONTANEZ MD       • potassium chloride (KLOR-CON) packet 40 mEq  40 mEq Nasogastric PRN Given, Abdiel FONTANEZ MD       • sennosides (SENOKOT) 8.8 MG/5ML syrup 10 mL  10 mL Nasogastric BID Given, Abdiel FONTANEZ MD   10 mL at 07/03/21 2044   • sodium chloride 0.9 % flush 10 mL  10 mL Intravenous Q12H Given, Abdiel FONTANEZ MD   10 mL at 07/03/21 2045   • sodium chloride 0.9 % flush 10 mL  10 mL Intravenous PRN Given, Abdiel FONTANEZ MD       • terazosin (HYTRIN) capsule 5 mg  5 mg Nasogastric Nightly Mikey Conrad MD   5 mg at 07/03/21 2043   • thiamine (VITAMIN B-1) tablet 100 mg  100 mg Nasogastric Daily Given, Abdiel FONTANEZ MD   100 mg at 07/03/21 0833         CABG 06/22/21    History of tobacco abuse    STEMI    Dyslipidemia    Obesity (BMI 30-39.9)    ST elevation myocardial infarction (STEMI) (CMS/[facility])    Hyperglycemia        Impression      Inferior STEMI 6/22/2021  Emergent CABG for left main and three-vessel coronary artery disease with preserved LV function 6/22/2021  reop for bleeding 6/23/2021 early a.m.  Hypertension  Hyperlipidemia  Remote coronary artery stent [place]  Postop delirium/EtOH withdrawal  Postop atrial fibrillation/flutter; back in sinus rhythm this morning  Echocardiogram shows mild LV dysfunction with no significant pericardial effusion  Left femoral pseudoaneurysm by duplex 7/1/2021 status post thrombin occlusion 7/2/21    Delirium is gradually  improving  Azotemia worsening    Plan     Continue beta-blocker  Pulmonary toilet  Continue amiodarone  Continue aspirin and statin  Discontinue Lasix  Mobilize      Mikey Conrad MD   07/04/21  07:24 EDT

## 2021-07-04 NOTE — PLAN OF CARE
Goal Outcome Evaluation:  Plan of Care Reviewed With: patient        Progress: improving  Outcome Summary: Pt increased ambulation distance to 70ft with Indira x2 +1 and B UE support on tele monitor. Chair follow for safety. Pt demonstrated very forward flexed posture with wide ADI and quick, shuffled steps. Frequent cues to correct. Pt required one seated rest break and returned to room in chair. Mobility limited by c/o increased fatigue. Continue to progress as appropriate.

## 2021-07-04 NOTE — PLAN OF CARE
Goal Outcome Evaluation:  Plan of Care Reviewed With: patient            SLP re-evaluation completed. Will continue to address dysphagia w/ re-evaluation via FEES tomorrow. Please see note for further details and recommendations.

## 2021-07-04 NOTE — THERAPY TREATMENT NOTE
Patient Name: Thien Gray  : 1955    MRN: [id number]                              Today's Date: 2021       Admit Date: 2021    Visit Dx:     ICD-10-CM ICD-9-CM   1. ST elevation myocardial infarction (STEMI), unspecified artery (CMS/HCC)  I21.3 410.90   2. Chest pain, unspecified type  R07.9 786.50   3. Pharyngeal dysphagia  R13.13 787.23   4. Cognitive communication deficit  R41.841 799.52     Patient Active Problem List   Diagnosis   • History of tobacco abuse   • CABG 21   • Dyspnea on exertion   • RBBB   • Psoriasis   • Primary osteoarthritis of both knees   • STEMI   • Dyslipidemia   • Obesity (BMI 30-39.9)   • ST elevation myocardial infarction (STEMI) (CMS/HCC)   • Hyperglycemia     Past Medical History:   Diagnosis Date   • Abnormal ECG 2020    Get from Dr. Kayser   • Coronary artery disease    • History of heart attack    • History of MI (myocardial infarction)    • Hyperlipidemia    • Myocardial infarction (CMS/HCC)    • RBBB 2/10/2020     Past Surgical History:   Procedure Laterality Date   • APPENDECTOMY     • CARDIAC CATHETERIZATION  2004    They used a catheter to put in my stent.   • CARDIAC CATHETERIZATION N/A 2021    Procedure: Left Heart Cath;  Surgeon: Mikey Conrad MD;  [facility];  Service: Cardiovascular;  Laterality: N/A;   • CORONARY ANGIOPLASTY      I think that's what the bill for my stent said.   • CORONARY ARTERY BYPASS BRING BACK FOR EXPLORATION N/A 2021    Procedure: BRING BACK FOR EXPLORATION OPEN HEART;  Surgeon: Miguel Angel Acosta MD;  [facility];  Service: Cardiothoracic;  Laterality: N/A;   • CORONARY ARTERY BYPASS GRAFT N/A 2021    Procedure: MEDIAN STERNOTOMY, CORONARY ARTERY BYPASS GRAFTING X 4 WITH LEFT INTERNAL MAMMARY ARTERY GRAFT, ENDOSCOPIC VEIN HARVESTING OF THE RIGHT GREATER SAPHENOUS VEIN, INTRA-AORTIC BALLOON PUMP INSERTION, IVAN PER ANESTHESIA;  Surgeon: Dave  MD Miguel Angel;  Location: [facility];  Service: Cardiothoracic;  Laterality: N/A;   • CORONARY STENT PLACEMENT  2004     General Information     Row Name 07/04/21 1307          Physical Therapy Time and Intention    Document Type  therapy note (daily note)  -KG     Mode of Treatment  physical therapy  -KG     Row Name 07/04/21 1307          General Information    Existing Precautions/Restrictions  cardiac;fall;sternal;other (see comments) NG; confusion  -KG     Row Name 07/04/21 1307          Cognition    Orientation Status (Cognition)  oriented to;person;place  -KG     Row Name 07/04/21 1307          Safety Issues, Functional Mobility    Safety Issues Affecting Function (Mobility)  awareness of need for assistance;insight into deficits/self-awareness;safety precaution awareness;safety precautions follow-through/compliance  -KG     Impairments Affecting Function (Mobility)  balance;cognition;coordination;endurance/activity tolerance;postural/trunk control;strength  -KG     Cognitive Impairments, Mobility Safety/Performance  attention;awareness, need for assistance;insight into deficits/self-awareness;safety precaution awareness;safety precaution follow-through  -KG       User Key  (r) = Recorded By, (t) = Taken By, (c) = Cosigned By    Initials Name Provider Type    KG Kassie Comer, PT Physical Therapist        Mobility     Row Name 07/04/21 1308          Bed Mobility    Comment (Bed Mobility)  UIC  -KG     Row Name 07/04/21 1308          Transfers    Comment (Transfers)  VC's for sequencing and technique. Pt required max cueing for hand placement to maintain sternal precautions.  -KG     Row Name 07/04/21 1308          Sit-Stand Transfer    Sit-Stand [place] (Transfers)  moderate assist (50% patient effort);2 person assist;verbal cues  -KG     Row Name 07/04/21 1308          Gait/Stairs (Locomotion)    [place] Level (Gait)  minimum assist (75% patient effort);2 person assist;1 person to manage  equipment;verbal cues chair follow  -KG     Assistive Device (Gait)  other (see comments) B UE support on tele monitor  -KG     Distance in Feet (Gait)  70  -KG     Deviations/Abnormal Patterns (Gait)  base of support, wide;stride length decreased  -KG     Bilateral Gait Deviations  forward flexed posture;heel strike decreased  -KG     Comment (Gait/Stairs)  Pt demonstrated step to gait pattern with wide ADI and quick, shuffled steps. Pt unsteady throughout ambulation with impaired balance and coordination. Very forward flexed posture; frequent cues required to correct. Pt required one seated rest break and returned to room in chair. Pt's mobility limited by c/o increased fatigue.  -KG       User Key  (r) = Recorded By, (t) = Taken By, (c) = Cosigned By    Initials Name Provider Type    Kassie Molina PT Physical Therapist        Obj/Interventions     Row Name 07/04/21 1311          Balance    Balance Assessment  sitting static balance;standing static balance;standing dynamic balance  -KG     Static Sitting Balance  WFL;sitting in chair  -KG     Static Standing Balance  mild impairment;supported;standing  -KG     Dynamic Standing Balance  moderate impairment;supported;standing  -KG       User Key  (r) = Recorded By, (t) = Taken By, (c) = Cosigned By    Initials Name Provider Type    Kassie Molina, PT Physical Therapist        Goals/Plan    No documentation.       Clinical Impression     Row Name 07/04/21 1311          Pain    Additional Documentation  Pain Scale: Numbers Pre/Post-Treatment (Group)  -KG     Row Name 07/04/21 1311          Pain Scale: Numbers Pre/Post-Treatment    Pretreatment Pain Rating  0/10 - no pain  -KG     Posttreatment Pain Rating  0/10 - no pain  -KG     Row Name 07/04/21 1311          Plan of Care Review    Plan of Care Reviewed With  patient  -KG     Progress  improving  -KG     Outcome Summary  Pt increased ambulation distance to 70ft with Indira x2 +1 and B UE support  on tele monitor. Chair follow for safety. Pt demonstrated very forward flexed posture with wide ADI and quick, shuffled steps. Frequent cues to correct. Pt required one seated rest break and returned to room in chair. Mobility limited by c/o increased fatigue. Continue to progress as appropriate.  -KG     Row Name 07/04/21 1311          Vital Signs    Pre Systolic BP Rehab  119  -KG     Pre Treatment Diastolic BP  63  -KG     Post Systolic BP Rehab  124  -KG     Post Treatment Diastolic BP  44  -KG     Pretreatment Heart Rate (beats/min)  56  -KG     Posttreatment Heart Rate (beats/min)  58  -KG     Pre SpO2 (%)  94  -KG     O2 Delivery Pre Treatment  room air  -KG     Post SpO2 (%)  94  -KG     O2 Delivery Post Treatment  room air  -KG     Pre Patient Position  Sitting  -KG     Intra Patient Position  Standing  -KG     Post Patient Position  Sitting  -KG     Row Name 07/04/21 1311          Positioning and Restraints    Pre-Treatment Position  sitting in chair/recliner  -KG     Post Treatment Position  chair  -KG     In Chair  notified nsg;reclined;call light within reach;encouraged to call for assist;exit alarm on;RUE elevated;LUE elevated;legs elevated  -KG       User Key  (r) = Recorded By, (t) = Taken By, (c) = Cosigned By    Initials Name Provider Type    KG Kassie Comer, PT Physical Therapist        Outcome Measures     Row Name 07/04/21 1312 07/04/21 0800       How much help from another person do you currently need...    Turning from your back to your side while in flat bed without using bedrails?  2  -KG  3  -LK    Moving from lying on back to sitting on the side of a flat bed without bedrails?  2  -KG  3  -LK    Moving to and from a bed to a chair (including a wheelchair)?  3  -KG  2  -LK    Standing up from a chair using your arms (e.g., wheelchair, bedside chair)?  2  -KG  2  -LK    Climbing 3-5 steps with a railing?  2  -KG  2  -LK    To walk in hospital room?  2  -KG  2  -LK    AM-PAC 6 Clicks  Score (PT)  13  -KG  14  -LK    Row Name 07/04/21 1312          Functional Assessment    Outcome Measure Options  AM-PAC 6 Clicks Basic Mobility (PT)  -KG       User Key  (r) = Recorded By, (t) = Taken By, (c) = Cosigned By    Initials Name Provider Type    Praveena Boo RN Registered Nurse    Kassie Molina, PT Physical Therapist        Physical Therapy Education                 Title: PT OT SLP Therapies (In Progress)     Topic: Physical Therapy (In Progress)     Point: Mobility training (In Progress)     Learning Progress Summary           Patient Acceptance, E, NR by KG at 7/4/2021 0956    Acceptance, E, NR by KG at 7/3/2021 1032    Acceptance, E,TB, VU,NR by AY at 7/2/2021 1520    Acceptance, E, NR,DU by LH at 7/1/2021 1529    Acceptance, E, NR by KG at 6/30/2021 1421    Acceptance, E, NR by KG at 6/29/2021 1101    Acceptance, E, NR by KG at 6/28/2021 1040    Acceptance, E, NR by KG at 6/25/2021 1121                   Point: Home exercise program (In Progress)     Learning Progress Summary           Patient Acceptance, E, NR by KG at 7/4/2021 0956    Acceptance, E, NR by KG at 7/3/2021 1032    Acceptance, E, NR,DU by LH at 7/1/2021 1529    Acceptance, E, NR by KG at 6/30/2021 1421    Acceptance, E, NR by KG at 6/29/2021 1101    Acceptance, E, NR by KG at 6/28/2021 1040                   Point: Body mechanics (In Progress)     Learning Progress Summary           Patient Acceptance, E, NR by KG at 7/4/2021 0956    Acceptance, E, NR by KG at 7/3/2021 1032    Acceptance, E,TB, VU,NR by AY at 7/2/2021 1520    Acceptance, E, NR,DU by LH at 7/1/2021 1529    Acceptance, E, NR by KG at 6/30/2021 1421    Acceptance, E, NR by KG at 6/29/2021 1101    Acceptance, E, NR by KG at 6/28/2021 1040    Acceptance, E, NR by KG at 6/25/2021 1121                   Point: Precautions (In Progress)     Learning Progress Summary           Patient Acceptance, E, NR by KG at 7/4/2021 0956    Acceptance, E, NR by KG at 7/3/2021  1032    Acceptance, E,TB, VU,NR by AY at 7/2/2021 1520    Acceptance, E, NR,DU by LH at 7/1/2021 1529    Acceptance, E, NR by KG at 6/30/2021 1421    Acceptance, E, NR by KG at 6/29/2021 1101    Acceptance, E, NR by KG at 6/28/2021 1040    Acceptance, E, NR by KG at 6/25/2021 1121                               User Key     Initials Effective Dates Name Provider Type Discipline    KG 05/22/20 -  Kassie Comer, PT Physical Therapist PT    AY 11/10/20 -  Guera Hooper, PT Physical Therapist PT     05/17/21 -  Ned Ortez, PT Student PT Student PT              PT Recommendation and Plan  Planned Therapy Interventions (PT): balance training, bed mobility training, gait training, strengthening, transfer training  Plan of Care Reviewed With: patient  Progress: improving  Outcome Summary: Pt increased ambulation distance to 70ft with Indira x2 +1 and B UE support on tele monitor. Chair follow for safety. Pt demonstrated very forward flexed posture with wide ADI and quick, shuffled steps. Frequent cues to correct. Pt required one seated rest break and returned to room in chair. Mobility limited by c/o increased fatigue. Continue to progress as appropriate.     Time Calculation:   PT Charges     Row Name 07/04/21 0956             Time Calculation    Start Time  0956  -KG      PT Received On  07/04/21  -KG      PT Goal Re-Cert Due Date  07/05/21  -KG         Time Calculation- PT    Total Timed Code Minutes- PT  23 minute(s)  -KG         Timed Charges    13511 - PT Therapeutic Activity Minutes  23  -KG         Total Minutes    Timed Charges Total Minutes  23  -KG       Total Minutes  23  -KG        User Key  (r) = Recorded By, (t) = Taken By, (c) = Cosigned By    Initials Name Provider Type    KG Kassie Comer, PT Physical Therapist        Therapy Charges for Today     Code Description Service Date Service Provider Modifiers Qty    89217892065  PT THERAPEUTIC ACT EA 15 MIN 7/3/2021 Kassie Comer,  PT GP 2    [id number]  PT THERAPEUTIC ACT EA 15 MIN 7/4/2021 Kassie Comer, PT GP 2          PT G-Codes  Outcome Measure Options: AM-PAC 6 Clicks Basic Mobility (PT)  AM-PAC 6 Clicks Score (PT): 13    Ivania Comer, PT  7/4/2021

## 2021-07-04 NOTE — PROGRESS NOTES
Cardiothoracic Surgery Progress Note      6/22 s/p CABG x 4, mediastinal exploration, PPD # 2 left groin pseudoaneurysm injection after IABP     LOS: 12 days      Subjective:  No complaints from patient.  Slept overnight    Objective:  Vital Signs  Temp:  [97.7 °F (36.5 °C)] 97.7 °F (36.5 °C)  Heart Rate:  [51-80] 71  Resp:  [18-23] 18  BP: ()/() 124/44    Physical Exam:   General Appearance: alert, appears stated age and cooperative, oriented x 3   Lungs: clear to auscultation, respirations regular, respirations even and respirations unlabored   Heart: regular rhythm & normal rate, normal S1, S2 and no murmur, no gallop, no rub   Skin: Incision c/d/i     Results:    Results from last 7 days   Lab Units 07/04/21  0448   WBC 10*3/mm3 8.94   HEMOGLOBIN g/dL 8.2*   HEMATOCRIT % 27.4*   PLATELETS 10*3/mm3 376     Results from last 7 days   Lab Units 07/04/21  0632   SODIUM mmol/L 140   POTASSIUM mmol/L 4.6   CHLORIDE mmol/L 110*   CO2 mmol/L 20.0*   BUN mg/dL 58*   CREATININE mg/dL 1.03   GLUCOSE mg/dL 123*   CALCIUM mg/dL 8.6       Assessment:  6/22 s/p CABG x 4, mediastinal exploration, PPD # 2 left groin pseudoaneurysm injection after IABP    Plan:  Hold ativan and Precedex  Beer TID  Minimize narcotics  Ambulate  Pulmonary toilet  SLP evaluation  ASA, statin, BB  Transfer to PCU telemetry    Phillip Jones MD  07/04/21  10:30 EDT

## 2021-07-05 ENCOUNTER — ANCILLARY PROCEDURE (OUTPATIENT)
Dept: SPEECH THERAPY | Facility: HOSPITAL | Age: 66
End: 2021-07-05

## 2021-07-05 LAB
ANION GAP SERPL CALCULATED.3IONS-SCNC: 10 MMOL/L (ref 5–15)
BUN SERPL-MCNC: 44 MG/DL (ref 8–23)
BUN/CREAT SERPL: 64.7 (ref 7–25)
CALCIUM SPEC-SCNC: 8.9 MG/DL (ref 8.6–10.5)
CHLORIDE SERPL-SCNC: 110 MMOL/L (ref 98–107)
CO2 SERPL-SCNC: 20 MMOL/L (ref 22–29)
CREAT SERPL-MCNC: 0.68 MG/DL (ref 0.76–1.27)
DEPRECATED RDW RBC AUTO: 57.8 FL (ref 37–54)
ERYTHROCYTE [DISTWIDTH] IN BLOOD BY AUTOMATED COUNT: 16.4 % (ref 12.3–15.4)
GFR SERPL CREATININE-BSD FRML MDRD: 116 ML/MIN/1.73
GLUCOSE BLDC GLUCOMTR-MCNC: 128 MG/DL (ref 70–130)
GLUCOSE BLDC GLUCOMTR-MCNC: 137 MG/DL (ref 70–130)
GLUCOSE BLDC GLUCOMTR-MCNC: 138 MG/DL (ref 70–130)
GLUCOSE BLDC GLUCOMTR-MCNC: 151 MG/DL (ref 70–130)
GLUCOSE SERPL-MCNC: 146 MG/DL (ref 65–99)
HCT VFR BLD AUTO: 29 % (ref 37.5–51)
HGB BLD-MCNC: 8.3 G/DL (ref 13–17.7)
MCH RBC QN AUTO: 27.5 PG (ref 26.6–33)
MCHC RBC AUTO-ENTMCNC: 28.6 G/DL (ref 31.5–35.7)
MCV RBC AUTO: 96 FL (ref 79–97)
PLATELET # BLD AUTO: 425 10*3/MM3 (ref 140–450)
PMV BLD AUTO: 10.3 FL (ref 6–12)
POTASSIUM SERPL-SCNC: 4.5 MMOL/L (ref 3.5–5.2)
QT INTERVAL: 0 MS
QTC INTERVAL: 0 MS
RBC # BLD AUTO: 3.02 10*6/MM3 (ref 4.14–5.8)
SODIUM SERPL-SCNC: 140 MMOL/L (ref 136–145)
WBC # BLD AUTO: 9.11 10*3/MM3 (ref 3.4–10.8)

## 2021-07-05 PROCEDURE — 99024 POSTOP FOLLOW-UP VISIT: CPT | Performed by: THORACIC SURGERY (CARDIOTHORACIC VASCULAR SURGERY)

## 2021-07-05 PROCEDURE — 82962 GLUCOSE BLOOD TEST: CPT

## 2021-07-05 PROCEDURE — 92612 ENDOSCOPY SWALLOW (FEES) VID: CPT

## 2021-07-05 PROCEDURE — 85027 COMPLETE CBC AUTOMATED: CPT | Performed by: INTERNAL MEDICINE

## 2021-07-05 PROCEDURE — 25010000002 PHENYLEPHRINE 10 MG/ML SOLUTION: Performed by: NURSE PRACTITIONER

## 2021-07-05 PROCEDURE — 94640 AIRWAY INHALATION TREATMENT: CPT

## 2021-07-05 PROCEDURE — 63710000001 INSULIN REGULAR HUMAN PER 5 UNITS: Performed by: RADIOLOGY

## 2021-07-05 PROCEDURE — 97530 THERAPEUTIC ACTIVITIES: CPT

## 2021-07-05 PROCEDURE — 99232 SBSQ HOSP IP/OBS MODERATE 35: CPT | Performed by: INTERNAL MEDICINE

## 2021-07-05 PROCEDURE — 80048 BASIC METABOLIC PNL TOTAL CA: CPT | Performed by: INTERNAL MEDICINE

## 2021-07-05 RX ORDER — DOCUSATE SODIUM 100 MG/1
100 CAPSULE, LIQUID FILLED ORAL 2 TIMES DAILY
Status: DISCONTINUED | OUTPATIENT
Start: 2021-07-05 | End: 2021-07-09 | Stop reason: HOSPADM

## 2021-07-05 RX ORDER — PHENYLEPHRINE HCL IN 0.9% NACL 0.5 MG/5ML
.5-3 SYRINGE (ML) INTRAVENOUS
Status: DISCONTINUED | OUTPATIENT
Start: 2021-07-05 | End: 2021-07-06

## 2021-07-05 RX ORDER — AMIODARONE HYDROCHLORIDE 200 MG/1
200 TABLET ORAL EVERY 8 HOURS
Status: DISPENSED | OUTPATIENT
Start: 2021-07-05 | End: 2021-07-07

## 2021-07-05 RX ORDER — ATORVASTATIN CALCIUM 40 MG/1
40 TABLET, FILM COATED ORAL NIGHTLY
Status: DISCONTINUED | OUTPATIENT
Start: 2021-07-05 | End: 2021-07-09 | Stop reason: HOSPADM

## 2021-07-05 RX ORDER — POTASSIUM CHLORIDE 1.5 G/1.77G
40 POWDER, FOR SOLUTION ORAL AS NEEDED
Status: DISCONTINUED | OUTPATIENT
Start: 2021-07-05 | End: 2021-07-09 | Stop reason: HOSPADM

## 2021-07-05 RX ORDER — AMIODARONE HYDROCHLORIDE 200 MG/1
200 TABLET ORAL EVERY 12 HOURS
Status: DISCONTINUED | OUTPATIENT
Start: 2021-07-07 | End: 2021-07-09 | Stop reason: HOSPADM

## 2021-07-05 RX ORDER — POTASSIUM CHLORIDE 750 MG/1
40 CAPSULE, EXTENDED RELEASE ORAL AS NEEDED
Status: DISCONTINUED | OUTPATIENT
Start: 2021-07-05 | End: 2021-07-09 | Stop reason: HOSPADM

## 2021-07-05 RX ORDER — FOLIC ACID 1 MG/1
1 TABLET ORAL DAILY
Status: DISCONTINUED | OUTPATIENT
Start: 2021-07-06 | End: 2021-07-09 | Stop reason: HOSPADM

## 2021-07-05 RX ORDER — FAMOTIDINE 20 MG/1
20 TABLET, FILM COATED ORAL 2 TIMES DAILY
Status: DISCONTINUED | OUTPATIENT
Start: 2021-07-05 | End: 2021-07-09 | Stop reason: HOSPADM

## 2021-07-05 RX ORDER — TERAZOSIN 5 MG/1
5 CAPSULE ORAL NIGHTLY
Status: DISCONTINUED | OUTPATIENT
Start: 2021-07-05 | End: 2021-07-09 | Stop reason: HOSPADM

## 2021-07-05 RX ORDER — ASPIRIN 325 MG
325 TABLET ORAL DAILY
Status: DISCONTINUED | OUTPATIENT
Start: 2021-07-06 | End: 2021-07-09 | Stop reason: HOSPADM

## 2021-07-05 RX ORDER — LOSARTAN POTASSIUM 50 MG/1
100 TABLET ORAL
Status: DISCONTINUED | OUTPATIENT
Start: 2021-07-06 | End: 2021-07-09 | Stop reason: HOSPADM

## 2021-07-05 RX ORDER — METOPROLOL TARTRATE 50 MG/1
50 TABLET, FILM COATED ORAL EVERY 12 HOURS SCHEDULED
Status: DISCONTINUED | OUTPATIENT
Start: 2021-07-05 | End: 2021-07-09 | Stop reason: HOSPADM

## 2021-07-05 RX ORDER — AMLODIPINE BESYLATE 5 MG/1
5 TABLET ORAL
Status: DISCONTINUED | OUTPATIENT
Start: 2021-07-06 | End: 2021-07-09 | Stop reason: HOSPADM

## 2021-07-05 RX ORDER — AMIODARONE HYDROCHLORIDE 200 MG/1
200 TABLET ORAL DAILY
Status: DISCONTINUED | OUTPATIENT
Start: 2021-07-21 | End: 2021-07-09 | Stop reason: HOSPADM

## 2021-07-05 RX ORDER — CHOLECALCIFEROL (VITAMIN D3) 125 MCG
5 CAPSULE ORAL NIGHTLY
Status: DISCONTINUED | OUTPATIENT
Start: 2021-07-05 | End: 2021-07-09 | Stop reason: HOSPADM

## 2021-07-05 RX ADMIN — AMLODIPINE BESYLATE 5 MG: 5 TABLET ORAL at 09:37

## 2021-07-05 RX ADMIN — SODIUM CHLORIDE, PRESERVATIVE FREE 10 ML: 5 INJECTION INTRAVENOUS at 09:39

## 2021-07-05 RX ADMIN — METOPROLOL TARTRATE 50 MG: 50 TABLET, FILM COATED ORAL at 20:00

## 2021-07-05 RX ADMIN — TERAZOSIN HYDROCHLORIDE 5 MG: 5 CAPSULE ORAL at 20:01

## 2021-07-05 RX ADMIN — AMIODARONE HYDROCHLORIDE 200 MG: 200 TABLET ORAL at 09:37

## 2021-07-05 RX ADMIN — ALBUTEROL SULFATE 2.5 MG: 2.5 SOLUTION RESPIRATORY (INHALATION) at 15:49

## 2021-07-05 RX ADMIN — PHENYLEPHRINE HYDROCHLORIDE 0.5 MCG/KG/MIN: 10 INJECTION, SOLUTION INTRAVENOUS at 22:26

## 2021-07-05 RX ADMIN — INSULIN HUMAN 2 UNITS: 100 INJECTION, SOLUTION PARENTERAL at 00:01

## 2021-07-05 RX ADMIN — METOPROLOL TARTRATE 50 MG: 50 TABLET, FILM COATED ORAL at 09:36

## 2021-07-05 RX ADMIN — Medication 5 MG: at 20:00

## 2021-07-05 RX ADMIN — NICOTINE 1 PATCH: 7 PATCH, EXTENDED RELEASE TRANSDERMAL at 09:38

## 2021-07-05 RX ADMIN — ATORVASTATIN CALCIUM 40 MG: 40 TABLET, FILM COATED ORAL at 20:00

## 2021-07-05 RX ADMIN — FAMOTIDINE 20 MG: 20 TABLET, FILM COATED ORAL at 20:03

## 2021-07-05 RX ADMIN — AMIODARONE HYDROCHLORIDE 200 MG: 200 TABLET ORAL at 02:21

## 2021-07-05 RX ADMIN — SODIUM CHLORIDE, PRESERVATIVE FREE 10 ML: 5 INJECTION INTRAVENOUS at 20:02

## 2021-07-05 RX ADMIN — ASPIRIN 325 MG ORAL TABLET 325 MG: 325 PILL ORAL at 09:37

## 2021-07-05 RX ADMIN — THIAMINE HCL TAB 100 MG 100 MG: 100 TAB at 09:37

## 2021-07-05 RX ADMIN — DOCUSATE SODIUM 100 MG: 100 CAPSULE, LIQUID FILLED ORAL at 20:00

## 2021-07-05 RX ADMIN — FAMOTIDINE 20 MG: 20 TABLET ORAL at 09:37

## 2021-07-05 RX ADMIN — LOSARTAN POTASSIUM 100 MG: 50 TABLET, FILM COATED ORAL at 09:36

## 2021-07-05 RX ADMIN — AMIODARONE HYDROCHLORIDE 200 MG: 200 TABLET ORAL at 18:27

## 2021-07-05 RX ADMIN — FOLIC ACID 1 MG: 1 TABLET ORAL at 09:37

## 2021-07-05 NOTE — CASE MANAGEMENT/SOCIAL WORK
Continued Stay Note  [facility]     Patient Name: Thien Gray  MRN: [id number]  Today's Date: 7/5/2021    Admit Date: 6/22/2021    Discharge Plan     Row Name 07/05/21 1615       Plan    Plan  IPR    Patient/Family in Agreement with Plan  yes    Plan Comments  Spoke with patient at bedside. He is interested in short term rehab. Anticipate transfer to telemetry today. Most agencies observing today as part of July 4 holiday, will place referrals tomorrow.    Final Discharge Disposition Code  30 - still a patient        Discharge Codes    No documentation.             Tram Dumont RN

## 2021-07-05 NOTE — PROGRESS NOTES
Cardiothoracic Surgery Progress Note      6/22 s/p CABG x 4, mediastinal exploration, PPD # 2 left groin pseudoaneurysm injection after IABP     LOS: 13 days      Subjective:  Slept better last night.     Objective:  Vital Signs  Temp:  [97.7 °F (36.5 °C)] 97.7 °F (36.5 °C)  Heart Rate:  [62-94] 73  Resp:  [17-20] 18  BP: (124-158)/() 144/64    Physical Exam:   General Appearance: alert, appears stated age and cooperative, conversant   Lungs: clear to auscultation, respirations regular, respirations even and respirations unlabored   Heart: regular rhythm & normal rate, normal S1, S2 and no murmur, no gallop, no rub   Skin: Incision c/d/i     Results:    Results from last 7 days   Lab Units 07/04/21  0448   WBC 10*3/mm3 8.94   HEMOGLOBIN g/dL 8.2*   HEMATOCRIT % 27.4*   PLATELETS 10*3/mm3 376     Results from last 7 days   Lab Units 07/04/21  0632   SODIUM mmol/L 140   POTASSIUM mmol/L 4.6   CHLORIDE mmol/L 110*   CO2 mmol/L 20.0*   BUN mg/dL 58*   CREATININE mg/dL 1.03   GLUCOSE mg/dL 123*   CALCIUM mg/dL 8.6       Assessment:  6/22 s/p CABG x 4, mediastinal exploration, PPD # 2 left groin pseudoaneurysm injection after IABP    Plan:  FEES today  Awaiting PCU bed  Hold ativan and Precedex  Beer TID  Minimize narcotics  Ambulate  Pulmonary toilet  ASA, statin, BB        Janay Kellogg PA-C  07/05/21  07:06 EDT

## 2021-07-05 NOTE — PLAN OF CARE
Goal Outcome Evaluation:      Pt is alert and oriented. Intermittently needs to be reminded to call before getting up but overall appropriate behavior. Sats >92% on room air. NSR with BBB. VSS. No complaints of pain. Will continue to monitor.

## 2021-07-05 NOTE — THERAPY PROGRESS REPORT/RE-CERT
Patient Name: Thien Gray  : 1955    MRN: [id number]                              Today's Date: 2021       Admit Date: 2021    Visit Dx:     ICD-10-CM ICD-9-CM   1. ST elevation myocardial infarction (STEMI), unspecified artery (CMS/HCC)  I21.3 410.90   2. Chest pain, unspecified type  R07.9 786.50   3. Cognitive communication deficit  R41.841 799.52     Patient Active Problem List   Diagnosis   • History of tobacco abuse   • CABG 21   • Dyspnea on exertion   • RBBB   • Psoriasis   • Primary osteoarthritis of both knees   • STEMI   • Dyslipidemia   • Obesity (BMI 30-39.9)   • ST elevation myocardial infarction (STEMI) (CMS/HCC)   • Hyperglycemia     Past Medical History:   Diagnosis Date   • Abnormal ECG 2020    Get from Dr. Kayser   • Coronary artery disease    • History of heart attack    • History of MI (myocardial infarction)    • Hyperlipidemia    • Myocardial infarction (CMS/HCC)    • RBBB 2/10/2020     Past Surgical History:   Procedure Laterality Date   • APPENDECTOMY     • CARDIAC CATHETERIZATION  2004    They used a catheter to put in my stent.   • CARDIAC CATHETERIZATION N/A 2021    Procedure: Left Heart Cath;  Surgeon: Mikey Conrad MD;  Location: [facility] CATH INVASIVE LOCATION;  Service: Cardiovascular;  Laterality: N/A;   • CORONARY ANGIOPLASTY      I think that's what the bill for my stent said.   • CORONARY ARTERY BYPASS BRING BACK FOR EXPLORATION N/A 2021    Procedure: BRING BACK FOR EXPLORATION OPEN HEART;  Surgeon: Miguel Angel Acosta MD;  Location: [facility] OR;  Service: Cardiothoracic;  Laterality: N/A;   • CORONARY ARTERY BYPASS GRAFT N/A 2021    Procedure: MEDIAN STERNOTOMY, CORONARY ARTERY BYPASS GRAFTING X 4 WITH LEFT INTERNAL MAMMARY ARTERY GRAFT, ENDOSCOPIC VEIN HARVESTING OF THE RIGHT GREATER SAPHENOUS VEIN, INTRA-AORTIC BALLOON PUMP INSERTION, IVAN PER ANESTHESIA;  Surgeon: Miguel Angel Acosta MD;  Location: [facility] OR;   Service: Cardiothoracic;  Laterality: N/A;   • CORONARY STENT PLACEMENT  2004     General Information     [facility] Name 07/05/21 1427          Physical Therapy Time and Intention    Document Type  therapy note (daily note)  -KG     Mode of Treatment  physical therapy  -KG     [facility] Name 07/05/21 1427          General Information    Existing Precautions/Restrictions  cardiac;fall;sternal;other (see comments) intermittent confusion  -KG     [facility] Name 07/05/21 1427          Cognition    Orientation Status (Cognition)  oriented to;person;place  -KG     [facility] Name 07/05/21 1427          Safety Issues, Functional Mobility    Safety Issues Affecting Function (Mobility)  awareness of need for assistance;insight into deficits/self-awareness;safety precaution awareness;safety precautions follow-through/compliance  -KG     Impairments Affecting Function (Mobility)  balance;cognition;coordination;endurance/activity tolerance;postural/trunk control;strength  -KG     Cognitive Impairments, Mobility Safety/Performance  attention;awareness, need for assistance;insight into deficits/self-awareness;safety precaution awareness;safety precaution follow-through  -KG       User Key  (r) = Recorded By, (t) = Taken By, (c) = Cosigned By    Initials Name Provider Type    KG Kassie Comer N, PT Physical Therapist        Mobility     [facility] Name 07/05/21 1428          Bed Mobility    Comment (Bed Mobility)  UIC  -KG     [facility] Name 07/05/21 1428          Transfers    Comment (Transfers)  STS x5 throughout treatment session. Pt required max cueing for hand placement to maintain sternal precautions.  -KG     [facility] Name 07/05/21 1428          Sit-Stand Transfer    Sit-Stand [place] (Transfers)  moderate assist (50% patient effort);2 person assist;verbal cues  -KG     Row Name 07/05/21 1428          Gait/Stairs (Locomotion)    [place] Level (Gait)  minimum assist (75% patient effort);2 person assist;1 person to manage equipment;verbal cues chair  follow  -KG     Assistive Device (Gait)  other (see comments) B UE support on tele monitor  -KG     Distance in Feet (Gait)  180  -KG     Deviations/Abnormal Patterns (Gait)  base of support, wide;jp decreased;stride length decreased  -KG     Bilateral Gait Deviations  forward flexed posture;heel strike decreased  -KG     Comment (Gait/Stairs)  Pt demonstrated step through gait pattern with slow jp and wide ADI. Frequent cues for upright posture and increased stride length. Pt required three seated rest breaks due to quick fatigue.  -KG       User Key  (r) = Recorded By, (t) = Taken By, (c) = Cosigned By    Initials Name Provider Type    Kassie Molina PT Physical Therapist        Obj/Interventions     Row Name 07/05/21 1430          Balance    Balance Assessment  sitting static balance;standing static balance;standing dynamic balance  -KG     Static Sitting Balance  WFL;sitting in chair  -KG     Static Standing Balance  mild impairment;supported;standing  -KG     Dynamic Standing Balance  mild impairment;supported;standing  -KG       User Key  (r) = Recorded By, (t) = Taken By, (c) = Cosigned By    Initials Name Provider Type    Kassie Molina PT Physical Therapist        Goals/Plan    No documentation.       Clinical Impression     Row Name 07/05/21 1430          Pain    Additional Documentation  Pain Scale: Numbers Pre/Post-Treatment (Group)  -KG     Row Name 07/05/21 1430          Pain Scale: Numbers Pre/Post-Treatment    Pretreatment Pain Rating  0/10 - no pain  -KG     Posttreatment Pain Rating  0/10 - no pain  -KG     Row Name 07/05/21 1430          Plan of Care Review    Plan of Care Reviewed With  patient  -KG     Progress  improving  -KG     Outcome Summary  Pt increased ambulation distance to 180ft with Indira x2 +1 and B UE support on tele monitor. Chair follow for safety. Frequent cues for upright posture and increased stride length. Pt required three standing rest breaks.  Pt limited by weakness and decreased endurance. Continue to progress as appropriate.  -KG     Row Name 07/05/21 1430          Vital Signs    Pre Systolic BP Rehab  118  -KG     Pre Treatment Diastolic BP  58  -KG     Post Systolic BP Rehab  12  -KG     Post Treatment Diastolic BP  65  -KG     Pretreatment Heart Rate (beats/min)  61  -KG     Posttreatment Heart Rate (beats/min)  65  -KG     Pre SpO2 (%)  97  -KG     O2 Delivery Pre Treatment  room air  -KG     Post SpO2 (%)  98  -KG     O2 Delivery Post Treatment  room air  -KG     Pre Patient Position  Sitting  -KG     Intra Patient Position  Standing  -KG     Post Patient Position  Sitting  -KG     Row Name 07/05/21 1430          Positioning and Restraints    Pre-Treatment Position  sitting in chair/recliner  -KG     Post Treatment Position  chair  -KG     In Chair  notified nsg;reclined;call light within reach;encouraged to call for assist;exit alarm on;RUE elevated;LUE elevated;legs elevated  -KG       User Key  (r) = Recorded By, (t) = Taken By, (c) = Cosigned By    Initials Name Provider Type    KG Kassie Comer, PT Physical Therapist        Outcome Measures     Row Name 07/05/21 1431 07/05/21 0800       How much help from another person do you currently need...    Turning from your back to your side while in flat bed without using bedrails?  2  -KG  2  -MM    Moving from lying on back to sitting on the side of a flat bed without bedrails?  2  -KG  2  -MM    Moving to and from a bed to a chair (including a wheelchair)?  3  -KG  3  -MM    Standing up from a chair using your arms (e.g., wheelchair, bedside chair)?  2  -KG  2  -MM    Climbing 3-5 steps with a railing?  2  -KG  2  -MM    To walk in hospital room?  2  -KG  2  -MM    AM-PAC 6 Clicks Score (PT)  13  -KG  13  -MM    Row Name 07/05/21 1431          Functional Assessment    Outcome Measure Options  AM-PAC 6 Clicks Basic Mobility (PT)  -KG       User Key  (r) = Recorded By, (t) = Taken By, (c) =  Cosigned By    Initials Name Provider Type    Ant Barney, RN Registered Nurse    Kassie Molina PT Physical Therapist        Physical Therapy Education                 Title: PT OT SLP Therapies (In Progress)     Topic: Physical Therapy (In Progress)     Point: Mobility training (In Progress)     Learning Progress Summary           Patient Acceptance, E, NR by KG at 7/5/2021 1042    Acceptance, E, NR by KG at 7/4/2021 0956    Acceptance, E, NR by KG at 7/3/2021 1032    Acceptance, E,TB, VU,NR by AY at 7/2/2021 1520    Acceptance, E, NR,DU by LH at 7/1/2021 1529    Acceptance, E, NR by KG at 6/30/2021 1421    Acceptance, E, NR by KG at 6/29/2021 1101    Acceptance, E, NR by KG at 6/28/2021 1040    Acceptance, E, NR by KG at 6/25/2021 1121                   Point: Home exercise program (In Progress)     Learning Progress Summary           Patient Acceptance, E, NR by KG at 7/5/2021 1042    Acceptance, E, NR by KG at 7/4/2021 0956    Acceptance, E, NR by KG at 7/3/2021 1032    Acceptance, E, NR,DU by LH at 7/1/2021 1529    Acceptance, E, NR by KG at 6/30/2021 1421    Acceptance, E, NR by KG at 6/29/2021 1101    Acceptance, E, NR by KG at 6/28/2021 1040                   Point: Body mechanics (In Progress)     Learning Progress Summary           Patient Acceptance, E, NR by KG at 7/5/2021 1042    Acceptance, E, NR by KG at 7/4/2021 0956    Acceptance, E, NR by KG at 7/3/2021 1032    Acceptance, E,TB, VU,NR by AY at 7/2/2021 1520    Acceptance, E, NR,DU by LH at 7/1/2021 1529    Acceptance, E, NR by KG at 6/30/2021 1421    Acceptance, E, NR by KG at 6/29/2021 1101    Acceptance, E, NR by KG at 6/28/2021 1040    Acceptance, E, NR by KG at 6/25/2021 1121                   Point: Precautions (In Progress)     Learning Progress Summary           Patient Acceptance, E, NR by KG at 7/5/2021 1042    Acceptance, E, NR by KG at 7/4/2021 0956    Acceptance, E, NR by KG at 7/3/2021 1032    Acceptance, E,TB,  VU,NR by AY at 7/2/2021 1520    Acceptance, E, NR,DU by LH at 7/1/2021 1529    Acceptance, E, NR by KG at 6/30/2021 1421    Acceptance, E, NR by KG at 6/29/2021 1101    Acceptance, E, NR by KG at 6/28/2021 1040    Acceptance, E, NR by KG at 6/25/2021 1121                               User Key     Initials Effective Dates Name Provider Type Discipline    KG 05/22/20 -  Kassie Comer, PT Physical Therapist PT    AY 11/10/20 -  Guera Hooper, PT Physical Therapist PT     05/17/21 -  Ned Ortez, PT Student PT Student PT              PT Recommendation and Plan  Planned Therapy Interventions (PT): balance training, bed mobility training, gait training, strengthening, transfer training  Plan of Care Reviewed With: patient  Progress: improving  Outcome Summary: Pt increased ambulation distance to 180ft with Indira x2 +1 and B UE support on tele monitor. Chair follow for safety. Frequent cues for upright posture and increased stride length. Pt required three standing rest breaks. Pt limited by weakness and decreased endurance. Continue to progress as appropriate.     Time Calculation:   PT Charges     Row Name 07/05/21 1042             Time Calculation    Start Time  1042  -KG      PT Received On  07/05/21  -KG      PT Goal Re-Cert Due Date  07/15/21  -KG         Time Calculation- PT    Total Timed Code Minutes- PT  23 minute(s)  -KG         Timed Charges    54535 - PT Therapeutic Activity Minutes  23  -KG         Total Minutes    Timed Charges Total Minutes  23  -KG       Total Minutes  23  -KG        User Key  (r) = Recorded By, (t) = Taken By, (c) = Cosigned By    Initials Name Provider Type    KG Kassie Comer, PT Physical Therapist        Therapy Charges for Today     Code Description Service Date Service Provider Modifiers Qty    71669707409 HC PT THERAPEUTIC ACT EA 15 MIN 7/4/2021 Kassie Comer, PT GP 2    85075328900 HC PT THERAPEUTIC ACT EA 15 MIN 7/5/2021 Kassie Comer, PT GP  2    [id number]  PT THER SUPP EA 15 MIN 7/5/2021 Kassie Comer, PT GP 2          PT G-Codes  Outcome Measure Options: AM-PAC 6 Clicks Basic Mobility (PT)  AM-PAC 6 Clicks Score (PT): 13    Ivania Comer, PT  7/5/2021

## 2021-07-05 NOTE — PLAN OF CARE
Goal Outcome Evaluation:  Plan of Care Reviewed With: patient            SLP re-evaluation completed. Will sign-off dysphagia, of note there was an irregular tissue noted in supraglottic region of pharynx, see photo in images or note for reference. Please see note for further details and recommendations.

## 2021-07-05 NOTE — PROGRESS NOTES
Pulmonary/Critical Care Follow-up     LOS: 13 days   Patient Care Team:  Sanaz Zuluaga DO as PCP - General (Family Medicine)    Chief Complaint/reason: Chest pain/medical management of issues including delirium, respiratory management, electrolyte management postoperatively after CABG.      Chief Complaint   Patient presents with   • Chest Pain     Subjective      Initial history (from ICU note 6/22/2021):    65-year-old male with a PMH of prior tobacco use (quit 34 yrs ago), HTN, CAD s/p stent (2004), and obesity.      Per report, approximately 1.5 hrs prior to presentation patient developed severe burning chest pain / pressure with associated shortness of breath and nausea. EKG on arrival revealed inferior ST elevation. He was emergently taken to Cath Lab by Dr. Conrad where he was found to have severe multivessel CAD (60-70% ostial LM with haziness in proximal suggesting thrombus, 70-80% mid-LAD, 70% RCA) not amenable to PCI. LV function was relatively well-preserved at 55%.      CTS was contacted and he was subsequently taken to OR where he underwent a CABG x4 with IABP placement per Dr. Acosta.     (End of copied text).    Patient went back to the operating room on 6/23/2021 due to bleeding and had evacuation of blood from the right pleural space.  Patient extubated 6/24/2021.  Patient developed confusion/agitation on 6/25/2021.  He has been treated for presumed alcohol withdrawal.     Patient had pseudoaneurysm repair (thrombin injection by Dr. Tamez) on 7/2/2021.    Interval History:     Patient more alert today.  Speech is improved.  Passed FEES.   No nausea or vomiting.  No fevers or chills.  No new complaints.    History taken from: Chart, staff    PMH/FH/Social History were reviewed and updated appropriately in the electronic medical record.     Review of Systems:    Review of 14 systems was completed with positives and pertinent negatives noted in the subjective section.  All other systems  reviewed and are negative.         Objective     Vital Signs  Temp:  [97.7 °F (36.5 °C)-97.9 °F (36.6 °C)] 97.9 °F (36.6 °C)  Heart Rate:  [62-98] 77  Resp:  [17-20] 18  BP: (127-155)/() 137/56  07/04 0701 - 07/05 0700  In: 2365   Out: 1225 [Urine:1225]  Body mass index is 34.44 kg/m².     IV drips:      Physical Exam:     Constitutional:   Awake.  Alert.  Remains very slightly confused.  No acute distress.   Head:   Normocephalic, without obvious abnormality, atraumatic   Eyes:           Lids and lashes normal, conjunctivae and sclerae normal.  No icterus, no pallor, corneas clear, PER   ENMT:  Ears appear intact with no abnormalities noted        Neck:  No adenopathy, supple, trachea midline   Lungs/Resp:    Normal effort, symmetric chest rise, no crepitus, clear bilaterally                Heart/CV:    Regular rhythm, normal S1 and S2, no murmur   Abdomen/GI:    Normal bowel sounds, no masses, no organomegaly, soft,        nontender, nondistended   :    Deferred   Extremities/MSK:  No clubbing or cyanosis.  Trace bilateral lower extremity edema.  Normal tone.    No deformities.    Pulses:  Pulses palpable and equal bilaterally   Skin:  No bleeding, bruising or rash   Heme/Lymph:  No cervical or supraclavicular adenopathy.   Neurologic:      Psychiatric:    Moves all extremities with no obvious focal motor deficit.  Cranial nerves 2 - 12 grossly intact, dysarthric speech persists but is significantly improved today.  Calm today.    The above physical exam findings were reviewed and reflect exam findings as of today's exam.   Electronically signed by:Cedric Murphy MD 07/05/21 11:28 EDT    Results Review:     I reviewed the patient's new clinical results.   Results from last 7 days   Lab Units 07/05/21  1016 07/04/21  0632 07/03/21  0415   SODIUM mmol/L 140 140 136   POTASSIUM mmol/L 4.5 4.6 4.0   CHLORIDE mmol/L 110* 110* 104   CO2 mmol/L 20.0* 20.0* 18.0*   BUN mg/dL 44* 58* 45*   CREATININE mg/dL  0.68* 1.03 0.76   CALCIUM mg/dL 8.9 8.6 8.8   GLUCOSE mg/dL 146* 123* 116*     Results from last 7 days   Lab Units 07/05/21  1016 07/04/21  0448 07/03/21  0415   WBC 10*3/mm3 9.11 8.94 8.96   HEMOGLOBIN g/dL 8.3* 8.2* 8.0*   HEMATOCRIT % 29.0* 27.4* 28.2*   PLATELETS 10*3/mm3 425 376 312         Results from last 7 days   Lab Units 07/01/21  0447 06/30/21  0641 06/29/21  0416   MAGNESIUM mg/dL 2.1 2.4 2.0   PHOSPHORUS mg/dL 4.2 3.7  --        I reviewed the patient's new imaging including images and reports.    FEES reviewed.    Medication Review:   amiodarone, 200 mg, Nasogastric, Q8H   Followed by  [START ON 7/7/2021] amiodarone, 200 mg, Nasogastric, Q12H   Followed by  [START ON 7/21/2021] amiodarone, 200 mg, Nasogastric, Daily  amLODIPine, 5 mg, Nasogastric, Q24H  aspirin, 325 mg, Nasogastric, Daily  atorvastatin, 40 mg, Nasogastric, Nightly  famotidine, 20 mg, Nasogastric, BID  folic acid, 1 mg, Nasogastric, Daily  insulin regular, 0-9 Units, Subcutaneous, Q6H  losartan, 100 mg, Nasogastric, Q24H  melatonin, 5 mg, Nasogastric, Nightly  metoprolol tartrate, 50 mg, Nasogastric, Q12H  nicotine, 1 patch, Transdermal, Q24H  pharmacy consult - [place], , Does not apply, Daily  sennosides, 10 mL, Nasogastric, BID  sodium chloride, 10 mL, Intravenous, Q12H  terazosin, 5 mg, Nasogastric, Nightly  thiamine, 100 mg, Nasogastric, Daily           Assessment/Plan         CABG 06/22/21    History of tobacco abuse    STEMI    Dyslipidemia    Obesity (BMI 30-39.9)    ST elevation myocardial infarction (STEMI) (CMS/[facility])    Hyperglycemia    65 y.o. male remote smoker with history of hypertension, coronary artery disease status post stent in 2004, obesity, BPH, alcohol use, admitted on 6/22/2021 with chest pain and found to have severe multivessel coronary artery disease.    Patient underwent CABG x4 with intra-aortic balloon pump placement by Dr. Acosta on 6/22/2021.  He went back to the operating room on 6/23/2021 for bleeding and  had evacuation of blood from the right pleural space.    Postoperative issues include delirium/agitation possibly secondary to alcohol withdrawal, hyperglycemia, hypoxemia requiring supplemental oxygen.    Agitation overall much improved.  More awake and passed FEES.     Okay to PCU or telemetry from my standpoint.    Plan:  1.  Continue PICC line.  2.  Holding sedatives.  3.  Correction insulin.  4.  Supplemental oxygen, wean as tolerated.  5.  Continue vitamin replacement.  6.  Continue nicotine patch.  7.  Continue terazosin.  8.  Speech dysphagia evaluation.  FEES done, advance diet.  9.  Continue tube feeding, nutritionist following, may be able to discontinue feeding tube if taking adequate oral diet.           Cedric Murphy MD  07/05/21  11:28 EDT      *. Please note that portions of this note were completed with [employer] - a voice recognition program.

## 2021-07-05 NOTE — PLAN OF CARE
Goal Outcome Evaluation:  Plan of Care Reviewed With: patient        Progress: improving  Outcome Summary: Pt increased ambulation distance to 180ft with Indira x2 +1 and B UE support on tele monitor. Chair follow for safety. Frequent cues for upright posture and increased stride length. Pt required three standing rest breaks. Pt limited by weakness and decreased endurance. Continue to progress as appropriate.

## 2021-07-05 NOTE — PROGRESS NOTES
Clinical Nutrition     Nutrition Support Assessment  Reason for Visit:   Follow-up protocol, EN      Patient Name: Thien Gray  YOB: 1955  MRN: [id number]  Date of Encounter: 07/05/21 09:28 EDT  Admission date: 6/22/2021    Nutrition Assessment   Assessment   STEMI  CAD  Intubated 6-22-21  s/p CABG x4, IABP 6-22-21  Post-op Bleed  s/p Mediastinal Exploration with evacuation of blood + clot from R. pleural space  Removal of IABP 6-23-21  Encephalopathy  Extubated 6-24-21  L common femoral artery pseudoaneurysm      h/o CAD, MI, HTN, HLP, Obesity  PMH: He  has a past medical history of Abnormal ECG (1/20/2020), Coronary artery disease, History of heart attack (2004), History of MI (myocardial infarction), Hyperlipidemia, Myocardial infarction (CMS/[facility]), and RBBB (2/10/2020).   PSxH: He  has a past surgical history that includes Appendectomy (1999); Coronary stent placement (2004); Cardiac catheterization (1/1/2004); Coronary angioplasty (2004); Cardiac catheterization (N/A, 6/22/2021); Coronary artery bypass graft (N/A, 6/22/2021); and Coronary Artery Bypass Graft (N/A, 6/23/2021).     Substance history: Etoh, Tobacco remote    Reported/Observed/Food/Nutrition Related History:     Pt sitting up in chair at time of RD visit. States that he is tolerating the tube feeding, no abdominal pain. Reports that he does not feel hungry, but looking forward to when he will be able to drink coffee. Planning for SLP FEES eval today.     Per I&O over the past 24 hrs:  2 bowel movements    Anthropometrics     Height: 70 in  Weight: 26 0lb ( MD office)  Weight Method: Bed scale    BMI: 37  Obese Class II: 35-39.9kg/m2     Weight Weight (kg) Weight (lbs) Weight Method   7/2/2021 111.585 kg 246 lb -   7/1/2021 111.7 kg 246 lb 4.1 oz Bed scale   6/28/2021 117.935 kg 260 lb -   6/23/2021 118 kg 260 lb 2.3 oz -   6/23/2021 118 kg 260 lb 2.3 oz -   6/22/2021 118 kg 260 lb 2.3 oz -   6/22/2021 118 kg  260 lb 2.3 oz -   6/22/2021 118 kg 260 lb 2.3 oz -         Labs reviewed     Results from last 7 days   Lab Units 07/04/21  0632 07/03/21  0415 07/01/21  0447 06/30/21  0641 06/29/21  0416   GLUCOSE mg/dL 123* 116* 137* 125* 130*   BUN mg/dL 58* 45* 27* 26* 21   CREATININE mg/dL 1.03 0.76 0.56* 0.53* 0.55*   SODIUM mmol/L 140 136 139 141 139   CHLORIDE mmol/L 110* 104 107 108* 105   POTASSIUM mmol/L 4.6 4.0 4.1 3.9 3.7   PHOSPHORUS mg/dL  --   --  4.2 3.7  --    MAGNESIUM mg/dL  --   --  2.1 2.4 2.0       Results from last 7 days   Lab Units 07/05/21  0553 07/04/21  2350 07/04/21  1809 07/04/21  1135 07/04/21  0551 07/04/21  0008   GLUCOSE mg/dL 137* 155* 135* 143* 116 174*     Lab Results   Lab Value Date/Time    HGBA1C 5.70 (H) 06/16/2021 0845    HGBA1C 6.00 (H) 11/24/2020 0852       Medications reviewed   Pertinent: seonokt, pepcid, folic acid, insulin, melatonin, thiamin      Needs Assessment (7/5)     Height used 70in   Weight used ABW 260lb   IBW 166lb     Estimated calorie needs: ~1700 kcal/day  14 kcal/kg actual weight= 2191-0663 kcal    Estimated protein needs: ~151 g pro/day  2.0 g/kg IBW= 151 g pro  1.2 g/kg actual weight= 141 g pro    Current Nutrition Prescription     PO: NPO Diet      EN: Peptamen Intense VHP at 85 ml/hr (goal volume= 1700 ml/day) and free water at 30 ml q 2 hrs  Route: ND-3  Verified at the bedside: Yes  Provides at goal volume: 1700 kcal, 156 g pro, 6 g fiber, 1428 ml water from EN, 1728 ml water total      EN intake:  2 Days:  1455 ml, 86%  1455 kcal, 86%  133 g pro, 88%  5 g fiber  1222 ml water from EN  1642 ml water total    Nutrition Diagnosis     6-24-21, 7-1  Problem Inadequate energy intake   Etiology Per Clinical Status: Encephalopathy/ Dysphagia   Signs/Symptoms 108% goal volume EN   Status: improved, EN started 6-25    Nutrition Intervention    Follow treatment progress, Care plan reviewed   -Will continue with current EN order at this time  -Planning for SLP FEES eval  today      Goal:   General: Nutrition support treatment   EN: Maintain EN  PO: Initiate PO diet if pt passes FEES eval    Monitoring/Evaluation:   Per protocol, I&O, Pertinent labs, EN delivery/tolerance, Weight, Symptoms, Swallow function    Bisi Clay MS RD/LD CNSC   Time Spent: 30 minutes

## 2021-07-06 LAB
BH CV ECHO MEAS - BSA(HAYCOCK): 2.4 M^2
BH CV ECHO MEAS - BSA(HAYCOCK): 2.4 M^2
BH CV ECHO MEAS - BSA: 2.3 M^2
BH CV ECHO MEAS - BSA: 2.3 M^2
BH CV ECHO MEAS - BZI_BMI: 34.3 KILOGRAMS/M^2
BH CV ECHO MEAS - BZI_BMI: 34.3 KILOGRAMS/M^2
BH CV ECHO MEAS - BZI_METRIC_HEIGHT: 180.3 CM
BH CV ECHO MEAS - BZI_METRIC_HEIGHT: 180.3 CM
BH CV ECHO MEAS - BZI_METRIC_WEIGHT: 111.6 KG
BH CV ECHO MEAS - BZI_METRIC_WEIGHT: 111.6 KG
BH CV LEFT GROIN HEMATOMA TRANS WIDTH: 1.7 CM
BH CV LEFT GROIN PSA PROCEDURE SCRIPTING LRR: 1
BH CV LEFT GROIN PSA PROCEDURE SCRIPTING LRR: 1
BH CV LEFT HEMATOMA TRANS LENGTH: 2.7 CM
BH CV LT PSA TRANSVERSE LENGTH: 3 CM
BH CV LT PSA TRANSVERSE WIDTH: 3 CM
BH CV VAS L ACTIVE AREA: 3 CM
BH CV VAS L ACTIVE WIDTH: 3 CM
BH CV VAS L CFA VELOCITY EDV: 25.4 CM/SEC
BH CV VAS L PFA VELOCITY EDV: 27.4 CM/SEC
BH CV VAS L PSA NECK LENGTH: 2.2 CM
BH CV VAS L PSA NECK WIDTH: 0.45 CM
BH CV VAS L PSEUDOANEURYSM LONG: 3.48 CM
BH CV VAS L PSEUDOANEURYSM TRV: 2.92 CM
GLUCOSE BLDC GLUCOMTR-MCNC: 120 MG/DL (ref 70–130)
GLUCOSE BLDC GLUCOMTR-MCNC: 123 MG/DL (ref 70–130)
GLUCOSE BLDC GLUCOMTR-MCNC: 127 MG/DL (ref 70–130)
GLUCOSE BLDC GLUCOMTR-MCNC: 143 MG/DL (ref 70–130)
LEFT GROIN CFA SYS: 110 CM/SEC
LEFT GROIN CFA SYS: 96 CM/SEC
Lab: 1.57 CM
PROX PFA PSV LEFT: 104 CM/SEC
PROX PFA PSV LEFT: 61 CM/SEC
PROX SFA PSV LEFT: 124 CM/SEC
PROX SFA PSV LEFT: 84 CM/SEC

## 2021-07-06 PROCEDURE — 94799 UNLISTED PULMONARY SVC/PX: CPT

## 2021-07-06 PROCEDURE — 99232 SBSQ HOSP IP/OBS MODERATE 35: CPT | Performed by: INTERNAL MEDICINE

## 2021-07-06 PROCEDURE — 97116 GAIT TRAINING THERAPY: CPT

## 2021-07-06 PROCEDURE — 82962 GLUCOSE BLOOD TEST: CPT

## 2021-07-06 PROCEDURE — 99024 POSTOP FOLLOW-UP VISIT: CPT | Performed by: THORACIC SURGERY (CARDIOTHORACIC VASCULAR SURGERY)

## 2021-07-06 PROCEDURE — 97530 THERAPEUTIC ACTIVITIES: CPT

## 2021-07-06 RX ADMIN — FOLIC ACID 1 MG: 1 TABLET ORAL at 08:30

## 2021-07-06 RX ADMIN — FAMOTIDINE 20 MG: 20 TABLET, FILM COATED ORAL at 08:31

## 2021-07-06 RX ADMIN — TERAZOSIN HYDROCHLORIDE 5 MG: 5 CAPSULE ORAL at 21:13

## 2021-07-06 RX ADMIN — AMIODARONE HYDROCHLORIDE 200 MG: 200 TABLET ORAL at 15:53

## 2021-07-06 RX ADMIN — DOCUSATE SODIUM 100 MG: 100 CAPSULE, LIQUID FILLED ORAL at 08:30

## 2021-07-06 RX ADMIN — LOSARTAN POTASSIUM 100 MG: 50 TABLET, FILM COATED ORAL at 08:31

## 2021-07-06 RX ADMIN — THIAMINE HCL TAB 100 MG 100 MG: 100 TAB at 08:30

## 2021-07-06 RX ADMIN — FAMOTIDINE 20 MG: 20 TABLET, FILM COATED ORAL at 21:13

## 2021-07-06 RX ADMIN — ASPIRIN 325 MG ORAL TABLET 325 MG: 325 PILL ORAL at 08:31

## 2021-07-06 RX ADMIN — SODIUM CHLORIDE, PRESERVATIVE FREE 10 ML: 5 INJECTION INTRAVENOUS at 21:13

## 2021-07-06 RX ADMIN — ATORVASTATIN CALCIUM 40 MG: 40 TABLET, FILM COATED ORAL at 21:13

## 2021-07-06 RX ADMIN — AMIODARONE HYDROCHLORIDE 200 MG: 200 TABLET ORAL at 08:30

## 2021-07-06 RX ADMIN — ALBUTEROL SULFATE 2.5 MG: 2.5 SOLUTION RESPIRATORY (INHALATION) at 23:51

## 2021-07-06 RX ADMIN — NICOTINE 1 PATCH: 7 PATCH, EXTENDED RELEASE TRANSDERMAL at 08:34

## 2021-07-06 RX ADMIN — METOPROLOL TARTRATE 50 MG: 50 TABLET, FILM COATED ORAL at 08:30

## 2021-07-06 RX ADMIN — METOPROLOL TARTRATE 50 MG: 50 TABLET, FILM COATED ORAL at 21:13

## 2021-07-06 RX ADMIN — Medication 5 MG: at 21:13

## 2021-07-06 RX ADMIN — AMLODIPINE BESYLATE 5 MG: 5 TABLET ORAL at 08:31

## 2021-07-06 RX ADMIN — AMIODARONE HYDROCHLORIDE 200 MG: 200 TABLET ORAL at 23:08

## 2021-07-06 NOTE — PROGRESS NOTES
"                                 [facility] Progress Note      LOS: 14 days   Patient Care Team:  Sanaz Zuluaga DO as PCP - General (Family Medicine)    Chief Complaint:    Chief Complaint   Patient presents with   • Chest Pain       Subjective     Interval History:     Patient Complaints: Much more coherent this morning.  Still easily confused      Review of Systems:   A 14 point review of systems was negative except as was stated in the HPI      Objective     Vital Sign Min/Max for last 24 hours  Temp  Min: 97.4 °F (36.3 °C)  Max: 98.1 °F (36.7 °C)   BP  Min: 57/43  Max: 148/67   Pulse  Min: 48  Max: 84   Resp  Min: 16  Max: 20   SpO2  Min: 86 %  Max: 98 %   No data recorded   Weight  Min: 113 kg (248 lb 4.8 oz)  Max: 113 kg (248 lb 4.8 oz)     Flowsheet Rows      First Filed Value   Admission Height  177.8 cm (70\") Documented at 06/22/2021 1400   Admission Weight  118 kg (260 lb 2.3 oz) Documented at 06/22/2021 2000          Physical Exam:  General Appearance: Well-nourished well-developed white male no acute distress  Lungs: Coarse bilateral breath sounds  Heart:: Regular rate and rhythm this morning; no Murmurs, Rubs or Gallops  Abdomen: Soft and nontender with adequate bowel sounds.  No organomegaly  Extremities: No cyanosis, clubbing.  1+ edema  Pulses: Pulses palpable and equal bilaterally; left groin stable  Skin: Warm and dry with no rash  Psych: Normal     Results Review:     I reviewed the patient's new clinical results.  Results from last 7 days   Lab Units 07/05/21  1016 07/04/21  0632 07/03/21  0415   SODIUM mmol/L 140 140 136   POTASSIUM mmol/L 4.5 4.6 4.0   CHLORIDE mmol/L 110* 110* 104   CO2 mmol/L 20.0* 20.0* 18.0*   BUN mg/dL 44* 58* 45*   CREATININE mg/dL 0.68* 1.03 0.76   GLUCOSE mg/dL 146* 123* 116*   CALCIUM mg/dL 8.9 8.6 8.8     Results from last 7 days   Lab Units 07/05/21  1016 07/04/21  0448 07/03/21  0415   WBC 10*3/mm3 9.11 8.94 8.96   HEMOGLOBIN g/dL 8.3* 8.2* " 8.0*   HEMATOCRIT % 29.0* 27.4* 28.2*   PLATELETS 10*3/mm3 425 376 312     Lab Results   Lab Value Date/Time    TROPONINT 1.730 (C) 06/22/2021 1705    TROPONINT <0.010 06/22/2021 1009                       Medication Review: yes  Current Facility-Administered Medications   Medication Dose Route Frequency Provider Last Rate Last Admin   • albuterol (PROVENTIL) nebulizer solution 0.083% 2.5 mg/3mL  2.5 mg Nebulization Q6H PRN Holger Angel PA-C   2.5 mg at 07/05/21 1549   • amiodarone (PACERONE) tablet 200 mg  200 mg Oral Q8H Miguel Angel Acosta MD   200 mg at 07/06/21 0830    Followed by   • [START ON 7/7/2021] amiodarone (PACERONE) tablet 200 mg  200 mg Oral Q12H Miguel Angel Acosta MD        Followed by   • [START ON 7/21/2021] amiodarone (PACERONE) tablet 200 mg  200 mg Oral Daily Miguel Angel Acosta MD       • amLODIPine (NORVASC) tablet 5 mg  5 mg Oral Q24H Miguel Angel Acosta MD   5 mg at 07/06/21 0831   • aspirin tablet 325 mg  325 mg Oral Daily Miguel Angel Acosta MD   325 mg at 07/06/21 0831   • atorvastatin (LIPITOR) tablet 40 mg  40 mg Oral Nightly Miguel Angel Acosta MD   40 mg at 07/05/21 2000   • beer 1 each  1 each Oral TID PRN Holger Angel PA-C   1 each at 07/05/21 1843   • docusate sodium (COLACE) capsule 100 mg  100 mg Oral BID Mark Anthony Hawkins PharmD   100 mg at 07/06/21 0830   • famotidine (PEPCID) tablet 20 mg  20 mg Oral BID Miguel Angel Acosta MD   20 mg at 07/06/21 0831   • folic acid (FOLVITE) tablet 1 mg  1 mg Oral Daily Miguel Angel Acosta MD   1 mg at 07/06/21 0830   • insulin lispro (humaLOG) injection 0-9 Units  0-9 Units Subcutaneous 4x Daily With Meals & Nightly Mark Anthony Hawkins, PharmD       • losartan (COZAAR) tablet 100 mg  100 mg Oral Q24H Miguel Angel Acosta MD   100 mg at 07/06/21 0831   • magnesium sulfate 4g/100mL (PREMIX) infusion  4 g Intravenous PRN Holger Angel PA-C   4 g at 06/28/21 0607   • melatonin tablet 5 mg  5 mg Oral Nightly Miguel Angel Acosta MD   5 mg at 07/05/21 2000   • metoprolol  tartrate (LOPRESSOR) tablet 50 mg  50 mg Oral Q12H Miguel Angel Acosta MD   50 mg at 07/06/21 0830   • morphine injection 2 mg  2 mg Intravenous Q2H PRN Holger Angel PA-C   2 mg at 07/02/21 2342   • nicotine (NICODERM CQ) 7 MG/24HR patch 1 patch  1 patch Transdermal Q24H Holger Angel PA-C   1 patch at 07/06/21 0834   • ondansetron (ZOFRAN) injection 4 mg  4 mg Intravenous Q6H PRN Holger Angel PA-C       • Pharmacy Consult - MTM   Does not apply Daily Holger Angel PA-C       • potassium chloride (KLOR-CON) packet 40 mEq  40 mEq Oral PRN Miguel Angel Acosta MD       • potassium chloride (MICRO-K) CR capsule 40 mEq  40 mEq Oral PRN Miguel Angel Acosta MD       • sodium chloride 0.9 % flush 10 mL  10 mL Intravenous Q12H Holger Angel PA-C   10 mL at 07/05/21 2002   • sodium chloride 0.9 % flush 10 mL  10 mL Intravenous PRN Holger Angel PA-C       • terazosin (HYTRIN) capsule 5 mg  5 mg Oral Nightly Miguel Angel Acosta MD   5 mg at 07/05/21 2001   • thiamine (VITAMIN B-1) tablet 100 mg  100 mg Oral Daily Miguel Angel Acosta MD   100 mg at 07/06/21 0830         CABG 06/22/21    History of tobacco abuse    STEMI    Dyslipidemia    Obesity (BMI 30-39.9)    ST elevation myocardial infarction (STEMI) (CMS/[facility])    Hyperglycemia        Impression      Inferior STEMI 6/22/2021  Emergent CABG for left main and three-vessel coronary artery disease with preserved LV function 6/22/2021  reop for bleeding 6/23/2021 early a.m.  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    Plan     Continue beta-blocker  Pulmonary toilet  Continue amiodarone  Continue aspirin  and statin  Mobilize      Mikey Conrad MD   07/06/21  10:28 EDT

## 2021-07-06 NOTE — PROGRESS NOTES
Cardiothoracic Surgery Progress Note      6/22 s/p CABG x 4, mediastinal exploration, PPD # 2 left groin pseudoaneurysm injection after IABP     LOS: 14 days      Subjective:  Doing well this morning. No complaints. Transferred to PCU bed yesterday.    Objective:  Vital Signs  Temp:  [97.4 °F (36.3 °C)-98.1 °F (36.7 °C)] 98.1 °F (36.7 °C)  Heart Rate:  [48-98] 76  Resp:  [16-20] 16  BP: ()/(39-91) 133/60    Physical Exam:   General Appearance: alert, appears stated age and cooperative, conversant   Lungs: clear to auscultation, respirations regular, respirations even and respirations unlabored   Heart: regular rhythm & normal rate, normal S1, S2 and no murmur, no gallop, no rub   Skin: Incision c/d/i     Results:    Results from last 7 days   Lab Units 07/05/21  1016   WBC 10*3/mm3 9.11   HEMOGLOBIN g/dL 8.3*   HEMATOCRIT % 29.0*   PLATELETS 10*3/mm3 425     Results from last 7 days   Lab Units 07/05/21  1016   SODIUM mmol/L 140   POTASSIUM mmol/L 4.5   CHLORIDE mmol/L 110*   CO2 mmol/L 20.0*   BUN mg/dL 44*   CREATININE mg/dL 0.68*   GLUCOSE mg/dL 146*   CALCIUM mg/dL 8.9       Assessment:  6/22 s/p CABG x 4, mediastinal exploration, PPD # 2 left groin pseudoaneurysm injection after IABP    Plan:  Diet per SLP  Hold ativan and Precedex  Beer TID  Minimize narcotics  Ambulate  Pulmonary toilet  ASA, statin, BB        Harman Montes PA-C  07/06/21  07:13 EDT

## 2021-07-06 NOTE — PLAN OF CARE
Goal Outcome Evaluation:  Plan of Care Reviewed With: patient        Progress: no change  Outcome Summary: pt transferreed to the PCU, BP dropped, Kar started and supported BP, weaned off this am, BP reads low in the RUE, BPs cycling on the LLE, pt resting in between care

## 2021-07-06 NOTE — THERAPY TREATMENT NOTE
Patient Name: Thien Gray  : 1955    MRN: [id number]                              Today's Date: 2021       Admit Date: 2021    Visit Dx:     ICD-10-CM ICD-9-CM   1. ST elevation myocardial infarction (STEMI), unspecified artery (CMS/HCC)  I21.3 410.90   2. Chest pain, unspecified type  R07.9 786.50   3. Cognitive communication deficit  R41.841 799.52   4. Primary osteoarthritis of both knees  M17.0 715.16     Patient Active Problem List   Diagnosis   • History of tobacco abuse   • CABG 21   • Dyspnea on exertion   • RBBB   • Psoriasis   • Primary osteoarthritis of both knees   • STEMI   • Dyslipidemia   • Obesity (BMI 30-39.9)   • Hyperglycemia     Past Medical History:   Diagnosis Date   • Abnormal ECG 2020    Get from Dr. Kayser   • Coronary artery disease    • History of heart attack    • History of MI (myocardial infarction)    • Hyperlipidemia    • Myocardial infarction (CMS/HCC)    • RBBB 2/10/2020     Past Surgical History:   Procedure Laterality Date   • APPENDECTOMY     • CARDIAC CATHETERIZATION  2004    They used a catheter to put in my stent.   • CARDIAC CATHETERIZATION N/A 2021    Procedure: Left Heart Cath;  Surgeon: Mikey Conrad MD;  [facility];  Service: Cardiovascular;  Laterality: N/A;   • CORONARY ANGIOPLASTY      I think that's what the bill for my stent said.   • CORONARY ARTERY BYPASS BRING BACK FOR EXPLORATION N/A 2021    Procedure: BRING BACK FOR EXPLORATION OPEN HEART;  Surgeon: Miguel Angel Acosta MD;  [facility];  Service: Cardiothoracic;  Laterality: N/A;   • CORONARY ARTERY BYPASS GRAFT N/A 2021    Procedure: MEDIAN STERNOTOMY, CORONARY ARTERY BYPASS GRAFTING X 4 WITH LEFT INTERNAL MAMMARY ARTERY GRAFT, ENDOSCOPIC VEIN HARVESTING OF THE RIGHT GREATER SAPHENOUS VEIN, INTRA-AORTIC BALLOON PUMP INSERTION, IVAN PER ANESTHESIA;  Surgeon: Miguel Angel Acosta MD;  [facility];   Service: Cardiothoracic;  Laterality: N/A;   • CORONARY STENT PLACEMENT  2004     General Information     [facility] Name 07/06/21 1425          Physical Therapy Time and Intention    Document Type  therapy note (daily note)  -     Mode of Treatment  physical therapy  -[facility] Name 07/06/21 1425          General Information    Patient Profile Reviewed  yes  -LO     Existing Precautions/Restrictions  cardiac;fall;sternal;other (see comments)  -[facility] Name 07/06/21 1425          Cognition    Orientation Status (Cognition)  oriented to;person;place  -[facility] Name 07/06/21 1425          Safety Issues, Functional Mobility    Impairments Affecting Function (Mobility)  balance;cognition;coordination;endurance/activity tolerance;postural/trunk control;strength;shortness of breath  -     Cognitive Impairments, Mobility Safety/Performance  awareness, need for assistance;impulsivity;insight into deficits/self-awareness;sequencing abilities;safety precaution follow-through;safety precaution awareness  -     Comment, Safety Issues/Impairments (Mobility)  impulsive with sitting, standing, difficulty maintaining sternal precautions  -       User Key  (r) = Recorded By, (t) = Taken By, (c) = Cosigned By    Initials Name Provider Type     Asia Poon, PT Physical Therapist        Mobility     Row Name 07/06/21 1426          Bed Mobility    Comment (Bed Mobility)  UIC  -[facility] Name 07/06/21 1426          Transfers    Comment (Transfers)  SIT<>stand from recliner<> x4 total with Indira and cuing each rep for following sternal precautions  -[facility] Name 07/06/21 1426          Sit-Stand Transfer    Sit-Stand [place] (Transfers)  minimum assist (75% patient effort);verbal cues  -     Assistive Device (Sit-Stand Transfers)  -- with patient bracing pillow  -[facility] Name 07/06/21 1426          Gait/Stairs (Locomotion)    [place] Level (Gait)  minimum assist (75% patient effort);verbal cues  -      Assistive Device (Gait)  walker, front-wheeled  -LO     Distance in Feet (Gait)  200' with 3 sitting rest breaks ( ambulates x 50-75' at a time)  -LO     Deviations/Abnormal Patterns (Gait)  base of support, wide;jp decreased;stride length decreased  -LO     Bilateral Gait Deviations  forward flexed posture;heel strike decreased  -LO     Comment (Gait/Stairs)  Patient requires vc for maintaining upright standing posture with fwd gaze during gait as well as for maintaining close distance to AD for safety. 3 seated rest breaks due to fatigue, SOA, and R knee pain.  -LO       User Key  (r) = Recorded By, (t) = Taken By, (c) = Cosigned By    Initials Name Provider Type    Asia Allan, JOSH Physical Therapist        Obj/Interventions     Row Name 07/06/21 1431          Balance    Balance Assessment  sitting static balance;sitting dynamic balance;standing static balance;standing dynamic balance  -LO     Static Sitting Balance  WFL;sitting in chair  -LO     Dynamic Sitting Balance  WFL;supported;sitting in chair  -LO     Static Standing Balance  mild impairment;supported;standing  -LO     Dynamic Standing Balance  mild impairment;supported;standing  -LO     Comment, Balance  Requires Indira and AD for safety in standing  -LO       User Key  (r) = Recorded By, (t) = Taken By, (c) = Cosigned By    Initials Name Provider Type    Asia Allan, JOSH Physical Therapist        Goals/Plan    No documentation.       Clinical Impression     Row Name 07/06/21 1433          Pain    Additional Documentation  Pain Scale: FACES Pre/Post-Treatment (Group)  -LO     Row Name 07/06/21 1433          Pain Scale: FACES Pre/Post-Treatment    Pain: FACES Scale, Pretreatment  2-->hurts little bit  -LO     Posttreatment Pain Rating  4-->hurts little more  -LO     Pain Location - Side  Right  -LO     Pain Location - Orientation  generalized  -LO     Pain Location  knee  -LO     Pre/Posttreatment Pain Comment  knee pain chronic in nature,  patient states in related to arthritis  -LO     Row Name 07/06/21 1433          Plan of Care Review    Plan of Care Reviewed With  patient  -LO     Progress  improving  -LO     Outcome Summary  Patient with slow but progressive improvements in functional mobility. Continues to require Indira for sit<>stand transfers with cuing each time for maintaining sternal precaution as well as for waiting until safely close to transfer surface prior to sitting. Ambulates x 200' with FWW with 3 sitting rest breaks due to fatigue, SOA, and chronic R knee pain. Cont IP PT POC.  -     Row Name 07/06/21 1433          Therapy Assessment/Plan (PT)    Rehab Potential (PT)  good, to achieve stated therapy goals  -     Criteria for Skilled Interventions Met (PT)  yes;skilled treatment is necessary  -     Row Name 07/06/21 1433          Vital Signs    Post Systolic BP Rehab  66 BP in LUE ( which patient reports as often inaccurrate), attempted LLE which also did not provide accurrate reading. RUE with Picc and RLE with incision. RN informed.  -LO     Post Treatment Diastolic BP  47  -LO     Pretreatment Heart Rate (beats/min)  65  -LO     Posttreatment Heart Rate (beats/min)  73  -LO     Pre SpO2 (%)  97  -LO     O2 Delivery Pre Treatment  room air  -LO     O2 Delivery Intra Treatment  room air  -LO     O2 Delivery Post Treatment  room air  -LO     Pre Patient Position  Sitting  -LO     Intra Patient Position  Standing  -LO     Post Patient Position  Sitting  -LO     Rest Breaks   3  -LO     Row Name 07/06/21 1433          Positioning and Restraints    Pre-Treatment Position  sitting in chair/recliner  -LO     Post Treatment Position  chair  -LO     In Chair  notified nsg;reclined;call light within reach;encouraged to call for assist;exit alarm on;waffle cushion  -LO       User Key  (r) = Recorded By, (t) = Taken By, (c) = Cosigned By    Initials Name Provider Type    Asia Allan, PT Physical Therapist        Outcome Measures      Row Name 07/06/21 1439          How much help from another person do you currently need...    Turning from your back to your side while in flat bed without using bedrails?  2  -LO     Moving from lying on back to sitting on the side of a flat bed without bedrails?  2  -LO     Moving to and from a bed to a chair (including a wheelchair)?  3  -LO     Standing up from a chair using your arms (e.g., wheelchair, bedside chair)?  3  -LO     Climbing 3-5 steps with a railing?  2  -LO     To walk in hospital room?  3  -LO     AM-PAC 6 Clicks Score (PT)  15  -LO       User Key  (r) = Recorded By, (t) = Taken By, (c) = Cosigned By    Initials Name Provider Type    Asia Allan, PT Physical Therapist        Physical Therapy Education                 Title: PT OT SLP Therapies (In Progress)     Topic: Physical Therapy (In Progress)     Point: Mobility training (Done)     Learning Progress Summary           Patient Acceptance, E, VU,NR,NL by  at 7/6/2021 1340    Comment: Patient education regarding importance for maintaining sternal precautions as well as for making sure chair is at a safe distance prior to sitting.    Acceptance, E, NR by KG at 7/5/2021 1042    Acceptance, E, NR by KG at 7/4/2021 0956    Acceptance, E, NR by KG at 7/3/2021 1032    Acceptance, E,TB, VU,NR by AY at 7/2/2021 1520    Acceptance, E, NR,DU by LH at 7/1/2021 1529    Acceptance, E, NR by KG at 6/30/2021 1421    Acceptance, E, NR by KG at 6/29/2021 1101    Acceptance, E, NR by KG at 6/28/2021 1040    Acceptance, E, NR by KG at 6/25/2021 1121                   Point: Home exercise program (In Progress)     Learning Progress Summary           Patient Acceptance, E, NR by KG at 7/5/2021 1042    Acceptance, E, NR by KG at 7/4/2021 0956    Acceptance, E, NR by KG at 7/3/2021 1032    Acceptance, E, NR,DU by LH at 7/1/2021 1529    Acceptance, E, NR by KG at 6/30/2021 1421    Acceptance, E, NR by KG at 6/29/2021 1101    Acceptance, E, NR by KG at 6/28/2021  1040                   Point: Body mechanics (In Progress)     Learning Progress Summary           Patient Acceptance, E, NR by KG at 7/5/2021 1042    Acceptance, E, NR by KG at 7/4/2021 0956    Acceptance, E, NR by KG at 7/3/2021 1032    Acceptance, E,TB, VU,NR by AY at 7/2/2021 1520    Acceptance, E, NR,DU by LH at 7/1/2021 1529    Acceptance, E, NR by KG at 6/30/2021 1421    Acceptance, E, NR by KG at 6/29/2021 1101    Acceptance, E, NR by KG at 6/28/2021 1040    Acceptance, E, NR by KG at 6/25/2021 1121                   Point: Precautions (Done)     Learning Progress Summary           Patient Acceptance, E, VU,NR,NL by  at 7/6/2021 1340    Comment: Patient education regarding importance for maintaining sternal precautions as well as for making sure chair is at a safe distance prior to sitting.    Acceptance, E, NR by KG at 7/5/2021 1042    Acceptance, E, NR by KG at 7/4/2021 0956    Acceptance, E, NR by KG at 7/3/2021 1032    Acceptance, E,TB, VU,NR by AY at 7/2/2021 1520    Acceptance, E, NR,DU by  at 7/1/2021 1529    Acceptance, E, NR by KG at 6/30/2021 1421    Acceptance, E, NR by KG at 6/29/2021 1101    Acceptance, E, NR by KG at 6/28/2021 1040    Acceptance, E, NR by KG at 6/25/2021 1121                               User Key     Initials Effective Dates Name Provider Type Discipline    KG 05/22/20 -  Kassie Comer, PT Physical Therapist PT    LO 06/16/21 -  Asia Poon, PT Physical Therapist PT    AY 11/10/20 -  Guera Hooper, PT Physical Therapist PT     05/17/21 -  Ned Ortez, PT Student PT Student PT              PT Recommendation and Plan     Plan of Care Reviewed With: patient  Progress: improving  Outcome Summary: Patient with slow but progressive improvements in functional mobility. Continues to require Indira for sit<>stand transfers with cuing each time for maintaining sternal precaution as well as for waiting until safely close to transfer surface prior to sitting. Ambulates x  200' with FWW with 3 sitting rest breaks due to fatigue, SOA, and chronic R knee pain. Cont IP PT POC.     Time Calculation:   PT Charges     Row Name 07/06/21 1340             Time Calculation    Start Time  1340  -LO      PT Received On  07/06/21  -LO      PT Goal Re-Cert Due Date  07/15/21  -LO         Timed Charges    66570 - Gait Training Minutes   20  -LO      40862 - PT Therapeutic Activity Minutes  8  -LO         Total Minutes    Timed Charges Total Minutes  28  -LO       Total Minutes  28  -LO        User Key  (r) = Recorded By, (t) = Taken By, (c) = Cosigned By    Initials Name Provider Type    LO Asia Poon, PT Physical Therapist        Therapy Charges for Today     Code Description Service Date Service Provider Modifiers Qty    06524333662 HC GAIT TRAINING EA 15 MIN 7/6/2021 Asia Poon, PT GP 1    63347638025 HC PT THERAPEUTIC ACT EA 15 MIN 7/6/2021 Asia Poon, PT GP 1    24804814013 HC PT THER SUPP EA 15 MIN 7/6/2021 Asia Poon, PT GP 2          PT G-Codes  Outcome Measure Options: AM-PAC 6 Clicks Basic Mobility (PT)  AM-PAC 6 Clicks Score (PT): 15    Asia Poon PT  7/6/2021

## 2021-07-06 NOTE — SIGNIFICANT NOTE
Patient transferred to PCU ([id number]) around 2000. Just prior to his transfer he received a dose of 50mg PO Metoprolol. Nurse on floor called to notify me that his blood pressure is ringing out 50's/40's. He is alert and oriented and has not other hemodynamic instability. I suspect this is related to the Metoprolol. I have ordered Neosynephrine to support him through this suspected episode of iatrogenic hypotension. Additionally, I asked the nurse to notify the thoracic surgery team to insure that is ok with them.

## 2021-07-06 NOTE — PAYOR COMM NOTE
"Ref # [id number]  Guera Garcia RN, BSN  Phone # [phone]  Fax # [phone]  Patt Linares (65 y.o. Male)     Date of Birth Social Security Number Address Home Phone MRN    1955  [address]  [id number]  [facility] [address] [phone] [id number]    Mandaen Marital Status          [facility]        Admission Date Admission Type Admitting Provider Attending Provider Department, Room/Bed    6/22/21 Emergency Mikey Conrad MD Rukavina, Michael G, MD [facility] 4H, S468/1    Discharge Date Discharge Disposition Discharge Destination                       Attending Provider: Mikey Conrad MD    Allergies: Bactrim [Sulfamethoxazole-trimethoprim], Plavix [Clopidogrel Bisulfate], Augmentin [Amoxicillin-pot Clavulanate]    Isolation: None   Infection: None   Code Status: CPR    Ht: 180 cm (70.87\")   Wt: 113 kg (248 lb 4.8 oz)    Admission Cmt: None   Principal Problem: CABG 06/22/21 [I25.10]                 Active Insurance as of 6/22/2021     Primary Coverage     Payor Plan Insurance Group Employer/Plan Group    ANTHEM BLUE CROSS ANTHEM BLUE CROSS BLUE SHIELD PPO [id number]     Payor Plan Address Payor Plan Phone Number Payor Plan Fax Number Effective Dates    [address] [phone]  3/4/2019 - None Entered    [facility] [id number]       Subscriber Name Subscriber Birth Date Member ID       PATT LINARES 1955 [id number]           Secondary Coverage     Payor Plan Insurance Group Employer/Plan Group    AETNA MEDICARE REPLACEMENT AETNA MEDICARE REPLACEMENT [id number]     Payor Plan Address Payor Plan Phone Number Payor Plan Fax Number Effective Dates    [address] [phone]  7/1/2018 - None Entered    [facility] [id number]       Subscriber Name Subscriber Birth Date Member ID       PATT LINARES 1955 [id number]                   Current Facility-Administered Medications   Medication Dose Route Frequency Provider Last Rate " Last Admin   • albuterol (PROVENTIL) nebulizer solution 0.083% 2.5 mg/3mL  2.5 mg Nebulization Q6H PRN Holger Angel PA-C   2.5 mg at 07/05/21 1549   • amiodarone (PACERONE) tablet 200 mg  200 mg Oral Q8H Miguel Angel Acosta MD   200 mg at 07/06/21 0830    Followed by   • [START ON 7/7/2021] amiodarone (PACERONE) tablet 200 mg  200 mg Oral Q12H Miguel Angel Acosta MD        Followed by   • [START ON 7/21/2021] amiodarone (PACERONE) tablet 200 mg  200 mg Oral Daily Miguel Angel Acosta MD       • amLODIPine (NORVASC) tablet 5 mg  5 mg Oral Q24H Miguel Angel Acosta MD   5 mg at 07/06/21 0831   • aspirin tablet 325 mg  325 mg Oral Daily Miguel Angel Acosta MD   325 mg at 07/06/21 0831   • atorvastatin (LIPITOR) tablet 40 mg  40 mg Oral Nightly Miguel Angel Acosta MD   40 mg at 07/05/21 2000   • beer 1 each  1 each Oral TID PRN Holger Angel PA-C   1 each at 07/05/21 1843   • docusate sodium (COLACE) capsule 100 mg  100 mg Oral BID Mark Anthony Hawkins, PharmD   100 mg at 07/06/21 0830   • famotidine (PEPCID) tablet 20 mg  20 mg Oral BID Miguel Angel Acosta MD   20 mg at 07/06/21 0831   • folic acid (FOLVITE) tablet 1 mg  1 mg Oral Daily Miguel Angel Acosta MD   1 mg at 07/06/21 0830   • insulin lispro (humaLOG) injection 0-9 Units  0-9 Units Subcutaneous 4x Daily With Meals & Nightly Mark Anthony Hawkins, PharmD       • losartan (COZAAR) tablet 100 mg  100 mg Oral Q24H Miguel Angel Acosta MD   100 mg at 07/06/21 0831   • magnesium sulfate 4g/100mL (PREMIX) infusion  4 g Intravenous PRN Holger Angel PA-C   4 g at 06/28/21 0607   • melatonin tablet 5 mg  5 mg Oral Nightly Miguel Angel Acosta MD   5 mg at 07/05/21 2000   • metoprolol tartrate (LOPRESSOR) tablet 50 mg  50 mg Oral Q12H Miguel Angel Acosta MD   50 mg at 07/06/21 0830   • morphine injection 2 mg  2 mg Intravenous Q2H PRN Holger nAgel PA-C   2 mg at 07/02/21 2342   • nicotine (NICODERM CQ) 7 MG/24HR patch 1 patch  1 patch Transdermal Q24H Holger Angel PA-C   1 patch at 07/06/21 0870    • ondansetron (ZOFRAN) injection 4 mg  4 mg Intravenous Q6H PRN Holger Angel PA-C       • Pharmacy Consult - MTM   Does not apply Daily Holger Angel PA-C       • potassium chloride (KLOR-CON) packet 40 mEq  40 mEq Oral PRN Miguel Angel Acosta MD       • potassium chloride (MICRO-K) CR capsule 40 mEq  40 mEq Oral PRN Miguel Angel Acosta MD       • sodium chloride 0.9 % flush 10 mL  10 mL Intravenous Q12H Holger Angel PA-C   10 mL at 07/05/21 2002   • sodium chloride 0.9 % flush 10 mL  10 mL Intravenous PRN Holger Angel PA-C       • terazosin (HYTRIN) capsule 5 mg  5 mg Oral Nightly Miguel Angel Acosta MD   5 mg at 07/05/21 2001   • thiamine (VITAMIN B-1) tablet 100 mg  100 mg Oral Daily Miguel Angel Acosta MD   100 mg at 07/06/21 0830     Lab Results (last 48 hours)     Procedure Component Value Units Date/Time    POC Glucose Once [[id number]]  (Normal) Collected: 07/06/21 1126    Specimen: Blood Updated: 07/06/21 1128     Glucose 123 mg/dL      Comment: Meter: [id number] : [id number] Trimble Franchesca       POC Glucose Once [[id number]]  (Normal) Collected: 07/06/21 0721    Specimen: Blood Updated: 07/06/21 0727     Glucose 127 mg/dL      Comment: Meter: [id number] : [id number] Trimble Franchesca       POC Glucose Once [[id number]]  (Abnormal) Collected: 07/05/21 1927    Specimen: Blood Updated: 07/05/21 1929     Glucose 151 mg/dL      Comment: Meter: [id number] : [id number] Tristin Hines       POC Glucose Once [[id number]]  (Normal) Collected: 07/05/21 1615    Specimen: Blood Updated: 07/05/21 1617     Glucose 128 mg/dL      Comment: Meter: [id number] : [id number] Delta Saleem       POC Glucose Once [[id number]]  (Abnormal) Collected: 07/05/21 1159    Specimen: Blood Updated: 07/05/21 1201     Glucose 138 mg/dL      Comment: Meter: [id number] : [id number] Delta Stiven       Basic Metabolic Panel [[id number]]  (Abnormal) Collected: 07/05/21 1016    Specimen: Blood Updated: 07/05/21 1048      Glucose 146 mg/dL      BUN 44 mg/dL      Creatinine 0.68 mg/dL      Sodium 140 mmol/L      Potassium 4.5 mmol/L      Chloride 110 mmol/L      CO2 20.0 mmol/L      Calcium 8.9 mg/dL      eGFR Non African Amer 116 mL/min/1.73      BUN/Creatinine Ratio 64.7     Anion Gap 10.0 mmol/L     Narrative:      GFR Normal >60  Chronic Kidney Disease <60  Kidney Failure <15      CBC (No Diff) [[id number]]  (Abnormal) Collected: 07/05/21 1016    Specimen: Blood Updated: 07/05/21 1028     WBC 9.11 10*3/mm3      RBC 3.02 10*6/mm3      Hemoglobin 8.3 g/dL      Hematocrit 29.0 %      MCV 96.0 fL      MCH 27.5 pg      MCHC 28.6 g/dL      RDW 16.4 %      RDW-SD 57.8 fl      MPV 10.3 fL      Platelets 425 10*3/mm3     POC Glucose Once [[id number]]  (Abnormal) Collected: 07/05/21 0553    Specimen: Blood Updated: 07/05/21 0555     Glucose 137 mg/dL      Comment: Meter: [id number] : [id number] [employer]       POC Glucose Once [[id number]]  (Abnormal) Collected: 07/04/21 2350    Specimen: Blood Updated: 07/04/21 2353     Glucose 155 mg/dL      Comment: Meter: [id number] : [id number] Soneter Maddy       POC Glucose Once [[id number]]  (Abnormal) Collected: 07/04/21 1809    Specimen: Blood Updated: 07/04/21 1821     Glucose 135 mg/dL      Comment: Meter: [id number] : [id number] Kurtis JENNINGS             Imaging Results (Last 48 Hours)     Procedure Component Value Units Date/Time    XR Abdomen KUB [[id number]] Collected: 07/03/21 0820     Updated: 07/05/21 1242    Narrative:         EXAMINATION: XR ABDOMEN KUB - 07/02/2021     INDICATION: I21.3-ST elevation (STEMI) myocardial infarction of  unspecified site; R07.9-Chest pain, unspecified; R13.13-Dysphagia,  pharyngeal phase; R41.841-Cognitive communication deficit. Evaluate tube  placement.      COMPARISON: None.     FINDINGS: KUB reveals feeding tube identified tip in the distal third  portion of the duodenum. Bowel gas pattern is unremarkable. Degenerative  changes seen  "within the spine.          Impression:      Feeding tube identified with tip in the distal third portion  of the duodenum.     DICTATED:   07/02/2021  EDITED/ls :   07/03/2021     This report was finalized on 7/5/2021 12:39 PM by Dr. Rekha Chaney MD.       SLP FEES - Fiberoptic Endo Eval Swallow [[id number]] Resulted: 07/05/21 1027     Updated: 07/05/21 1027    Narrative:      This procedure was auto-finalized with no dictation required.        Operative/Procedure Notes (last 48 hours) (Notes from 07/04/21 1304 through 07/06/21 1304)    No notes of this type exist for this encounter.          Physician Progress Notes (last 48 hours) (Notes from 07/04/21 1304 through 07/06/21 1304)      Mikey Conrad MD at 07/06/21 1027                                           [facility] Progress Note      LOS: 14 days   Patient Care Team:  Sanaz Zuluaga DO as PCP - General (Family Medicine)    Chief Complaint:    Chief Complaint   Patient presents with   • Chest Pain       Subjective     Interval History:     Patient Complaints: Much more coherent this morning.  Still easily confused      Review of Systems:   A 14 point review of systems was negative except as was stated in the HPI      Objective     Vital Sign Min/Max for last 24 hours  Temp  Min: 97.4 °F (36.3 °C)  Max: 98.1 °F (36.7 °C)   BP  Min: 57/43  Max: 148/67   Pulse  Min: 48  Max: 84   Resp  Min: 16  Max: 20   SpO2  Min: 86 %  Max: 98 %   No data recorded   Weight  Min: 113 kg (248 lb 4.8 oz)  Max: 113 kg (248 lb 4.8 oz)     Flowsheet Rows      First Filed Value   Admission Height  177.8 cm (70\") Documented at 06/22/2021 1400   Admission Weight  118 kg (260 lb 2.3 oz) Documented at 06/22/2021 2000          Physical Exam:  General Appearance: Well-nourished well-developed white male no acute distress  Lungs: Coarse bilateral breath sounds  Heart:: Regular rate and rhythm this morning; no Murmurs, Rubs or Gallops  Abdomen: Soft and " nontender with adequate bowel sounds.  No organomegaly  Extremities: No cyanosis, clubbing.  1+ edema  Pulses: Pulses palpable and equal bilaterally; left groin stable  Skin: Warm and dry with no rash  Psych: Normal     Results Review:     I reviewed the patient's new clinical results.  Results from last 7 days   Lab Units 07/05/21  1016 07/04/21  0632 07/03/21  0415   SODIUM mmol/L 140 140 136   POTASSIUM mmol/L 4.5 4.6 4.0   CHLORIDE mmol/L 110* 110* 104   CO2 mmol/L 20.0* 20.0* 18.0*   BUN mg/dL 44* 58* 45*   CREATININE mg/dL 0.68* 1.03 0.76   GLUCOSE mg/dL 146* 123* 116*   CALCIUM mg/dL 8.9 8.6 8.8     Results from last 7 days   Lab Units 07/05/21  1016 07/04/21  0448 07/03/21  0415   WBC 10*3/mm3 9.11 8.94 8.96   HEMOGLOBIN g/dL 8.3* 8.2* 8.0*   HEMATOCRIT % 29.0* 27.4* 28.2*   PLATELETS 10*3/mm3 425 376 312     Lab Results   Lab Value Date/Time    TROPONINT 1.730 (C) 06/22/2021 1705    TROPONINT <0.010 06/22/2021 1009                       Medication Review: yes  Current Facility-Administered Medications   Medication Dose Route Frequency Provider Last Rate Last Admin   • albuterol (PROVENTIL) nebulizer solution 0.083% 2.5 mg/3mL  2.5 mg Nebulization Q6H PRN Holger Angel PA-C   2.5 mg at 07/05/21 1549   • amiodarone (PACERONE) tablet 200 mg  200 mg Oral Q8H Miguel Angel Acosta MD   200 mg at 07/06/21 0830    Followed by   • [START ON 7/7/2021] amiodarone (PACERONE) tablet 200 mg  200 mg Oral Q12H Miguel Angel Acosta MD        Followed by   • [START ON 7/21/2021] amiodarone (PACERONE) tablet 200 mg  200 mg Oral Daily Miguel Angel Acosta MD       • amLODIPine (NORVASC) tablet 5 mg  5 mg Oral Q24H Miguel Angel Acosta MD   5 mg at 07/06/21 0831   • aspirin tablet 325 mg  325 mg Oral Daily Miguel Angel Acosta MD   325 mg at 07/06/21 0831   • atorvastatin (LIPITOR) tablet 40 mg  40 mg Oral Nightly Miguel Angel Acosta MD   40 mg at 07/05/21 2000   • beer 1 each  1 each Oral TID PRN Holger Angel, PA-C   1 each at 07/05/21 3493   •  docusate sodium (COLACE) capsule 100 mg  100 mg Oral BID Mark Anthony Hawkins, PharmD   100 mg at 07/06/21 0830   • famotidine (PEPCID) tablet 20 mg  20 mg Oral BID Miguel Angel Acosta MD   20 mg at 07/06/21 0831   • folic acid (FOLVITE) tablet 1 mg  1 mg Oral Daily Miguel Angel Acosta MD   1 mg at 07/06/21 0830   • insulin lispro (humaLOG) injection 0-9 Units  0-9 Units Subcutaneous 4x Daily With Meals & Nightly Mark Anthony Hawkins, PharmD       • losartan (COZAAR) tablet 100 mg  100 mg Oral Q24H Miguel Angel Acosta MD   100 mg at 07/06/21 0831   • magnesium sulfate 4g/100mL (PREMIX) infusion  4 g Intravenous PRN Holger Angel PA-C   4 g at 06/28/21 0607   • melatonin tablet 5 mg  5 mg Oral Nightly Miguel Angel Acosta MD   5 mg at 07/05/21 2000   • metoprolol tartrate (LOPRESSOR) tablet 50 mg  50 mg Oral Q12H Miguel Angel Acosta MD   50 mg at 07/06/21 0830   • morphine injection 2 mg  2 mg Intravenous Q2H PRN Holger Angel PA-C   2 mg at 07/02/21 2342   • nicotine (NICODERM CQ) 7 MG/24HR patch 1 patch  1 patch Transdermal Q24H Holger Angel PA-C   1 patch at 07/06/21 0834   • ondansetron (ZOFRAN) injection 4 mg  4 mg Intravenous Q6H PRN Holger Angel PA-C       • Pharmacy Consult - MTM   Does not apply Daily Holger Angel PA-C       • potassium chloride (KLOR-CON) packet 40 mEq  40 mEq Oral PRN Miguel Angel Acosta MD       • potassium chloride (MICRO-K) CR capsule 40 mEq  40 mEq Oral PRN Miguel Angel Acosta MD       • sodium chloride 0.9 % flush 10 mL  10 mL Intravenous Q12H Holger Angel PA-C   10 mL at 07/05/21 2002   • sodium chloride 0.9 % flush 10 mL  10 mL Intravenous PRN Holger Angel PA-C       • terazosin (HYTRIN) capsule 5 mg  5 mg Oral Nightly Miguel Angel Acosta MD   5 mg at 07/05/21 2001   • thiamine (VITAMIN B-1) tablet 100 mg  100 mg Oral Daily Miguel Angel Acosta MD   100 mg at 07/06/21 0830         CABG 06/22/21    History of tobacco abuse    STEMI    Dyslipidemia    Obesity (BMI 30-39.9)    ST elevation  myocardial infarction (STEMI) (CMS/[facility])    Hyperglycemia        Impression      Inferior STEMI 6/22/2021  Emergent CABG for left main and three-vessel coronary artery disease with preserved LV function 6/22/2021  reop for bleeding 6/23/2021 early a.m.  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    Plan     Continue beta-blocker  Pulmonary toilet  Continue amiodarone  Continue aspirin and statin  Mobilize      Mikey Conrad MD   07/06/21  10:28 EDT          Electronically signed by Mikey Conrad MD at 07/06/21 1028     Harman Montes PA-C at 07/06/21 0713     Attestation signed by Miguel Angel Acosta MD at 07/06/21 0848    I have reviewed this documentation and agree.  Status as outlined above.  Patient remains stable.  Mental status has returned almost to normal.  He is ambulating in the gonzales without difficulty.  I believe he will require rehabilitation facility placement.  Hopefully he will be ready for transfer by the end of the week. I have reviewed, verified, and confirmed the above history and current status.  I have examined the patient and confirmed the above physical findings.Above plan and treatment regimen discussed in detail with patient.  Options of treatment, attendant risks vs benefits, and my recommendations were discussed and all questions answered.    Miguel Angel Acosta MD  CTSurgery  07/06/21   08:48 EDT                     Cardiothoracic Surgery Progress Note      6/22 s/p CABG x 4, mediastinal exploration, PPD # 2 left groin pseudoaneurysm injection after IABP     LOS: 14 days      Subjective:  Doing well this morning. No  complaints. Transferred to PCU bed yesterday.    Objective:  Vital Signs  Temp:  [97.4 °F (36.3 °C)-98.1 °F (36.7 °C)] 98.1 °F (36.7 °C)  Heart Rate:  [48-98] 76  Resp:  [16-20] 16  BP: ()/(39-91) 133/60    Physical Exam:   General Appearance: alert, appears stated age and cooperative, conversant   Lungs: clear to auscultation, respirations regular, respirations even and respirations unlabored   Heart: regular rhythm & normal rate, normal S1, S2 and no murmur, no gallop, no rub   Skin: Incision c/d/i     Results:    Results from last 7 days   Lab Units 07/05/21  1016   WBC 10*3/mm3 9.11   HEMOGLOBIN g/dL 8.3*   HEMATOCRIT % 29.0*   PLATELETS 10*3/mm3 425     Results from last 7 days   Lab Units 07/05/21  1016   SODIUM mmol/L 140   POTASSIUM mmol/L 4.5   CHLORIDE mmol/L 110*   CO2 mmol/L 20.0*   BUN mg/dL 44*   CREATININE mg/dL 0.68*   GLUCOSE mg/dL 146*   CALCIUM mg/dL 8.9       Assessment:  6/22 s/p CABG x 4, mediastinal exploration, PPD # 2 left groin pseudoaneurysm injection after IABP    Plan:  Diet per SLP  Hold ativan and Precedex  Beer TID  Minimize narcotics  Ambulate  Pulmonary toilet  ASA, statin, BB        Harman Montes PA-C  07/06/21  07:13 EDT          Electronically signed by Miguel Angel Acosta MD at 07/06/21 0848     Cedric Murphy MD at 07/05/21 1128          Pulmonary/Critical Care Follow-up     LOS: 13 days   Patient Care Team:  Sanaz Zuluaga DO as PCP - General (Family Medicine)    Chief Complaint/reason: Chest pain/medical management of issues including delirium, respiratory management, electrolyte management postoperatively after CABG.      Chief Complaint   Patient presents with   • Chest Pain     Subjective      Initial history (from ICU note 6/22/2021):    65-year-old male with a PMH of prior tobacco use (quit 34 yrs ago), HTN, CAD s/p stent (2004), and obesity.      Per report, approximately 1.5 hrs prior to presentation patient developed severe burning chest pain /  pressure with associated shortness of breath and nausea. EKG on arrival revealed inferior ST elevation. He was emergently taken to Cath Lab by Dr. Conrad where he was found to have severe multivessel CAD (60-70% ostial LM with haziness in proximal suggesting thrombus, 70-80% mid-LAD, 70% RCA) not amenable to PCI. LV function was relatively well-preserved at 55%.      CTS was contacted and he was subsequently taken to OR where he underwent a CABG x4 with IABP placement per Dr. Acosta.     (End of copied text).    Patient went back to the operating room on 6/23/2021 due to bleeding and had evacuation of blood from the right pleural space.  Patient extubated 6/24/2021.  Patient developed confusion/agitation on 6/25/2021.  He has been treated for presumed alcohol withdrawal.     Patient had pseudoaneurysm repair (thrombin injection by Dr. Tamez) on 7/2/2021.    Interval History:     Patient more alert today.  Speech is improved.  Passed FEES.   No nausea or vomiting.  No fevers or chills.  No new complaints.    History taken from: Chart, staff    PMH/FH/Social History were reviewed and updated appropriately in the electronic medical record.     Review of Systems:    Review of 14 systems was completed with positives and pertinent negatives noted in the subjective section.  All other systems reviewed and are negative.         Objective     Vital Signs  Temp:  [97.7 °F (36.5 °C)-97.9 °F (36.6 °C)] 97.9 °F (36.6 °C)  Heart Rate:  [62-98] 77  Resp:  [17-20] 18  BP: (127-155)/() 137/56  07/04 0701 - 07/05 0700  In: 2365   Out: 1225 [Urine:1225]  Body mass index is 34.44 kg/m².     IV drips:      Physical Exam:     Constitutional:   Awake.  Alert.  Remains very slightly confused.  No acute distress.   Head:   Normocephalic, without obvious abnormality, atraumatic   Eyes:           Lids and lashes normal, conjunctivae and sclerae normal.  No icterus, no pallor, corneas clear, PER   ENMT:  Ears appear intact with no  abnormalities noted        Neck:  No adenopathy, supple, trachea midline   Lungs/Resp:    Normal effort, symmetric chest rise, no crepitus, clear bilaterally                Heart/CV:    Regular rhythm, normal S1 and S2, no murmur   Abdomen/GI:    Normal bowel sounds, no masses, no organomegaly, soft,        nontender, nondistended   :    Deferred   Extremities/MSK:  No clubbing or cyanosis.  Trace bilateral lower extremity edema.  Normal tone.    No deformities.    Pulses:  Pulses palpable and equal bilaterally   Skin:  No bleeding, bruising or rash   Heme/Lymph:  No cervical or supraclavicular adenopathy.   Neurologic:      Psychiatric:    Moves all extremities with no obvious focal motor deficit.  Cranial nerves 2 - 12 grossly intact, dysarthric speech persists but is significantly improved today.  Calm today.    The above physical exam findings were reviewed and reflect exam findings as of today's exam.   Electronically signed by:Cedric Murphy MD 07/05/21 11:28 EDT    Results Review:     I reviewed the patient's new clinical results.   Results from last 7 days   Lab Units 07/05/21  1016 07/04/21  0632 07/03/21  0415   SODIUM mmol/L 140 140 136   POTASSIUM mmol/L 4.5 4.6 4.0   CHLORIDE mmol/L 110* 110* 104   CO2 mmol/L 20.0* 20.0* 18.0*   BUN mg/dL 44* 58* 45*   CREATININE mg/dL 0.68* 1.03 0.76   CALCIUM mg/dL 8.9 8.6 8.8   GLUCOSE mg/dL 146* 123* 116*     Results from last 7 days   Lab Units 07/05/21  1016 07/04/21  0448 07/03/21  0415   WBC 10*3/mm3 9.11 8.94 8.96   HEMOGLOBIN g/dL 8.3* 8.2* 8.0*   HEMATOCRIT % 29.0* 27.4* 28.2*   PLATELETS 10*3/mm3 425 376 312         Results from last 7 days   Lab Units 07/01/21  0447 06/30/21  0641 06/29/21  0416   MAGNESIUM mg/dL 2.1 2.4 2.0   PHOSPHORUS mg/dL 4.2 3.7  --        I reviewed the patient's new imaging including images and reports.    FEES reviewed.    Medication Review:   amiodarone, 200 mg, Nasogastric, Q8H   Followed by  [START ON 7/7/2021] amiodarone,  200 mg, Nasogastric, Q12H   Followed by  [START ON 7/21/2021] amiodarone, 200 mg, Nasogastric, Daily  amLODIPine, 5 mg, Nasogastric, Q24H  aspirin, 325 mg, Nasogastric, Daily  atorvastatin, 40 mg, Nasogastric, Nightly  famotidine, 20 mg, Nasogastric, BID  folic acid, 1 mg, Nasogastric, Daily  insulin regular, 0-9 Units, Subcutaneous, Q6H  losartan, 100 mg, Nasogastric, Q24H  melatonin, 5 mg, Nasogastric, Nightly  metoprolol tartrate, 50 mg, Nasogastric, Q12H  nicotine, 1 patch, Transdermal, Q24H  pharmacy consult - MTM, , Does not apply, Daily  sennosides, 10 mL, Nasogastric, BID  sodium chloride, 10 mL, Intravenous, Q12H  terazosin, 5 mg, Nasogastric, Nightly  thiamine, 100 mg, Nasogastric, Daily           Assessment/Plan         CABG 06/22/21    History of tobacco abuse    STEMI    Dyslipidemia    Obesity (BMI 30-39.9)    ST elevation myocardial infarction (STEMI) (CMS/[facility])    Hyperglycemia    65 y.o. male remote smoker with history of hypertension, coronary artery disease status post stent in 2004, obesity, BPH, alcohol use, admitted on 6/22/2021 with chest pain and found to have severe multivessel coronary artery disease.    Patient underwent CABG x4 with intra-aortic balloon pump placement by Dr. Acosta on 6/22/2021.  He went back to the operating room on 6/23/2021 for bleeding and had evacuation of blood from the right pleural space.    Postoperative issues include delirium/agitation possibly secondary to alcohol withdrawal, hyperglycemia, hypoxemia requiring supplemental oxygen.    Agitation overall much improved.  More awake and passed FEES.     Okay to PCU or telemetry from my standpoint.    Plan:  1.  Continue PICC line.  2.  Holding sedatives.  3.  Correction insulin.  4.  Supplemental oxygen, wean as tolerated.  5.  Continue vitamin replacement.  6.  Continue nicotine patch.  7.  Continue terazosin.  8.  Speech dysphagia evaluation.  FEES done, advance diet.  9.  Continue tube feeding, nutritionist  following, may be able to discontinue feeding tube if taking adequate oral diet.           Cedric Murphy MD  07/05/21  11:28 EDT      *. Please note that portions of this note were completed with [employer] - a voice recognition program.     Electronically signed by Cedric Murphy MD at 07/05/21 113     Janay Kellogg PA-C at 07/05/21 0706     Attestation signed by Miguel Angel Acosta MD at 07/05/21 0923    I have reviewed this documentation and agree.  Patient seen and examined.  He is currently sitting in a chair doing a crossword puzzle.  His mentation is vastly improved.  He has a number of questions concerning what happened to him and when.  We have had a thorough discussion of coronary artery disease in general.  Vital signs are stable.  He will be reevaluated for swallowing today and hopefully transferred to PCU when bed available.  He will require rehabilitation facility toward the end of the week. I have reviewed, verified, and confirmed the above history and current status.  I have examined the patient and confirmed the above physical findings.Above plan and treatment regimen discussed in detail with patient.  Options of treatment, attendant risks vs benefits, and my recommendations were discussed and all questions answered.    Miguel Angel Acosta MD  CTSurgery  07/05/21   09:23 EDT                     Cardiothoracic Surgery Progress Note      6/22 s/p CABG x 4, mediastinal exploration, PPD # 2 left groin pseudoaneurysm injection after IABP     LOS: 13 days      Subjective:  Slept better last night.     Objective:  Vital Signs  Temp:  [97.7 °F (36.5 °C)] 97.7 °F (36.5 °C)  Heart Rate:  [62-94] 73  Resp:  [17-20] 18  BP: (124-158)/() 144/64    Physical Exam:   General Appearance: alert, appears stated age and cooperative, conversant   Lungs: clear to auscultation, respirations regular, respirations even and respirations unlabored   Heart: regular rhythm & normal rate, normal S1, S2 and no  murmur, no gallop, no rub   Skin: Incision c/d/i     Results:    Results from last 7 days   Lab Units 07/04/21  0448   WBC 10*3/mm3 8.94   HEMOGLOBIN g/dL 8.2*   HEMATOCRIT % 27.4*   PLATELETS 10*3/mm3 376     Results from last 7 days   Lab Units 07/04/21  0632   SODIUM mmol/L 140   POTASSIUM mmol/L 4.6   CHLORIDE mmol/L 110*   CO2 mmol/L 20.0*   BUN mg/dL 58*   CREATININE mg/dL 1.03   GLUCOSE mg/dL 123*   CALCIUM mg/dL 8.6       Assessment:  6/22 s/p CABG x 4, mediastinal exploration, PPD # 2 left groin pseudoaneurysm injection after IABP    Plan:  FEES today  Awaiting PCU bed  Hold ativan and Precedex  Beer TID  Minimize narcotics  Ambulate  Pulmonary toilet  ASA, statin, BB        Janay Kellogg PA-C  07/05/21  07:06 EDT          Electronically signed by Miguel Angel Acosta MD at 07/05/21 0923     Cedric Murphy MD at 07/04/21 1348          Pulmonary/Critical Care Follow-up     LOS: 12 days   Patient Care Team:  Sanaz Zuluaga DO as PCP - General (Family Medicine)    Chief Complaint/reason: Chest pain/medical management of issues including delirium, respiratory management, electrolyte management postoperatively after CABG.      Chief Complaint   Patient presents with   • Chest Pain     Subjective      Initial history (from ICU note 6/22/2021):    65-year-old male with a PMH of prior tobacco use (quit 34 yrs ago), HTN, CAD s/p stent (2004), and obesity.      Per report, approximately 1.5 hrs prior to presentation patient developed severe burning chest pain / pressure with associated shortness of breath and nausea. EKG on arrival revealed inferior ST elevation. He was emergently taken to Cath Lab by Dr. Conrad where he was found to have severe multivessel CAD (60-70% ostial LM with haziness in proximal suggesting thrombus, 70-80% mid-LAD, 70% RCA) not amenable to PCI. LV function was relatively well-preserved at 55%.      CTS was contacted and he was subsequently taken to OR where he underwent a CABG x4 with  IABP placement per Dr. Acosta.     (End of copied text).    Patient went back to the operating room on 6/23/2021 due to bleeding and had evacuation of blood from the right pleural space.  Patient extubated 6/24/2021.  Patient developed confusion/agitation on 6/25/2021.  He has been treated for presumed alcohol withdrawal.     Patient had pseudoaneurysm repair (thrombin injection by Dr. Tamez) on 7/2/2021.    Interval History:     Patient slept overnight.  Seems appropriate today.  No dyspnea, nausea, vomiting.  No new complaints.    History taken from: Chart, staff    PMH/FH/Social History were reviewed and updated appropriately in the electronic medical record.     Review of Systems:    Review of 14 systems was completed with positives and pertinent negatives noted in the subjective section.  All other systems reviewed and are negative.         Objective     Vital Signs  Temp:  [97.7 °F (36.5 °C)] 97.7 °F (36.5 °C)  Heart Rate:  [51-80] 62  Resp:  [18-23] 18  BP: ()/() 127/59  07/03 0701 - 07/04 0700  In: 2104 [I.V.:76]  Out: 300 [Urine:300]  Body mass index is 34.44 kg/m².     IV drips:      Physical Exam:     Constitutional:   Awake.  Alert.  Remains very slightly confused.  No acute distress.   Head:   Normocephalic, without obvious abnormality, atraumatic   Eyes:           Lids and lashes normal, conjunctivae and sclerae normal.  No icterus, no pallor, corneas clear, PER   ENMT:  Ears appear intact with no abnormalities noted        Neck:  No adenopathy, supple, trachea midline   Lungs/Resp:    Normal effort, symmetric chest rise, no crepitus, clear bilaterally                Heart/CV:    Regular rhythm, normal S1 and S2, no murmur   Abdomen/GI:    Normal bowel sounds, no masses, no organomegaly, soft,        nontender, nondistended   :    Deferred   Extremities/MSK:  No clubbing or cyanosis.  Trace bilateral lower extremity edema.  Normal tone.    No deformities.    Pulses:  Pulses palpable and  equal bilaterally   Skin:  No bleeding, bruising or rash   Heme/Lymph:  No cervical or supraclavicular adenopathy.   Neurologic:    Psychiatric:    Moves all extremities with no obvious focal motor deficit.  Cranial nerves 2 - 12 grossly intact, dysarthric speech persists.  Much more calm today.    The above physical exam findings were reviewed and reflect exam findings as of today's exam.   Electronically signed by:Cedric Murphy MD 07/04/21 13:48 EDT    Results Review:     I reviewed the patient's new clinical results.   Results from last 7 days   Lab Units 07/04/21  0632 07/03/21  0415 07/01/21 0447 06/28/21  0346   SODIUM mmol/L 140 136 139 139   POTASSIUM mmol/L 4.6 4.0 4.1 3.8   CHLORIDE mmol/L 110* 104 107 105   CO2 mmol/L 20.0* 18.0* 23.0 24.0   BUN mg/dL 58* 45* 27* 16   CREATININE mg/dL 1.03 0.76 0.56* 0.61*   CALCIUM mg/dL 8.6 8.8 8.9 8.9   BILIRUBIN mg/dL  --   --   --  0.8   ALK PHOS U/L  --   --   --  56   ALT (SGPT) U/L  --   --   --  18   AST (SGOT) U/L  --   --   --  26   GLUCOSE mg/dL 123* 116* 137* 122*     Results from last 7 days   Lab Units 07/04/21  0448 07/03/21  0415 07/02/21  0639   WBC 10*3/mm3 8.94 8.96 12.47*   HEMOGLOBIN g/dL 8.2* 8.0* 8.4*   HEMATOCRIT % 27.4* 28.2* 28.7*   PLATELETS 10*3/mm3 376 312 351         Results from last 7 days   Lab Units 07/01/21  0447 06/30/21  0641 06/29/21  0416 06/28/21  0346   MAGNESIUM mg/dL 2.1 2.4 2.0 1.8   PHOSPHORUS mg/dL 4.2 3.7  --  4.3       I reviewed the patient's new imaging including images and reports.      Chest x-ray 6/30/2021 shows cardiomegaly with small pleural effusions and pulmonary congestion which appears slightly worse to me from yesterday.    Medication Review:   amiodarone, 200 mg, Nasogastric, Q8H   Followed by  [START ON 7/7/2021] amiodarone, 200 mg, Nasogastric, Q12H   Followed by  [START ON 7/21/2021] amiodarone, 200 mg, Nasogastric, Daily  amLODIPine, 5 mg, Nasogastric, Q24H  aspirin, 325 mg, Nasogastric,  Daily  atorvastatin, 40 mg, Nasogastric, Nightly  famotidine, 20 mg, Nasogastric, BID  folic acid, 1 mg, Nasogastric, Daily  insulin regular, 0-9 Units, Subcutaneous, Q6H  losartan, 100 mg, Nasogastric, Q24H  melatonin, 5 mg, Nasogastric, Nightly  metoprolol tartrate, 50 mg, Nasogastric, Q12H  nicotine, 1 patch, Transdermal, Q24H  pharmacy consult - [place], , Does not apply, Daily  sennosides, 10 mL, Nasogastric, BID  sodium chloride, 10 mL, Intravenous, Q12H  terazosin, 5 mg, Nasogastric, Nightly  thiamine, 100 mg, Nasogastric, Daily           Assessment/Plan         CABG 06/22/21    History of tobacco abuse    STEMI    Dyslipidemia    Obesity (BMI 30-39.9)    ST elevation myocardial infarction (STEMI) (CMS/[facility])    Hyperglycemia    65 y.o. male remote smoker with history of hypertension, coronary artery disease status post stent in 2004, obesity, BPH, alcohol use, admitted on 6/22/2021 with chest pain and found to have severe multivessel coronary artery disease.    Patient underwent CABG x4 with intra-aortic balloon pump placement by Dr. Acosta on 6/22/2021.  He went back to the operating room on 6/23/2021 for bleeding and had evacuation of blood from the right pleural space.    Postoperative issues include delirium/agitation possibly secondary to alcohol withdrawal, hyperglycemia, hypoxemia requiring supplemental oxygen.    Agitation overall improved.  Calm overnight with Precedex which was stopped early this morning.    Plan to recheck swallow evaluation and hold sedatives.  Patient getting beer.    Plan:  1.  Continue PICC line.  2.  Holding sedatives.  3.  Correction insulin.  4.  Supplemental oxygen, wean as tolerated.  5.  Continue vitamin replacement.  6.  Continue nicotine patch.  7.  Continue terazosin.  8.  Speech dysphagia evaluation.  9.  Continue tube feeding, speech following.           Cedric Murphy MD  07/04/21  13:48 EDT      *. Please note that portions of this note were completed with [employer] - a voice recognition program.     Electronically signed by Cedric Murphy MD at 07/04/21 4970

## 2021-07-06 NOTE — PLAN OF CARE
Goal Outcome Evaluation:  Plan of Care Reviewed With: patient        Progress: improving  Outcome Summary: Patient with slow but progressive improvements in functional mobility. Continues to require Indira for sit<>stand transfers with cuing each time for maintaining sternal precaution as well as for waiting until safely close to transfer surface prior to sitting. Ambulates x 200' with FWW with 3 sitting rest breaks due to fatigue, SOA, and chronic R knee pain. Cont IP PT POC.

## 2021-07-06 NOTE — PROGRESS NOTES
"INTENSIVIST NOTE     Hospital Day: 14    Mr. Thien Gray, 65 y.o. male is followed for:   Perioperative management of comorbid medical conditions       SUBJECTIVE       Interval history:    Awake and alert.  Low blood pressure overnight though it was discovered that blood pressure was much lower in his left arm than in other sites.  See below.  Afebrile.  Fluid balance negative.    The patient's relevant past medical, surgical and social history were reviewed and updated in Epic as appropriate.       OBJECTIVE     Vital Sign Min/Max for last 24 hours  Temp  Min: 97.4 °F (36.3 °C)  Max: 98.1 °F (36.7 °C)   BP  Min: 57/43  Max: 148/67   Pulse  Min: 48  Max: 84   Resp  Min: 16  Max: 18   SpO2  Min: 86 %  Max: 98 %   No data recorded   Weight  Min: 113 kg (248 lb 4.8 oz)  Max: 113 kg (248 lb 4.8 oz)      Intake/Output Summary (Last 24 hours) at 7/6/2021 1349  Last data filed at 7/6/2021 0607  Gross per 24 hour   Intake 480 ml   Output 1225 ml   Net -745 ml      Flowsheet Rows      First Filed Value   Admission Height  177.8 cm (70\") Documented at 06/22/2021 1400   Admission Weight  118 kg (260 lb 2.3 oz) Documented at 06/22/2021 2000             07/01/21  0600 07/02/21  1358 07/06/21  0500   Weight: 112 kg (246 lb 4.1 oz) 112 kg (246 lb) 113 kg (248 lb 4.8 oz)            Objective:  General Appearance:  In no acute distress.    Vital signs: (most recent): Blood pressure 128/62, pulse 80, temperature 98.1 °F (36.7 °C), temperature source Oral, resp. rate 16, height 180 cm (70.87\"), weight 113 kg (248 lb 4.8 oz), SpO2 98 %.    HEENT: Normal HEENT exam.    Lungs:  Normal effort and normal respiratory rate.  Breath sounds clear to auscultation.  He is not in respiratory distress.  No rales, wheezes or rhonchi.    Heart: Normal rate.  Regular rhythm.  S1 normal and S2 normal.  No murmur, gallop or friction rub.   Chest: Symmetric chest wall expansion.   Abdomen: Abdomen is soft and non-distended.  Bowel sounds are " normal.   There is no abdominal tenderness.   There is no mass. There is no splenomegaly. There is no hepatomegaly.   Extremities: There is no deformity or dependent edema.    Neurological: Patient is alert and oriented to person, place and time.    Pupils:  Pupils are equal, round, and reactive to light.    Skin:  Warm and dry.              I reviewed the patient's new clinical results.  I reviewed the patient's new imaging results/reports including actual images and agree with reports.      Chest X-Ray: No additional    INFUSIONS       Results from last 7 days   Lab Units 07/05/21  1016 07/04/21  0448 07/03/21  0415   WBC 10*3/mm3 9.11 8.94 8.96   HEMOGLOBIN g/dL 8.3* 8.2* 8.0*   HEMATOCRIT % 29.0* 27.4* 28.2*   PLATELETS 10*3/mm3 425 376 312     Results from last 7 days   Lab Units 07/05/21  1016 07/04/21  0632 07/03/21  0415 07/01/21  0447 06/30/21  0641   SODIUM mmol/L 140 140 136 139 141   POTASSIUM mmol/L 4.5 4.6 4.0 4.1 3.9   CHLORIDE mmol/L 110* 110* 104 107 108*   CO2 mmol/L 20.0* 20.0* 18.0* 23.0 22.0   BUN mg/dL 44* 58* 45* 27* 26*   GLUCOSE mg/dL 146* 123* 116* 137* 125*   CREATININE mg/dL 0.68* 1.03 0.76 0.56* 0.53*   MAGNESIUM mg/dL  --   --   --  2.1 2.4   CALCIUM mg/dL 8.9 8.6 8.8 8.9 8.3*                 [facility] Ventilation:      I reviewed the patient's medications.    Assessment/Plan   ASSESSMENT/PLAN     Active Hospital Problems    Diagnosis    • **CABG 06/22/21    • Hyperglycemia    • STEMI    • Dyslipidemia    • Obesity (BMI 30-39.9)    • History of tobacco abuse        65 y.o. male remote smoker with history of hypertension, coronary artery disease status post stent in 2004, obesity, BPH, alcohol use, admitted on 6/22/2021 with chest pain and found to have severe multivessel coronary artery disease.     Patient underwent CABG x4 with intra-aortic balloon pump placement by Dr. Acosta on 6/22/2021.  He went back to the operating room on 6/23/2021 for bleeding and had evacuation of blood from the  right pleural space.     Postoperative issues have included delirium/agitation possibly secondary to alcohol withdrawal, hyperglycemia, hypoxemia requiring supplemental oxygen.    His condition overall stabilized and he was transferred to PCU on 7/5.    Overnight had problems with low blood pressure but it was discovered that the pressures in his left arm were much lower than in his legs.  He has a PICC line in his right arm.  He now tells me that he recalls having a full cardiac work-up years ago and the cardiologist told him never to have his blood pressure checked in his left arm.  He thinks he had some type of imaging but does not recall what that showed.    1. Check blood pressure in legs as may have arterial stenosis resulting in isolated low blood pressure in left arm.  Has right PICC line.  2. Oral amiodarone protocol  3. Aspirin/statin  4. Sliding scale insulin  5. Mobilize/ambulate     I discussed the patient's findings and my recommendations with patient and nursing staff     Plan of care and goals reviewed with multidisciplinary team at daily rounds.    .    Gatito Hubbard MD  Pulmonary and Critical Care Medicine  07/06/21 13:49 EDT

## 2021-07-07 LAB
ANION GAP SERPL CALCULATED.3IONS-SCNC: 10 MMOL/L (ref 5–15)
BASOPHILS # BLD AUTO: 0.04 10*3/MM3 (ref 0–0.2)
BASOPHILS NFR BLD AUTO: 0.6 % (ref 0–1.5)
BH CV GROIN LEFT HEMATOMA LONG LENGTH: 2.7 CM
BH CV LEFT GROIN HEMATOMA LONG WIDTH: 2.8 CM
BH CV LEFT GROIN HEMATOMA TRANS WIDTH: 2.9 CM
BH CV LEFT GROIN PSA PROCEDURE SCRIPTING LRR: 1
BH CV LEFT HEMATOMA TRANS LENGTH: 3.9 CM
BH CV VAS L CFA VELOCITY EDV: 8.64 CM/SEC
BH CV VAS L EIA VELOCITY EDV: 12.5 CM/SEC
BH CV VAS L EIA VELOCITY PSV: 78.2 CM/SEC
BH CV VAS L SFA VELOCITY EDV: 12.6 CM/SEC
BUN SERPL-MCNC: 25 MG/DL (ref 8–23)
BUN/CREAT SERPL: 36.2 (ref 7–25)
CALCIUM SPEC-SCNC: 8.6 MG/DL (ref 8.6–10.5)
CHLORIDE SERPL-SCNC: 102 MMOL/L (ref 98–107)
CO2 SERPL-SCNC: 20 MMOL/L (ref 22–29)
CREAT SERPL-MCNC: 0.69 MG/DL (ref 0.76–1.27)
DEPRECATED RDW RBC AUTO: 55.8 FL (ref 37–54)
EOSINOPHIL # BLD AUTO: 0.04 10*3/MM3 (ref 0–0.4)
EOSINOPHIL NFR BLD AUTO: 0.6 % (ref 0.3–6.2)
ERYTHROCYTE [DISTWIDTH] IN BLOOD BY AUTOMATED COUNT: 16.3 % (ref 12.3–15.4)
GFR SERPL CREATININE-BSD FRML MDRD: 114 ML/MIN/1.73
GLUCOSE BLDC GLUCOMTR-MCNC: 116 MG/DL (ref 70–130)
GLUCOSE BLDC GLUCOMTR-MCNC: 119 MG/DL (ref 70–130)
GLUCOSE BLDC GLUCOMTR-MCNC: 125 MG/DL (ref 70–130)
GLUCOSE BLDC GLUCOMTR-MCNC: 125 MG/DL (ref 70–130)
GLUCOSE SERPL-MCNC: 108 MG/DL (ref 65–99)
HCT VFR BLD AUTO: 27.4 % (ref 37.5–51)
HGB BLD-MCNC: 8.3 G/DL (ref 13–17.7)
IMM GRANULOCYTES # BLD AUTO: 0.14 10*3/MM3 (ref 0–0.05)
IMM GRANULOCYTES NFR BLD AUTO: 2.1 % (ref 0–0.5)
LEFT GROIN CFA SYS: 99.8 CM/SEC
LYMPHOCYTES # BLD AUTO: 1.08 10*3/MM3 (ref 0.7–3.1)
LYMPHOCYTES NFR BLD AUTO: 16.3 % (ref 19.6–45.3)
MAXIMAL PREDICTED HEART RATE: 153 BPM
MCH RBC QN AUTO: 28.1 PG (ref 26.6–33)
MCHC RBC AUTO-ENTMCNC: 30.3 G/DL (ref 31.5–35.7)
MCV RBC AUTO: 92.9 FL (ref 79–97)
MONOCYTES # BLD AUTO: 0.48 10*3/MM3 (ref 0.1–0.9)
MONOCYTES NFR BLD AUTO: 7.3 % (ref 5–12)
NEUTROPHILS NFR BLD AUTO: 4.84 10*3/MM3 (ref 1.7–7)
NEUTROPHILS NFR BLD AUTO: 73.1 % (ref 42.7–76)
NRBC BLD AUTO-RTO: 0 /100 WBC (ref 0–0.2)
PLATELET # BLD AUTO: 378 10*3/MM3 (ref 140–450)
PMV BLD AUTO: 10.2 FL (ref 6–12)
POTASSIUM SERPL-SCNC: 4.2 MMOL/L (ref 3.5–5.2)
PROX PFA PSV LEFT: 123 CM/SEC
PROX SFA PSV LEFT: 91.9 CM/SEC
RBC # BLD AUTO: 2.95 10*6/MM3 (ref 4.14–5.8)
SODIUM SERPL-SCNC: 132 MMOL/L (ref 136–145)
STRESS TARGET HR: 130 BPM
WBC # BLD AUTO: 6.62 10*3/MM3 (ref 3.4–10.8)

## 2021-07-07 PROCEDURE — 99232 SBSQ HOSP IP/OBS MODERATE 35: CPT | Performed by: INTERNAL MEDICINE

## 2021-07-07 PROCEDURE — 80048 BASIC METABOLIC PNL TOTAL CA: CPT | Performed by: INTERNAL MEDICINE

## 2021-07-07 PROCEDURE — 82962 GLUCOSE BLOOD TEST: CPT

## 2021-07-07 PROCEDURE — 85025 COMPLETE CBC W/AUTO DIFF WBC: CPT | Performed by: INTERNAL MEDICINE

## 2021-07-07 PROCEDURE — 92526 ORAL FUNCTION THERAPY: CPT

## 2021-07-07 PROCEDURE — 99024 POSTOP FOLLOW-UP VISIT: CPT | Performed by: THORACIC SURGERY (CARDIOTHORACIC VASCULAR SURGERY)

## 2021-07-07 PROCEDURE — 97166 OT EVAL MOD COMPLEX 45 MIN: CPT

## 2021-07-07 PROCEDURE — 97530 THERAPEUTIC ACTIVITIES: CPT

## 2021-07-07 RX ADMIN — SODIUM CHLORIDE, PRESERVATIVE FREE 10 ML: 5 INJECTION INTRAVENOUS at 20:22

## 2021-07-07 RX ADMIN — ATORVASTATIN CALCIUM 40 MG: 40 TABLET, FILM COATED ORAL at 20:20

## 2021-07-07 RX ADMIN — AMLODIPINE BESYLATE 5 MG: 5 TABLET ORAL at 10:35

## 2021-07-07 RX ADMIN — Medication 5 MG: at 20:20

## 2021-07-07 RX ADMIN — AMIODARONE HYDROCHLORIDE 200 MG: 200 TABLET ORAL at 20:21

## 2021-07-07 RX ADMIN — DOCUSATE SODIUM 100 MG: 100 CAPSULE, LIQUID FILLED ORAL at 20:20

## 2021-07-07 RX ADMIN — FAMOTIDINE 20 MG: 20 TABLET, FILM COATED ORAL at 20:20

## 2021-07-07 RX ADMIN — THIAMINE HCL TAB 100 MG 100 MG: 100 TAB at 10:35

## 2021-07-07 RX ADMIN — METOPROLOL TARTRATE 50 MG: 50 TABLET, FILM COATED ORAL at 20:20

## 2021-07-07 RX ADMIN — FAMOTIDINE 20 MG: 20 TABLET, FILM COATED ORAL at 10:35

## 2021-07-07 RX ADMIN — AMIODARONE HYDROCHLORIDE 200 MG: 200 TABLET ORAL at 10:35

## 2021-07-07 RX ADMIN — DOCUSATE SODIUM 100 MG: 100 CAPSULE, LIQUID FILLED ORAL at 10:36

## 2021-07-07 RX ADMIN — SODIUM CHLORIDE, PRESERVATIVE FREE 10 ML: 5 INJECTION INTRAVENOUS at 10:37

## 2021-07-07 RX ADMIN — METOPROLOL TARTRATE 50 MG: 50 TABLET, FILM COATED ORAL at 10:35

## 2021-07-07 RX ADMIN — FOLIC ACID 1 MG: 1 TABLET ORAL at 10:34

## 2021-07-07 RX ADMIN — ASPIRIN 325 MG ORAL TABLET 325 MG: 325 PILL ORAL at 10:34

## 2021-07-07 RX ADMIN — LOSARTAN POTASSIUM 100 MG: 50 TABLET, FILM COATED ORAL at 10:35

## 2021-07-07 RX ADMIN — TERAZOSIN HYDROCHLORIDE 5 MG: 5 CAPSULE ORAL at 20:20

## 2021-07-07 NOTE — PROGRESS NOTES
"INTENSIVIST NOTE     Hospital Day: 15    Mr. Thien Gray, 65 y.o. male is followed for:   Perioperative management of comorbid medical conditions       SUBJECTIVE       Interval history:    Awake and alert.  Fluid balance -800 mL.  Afebrile.  Normal sinus rhythm.  On room air.    The patient's relevant past medical, surgical and social history were reviewed and updated in Epic as appropriate.       OBJECTIVE     Vital Sign Min/Max for last 24 hours  Temp  Min: 97.5 °F (36.4 °C)  Max: 98.6 °F (37 °C)   BP  Min: 96/46  Max: 138/65   Pulse  Min: 54  Max: 85   Resp  Min: 18  Max: 20   SpO2  Min: 91 %  Max: 99 %   No data recorded   Weight  Min: 113 kg (248 lb 14.4 oz)  Max: 113 kg (248 lb 14.4 oz)      Intake/Output Summary (Last 24 hours) at 7/7/2021 1526  Last data filed at 7/7/2021 0944  Gross per 24 hour   Intake 480 ml   Output 1275 ml   Net -795 ml      Flowsheet Rows      First Filed Value   Admission Height  177.8 cm (70\") Documented at 06/22/2021 1400   Admission Weight  118 kg (260 lb 2.3 oz) Documented at 06/22/2021 2000             07/02/21  1358 07/06/21  0500 07/07/21  0500   Weight: 112 kg (246 lb) 113 kg (248 lb 4.8 oz) 113 kg (248 lb 14.4 oz)            Objective:  General Appearance:  In no acute distress.    Vital signs: (most recent): Blood pressure 112/59, pulse 59, temperature 97.5 °F (36.4 °C), temperature source Oral, resp. rate 20, height 180 cm (70.87\"), weight 113 kg (248 lb 14.4 oz), SpO2 91 %.    HEENT: Normal HEENT exam.    Lungs:  Normal effort and normal respiratory rate.  Breath sounds clear to auscultation.  He is not in respiratory distress.  No rales, wheezes or rhonchi.    Heart: Normal rate.  Regular rhythm.  S1 normal and S2 normal.  No murmur, gallop or friction rub.   Chest: Symmetric chest wall expansion.   Abdomen: Abdomen is soft and non-distended.  Bowel sounds are normal.   There is no abdominal tenderness.   There is no mass. There is no splenomegaly. There is no " hepatomegaly.   Extremities: There is no deformity or dependent edema.    Neurological: Patient is alert and oriented to person, place and time.    Pupils:  Pupils are equal, round, and reactive to light.    Skin:  Warm and dry.              I reviewed the patient's new clinical results.  I reviewed the patient's new imaging results/reports including actual images and agree with reports.      Chest X-Ray: No additional    INFUSIONS       Results from last 7 days   Lab Units 07/07/21  0534 07/05/21  1016 07/04/21  0448   WBC 10*3/mm3 6.62 9.11 8.94   HEMOGLOBIN g/dL 8.3* 8.3* 8.2*   HEMATOCRIT % 27.4* 29.0* 27.4*   PLATELETS 10*3/mm3 378 425 376     Results from last 7 days   Lab Units 07/07/21  0534 07/05/21  1016 07/04/21  0632 07/01/21  0447   SODIUM mmol/L 132* 140 140 139   POTASSIUM mmol/L 4.2 4.5 4.6 4.1   CHLORIDE mmol/L 102 110* 110* 107   CO2 mmol/L 20.0* 20.0* 20.0* 23.0   BUN mg/dL 25* 44* 58* 27*   GLUCOSE mg/dL 108* 146* 123* 137*   CREATININE mg/dL 0.69* 0.68* 1.03 0.56*   MAGNESIUM mg/dL  --   --   --  2.1   CALCIUM mg/dL 8.6 8.9 8.6 8.9                 [facility] Vent[facility]:      I reviewed the patient's medications.    Assessment/Plan   ASSESSMENT/PLAN     Active Hospital Problems    Diagnosis    • **CABG 06/22/21    • Hyperglycemia    • STEMI    • Dyslipidemia    • Obesity (BMI 30-39.9)    • History of tobacco abuse        65 y.o. male remote smoker with history of hypertension, coronary artery disease status post stent in 2004, obesity, BPH, alcohol use, admitted on 6/22/2021 with chest pain and found to have severe multivessel coronary artery disease.     Patient underwent CABG x4 with intra-aortic balloon pump placement by Dr. Acosta on 6/22/2021.  He went back to the operating room on 6/23/2021 for bleeding and had evacuation of blood from the right pleural space.     Postoperative issues have included delirium/agitation possibly secondary to alcohol withdrawal, hyperglycemia, hypoxemia requiring  supplemental oxygen.    His condition overall stabilized and he was transferred to PCU on 7/5.    Sodium down to 132 today.    1. Check blood pressure in legs as may have arterial stenosis resulting in isolated low blood pressure in left arm.  Has right PICC line.  2. Oral amiodarone protocol  3. Aspirin/statin  4. Sliding scale insulin  5. Mobilize/ambulate  6. Monitor sodium  7. Possible discharge later this week     I discussed the patient's findings and my recommendations with patient and nursing staff     Plan of care and goals reviewed with multidisciplinary team at daily rounds.    .    Gatito Hubbard MD  Pulmonary and Critical Care Medicine  07/07/21 15:26 EDT

## 2021-07-07 NOTE — PLAN OF CARE
Goal Outcome Evaluation:  Plan of Care Reviewed With: patient        Progress: no change  Outcome Summary: VSS overnight. Patient anxious and restless this evening, encouraged to drink one PRN beer after not having any today. This did seem to help calm patient. Patient resting comfortably afterwards.

## 2021-07-07 NOTE — CASE MANAGEMENT/SOCIAL WORK
Discharge Planning Assessment  [facility]     Patient Name: Thien Gray  MRN: [id number]  Today's Date: 7/7/2021    Admit Date: 6/22/2021    Discharge Needs Assessment    No documentation.       Discharge Plan     Row Name 07/07/21 1348       Plan    Plan  IPR    Patient/Family in Agreement with Plan  yes    Plan Comments  Had met & spoken with Mr Gray at the bedside on 7/6 to discuss rehab facilities. He is agreement with [facility] and any [facility]. Called referrals to both places. Today, I called and spoke with liaison, Melba, at [facility], to confirm that they would accomodate giving Mr Gray a beer for anxiety. Melba stated that the MD has already approved and pre-cert was started today.    Final Discharge Disposition Code  30 - still a patient        Continued Care and Services - Admitted Since 6/22/2021     Destination     Service Provider Request Status Selected Services Address Phone Fax Patient Preferred    [facility]  Pending - No Request Sent N/A [address] [facility] [id number] [phone] --       Internal Comment last updated by Valerie Weinstein, RN 7/6/2021 1406    7/6 referral called               [facility]  Pending - No Request Sent N/A [address] [facility] [address] [phone] --                Demographic Summary    No documentation.       Functional Status    No documentation.       Psychosocial    No documentation.       Abuse/Neglect    No documentation.       Legal    No documentation.       Substance Abuse    No documentation.       Patient Forms    No documentation.           Valerie Weinstein, RN

## 2021-07-07 NOTE — PLAN OF CARE
Goal Outcome Evaluation:  Plan of Care Reviewed With: patient        Progress: improving  Outcome Summary: OT eval completed Pt presents with deficits in strength, balance, and safety awareness intermittently, progressed STS from Indira to CGA at FWW today, completed total of 150ft with rest break required in standing after 50ft at FWW with CGA for balance, overall Indira LBD, Indira bed mobility with cues for sternal precautions, recom IPOT d/c rehab pending progress

## 2021-07-07 NOTE — PROGRESS NOTES
Clinical Nutrition       Patient Name: Thien Gray  YOB: 1955  MRN: [id number]  Date of Encounter: 07/07/21 12:46 EDT  Admission date: 6/22/2021      Reason for Visit   Follow-up protocol      EMR  Reviewed     7/5) S/p FEES: SLP Recommendation and Plan: SLP Swallowing Diagnosis: swallow WFL. SLP Diet Recommendation: regular textures, thin liquids  7/5) EN order discontinued      Reported/Observed/Food/Nutrition Related - Comments     7/7) Pt reports he has been eating okay however cannot quantify. Pt reports he cannot remember having feeding tube in his nose. Patient says his mouth is sore so he is avoid spicy or acidic foods. Willing to try ONS.      Current Nutrition Prescription     Diet Regular; Cardiac, Consistent Carbohydrate    Average Intake from Charting: limited data  38% x 2 meals    Actions     Follow treatment progress, Care plan reviewed, Advise alternate selection, Advised available snacks, Interview for preferences, Encourage intake, Supplement provided Ensure HP BID    RD discussed importance of nutrition adequacy on wound healing, strength and lean mass preservation     Encouraged intake of small frequent meals to optimize PO intake. Encouraged pt to consume ONS in between meals to assist in achieving this.     Monitor Per Protocol      Maribel Ponce, ARACELIS, LD, CNSC  Time Spent: 30min

## 2021-07-07 NOTE — PROGRESS NOTES
"                                 [facility] Progress Note      LOS: 15 days   Patient Care Team:  Sanaz Zuluaga DO as PCP - General (Family Medicine)    Chief Complaint:    Chief Complaint   Patient presents with   • Chest Pain       Subjective     Interval History:     Patient Complaints: Much more coherent this morning      Review of Systems:   A 14 point review of systems was negative except as was stated in the HPI      Objective     Vital Sign Min/Max for last 24 hours  Temp  Min: 97.5 °F (36.4 °C)  Max: 98.6 °F (37 °C)   BP  Min: 96/46  Max: 138/65   Pulse  Min: 54  Max: 85   Resp  Min: 18  Max: 20   SpO2  Min: 91 %  Max: 99 %   Flow (L/min)  Min: 2  Max: 2   Weight  Min: 113 kg (248 lb 14.4 oz)  Max: 113 kg (248 lb 14.4 oz)     Flowsheet Rows      First Filed Value   Admission Height  177.8 cm (70\") Documented at 06/22/2021 1400   Admission Weight  118 kg (260 lb 2.3 oz) Documented at 06/22/2021 2000          Physical Exam:  General Appearance: Well-nourished well-developed white male no acute distress  Lungs: Coarse bilateral breath sounds  Heart:: Regular rate and rhythm this morning; no Murmurs, Rubs or Gallops  Abdomen: Soft and nontender with adequate bowel sounds.  No organomegaly  Extremities: No cyanosis, clubbing.  1+ edema  Pulses: Pulses palpable and equal bilaterally; left groin stable  Skin: Warm and dry with no rash  Psych: Normal     Results Review:     I reviewed the patient's new clinical results.  Results from last 7 days   Lab Units 07/07/21 0534 07/05/21  1016 07/04/21  0632   SODIUM mmol/L 132* 140 140   POTASSIUM mmol/L 4.2 4.5 4.6   CHLORIDE mmol/L 102 110* 110*   CO2 mmol/L 20.0* 20.0* 20.0*   BUN mg/dL 25* 44* 58*   CREATININE mg/dL 0.69* 0.68* 1.03   GLUCOSE mg/dL 108* 146* 123*   CALCIUM mg/dL 8.6 8.9 8.6     Results from last 7 days   Lab Units 07/07/21  0534 07/05/21  1016 07/04/21  0448   WBC 10*3/mm3 6.62 9.11 8.94   HEMOGLOBIN g/dL 8.3* 8.3* 8.2* "   HEMATOCRIT % 27.4* 29.0* 27.4*   PLATELETS 10*3/mm3 378 425 376     Lab Results   Lab Value Date/Time    TROPONINT 1.730 (C) 06/22/2021 1705    TROPONINT <0.010 06/22/2021 1009                       Medication Review: yes  Current Facility-Administered Medications   Medication Dose Route Frequency Provider Last Rate Last Admin   • albuterol (PROVENTIL) nebulizer solution 0.083% 2.5 mg/3mL  2.5 mg Nebulization Q6H PRN Holger Angel PA-C   2.5 mg at 07/06/21 2351   • amiodarone (PACERONE) tablet 200 mg  200 mg Oral Q12H Miguel Angel Acosta MD   200 mg at 07/07/21 1035    Followed by   • [START ON 7/21/2021] amiodarone (PACERONE) tablet 200 mg  200 mg Oral Daily Miguel Angel Acosta MD       • amLODIPine (NORVASC) tablet 5 mg  5 mg Oral Q24H Miguel Angel Acosta MD   5 mg at 07/07/21 1035   • aspirin tablet 325 mg  325 mg Oral Daily Miguel Angel Acosta MD   325 mg at 07/07/21 1034   • atorvastatin (LIPITOR) tablet 40 mg  40 mg Oral Nightly Miguel Angel cAosta MD   40 mg at 07/06/21 2113   • beer 1 each  1 each Oral TID PRN Holger Angel PA-C   1 each at 07/06/21 2308   • docusate sodium (COLACE) capsule 100 mg  100 mg Oral BID Mark Anthony Hawkins PharmD   100 mg at 07/07/21 1036   • famotidine (PEPCID) tablet 20 mg  20 mg Oral BID Miguel Angel Acosta MD   20 mg at 07/07/21 1035   • folic acid (FOLVITE) tablet 1 mg  1 mg Oral Daily Miguel Angel Acosta MD   1 mg at 07/07/21 1034   • insulin lispro (humaLOG) injection 0-9 Units  0-9 Units Subcutaneous 4x Daily With Meals & Nightly Mark Anthony Hawkins, PharmD       • losartan (COZAAR) tablet 100 mg  100 mg Oral Q24H Miguel Angel Acosta MD   100 mg at 07/07/21 1035   • magnesium sulfate 4g/100mL (PREMIX) infusion  4 g Intravenous PRN Holger Angel PA-C   4 g at 06/28/21 0607   • melatonin tablet 5 mg  5 mg Oral Nightly Miguel Angel Acosta MD   5 mg at 07/06/21 2113   • metoprolol tartrate (LOPRESSOR) tablet 50 mg  50 mg Oral Q12H Miguel Angel Acosta MD   50 mg at 07/07/21 1035   • morphine injection 2  mg  2 mg Intravenous Q2H PRN Holger Angel PA-C   2 mg at 07/02/21 2342   • nicotine (NICODERM CQ) 7 MG/24HR patch 1 patch  1 patch Transdermal Q24H Holger Angel PA-C   1 patch at 07/06/21 0834   • ondansetron (ZOFRAN) injection 4 mg  4 mg Intravenous Q6H PRN Holger Angel PA-C       • Pharmacy Consult - MTM   Does not apply Daily Holger Angel PA-C       • potassium chloride (KLOR-CON) packet 40 mEq  40 mEq Oral PRN Miguel Angel Acosta MD       • potassium chloride (MICRO-K) CR capsule 40 mEq  40 mEq Oral PRN Miguel Angel Acosta MD       • sodium chloride 0.9 % flush 10 mL  10 mL Intravenous Q12H Holger Angel PA-C   10 mL at 07/07/21 1037   • sodium chloride 0.9 % flush 10 mL  10 mL Intravenous PRN Holger Angel PA-C       • terazosin (HYTRIN) capsule 5 mg  5 mg Oral Nightly Miguel Angel Acosta MD   5 mg at 07/06/21 2113   • thiamine (VITAMIN B-1) tablet 100 mg  100 mg Oral Daily Miguel Angel Acosta MD   100 mg at 07/07/21 1035         CABG 06/22/21    History of tobacco abuse    STEMI    Dyslipidemia    Obesity (BMI 30-39.9)    Hyperglycemia        Impression      Inferior STEMI 6/22/2021  Emergent CABG for left main and three-vessel coronary artery disease with preserved LV function 6/22/2021  reop for bleeding 6/23/2021 early a.m.  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    Plan     Continue beta-blocker  Pulmonary toilet  Continue amiodarone  Continue aspirin and statin  Mobilize  Home 24 to 48 hours      Mikey Conrad MD   07/07/21  10:40 EDT

## 2021-07-07 NOTE — PROGRESS NOTES
Cardiothoracic Surgery Progress Note    POD# 15/14/5 S/P CABGx4/mediastinal exploration/Thrombin ablation of left femoral pseudo-aneurysm     LOS: 15 days      Subjective:  Doing well this morning. No complaints. Per nurse, patient had some notable anxiety last night. 1 beer given which seemed to help.    Objective:  Vital Signs  Temp:  [97.5 °F (36.4 °C)-98.6 °F (37 °C)] 98.6 °F (37 °C)  Heart Rate:  [54-83] 64  Resp:  [18-20] 20  BP: ()/(46-72) 114/53    Physical Exam:   General Appearance: alert, appears stated age and cooperative, conversant   Lungs: clear to auscultation, respirations regular, respirations even and respirations unlabored   Heart: regular rhythm & normal rate, normal S1, S2 and no murmur, no gallop, no rub   Skin: Incision c/d/i     Results:    Results from last 7 days   Lab Units 07/05/21  1016   WBC 10*3/mm3 9.11   HEMOGLOBIN g/dL 8.3*   HEMATOCRIT % 29.0*   PLATELETS 10*3/mm3 425     Results from last 7 days   Lab Units 07/05/21  1016   SODIUM mmol/L 140   POTASSIUM mmol/L 4.5   CHLORIDE mmol/L 110*   CO2 mmol/L 20.0*   BUN mg/dL 44*   CREATININE mg/dL 0.68*   GLUCOSE mg/dL 146*   CALCIUM mg/dL 8.9       Assessment:  • **CABG 06/22/21     • Hyperglycemia     • STEMI     • Dyslipidemia     • Obesity (BMI 30-39.9)     • History of tobacco abuse            Plan:  Diet per SLP  Hold ativan and Precedex  Beer TID  Minimize narcotics  Ambulate  Pulmonary toilet  ASA, statin, BB  Will need rehab placement upon discharge. Hopefully D/C later this week.        Harman Montes PA-C  07/07/21  07:29 EDT    Status as outlined above.  Patient continues to improve on a daily basis.  Hemodynamics are stable vital signs are normal laboratory studies are all back to baseline.  Dust x-ray is clear.  Patient is ambulating maintaining an adequate quit O2 saturation on room air.  Wounds are clean and dry. I have reviewed, verified, and confirmed the above history and current status.  I have examined  the patient and confirmed the above physical findings.Above plan and treatment regimen discussed in detail with patient.  Options of treatment, attendant risks vs benefits, and my recommendations were discussed and all questions answered.    Miguel Angel Acosta MD  [facility]  07/07/21   08:08 EDT

## 2021-07-07 NOTE — THERAPY TREATMENT NOTE
Acute Care - Speech Language Pathology Treatment Note  [facility]     Patient Name: Thien Gray  : 1955  MRN: [id number]  Today's Date: 2021               Admit Date: 2021     Visit Dx:    ICD-10-CM ICD-9-CM   1. ST elevation myocardial infarction (STEMI), unspecified artery (CMS/HCC)  I21.3 410.90   2. Chest pain, unspecified type  R07.9 786.50   3. Cognitive communication deficit  R41.841 799.52   4. Primary osteoarthritis of both knees  M17.0 715.16     Patient Active Problem List   Diagnosis   • History of tobacco abuse   • CABG 21   • Dyspnea on exertion   • RBBB   • Psoriasis   • Primary osteoarthritis of both knees   • STEMI   • Dyslipidemia   • Obesity (BMI 30-39.9)   • Hyperglycemia     Past Medical History:   Diagnosis Date   • Abnormal ECG 2020    Get from Dr. Kayser   • Coronary artery disease    • History of heart attack    • History of MI (myocardial infarction)    • Hyperlipidemia    • Myocardial infarction (CMS/HCC)    • RBBB 2/10/2020     Past Surgical History:   Procedure Laterality Date   • APPENDECTOMY     • CARDIAC CATHETERIZATION  2004    They used a catheter to put in my stent.   • CARDIAC CATHETERIZATION N/A 2021    Procedure: Left Heart Cath;  Surgeon: Mikey Conrad MD;  Location: [facility] CATH INVASIVE LOCATION;  Service: Cardiovascular;  Laterality: N/A;   • CORONARY ANGIOPLASTY      I think that's what the bill for my stent said.   • CORONARY ARTERY BYPASS BRING BACK FOR EXPLORATION N/A 2021    Procedure: BRING BACK FOR EXPLORATION OPEN HEART;  Surgeon: Miguel Angel Acosta MD;  Location: [facility] OR;  Service: Cardiothoracic;  Laterality: N/A;   • CORONARY ARTERY BYPASS GRAFT N/A 2021    Procedure: MEDIAN STERNOTOMY, CORONARY ARTERY BYPASS GRAFTING X 4 WITH LEFT INTERNAL MAMMARY ARTERY GRAFT, ENDOSCOPIC VEIN HARVESTING OF THE RIGHT GREATER SAPHENOUS VEIN, INTRA-AORTIC BALLOON PUMP INSERTION, IVAN PER ANESTHESIA;  Surgeon:  Miguel Angel Acosta MD;  Location:  [facility] OR;  Service: Cardiothoracic;  Laterality: N/A;   • CORONARY STENT PLACEMENT  2004   • INTERVENTIONAL RADIOLOGY PROCEDURE N/A 7/2/2021    Procedure: thrombin injection;  Surgeon: Abdiel Tamez MD;  Location:  [facility] CATH INVASIVE LOCATION;  Service: Interventional Radiology;  Laterality: N/A;        SLP EVALUATION (last 72 hours)      SLP SLC Evaluation     Row Name 07/07/21 1054                   Communication Assessment/Intervention    Document Type  therapy note (daily note)  -        Subjective Information  no complaints  -        Patient Observations  alert;cooperative;agree to therapy  -        Patient/Family/Caregiver Comments/Observations  Patient checking email, sitting in chair  -        Care Plan Review  evaluation/treatment results reviewed;care plan/treatment goals reviewed;risks/benefits reviewed;current/potential barriers reviewed;patient/other agree to care plan  -        Patient Effort  good  -           General Information    Patient Profile Reviewed  yes  -           Pain    Additional Documentation  Pain Scale: FACES Pre/Post-Treatment (Group);Pain Scale: Numbers Pre/Post-Treatment (Group)  -           Pain Scale: Numbers Pre/Post-Treatment    Pretreatment Pain Rating  0/10 - no pain  -        Posttreatment Pain Rating  0/10 - no pain  -           Pain Scale: FACES Pre/Post-Treatment    Pain: FACES Scale, Pretreatment  0-->no hurt  -        Posttreatment Pain Rating  0-->no hurt  -           SLP Clinical Impressions    Daily Summary of Progress (SLP)  progress toward functional goals as expected  -        Plan for Continued Treatment (SLP)  Patient seen this date for cognitive tx. Cognition improving, however, patient continues with orientation and recall deficits impacting safety and independence  -           Recommendations    Therapy Frequency (SLP SLC)  3 days per week  -        Predicted Duration Therapy Intervention  "(Days)  until discharge  -CH        Anticipated Discharge Disposition (SLP)  unknown  -CH           Articulation Goal 1 (SLP)    Improve Articulation Goal 1 (SLP)  by over-articulating at word level;90%;with minimal cues (75-90%)  -CH        Time Frame (Articulation Goal 1, SLP)  short term goal (STG)  -CH        Progress (Articulation Goal 1, SLP)  70%;with minimal cues (75-90%)  -CH        Progress/Outcomes (Articulation Goal 1, SLP)  continuing progress toward goal  -CH           Attention Goal 1 (SLP)    Improve Attention by Goal 1 (SLP)  looking at speaker;attending to task;90%;with moderate cues (50-74%)  -CH        Time Frame (Attention Goal 1, SLP)  short term goal (STG)  -CH        Progress (Attention Goal 1, SLP)  80%;with minimal cues (75-90%)  -CH        Progress/Outcomes (Attention Goal 1, SLP)  continuing progress toward goal  -CH           Orientation Goal 1 (SLP)    Improve Orientation Through Goal 1 (SLP)  demonstrating orientation to year;demonstrating orientation to place;demonstrating orientation to disease/impairment;demonstrating orientation to month;demonstrating orientation to day;90%;with moderate cues (50-74%)  -CH        Time Frame (Orientation Goal 1, SLP)  short term goal (STG)  -CH        Progress (Orientation Goal 1, SLP)  60%;with minimal cues (75-90%)  -CH        Progress/Outcomes (Orientation Goal 1, SLP)  continuing progress toward goal  -CH        Comment (Orientation Goal 1, SLP)  Patient grossly oriented to place (\"hospital\") , not to faby or specific date. Knew year.   -CH           Additional Goals    Additional Goal Selection  additional goal, SLP goal 1  -CH           Additional Goal 1 (SLP)    Additional Goal 1, SLP  LTG: Pt will improve cognitive-communication skills in order to participate in care w/ 100% acc w/o cues.  -CH        Time Frame (Additional Goal 1, SLP)  by discharge  -CH        Progress/Outcomes (Additional Goal 1, SLP)  continuing progress toward goal  -CH   "        User Key  (r) = Recorded By, (t) = Taken By, (c) = Cosigned By    Initials Name Effective Dates    Cora Hull, MS CCC-SLP 06/16/21 -              EDUCATION  The patient has been educated in the following areas:     Cognitive Impairment Communication Impairment.    SLP Recommendation and Plan              Anticipated Discharge Disposition (SLP): unknown        Predicted Duration Therapy Intervention (Days): until discharge  Daily Summary of Progress (SLP): progress toward functional goals as expected  Plan for Continued Treatment (SLP): Patient seen this date for cognitive tx. Cognition improving, however, patient continues with orientation and recall deficits impacting safety and independence                    SLP GOALS     Row Name 07/07/21 1050 07/05/21 1015          Oral Nutrition/Hydration Goal 1 (SLP)    Oral Nutrition/Hydration Goal 1, SLP  --  LTG: Pt will improve swallow function in order to return to PO diet w/ no overt s/sxs aspiration/distress w/ 100% acc and no cues  -VO     Time Frame (Oral Nutrition/Hydration Goal 1, SLP)  --  by discharge  -VO     Progress/Outcomes (Oral Nutrition/Hydration Goal 1, SLP)  --  goal met  -VO        Oral Nutrition/Hydration Goal 2 (SLP)    Oral Nutrition/Hydration Goal 2, SLP  --  Pt will tolerate therapeutic trials of thin H2O & puree w/ no overt s/sxs aspiration/distress w/ 60% acc and no cues in order to assess appropriateness for repeat instrumental eval.  -VO     Time Frame (Oral Nutrition/Hydration Goal 2, SLP)  --  short term goal (STG)  -VO     Progress/Outcomes (Oral Nutrition/Hydration Goal 2, SLP)  --  goal met  -VO        Pharyngeal Strengthening Exercise Goal 1 (SLP)    Activity (Pharyngeal Strengthening Goal 1, SLP)  --  increase superior movement of the hyolaryngeal complex;increase anterior movement of the hyolaryngeal complex;increase closure at entrance to airway/closure of airway at glottis;increase squeeze/positive pressure generation   -VO     Increase Superior Movement of the Hyolaryngeal Complex  --  effortful pitch glide (falsetto + pharyngeal squeeze)  -VO     Increase Anterior Movement of the Hyolaryngeal Complex  --  chin tuck against resistance (CTAR)  -VO     Increase Closure at Entrance to Airway/Closure of Airway at Glottis  --  supraglottic swallow  -VO     Increase Squeeze/Positive Pressure Generation  --  hard effortful swallow  -VO     [place]/Accuracy (Pharyngeal Strengthening Goal 1, SLP)  --  with minimal cues (75-90% accuracy)  -VO     Time Frame (Pharyngeal Strengthening Goal 1, SLP)  --  short term goal (STG)  -VO     Progress/Outcomes (Pharyngeal Strengthening Goal 1, SLP)  --  goal no longer appropriate  -VO        Articulation Goal 1 (SLP)    Improve Articulation Goal 1 (SLP)  by over-articulating at word level;90%;with minimal cues (75-90%)  -CH  --     Time Frame (Articulation Goal 1, SLP)  short term goal (STG)  -CH  --     Progress (Articulation Goal 1, SLP)  70%;with minimal cues (75-90%)  -CH  --     Progress/Outcomes (Articulation Goal 1, SLP)  continuing progress toward goal  -CH  --        Attention Goal 1 (SLP)    Improve Attention by Goal 1 (SLP)  looking at speaker;attending to task;90%;with moderate cues (50-74%)  -CH  --     Time Frame (Attention Goal 1, SLP)  short term goal (STG)  -CH  --     Progress (Attention Goal 1, SLP)  80%;with minimal cues (75-90%)  -CH  --     Progress/Outcomes (Attention Goal 1, SLP)  continuing progress toward goal  -CH  --        Orientation Goal 1 ([facility])    Improve Orientation Through Goal 1 ([facility])  demonstrating orientation to year;demonstrating orientation to place;demonstrating orientation to disease/impairment;demonstrating orientation to month;demonstrating orientation to day;90%;with moderate cues (50-74%)  -CH  --     Time Frame (Orientation Goal 1, SLP)  short term goal (STG)  -CH  --     Progress (Orientation Goal 1, SLP)  60%;with minimal cues (75-90%)  -CH  --      "Progress/Outcomes (Orientation Goal 1, SLP)  continuing progress toward goal  -CH  --     Comment (Orientation Goal 1, SLP)  Patient grossly oriented to place (\"hospital\") , not to faby or specific date. Knew year.   -CH  --        Additional Goal 1 (SLP)    Additional Goal 1, SLP  LTG: Pt will improve cognitive-communication skills in order to participate in care w/ 100% acc w/o cues.  -CH  --     Time Frame (Additional Goal 1, SLP)  by discharge  -CH  --     Progress/Outcomes (Additional Goal 1, SLP)  continuing progress toward goal  -CH  --       User Key  (r) = Recorded By, (t) = Taken By, (c) = Cosigned By    Initials Name Provider Type    Karolina Porras MA,CCC-SLP Speech and Language Pathologist    Cora Hull MS CCC-SLP Speech and Language Pathologist                  Time Calculation:     Time Calculation- SLP     Row Name 07/07/21 1128             Time Calculation- SLP    SLP Start Time  0500  -CH      SLP Received On  07/07/21  -CH         Untimed Charges    89731-PM Treatment/ST Modification Prosth Aug Alter   45  -CH         Total Minutes    Untimed Charges Total Minutes  45  -CH       Total Minutes  45  -CH        User Key  (r) = Recorded By, (t) = Taken By, (c) = Cosigned By    Initials Name Provider Type    Cora Hull MS CCC-SLP Speech and Language Pathologist          Therapy Charges for Today     Code Description Service Date Service Provider Modifiers Qty    [id number] HC ST TREATMENT SWALLOW 3 7/7/2021 Cora Yoder MS CCC-SLP GN 1                     Cora Yoder MS CCC-SLP  7/7/2021  "

## 2021-07-07 NOTE — THERAPY EVALUATION
Patient Name: Thien Gray  : 1955    MRN: [id number]                              Today's Date: 2021       Admit Date: 2021    Visit Dx:     ICD-10-CM ICD-9-CM   1. ST elevation myocardial infarction (STEMI), unspecified artery (CMS/HCC)  I21.3 410.90   2. Chest pain, unspecified type  R07.9 786.50   3. Cognitive communication deficit  R41.841 799.52   4. Primary osteoarthritis of both knees  M17.0 715.16     Patient Active Problem List   Diagnosis   • History of tobacco abuse   • CABG 21   • Dyspnea on exertion   • RBBB   • Psoriasis   • Primary osteoarthritis of both knees   • STEMI   • Dyslipidemia   • Obesity (BMI 30-39.9)   • Hyperglycemia     Past Medical History:   Diagnosis Date   • Abnormal ECG 2020    Get from Dr. Kayser   • Coronary artery disease    • History of heart attack    • History of MI (myocardial infarction)    • Hyperlipidemia    • Myocardial infarction (CMS/HCC)    • RBBB 2/10/2020     Past Surgical History:   Procedure Laterality Date   • APPENDECTOMY     • CARDIAC CATHETERIZATION  2004    They used a catheter to put in my stent.   • CARDIAC CATHETERIZATION N/A 2021    Procedure: Left Heart Cath;  Surgeon: Mikey Conrad MD;  Location: [facility] CATH INVASIVE LOCATION;  Service: Cardiovascular;  Laterality: N/A;   • CORONARY ANGIOPLASTY      I think that's what the bill for my stent said.   • CORONARY ARTERY BYPASS BRING BACK FOR EXPLORATION N/A 2021    Procedure: BRING BACK FOR EXPLORATION OPEN HEART;  Surgeon: Miguel Angel Acosta MD;  Location: [facility] OR;  Service: Cardiothoracic;  Laterality: N/A;   • CORONARY ARTERY BYPASS GRAFT N/A 2021    Procedure: MEDIAN STERNOTOMY, CORONARY ARTERY BYPASS GRAFTING X 4 WITH LEFT INTERNAL MAMMARY ARTERY GRAFT, ENDOSCOPIC VEIN HARVESTING OF THE RIGHT GREATER SAPHENOUS VEIN, INTRA-AORTIC BALLOON PUMP INSERTION, IVAN PER ANESTHESIA;  Surgeon: Miguel Angel Acosta MD;  Location: [facility] OR;   Service: Cardiothoracic;  Laterality: N/A;   • CORONARY STENT PLACEMENT  2004   • INTERVENTIONAL RADIOLOGY PROCEDURE N/A 7/2/2021    Procedure: thrombin injection;  Surgeon: Abdiel Tamez MD;  Location: [facility] INVASIVE LOCATION;  Service: Interventional Radiology;  Laterality: N/A;     General Information     Row Name 07/07/21 1424          OT Time and Intention    Document Type  evaluation  -KF     Mode of Treatment  occupational therapy  -KF     Row Name 07/07/21 1424          General Information    Patient Profile Reviewed  yes  -KF     Prior Level of Function  independent:;transfer;bed mobility;ADL's;all household mobility  -KF     Existing Precautions/Restrictions  cardiac;fall;sternal  -KF     Barriers to Rehab  medically complex  -KF     Row Name 07/07/21 1424          Living Environment    Lives With  alone  -KF     Row Name 07/07/21 1424          Home Main Entrance    Number of Stairs, Main Entrance  none  -KF     Row Name 07/07/21 1424          Stairs Within Home, Primary    Stairs, Within Home, Primary  tub shower  -KF     Number of Stairs, Within Home, Primary  none  -KF     Row Name 07/07/21 1424          Cognition    Orientation Status (Cognition)  oriented to;person;place;time  -KF     Row Name 07/07/21 1424          Safety Issues, Functional Mobility    Safety Issues Affecting Function (Mobility)  awareness of need for assistance;safety precaution awareness;insight into deficits/self-awareness;judgment at times requires repeated cues for sternal precautions sitting EOB during rest breaks  -KF     Impairments Affecting Function (Mobility)  balance;cognition;coordination;endurance/activity tolerance;postural/trunk control;strength;shortness of breath;range of motion (ROM)  -KF     Cognitive Impairments, Mobility Safety/Performance  awareness, need for assistance;insight into deficits/self-awareness;judgment  -KF     Comment, Safety Issues/Impairments (Mobility)  slighlty impulsive when fatigued  to sit, required standing rest break  -KF       User Key  (r) = Recorded By, (t) = Taken By, (c) = Cosigned By    Initials Name Provider Type    KF Suly Christian OT Occupational Therapist          Mobility/ADL's    No documentation.       Obj/Interventions     Row Name 07/07/21 1426          Sensory Assessment (Somatosensory)    Sensory Assessment (Somatosensory)  UE sensation intact  -KF     Row Name 07/07/21 1426          Vision Assessment/Intervention    Visual Impairment/Limitations  WFL;corrective lenses full-time  -KF     Row Name 07/07/21 1426          Range of Motion Comprehensive    General Range of Motion  bilateral upper extremity ROM WFL  -KF     Row Name 07/07/21 1426          Strength Comprehensive (MMT)    General Manual Muscle Testing (MMT) Assessment  upper extremity strength deficits identified  -KF     Comment, General Manual Muscle Testing (MMT) Assessment  BUE  4/5 limitations in extension at times due to multiple trigger fingers B hands, WFL for ADL completion today, deferred shoulder and elbow due to sternal precautions  -KF     Row Name 07/07/21 1426          Motor Skills    Motor Skills  coordination  -KF     Coordination  WFL;bilateral;upper extremity  -KF     Row Name 07/07/21 1426          Balance    Balance Assessment  sitting static balance;sitting dynamic balance;standing static balance;standing dynamic balance  -KF     Static Sitting Balance  WNL  -KF     Dynamic Sitting Balance  WFL  -KF     Static Standing Balance  mild impairment;supported;standing  -KF     Dynamic Standing Balance  mild impairment;supported;standing  -KF     Balance Interventions  standing;sit to stand;weight shifting activity;occupation based/functional task  -KF     Comment, Balance  challenge of LBD today in sitting and standing, overall CGA for balance with cues for safety  -KF       User Key  (r) = Recorded By, (t) = Taken By, (c) = Cosigned By    Initials Name Provider Type    Suly Burns  M, OT Occupational Therapist        Goals/Plan     Row Name 07/07/21 1435          Bed Mobility Goal 1 (OT)    Activity/Assistive Device (Bed Mobility Goal 1, OT)  sidelying to sit/sit to sidelying  -KF     Cameron Level/Cues Needed (Bed Mobility Goal 1, OT)  contact guard assist  -KF     Time Frame (Bed Mobility Goal 1, OT)  long term goal (LTG);10 days  -KF     Progress/Outcomes (Bed Mobility Goal 1, OT)  goal ongoing  -KF     Row Name 07/07/21 1435          Transfer Goal 1 (OT)    Activity/Assistive Device (Transfer Goal 1, OT)  toilet;walker, rolling  -KF     Cameron Level/Cues Needed (Transfer Goal 1, OT)  standby assist  -KF     Time Frame (Transfer Goal 1, OT)  long term goal (LTG);10 days  -KF     Progress/Outcome (Transfer Goal 1, OT)  goal ongoing  -KF     Row Name 07/07/21 1435          Dressing Goal 1 (OT)    Activity/Device (Dressing Goal 1, OT)  lower body dressing socks/shoes/pants with good safety and sternal precautions maintained  -KF     Cameron/Cues Needed (Dressing Goal 1, OT)  set-up required  -KF     Time Frame (Dressing Goal 1, OT)  long term goal (LTG);10 days  -KF     Progress/Outcome (Dressing Goal 1, OT)  goal ongoing  -KF     Row Name 07/07/21 1435          Toileting Goal 1 (OT)    Activity/Device (Toileting Goal 1, OT)  perform perineal hygiene;adjust/manage clothing;commode with sternal precautions maintained  -KF     Cameron Level/Cues Needed (Toileting Goal 1, OT)  standby assist  -KF     Time Frame (Toileting Goal 1, OT)  long term goal (LTG);10 days  -KF     Progress/Outcome (Toileting Goal 1, OT)  goal ongoing  -KF     Row Name 07/07/21 1435          Therapy Assessment/Plan (OT)    Planned Therapy Interventions (OT)  activity tolerance training;patient/caregiver education/training;adaptive equipment training;neuromuscular control/coordination retraining;BADL retraining;ROM/therapeutic exercise;occupation/activity based interventions;strengthening  exercise;cognitive/visual perception retraining;transfer/mobility retraining;functional balance retraining  -       User Key  (r) = Recorded By, (t) = Taken By, (c) = Cosigned By    Initials Name Provider Type    KF Suly Christian, OT Occupational Therapist        Clinical Impression     Row Name 07/07/21 1430          Pain Assessment    Additional Documentation  Pain Scale: Numbers Pre/Post-Treatment (Group)  -     Row Name 07/07/21 1430          Pain Scale: Numbers Pre/Post-Treatment    Pretreatment Pain Rating  0/10 - no pain  -KF     Posttreatment Pain Rating  0/10 - no pain  -KF     Pre/Posttreatment Pain Comment  tolerated  -     Pain Intervention(s)  Repositioned;Ambulation/increased activity  -     Row Name 07/07/21 1430          Plan of Care Review    Plan of Care Reviewed With  patient  -     Progress  improving  -     Outcome Summary  OT eval completed Pt presents with deficits in strength, balance, and safety awareness intermittently, progressed STS from Indira to CGA at [facility] today, completed total of 150ft with rest break required in standing after 50ft at [facility] with CGA for balance, overall Indira LBD, Indira bed mobility with cues for sternal precautions, recom IPOT d/c rehab pending progress  -     Row Name 07/07/21 1430          Therapy Assessment/Plan (OT)    Rehab Potential (OT)  good, to achieve stated therapy goals  -     Criteria for Skilled Therapeutic Interventions Met (OT)  yes;meets criteria;skilled treatment is necessary  -     Therapy Frequency (OT)  daily  -     Row Name 07/07/21 1430          Therapy Plan Review/Discharge Plan (OT)    Equipment Needs Upon Discharge (OT)  walker, rolling  -SHERRIE     Anticipated Discharge Disposition (OT)  inpatient rehabilitation facility  -     Row Name 07/07/21 1430          Vital Signs    Pre Systolic BP Rehab  110 RN cleared VSS  -KF     Pre Treatment Diastolic BP  49  -KF     Pretreatment Heart Rate (beats/min)  60  -KF      Posttreatment Heart Rate (beats/min)  63  -KF     O2 Delivery Pre Treatment  room air  -KF     Pre Patient Position  Supine  -KF     Intra Patient Position  Standing  -KF     Post Patient Position  Supine  -KF     Rest Breaks   3  -KF     Row Name 07/07/21 1430          Positioning and Restraints    Pre-Treatment Position  in bed  -KF     Post Treatment Position  bed  -KF     In Bed  notified nsg;supine;fowlers;call light within reach;encouraged to call for assist;exit alarm on;legs elevated;side rails up x2  -KF       User Key  (r) = Recorded By, (t) = Taken By, (c) = Cosigned By    Initials Name Provider Type    Suly Burns OT Occupational Therapist        Outcome Measures     Row Name 07/07/21 1440          How much help from another is currently needed...    Putting on and taking off regular lower body clothing?  3  -KF     Bathing (including washing, rinsing, and drying)  3  -KF     Toileting (which includes using toilet bed pan or urinal)  2  -KF     Putting on and taking off regular upper body clothing  3  -KF     Taking care of personal grooming (such as brushing teeth)  3  -KF     Eating meals  4  -KF     AM-PAC 6 Clicks Score (OT)  18  -KF     Row Name 07/07/21 1440          Functional Assessment    Outcome Measure Options  AM-PAC 6 Clicks Daily Activity (OT)  -KF       User Key  (r) = Recorded By, (t) = Taken By, (c) = Cosigned By    Initials Name Provider Type    Suly Burns OT Occupational Therapist        Occupational Therapy Education                 Title: PT OT SLP Therapies (In Progress)     Topic: Occupational Therapy (Done)     Point: ADL training (Done)     Description:   Instruct learner(s) on proper safety adaptation and remediation techniques during self care or transfers.   Instruct in proper use of assistive devices.              Learning Progress Summary           Patient Acceptance, E,TB,D, VU,NR by SHERRIE at 7/7/2021 1443                   Point: Home exercise program  (Done)     Description:   Instruct learner(s) on appropriate technique for monitoring, assisting and/or progressing therapeutic exercises/activities.              Learning Progress Summary           Patient Acceptance, E,TB,D, VU,NR by  at 7/7/2021 1443                   Point: Precautions (Done)     Description:   Instruct learner(s) on prescribed precautions during self-care and functional transfers.              Learning Progress Summary           Patient Acceptance, E,TB,D, VU,NR by KF at 7/7/2021 1443                   Point: Body mechanics (Done)     Description:   Instruct learner(s) on proper positioning and spine alignment during self-care, functional mobility activities and/or exercises.              Learning Progress Summary           Patient Acceptance, E,TB,D, VU,NR by  at 7/7/2021 1443                               User Key     Initials Effective Dates Name Provider Type Discipline     06/16/21 -  Suly Christian, OT Occupational Therapist OT              OT Recommendation and Plan  Planned Therapy Interventions (OT): activity tolerance training, patient/caregiver education/training, adaptive equipment training, neuromuscular control/coordination retraining, BADL retraining, ROM/therapeutic exercise, occupation/activity based interventions, strengthening exercise, cognitive/visual perception retraining, transfer/mobility retraining, functional balance retraining  Therapy Frequency (OT): daily  Plan of Care Review  Plan of Care Reviewed With: patient  Progress: improving  Outcome Summary: OT eval completed Pt presents with deficits in strength, balance, and safety awareness intermittently, progressed STS from Indira to CGA at [facility] today, completed total of 150ft with rest break required in standing after 50ft at [facility] with CGA for balance, overall Indira LBD, Indira bed mobility with cues for sternal precautions, recom IPOT d/c rehab pending progress     Time Calculation:   Time Calculation- OT     Row  Name 07/07/21 1358             Time Calculation- OT    OT Start Time  1358  -KF      OT Received On  07/07/21  -KF      OT Goal Re-Cert Due Date  07/17/21  -KF         Timed Charges    84079 - OT Therapeutic Activity Minutes  8  -KF         Untimed Charges    OT Eval/Re-eval Minutes  46  -KF         Total Minutes    Timed Charges Total Minutes  8  -KF      Untimed Charges Total Minutes  46  -KF       Total Minutes  54  -KF        User Key  (r) = Recorded By, (t) = Taken By, (c) = Cosigned By    Initials Name Provider Type    Suly Burns OT Occupational Therapist        Therapy Charges for Today     Code Description Service Date Service Provider Modifiers Qty    98063507229 HC OT THERAPEUTIC ACT EA 15 MIN 7/7/2021 Suly Christian OT GO 1    04302284354 HC OT EVAL MOD COMPLEXITY 4 7/7/2021 Suly Christian OT GO 1               Suly Christian OT  7/7/2021

## 2021-07-08 ENCOUNTER — ANCILLARY PROCEDURE (OUTPATIENT)
Dept: SPEECH THERAPY | Facility: HOSPITAL | Age: 66
End: 2021-07-08

## 2021-07-08 LAB
ANION GAP SERPL CALCULATED.3IONS-SCNC: 8 MMOL/L (ref 5–15)
BASOPHILS # BLD AUTO: 0.03 10*3/MM3 (ref 0–0.2)
BASOPHILS NFR BLD AUTO: 0.5 % (ref 0–1.5)
BUN SERPL-MCNC: 19 MG/DL (ref 8–23)
BUN/CREAT SERPL: 26.8 (ref 7–25)
CALCIUM SPEC-SCNC: 8.7 MG/DL (ref 8.6–10.5)
CHLORIDE SERPL-SCNC: 102 MMOL/L (ref 98–107)
CO2 SERPL-SCNC: 22 MMOL/L (ref 22–29)
CREAT SERPL-MCNC: 0.71 MG/DL (ref 0.76–1.27)
DEPRECATED RDW RBC AUTO: 53.8 FL (ref 37–54)
EOSINOPHIL # BLD AUTO: 0.04 10*3/MM3 (ref 0–0.4)
EOSINOPHIL NFR BLD AUTO: 0.7 % (ref 0.3–6.2)
ERYTHROCYTE [DISTWIDTH] IN BLOOD BY AUTOMATED COUNT: 16.2 % (ref 12.3–15.4)
GFR SERPL CREATININE-BSD FRML MDRD: 111 ML/MIN/1.73
GLUCOSE BLDC GLUCOMTR-MCNC: 116 MG/DL (ref 70–130)
GLUCOSE BLDC GLUCOMTR-MCNC: 125 MG/DL (ref 70–130)
GLUCOSE BLDC GLUCOMTR-MCNC: 131 MG/DL (ref 70–130)
GLUCOSE BLDC GLUCOMTR-MCNC: 132 MG/DL (ref 70–130)
GLUCOSE SERPL-MCNC: 124 MG/DL (ref 65–99)
HCT VFR BLD AUTO: 27.7 % (ref 37.5–51)
HGB BLD-MCNC: 8.3 G/DL (ref 13–17.7)
IMM GRANULOCYTES # BLD AUTO: 0.19 10*3/MM3 (ref 0–0.05)
IMM GRANULOCYTES NFR BLD AUTO: 3.2 % (ref 0–0.5)
LYMPHOCYTES # BLD AUTO: 1.08 10*3/MM3 (ref 0.7–3.1)
LYMPHOCYTES NFR BLD AUTO: 18.2 % (ref 19.6–45.3)
MCH RBC QN AUTO: 27.8 PG (ref 26.6–33)
MCHC RBC AUTO-ENTMCNC: 30 G/DL (ref 31.5–35.7)
MCV RBC AUTO: 92.6 FL (ref 79–97)
MONOCYTES # BLD AUTO: 0.49 10*3/MM3 (ref 0.1–0.9)
MONOCYTES NFR BLD AUTO: 8.2 % (ref 5–12)
NEUTROPHILS NFR BLD AUTO: 4.11 10*3/MM3 (ref 1.7–7)
NEUTROPHILS NFR BLD AUTO: 69.2 % (ref 42.7–76)
NRBC BLD AUTO-RTO: 0 /100 WBC (ref 0–0.2)
PLATELET # BLD AUTO: 394 10*3/MM3 (ref 140–450)
PMV BLD AUTO: 9.9 FL (ref 6–12)
POTASSIUM SERPL-SCNC: 4.2 MMOL/L (ref 3.5–5.2)
RBC # BLD AUTO: 2.99 10*6/MM3 (ref 4.14–5.8)
SODIUM SERPL-SCNC: 132 MMOL/L (ref 136–145)
WBC # BLD AUTO: 5.94 10*3/MM3 (ref 3.4–10.8)

## 2021-07-08 PROCEDURE — 85025 COMPLETE CBC W/AUTO DIFF WBC: CPT | Performed by: INTERNAL MEDICINE

## 2021-07-08 PROCEDURE — 82962 GLUCOSE BLOOD TEST: CPT

## 2021-07-08 PROCEDURE — 99232 SBSQ HOSP IP/OBS MODERATE 35: CPT | Performed by: INTERNAL MEDICINE

## 2021-07-08 PROCEDURE — 99024 POSTOP FOLLOW-UP VISIT: CPT | Performed by: PHYSICIAN ASSISTANT

## 2021-07-08 PROCEDURE — 80048 BASIC METABOLIC PNL TOTAL CA: CPT | Performed by: INTERNAL MEDICINE

## 2021-07-08 RX ADMIN — Medication 5 MG: at 21:07

## 2021-07-08 RX ADMIN — TERAZOSIN HYDROCHLORIDE 5 MG: 5 CAPSULE ORAL at 21:07

## 2021-07-08 RX ADMIN — THIAMINE HCL TAB 100 MG 100 MG: 100 TAB at 08:59

## 2021-07-08 RX ADMIN — FAMOTIDINE 20 MG: 20 TABLET, FILM COATED ORAL at 21:07

## 2021-07-08 RX ADMIN — METOPROLOL TARTRATE 50 MG: 50 TABLET, FILM COATED ORAL at 21:06

## 2021-07-08 RX ADMIN — AMLODIPINE BESYLATE 5 MG: 5 TABLET ORAL at 08:58

## 2021-07-08 RX ADMIN — ASPIRIN 325 MG ORAL TABLET 325 MG: 325 PILL ORAL at 08:58

## 2021-07-08 RX ADMIN — AMIODARONE HYDROCHLORIDE 200 MG: 200 TABLET ORAL at 08:59

## 2021-07-08 RX ADMIN — SODIUM CHLORIDE, PRESERVATIVE FREE 10 ML: 5 INJECTION INTRAVENOUS at 21:09

## 2021-07-08 RX ADMIN — LOSARTAN POTASSIUM 100 MG: 50 TABLET, FILM COATED ORAL at 08:58

## 2021-07-08 RX ADMIN — SODIUM CHLORIDE, PRESERVATIVE FREE 10 ML: 5 INJECTION INTRAVENOUS at 09:00

## 2021-07-08 RX ADMIN — FAMOTIDINE 20 MG: 20 TABLET, FILM COATED ORAL at 08:58

## 2021-07-08 RX ADMIN — AMIODARONE HYDROCHLORIDE 200 MG: 200 TABLET ORAL at 21:08

## 2021-07-08 RX ADMIN — FOLIC ACID 1 MG: 1 TABLET ORAL at 08:59

## 2021-07-08 RX ADMIN — METOPROLOL TARTRATE 50 MG: 50 TABLET, FILM COATED ORAL at 08:59

## 2021-07-08 RX ADMIN — DOCUSATE SODIUM 100 MG: 100 CAPSULE, LIQUID FILLED ORAL at 08:58

## 2021-07-08 RX ADMIN — ATORVASTATIN CALCIUM 40 MG: 40 TABLET, FILM COATED ORAL at 21:06

## 2021-07-08 NOTE — PROGRESS NOTES
"INTENSIVIST NOTE     Hospital Day: 16    Mr. Thien Gray, 65 y.o. male is followed for:   Perioperative management of comorbid medical conditions       SUBJECTIVE       Interval history:    Fluid balance roughly even.  Awake and alert.  Oriented x4.  On room air.  Normal sinus rhythm.  No drips.    The patient's relevant past medical, surgical and social history were reviewed and updated in Epic as appropriate.       OBJECTIVE     Vital Sign Min/Max for last 24 hours  Temp  Min: 97.6 °F (36.4 °C)  Max: 98.2 °F (36.8 °C)   BP  Min: 103/39  Max: 146/60   Pulse  Min: 50  Max: 94   Resp  Min: 20  Max: 20   SpO2  Min: 93 %  Max: 98 %   No data recorded   Weight  Min: 112 kg (248 lb)  Max: 112 kg (248 lb)      Intake/Output Summary (Last 24 hours) at 7/8/2021 1315  Last data filed at 7/8/2021 0858  Gross per 24 hour   Intake 1440 ml   Output 1200 ml   Net 240 ml      Flowsheet Rows      First Filed Value   Admission Height  177.8 cm (70\") Documented at 06/22/2021 1400   Admission Weight  118 kg (260 lb 2.3 oz) Documented at 06/22/2021 2000             07/06/21  0500 07/07/21  0500 07/08/21  0400   Weight: 113 kg (248 lb 4.8 oz) 113 kg (248 lb 14.4 oz) 112 kg (248 lb)            Objective:  General Appearance:  In no acute distress.    Vital signs: (most recent): Blood pressure 134/67, pulse 64, temperature 97.6 °F (36.4 °C), temperature source Oral, resp. rate 20, height 180 cm (70.87\"), weight 112 kg (248 lb), SpO2 97 %.    HEENT: Normal HEENT exam.    Lungs:  Normal effort and normal respiratory rate.  Breath sounds clear to auscultation.  He is not in respiratory distress.  No rales, wheezes or rhonchi.    Heart: Normal rate.  Regular rhythm.  S1 normal and S2 normal.  No murmur, gallop or friction rub.   Chest: Symmetric chest wall expansion.   Abdomen: Abdomen is soft and non-distended.  Bowel sounds are normal.   There is no abdominal tenderness.   There is no mass. There is no splenomegaly. There is no " hepatomegaly.   Extremities: There is no deformity or dependent edema.    Neurological: Patient is alert and oriented to person, place and time.    Pupils:  Pupils are equal, round, and reactive to light.    Skin:  Warm and dry.              I reviewed the patient's new clinical results.  I reviewed the patient's new imaging results/reports including actual images and agree with reports.      Chest X-Ray: No additional    INFUSIONS       Results from last 7 days   Lab Units 07/08/21  0641 07/07/21  0534 07/05/21  1016   WBC 10*3/mm3 5.94 6.62 9.11   HEMOGLOBIN g/dL 8.3* 8.3* 8.3*   HEMATOCRIT % 27.7* 27.4* 29.0*   PLATELETS 10*3/mm3 394 378 425     Results from last 7 days   Lab Units 07/08/21  0641 07/07/21  0534 07/05/21  1016   SODIUM mmol/L 132* 132* 140   POTASSIUM mmol/L 4.2 4.2 4.5   CHLORIDE mmol/L 102 102 110*   CO2 mmol/L 22.0 20.0* 20.0*   BUN mg/dL 19 25* 44*   GLUCOSE mg/dL 124* 108* 146*   CREATININE mg/dL 0.71* 0.69* 0.68*   CALCIUM mg/dL 8.7 8.6 8.9                 [facility] Ventilation:      I reviewed the patient's medications.    Assessment/Plan   ASSESSMENT/PLAN     Active Hospital Problems    Diagnosis    • **CABG 06/22/21    • Hyperglycemia    • STEMI    • Dyslipidemia    • Obesity (BMI 30-39.9)    • History of tobacco abuse        65 y.o. male remote smoker with history of hypertension, coronary artery disease status post stent in 2004, obesity, BPH, alcohol use, admitted on 6/22/2021 with chest pain and found to have severe multivessel coronary artery disease.     Patient underwent CABG x4 with intra-aortic balloon pump placement by Dr. Acosta on 6/22/2021.  He went back to the operating room on 6/23/2021 for bleeding and had evacuation of blood from the right pleural space.     Postoperative issues have included delirium/agitation possibly secondary to alcohol withdrawal, hyperglycemia, hypoxemia requiring supplemental oxygen.    His condition overall stabilized and he was transferred to PCU on  7/5.    Sodium stable at 132 today.    1. Check blood pressure in legs as may have arterial stenosis resulting in isolated low blood pressure in left arm.  Has right PICC line.  2. Oral amiodarone protocol  3. Aspirin/statin  4. Sliding scale insulin  5. Mobilize/ambulate  6. Monitor sodium  7. Possible discharge later this week with rehab     I discussed the patient's findings and my recommendations with patient and nursing staff     Plan of care and goals reviewed with multidisciplinary team at daily rounds.    .    Gatito Hubbard MD  Pulmonary and Critical Care Medicine  07/08/21 13:15 EDT

## 2021-07-08 NOTE — PROGRESS NOTES
Cardiothoracic Surgery Progress Note    6/22/21 S/P CABGx4/mediastinal exploration/Thrombin ablation of left femoral pseudo-aneurysm     LOS: 16 days      Subjective:  Doing well this morning. Resting in bed. States he slept well overnight. No acute complaints.     Objective:  Vital Signs  Temp:  [97.5 °F (36.4 °C)-98.2 °F (36.8 °C)] 97.6 °F (36.4 °C)  Heart Rate:  [50-86] 54  Resp:  [20] 20  BP: (103-146)/(39-66) 145/66    Physical Exam:   General Appearance: alert, appears stated age and cooperative, conversant   Lungs: clear to auscultation, respirations regular, respirations even and respirations unlabored   Heart: regular rhythm & normal rate, normal S1, S2 and no murmur, no gallop, no rub   Skin: Incision c/d/i     Results:    Results from last 7 days   Lab Units 07/08/21  0641   WBC 10*3/mm3 5.94   HEMOGLOBIN g/dL 8.3*   HEMATOCRIT % 27.7*   PLATELETS 10*3/mm3 394     Results from last 7 days   Lab Units 07/07/21  0534   SODIUM mmol/L 132*   POTASSIUM mmol/L 4.2   CHLORIDE mmol/L 102   CO2 mmol/L 20.0*   BUN mg/dL 25*   CREATININE mg/dL 0.69*   GLUCOSE mg/dL 108*   CALCIUM mg/dL 8.6       Assessment:  • **CABG 06/22/21     • Hyperglycemia     • STEMI     • Dyslipidemia     • Obesity (BMI 30-39.9)     • History of tobacco abuse            Plan:  Diet per SLP  Hold ativan and Precedex  Beer TID  Minimize narcotics  Ambulate  Pulmonary toilet  ASA, statin, BB  H&H stable - continue to monitor  Will need rehab placement upon discharge. Hopefully D/C later this week.        Harman Montes PA-C  07/08/21  07:24 EDT

## 2021-07-08 NOTE — PROGRESS NOTES
"                                 [facility] Progress Note      LOS: 16 days   Patient Care Team:  Sanaz Zuluaga DO as PCP - General (Family Medicine)    Chief Complaint:    Chief Complaint   Patient presents with   • Chest Pain       Subjective     Interval History:     Patient Complaints: Alert and conversant      Review of Systems:   A 14 point review of systems was negative except as was stated in the HPI      Objective     Vital Sign Min/Max for last 24 hours  Temp  Min: 97.5 °F (36.4 °C)  Max: 98.2 °F (36.8 °C)   BP  Min: 103/39  Max: 146/60   Pulse  Min: 50  Max: 86   Resp  Min: 20  Max: 20   SpO2  Min: 91 %  Max: 98 %   No data recorded   Weight  Min: 112 kg (248 lb)  Max: 112 kg (248 lb)     Flowsheet Rows      First Filed Value   Admission Height  177.8 cm (70\") Documented at 06/22/2021 1400   Admission Weight  118 kg (260 lb 2.3 oz) Documented at 06/22/2021 2000          Physical Exam:  General Appearance: Well-nourished well-developed white male no acute distress  Lungs: Clear  Heart:: Regular rate and rhythm this morning; no Murmurs, Rubs or Gallops  Abdomen: Soft and nontender with adequate bowel sounds.  No organomegaly  Extremities: No cyanosis, clubbing.  Trace edema  Pulses: Pulses palpable and equal bilaterally; left groin stable  Skin: Warm and dry with no rash  Psych: Normal     Results Review:     I reviewed the patient's new clinical results.  Results from last 7 days   Lab Units 07/08/21  0641 07/07/21  0534 07/05/21  1016   SODIUM mmol/L 132* 132* 140   POTASSIUM mmol/L 4.2 4.2 4.5   CHLORIDE mmol/L 102 102 110*   CO2 mmol/L 22.0 20.0* 20.0*   BUN mg/dL 19 25* 44*   CREATININE mg/dL 0.71* 0.69* 0.68*   GLUCOSE mg/dL 124* 108* 146*   CALCIUM mg/dL 8.7 8.6 8.9     Results from last 7 days   Lab Units 07/08/21  0641 07/07/21  0534 07/05/21  1016   WBC 10*3/mm3 5.94 6.62 9.11   HEMOGLOBIN g/dL 8.3* 8.3* 8.3*   HEMATOCRIT % 27.7* 27.4* 29.0*   PLATELETS 10*3/mm3 394 466 679 "     Lab Results   Lab Value Date/Time    TROPONINT 1.730 (C) 06/22/2021 1705    TROPONINT <0.010 06/22/2021 1009                       Medication Review: yes  Current Facility-Administered Medications   Medication Dose Route Frequency Provider Last Rate Last Admin   • albuterol (PROVENTIL) nebulizer solution 0.083% 2.5 mg/3mL  2.5 mg Nebulization Q6H PRN Holger Angel PA-C   2.5 mg at 07/06/21 2351   • amiodarone (PACERONE) tablet 200 mg  200 mg Oral Q12H Miguel Angel Acosta MD   200 mg at 07/07/21 2021    Followed by   • [START ON 7/21/2021] amiodarone (PACERONE) tablet 200 mg  200 mg Oral Daily Miguel Angel Acosta MD       • amLODIPine (NORVASC) tablet 5 mg  5 mg Oral Q24H Miguel Angel Acosta MD   5 mg at 07/07/21 1035   • aspirin tablet 325 mg  325 mg Oral Daily Miguel Angel Acosta MD   325 mg at 07/07/21 1034   • atorvastatin (LIPITOR) tablet 40 mg  40 mg Oral Nightly Miguel Angel Acosta MD   40 mg at 07/07/21 2020   • beer 1 each  1 each Oral TID PRN Holger Angel PA-C   1 each at 07/07/21 1635   • docusate sodium (COLACE) capsule 100 mg  100 mg Oral BID Mark Anthony Hawkins, PharmD   100 mg at 07/07/21 2020   • famotidine (PEPCID) tablet 20 mg  20 mg Oral BID Miguel Angel Acosta MD   20 mg at 07/07/21 2020   • folic acid (FOLVITE) tablet 1 mg  1 mg Oral Daily Miugel Angel Acosta MD   1 mg at 07/07/21 1034   • insulin lispro (humaLOG) injection 0-9 Units  0-9 Units Subcutaneous 4x Daily With Meals & Nightly Mark Anthony Hawkins, PharmD       • losartan (COZAAR) tablet 100 mg  100 mg Oral Q24H Miguel Angel Acosta MD   100 mg at 07/07/21 1035   • magnesium sulfate 4g/100mL (PREMIX) infusion  4 g Intravenous PRN Holger Angel PA-C   4 g at 06/28/21 0607   • melatonin tablet 5 mg  5 mg Oral Nightly Miguel Angel Acosta MD   5 mg at 07/07/21 2020   • metoprolol tartrate (LOPRESSOR) tablet 50 mg  50 mg Oral Q12H Miguel Angel Acosta MD   50 mg at 07/07/21 2020   • morphine injection 2 mg  2 mg Intravenous Q2H PRN Holger Angel PA-C   2 mg at  07/02/21 2342   • nicotine (NICODERM CQ) 7 MG/24HR patch 1 patch  1 patch Transdermal Q24H Holger Angel PA-C   1 patch at 07/06/21 0834   • ondansetron (ZOFRAN) injection 4 mg  4 mg Intravenous Q6H PRN Holger Angel PA-C       • Pharmacy Consult - MTM   Does not apply Daily Holger Angel PA-C       • potassium chloride (KLOR-CON) packet 40 mEq  40 mEq Oral PRN Miguel Angel Acosta MD       • potassium chloride (MICRO-K) CR capsule 40 mEq  40 mEq Oral PRN Miguel Angel Acosta MD       • sodium chloride 0.9 % flush 10 mL  10 mL Intravenous Q12H Holger Angel PA-C   10 mL at 07/07/21 2022   • sodium chloride 0.9 % flush 10 mL  10 mL Intravenous PRN Holger Angel PA-C       • terazosin (HYTRIN) capsule 5 mg  5 mg Oral Nightly Miguel Angel Acosta MD   5 mg at 07/07/21 2020   • thiamine (VITAMIN B-1) tablet 100 mg  100 mg Oral Daily Miguel Angel Acosta MD   100 mg at 07/07/21 1035         CABG 06/22/21    History of tobacco abuse    STEMI    Dyslipidemia    Obesity (BMI 30-39.9)    Hyperglycemia        Impression      Inferior STEMI 6/22/2021  Emergent CABG for left main and three-vessel coronary artery disease with preserved LV function 6/22/2021  reop for bleeding 6/23/2021 early a.m.  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

## 2021-07-09 VITALS
RESPIRATION RATE: 16 BRPM | HEART RATE: 66 BPM | HEIGHT: 71 IN | OXYGEN SATURATION: 94 % | WEIGHT: 249.3 LBS | TEMPERATURE: 98.2 F | SYSTOLIC BLOOD PRESSURE: 134 MMHG | DIASTOLIC BLOOD PRESSURE: 68 MMHG | BODY MASS INDEX: 34.9 KG/M2

## 2021-07-09 LAB
ANION GAP SERPL CALCULATED.3IONS-SCNC: 9 MMOL/L (ref 5–15)
BASOPHILS # BLD AUTO: 0.03 10*3/MM3 (ref 0–0.2)
BASOPHILS NFR BLD AUTO: 0.5 % (ref 0–1.5)
BUN SERPL-MCNC: 17 MG/DL (ref 8–23)
BUN/CREAT SERPL: 22.7 (ref 7–25)
CALCIUM SPEC-SCNC: 8.5 MG/DL (ref 8.6–10.5)
CHLORIDE SERPL-SCNC: 106 MMOL/L (ref 98–107)
CO2 SERPL-SCNC: 21 MMOL/L (ref 22–29)
CREAT SERPL-MCNC: 0.75 MG/DL (ref 0.76–1.27)
DEPRECATED RDW RBC AUTO: 54.8 FL (ref 37–54)
EOSINOPHIL # BLD AUTO: 0.04 10*3/MM3 (ref 0–0.4)
EOSINOPHIL NFR BLD AUTO: 0.7 % (ref 0.3–6.2)
ERYTHROCYTE [DISTWIDTH] IN BLOOD BY AUTOMATED COUNT: 16.5 % (ref 12.3–15.4)
GFR SERPL CREATININE-BSD FRML MDRD: 104 ML/MIN/1.73
GLUCOSE BLDC GLUCOMTR-MCNC: 119 MG/DL (ref 70–130)
GLUCOSE BLDC GLUCOMTR-MCNC: 121 MG/DL (ref 70–130)
GLUCOSE SERPL-MCNC: 106 MG/DL (ref 65–99)
HCT VFR BLD AUTO: 27.9 % (ref 37.5–51)
HGB BLD-MCNC: 8.4 G/DL (ref 13–17.7)
IMM GRANULOCYTES # BLD AUTO: 0.2 10*3/MM3 (ref 0–0.05)
IMM GRANULOCYTES NFR BLD AUTO: 3.3 % (ref 0–0.5)
LYMPHOCYTES # BLD AUTO: 1.12 10*3/MM3 (ref 0.7–3.1)
LYMPHOCYTES NFR BLD AUTO: 18.4 % (ref 19.6–45.3)
MCH RBC QN AUTO: 27.5 PG (ref 26.6–33)
MCHC RBC AUTO-ENTMCNC: 30.1 G/DL (ref 31.5–35.7)
MCV RBC AUTO: 91.2 FL (ref 79–97)
MONOCYTES # BLD AUTO: 0.68 10*3/MM3 (ref 0.1–0.9)
MONOCYTES NFR BLD AUTO: 11.2 % (ref 5–12)
NEUTROPHILS NFR BLD AUTO: 4.01 10*3/MM3 (ref 1.7–7)
NEUTROPHILS NFR BLD AUTO: 65.9 % (ref 42.7–76)
NRBC BLD AUTO-RTO: 0 /100 WBC (ref 0–0.2)
PLATELET # BLD AUTO: 387 10*3/MM3 (ref 140–450)
PMV BLD AUTO: 9.9 FL (ref 6–12)
POTASSIUM SERPL-SCNC: 4.8 MMOL/L (ref 3.5–5.2)
RBC # BLD AUTO: 3.06 10*6/MM3 (ref 4.14–5.8)
SODIUM SERPL-SCNC: 136 MMOL/L (ref 136–145)
WBC # BLD AUTO: 6.08 10*3/MM3 (ref 3.4–10.8)

## 2021-07-09 PROCEDURE — 92507 TX SP LANG VOICE COMM INDIV: CPT

## 2021-07-09 PROCEDURE — 80048 BASIC METABOLIC PNL TOTAL CA: CPT | Performed by: INTERNAL MEDICINE

## 2021-07-09 PROCEDURE — 82962 GLUCOSE BLOOD TEST: CPT

## 2021-07-09 PROCEDURE — 99232 SBSQ HOSP IP/OBS MODERATE 35: CPT | Performed by: INTERNAL MEDICINE

## 2021-07-09 PROCEDURE — 99024 POSTOP FOLLOW-UP VISIT: CPT | Performed by: THORACIC SURGERY (CARDIOTHORACIC VASCULAR SURGERY)

## 2021-07-09 PROCEDURE — 85025 COMPLETE CBC W/AUTO DIFF WBC: CPT | Performed by: INTERNAL MEDICINE

## 2021-07-09 PROCEDURE — 97530 THERAPEUTIC ACTIVITIES: CPT

## 2021-07-09 RX ORDER — AMIODARONE HYDROCHLORIDE 200 MG/1
200 TABLET ORAL DAILY
Qty: 30 TABLET | Refills: 6 | Status: SHIPPED | OUTPATIENT
Start: 2021-07-09 | End: 2021-09-09

## 2021-07-09 RX ORDER — ATORVASTATIN CALCIUM 40 MG/1
40 TABLET, FILM COATED ORAL NIGHTLY
Qty: 30 TABLET | Refills: 6 | Status: SHIPPED | OUTPATIENT
Start: 2021-07-09 | End: 2021-09-09

## 2021-07-09 RX ORDER — LOSARTAN POTASSIUM 100 MG/1
100 TABLET ORAL
Qty: 30 TABLET | Refills: 6 | Status: SHIPPED | OUTPATIENT
Start: 2021-07-10 | End: 2021-07-19

## 2021-07-09 RX ORDER — AMIODARONE HYDROCHLORIDE 200 MG/1
200 TABLET ORAL DAILY
Qty: 30 TABLET | Refills: 0 | Status: SHIPPED | OUTPATIENT
Start: 2021-07-21 | End: 2021-07-09 | Stop reason: HOSPADM

## 2021-07-09 RX ORDER — METOPROLOL TARTRATE 50 MG/1
50 TABLET, FILM COATED ORAL EVERY 12 HOURS SCHEDULED
Qty: 60 TABLET | Refills: 6 | Status: SHIPPED | OUTPATIENT
Start: 2021-07-09 | End: 2022-01-26

## 2021-07-09 RX ORDER — ASPIRIN 325 MG
325 TABLET ORAL DAILY
Qty: 30 TABLET | Refills: 6 | Status: SHIPPED | OUTPATIENT
Start: 2021-07-10 | End: 2021-09-09

## 2021-07-09 RX ORDER — AMLODIPINE BESYLATE 5 MG/1
5 TABLET ORAL
Qty: 30 TABLET | Refills: 6 | Status: SHIPPED | OUTPATIENT
Start: 2021-07-10 | End: 2021-09-09

## 2021-07-09 RX ADMIN — DOCUSATE SODIUM 100 MG: 100 CAPSULE, LIQUID FILLED ORAL at 08:42

## 2021-07-09 RX ADMIN — FAMOTIDINE 20 MG: 20 TABLET, FILM COATED ORAL at 08:43

## 2021-07-09 RX ADMIN — AMIODARONE HYDROCHLORIDE 200 MG: 200 TABLET ORAL at 08:44

## 2021-07-09 RX ADMIN — METOPROLOL TARTRATE 50 MG: 50 TABLET, FILM COATED ORAL at 08:43

## 2021-07-09 RX ADMIN — FOLIC ACID 1 MG: 1 TABLET ORAL at 08:42

## 2021-07-09 RX ADMIN — LOSARTAN POTASSIUM 100 MG: 50 TABLET, FILM COATED ORAL at 08:44

## 2021-07-09 RX ADMIN — AMLODIPINE BESYLATE 5 MG: 5 TABLET ORAL at 08:44

## 2021-07-09 RX ADMIN — SODIUM CHLORIDE, PRESERVATIVE FREE 10 ML: 5 INJECTION INTRAVENOUS at 08:44

## 2021-07-09 RX ADMIN — ASPIRIN 325 MG ORAL TABLET 325 MG: 325 PILL ORAL at 08:43

## 2021-07-09 RX ADMIN — THIAMINE HCL TAB 100 MG 100 MG: 100 TAB at 08:43

## 2021-07-09 NOTE — PROGRESS NOTES
"                                 [facility] Progress Note      LOS: 17 days   Patient Care Team:  Sanaz Zuluaga DO as PCP - General (Family Medicine)    Chief Complaint:    Chief Complaint   Patient presents with   • Chest Pain       Subjective     Interval History:     Patient Complaints: Alert and conversant      Review of Systems:   A 14 point review of systems was negative except as was stated in the HPI      Objective     Vital Sign Min/Max for last 24 hours  Temp  Min: 97.8 °F (36.6 °C)  Max: 98.2 °F (36.8 °C)   BP  Min: 90/49  Max: 150/79   Pulse  Min: 47  Max: 92   Resp  Min: 18  Max: 20   SpO2  Min: 97 %  Max: 100 %   No data recorded   Weight  Min: 113 kg (249 lb 4.8 oz)  Max: 113 kg (249 lb 4.8 oz)     Flowsheet Rows      First Filed Value   Admission Height  177.8 cm (70\") Documented at 06/22/2021 1400   Admission Weight  118 kg (260 lb 2.3 oz) Documented at 06/22/2021 2000          Physical Exam:  General Appearance: Well-nourished well-developed white male no acute distress  Lungs: Clear  Heart:: Regular rate and rhythm this morning; no Murmurs, Rubs or Gallops  Abdomen: Soft and nontender with adequate bowel sounds.  No organomegaly  Extremities: No cyanosis, clubbing.  Trace edema  Pulses: Pulses palpable and equal bilaterally; left groin stable  Skin: Warm and dry with no rash  Psych: Normal     Results Review:     I reviewed the patient's new clinical results.  Results from last 7 days   Lab Units 07/09/21  0608 07/08/21  0641 07/07/21  0534   SODIUM mmol/L 136 132* 132*   POTASSIUM mmol/L 4.8 4.2 4.2   CHLORIDE mmol/L 106 102 102   CO2 mmol/L 21.0* 22.0 20.0*   BUN mg/dL 17 19 25*   CREATININE mg/dL 0.75* 0.71* 0.69*   GLUCOSE mg/dL 106* 124* 108*   CALCIUM mg/dL 8.5* 8.7 8.6     Results from last 7 days   Lab Units 07/09/21  0607 07/08/21  0641 07/07/21  0534   WBC 10*3/mm3 6.08 5.94 6.62   HEMOGLOBIN g/dL 8.4* 8.3* 8.3*   HEMATOCRIT % 27.9* 27.7* 27.4*   PLATELETS 10*3/mm3 " 387 394 378     Lab Results   Lab Value Date/Time    TROPONINT 1.730 (C) 06/22/2021 1705    TROPONINT <0.010 06/22/2021 1009                       Medication Review: yes  Current Facility-Administered Medications   Medication Dose Route Frequency Provider Last Rate Last Admin   • albuterol (PROVENTIL) nebulizer solution 0.083% 2.5 mg/3mL  2.5 mg Nebulization Q6H PRN Holger Angel PA-C   2.5 mg at 07/06/21 2351   • amiodarone (PACERONE) tablet 200 mg  200 mg Oral Q12H Miguel Angel Acosta MD   200 mg at 07/09/21 0844    Followed by   • [START ON 7/21/2021] amiodarone (PACERONE) tablet 200 mg  200 mg Oral Daily Miguel Angel Acosta MD       • amLODIPine (NORVASC) tablet 5 mg  5 mg Oral Q24H Miguel Angel Acosta MD   5 mg at 07/09/21 0844   • aspirin tablet 325 mg  325 mg Oral Daily Miguel Angel Acosta MD   325 mg at 07/09/21 0843   • atorvastatin (LIPITOR) tablet 40 mg  40 mg Oral Nightly Miguel Angel Acosta MD   40 mg at 07/08/21 2106   • beer 1 each  1 each Oral TID PRN Holger Angel PA-C   1 each at 07/07/21 1635   • docusate sodium (COLACE) capsule 100 mg  100 mg Oral BID Mark Anthony Hawkins PharmD   100 mg at 07/09/21 0842   • famotidine (PEPCID) tablet 20 mg  20 mg Oral BID Miguel Angel Acosta MD   20 mg at 07/09/21 0843   • folic acid (FOLVITE) tablet 1 mg  1 mg Oral Daily Miguel Angel Acosta MD   1 mg at 07/09/21 0842   • insulin lispro (humaLOG) injection 0-9 Units  0-9 Units Subcutaneous 4x Daily With Meals & Nightly Mark Anthony Hawkins, PharmD       • losartan (COZAAR) tablet 100 mg  100 mg Oral Q24H Miguel Angel Acosta MD   100 mg at 07/09/21 0844   • magnesium sulfate 4g/100mL (PREMIX) infusion  4 g Intravenous PRN Holger Angel PA-C   4 g at 06/28/21 0607   • melatonin tablet 5 mg  5 mg Oral Nightly Miguel Angel Acosta MD   5 mg at 07/08/21 2107   • metoprolol tartrate (LOPRESSOR) tablet 50 mg  50 mg Oral Q12H Miguel Angel Acosta MD   50 mg at 07/09/21 0843   • morphine injection 2 mg  2 mg Intravenous Q2H PRN Holger Angel, DARIA    2 mg at 07/02/21 2342   • nicotine (NICODERM CQ) 7 MG/24HR patch 1 patch  1 patch Transdermal Q24H Holger Angel PA-C   1 patch at 07/06/21 0834   • ondansetron (ZOFRAN) injection 4 mg  4 mg Intravenous Q6H PRN Holger Angel PA-C       • Pharmacy Consult - MTM   Does not apply Daily Holger Angel PA-C       • potassium chloride (KLOR-CON) packet 40 mEq  40 mEq Oral PRN Miguel Angel Acosta MD       • potassium chloride (MICRO-K) CR capsule 40 mEq  40 mEq Oral PRN Miguel Angel Acosta MD       • sodium chloride 0.9 % flush 10 mL  10 mL Intravenous Q12H Holger Angel PA-C   10 mL at 07/09/21 0844   • sodium chloride 0.9 % flush 10 mL  10 mL Intravenous PRN Holger Angel PA-C       • terazosin (HYTRIN) capsule 5 mg  5 mg Oral Nightly Miguel Angel Acosta MD   5 mg at 07/08/21 2107   • thiamine (VITAMIN B-1) tablet 100 mg  100 mg Oral Daily Miguel Angel Acosta MD   100 mg at 07/09/21 0843         CABG 06/22/21    History of tobacco abuse    STEMI    Dyslipidemia    Obesity (BMI 30-39.9)    Hyperglycemia        Impression      Inferior STEMI 6/22/2021  Emergent CABG for left main and three-vessel coronary artery disease with preserved LV function 6/22/2021  reop for bleeding 6/23/2021 early a.m.  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

## 2021-07-09 NOTE — PROGRESS NOTES
Cardiothoracic Surgery Progress Note    POD# 17/16/7 S/P CABGx4/mediastinal exploration/Thrombin ablation of left femoral pseudo-aneurysm     LOS: 17 days      Subjective:  Doing well this morning. Resting in bed.  Admits to be somewhat restless night.  Denies chest pain or shortness of breath.    Objective:  Vital Signs  Temp:  [97.8 °F (36.6 °C)-98.2 °F (36.8 °C)] 98.2 °F (36.8 °C)  Heart Rate:  [47-94] 79  Resp:  [18-20] 18  BP: ()/(47-67) 143/57    Physical Exam:   General Appearance: alert, appears stated age and cooperative, conversant   Lungs: clear to auscultation, respirations regular, respirations even and respirations unlabored   Heart: regular rhythm & normal rate, normal S1, S2 and no murmur, no gallop, no rub   Skin: Incision c/d/i     Results:    Results from last 7 days   Lab Units 07/09/21  0607   WBC 10*3/mm3 6.08   HEMOGLOBIN g/dL 8.4*   HEMATOCRIT % 27.9*   PLATELETS 10*3/mm3 387     Results from last 7 days   Lab Units 07/08/21  0641   SODIUM mmol/L 132*   POTASSIUM mmol/L 4.2   CHLORIDE mmol/L 102   CO2 mmol/L 22.0   BUN mg/dL 19   CREATININE mg/dL 0.71*   GLUCOSE mg/dL 124*   CALCIUM mg/dL 8.7       Assessment:  • **CABG 06/22/21     • Hyperglycemia     • STEMI     • Dyslipidemia     • Obesity (BMI 30-39.9)     • History of tobacco abuse            Plan:  Diet per SLP  Hold ativan and Precedex  Beer TID  Minimize narcotics  Ambulate  Pulmonary toilet  ASA, statin, BB  H&H stable - continue to monitor  Awaiting rehab placement.        Harman Montes PA-C  07/09/21  07:13 EDT     Status as outlined patient current ambulating in the [facility] and doing well.  2 saturation room air is 94% during ambulation.  We will continue current treatment regimen.  I believe this patient would benefit from a rehabilitation facility placement.I have reviewed, verified, and confirmed the above history and current status.  I have examined the patient and confirmed the above physical findings.    Miguel Angel  MD Dave  [facility]  07/09/21   10:27 EDT

## 2021-07-09 NOTE — PAYOR COMM NOTE
"Ref # [id number]  Guera Garcia RN, BSN  Phone # [phone]  Fax # [phone]  Patt Linares (65 y.o. Male)     Date of Birth Social Security Number Address Home Phone MRN    1955  [address]  [id number]  [facility] [address] [phone] [id number]    [employer] Marital Status          [employer]        Admission Date Admission Type Admitting Provider Attending Provider Department, Room/Bed    6/22/21 Emergency Mikey Conrad MD  [facility] 4H, S468/1    Discharge Date Discharge Disposition Discharge Destination        7/9/2021 Rehab Facility or Unit (DC - External)              Attending Provider: (none)   Allergies: Bactrim [Sulfamethoxazole-trimethoprim], Plavix [Clopidogrel Bisulfate], Augmentin [Amoxicillin-pot Clavulanate]    Isolation: None   Infection: None   Code Status: CPR    Ht: 180 cm (70.87\")   Wt: 113 kg (249 lb 4.8 oz)    Admission Cmt: None   Principal Problem: CABG 06/22/21 [I25.10]                 Active Insurance as of 6/22/2021     Primary Coverage     Payor Plan Insurance Group Employer/Plan Group    ANTHEM BLUE CROSS ANTHEM BLUE CROSS BLUE SHIELD PPO [id number]     Payor Plan Address Payor Plan Phone Number Payor Plan Fax Number Effective Dates    [address] [phone]  3/4/2019 - None Entered    [facility] [id number]       Subscriber Name Subscriber Birth Date Member ID       PATT LINARES 1955 [id number]           Secondary Coverage     Payor Plan Insurance Group Employer/Plan Group    AETNA MEDICARE REPLACEMENT AETNA MEDICARE REPLACEMENT [id number]     Payor Plan Address Payor Plan Phone Number Payor Plan Fax Number Effective Dates    [address] [phone]  7/1/2018 - None Entered    [facility] [id number]       Subscriber Name Subscriber Birth Date Member ID       PATT LINARES 1955 [id number]                   Current Facility-Administered Medications   Medication Dose Route Frequency Provider Last " Rate Last Admin   • albuterol (PROVENTIL) nebulizer solution 0.083% 2.5 mg/3mL  2.5 mg Nebulization Q6H PRN Holger Angel PA-C   2.5 mg at 07/06/21 2351   • amiodarone (PACERONE) tablet 200 mg  200 mg Oral Q12H Miguel Angel Acosta MD   200 mg at 07/09/21 0844    Followed by   • [START ON 7/21/2021] amiodarone (PACERONE) tablet 200 mg  200 mg Oral Daily Miguel Angel Acosta MD       • amLODIPine (NORVASC) tablet 5 mg  5 mg Oral Q24H Miguel Angel Acosta MD   5 mg at 07/09/21 0844   • aspirin tablet 325 mg  325 mg Oral Daily Miguel Angel Acosta MD   325 mg at 07/09/21 0843   • atorvastatin (LIPITOR) tablet 40 mg  40 mg Oral Nightly Miguel Angel Acosta MD   40 mg at 07/08/21 2106   • beer 1 each  1 each Oral TID PRN Holger Angel PA-C   1 each at 07/07/21 1635   • docusate sodium (COLACE) capsule 100 mg  100 mg Oral BID Mark Anthony Hawkins, PharmD   100 mg at 07/09/21 0842   • famotidine (PEPCID) tablet 20 mg  20 mg Oral BID Miguel Angel Acosta MD   20 mg at 07/09/21 0843   • folic acid (FOLVITE) tablet 1 mg  1 mg Oral Daily Miguel Angel Acosta MD   1 mg at 07/09/21 0842   • insulin lispro (humaLOG) injection 0-9 Units  0-9 Units Subcutaneous 4x Daily With Meals & Nightly Mark Anthony Hawkins, PharmD       • losartan (COZAAR) tablet 100 mg  100 mg Oral Q24H Miguel Angel Acosta MD   100 mg at 07/09/21 0844   • magnesium sulfate 4g/100mL (PREMIX) infusion  4 g Intravenous PRN Holger Angel PA-C   4 g at 06/28/21 0607   • melatonin tablet 5 mg  5 mg Oral Nightly Miguel Angel Acosat MD   5 mg at 07/08/21 2107   • metoprolol tartrate (LOPRESSOR) tablet 50 mg  50 mg Oral Q12H Miguel Angel Acosta MD   50 mg at 07/09/21 0843   • morphine injection 2 mg  2 mg Intravenous Q2H PRN Holger Angel PA-C   2 mg at 07/02/21 2342   • nicotine (NICODERM CQ) 7 MG/24HR patch 1 patch  1 patch Transdermal Q24H Holger Angel PA-C   1 patch at 07/06/21 0834   • ondansetron (ZOFRAN) injection 4 mg  4 mg Intravenous Q6H PRN Holger Angel PA-C       • Pharmacy  Consult - MTM   Does not apply Daily Holgre Angel PA-C       • potassium chloride (KLOR-CON) packet 40 mEq  40 mEq Oral PRN Miguel Angel Acosta MD       • potassium chloride (MICRO-K) CR capsule 40 mEq  40 mEq Oral PRN Miguel Angel Acosta MD       • sodium chloride 0.9 % flush 10 mL  10 mL Intravenous Q12H Holger Angel PA-C   10 mL at 07/09/21 0844   • sodium chloride 0.9 % flush 10 mL  10 mL Intravenous PRN Holger Angel PA-C       • terazosin (HYTRIN) capsule 5 mg  5 mg Oral Nightly MiguelA ngel Acosta MD   5 mg at 07/08/21 2107   • thiamine (VITAMIN B-1) tablet 100 mg  100 mg Oral Daily Miguel Angel Acosta MD   100 mg at 07/09/21 0843     Current Outpatient Medications   Medication Sig Dispense Refill   • albuterol sulfate  (90 Base) MCG/ACT inhaler Inhale 2 puffs Every 4 (Four) Hours As Needed for Wheezing or Shortness of Air. 8 g 5   • meloxicam (MOBIC) 7.5 MG tablet TAKE 1 TABLET DAILY WITH   FOOD 90 tablet 2   • tamsulosin (FLOMAX) 0.4 MG capsule 24 hr capsule Take 2 capsules by mouth Daily. 180 capsule 3   • triamcinolone (KENALOG) 0.025 % ointment Apply  topically to the appropriate area as directed 2 Times a Day for 10 days. Avoid applying to face, axilla, and groin 15 g 1   • amiodarone (Pacerone) 200 MG tablet Take 1 tablet by mouth Daily. 30 tablet 6   • [START ON 7/10/2021] amLODIPine (NORVASC) 5 MG tablet Take 1 tablet by mouth Daily. 30 tablet 6   • [START ON 7/10/2021] aspirin 325 MG tablet Take 1 tablet by mouth Daily. 30 tablet 6   • atorvastatin (LIPITOR) 40 MG tablet Take 1 tablet by mouth Every Night. 30 tablet 6   • [START ON 7/10/2021] losartan (COZAAR) 100 MG tablet Take 1 tablet by mouth Daily. 30 tablet 6   • metoprolol tartrate (LOPRESSOR) 50 MG tablet Take 1 tablet by mouth Every 12 (Twelve) Hours. 60 tablet 6            Physician Progress Notes (last 7 days) (Notes from 07/02/21 1538 through 07/09/21 1538)      Gatito Hubbard MD at 07/09/21 1309          INTENSIVIST  "NOTE     Hospital Day: 17    Mr. Thien Gray, 65 y.o. male is followed for:   Perioperative management of comorbid medical conditions       SUBJECTIVE       Interval history:    Afebrile awake and alert.  Fluid balance negative.  Plans for [facility] admission today.    The patient's relevant past medical, surgical and social history were reviewed and updated in Epic as appropriate.       OBJECTIVE     Vital Sign Min/Max for last 24 hours  Temp  Min: 97.8 °F (36.6 °C)  Max: 98.2 °F (36.8 °C)   BP  Min: 90/49  Max: 150/79   Pulse  Min: 47  Max: 92   Resp  Min: 17  Max: 20   SpO2  Min: 95 %  Max: 99 %   No data recorded   Weight  Min: 113 kg (249 lb 4.8 oz)  Max: 113 kg (249 lb 4.8 oz)      Intake/Output Summary (Last 24 hours) at 7/9/2021 1309  Last data filed at 7/9/2021 1248  Gross per 24 hour   Intake 1200 ml   Output 1950 ml   Net -750 ml      Flowsheet Rows      First Filed Value   Admission Height  177.8 cm (70\") Documented at 06/22/2021 1400   Admission Weight  118 kg (260 lb 2.3 oz) Documented at 06/22/2021 2000             07/07/21  0500 07/08/21  0400 07/09/21  0300   Weight: 113 kg (248 lb 14.4 oz) 112 kg (248 lb) 113 kg (249 lb 4.8 oz)            Objective:  General Appearance:  In no acute distress.    Vital signs: (most recent): Blood pressure 144/65, pulse 72, temperature 98.2 °F (36.8 °C), temperature source Oral, resp. rate 20, height 180 cm (70.87\"), weight 113 kg (249 lb 4.8 oz), SpO2 95 %.    HEENT: Normal HEENT exam.    Lungs:  Normal effort and normal respiratory rate.  Breath sounds clear to auscultation.  He is not in respiratory distress.  No rales, wheezes or rhonchi.    Heart: Normal rate.  Regular rhythm.  S1 normal and S2 normal.  No murmur, gallop or friction rub.   Chest: Symmetric chest wall expansion.   Abdomen: Abdomen is soft and non-distended.  Bowel sounds are normal.   There is no abdominal tenderness.   There is no mass. There is no splenomegaly. There is no " hepatomegaly.   Extremities: There is no deformity or dependent edema.    Neurological: Patient is alert and oriented to person, place and time.    Pupils:  Pupils are equal, round, and reactive to light.    Skin:  Warm and dry.              I reviewed the patient's new clinical results.  I reviewed the patient's new imaging results/reports including actual images and agree with reports.      Chest X-Ray: No additional    INFUSIONS       Results from last 7 days   Lab Units 07/09/21  0607 07/08/21  0641 07/07/21  0534   WBC 10*3/mm3 6.08 5.94 6.62   HEMOGLOBIN g/dL 8.4* 8.3* 8.3*   HEMATOCRIT % 27.9* 27.7* 27.4*   PLATELETS 10*3/mm3 387 394 378     Results from last 7 days   Lab Units 07/09/21  0608 07/08/21  0641 07/07/21  0534   SODIUM mmol/L 136 132* 132*   POTASSIUM mmol/L 4.8 4.2 4.2   CHLORIDE mmol/L 106 102 102   CO2 mmol/L 21.0* 22.0 20.0*   BUN mg/dL 17 19 25*   GLUCOSE mg/dL 106* 124* 108*   CREATININE mg/dL 0.75* 0.71* 0.69*   CALCIUM mg/dL 8.5* 8.7 8.6                 [facility] Ventilation:      I reviewed the patient's medications.    Assessment/Plan   ASSESSMENT/PLAN     Active Hospital Problems    Diagnosis    • **CABG 06/22/21    • Hyperglycemia    • STEMI    • Dyslipidemia    • Obesity (BMI 30-39.9)    • History of tobacco abuse        65 y.o. male remote smoker with history of hypertension, coronary artery disease status post stent in 2004, obesity, BPH, alcohol use, admitted on 6/22/2021 with chest pain and found to have severe multivessel coronary artery disease.     Patient underwent CABG x4 with intra-aortic balloon pump placement by Dr. Acosta on 6/22/2021.  He went back to the operating room on 6/23/2021 for bleeding and had evacuation of blood from the right pleural space.     Postoperative issues have included delirium/agitation possibly secondary to alcohol withdrawal, hyperglycemia, hypoxemia requiring supplemental oxygen.  These have all resolved.    His condition overall stabilized and he  "was transferred to PCU on 7/5.    Sodium up to 136.  He has been cleared for discharge by cardiology and CT surgery.  Plans are for admission to [facility] for rehab.    1. Have gone over his noncardiac medications for discharge  2. Transfer to [facility] later today     I discussed the patient's findings and my recommendations with patient and nursing staff     Plan of care and goals reviewed with multidisciplinary team at daily rounds.    .    Gatito Hubbard MD  Pulmonary and Critical Care Medicine  07/09/21 13:09 EDT         Electronically signed by Gatito Hubbard MD at 07/09/21 1312     Mikey Conrad MD at 07/09/21 0944                                           [facility] Progress Note      LOS: 17 days   Patient Care Team:  Sanaz Zuluaga DO as PCP - General (Family Medicine)    Chief Complaint:    Chief Complaint   Patient presents with   • Chest Pain       Subjective     Interval History:     Patient Complaints: Alert and conversant      Review of Systems:   A 14 point review of systems was negative except as was stated in the HPI      Objective     Vital Sign Min/Max for last 24 hours  Temp  Min: 97.8 °F (36.6 °C)  Max: 98.2 °F (36.8 °C)   BP  Min: 90/49  Max: 150/79   Pulse  Min: 47  Max: 92   Resp  Min: 18  Max: 20   SpO2  Min: 97 %  Max: 100 %   No data recorded   Weight  Min: 113 kg (249 lb 4.8 oz)  Max: 113 kg (249 lb 4.8 oz)     Flowsheet Rows      First Filed Value   Admission Height  177.8 cm (70\") Documented at 06/22/2021 1400   Admission Weight  118 kg (260 lb 2.3 oz) Documented at 06/22/2021 2000          Physical Exam:  General Appearance: Well-nourished well-developed white male no acute distress  Lungs: Clear  Heart:: Regular rate and rhythm this morning; no Murmurs, Rubs or Gallops  Abdomen: Soft and nontender with adequate bowel sounds.  No organomegaly  Extremities: No cyanosis, clubbing.  Trace edema  Pulses: Pulses " palpable and equal bilaterally; left groin stable  Skin: Warm and dry with no rash  Psych: Normal     Results Review:     I reviewed the patient's new clinical results.  Results from last 7 days   Lab Units 07/09/21  0608 07/08/21  0641 07/07/21  0534   SODIUM mmol/L 136 132* 132*   POTASSIUM mmol/L 4.8 4.2 4.2   CHLORIDE mmol/L 106 102 102   CO2 mmol/L 21.0* 22.0 20.0*   BUN mg/dL 17 19 25*   CREATININE mg/dL 0.75* 0.71* 0.69*   GLUCOSE mg/dL 106* 124* 108*   CALCIUM mg/dL 8.5* 8.7 8.6     Results from last 7 days   Lab Units 07/09/21  0607 07/08/21  0641 07/07/21  0534   WBC 10*3/mm3 6.08 5.94 6.62   HEMOGLOBIN g/dL 8.4* 8.3* 8.3*   HEMATOCRIT % 27.9* 27.7* 27.4*   PLATELETS 10*3/mm3 387 394 378     Lab Results   Lab Value Date/Time    TROPONINT 1.730 (C) 06/22/2021 1705    TROPONINT <0.010 06/22/2021 1009                       Medication Review: yes  Current Facility-Administered Medications   Medication Dose Route Frequency Provider Last Rate Last Admin   • albuterol (PROVENTIL) nebulizer solution 0.083% 2.5 mg/3mL  2.5 mg Nebulization Q6H PRN Holger Angel PA-C   2.5 mg at 07/06/21 2351   • amiodarone (PACERONE) tablet 200 mg  200 mg Oral Q12H Miguel Angel Acosta MD   200 mg at 07/09/21 0844    Followed by   • [START ON 7/21/2021] amiodarone (PACERONE) tablet 200 mg  200 mg Oral Daily Miguel Angel Acosta MD       • amLODIPine (NORVASC) tablet 5 mg  5 mg Oral Q24H Miguel Angel Acosta MD   5 mg at 07/09/21 0844   • aspirin tablet 325 mg  325 mg Oral Daily Miguel Angel Acosta MD   325 mg at 07/09/21 0843   • atorvastatin (LIPITOR) tablet 40 mg  40 mg Oral Nightly Miguel Angel Acosta MD   40 mg at 07/08/21 2106   • beer 1 each  1 each Oral TID PRN Holger Angel PA-C   1 each at 07/07/21 1635   • docusate sodium (COLACE) capsule 100 mg  100 mg Oral BID Mark Anthony Hawkins PharmD   100 mg at 07/09/21 0842   • famotidine (PEPCID) tablet 20 mg  20 mg Oral BID Miguel Angel Acosta MD   20 mg at 07/09/21 0843   • folic acid (FOLVITE)  tablet 1 mg  1 mg Oral Daily Miguel Angel Acosta MD   1 mg at 07/09/21 0842   • insulin lispro (humaLOG) injection 0-9 Units  0-9 Units Subcutaneous 4x Daily With Meals & Nightly Mark Anthony Hawkins, PharmD       • losartan (COZAAR) tablet 100 mg  100 mg Oral Q24H Miguel Angel Acosta MD   100 mg at 07/09/21 0844   • magnesium sulfate 4g/100mL (PREMIX) infusion  4 g Intravenous PRN Holger Angel PA-C   4 g at 06/28/21 0607   • melatonin tablet 5 mg  5 mg Oral Nightly Miguel Angel Acosta MD   5 mg at 07/08/21 2107   • metoprolol tartrate (LOPRESSOR) tablet 50 mg  50 mg Oral Q12H Miguel Angel Acosta MD   50 mg at 07/09/21 0843   • morphine injection 2 mg  2 mg Intravenous Q2H PRN Holger Angel PA-C   2 mg at 07/02/21 2342   • nicotine (NICODERM CQ) 7 MG/24HR patch 1 patch  1 patch Transdermal Q24H Holger Angel PA-C   1 patch at 07/06/21 0834   • ondansetron (ZOFRAN) injection 4 mg  4 mg Intravenous Q6H PRN Holger Angel PA-C       • Pharmacy Consult - MTM   Does not apply Daily Holger Angel PA-C       • potassium chloride (KLOR-CON) packet 40 mEq  40 mEq Oral PRN Miguel Angel Acosta MD       • potassium chloride (MICRO-K) CR capsule 40 mEq  40 mEq Oral PRN Miguel Angel Acosta MD       • sodium chloride 0.9 % flush 10 mL  10 mL Intravenous Q12H Holger Angel PA-C   10 mL at 07/09/21 0844   • sodium chloride 0.9 % flush 10 mL  10 mL Intravenous PRN Holger Angel PA-C       • terazosin (HYTRIN) capsule 5 mg  5 mg Oral Nightly Miguel Angel Acosta MD   5 mg at 07/08/21 2107   • thiamine (VITAMIN B-1) tablet 100 mg  100 mg Oral Daily Miguel Angel Acosta MD   100 mg at 07/09/21 0843         CABG 06/22/21    History of tobacco abuse    STEMI    Dyslipidemia    Obesity (BMI 30-39.9)    Hyperglycemia        Impression      Inferior STEMI 6/22/2021  Emergent CABG for left main and three-vessel coronary artery disease with preserved LV function 6/22/2021  reop for bleeding 6/23/2021 early  a.m.  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 Resting in bed.  Admits to be somewhat restless night.  Denies chest pain or shortness of breath.    Objective:  Vital Signs  Temp:  [97.8 °F (36.6 °C)-98.2 °F (36.8 °C)] 98.2 °F (36.8 °C)  Heart Rate:  [47-94] 79  Resp:  [18-20] 18  BP: ()/(47-67) 143/57    Physical Exam:   General Appearance: alert, appears stated age and cooperative, conversant   Lungs: clear to auscultation, respirations regular, respirations even and respirations unlabored   Heart: regular rhythm & normal rate, normal S1, S2 and no murmur, no gallop, no rub   Skin: Incision c/d/i     Results:    Results from last 7 days   Lab Units 07/09/21  0607   WBC 10*3/mm3 6.08   HEMOGLOBIN g/dL 8.4*   HEMATOCRIT % 27.9*   PLATELETS 10*3/mm3 387     Results from last 7 days   Lab Units 07/08/21  0641   SODIUM mmol/L 132*   POTASSIUM mmol/L 4.2   CHLORIDE mmol/L 102   CO2 mmol/L 22.0   BUN mg/dL 19   CREATININE mg/dL 0.71*   GLUCOSE mg/dL 124*   CALCIUM mg/dL 8.7       Assessment:  • **CABG 06/22/21     • Hyperglycemia     • STEMI     • Dyslipidemia     • Obesity (BMI 30-39.9)     • History of tobacco  "abuse            Plan:  Diet per SLP  Hold ativan and Precedex  Beer TID  Minimize narcotics  Ambulate  Pulmonary toilet  ASA, statin, BB  H&H stable - continue to monitor  Awaiting rehab placement.        Harman Montes PA-C  07/09/21  07:13 EDT     Status as outlined patient current ambulating in the [facility]ales and doing well.  2 saturation room air is 94% during ambulation.  We will continue current treatment regimen.  I believe this patient would benefit from a rehabilitation facility placement.I have reviewed, verified, and confirmed the above history and current status.  I have examined the patient and confirmed the above physical findings.    Miguel Angel Acosta MD  CTSurgery  07/09/21   10:27 EDT          Electronically signed by Miugel Angel Acosta MD at 07/09/21 1027     Gatito Hubbard MD at 07/08/21 1315          INTENSIVIST NOTE     Hospital Day: 16    Mr. Thien Gray, 65 y.o. male is followed for:   Perioperative management of comorbid medical conditions       SUBJECTIVE       Interval history:    Fluid balance roughly even.  Awake and alert.  Oriented x4.  On room air.  Normal sinus rhythm.  No drips.    The patient's relevant past medical, surgical and social history were reviewed and updated in Epic as appropriate.       OBJECTIVE     Vital Sign Min/Max for last 24 hours  Temp  Min: 97.6 °F (36.4 °C)  Max: 98.2 °F (36.8 °C)   BP  Min: 103/39  Max: 146/60   Pulse  Min: 50  Max: 94   Resp  Min: 20  Max: 20   SpO2  Min: 93 %  Max: 98 %   No data recorded   Weight  Min: 112 kg (248 lb)  Max: 112 kg (248 lb)      Intake/Output Summary (Last 24 hours) at 7/8/2021 1315  Last data filed at 7/8/2021 0858  Gross per 24 hour   Intake 1440 ml   Output 1200 ml   Net 240 ml      Flowsheet Rows      First Filed Value   Admission Height  177.8 cm (70\") Documented at 06/22/2021 1400   Admission Weight  118 kg (260 lb 2.3 oz) Documented at 06/22/2021 2000 07/06/21  0500 07/07/21  0500 " "07/08/21  0400   Weight: 113 kg (248 lb 4.8 oz) 113 kg (248 lb 14.4 oz) 112 kg (248 lb)            Objective:  General Appearance:  In no acute distress.    Vital signs: (most recent): Blood pressure 134/67, pulse 64, temperature 97.6 °F (36.4 °C), temperature source Oral, resp. rate 20, height 180 cm (70.87\"), weight 112 kg (248 lb), SpO2 97 %.    HEENT: Normal HEENT exam.    Lungs:  Normal effort and normal respiratory rate.  Breath sounds clear to auscultation.  He is not in respiratory distress.  No rales, wheezes or rhonchi.    Heart: Normal rate.  Regular rhythm.  S1 normal and S2 normal.  No murmur, gallop or friction rub.   Chest: Symmetric chest wall expansion.   Abdomen: Abdomen is soft and non-distended.  Bowel sounds are normal.   There is no abdominal tenderness.   There is no mass. There is no splenomegaly. There is no hepatomegaly.   Extremities: There is no deformity or dependent edema.    Neurological: Patient is alert and oriented to person, place and time.    Pupils:  Pupils are equal, round, and reactive to light.    Skin:  Warm and dry.              I reviewed the patient's new clinical results.  I reviewed the patient's new imaging results/reports including actual images and agree with reports.      Chest X-Ray: No additional    INFUSIONS       Results from last 7 days   Lab Units 07/08/21  0641 07/07/21  0534 07/05/21  1016   WBC 10*3/mm3 5.94 6.62 9.11   HEMOGLOBIN g/dL 8.3* 8.3* 8.3*   HEMATOCRIT % 27.7* 27.4* 29.0*   PLATELETS 10*3/mm3 394 378 425     Results from last 7 days   Lab Units 07/08/21  0641 07/07/21  0534 07/05/21  1016   SODIUM mmol/L 132* 132* 140   POTASSIUM mmol/L 4.2 4.2 4.5   CHLORIDE mmol/L 102 102 110*   CO2 mmol/L 22.0 20.0* 20.0*   BUN mg/dL 19 25* 44*   GLUCOSE mg/dL 124* 108* 146*   CREATININE mg/dL 0.71* 0.69* 0.68*   CALCIUM mg/dL 8.7 8.6 8.9                 [facility] Ventilation:      I reviewed the patient's medications.    Assessment/Plan   ASSESSMENT/PLAN     Active " Hospital Problems    Diagnosis    • **CABG 06/22/21    • Hyperglycemia    • STEMI    • Dyslipidemia    • Obesity (BMI 30-39.9)    • History of tobacco abuse        65 y.o. male remote smoker with history of hypertension, coronary artery disease status post stent in 2004, obesity, BPH, alcohol use, admitted on 6/22/2021 with chest pain and found to have severe multivessel coronary artery disease.     Patient underwent CABG x4 with intra-aortic balloon pump placement by Dr. Acosta on 6/22/2021.  He went back to the operating room on 6/23/2021 for bleeding and had evacuation of blood from the right pleural space.     Postoperative issues have included delirium/agitation possibly secondary to alcohol withdrawal, hyperglycemia, hypoxemia requiring supplemental oxygen.    His condition overall stabilized and he was transferred to PCU on 7/5.    Sodium stable at 132 today.    3. Check blood pressure in legs as may have arterial stenosis resulting in isolated low blood pressure in left arm.  Has right PICC line.  4. Oral amiodarone protocol  5. Aspirin/statin  6. Sliding scale insulin  7. Mobilize/ambulate  8. Monitor sodium  9. Possible discharge later this week with rehab     I discussed the patient's findings and my recommendations with patient and nursing staff     Plan of care and goals reviewed with multidisciplinary team at daily rounds.    .    Gatito Hubbard MD  Pulmonary and Critical Care Medicine  07/08/21 13:15 EDT         Electronically signed by Gatito Hubbard MD at 07/08/21 1317     Mikey Conrad MD at 07/08/21 0848                                           [facility] Progress Note      LOS: 16 days   Patient Care Team:  Sanaz Zuluaga DO as PCP - General (Family Medicine)    Chief Complaint:    Chief Complaint   Patient presents with   • Chest Pain       Subjective     Interval History:     Patient Complaints: Alert and conversant      Review of Systems:   A 14  "point review of systems was negative except as was stated in the HPI      Objective     Vital Sign Min/Max for last 24 hours  Temp  Min: 97.5 °F (36.4 °C)  Max: 98.2 °F (36.8 °C)   BP  Min: 103/39  Max: 146/60   Pulse  Min: 50  Max: 86   Resp  Min: 20  Max: 20   SpO2  Min: 91 %  Max: 98 %   No data recorded   Weight  Min: 112 kg (248 lb)  Max: 112 kg (248 lb)     Flowsheet Rows      First Filed Value   Admission Height  177.8 cm (70\") Documented at 06/22/2021 1400   Admission Weight  118 kg (260 lb 2.3 oz) Documented at 06/22/2021 2000          Physical Exam:  General Appearance: Well-nourished well-developed white male no acute distress  Lungs: Clear  Heart:: Regular rate and rhythm this morning; no Murmurs, Rubs or Gallops  Abdomen: Soft and nontender with adequate bowel sounds.  No organomegaly  Extremities: No cyanosis, clubbing.  Trace edema  Pulses: Pulses palpable and equal bilaterally; left groin stable  Skin: Warm and dry with no rash  Psych: Normal     Results Review:     I reviewed the patient's new clinical results.  Results from last 7 days   Lab Units 07/08/21  0641 07/07/21  0534 07/05/21  1016   SODIUM mmol/L 132* 132* 140   POTASSIUM mmol/L 4.2 4.2 4.5   CHLORIDE mmol/L 102 102 110*   CO2 mmol/L 22.0 20.0* 20.0*   BUN mg/dL 19 25* 44*   CREATININE mg/dL 0.71* 0.69* 0.68*   GLUCOSE mg/dL 124* 108* 146*   CALCIUM mg/dL 8.7 8.6 8.9     Results from last 7 days   Lab Units 07/08/21  0641 07/07/21  0534 07/05/21  1016   WBC 10*3/mm3 5.94 6.62 9.11   HEMOGLOBIN g/dL 8.3* 8.3* 8.3*   HEMATOCRIT % 27.7* 27.4* 29.0*   PLATELETS 10*3/mm3 394 378 425     Lab Results   Lab Value Date/Time    TROPONINT 1.730 (C) 06/22/2021 1705    TROPONINT <0.010 06/22/2021 1009                       Medication Review: yes  Current Facility-Administered Medications   Medication Dose Route Frequency Provider Last Rate Last Admin   • albuterol (PROVENTIL) nebulizer solution 0.083% 2.5 mg/3mL  2.5 mg Nebulization Q6H PRN Stanley, " Holger SAAVEDRA PA-C   2.5 mg at 07/06/21 2351   • amiodarone (PACERONE) tablet 200 mg  200 mg Oral Q12H Miguel Angel Acosta MD   200 mg at 07/07/21 2021    Followed by   • [START ON 7/21/2021] amiodarone (PACERONE) tablet 200 mg  200 mg Oral Daily Miguel Angel Acosta MD       • amLODIPine (NORVASC) tablet 5 mg  5 mg Oral Q24H Miguel Angel Acosta MD   5 mg at 07/07/21 1035   • aspirin tablet 325 mg  325 mg Oral Daily Miguel Angel Acosta MD   325 mg at 07/07/21 1034   • atorvastatin (LIPITOR) tablet 40 mg  40 mg Oral Nightly Miguel Angel Acosta MD   40 mg at 07/07/21 2020   • beer 1 each  1 each Oral TID PRN Holger Angel PA-C   1 each at 07/07/21 1635   • docusate sodium (COLACE) capsule 100 mg  100 mg Oral BID Mark Anthony Hawkins, PharmD   100 mg at 07/07/21 2020   • famotidine (PEPCID) tablet 20 mg  20 mg Oral BID Miguel Angel Acosta MD   20 mg at 07/07/21 2020   • folic acid (FOLVITE) tablet 1 mg  1 mg Oral Daily Miguel Angel Acosta MD   1 mg at 07/07/21 1034   • insulin lispro (humaLOG) injection 0-9 Units  0-9 Units Subcutaneous 4x Daily With Meals & Nightly Mark Anthony Hawkins, PharmD       • losartan (COZAAR) tablet 100 mg  100 mg Oral Q24H Miguel Angel Acosta MD   100 mg at 07/07/21 1035   • magnesium sulfate 4g/100mL (PREMIX) infusion  4 g Intravenous PRN Holger Angel PA-C   4 g at 06/28/21 0607   • melatonin tablet 5 mg  5 mg Oral Nightly Miguel Angel Acosta MD   5 mg at 07/07/21 2020   • metoprolol tartrate (LOPRESSOR) tablet 50 mg  50 mg Oral Q12H Miguel Angel Acosta MD   50 mg at 07/07/21 2020   • morphine injection 2 mg  2 mg Intravenous Q2H PRN Holger Angel PA-C   2 mg at 07/02/21 2342   • nicotine (NICODERM CQ) 7 MG/24HR patch 1 patch  1 patch Transdermal Q24H Holger Angel PA-C   1 patch at 07/06/21 0834   • ondansetron (ZOFRAN) injection 4 mg  4 mg Intravenous Q6H PRN Holger Angel PA-C       • Pharmacy Consult - MTM   Does not apply Daily Holger Angel PA-C       • potassium chloride (KLOR-CON) packet 40 mEq  40  mEq Oral PRN Miguel Angel Acosta MD       • potassium chloride (MICRO-K) CR capsule 40 mEq  40 mEq Oral PRN Miguel Angel Acosta MD       • sodium chloride 0.9 % flush 10 mL  10 mL Intravenous Q12H Holger Angel PA-C   10 mL at 07/07/21 2022   • sodium chloride 0.9 % flush 10 mL  10 mL Intravenous PRN Holger Angel PA-C       • terazosin (HYTRIN) capsule 5 mg  5 mg Oral Nightly Miguel Angel Acosta MD   5 mg at 07/07/21 2020   • thiamine (VITAMIN B-1) tablet 100 mg  100 mg Oral Daily Miguel Angel Acosta MD   100 mg at 07/07/21 1035         CABG 06/22/21    History of tobacco abuse    STEMI    Dyslipidemia    Obesity (BMI 30-39.9)    Hyperglycemia        Impression      Inferior STEMI 6/22/2021  Emergent CABG for left main and three-vessel coronary artery disease with preserved LV function 6/22/2021  reop for bleeding 6/23/2021 early a.m.  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  Status as outlined above.  Patient continues to do well.  Awaiting final insurance approval for transfer to a rehabilitation facility and the availability of a bed.  Continue current treatment regimen.      Miguel Angel Acosta MD  CTSurgery  07/08/21   09:32 EDT                         Cardiothoracic Surgery Progress  Note    6/22/21 S/P CABGx4/mediastinal exploration/Thrombin ablation of left femoral pseudo-aneurysm     LOS: 16 days      Subjective:  Doing well this morning. Resting in bed. States he slept well overnight. No acute complaints.     Objective:  Vital Signs  Temp:  [97.5 °F (36.4 °C)-98.2 °F (36.8 °C)] 97.6 °F (36.4 °C)  Heart Rate:  [50-86] 54  Resp:  [20] 20  BP: (103-146)/(39-66) 145/66    Physical Exam:   General Appearance: alert, appears stated age and cooperative, conversant   Lungs: clear to auscultation, respirations regular, respirations even and respirations unlabored   Heart: regular rhythm & normal rate, normal S1, S2 and no murmur, no gallop, no rub   Skin: Incision c/d/i     Results:    Results from last 7 days   Lab Units 07/08/21  0641   WBC 10*3/mm3 5.94   HEMOGLOBIN g/dL 8.3*   HEMATOCRIT % 27.7*   PLATELETS 10*3/mm3 394     Results from last 7 days   Lab Units 07/07/21  0534   SODIUM mmol/L 132*   POTASSIUM mmol/L 4.2   CHLORIDE mmol/L 102   CO2 mmol/L 20.0*   BUN mg/dL 25*   CREATININE mg/dL 0.69*   GLUCOSE mg/dL 108*   CALCIUM mg/dL 8.6       Assessment:  • **CABG 06/22/21     • Hyperglycemia     • STEMI     • Dyslipidemia     • Obesity (BMI 30-39.9)     • History of tobacco abuse            Plan:  Diet per SLP  Hold ativan and Precedex  Beer TID  Minimize narcotics  Ambulate  Pulmonary toilet  ASA, statin, BB  H&H stable - continue to monitor  Will need rehab placement upon discharge. Hopefully D/C later this week.        Harman Montes PA-C  07/08/21  07:24 EDT          Electronically signed by Miguel Angel Acosta MD at 07/08/21 5255     Gatito Hubbard MD at 07/07/21 6556          INTENSIVIST NOTE     Hospital Day: 15    Mr. Thien Gray, 65 y.o. male is followed for:   Perioperative management of comorbid medical conditions       SUBJECTIVE       Interval history:    Awake and alert.  Fluid balance -800 mL.  Afebrile.  Normal sinus rhythm.  On room air.    The  "patient's relevant past medical, surgical and social history were reviewed and updated in Epic as appropriate.       OBJECTIVE     Vital Sign Min/Max for last 24 hours  Temp  Min: 97.5 °F (36.4 °C)  Max: 98.6 °F (37 °C)   BP  Min: 96/46  Max: 138/65   Pulse  Min: 54  Max: 85   Resp  Min: 18  Max: 20   SpO2  Min: 91 %  Max: 99 %   No data recorded   Weight  Min: 113 kg (248 lb 14.4 oz)  Max: 113 kg (248 lb 14.4 oz)      Intake/Output Summary (Last 24 hours) at 7/7/2021 1526  Last data filed at 7/7/2021 0944  Gross per 24 hour   Intake 480 ml   Output 1275 ml   Net -795 ml      Flowsheet Rows      First Filed Value   Admission Height  177.8 cm (70\") Documented at 06/22/2021 1400   Admission Weight  118 kg (260 lb 2.3 oz) Documented at 06/22/2021 2000             07/02/21  1358 07/06/21  0500 07/07/21  0500   Weight: 112 kg (246 lb) 113 kg (248 lb 4.8 oz) 113 kg (248 lb 14.4 oz)            Objective:  General Appearance:  In no acute distress.    Vital signs: (most recent): Blood pressure 112/59, pulse 59, temperature 97.5 °F (36.4 °C), temperature source Oral, resp. rate 20, height 180 cm (70.87\"), weight 113 kg (248 lb 14.4 oz), SpO2 91 %.    HEENT: Normal HEENT exam.    Lungs:  Normal effort and normal respiratory rate.  Breath sounds clear to auscultation.  He is not in respiratory distress.  No rales, wheezes or rhonchi.    Heart: Normal rate.  Regular rhythm.  S1 normal and S2 normal.  No murmur, gallop or friction rub.   Chest: Symmetric chest wall expansion.   Abdomen: Abdomen is soft and non-distended.  Bowel sounds are normal.   There is no abdominal tenderness.   There is no mass. There is no splenomegaly. There is no hepatomegaly.   Extremities: There is no deformity or dependent edema.    Neurological: Patient is alert and oriented to person, place and time.    Pupils:  Pupils are equal, round, and reactive to light.    Skin:  Warm and dry.              I reviewed the patient's new clinical results.  I " reviewed the patient's new imaging results/reports including actual images and agree with reports.      Chest X-Ray: No additional    INFUSIONS       Results from last 7 days   Lab Units 07/07/21  0534 07/05/21  1016 07/04/21  0448   WBC 10*3/mm3 6.62 9.11 8.94   HEMOGLOBIN g/dL 8.3* 8.3* 8.2*   HEMATOCRIT % 27.4* 29.0* 27.4*   PLATELETS 10*3/mm3 378 425 376     Results from last 7 days   Lab Units 07/07/21  0534 07/05/21  1016 07/04/21  0632 07/01/21  0447   SODIUM mmol/L 132* 140 140 139   POTASSIUM mmol/L 4.2 4.5 4.6 4.1   CHLORIDE mmol/L 102 110* 110* 107   CO2 mmol/L 20.0* 20.0* 20.0* 23.0   BUN mg/dL 25* 44* 58* 27*   GLUCOSE mg/dL 108* 146* 123* 137*   CREATININE mg/dL 0.69* 0.68* 1.03 0.56*   MAGNESIUM mg/dL  --   --   --  2.1   CALCIUM mg/dL 8.6 8.9 8.6 8.9                 [facility] Ventilation:      I reviewed the patient's medications.    Assessment/Plan   ASSESSMENT/PLAN     Active Hospital Problems    Diagnosis    • **CABG 06/22/21    • Hyperglycemia    • STEMI    • Dyslipidemia    • Obesity (BMI 30-39.9)    • History of tobacco abuse        65 y.o. male remote smoker with history of hypertension, coronary artery disease status post stent in 2004, obesity, BPH, alcohol use, admitted on 6/22/2021 with chest pain and found to have severe multivessel coronary artery disease.     Patient underwent CABG x4 with intra-aortic balloon pump placement by Dr. Acosta on 6/22/2021.  He went back to the operating room on 6/23/2021 for bleeding and had evacuation of blood from the right pleural space.     Postoperative issues have included delirium/agitation possibly secondary to alcohol withdrawal, hyperglycemia, hypoxemia requiring supplemental oxygen.    His condition overall stabilized and he was transferred to PCU on 7/5.    Sodium down to 132 today.    10. Check blood pressure in legs as may have arterial stenosis resulting in isolated low blood pressure in left arm.  Has right PICC line.  11. Oral amiodarone  "protocol  12. Aspirin/statin  13. Sliding scale insulin  14. Mobilize/ambulate  15. Monitor sodium  16. Possible discharge later this week     I discussed the patient's findings and my recommendations with patient and nursing staff     Plan of care and goals reviewed with multidisciplinary team at daily rounds.    .    Gatito Hubbard MD  Pulmonary and Critical Care Medicine  07/07/21 15:26 EDT         Electronically signed by Gatito Hubbard MD at 07/07/21 1529     Mikey Conrad MD at 07/07/21 1040                                           [facility] Progress Note      LOS: 15 days   Patient Care Team:  Sanaz Zuluaga DO as PCP - General (Family Medicine)    Chief Complaint:    Chief Complaint   Patient presents with   • Chest Pain       Subjective     Interval History:     Patient Complaints: Much more coherent this morning      Review of Systems:   A 14 point review of systems was negative except as was stated in the HPI      Objective     Vital Sign Min/Max for last 24 hours  Temp  Min: 97.5 °F (36.4 °C)  Max: 98.6 °F (37 °C)   BP  Min: 96/46  Max: 138/65   Pulse  Min: 54  Max: 85   Resp  Min: 18  Max: 20   SpO2  Min: 91 %  Max: 99 %   Flow (L/min)  Min: 2  Max: 2   Weight  Min: 113 kg (248 lb 14.4 oz)  Max: 113 kg (248 lb 14.4 oz)     Flowsheet Rows      First Filed Value   Admission Height  177.8 cm (70\") Documented at 06/22/2021 1400   Admission Weight  118 kg (260 lb 2.3 oz) Documented at 06/22/2021 2000          Physical Exam:  General Appearance: Well-nourished well-developed white male no acute distress  Lungs: Coarse bilateral breath sounds  Heart:: Regular rate and rhythm this morning; no Murmurs, Rubs or Gallops  Abdomen: Soft and nontender with adequate bowel sounds.  No organomegaly  Extremities: No cyanosis, clubbing.  1+ edema  Pulses: Pulses palpable and equal bilaterally; left groin stable  Skin: Warm and dry with no rash  Psych: Normal     Results " Review:     I reviewed the patient's new clinical results.  Results from last 7 days   Lab Units 07/07/21  0534 07/05/21  1016 07/04/21  0632   SODIUM mmol/L 132* 140 140   POTASSIUM mmol/L 4.2 4.5 4.6   CHLORIDE mmol/L 102 110* 110*   CO2 mmol/L 20.0* 20.0* 20.0*   BUN mg/dL 25* 44* 58*   CREATININE mg/dL 0.69* 0.68* 1.03   GLUCOSE mg/dL 108* 146* 123*   CALCIUM mg/dL 8.6 8.9 8.6     Results from last 7 days   Lab Units 07/07/21  0534 07/05/21  1016 07/04/21  0448   WBC 10*3/mm3 6.62 9.11 8.94   HEMOGLOBIN g/dL 8.3* 8.3* 8.2*   HEMATOCRIT % 27.4* 29.0* 27.4*   PLATELETS 10*3/mm3 378 425 376     Lab Results   Lab Value Date/Time    TROPONINT 1.730 (C) 06/22/2021 1705    TROPONINT <0.010 06/22/2021 1009                       Medication Review: yes  Current Facility-Administered Medications   Medication Dose Route Frequency Provider Last Rate Last Admin   • albuterol (PROVENTIL) nebulizer solution 0.083% 2.5 mg/3mL  2.5 mg Nebulization Q6H PRN Holger Angel, PA-C   2.5 mg at 07/06/21 2351   • amiodarone (PACERONE) tablet 200 mg  200 mg Oral Q12H Miguel Angel Acosta MD   200 mg at 07/07/21 1035    Followed by   • [START ON 7/21/2021] amiodarone (PACERONE) tablet 200 mg  200 mg Oral Daily Miguel Angel Acosta MD       • amLODIPine (NORVASC) tablet 5 mg  5 mg Oral Q24H Miguel Angel Acosta MD   5 mg at 07/07/21 1035   • aspirin tablet 325 mg  325 mg Oral Daily Miguel Angel Acosta MD   325 mg at 07/07/21 1034   • atorvastatin (LIPITOR) tablet 40 mg  40 mg Oral Nightly Miguel Angel Acosta MD   40 mg at 07/06/21 2113   • beer 1 each  1 each Oral TID PRN Holger Angel PA-C   1 each at 07/06/21 2308   • docusate sodium (COLACE) capsule 100 mg  100 mg Oral BID Mark Anthony Hawkins PharmD   100 mg at 07/07/21 1036   • famotidine (PEPCID) tablet 20 mg  20 mg Oral BID Miguel Angel Acosta MD   20 mg at 07/07/21 1035   • folic acid (FOLVITE) tablet 1 mg  1 mg Oral Daily Miguel Angel Acosta MD   1 mg at 07/07/21 1034   • insulin lispro (humaLOG) injection  0-9 Units  0-9 Units Subcutaneous 4x Daily With Meals & Nightly Mark Anthony Hawkins PharmD       • losartan (COZAAR) tablet 100 mg  100 mg Oral Q24H Miguel Angel Acosta MD   100 mg at 07/07/21 1035   • magnesium sulfate 4g/100mL (PREMIX) infusion  4 g Intravenous PRN Holger Angel PA-C   4 g at 06/28/21 0607   • melatonin tablet 5 mg  5 mg Oral Nightly Miguel Angel Acosta MD   5 mg at 07/06/21 2113   • metoprolol tartrate (LOPRESSOR) tablet 50 mg  50 mg Oral Q12H Miguel Angel Acosta MD   50 mg at 07/07/21 1035   • morphine injection 2 mg  2 mg Intravenous Q2H PRN Holger Angel PA-C   2 mg at 07/02/21 2342   • nicotine (NICODERM CQ) 7 MG/24HR patch 1 patch  1 patch Transdermal Q24H Holger Angel PA-C   1 patch at 07/06/21 0834   • ondansetron (ZOFRAN) injection 4 mg  4 mg Intravenous Q6H PRN Holger Angel PA-C       • Pharmacy Consult - MTM   Does not apply Daily Holger Angel PA-C       • potassium chloride (KLOR-CON) packet 40 mEq  40 mEq Oral PRN Miguel Angel Acosta MD       • potassium chloride (MICRO-K) CR capsule 40 mEq  40 mEq Oral PRN Miguel Angel Acosta MD       • sodium chloride 0.9 % flush 10 mL  10 mL Intravenous Q12H Holger Angel PA-C   10 mL at 07/07/21 1037   • sodium chloride 0.9 % flush 10 mL  10 mL Intravenous PRN Holger Angel PA-C       • terazosin (HYTRIN) capsule 5 mg  5 mg Oral Nightly Miguel Angel Acosta MD   5 mg at 07/06/21 2113   • thiamine (VITAMIN B-1) tablet 100 mg  100 mg Oral Daily Miguel Angel Acosta MD   100 mg at 07/07/21 1035         CABG 06/22/21    History of tobacco abuse    STEMI    Dyslipidemia    Obesity (BMI 30-39.9)    Hyperglycemia        Impression      Inferior STEMI 6/22/2021  Emergent CABG for left main and three-vessel coronary artery disease with preserved LV function 6/22/2021  reop for bleeding 6/23/2021 early a.m.  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    Plan     Continue beta-blocker  Pulmonary toilet  Continue amiodarone  Continue aspirin and statin  Mobilize  Home 24 to 48 hours      Mikey Conrad MD   07/07/21  10:40 EDT          Electronically signed by Mikey Conrad MD at 07/07/21 1041     Miguel Angel Acosta MD at 07/07/21 0729          Cardiothoracic Surgery Progress Note    POD# 15/14/5 S/P CABGx4/mediastinal exploration/Thrombin ablation of left femoral pseudo-aneurysm     LOS: 15 days      Subjective:  Doing well this morning. No complaints. Per nurse, patient had some notable anxiety last night. 1 beer given which seemed to help.    Objective:  Vital Signs  Temp:  [97.5 °F (36.4 °C)-98.6 °F (37 °C)] 98.6 °F (37 °C)  Heart Rate:  [54-83] 64  Resp:  [18-20] 20  BP: ()/(46-72) 114/53    Physical Exam:   General Appearance: alert, appears stated age and cooperative, conversant   Lungs: clear to auscultation, respirations regular, respirations even and respirations unlabored   Heart: regular rhythm & normal rate, normal S1, S2 and no murmur, no gallop, no rub   Skin: Incision c/d/i     Results:    Results from last 7 days   Lab Units 07/05/21  1016   WBC 10*3/mm3 9.11   HEMOGLOBIN g/dL 8.3*   HEMATOCRIT % 29.0*   PLATELETS 10*3/mm3 425     Results from last 7 days   Lab Units 07/05/21  1016   SODIUM mmol/L 140   POTASSIUM mmol/L 4.5   CHLORIDE mmol/L 110*   CO2 mmol/L 20.0*   BUN mg/dL 44*   CREATININE mg/dL 0.68*   GLUCOSE mg/dL 146*   CALCIUM mg/dL 8.9       Assessment:  • **CABG 06/22/21     • Hyperglycemia     • STEMI     • Dyslipidemia     • Obesity (BMI 30-39.9)     • History of tobacco abuse            Plan:  Diet per SLP  Hold ativan and Precedex  Beer TID  Minimize  narcotics  Ambulate  Pulmonary toilet  ASA, statin, BB  Will need rehab placement upon discharge. Hopefully D/C later this week.        Harman Montes PA-C  07/07/21  07:29 EDT    Status as outlined above.  Patient continues to improve on a daily basis.  Hemodynamics are stable vital signs are normal laboratory studies are all back to baseline.  Dust x-ray is clear.  Patient is ambulating maintaining an adequate quit O2 saturation on room air.  Wounds are clean and dry. I have reviewed, verified, and confirmed the above history and current status.  I have examined the patient and confirmed the above physical findings.Above plan and treatment regimen discussed in detail with patient.  Options of treatment, attendant risks vs benefits, and my recommendations were discussed and all questions answered.    Miguel Angel Acosta MD  CTSurgery  07/07/21   08:08 EDT      Electronically signed by Miguel Angel Acosta MD at 07/07/21 0808     Gatito Hubbard MD at 07/06/21 1349          INTENSIVIST NOTE     Hospital Day: 14    Mr. Thien Gray, 65 y.o. male is followed for:   Perioperative management of comorbid medical conditions       SUBJECTIVE       Interval history:    Awake and alert.  Low blood pressure overnight though it was discovered that blood pressure was much lower in his left arm than in other sites.  See below.  Afebrile.  Fluid balance negative.    The patient's relevant past medical, surgical and social history were reviewed and updated in Epic as appropriate.       OBJECTIVE     Vital Sign Min/Max for last 24 hours  Temp  Min: 97.4 °F (36.3 °C)  Max: 98.1 °F (36.7 °C)   BP  Min: 57/43  Max: 148/67   Pulse  Min: 48  Max: 84   Resp  Min: 16  Max: 18   SpO2  Min: 86 %  Max: 98 %   No data recorded   Weight  Min: 113 kg (248 lb 4.8 oz)  Max: 113 kg (248 lb 4.8 oz)      Intake/Output Summary (Last 24 hours) at 7/6/2021 1349  Last data filed at 7/6/2021 0607  Gross per 24 hour   Intake 480 ml  "  Output 1225 ml   Net -745 ml      Flowsheet Rows      First Filed Value   Admission Height  177.8 cm (70\") Documented at 06/22/2021 1400   Admission Weight  118 kg (260 lb 2.3 oz) Documented at 06/22/2021 2000             07/01/21  0600 07/02/21  1358 07/06/21  0500   Weight: 112 kg (246 lb 4.1 oz) 112 kg (246 lb) 113 kg (248 lb 4.8 oz)            Objective:  General Appearance:  In no acute distress.    Vital signs: (most recent): Blood pressure 128/62, pulse 80, temperature 98.1 °F (36.7 °C), temperature source Oral, resp. rate 16, height 180 cm (70.87\"), weight 113 kg (248 lb 4.8 oz), SpO2 98 %.    HEENT: Normal HEENT exam.    Lungs:  Normal effort and normal respiratory rate.  Breath sounds clear to auscultation.  He is not in respiratory distress.  No rales, wheezes or rhonchi.    Heart: Normal rate.  Regular rhythm.  S1 normal and S2 normal.  No murmur, gallop or friction rub.   Chest: Symmetric chest wall expansion.   Abdomen: Abdomen is soft and non-distended.  Bowel sounds are normal.   There is no abdominal tenderness.   There is no mass. There is no splenomegaly. There is no hepatomegaly.   Extremities: There is no deformity or dependent edema.    Neurological: Patient is alert and oriented to person, place and time.    Pupils:  Pupils are equal, round, and reactive to light.    Skin:  Warm and dry.              I reviewed the patient's new clinical results.  I reviewed the patient's new imaging results/reports including actual images and agree with reports.      Chest X-Ray: No additional    INFUSIONS       Results from last 7 days   Lab Units 07/05/21  1016 07/04/21  0448 07/03/21  0415   WBC 10*3/mm3 9.11 8.94 8.96   HEMOGLOBIN g/dL 8.3* 8.2* 8.0*   HEMATOCRIT % 29.0* 27.4* 28.2*   PLATELETS 10*3/mm3 425 376 312     Results from last 7 days   Lab Units 07/05/21  1016 07/04/21  0632 07/03/21  0415 07/01/21  0447 06/30/21  0641   SODIUM mmol/L 140 140 136 139 141   POTASSIUM mmol/L 4.5 4.6 4.0 4.1 3.9 "   CHLORIDE mmol/L 110* 110* 104 107 108*   CO2 mmol/L 20.0* 20.0* 18.0* 23.0 22.0   BUN mg/dL 44* 58* 45* 27* 26*   GLUCOSE mg/dL 146* 123* 116* 137* 125*   CREATININE mg/dL 0.68* 1.03 0.76 0.56* 0.53*   MAGNESIUM mg/dL  --   --   --  2.1 2.4   CALCIUM mg/dL 8.9 8.6 8.8 8.9 8.3*                 [facility] Ventilation:      I reviewed the patient's medications.    Assessment/Plan   ASSESSMENT/PLAN     Active Hospital Problems    Diagnosis    • **CABG 06/22/21    • Hyperglycemia    • STEMI    • Dyslipidemia    • Obesity (BMI 30-39.9)    • History of tobacco abuse        65 y.o. male remote smoker with history of hypertension, coronary artery disease status post stent in 2004, obesity, BPH, alcohol use, admitted on 6/22/2021 with chest pain and found to have severe multivessel coronary artery disease.     Patient underwent CABG x4 with intra-aortic balloon pump placement by Dr. Acosta on 6/22/2021.  He went back to the operating room on 6/23/2021 for bleeding and had evacuation of blood from the right pleural space.     Postoperative issues have included delirium/agitation possibly secondary to alcohol withdrawal, hyperglycemia, hypoxemia requiring supplemental oxygen.    His condition overall stabilized and he was transferred to PCU on 7/5.    Overnight had problems with low blood pressure but it was discovered that the pressures in his left arm were much lower than in his legs.  He has a PICC line in his right arm.  He now tells me that he recalls having a full cardiac work-up years ago and the cardiologist told him never to have his blood pressure checked in his left arm.  He thinks he had some type of imaging but does not recall what that showed.    17. Check blood pressure in legs as may have arterial stenosis resulting in isolated low blood pressure in left arm.  Has right PICC line.  18. Oral amiodarone protocol  19. Aspirin/statin  20. Sliding scale insulin  21. Mobilize/ambulate     I discussed the patient's  "findings and my recommendations with patient and nursing staff     Plan of care and goals reviewed with multidisciplinary team at daily rounds.    .    Gatito Hubbard MD  Pulmonary and Critical Care Medicine  07/06/21 13:49 EDT         Electronically signed by Gatito Hubbard MD at 07/06/21 1354     Mikey Conrad MD at 07/06/21 1027                                           [facility] Progress Note      LOS: 14 days   Patient Care Team:  Sanaz Zuluaga DO as PCP - General (Family Medicine)    Chief Complaint:    Chief Complaint   Patient presents with   • Chest Pain       Subjective     Interval History:     Patient Complaints: Much more coherent this morning.  Still easily confused      Review of Systems:   A 14 point review of systems was negative except as was stated in the HPI      Objective     Vital Sign Min/Max for last 24 hours  Temp  Min: 97.4 °F (36.3 °C)  Max: 98.1 °F (36.7 °C)   BP  Min: 57/43  Max: 148/67   Pulse  Min: 48  Max: 84   Resp  Min: 16  Max: 20   SpO2  Min: 86 %  Max: 98 %   No data recorded   Weight  Min: 113 kg (248 lb 4.8 oz)  Max: 113 kg (248 lb 4.8 oz)     Flowsheet Rows      First Filed Value   Admission Height  177.8 cm (70\") Documented at 06/22/2021 1400   Admission Weight  118 kg (260 lb 2.3 oz) Documented at 06/22/2021 2000          Physical Exam:  General Appearance: Well-nourished well-developed white male no acute distress  Lungs: Coarse bilateral breath sounds  Heart:: Regular rate and rhythm this morning; no Murmurs, Rubs or Gallops  Abdomen: Soft and nontender with adequate bowel sounds.  No organomegaly  Extremities: No cyanosis, clubbing.  1+ edema  Pulses: Pulses palpable and equal bilaterally; left groin stable  Skin: Warm and dry with no rash  Psych: Normal     Results Review:     I reviewed the patient's new clinical results.  Results from last 7 days   Lab Units 07/05/21  1016 07/04/21  0632 07/03/21  0415   SODIUM mmol/L " 140 140 136   POTASSIUM mmol/L 4.5 4.6 4.0   CHLORIDE mmol/L 110* 110* 104   CO2 mmol/L 20.0* 20.0* 18.0*   BUN mg/dL 44* 58* 45*   CREATININE mg/dL 0.68* 1.03 0.76   GLUCOSE mg/dL 146* 123* 116*   CALCIUM mg/dL 8.9 8.6 8.8     Results from last 7 days   Lab Units 07/05/21  1016 07/04/21  0448 07/03/21  0415   WBC 10*3/mm3 9.11 8.94 8.96   HEMOGLOBIN g/dL 8.3* 8.2* 8.0*   HEMATOCRIT % 29.0* 27.4* 28.2*   PLATELETS 10*3/mm3 425 376 312     Lab Results   Lab Value Date/Time    TROPONINT 1.730 (C) 06/22/2021 1705    TROPONINT <0.010 06/22/2021 1009                       Medication Review: yes  Current Facility-Administered Medications   Medication Dose Route Frequency Provider Last Rate Last Admin   • albuterol (PROVENTIL) nebulizer solution 0.083% 2.5 mg/3mL  2.5 mg Nebulization Q6H PRN Holger Angel, PA-C   2.5 mg at 07/05/21 1549   • amiodarone (PACERONE) tablet 200 mg  200 mg Oral Q8H Miguel Angel Acosta MD   200 mg at 07/06/21 0830    Followed by   • [START ON 7/7/2021] amiodarone (PACERONE) tablet 200 mg  200 mg Oral Q12H Miguel Angel Acosta MD        Followed by   • [START ON 7/21/2021] amiodarone (PACERONE) tablet 200 mg  200 mg Oral Daily Miguel Angel Acosta MD       • amLODIPine (NORVASC) tablet 5 mg  5 mg Oral Q24H Miguel Angel Acosta MD   5 mg at 07/06/21 0831   • aspirin tablet 325 mg  325 mg Oral Daily Miguel Angel Acosta MD   325 mg at 07/06/21 0831   • atorvastatin (LIPITOR) tablet 40 mg  40 mg Oral Nightly Miguel Angel Acosta MD   40 mg at 07/05/21 2000   • beer 1 each  1 each Oral TID PRN Holger Angel, PA-C   1 each at 07/05/21 1843   • docusate sodium (COLACE) capsule 100 mg  100 mg Oral BID Mark Anthony Hawkins PharmD   100 mg at 07/06/21 0830   • famotidine (PEPCID) tablet 20 mg  20 mg Oral BID Miguel Angel Acosta MD   20 mg at 07/06/21 0831   • folic acid (FOLVITE) tablet 1 mg  1 mg Oral Daily Miguel Angel Acosta MD   1 mg at 07/06/21 0830   • insulin lispro (humaLOG) injection 0-9 Units  0-9 Units Subcutaneous 4x Daily  With Meals & Nightly Mark Anthony Hawkins, PharmD       • losartan (COZAAR) tablet 100 mg  100 mg Oral Q24H Miguel Angel Acosta MD   100 mg at 07/06/21 0831   • magnesium sulfate 4g/100mL (PREMIX) infusion  4 g Intravenous PRN Holger Angel PA-C   4 g at 06/28/21 0607   • melatonin tablet 5 mg  5 mg Oral Nightly Miguel Angel Acosta MD   5 mg at 07/05/21 2000   • metoprolol tartrate (LOPRESSOR) tablet 50 mg  50 mg Oral Q12H Miguel Angel Acosta MD   50 mg at 07/06/21 0830   • morphine injection 2 mg  2 mg Intravenous Q2H PRN Holger Angel PA-C   2 mg at 07/02/21 2342   • nicotine (NICODERM CQ) 7 MG/24HR patch 1 patch  1 patch Transdermal Q24H Holger Angel PA-C   1 patch at 07/06/21 0834   • ondansetron (ZOFRAN) injection 4 mg  4 mg Intravenous Q6H PRN Holger Angel PA-C       • Pharmacy Consult - MTM   Does not apply Daily Holger Angel PA-C       • potassium chloride (KLOR-CON) packet 40 mEq  40 mEq Oral PRN Miguel Angel Acosta MD       • potassium chloride (MICRO-K) CR capsule 40 mEq  40 mEq Oral PRN Miguel Angel Acosta MD       • sodium chloride 0.9 % flush 10 mL  10 mL Intravenous Q12H Holger Angel PA-C   10 mL at 07/05/21 2002   • sodium chloride 0.9 % flush 10 mL  10 mL Intravenous PRN Holger Angel PA-C       • terazosin (HYTRIN) capsule 5 mg  5 mg Oral Nightly Miguel Angel Acosta MD   5 mg at 07/05/21 2001   • thiamine (VITAMIN B-1) tablet 100 mg  100 mg Oral Daily Miguel Angel Acosta MD   100 mg at 07/06/21 0830         CABG 06/22/21    History of tobacco abuse    STEMI    Dyslipidemia    Obesity (BMI 30-39.9)    ST elevation myocardial infarction (STEMI) (CMS/[facility])    Hyperglycemia        Impression      Inferior STEMI 6/22/2021  Emergent CABG for left main and three-vessel coronary artery disease with preserved LV function 6/22/2021  reop for bleeding 6/23/2021 early a.m.  Hypertension  Hyperlipidemia  Remote coronary artery stent [place]  Postop delirium/EtOH withdrawal  Postop atrial  fibrillation/flutter; back in sinus rhythm this morning  Echocardiogram shows mild LV dysfunction with no significant pericardial effusion  Left femoral pseudoaneurysm by duplex 7/1/2021 status post thrombin occlusion 7/2/21    Azotemia improving and mental status significantly improved.    Plan     Continue beta-blocker  Pulmonary toilet  Continue amiodarone  Continue aspirin and statin  Mobilize      Mikey Conrad MD   07/06/21  10:28 EDT          Electronically signed by Mikey Conrad MD at 07/06/21 1028     Harman Montes PA-C at 07/06/21 0713     Attestation signed by Miguel Angel Acosta MD at 07/06/21 0848    I have reviewed this documentation and agree.  Status as outlined above.  Patient remains stable.  Mental status has returned almost to normal.  He is ambulating in the [place]onzales without difficulty.  I believe he will require rehabilitation facility placement.  Hopefully he will be ready for transfer by the end of the week. I have reviewed, verified, and confirmed the above history and current status.  I have examined the patient and confirmed the above physical findings.Above plan and treatment regimen discussed in detail with patient.  Options of treatment, attendant risks vs benefits, and my recommendations were discussed and all questions answered.    Miguel Angel Acosta MD  CTSurgery  07/06/21   08:48 EDT                     Cardiothoracic Surgery Progress Note      6/22 s/p CABG x 4, mediastinal exploration, PPD # 2 left groin pseudoaneurysm injection after IABP     LOS: 14 days      Subjective:  Doing well this morning. No complaints. Transferred to PCU bed yesterday.    Objective:  Vital Signs  Temp:  [97.4 °F (36.3 °C)-98.1 °F (36.7 °C)] 98.1 °F (36.7 °C)  Heart Rate:  [48-98] 76  Resp:  [16-20] 16  BP: ()/(39-91) 133/60    Physical Exam:   General Appearance: alert, appears stated age and cooperative, conversant   Lungs: clear to auscultation, respirations regular, respirations  even and respirations unlabored   Heart: regular rhythm & normal rate, normal S1, S2 and no murmur, no gallop, no rub   Skin: Incision c/d/i     Results:    Results from last 7 days   Lab Units 07/05/21  1016   WBC 10*3/mm3 9.11   HEMOGLOBIN g/dL 8.3*   HEMATOCRIT % 29.0*   PLATELETS 10*3/mm3 425     Results from last 7 days   Lab Units 07/05/21  1016   SODIUM mmol/L 140   POTASSIUM mmol/L 4.5   CHLORIDE mmol/L 110*   CO2 mmol/L 20.0*   BUN mg/dL 44*   CREATININE mg/dL 0.68*   GLUCOSE mg/dL 146*   CALCIUM mg/dL 8.9       Assessment:  6/22 s/p CABG x 4, mediastinal exploration, PPD # 2 left groin pseudoaneurysm injection after IABP    Plan:  Diet per SLP  Hold ativan and Precedex  Beer TID  Minimize narcotics  Ambulate  Pulmonary toilet  ASA, statin, BB        Harman Montes PA-C  07/06/21  07:13 EDT          Electronically signed by Miguel Angel Acosta MD at 07/06/21 0848     Cedric Murphy MD at 07/05/21 1128          Pulmonary/Critical Care Follow-up     LOS: 13 days   Patient Care Team:  Sanaz Zuluaga DO as PCP - General (Family Medicine)    Chief Complaint/reason: Chest pain/medical management of issues including delirium, respiratory management, electrolyte management postoperatively after CABG.      Chief Complaint   Patient presents with   • Chest Pain     Subjective      Initial history (from ICU note 6/22/2021):    65-year-old male with a PMH of prior tobacco use (quit 34 yrs ago), HTN, CAD s/p stent (2004), and obesity.      Per report, approximately 1.5 hrs prior to presentation patient developed severe burning chest pain / pressure with associated shortness of breath and nausea. EKG on arrival revealed inferior ST elevation. He was emergently taken to Cath Lab by Dr. Conrad where he was found to have severe multivessel CAD (60-70% ostial LM with haziness in proximal suggesting thrombus, 70-80% mid-LAD, 70% RCA) not amenable to PCI. LV function was relatively well-preserved at 55%.       CTS was contacted and he was subsequently taken to OR where he underwent a CABG x4 with IABP placement per Dr. Acosta.     (End of copied text).    Patient went back to the operating room on 6/23/2021 due to bleeding and had evacuation of blood from the right pleural space.  Patient extubated 6/24/2021.  Patient developed confusion/agitation on 6/25/2021.  He has been treated for presumed alcohol withdrawal.     Patient had pseudoaneurysm repair (thrombin injection by Dr. Tamez) on 7/2/2021.    Interval History:     Patient more alert today.  Speech is improved.  Passed FEES.   No nausea or vomiting.  No fevers or chills.  No new complaints.    History taken from: Chart, staff    PMH/FH/Social History were reviewed and updated appropriately in the electronic medical record.     Review of Systems:    Review of 14 systems was completed with positives and pertinent negatives noted in the subjective section.  All other systems reviewed and are negative.         Objective     Vital Signs  Temp:  [97.7 °F (36.5 °C)-97.9 °F (36.6 °C)] 97.9 °F (36.6 °C)  Heart Rate:  [62-98] 77  Resp:  [17-20] 18  BP: (127-155)/() 137/56  07/04 0701 - 07/05 0700  In: 2365   Out: 1225 [Urine:1225]  Body mass index is 34.44 kg/m².     IV drips:      Physical Exam:     Constitutional:   Awake.  Alert.  Remains very slightly confused.  No acute distress.   Head:   Normocephalic, without obvious abnormality, atraumatic   Eyes:           Lids and lashes normal, conjunctivae and sclerae normal.  No icterus, no pallor, corneas clear, PER   ENMT:  Ears appear intact with no abnormalities noted        Neck:  No adenopathy, supple, trachea midline   Lungs/Resp:    Normal effort, symmetric chest rise, no crepitus, clear bilaterally                Heart/CV:    Regular rhythm, normal S1 and S2, no murmur   Abdomen/GI:    Normal bowel sounds, no masses, no organomegaly, soft,        nontender, nondistended   :    Deferred   Extremities/MSK:   No clubbing or cyanosis.  Trace bilateral lower extremity edema.  Normal tone.    No deformities.    Pulses:  Pulses palpable and equal bilaterally   Skin:  No bleeding, bruising or rash   Heme/Lymph:  No cervical or supraclavicular adenopathy.   Neurologic:      Psychiatric:    Moves all extremities with no obvious focal motor deficit.  Cranial nerves 2 - 12 grossly intact, dysarthric speech persists but is significantly improved today.  Calm today.    The above physical exam findings were reviewed and reflect exam findings as of today's exam.   Electronically signed by:Cedric Murphy MD 07/05/21 11:28 EDT    Results Review:     I reviewed the patient's new clinical results.   Results from last 7 days   Lab Units 07/05/21  1016 07/04/21  0632 07/03/21  0415   SODIUM mmol/L 140 140 136   POTASSIUM mmol/L 4.5 4.6 4.0   CHLORIDE mmol/L 110* 110* 104   CO2 mmol/L 20.0* 20.0* 18.0*   BUN mg/dL 44* 58* 45*   CREATININE mg/dL 0.68* 1.03 0.76   CALCIUM mg/dL 8.9 8.6 8.8   GLUCOSE mg/dL 146* 123* 116*     Results from last 7 days   Lab Units 07/05/21  1016 07/04/21  0448 07/03/21  0415   WBC 10*3/mm3 9.11 8.94 8.96   HEMOGLOBIN g/dL 8.3* 8.2* 8.0*   HEMATOCRIT % 29.0* 27.4* 28.2*   PLATELETS 10*3/mm3 425 376 312         Results from last 7 days   Lab Units 07/01/21  0447 06/30/21  0641 06/29/21  0416   MAGNESIUM mg/dL 2.1 2.4 2.0   PHOSPHORUS mg/dL 4.2 3.7  --        I reviewed the patient's new imaging including images and reports.    FEES reviewed.    Medication Review:   amiodarone, 200 mg, Nasogastric, Q8H   Followed by  [START ON 7/7/2021] amiodarone, 200 mg, Nasogastric, Q12H   Followed by  [START ON 7/21/2021] amiodarone, 200 mg, Nasogastric, Daily  amLODIPine, 5 mg, Nasogastric, Q24H  aspirin, 325 mg, Nasogastric, Daily  atorvastatin, 40 mg, Nasogastric, Nightly  famotidine, 20 mg, Nasogastric, BID  folic acid, 1 mg, Nasogastric, Daily  insulin regular, 0-9 Units, Subcutaneous, Q6H  losartan, 100 mg,  Nasogastric, Q24H  melatonin, 5 mg, Nasogastric, Nightly  metoprolol tartrate, 50 mg, Nasogastric, Q12H  nicotine, 1 patch, Transdermal, Q24H  pharmacy consult - MTM, , Does not apply, Daily  sennosides, 10 mL, Nasogastric, BID  sodium chloride, 10 mL, Intravenous, Q12H  terazosin, 5 mg, Nasogastric, Nightly  thiamine, 100 mg, Nasogastric, Daily           Assessment/Plan         CABG 06/22/21    History of tobacco abuse    STEMI    Dyslipidemia    Obesity (BMI 30-39.9)    ST elevation myocardial infarction (STEMI) (CMS/[facility])    Hyperglycemia    65 y.o. male remote smoker with history of hypertension, coronary artery disease status post stent in 2004, obesity, BPH, alcohol use, admitted on 6/22/2021 with chest pain and found to have severe multivessel coronary artery disease.    Patient underwent CABG x4 with intra-aortic balloon pump placement by Dr. Acosta on 6/22/2021.  He went back to the operating room on 6/23/2021 for bleeding and had evacuation of blood from the right pleural space.    Postoperative issues include delirium/agitation possibly secondary to alcohol withdrawal, hyperglycemia, hypoxemia requiring supplemental oxygen.    Agitation overall much improved.  More awake and passed FEES.     Okay to PCU or telemetry from my standpoint.    Plan:  1.  Continue PICC line.  2.  Holding sedatives.  3.  Correction insulin.  4.  Supplemental oxygen, wean as tolerated.  5.  Continue vitamin replacement.  6.  Continue nicotine patch.  7.  Continue terazosin.  8.  Speech dysphagia evaluation.  FEES done, advance diet.  9.  Continue tube feeding, nutritionist following, may be able to discontinue feeding tube if taking adequate oral diet.           Cedric Murphy MD  07/05/21  11:28 EDT      *. Please note that portions of this note were completed with [employer] - a voice recognition program.     Electronically signed by Cedric Murphy MD at 07/05/21 3444     Janay Kellogg PA-C at 07/05/21 7194      Attestation signed by Miguel Angel Acosta MD at 07/05/21 0923    I have reviewed this documentation and agree.  Patient seen and examined.  He is currently sitting in a chair doing a crossword puzzle.  His mentation is vastly improved.  He has a number of questions concerning what happened to him and when.  We have had a thorough discussion of coronary artery disease in general.  Vital signs are stable.  He will be reevaluated for swallowing today and hopefully transferred to PCU when bed available.  He will require rehabilitation facility toward the end of the week. I have reviewed, verified, and confirmed the above history and current status.  I have examined the patient and confirmed the above physical findings.Above plan and treatment regimen discussed in detail with patient.  Options of treatment, attendant risks vs benefits, and my recommendations were discussed and all questions answered.    Miguel Angel Acosta MD  CTSurgery  07/05/21   09:23 EDT                     Cardiothoracic Surgery Progress Note      6/22 s/p CABG x 4, mediastinal exploration, PPD # 2 left groin pseudoaneurysm injection after IABP     LOS: 13 days      Subjective:  Slept better last night.     Objective:  Vital Signs  Temp:  [97.7 °F (36.5 °C)] 97.7 °F (36.5 °C)  Heart Rate:  [62-94] 73  Resp:  [17-20] 18  BP: (124-158)/() 144/64    Physical Exam:   General Appearance: alert, appears stated age and cooperative, conversant   Lungs: clear to auscultation, respirations regular, respirations even and respirations unlabored   Heart: regular rhythm & normal rate, normal S1, S2 and no murmur, no gallop, no rub   Skin: Incision c/d/i     Results:    Results from last 7 days   Lab Units 07/04/21  0448   WBC 10*3/mm3 8.94   HEMOGLOBIN g/dL 8.2*   HEMATOCRIT % 27.4*   PLATELETS 10*3/mm3 376     Results from last 7 days   Lab Units 07/04/21  0632   SODIUM mmol/L 140   POTASSIUM mmol/L 4.6   CHLORIDE mmol/L 110*   CO2 mmol/L 20.0*   BUN mg/dL 58*    CREATININE mg/dL 1.03   GLUCOSE mg/dL 123*   CALCIUM mg/dL 8.6       Assessment:  6/22 s/p CABG x 4, mediastinal exploration, PPD # 2 left groin pseudoaneurysm injection after IABP    Plan:  FEES today  Awaiting PCU bed  Hold ativan and Precedex  Beer TID  Minimize narcotics  Ambulate  Pulmonary toilet  ASA, statin, BB        Janay Kellogg PA-C  07/05/21  07:06 EDT          Electronically signed by Miguel Angel Acosta MD at 07/05/21 0923     Cedric Murphy MD at 07/04/21 1348          Pulmonary/Critical Care Follow-up     LOS: 12 days   Patient Care Team:  Sanaz Zuluaga DO as PCP - General (Family Medicine)    Chief Complaint/reason: Chest pain/medical management of issues including delirium, respiratory management, electrolyte management postoperatively after CABG.      Chief Complaint   Patient presents with   • Chest Pain     Subjective      Initial history (from ICU note 6/22/2021):    65-year-old male with a PMH of prior tobacco use (quit 34 yrs ago), HTN, CAD s/p stent (2004), and obesity.      Per report, approximately 1.5 hrs prior to presentation patient developed severe burning chest pain / pressure with associated shortness of breath and nausea. EKG on arrival revealed inferior ST elevation. He was emergently taken to Cath Lab by Dr. Conrad where he was found to have severe multivessel CAD (60-70% ostial LM with haziness in proximal suggesting thrombus, 70-80% mid-LAD, 70% RCA) not amenable to PCI. LV function was relatively well-preserved at 55%.      CTS was contacted and he was subsequently taken to OR where he underwent a CABG x4 with IABP placement per Dr. Acosta.     (End of copied text).    Patient went back to the operating room on 6/23/2021 due to bleeding and had evacuation of blood from the right pleural space.  Patient extubated 6/24/2021.  Patient developed confusion/agitation on 6/25/2021.  He has been treated for presumed alcohol withdrawal.     Patient had pseudoaneurysm repair  (thrombin injection by Dr. Tamez) on 7/2/2021.    Interval History:     Patient slept overnight.  Seems appropriate today.  No dyspnea, nausea, vomiting.  No new complaints.    History taken from: Chart, staff    PMH/FH/Social History were reviewed and updated appropriately in the electronic medical record.     Review of Systems:    Review of 14 systems was completed with positives and pertinent negatives noted in the subjective section.  All other systems reviewed and are negative.         Objective     Vital Signs  Temp:  [97.7 °F (36.5 °C)] 97.7 °F (36.5 °C)  Heart Rate:  [51-80] 62  Resp:  [18-23] 18  BP: ()/() 127/59  07/03 0701 - 07/04 0700  In: 2104 [I.V.:76]  Out: 300 [Urine:300]  Body mass index is 34.44 kg/m².     IV drips:      Physical Exam:     Constitutional:   Awake.  Alert.  Remains very slightly confused.  No acute distress.   Head:   Normocephalic, without obvious abnormality, atraumatic   Eyes:           Lids and lashes normal, conjunctivae and sclerae normal.  No icterus, no pallor, corneas clear, PER   ENMT:  Ears appear intact with no abnormalities noted        Neck:  No adenopathy, supple, trachea midline   Lungs/Resp:    Normal effort, symmetric chest rise, no crepitus, clear bilaterally                Heart/CV:    Regular rhythm, normal S1 and S2, no murmur   Abdomen/GI:    Normal bowel sounds, no masses, no organomegaly, soft,        nontender, nondistended   :    Deferred   Extremities/MSK:  No clubbing or cyanosis.  Trace bilateral lower extremity edema.  Normal tone.    No deformities.    Pulses:  Pulses palpable and equal bilaterally   Skin:  No bleeding, bruising or rash   Heme/Lymph:  No cervical or supraclavicular adenopathy.   Neurologic:    Psychiatric:    Moves all extremities with no obvious focal motor deficit.  Cranial nerves 2 - 12 grossly intact, dysarthric speech persists.  Much more calm today.    The above physical exam findings were reviewed and reflect exam  findings as of today's exam.   Electronically signed by:Cedric Murphy MD 07/04/21 13:48 EDT    Results Review:     I reviewed the patient's new clinical results.   Results from last 7 days   Lab Units 07/04/21  0632 07/03/21  0415 07/01/21 0447 06/28/21  0346   SODIUM mmol/L 140 136 139 139   POTASSIUM mmol/L 4.6 4.0 4.1 3.8   CHLORIDE mmol/L 110* 104 107 105   CO2 mmol/L 20.0* 18.0* 23.0 24.0   BUN mg/dL 58* 45* 27* 16   CREATININE mg/dL 1.03 0.76 0.56* 0.61*   CALCIUM mg/dL 8.6 8.8 8.9 8.9   BILIRUBIN mg/dL  --   --   --  0.8   ALK PHOS U/L  --   --   --  56   ALT (SGPT) U/L  --   --   --  18   AST (SGOT) U/L  --   --   --  26   GLUCOSE mg/dL 123* 116* 137* 122*     Results from last 7 days   Lab Units 07/04/21  0448 07/03/21 0415 07/02/21  0639   WBC 10*3/mm3 8.94 8.96 12.47*   HEMOGLOBIN g/dL 8.2* 8.0* 8.4*   HEMATOCRIT % 27.4* 28.2* 28.7*   PLATELETS 10*3/mm3 376 312 351         Results from last 7 days   Lab Units 07/01/21  0447 06/30/21  0641 06/29/21  0416 06/28/21  0346   MAGNESIUM mg/dL 2.1 2.4 2.0 1.8   PHOSPHORUS mg/dL 4.2 3.7  --  4.3       I reviewed the patient's new imaging including images and reports.      Chest x-ray 6/30/2021 shows cardiomegaly with small pleural effusions and pulmonary congestion which appears slightly worse to me from yesterday.    Medication Review:   amiodarone, 200 mg, Nasogastric, Q8H   Followed by  [START ON 7/7/2021] amiodarone, 200 mg, Nasogastric, Q12H   Followed by  [START ON 7/21/2021] amiodarone, 200 mg, Nasogastric, Daily  amLODIPine, 5 mg, Nasogastric, Q24H  aspirin, 325 mg, Nasogastric, Daily  atorvastatin, 40 mg, Nasogastric, Nightly  famotidine, 20 mg, Nasogastric, BID  folic acid, 1 mg, Nasogastric, Daily  insulin regular, 0-9 Units, Subcutaneous, Q6H  losartan, 100 mg, Nasogastric, Q24H  melatonin, 5 mg, Nasogastric, Nightly  metoprolol tartrate, 50 mg, Nasogastric, Q12H  nicotine, 1 patch, Transdermal, Q24H  pharmacy consult - [place], , Does not apply,  Daily  sennosides, 10 mL, Nasogastric, BID  sodium chloride, 10 mL, Intravenous, Q12H  terazosin, 5 mg, Nasogastric, Nightly  thiamine, 100 mg, Nasogastric, Daily           Assessment/Plan         CABG 06/22/21    History of tobacco abuse    STEMI    Dyslipidemia    Obesity (BMI 30-39.9)    ST elevation myocardial infarction (STEMI) (CMS/[facility])    Hyperglycemia    65 y.o. male remote smoker with history of hypertension, coronary artery disease status post stent in 2004, obesity, BPH, alcohol use, admitted on 6/22/2021 with chest pain and found to have severe multivessel coronary artery disease.    Patient underwent CABG x4 with intra-aortic balloon pump placement by Dr. Acosta on 6/22/2021.  He went back to the operating room on 6/23/2021 for bleeding and had evacuation of blood from the right pleural space.    Postoperative issues include delirium/agitation possibly secondary to alcohol withdrawal, hyperglycemia, hypoxemia requiring supplemental oxygen.    Agitation overall improved.  Calm overnight with Precedex which was stopped early this morning.    Plan to recheck swallow evaluation and hold sedatives.  Patient getting beer.    Plan:  1.  Continue PICC line.  2.  Holding sedatives.  3.  Correction insulin.  4.  Supplemental oxygen, wean as tolerated.  5.  Continue vitamin replacement.  6.  Continue nicotine patch.  7.  Continue terazosin.  8.  Speech dysphagia evaluation.  9.  Continue tube feeding, speech following.           Cedric Murphy MD  07/04/21  13:48 EDT      *. Please note that portions of this note were completed with [employer] - a voice recognition program.     Electronically signed by Cedric Murphy MD at 07/04/21 1352     Phillip Jones MD at 07/04/21 1030          Cardiothoracic Surgery Progress Note      6/22 s/p CABG x 4, mediastinal exploration, PPD # 2 left groin pseudoaneurysm injection after IABP     LOS: 12 days      Subjective:  No complaints from patient.  Slept  overnight    Objective:  Vital Signs  Temp:  [97.7 °F (36.5 °C)] 97.7 °F (36.5 °C)  Heart Rate:  [51-80] 71  Resp:  [18-23] 18  BP: ()/() 124/44    Physical Exam:   General Appearance: alert, appears stated age and cooperative, oriented x 3   Lungs: clear to auscultation, respirations regular, respirations even and respirations unlabored   Heart: regular rhythm & normal rate, normal S1, S2 and no murmur, no gallop, no rub   Skin: Incision c/d/i     Results:    Results from last 7 days   Lab Units 07/04/21  0448   WBC 10*3/mm3 8.94   HEMOGLOBIN g/dL 8.2*   HEMATOCRIT % 27.4*   PLATELETS 10*3/mm3 376     Results from last 7 days   Lab Units 07/04/21  0632   SODIUM mmol/L 140   POTASSIUM mmol/L 4.6   CHLORIDE mmol/L 110*   CO2 mmol/L 20.0*   BUN mg/dL 58*   CREATININE mg/dL 1.03   GLUCOSE mg/dL 123*   CALCIUM mg/dL 8.6       Assessment:  6/22 s/p CABG x 4, mediastinal exploration, PPD # 2 left groin pseudoaneurysm injection after IABP    Plan:  Hold ativan and Precedex  Beer TID  Minimize narcotics  Ambulate  Pulmonary toilet  SLP evaluation  ASA, statin, BB  Transfer to PCU telemetry    Phillip Jones MD  07/04/21  10:30 EDT          Electronically signed by Phillip Jones MD at 07/04/21 1031     Mikey Conrad MD at 07/04/21 0723                                           [facility] Progress Note      LOS: 12 days   Patient Care Team:  Sanaz Zuluaga DO as PCP - General (Family Medicine)    Chief Complaint:    Chief Complaint   Patient presents with   • Chest Pain       Subjective     Interval History:     Patient Complaints: Much more coherent this morning.  Still easily confused      Review of Systems:   A 14 point review of systems was negative except as was stated in the HPI      Objective     Vital Sign Min/Max for last 24 hours  No data recorded   BP  Min: 83/26  Max: 167/62   Pulse  Min: 51  Max: 92   Resp  Min: 19  Max: 23   SpO2  Min: 85 %  Max: 98 %   No data recorded  "  No data recorded     Flowsheet Rows      First Filed Value   Admission Height  177.8 cm (70\") Documented at 06/22/2021 1400   Admission Weight  118 kg (260 lb 2.3 oz) Documented at 06/22/2021 2000          Physical Exam:  General Appearance: Well-nourished well-developed white male no acute distress  Lungs: Coarse bilateral breath sounds  Heart:: Regular rate and rhythm this morning; no Murmurs, Rubs or Gallops  Abdomen: Soft and nontender with adequate bowel sounds.  No organomegaly  Extremities: No cyanosis, clubbing.  1+ edema  Pulses: Pulses palpable and equal bilaterally; left groin stable  Skin: Warm and dry with no rash  Psych: Normal     Results Review:     I reviewed the patient's new clinical results.  Results from last 7 days   Lab Units 07/04/21  0632 07/03/21  0415 07/01/21  0447   SODIUM mmol/L 140 136 139   POTASSIUM mmol/L 4.6 4.0 4.1   CHLORIDE mmol/L 110* 104 107   CO2 mmol/L 20.0* 18.0* 23.0   BUN mg/dL 58* 45* 27*   CREATININE mg/dL 1.03 0.76 0.56*   GLUCOSE mg/dL 123* 116* 137*   CALCIUM mg/dL 8.6 8.8 8.9     Results from last 7 days   Lab Units 07/04/21  0448 07/03/21  0415 07/02/21  0639   WBC 10*3/mm3 8.94 8.96 12.47*   HEMOGLOBIN g/dL 8.2* 8.0* 8.4*   HEMATOCRIT % 27.4* 28.2* 28.7*   PLATELETS 10*3/mm3 376 312 351     Lab Results   Lab Value Date/Time    TROPONINT 1.730 (C) 06/22/2021 1705    TROPONINT <0.010 06/22/2021 1009     Results from last 7 days   Lab Units 06/28/21  0346   TRIGLYCERIDES mg/dL 70                   Medication Review: yes  Current Facility-Administered Medications   Medication Dose Route Frequency Provider Last Rate Last Admin   • albuterol (PROVENTIL) nebulizer solution 0.083% 2.5 mg/3mL  2.5 mg Nebulization Q6H PRN Given, Abdiel FONTANEZ MD       • amiodarone (PACERONE) tablet 200 mg  200 mg Nasogastric Q8H Given, Abdiel FONTANEZ MD   200 mg at 07/04/21 0118    Followed by   • [START ON 7/7/2021] amiodarone (PACERONE) tablet 200 mg  200 mg Nasogastric Q12H Given, Abdiel FONTANEZ MD  "       Followed by   • [START ON 7/21/2021] amiodarone (PACERONE) tablet 200 mg  200 mg Nasogastric Daily Given, Abdiel FONTANEZ MD       • amLODIPine (NORVASC) tablet 5 mg  5 mg Nasogastric Q24H Given, Abdiel FONTANEZ MD   5 mg at 07/03/21 0833   • aspirin tablet 325 mg  325 mg Nasogastric Daily Given, Abdiel FONTANEZ MD   325 mg at 07/03/21 0832   • atorvastatin (LIPITOR) tablet 40 mg  40 mg Nasogastric Nightly Given, Abdiel FONTANEZ MD   40 mg at 07/03/21 2043   • beer 1 each  1 each Oral TID PRN Phillip Jones MD       • dexmedetomidine (PRECEDEX) 400 mcg in 100 mL NS infusion  0.2-1.5 mcg/kg/hr Intravenous Titrated Cedric Murphy MD 5.6 mL/hr at 07/04/21 0302 0.2 mcg/kg/hr at 07/04/21 0302   • famotidine (PEPCID) tablet 20 mg  20 mg Nasogastric BID Given, Abdiel FONTANEZ MD   20 mg at 07/03/21 2044   • folic acid (FOLVITE) tablet 1 mg  1 mg Nasogastric Daily Given, Abdiel FONTANEZ MD   1 mg at 07/03/21 0833   • insulin regular (humuLIN R,novoLIN R) injection 0-9 Units  0-9 Units Subcutaneous Q6H Given, Abdiel FONTANEZ MD   Stopped at 07/04/21 0553   • losartan (COZAAR) tablet 100 mg  100 mg Nasogastric Q24H Given, Abdiel FONTANEZ MD   100 mg at 07/03/21 0833   • magnesium sulfate 4g/100mL (PREMIX) infusion  4 g Intravenous PRN Given, Abdiel FONTANEZ MD   4 g at 06/28/21 0607   • melatonin tablet 5 mg  5 mg Nasogastric Nightly Given, Abdiel FONTANEZ MD   5 mg at 07/03/21 2043   • metoprolol tartrate (LOPRESSOR) tablet 50 mg  50 mg Nasogastric Q12H Cedric Murphy MD   50 mg at 07/03/21 2043   • morphine injection 2 mg  2 mg Intravenous Q2H PRN Given, Abdiel FONTANEZ MD   2 mg at 07/02/21 2342   • nicotine (NICODERM CQ) 7 MG/24HR patch 1 patch  1 patch Transdermal Q24H Given, Abdiel FONTANEZ MD   1 patch at 07/03/21 0835   • ondansetron (ZOFRAN) injection 4 mg  4 mg Intravenous Q6H PRN Given, Abdiel FONTANEZ MD       • Pharmacy Consult - MTM   Does not apply Daily Given, Abdiel FONTANEZ MD       • potassium chloride (KLOR-CON) packet 40 mEq  40 mEq Nasogastric PRN Given, Abdiel FONTANEZ MD        • sennosides (SENOKOT) 8.8 MG/5ML syrup 10 mL  10 mL Nasogastric BID Given, Abdiel FONTANEZ MD   10 mL at 07/03/21 2044   • sodium chloride 0.9 % flush 10 mL  10 mL Intravenous Q12H Given, Abdiel FONTANEZ MD   10 mL at 07/03/21 2045   • sodium chloride 0.9 % flush 10 mL  10 mL Intravenous PRN Given, Abdiel FONTANEZ MD       • terazosin (HYTRIN) capsule 5 mg  5 mg Nasogastric Nightly Mikey Conrad MD   5 mg at 07/03/21 2043   • thiamine (VITAMIN B-1) tablet 100 mg  100 mg Nasogastric Daily Given, Abdiel FONTANEZ MD   100 mg at 07/03/21 0833         CABG 06/22/21    History of tobacco abuse    STEMI    Dyslipidemia    Obesity (BMI 30-39.9)    ST elevation myocardial infarction (STEMI) (CMS/[facility])    Hyperglycemia        Impression      Inferior STEMI 6/22/2021  Emergent CABG for left main and three-vessel coronary artery disease with preserved LV function 6/22/2021  reop for bleeding 6/23/2021 early a.m.  Hypertension  Hyperlipidemia  Remote coronary artery stent [place]  Postop delirium/EtOH withdrawal  Postop atrial fibrillation/flutter; back in sinus rhythm this morning  Echocardiogram shows mild LV dysfunction with no significant pericardial effusion  Left femoral pseudoaneurysm by duplex 7/1/2021 status post thrombin occlusion 7/2/21    Delirium is gradually improving  Azotemia worsening    Plan     Continue beta-blocker  Pulmonary toilet  Continue amiodarone  Continue aspirin and statin  Discontinue Lasix  Mobilize      Mikey Conrad MD   07/04/21  07:24 EDT          Electronically signed by Mikey Conrad MD at 07/04/21 0729     Phillip Jones MD at 07/03/21 0397          Cardiothoracic Surgery Progress Note      6/22 s/p CABG x 4, mediastinal exploration, PPD # 1 left groin pseudoaneurysm injection after IABP     LOS: 11 days      Subjective:  No complaints from patient.  He is yelling in the unit.      Objective:  Vital Signs  Temp:  [98.2 °F (36.8 °C)-98.5 °F (36.9 °C)] 98.4 °F (36.9 °C)  Heart Rate:   [56-92] 66  Resp:  [24-26] 26  BP: ()/(35-76) 133/47    Physical Exam:   General Appearance: alert, appears stated age and cooperative, oriented x 3   Lungs: clear to auscultation, respirations regular, respirations even and respirations unlabored   Heart: regular rhythm & normal rate, normal S1, S2 and no murmur, no gallop, no rub   Skin: Incision c/d/i     Results:  Results from last 7 days   Lab Units 07/03/21  0415   WBC 10*3/mm3 8.96   HEMOGLOBIN g/dL 8.0*   HEMATOCRIT % 28.2*   PLATELETS 10*3/mm3 312     Results from last 7 days   Lab Units 07/03/21  0415   SODIUM mmol/L 136   POTASSIUM mmol/L 4.0   CHLORIDE mmol/L 104   CO2 mmol/L 18.0*   BUN mg/dL 45*   CREATININE mg/dL 0.76   GLUCOSE mg/dL 116*   CALCIUM mg/dL 8.8       Assessment:  6/22 s/p CABG x 4, mediastinal exploration, PPD # 1 left groin pseudoaneurysm injection after IABP    Plan:  D/C ativan  Beer TID  Minimize narcotics  Ambulate  Pulmonary toilet  SLP evaluation  ASA, statin, BB    Phillip Jones MD  07/03/21  16:37 EDT          Electronically signed by Phillip Jones MD at 07/03/21 1642     Cedric Murphy MD at 07/03/21 1626          Pulmonary/Critical Care Follow-up     LOS: 11 days   Patient Care Team:  Sanaz Zuluaga DO as PCP - General (Family Medicine)    Chief Complaint/reason: Chest pain/medical management of issues including delirium, respiratory management, electrolyte management postoperatively after CABG.      Chief Complaint   Patient presents with   • Chest Pain     Subjective      Initial history (from ICU note 6/22/2021):    65-year-old male with a PMH of prior tobacco use (quit 34 yrs ago), HTN, CAD s/p stent (2004), and obesity.      Per report, approximately 1.5 hrs prior to presentation patient developed severe burning chest pain / pressure with associated shortness of breath and nausea. EKG on arrival revealed inferior ST elevation. He was emergently taken to Cath Lab by Dr. Conrad where he was found to  have severe multivessel CAD (60-70% ostial LM with haziness in proximal suggesting thrombus, 70-80% mid-LAD, 70% RCA) not amenable to PCI. LV function was relatively well-preserved at 55%.      CTS was contacted and he was subsequently taken to OR where he underwent a CABG x4 with IABP placement per Dr. Acosta.     (End of copied text).    Patient went back to the operating room on 6/23/2021 due to bleeding and had evacuation of blood from the right pleural space.  Patient extubated 6/24/2021.  Patient developed confusion/agitation on 6/25/2021.  He has been treated for presumed alcohol withdrawal.     Patient had pseudoaneurysm repair (thrombin injection by Dr. Tamez) on 7/2/2021.    Interval History:     Patient seems more drowsy today.  He is confused but not agitated.  He still drowsy and slurring words a bit.  He is not at all agitated this morning.   Ambulated a short distance in the gonzales.    History taken from: Chart, staff    PMH/FH/Social History were reviewed and updated appropriately in the electronic medical record.     Review of Systems:    Review of 14 systems was completed with positives and pertinent negatives noted in the subjective section.  All other systems reviewed and are negative.         Objective     Vital Signs  Temp:  [98.2 °F (36.8 °C)-98.5 °F (36.9 °C)] 98.4 °F (36.9 °C)  Heart Rate:  [56-92] 66  Resp:  [24-26] 26  BP: ()/(35-76) 133/47  07/02 0701 - 07/03 0700  In: 258   Out: -   Body mass index is 34.44 kg/m².     IV drips:  niCARdipine, Last Rate: 5 mg/hr (07/01/21 1449)       Physical Exam:     Constitutional:   Awake.  Drowsy but arousable.  Very slightly confused.  No acute distress.   Head:   Normocephalic, without obvious abnormality, atraumatic   Eyes:           Lids and lashes normal, conjunctivae and sclerae normal.  No icterus, no pallor, corneas clear, PER   ENMT:  Ears appear intact with no abnormalities noted        Neck:  No adenopathy, supple, trachea midline    Lungs/Resp:    Normal effort, symmetric chest rise, no crepitus, clear bilaterally                Heart/CV:    Regular rhythm, normal S1 and S2, no murmur   Abdomen/GI:    Normal bowel sounds, no masses, no organomegaly, soft,        nontender, nondistended   :    Deferred   Extremities/MSK:  No clubbing or cyanosis.  Trace bilateral lower extremity edema.  Normal tone.    No deformities.    Pulses:  Pulses palpable and equal bilaterally   Skin:  No bleeding, bruising or rash   Heme/Lymph:  No cervical or supraclavicular adenopathy.   Neurologic:    Psychiatric:    Moves all extremities with no obvious focal motor deficit.  Cranial nerves 2 - 12 grossly intact, dysarthric speech persists.  Much more calm today.    The above physical exam findings were reviewed and reflect exam findings as of today's exam.   Electronically signed by:Cedric Murphy MD 07/03/21 16:26 EDT    Results Review:     I reviewed the patient's new clinical results.   Results from last 7 days   Lab Units 07/03/21 0415 07/01/21 0447 06/30/21  0641 06/28/21  0346   SODIUM mmol/L 136 139 141 139   POTASSIUM mmol/L 4.0 4.1 3.9 3.8   CHLORIDE mmol/L 104 107 108* 105   CO2 mmol/L 18.0* 23.0 22.0 24.0   BUN mg/dL 45* 27* 26* 16   CREATININE mg/dL 0.76 0.56* 0.53* 0.61*   CALCIUM mg/dL 8.8 8.9 8.3* 8.9   BILIRUBIN mg/dL  --   --   --  0.8   ALK PHOS U/L  --   --   --  56   ALT (SGPT) U/L  --   --   --  18   AST (SGOT) U/L  --   --   --  26   GLUCOSE mg/dL 116* 137* 125* 122*     Results from last 7 days   Lab Units 07/03/21 0415 07/02/21  0639 07/01/21 0447 06/27/21  1802 06/27/21  0420   WBC 10*3/mm3 8.96 12.47* 10.03   < > 5.30   HEMOGLOBIN g/dL 8.0* 8.4* 7.7*  --  6.8*   HEMATOCRIT % 28.2* 28.7* 24.4*  --  21.1*   PLATELETS 10*3/mm3 312 351 323   < > 173   MONOCYTES % %  --   --   --   --  6.0    < > = values in this interval not displayed.         Results from last 7 days   Lab Units 07/01/21  0447 06/30/21  0641 06/29/21  0416  06/28/21  0346   MAGNESIUM mg/dL 2.1 2.4 2.0 1.8   PHOSPHORUS mg/dL 4.2 3.7  --  4.3       I reviewed the patient's new imaging including images and reports.      Chest x-ray 6/30/2021 shows cardiomegaly with small pleural effusions and pulmonary congestion which appears slightly worse to me from yesterday.    Medication Review:   amiodarone, 200 mg, Nasogastric, Q8H   Followed by  [START ON 7/7/2021] amiodarone, 200 mg, Nasogastric, Q12H   Followed by  [START ON 7/21/2021] amiodarone, 200 mg, Nasogastric, Daily  amLODIPine, 5 mg, Nasogastric, Q24H  aspirin, 325 mg, Nasogastric, Daily  atorvastatin, 40 mg, Nasogastric, Nightly  famotidine, 20 mg, Nasogastric, BID  folic acid, 1 mg, Nasogastric, Daily  furosemide, 40 mg, Nasogastric, Daily  insulin regular, 0-9 Units, Subcutaneous, Q6H  LORazepam, 2 mg, Nasogastric, Nightly  losartan, 100 mg, Nasogastric, Q24H  melatonin, 5 mg, Nasogastric, Nightly  metoprolol tartrate, 50 mg, Nasogastric, Q12H  nicotine, 1 patch, Transdermal, Q24H  pharmacy consult - [place], , Does not apply, Daily  QUEtiapine, 50 mg, Nasogastric, Nightly  sennosides, 10 mL, Nasogastric, BID  sodium chloride, 10 mL, Intravenous, Q12H  terazosin, 5 mg, Nasogastric, Nightly  thiamine, 100 mg, Nasogastric, Daily      niCARdipine, 5-15 mg/hr, Last Rate: 5 mg/hr (07/01/21 1449)        Assessment/Plan         CABG 06/22/21    History of tobacco abuse    STEMI    Dyslipidemia    Obesity (BMI 30-39.9)    ST elevation myocardial infarction (STEMI) (CMS/[facility])    Hyperglycemia    65 y.o. male remote smoker with history of hypertension, coronary artery disease status post stent in 2004, obesity, BPH, alcohol use, admitted on 6/22/2021 with chest pain and found to have severe multivessel coronary artery disease.    Patient underwent CABG x4 with intra-aortic balloon pump placement by Dr. Acosta on 6/22/2021.  He went back to the operating room on 6/23/2021 for bleeding and had evacuation of blood from the right  pleural space.    Postoperative issues include delirium/agitation possibly secondary to alcohol withdrawal, hyperglycemia, hypoxemia requiring supplemental oxygen.    Agitation overall improved.  Still some restlessness alternating with somnolence.  Will decrease oral medications.    Plan:  1.  Continue PICC line.  2.  Management of agitation/delirium per neurology.  3.  Continue correction insulin. Patient does not have history of diabetes.  4.  Supplemental oxygen, wean as tolerated.  5.  Continue vitamin replacement.  6.  Continue nicotine patch.  7.  Continue terazosin.  8.  Discontinue Seroquel and Ativan and use Precedex as needed tonight and hold at 4:30 AM.  9.  Continue tube feeding, speech following.  Periodic reassessment.    Level of Risk High due to:  illness with threat to life or bodily function tenuous respiratory and neurologic status in the setting of recent CABG, probable volume overload, possible alcohol withdrawal and delirium.      Cedric Murphy MD  07/03/21  16:26 EDT      *. Please note that portions of this note were completed with [employer] - a voice recognition program.     Electronically signed by Cedric Murphy MD at 07/03/21 1702     Mikey Conrad MD at 07/03/21 7189                                           [facility] Progress Note      LOS: 11 days   Patient Care Team:  Sanaz Zuluaga DO as PCP - General (Family Medicine)    Chief Complaint:    Chief Complaint   Patient presents with   • Chest Pain       Subjective     Interval History:     Patient Complaints: Confused.  Somewhat more intelligible      Review of Systems:   A 14 point review of systems was negative except as was stated in the HPI      Objective     Vital Sign Min/Max for last 24 hours  Temp  Min: 98.1 °F (36.7 °C)  Max: 98.7 °F (37.1 °C)   BP  Min: 92/38  Max: 154/71   Pulse  Min: 56  Max: 85   Resp  Min: 16  Max: 26   SpO2  Min: 85 %  Max: 100 %   No data recorded   Weight  " Min: 112 kg (246 lb)  Max: 112 kg (246 lb)     Flowsheet Rows      First Filed Value   Admission Height  177.8 cm (70\") Documented at 06/22/2021 1400   Admission Weight  118 kg (260 lb 2.3 oz) Documented at 06/22/2021 2000          Physical Exam:  General Appearance: Well-nourished well-developed white male no acute distress  Lungs: Coarse bilateral breath sounds  Heart:: Regular rate and rhythm this morning; no Murmurs, Rubs or Gallops  Abdomen: Soft and nontender with adequate bowel sounds.  No organomegaly  Extremities: No cyanosis, clubbing.  1+ edema  Pulses: Pulses palpable and equal bilaterally/pulsatile mass left groin  Skin: Warm and dry with no rash  Psych: Normal     Results Review:     I reviewed the patient's new clinical results.  Results from last 7 days   Lab Units 07/03/21  0415 07/01/21  0447 06/30/21  0641   SODIUM mmol/L 136 139 141   POTASSIUM mmol/L 4.0 4.1 3.9   CHLORIDE mmol/L 104 107 108*   CO2 mmol/L 18.0* 23.0 22.0   BUN mg/dL 45* 27* 26*   CREATININE mg/dL 0.76 0.56* 0.53*   GLUCOSE mg/dL 116* 137* 125*   CALCIUM mg/dL 8.8 8.9 8.3*     Results from last 7 days   Lab Units 07/03/21  0415 07/02/21  0639 07/01/21  0447   WBC 10*3/mm3 8.96 12.47* 10.03   HEMOGLOBIN g/dL 8.0* 8.4* 7.7*   HEMATOCRIT % 28.2* 28.7* 24.4*   PLATELETS 10*3/mm3 312 351 323     Lab Results   Lab Value Date/Time    TROPONINT 1.730 (C) 06/22/2021 1705    TROPONINT <0.010 06/22/2021 1009     Results from last 7 days   Lab Units 06/28/21  0346   TRIGLYCERIDES mg/dL 70                   Medication Review: yes  Current Facility-Administered Medications   Medication Dose Route Frequency Provider Last Rate Last Admin   • albuterol (PROVENTIL) nebulizer solution 0.083% 2.5 mg/3mL  2.5 mg Nebulization Q6H PRN Given, Abdiel FONTANZE MD       • amiodarone (PACERONE) tablet 200 mg  200 mg Nasogastric Q8H Given, Abdiel FONTANEZ MD   200 mg at 07/03/21 0419    Followed by   • [START ON 7/7/2021] amiodarone (PACERONE) tablet 200 mg  200 mg " Nasogastric Q12H Given, Abdiel FONTANEZ MD        Followed by   • [START ON 7/21/2021] amiodarone (PACERONE) tablet 200 mg  200 mg Nasogastric Daily Given, Abdiel FONTANEZ MD       • amLODIPine (NORVASC) tablet 5 mg  5 mg Nasogastric Q24H Given, Abdiel FONTANEZ MD       • aspirin tablet 325 mg  325 mg Nasogastric Daily Given, Abdiel FONTANEZ MD   325 mg at 07/02/21 0839   • atorvastatin (LIPITOR) tablet 40 mg  40 mg Nasogastric Nightly Given, Abdiel FONTANEZ MD   40 mg at 07/02/21 2056   • famotidine (PEPCID) tablet 20 mg  20 mg Nasogastric BID Given, Abdiel FONTANEZ MD   20 mg at 07/02/21 2057   • folic acid (FOLVITE) tablet 1 mg  1 mg Nasogastric Daily Given, Abdiel FONTANEZ MD   1 mg at 07/02/21 0839   • furosemide (LASIX) tablet 40 mg  40 mg Nasogastric Daily Given, Abdiel FONTANEZ MD   40 mg at 07/02/21 0839   • [MAR Hold] HYDROcodone-acetaminophen (NORCO) 7.5-325 MG per tablet 1 tablet  1 tablet Nasogastric Q4H PRN Miguel Angel Acosta MD       • insulin regular (humuLIN R,novoLIN R) injection 0-9 Units  0-9 Units Subcutaneous Q6H Given, Abdiel FONTANEZ MD   2 Units at 07/01/21 0530   • LORazepam (ATIVAN) tablet 1 mg  1 mg Nasogastric Q6H PRN Given, Abdiel FONTANEZ MD   1 mg at 07/02/21 2342   • LORazepam (ATIVAN) tablet 2 mg  2 mg Nasogastric Nightly Given, Abdiel FONTANEZ MD   2 mg at 07/02/21 2056   • losartan (COZAAR) tablet 100 mg  100 mg Nasogastric Q24H Given, Abdiel FONTANEZ MD       • magnesium sulfate 4g/100mL (PREMIX) infusion  4 g Intravenous PRN Given, Abdiel FONTANEZ MD   4 g at 06/28/21 0607   • melatonin tablet 5 mg  5 mg Nasogastric Nightly Given, Abdiel FONTANEZ MD   5 mg at 07/02/21 2057   • metoprolol tartrate (LOPRESSOR) tablet 100 mg  100 mg Nasogastric Q12H Given, Abdiel FONTANEZ MD   100 mg at 07/02/21 2056   • morphine injection 2 mg  2 mg Intravenous Q2H PRN Given, Abdiel FONTANEZ MD   2 mg at 07/02/21 5826   • niCARdipine (CARDENE) 40 mg in 200 mL NS infusion  5-15 mg/hr Intravenous Titrated Given, Abdiel FONTANEZ MD 25 mL/hr at 07/01/21 1449 5 mg/hr at 07/01/21 1449   • nicotine  (NICODERM CQ) 7 MG/24HR patch 1 patch  1 patch Transdermal Q24H Given, Abdiel FONTANEZ MD   1 patch at 07/01/21 0927   • ondansetron (ZOFRAN) injection 4 mg  4 mg Intravenous Q6H PRN Given, Abdiel FONTANEZ MD       • [MAR Hold] oxyCODONE-acetaminophen (PERCOCET) 5-325 MG per tablet 2 tablet  2 tablet Nasogastric Q4H PRN Miguel Angel Acosta MD   2 tablet at 07/02/21 0314   • Pharmacy Consult - MTM   Does not apply Daily Given, Abdiel FONTANEZ MD       • potassium chloride (KLOR-CON) packet 40 mEq  40 mEq Nasogastric PRN Given, Abdiel FONTANEZ MD       • QUEtiapine (SEROquel) tablet 50 mg  50 mg Nasogastric Nightly Given, Abdiel FONTANEZ MD   50 mg at 07/02/21 2151   • sennosides (SENOKOT) 8.8 MG/5ML syrup 10 mL  10 mL Nasogastric BID Given, Abdiel FONTANEZ MD   10 mL at 07/02/21 2056   • sodium chloride 0.9 % flush 10 mL  10 mL Intravenous Q12H Given, Abdiel FONTANEZ MD   10 mL at 06/30/21 2124   • sodium chloride 0.9 % flush 10 mL  10 mL Intravenous PRN Given, Abdiel FONTANEZ MD       • terazosin (HYTRIN) capsule 5 mg  5 mg Nasogastric Nightly Mikey Conrad MD       • thiamine (VITAMIN B-1) tablet 100 mg  100 mg Nasogastric Daily Given, Abdiel FONTANEZ MD   100 mg at 07/02/21 0839         CABG 06/22/21    History of tobacco abuse    STEMI    Dyslipidemia    Obesity (BMI 30-39.9)    ST elevation myocardial infarction (STEMI) (CMS/[facility])    Hyperglycemia        Impression      Inferior STEMI 6/22/2021  Emergent CABG for left main and three-vessel coronary artery disease with preserved LV function 6/22/2021  reop for bleeding 6/23/2021 early a.m.  Hypertension  Hyperlipidemia  Remote coronary artery stent [place]  Postop delirium/EtOH withdrawal  Postop atrial fibrillation/flutter; back in sinus rhythm this morning  Echocardiogram shows mild LV dysfunction with no significant pericardial effusion  Left femoral pseudoaneurysm by duplex 7/1/2021 status post thrombin occlusion 7/2/21        Plan     Continue beta-blocker  Pulmonary toilet  Continue  amiodarone  Continue aspirin and statin  Gentle diuresis  Mobilize      Mikey Conrad MD   07/03/21  07:27 EDT          Electronically signed by Mikey Conrad MD at 07/03/21 0729              Discharge Summary      Mikey Conrad MD at 07/09/21 1206          Date of Discharge:  7/9/2021              [facility]  Date of Admit: 6/22/2021    Sanaz Zuluaga DO      Discharge Diagnosis:  CABG 06/22/21    History of tobacco abuse    STEMI    Dyslipidemia    Obesity (BMI 30-39.9)    Hyperglycemia      Hospital Course: 65-year-old man admitted on June 22 with an inferior STEMI.  He underwent cardiac catheterization revealing left main and three-vessel disease with preserved LV function and ultimately underwent emergent bypass surgery.  He was taken back to the OR 12 hours postop for bleeding.  Subsequently he pursued a stable hemodynamic course.  He did have to have a left femoral pseudoaneurysm repaired on July 1 with thrombin occlusion.  Otherwise blood pressure heart rate and hemodynamics remained stable.  Atrial fibrillation occurred postoperatively and this was treated with amiodarone with restoration of sinus rhythm.  The patient had a significant amount of delirium attributed to acute illness as well as possible EtOH withdrawal.  This resolved throughout the course of his hospital stay.  Today the patient was felt stable and ready for discharge for intensive rehab.    Procedures Performed  Procedure(s):  thrombin injection       Consults     Date and Time Order Name Status Description    6/28/2021 11:19 AM Inpatient Neurology Consult General Completed           Pertinent Test Results:   .      Discharge Physical Exam:    General Appearance No acute distress   Neck No adenopathy, supple, trachea midline, no JVD   Lungs Clear to auscultation,respirations regular, even and unlabored   Heart Regular rhythm and normal rate, normal S1 and S2, no murmur, no gallop, no rub, no click    Chest wall No abnormalities observed   Abdomen Normal bowel sounds, no masses, no hepatomegaly, soft   Extremities Moves all extremities well, no edema, no cyanosis, no redness   Neurological Alert and oriented x 3     Discharge Medications     Discharge Medications      ASK your doctor about these medications      Instructions Start Date   albuterol sulfate  (90 Base) MCG/ACT inhaler  Commonly known as: PROVENTIL HFA;VENTOLIN HFA;PROAIR HFA   2 puffs, Inhalation, Every 4 Hours PRN      aspirin 81 MG EC tablet   81 mg, Oral, Daily      meloxicam 7.5 MG tablet  Commonly known as: MOBIC   TAKE 1 TABLET DAILY WITH   FOOD      pravastatin 40 MG tablet  Commonly known as: Pravachol   40 mg, Oral, Daily      tamsulosin 0.4 MG capsule 24 hr capsule  Commonly known as: FLOMAX   0.8 mg, Oral, Daily      triamcinolone 0.025 % ointment  Commonly known as: KENALOG   Apply  topically to the appropriate area as directed 2 Times a Day for 10 days. Avoid applying to face, axilla, and groin             Discharge Diet:   Diet Instructions     FEES   6/25/2021  Reason for Referral  Patient was referred for a FEES to assess the efficiency of his/her swallow function, rule out aspiration and make recommendations regarding safe dietary consistencies, effective compensatory strategies, and safe eating environment.         Recommendations/Treatment  SLP Swallowing Diagnosis: severe, pharyngeal dysphagia  Functional Impact: risk of aspiration/pneumonia  Rehab Potential/Prognosis, Swallowing: good, to achieve stated therapy goals  Swallow Criteria for Skilled Therapeutic Interventions Met: demonstrates skilled criteria  Therapy Frequency (Swallow): 5 days per week  Predicted Duration Therapy Intervention (Days): until discharge  SLP Diet Recommendation: NPO, temporary alternate methods of nutrition/hydration, other (see comments) (few ice chips/hr w/ RN supervision, as tolerated)  SLP Rec. for Method of Medication Administration:  meds via alternate route  Anticipated Discharge Disposition (SLP): unknown, anticipate therapy at next level of care    Instrumental Set-up  Risks/Benefits Reviewed: risks/benefits explained, patient, family, agreed to eval  Nasal Entry: left:  Anatomic Considerations: edema, false vocal folds, arytenoids  Utensils Used: Spoon, Cup  Consistencies Trialed: thin liquids, nectar-thick liquids, honey-thick liquids, pudding/puree    Oral Preparation/ Oral Phase  Oral Phase: WFL    Pharyngeal Phase  Initiation of Pharyngeal Swallow: WFL  Pharyngeal Phase: impaired pharyngeal phase of swallowing  Aspiration During the Swallow: thin liquids, secondary to reduced laryngeal elevation, secondary to reduced vestibular closure  Aspiration After the Swallow: thin liquids, nectar-thick liquids, honey-thick liquids, pudding/puree, secondary to residue, in pyriform sinuses  Response to Aspiration: no response, silent aspiration  Rosenbek's Scale: thin:, nectar:, honey:, pudding/puree:, 8-->Level 8  Residue: thin liquids, nectar-thick liquids, honey-thick liquids, pudding/puree, pyriform sinuses, secondary to reduced laryngeal elevation, secondary to reduced hyolaryngeal excursion  Response to Residue: unable to clear residue  Attempted Compensatory Maneuvers: bolus size, bolus presentation style, effortful (hard swallow), throat clear after swallow, multiple swallows  Response to Attempted Compensatory Maneuvers: did not prevent penetration, did not prevent aspiration, did not reduce residue  FEES Summary: Severe pharyngeal dysphagia. Aspiration during/after the swallow w/ thin liquid & aspiration after the swallow w/ nectar-thick/honey-thick/pudding as residue spilled over posterior commissure. Aspiration was silent. U/a to clear subglottic w/ cued cough. Rec continue NPO w/ temporary alternate route of nutrition/hydration/medication. Few ice chips/hr w/ RN supervision, as tolerated. SLP will continue to follow for tx.                        Activity at Discharge: Regular    Discharge disposition: Follow-up with CT surgery as scheduled and with cardiology in 1 month    Condition on Discharge: Stable    Follow-up Appointments  Future Appointments   Date Time Provider Department Center   9/23/2021  1:00 PM [facility] -  [facility]   1/26/2022  9:15 AM Phillip Eason III, MD [facility]         Test Results Pending at Discharge       Jose A Conrad MD  07/09/21  12:06 EDT           Electronically signed by Jose A Conrad MD at 07/09/21 1209       Discharge Order (From admission, onward)     Start     Ordered    07/09/21 1211  Discharge patient  Once     Expected Discharge Date: 07/09/21    Discharge Disposition: Rehab Facility or Unit (DC - External)    Physician of Record for Attribution - Please select from Treatment Team: JOSEA CONRAD [[id number]]    Review needed by CMO to determine Physician of Record: No       Question Answer Comment   Physician of Record for Attribution - Please select from Treatment Team JOSE A CONRAD    Review needed by CMO to determine Physician of Record No        07/09/21 0683

## 2021-07-09 NOTE — PROGRESS NOTES
"INTENSIVIST NOTE     Hospital Day: 17    Mr. Thien Gray, 65 y.o. male is followed for:   Perioperative management of comorbid medical conditions       SUBJECTIVE       Interval history:    Afebrile awake and alert.  Fluid balance negative.  Plans for [facility] admission today.    The patient's relevant past medical, surgical and social history were reviewed and updated in Epic as appropriate.       OBJECTIVE     Vital Sign Min/Max for last 24 hours  Temp  Min: 97.8 °F (36.6 °C)  Max: 98.2 °F (36.8 °C)   BP  Min: 90/49  Max: 150/79   Pulse  Min: 47  Max: 92   Resp  Min: 17  Max: 20   SpO2  Min: 95 %  Max: 99 %   No data recorded   Weight  Min: 113 kg (249 lb 4.8 oz)  Max: 113 kg (249 lb 4.8 oz)      Intake/Output Summary (Last 24 hours) at 7/9/2021 1309  Last data filed at 7/9/2021 1248  Gross per 24 hour   Intake 1200 ml   Output 1950 ml   Net -750 ml      Flowsheet Rows      First Filed Value   Admission Height  177.8 cm (70\") Documented at 06/22/2021 1400   Admission Weight  118 kg (260 lb 2.3 oz) Documented at 06/22/2021 2000             07/07/21  0500 07/08/21  0400 07/09/21  0300   Weight: 113 kg (248 lb 14.4 oz) 112 kg (248 lb) 113 kg (249 lb 4.8 oz)            Objective:  General Appearance:  In no acute distress.    Vital signs: (most recent): Blood pressure 144/65, pulse 72, temperature 98.2 °F (36.8 °C), temperature source Oral, resp. rate 20, height 180 cm (70.87\"), weight 113 kg (249 lb 4.8 oz), SpO2 95 %.    HEENT: Normal HEENT exam.    Lungs:  Normal effort and normal respiratory rate.  Breath sounds clear to auscultation.  He is not in respiratory distress.  No rales, wheezes or rhonchi.    Heart: Normal rate.  Regular rhythm.  S1 normal and S2 normal.  No murmur, gallop or friction rub.   Chest: Symmetric chest wall expansion.   Abdomen: Abdomen is soft and non-distended.  Bowel sounds are normal.   There is no abdominal tenderness.   There is no mass. There is no splenomegaly. " There is no hepatomegaly.   Extremities: There is no deformity or dependent edema.    Neurological: Patient is alert and oriented to person, place and time.    Pupils:  Pupils are equal, round, and reactive to light.    Skin:  Warm and dry.              I reviewed the patient's new clinical results.  I reviewed the patient's new imaging results/reports including actual images and agree with reports.      Chest X-Ray: No additional    INFUSIONS       Results from last 7 days   Lab Units 07/09/21  0607 07/08/21  0641 07/07/21  0534   WBC 10*3/mm3 6.08 5.94 6.62   HEMOGLOBIN g/dL 8.4* 8.3* 8.3*   HEMATOCRIT % 27.9* 27.7* 27.4*   PLATELETS 10*3/mm3 387 394 378     Results from last 7 days   Lab Units 07/09/21  0608 07/08/21  0641 07/07/21  0534   SODIUM mmol/L 136 132* 132*   POTASSIUM mmol/L 4.8 4.2 4.2   CHLORIDE mmol/L 106 102 102   CO2 mmol/L 21.0* 22.0 20.0*   BUN mg/dL 17 19 25*   GLUCOSE mg/dL 106* 124* 108*   CREATININE mg/dL 0.75* 0.71* 0.69*   CALCIUM mg/dL 8.5* 8.7 8.6                 [place] Ventilation:      I reviewed the patient's medications.    Assessment/Plan   ASSESSMENT/PLAN     Active Hospital Problems    Diagnosis    • **CABG 06/22/21    • Hyperglycemia    • STEMI    • Dyslipidemia    • Obesity (BMI 30-39.9)    • History of tobacco abuse        65 y.o. male remote smoker with history of hypertension, coronary artery disease status post stent in 2004, obesity, BPH, alcohol use, admitted on 6/22/2021 with chest pain and found to have severe multivessel coronary artery disease.     Patient underwent CABG x4 with intra-aortic balloon pump placement by Dr. Acosta on 6/22/2021.  He went back to the operating room on 6/23/2021 for bleeding and had evacuation of blood from the right pleural space.     Postoperative issues have included delirium/agitation possibly secondary to alcohol withdrawal, hyperglycemia, hypoxemia requiring supplemental oxygen.  These have all resolved.    His condition overall  stabilized and he was transferred to PCU on 7/5.    Sodium up to 136.  He has been cleared for discharge by cardiology and CT surgery.  Plans are for admission to [facility] for rehab.    1. Have gone over his noncardiac medications for discharge  2. Transfer to [facility] later today     I discussed the patient's findings and my recommendations with patient and nursing staff     Plan of care and goals reviewed with multidisciplinary team at daily rounds.    .    Gatito Hubbard MD  Pulmonary and Critical Care Medicine  07/09/21 13:09 EDT

## 2021-07-09 NOTE — DISCHARGE SUMMARY
Date of Discharge:  7/9/2021              [facility]  Date of Admit: 6/22/2021    Sanaz Zuluaga DO      Discharge Diagnosis:  CABG 06/22/21    History of tobacco abuse    STEMI    Dyslipidemia    Obesity (BMI 30-39.9)    Hyperglycemia      Hospital Course: 65-year-old man admitted on June 22 with an inferior STEMI.  He underwent cardiac catheterization revealing left main and three-vessel disease with preserved LV function and ultimately underwent emergent bypass surgery.  He was taken back to the OR 12 hours postop for bleeding.  Subsequently he pursued a stable hemodynamic course.  He did have to have a left femoral pseudoaneurysm repaired on July 1 with thrombin occlusion.  Otherwise blood pressure heart rate and hemodynamics remained stable.  Atrial fibrillation occurred postoperatively and this was treated with amiodarone with restoration of sinus rhythm.  The patient had a significant amount of delirium attributed to acute illness as well as possible EtOH withdrawal.  This resolved throughout the course of his hospital stay.  Today the patient was felt stable and ready for discharge for intensive rehab.    Procedures Performed  Procedure(s):  thrombin injection       Consults     Date and Time Order Name Status Description    6/28/2021 11:19 AM Inpatient Neurology Consult General Completed           Pertinent Test Results:   .      Discharge Physical Exam:    General Appearance No acute distress   Neck No adenopathy, supple, trachea midline, no JVD   Lungs Clear to auscultation,respirations regular, even and unlabored   Heart Regular rhythm and normal rate, normal S1 and S2, no murmur, no gallop, no rub, no click   Chest wall No abnormalities observed   Abdomen Normal bowel sounds, no masses, no hepatomegaly, soft   Extremities Moves all extremities well, no edema, no cyanosis, no redness   Neurological Alert and oriented x 3     Discharge Medications     Discharge Medications      ASK  your doctor about these medications      Instructions Start Date   albuterol sulfate  (90 Base) MCG/ACT inhaler  Commonly known as: PROVENTIL HFA;VENTOLIN HFA;PROAIR HFA   2 puffs, Inhalation, Every 4 Hours PRN      aspirin 81 MG EC tablet   81 mg, Oral, Daily      meloxicam 7.5 MG tablet  Commonly known as: MOBIC   TAKE 1 TABLET DAILY WITH   FOOD      pravastatin 40 MG tablet  Commonly known as: Pravachol   40 mg, Oral, Daily      tamsulosin 0.4 MG capsule 24 hr capsule  Commonly known as: FLOMAX   0.8 mg, Oral, Daily      triamcinolone 0.025 % ointment  Commonly known as: KENALOG   Apply  topically to the appropriate area as directed 2 Times a Day for 10 days. Avoid applying to face, axilla, and groin             Discharge Diet:   Diet Instructions     FEES   6/25/2021  Reason for Referral  Patient was referred for a FEES to assess the efficiency of his/her swallow function, rule out aspiration and make recommendations regarding safe dietary consistencies, effective compensatory strategies, and safe eating environment.         Recommendations/Treatment  SLP Swallowing Diagnosis: severe, pharyngeal dysphagia  Functional Impact: risk of aspiration/pneumonia  Rehab Potential/Prognosis, Swallowing: good, to achieve stated therapy goals  Swallow Criteria for Skilled Therapeutic Interventions Met: demonstrates skilled criteria  Therapy Frequency (Swallow): 5 days per week  Predicted Duration Therapy Intervention (Days): until discharge  SLP Diet Recommendation: NPO, temporary alternate methods of nutrition/hydration, other (see comments) (few ice chips/hr w/ RN supervision, as tolerated)  SLP Rec. for Method of Medication Administration: meds via alternate route  Anticipated Discharge Disposition (SLP): unknown, anticipate therapy at next level of care    Instrumental Set-up  Risks/Benefits Reviewed: risks/benefits explained, patient, family, agreed to eval  Nasal Entry: left:  Anatomic Considerations: edema,  false vocal folds, arytenoids  Utensils Used: Spoon, Cup  Consistencies Trialed: thin liquids, nectar-thick liquids, honey-thick liquids, pudding/puree    Oral Preparation/ Oral Phase  Oral Phase: WFL    Pharyngeal Phase  Initiation of Pharyngeal Swallow: WFL  Pharyngeal Phase: impaired pharyngeal phase of swallowing  Aspiration During the Swallow: thin liquids, secondary to reduced laryngeal elevation, secondary to reduced vestibular closure  Aspiration After the Swallow: thin liquids, nectar-thick liquids, honey-thick liquids, pudding/puree, secondary to residue, in pyriform sinuses  Response to Aspiration: no response, silent aspiration  Rosenbek's Scale: thin:, nectar:, honey:, pudding/puree:, 8-->Level 8  Residue: thin liquids, nectar-thick liquids, honey-thick liquids, pudding/puree, pyriform sinuses, secondary to reduced laryngeal elevation, secondary to reduced hyolaryngeal excursion  Response to Residue: unable to clear residue  Attempted Compensatory Maneuvers: bolus size, bolus presentation style, effortful (hard swallow), throat clear after swallow, multiple swallows  Response to Attempted Compensatory Maneuvers: did not prevent penetration, did not prevent aspiration, did not reduce residue  FEES Summary: Severe pharyngeal dysphagia. Aspiration during/after the swallow w/ thin liquid & aspiration after the swallow w/ nectar-thick/honey-thick/pudding as residue spilled over posterior commissure. Aspiration was silent. U/a to clear subglottic w/ cued cough. Rec continue NPO w/ temporary alternate route of nutrition/hydration/medication. Few ice chips/hr w/ RN supervision, as tolerated. SLP will continue to follow for tx.                       Activity at Discharge: Regular    Discharge disposition: Follow-up with CT surgery as scheduled and with cardiology in 1 month    Condition on Discharge: Stable    Follow-up Appointments  Future Appointments   Date Time Provider Department Center   9/23/2021  1:00 PM  [facility] -  [facility]  [facility]   1/26/2022  9:15 AM Phillip Eason III, MD [facility]         Test Results Pending at Discharge       Mikey Conrad MD  07/09/21  12:06 EDT

## 2021-07-09 NOTE — PLAN OF CARE
Goal Outcome Evaluation:  Plan of Care Reviewed With: patient            SLP treatment completed. Will sign-off. Please see note for further details and recommendations.

## 2021-07-09 NOTE — PLAN OF CARE
Goal Outcome Evaluation:  Plan of Care Reviewed With: patient        Progress: improving  Outcome Summary: Patient demonstrating improved sit to stand transfer, ambulates 250 ft with 4 sitting rest breaks, holds portable tele pole with c.g.assist. Pt to benefit from inpt rehab at d/c

## 2021-07-09 NOTE — THERAPY TREATMENT NOTE
Acute Care - Speech Language Pathology Treatment Note  [facility]     Patient Name: Thien Gray  : 1955  MRN: [id number]  Today's Date: 2021               Admit Date: 2021     Visit Dx:    ICD-10-CM ICD-9-CM   1. ST elevation myocardial infarction (STEMI), unspecified artery (CMS/HCC)  I21.3 410.90   2. Chest pain, unspecified type  R07.9 786.50   3. Cognitive communication deficit  R41.841 799.52   4. Primary osteoarthritis of both knees  M17.0 715.16     Patient Active Problem List   Diagnosis   • History of tobacco abuse   • CABG 21   • Dyspnea on exertion   • RBBB   • Psoriasis   • Primary osteoarthritis of both knees   • STEMI   • Dyslipidemia   • Obesity (BMI 30-39.9)   • Hyperglycemia     Past Medical History:   Diagnosis Date   • Abnormal ECG 2020    Get from Dr. Kayser   • Coronary artery disease    • History of heart attack    • History of MI (myocardial infarction)    • Hyperlipidemia    • Myocardial infarction (CMS/HCC)    • RBBB 2/10/2020     Past Surgical History:   Procedure Laterality Date   • APPENDECTOMY     • CARDIAC CATHETERIZATION  2004    They used a catheter to put in my stent.   • CARDIAC CATHETERIZATION N/A 2021    Procedure: Left Heart Cath;  Surgeon: Mikey Conrad MD;  Location: [facility] CATH INVASIVE LOCATION;  Service: Cardiovascular;  Laterality: N/A;   • CORONARY ANGIOPLASTY      I think that's what the bill for my stent said.   • CORONARY ARTERY BYPASS BRING BACK FOR EXPLORATION N/A 2021    Procedure: BRING BACK FOR EXPLORATION OPEN HEART;  Surgeon: Miguel Angel Acosta MD;  Location: [facility] OR;  Service: Cardiothoracic;  Laterality: N/A;   • CORONARY ARTERY BYPASS GRAFT N/A 2021    Procedure: MEDIAN STERNOTOMY, CORONARY ARTERY BYPASS GRAFTING X 4 WITH LEFT INTERNAL MAMMARY ARTERY GRAFT, ENDOSCOPIC VEIN HARVESTING OF THE RIGHT GREATER SAPHENOUS VEIN, INTRA-AORTIC BALLOON PUMP INSERTION, IVAN PER ANESTHESIA;  Surgeon:  Miguel Angel Acosta MD;  Location:  [facility] OR;  Service: Cardiothoracic;  Laterality: N/A;   • CORONARY STENT PLACEMENT  2004   • INTERVENTIONAL RADIOLOGY PROCEDURE N/A 7/2/2021    Procedure: thrombin injection;  Surgeon: Abdiel Tamez MD;  Location:  [facility] CATH INVASIVE LOCATION;  Service: Interventional Radiology;  Laterality: N/A;        SLP EVALUATION (last 72 hours)      SLP SLC Evaluation     Row Name 07/09/21 0920 07/07/21 1055                Communication Assessment/Intervention    Document Type  therapy note (daily note);re-evaluation  -  therapy note (daily note)  -       Subjective Information  no complaints  -  no complaints  -       Patient Observations  alert;cooperative  -MP  alert;cooperative;agree to therapy  -       Patient/Family/Caregiver Comments/Observations  No family present  -  Patient checking email, sitting in chair  -       Care Plan Review  care plan/treatment goals reviewed;patient/other agree to care plan  -MP  evaluation/treatment results reviewed;care plan/treatment goals reviewed;risks/benefits reviewed;current/potential barriers reviewed;patient/other agree to care plan  -       Patient Effort  good  -MP  good  -          General Information    Patient Profile Reviewed  --  yes  -          Pain    Additional Documentation  Pain Scale: FACES Pre/Post-Treatment (Group)  -MP  Pain Scale: FACES Pre/Post-Treatment (Group);Pain Scale: Numbers Pre/Post-Treatment (Group)  -          Pain Scale: Numbers Pre/Post-Treatment    Pretreatment Pain Rating  --  0/10 - no pain  -CH       Posttreatment Pain Rating  --  0/10 - no pain  -CH          Pain Scale: FACES Pre/Post-Treatment    Pain: FACES Scale, Pretreatment  0-->no hurt  -MP  0-->no hurt  -CH       Posttreatment Pain Rating  0-->no hurt  -MP  0-->no hurt  -CH          SLP Clinical Impressions    Daily Summary of Progress (SLP)  progress toward functional goals is good  -MP  progress toward functional goals as expected   -[facility] Criteria for Skilled Therapy Interventions Met  baseline status  -  --       Plan for Continued Treatment (SLP)  Completed cognitive-communication dx tx/re-eval. Pt appears to be back @ baseline - grossly fxnl cognitive-communication. No further SLP intevention indicated. SLP will sign off.  -  Patient seen this date for cognitive tx. Cognition improving, however, patient continues with orientation and recall deficits impacting safety and independence  -          Recommendations    Therapy Frequency ([facility])  evaluation only  -  3 days per week  -       Predicted Duration Therapy Intervention (Days)  --  until discharge  -       Anticipated Discharge Disposition (SLP)  home  -  unknown  -          Articulation Goal 1 (SLP)    Improve Articulation Goal 1 (SLP)  --  by over-articulating at word level;90%;with minimal cues (75-90%)  -       Time Frame (Articulation Goal 1, SLP)  --  short term goal (STG)  -CH       Progress (Articulation Goal 1, SLP)  --  70%;with minimal cues (75-90%)  -       Progress/Outcomes (Articulation Goal 1, SLP)  --  continuing progress toward goal  -CH          Attention Goal 1 (SLP)    Improve Attention by Goal 1 (SLP)  --  looking at speaker;attending to task;90%;with moderate cues (50-74%)  -       Time Frame (Attention Goal 1, SLP)  --  short term goal (STG)  -CH       Progress (Attention Goal 1, SLP)  --  80%;with minimal cues (75-90%)  -       Progress/Outcomes (Attention Goal 1, SLP)  --  continuing progress toward goal  -CH          Orientation Goal 1 ([facility])    Improve Orientation Through Goal 1 (SLP)  --  demonstrating orientation to year;demonstrating orientation to place;demonstrating orientation to disease/impairment;demonstrating orientation to month;demonstrating orientation to day;90%;with moderate cues (50-74%)  -       Time Frame (Orientation Goal 1, SLP)  --  short term goal (STG)  -CH       Progress (Orientation Goal 1, SLP)  --   "60%;with minimal cues (75-90%)  -CH       Progress/Outcomes (Orientation Goal 1, SLP)  --  continuing progress toward goal  -CH       Comment (Orientation Goal 1, SLP)  --  Patient grossly oriented to place (\"hospital\") , not to faby or specific date. Knew year.   -CH          Additional Goals    Additional Goal Selection  --  additional goal, SLP goal 1  -CH          Additional Goal 1 (SLP)    Additional Goal 1, SLP  --  LTG: Pt will improve cognitive-communication skills in order to participate in care w/ 100% acc w/o cues.  -CH       Time Frame (Additional Goal 1, SLP)  --  by discharge  -CH       Progress/Outcomes (Additional Goal 1, SLP)  --  continuing progress toward goal  -CH         User Key  (r) = Recorded By, (t) = Taken By, (c) = Cosigned By    Initials Name Effective Dates    MP Ant Gil, MS CCC-SLP 06/16/21 -     CH Cora Yoder, MS CCC-SLP 06/16/21 -              EDUCATION  The patient has been educated in the following areas:     Cognitive Impairment Communication Impairment.    SLP Recommendation and Plan           SLC Criteria for Skilled Therapy Interventions Met: baseline status  Anticipated Discharge Disposition (SLP): home           Daily Summary of Progress (SLP): progress toward functional goals is good  Plan for Continued Treatment (SLP): Completed cognitive-communication dx tx/re-eval. Pt appears to be back @ baseline - grossly fxnl cognitive-communication. No further SLP intevention indicated. SLP will sign off.             Plan of Care Reviewed With: patient      SLP GOALS     Row Name 07/09/21 0920 07/07/21 1050          Articulation Goal 1 (SLP)    Improve Articulation Goal 1 (SLP)  by over-articulating at word level;90%;with minimal cues (75-90%)  -MP  by over-articulating at word level;90%;with minimal cues (75-90%)  -CH     Time Frame (Articulation Goal 1, SLP)  short term goal (STG)  -MP  short term goal (STG)  -CH     Progress (Articulation Goal 1, SLP)  --  70%;with " "minimal cues (75-90%)  -CH     Progress/Outcomes (Articulation Goal 1, SLP)  goal no longer appropriate  -MP  continuing progress toward goal  -CH        Attention Goal 1 (SLP)    Improve Attention by Goal 1 (SLP)  looking at speaker;attending to task;90%;with moderate cues (50-74%)  -MP  looking at speaker;attending to task;90%;with moderate cues (50-74%)  -CH     Time Frame (Attention Goal 1, SLP)  short term goal (STG)  -MP  short term goal (STG)  -CH     Progress (Attention Goal 1, SLP)  100%;independently (over 90% accuracy)  -MP  80%;with minimal cues (75-90%)  -CH     Progress/Outcomes (Attention Goal 1, SLP)  goal met  -MP  continuing progress toward goal  -CH        Orientation Goal 1 (SLP)    Improve Orientation Through Goal 1 (SLP)  demonstrating orientation to year;demonstrating orientation to place;demonstrating orientation to disease/impairment;demonstrating orientation to month;demonstrating orientation to day;90%;with moderate cues (50-74%)  -MP  demonstrating orientation to year;demonstrating orientation to place;demonstrating orientation to disease/impairment;demonstrating orientation to month;demonstrating orientation to day;90%;with moderate cues (50-74%)  -CH     Time Frame (Orientation Goal 1, SLP)  short term goal (STG)  -MP  short term goal (STG)  -CH     Progress (Orientation Goal 1, SLP)  100%;independently (over 90% accuracy)  -MP  60%;with minimal cues (75-90%)  -CH     Progress/Outcomes (Orientation Goal 1, SLP)  goal met  -MP  continuing progress toward goal  -CH     Comment (Orientation Goal 1, SLP)  --  Patient grossly oriented to place (\"hospital\") , not to faby or specific date. Knew year.   -CH        Additional Goal 1 (SLP)    Additional Goal 1, SLP  LTG: Pt will improve cognitive-communication skills in order to participate in care w/ 100% acc w/o cues.  -MP  LTG: Pt will improve cognitive-communication skills in order to participate in care w/ 100% acc w/o cues.  -CH     Time " Frame (Additional Goal 1, SLP)  by discharge  -MP  by discharge  -CH     Progress/Outcomes (Additional Goal 1, SLP)  goal met  -MP  continuing progress toward goal  -CH       User Key  (r) = Recorded By, (t) = Taken By, (c) = Cosigned By    Initials Name Provider Type    Ant Wilcox MS CCC-SLP Speech and Language Pathologist    Cora Hull MS CCC-SLP Speech and Language Pathologist                  Time Calculation:     Time Calculation- SLP     Row Name 07/09/21 0945             Time Calculation- SLP    SLP Start Time  0915  -MP      SLP Received On  07/09/21  -MP         Untimed Charges    97982-UV Treatment/ST Modification Prosth Aug Alter   40  -MP         Total Minutes    Untimed Charges Total Minutes  40  -MP       Total Minutes  40  -MP        User Key  (r) = Recorded By, (t) = Taken By, (c) = Cosigned By    Initials Name Provider Type    Ant Wilcox MS CCC-SLP Speech and Language Pathologist          Therapy Charges for Today     Code Description Service Date Service Provider Modifiers Qty    [id number]  ST TREATMENT SPEECH 3 7/9/2021 Ant Gil MS CCC-SLP GN 1                     MS DOC Patel  7/9/2021

## 2021-07-09 NOTE — THERAPY TREATMENT NOTE
Patient Name: Thien Gray  : 1955    MRN: [id number]                              Today's Date: 2021       Admit Date: 2021    Visit Dx:     ICD-10-CM ICD-9-CM   1. ST elevation myocardial infarction (STEMI), unspecified artery (CMS/HCC)  I21.3 410.90   2. Chest pain, unspecified type  R07.9 786.50   3. Cognitive communication deficit  R41.841 799.52   4. Primary osteoarthritis of both knees  M17.0 715.16     Patient Active Problem List   Diagnosis   • History of tobacco abuse   • CABG 21   • Dyspnea on exertion   • RBBB   • Psoriasis   • Primary osteoarthritis of both knees   • STEMI   • Dyslipidemia   • Obesity (BMI 30-39.9)   • Hyperglycemia     Past Medical History:   Diagnosis Date   • Abnormal ECG 2020    Get from Dr. Kayser   • Coronary artery disease    • History of heart attack    • History of MI (myocardial infarction)    • Hyperlipidemia    • Myocardial infarction (CMS/HCC)    • RBBB 2/10/2020     Past Surgical History:   Procedure Laterality Date   • APPENDECTOMY     • CARDIAC CATHETERIZATION  2004    They used a catheter to put in my stent.   • CARDIAC CATHETERIZATION N/A 2021    Procedure: Left Heart Cath;  Surgeon: Mikey Conrad MD;  Location: [facility] CATH INVASIVE LOCATION;  Service: Cardiovascular;  Laterality: N/A;   • CORONARY ANGIOPLASTY      I think that's what the bill for my stent said.   • CORONARY ARTERY BYPASS BRING BACK FOR EXPLORATION N/A 2021    Procedure: BRING BACK FOR EXPLORATION OPEN HEART;  Surgeon: Miguel Angel Acosta MD;  Location: [facility] OR;  Service: Cardiothoracic;  Laterality: N/A;   • CORONARY ARTERY BYPASS GRAFT N/A 2021    Procedure: MEDIAN STERNOTOMY, CORONARY ARTERY BYPASS GRAFTING X 4 WITH LEFT INTERNAL MAMMARY ARTERY GRAFT, ENDOSCOPIC VEIN HARVESTING OF THE RIGHT GREATER SAPHENOUS VEIN, INTRA-AORTIC BALLOON PUMP INSERTION, IVAN PER ANESTHESIA;  Surgeon: Miguel Angel Acosta MD;  Location: [facility] OR;   Service: Cardiothoracic;  Laterality: N/A;   • CORONARY STENT PLACEMENT  2004   • INTERVENTIONAL RADIOLOGY PROCEDURE N/A 7/2/2021    Procedure: thrombin injection;  Surgeon: Abdiel Tamez MD;  Location: [facility] INVASIVE LOCATION;  Service: Interventional Radiology;  Laterality: N/A;     General Information     Row Name 07/09/21 1034          Physical Therapy Time and Intention    Document Type  therapy note (daily note)  -KM     Mode of Treatment  physical therapy  -     Row Name 07/09/21 1034          General Information    Patient Profile Reviewed  yes  -KM     Existing Precautions/Restrictions  cardiac;fall;sternal  -KM     Row Name 07/09/21 1034          Cognition    Orientation Status (Cognition)  oriented x 3  -KM     Row Name 07/09/21 1034          Safety Issues, Functional Mobility    Safety Issues Affecting Function (Mobility)  awareness of need for assistance;impulsivity;judgment  -KM     Impairments Affecting Function (Mobility)  balance;cognition;coordination;endurance/activity tolerance;postural/trunk control;strength;shortness of breath  -KM       User Key  (r) = Recorded By, (t) = Taken By, (c) = Cosigned By    Initials Name Provider Type     Yahaira Delacruz, PT Physical Therapist        Mobility     Row Name 07/09/21 1035          Bed Mobility    Scooting/Bridging Burden (Bed Mobility)  independent  -     Supine-Sit Burden (Bed Mobility)  verbal cues;minimum assist (75% patient effort)  -     Assistive Device (Bed Mobility)  draw sheet;head of bed elevated  -     Row Name 07/09/21 1035          Transfers    Comment (Transfers)  sts x4 due to sitting rest breaks, performed with c.g.assist and sternal precautions  -     Row Name 07/09/21 1035          Sit-Stand Transfer    Sit-Stand Burden (Transfers)  verbal cues;contact guard  -[facility] Name 07/09/21 1035          Gait/Stairs (Locomotion)    Burden Level (Gait)  contact guard;verbal cues  -      Assistive Device (Gait)  other (see comments) pt holding portable tele monitor  -KM     Distance in Feet (Gait)  250 with 4 sitting rest breaks (ambulates 50-75 ft at a time)  -KM     Deviations/Abnormal Patterns (Gait)  base of support, wide;stride length decreased  -KM     Bilateral Gait Deviations  forward flexed posture;heel strike decreased  -KM     Comment (Gait/Stairs)  cues for upright posture, limited by R knee pain, SOA and fatigue, puts forth good effort  -KM       User Key  (r) = Recorded By, (t) = Taken By, (c) = Cosigned By    Initials Name Provider Type    Yahaira Nguyen, PT Physical Therapist        Obj/Interventions     Row Name 07/09/21 1039          Hip (Therapeutic Exercise)    Hip (Therapeutic Exercise)  strengthening exercise  -KM     Hip Strengthening (Therapeutic Exercise)  bilateral;marching while seated;10 repetitions  -KM     Row Name 07/09/21 1039          Knee (Therapeutic Exercise)    Knee AROM (Therapeutic Exercise)  bilateral;LAQ (long arc quad);sitting  -KM     Row Name 07/09/21 1039          Ankle (Therapeutic Exercise)    Ankle AROM (Therapeutic Exercise)  bilateral;dorsiflexion;plantarflexion;10 repetitions  -KM     Row Name 07/09/21 1039          Balance    Static Standing Balance  WFL;unsupported  -KM     Dynamic Standing Balance  mild impairment;supported  -KM     Balance Interventions  sit to stand;occupation based/functional task;weight shifting activity  -KM       User Key  (r) = Recorded By, (t) = Taken By, (c) = Cosigned By    Initials Name Provider Type    Yahaira Nguyen, PT Physical Therapist        Goals/Plan    No documentation.       Clinical Impression     Row Name 07/09/21 1040          Pain Scale: Numbers Pre/Post-Treatment    Pretreatment Pain Rating  3/10  -KM     Posttreatment Pain Rating  3/10  -KM     Pain Location - Side  Right  -KM     Pain Location  knee  -KM     Row Name 07/09/21 1040          Plan of Care Review    Plan of Care  Reviewed With  patient  -KM     Progress  improving  -KM     Outcome Summary  Patient demonstrating improved sit to stand transfer, ambulates 250 ft with 4 sitting rest breaks, holds portable tele pole with c.g.assist. Pt to benefit from inpt rehab at d/c  -KM     Row Name 07/09/21 1040          Vital Signs    Pre Systolic BP Rehab  -- vss on portable monitor  -KM     Row Name 07/09/21 1040          Positioning and Restraints    Pre-Treatment Position  in bed  -KM     Post Treatment Position  chair  -KM     In Chair  reclined;call light within reach;encouraged to call for assist;exit alarm on  -KM       User Key  (r) = Recorded By, (t) = Taken By, (c) = Cosigned By    Initials Name Provider Type    Yahaira Nguyen, PT Physical Therapist        Outcome Measures     Row Name 07/09/21 1045          How much help from another person do you currently need...    Turning from your back to your side while in flat bed without using bedrails?  3  -KM     Moving from lying on back to sitting on the side of a flat bed without bedrails?  3  -KM     Moving to and from a bed to a chair (including a wheelchair)?  4  -KM     Standing up from a chair using your arms (e.g., wheelchair, bedside chair)?  4  -KM     Climbing 3-5 steps with a railing?  3  -KM     To walk in hospital room?  3  -KM     AM-PAC 6 Clicks Score (PT)  20  -KM     Row Name 07/09/21 1045          Functional Assessment    Outcome Measure Options  AM-PAC 6 Clicks Basic Mobility (PT)  -KM       User Key  (r) = Recorded By, (t) = Taken By, (c) = Cosigned By    Initials Name Provider Type    Yahaira Nguyen, PT Physical Therapist        Physical Therapy Education                 Title: PT OT SLP Therapies (Done)     Topic: Physical Therapy (Done)     Point: Mobility training (Done)     Learning Progress Summary           Patient EagerCIRILO VU by DICKSON at 7/9/2021 1045    Acceptance, E, OLIVA,NR,NL by JANNA at 7/6/2021 1340    Comment: Patient education  regarding importance for maintaining sternal precautions as well as for making sure chair is at a safe distance prior to sitting.    Acceptance, E, NR by KG at 7/5/2021 1042    Acceptance, E, NR by KG at 7/4/2021 0956    Acceptance, E, NR by KG at 7/3/2021 1032    Acceptance, E,TB, VU,NR by AY at 7/2/2021 1520    Acceptance, E, NR,DU by LH at 7/1/2021 1529    Acceptance, E, NR by KG at 6/30/2021 1421    Acceptance, E, NR by KG at 6/29/2021 1101    Acceptance, E, NR by KG at 6/28/2021 1040    Acceptance, E, NR by KG at 6/25/2021 1121                   Point: Home exercise program (Done)     Learning Progress Summary           Patient Eager, E, VU by KM at 7/9/2021 1045    Acceptance, E, NR by KG at 7/5/2021 1042    Acceptance, E, NR by KG at 7/4/2021 0956    Acceptance, E, NR by KG at 7/3/2021 1032    Acceptance, E, NR,DU by LH at 7/1/2021 1529    Acceptance, E, NR by KG at 6/30/2021 1421    Acceptance, E, NR by KG at 6/29/2021 1101    Acceptance, E, NR by KG at 6/28/2021 1040                   Point: Body mechanics (Done)     Learning Progress Summary           Patient Eager, E, VU by KM at 7/9/2021 1045    Acceptance, E, NR by KG at 7/5/2021 1042    Acceptance, E, NR by KG at 7/4/2021 0956    Acceptance, E, NR by KG at 7/3/2021 1032    Acceptance, E,TB, VU,NR by AY at 7/2/2021 1520    Acceptance, E, NR,DU by LH at 7/1/2021 1529    Acceptance, E, NR by KG at 6/30/2021 1421    Acceptance, E, NR by KG at 6/29/2021 1101    Acceptance, E, NR by KG at 6/28/2021 1040    Acceptance, E, NR by KG at 6/25/2021 1121                   Point: Precautions (Done)     Learning Progress Summary           Patient Eager, E, VU by KM at 7/9/2021 1045    Acceptance, E, VU,NR,NL by LO at 7/6/2021 1340    Comment: Patient education regarding importance for maintaining sternal precautions as well as for making sure chair is at a safe distance prior to sitting.    Acceptance, E, NR by KG at 7/5/2021 1042    Acceptance, E, NR by KG at  7/4/2021 0956    Acceptance, E, NR by KG at 7/3/2021 1032    Acceptance, E,TB, VU,NR by AY at 7/2/2021 1520    Acceptance, E, NR,DU by LH at 7/1/2021 1529    Acceptance, E, NR by KG at 6/30/2021 1421    Acceptance, E, NR by KG at 6/29/2021 1101    Acceptance, E, NR by KG at 6/28/2021 1040    Acceptance, E, NR by KG at 6/25/2021 1121                               User Key     Initials Effective Dates Name Provider Type Discipline     06/16/21 -  Yahaira Delacruz, PT Physical Therapist PT    KG 05/22/20 -  Kassie Comer, PT Physical Therapist PT    LO 06/16/21 -  Asia Poon, PT Physical Therapist PT    AY 11/10/20 -  Guera Hooper, PT Physical Therapist PT     05/17/21 -  Ned Ortez, PT Student PT Student PT              PT Recommendation and Plan     Plan of Care Reviewed With: patient  Progress: improving  Outcome Summary: Patient demonstrating improved sit to stand transfer, ambulates 250 ft with 4 sitting rest breaks, holds portable tele pole with c.g.assist. Pt to benefit from inpt rehab at d/c     Time Calculation:   PT Charges     Row Name 07/09/21 1046             Time Calculation    Start Time  0940  -KM      PT Received On  07/09/21  -KM      PT Goal Re-Cert Due Date  07/15/21  -KM         Time Calculation- PT    Total Timed Code Minutes- PT  24 minute(s)  -KM         Timed Charges    46808 - PT Therapeutic Activity Minutes  24  -KM         Total Minutes    Timed Charges Total Minutes  24  -KM       Total Minutes  24  -KM        User Key  (r) = Recorded By, (t) = Taken By, (c) = Cosigned By    Initials Name Provider Type     Yahaira Delacruz, PT Physical Therapist        Therapy Charges for Today     Code Description Service Date Service Provider Modifiers Qty    [id number] HC PT THERAPEUTIC ACT EA 15 MIN 7/9/2021 Yahaira Delacruz, PT GP 2          PT G-Codes  Outcome Measure Options: AM-PAC 6 Clicks Basic Mobility (PT)  AM-PAC 6 Clicks Score (PT): 20  AM-PAC 6 Clicks  Score (OT): 18    Yahaira Delacruz, PT  7/9/2021

## 2021-07-09 NOTE — CASE MANAGEMENT/SOCIAL WORK
Case Management Discharge Note      Final Note: Received a call from Melba guzman with [facility] and insurance has been approved. Mr Gray will be going to the Med Unit Acute. I met with him at bedside and he is still agreeable and has accepted the bed offer. Nurse to call report to [phone]. The facility will retrieve the DC summary from Epic. A Covid is not needed. [facility] will provide transportation today at 3pm. Please have him at the maternity entrance at 2:45. Please place a copy of the DC summary in the packet with the AVS.         Selected Continued Care - Admitted Since 6/22/2021     Destination Coordination complete    Service Provider Selected Services Address Phone Fax Patient Preferred    [facility]  Inpatient Rehabilitation [address] [address] [phone] --       Internal Comment last updated by Valerie Weinstein, RN 7/6/2021 [id number]    7/6 referral called                     Durable Medical Equipment    No services have been selected for the patient.              Dialysis/Infusion    No services have been selected for the patient.              Home Medical Care    No services have been selected for the patient.              Therapy    No services have been selected for the patient.              Community Resources    No services have been selected for the patient.              Community & DME    No services have been selected for the patient.                       Final Discharge Disposition Code: 62 - inpatient rehab facility

## 2021-07-15 ENCOUNTER — READMISSION MANAGEMENT (OUTPATIENT)
Dept: CALL CENTER | Facility: HOSPITAL | Age: 66
End: 2021-07-15

## 2021-07-15 ENCOUNTER — TELEPHONE (OUTPATIENT)
Dept: FAMILY MEDICINE CLINIC | Facility: CLINIC | Age: 66
End: 2021-07-15

## 2021-07-15 NOTE — TELEPHONE ENCOUNTER
Caller: [facility] Kevin call back number: [phone]    New or established patient?  [] New  [x] Established    Date of discharge: 7/16/21    Facility discharged from: [facility]    Diagnosis/Symptoms: BYPASS    Length of stay (If applicable): 7/9/21-7/16/21

## 2021-07-15 NOTE — OUTREACH NOTE
Prep Survey      Responses   [facility] [place] facility patient discharged from?  Non-BH   Is LACE score < 7 ?  Non-BH Discharge   Emergency Room discharge w/ pulse ox?  No   Eligibility  [facility]    Date of Admission  07/09/21   Date of Discharge  07/16/21   Discharge diagnosis  s/p CABG   Does the patient have one of the following disease processes/diagnoses(primary or secondary)?  Cardiothoracic surgery   Prep survey completed?  Yes          Maral Roa RN

## 2021-07-16 ENCOUNTER — OFFICE VISIT (OUTPATIENT)
Dept: CARDIOLOGY | Facility: HOSPITAL | Age: 66
End: 2021-07-16

## 2021-07-16 VITALS — HEIGHT: 71 IN | BODY MASS INDEX: 33.32 KG/M2 | WEIGHT: 238 LBS

## 2021-07-16 DIAGNOSIS — I25.10 CORONARY ARTERY DISEASE INVOLVING NATIVE CORONARY ARTERY OF NATIVE HEART WITHOUT ANGINA PECTORIS: Primary | ICD-10-CM

## 2021-07-16 DIAGNOSIS — I48.0 PAROXYSMAL ATRIAL FIBRILLATION (HCC): ICD-10-CM

## 2021-07-16 DIAGNOSIS — E78.2 MIXED HYPERLIPIDEMIA: ICD-10-CM

## 2021-07-16 DIAGNOSIS — R06.09 DYSPNEA ON EXERTION: ICD-10-CM

## 2021-07-16 PROCEDURE — 99442 PR PHYS/QHP TELEPHONE EVALUATION 11-20 MIN: CPT | Performed by: NURSE PRACTITIONER

## 2021-07-16 RX ORDER — PRAVASTATIN SODIUM 40 MG
40 TABLET ORAL DAILY
COMMUNITY
Start: 2021-07-14 | End: 2021-07-16

## 2021-07-16 NOTE — PROGRESS NOTES
"Chief Complaint  Establish Care and Post-op (CABG)    [facility]  Heart and Valve Center  Telemedicine note    This was an telephone enabled telemedicine encounter.    Subjective    History of Present Illness {CC  Problem List  Visit  Diagnosis   Encounters  Notes  Medications  Labs  Result Review Imaging  Media :23}     Thien Gray, 65 y.o. male with CAD s/p CABG 6/22/21, mixed hyperlipidemia, pAF presents to [facility] Heart and Valve telemedicine visit for Establish Care and Post-op (CABG).    Patient recently hospitalized at [facility] for chest pain.  EKG revealed STEMI.  He ultimately underwent CABGx4 on 6/22/21. Hospital course complicated gated by postop AF treated by amiodarone with restoration of NSR, left femoral pseudoaneurysm, and alcohol withdrawal.  He was discharged to a rehab facility, and was discharged home from the rehab facility today, 7/16/21.    He states that since discharge she has been doing overall well.  He is ambulating without difficulty, and happy to be home.  He was unable to take his blood pressure at time of visit, but reports blood pressures have been stable.  He denies S/S infection at surgical sites, fever/chills.  He denies edema, and other signs of hypervolemia. He denies chest pain, dyspnea, palpitation, racing heart, and syncope.  Upon review of medications he has not yet started his amlodipine, amiodarone, and atorvastatin prescribed at discharge.  Reviewed medication ordering and instructions with patient, and that prescriptions were sent to local [facility] at [facility].  Patient appreciative of medication update.  He has an upcoming appointment with his PCP next week, and with CT surgery on 7/29/2021.  He was instructed to continue to watch surgical sites for s/s infection, and signs of hypervolemia.      Objective     Vital Signs:   Vitals:    07/16/21 1304   Weight: 108 kg (238 lb)   Height: 180 cm (70.87\") "     Body mass index is 33.32 kg/m².  Physical Exam  Pulmonary:      Effort: No respiratory distress.   Neurological:      Mental Status: He is alert.   Psychiatric:         Mood and Affect: Mood normal.         Behavior: Behavior normal.         Thought Content: Thought content normal.        Data Reviewed:{ Labs  Result Review  Imaging  Med Tab  Media :23}     ECG 12 Lead (07/02/2021 03:41)  Cardiac Catheterization/Vascular Study (06/22/2021 10:44)  Basic Metabolic Panel (07/09/2021 06:08)  CBC & Differential (07/09/2021 06:07)    Assessment and Plan {CC Problem List  Visit Diagnosis  ROS  Review (Popup)  [facility] Maintenance  Quality  BestPractice  Medications  SmartSets  SnapShot Encounters  Media :23}     This visit has been scheduled as a telephone visit to comply with patient safety concerns in accordance with CDC recommendations. Total time of discussion was 13 minutes.    1. Coronary artery disease involving native coronary artery of native heart without angina pectoris  - CAD s/p CABG 6/22/21, STEMI  - Denies chest pain, anginal symptoms  - Denies surgical site complications, s/s infection.  Progressing well postoperatively since discharge  - Continue ASA, atorvastatin, metoprolol  - Discharged from rehab facility today  - We will message cardiology office to schedule postop follow-up    2. Dyspnea on exertion  -Improved since hospitalization  -Ambulating without difficulty    3. Paroxysmal atrial fibrillation (CMS/HCC)  - Paroxysmal AF noted postoperatively, restoration to NSR with amiodarone  - Patient unaware of amiodarone prescription since being discharged from rehab facility, instructed on medication and that prescription available at his local pharmacy  -Follow-up with PCP, CT surgery, cardiology as scheduled    4. Mixed hyperlipidemia  -Patient was under assumption he was to continue pravastatin  -Notified that atorvastatin was ordered at hospital discharge, available at local  pharmacy, and to discontinue pravastatin      Follow Up {Instructions Charge Capture  Follow-up Communications :23}   Return if symptoms worsen or fail to improve.    Patient was given instructions and counseling regarding his condition or for health maintenance advice. Please see specific information pulled into the AVS if appropriate.  Patient was instructed to call the Heart and Valve Center with any questions, concerns, or worsening symptoms.    *Please note that portions of this note were completed with a voice recognition program. Efforts were made to edit the dictations, but occasionally words are mistranscribed.

## 2021-07-19 ENCOUNTER — TRANSITIONAL CARE MANAGEMENT TELEPHONE ENCOUNTER (OUTPATIENT)
Dept: CALL CENTER | Facility: HOSPITAL | Age: 66
End: 2021-07-19

## 2021-07-19 ENCOUNTER — TELEPHONE (OUTPATIENT)
Dept: CARDIOLOGY | Facility: HOSPITAL | Age: 66
End: 2021-07-19

## 2021-07-19 DIAGNOSIS — I25.10 CORONARY ARTERY DISEASE INVOLVING NATIVE CORONARY ARTERY OF NATIVE HEART WITHOUT ANGINA PECTORIS: Primary | ICD-10-CM

## 2021-07-19 RX ORDER — LOSARTAN POTASSIUM 25 MG/1
25 TABLET ORAL
Start: 2021-07-19 | End: 2022-07-18

## 2021-07-19 NOTE — OUTREACH NOTE
Call Center TCM Note      Responses   [facility] patient discharged from?  Non-BH   Does the patient have one of the following disease processes/diagnoses(primary or secondary)?  Cardiothoracic surgery   TCM attempt successful?  No [verbal release is over a year old]   Unsuccessful attempts  Attempt 1          Uzma Chung RN    7/19/2021, 13:10 EDT

## 2021-07-19 NOTE — TELEPHONE ENCOUNTER
Patient states that he had questions about Losartan.  Dr. Velez gave him 25mg 0.5MG BID  Dr. Conrad gave him 100mg 1 tablet BID    Patient states that he also had questions about his Metoprolol. He wants to know what he needs to be taking.

## 2021-07-19 NOTE — OUTREACH NOTE
Call Center TCM Note      Responses   [facility] patient discharged from?  Non-BH   Does the patient have one of the following disease processes/diagnoses(primary or secondary)?  Cardiothoracic surgery   TCM attempt successful?  No   Unsuccessful attempts  Attempt 2          Uzma Chung RN    7/19/2021, 13:47 EDT

## 2021-07-20 ENCOUNTER — TRANSITIONAL CARE MANAGEMENT TELEPHONE ENCOUNTER (OUTPATIENT)
Dept: CALL CENTER | Facility: HOSPITAL | Age: 66
End: 2021-07-20

## 2021-07-20 NOTE — OUTREACH NOTE
Call Center TCM Note      Responses   [facility] patient discharged from?  Non-   Does the patient have one of the following disease processes/diagnoses(primary or secondary)?  Cardiothoracic surgery   TCM attempt successful?  Yes   Call start time  1050   Call end time  1054   Discharge diagnosis  s/p CABG   Meds reviewed with patient/caregiver?  Yes   Is the patient having any side effects they believe may be caused by any medication additions or changes?  No   Does the patient have all medications ordered at discharge?  Yes   Is the patient taking all medications as directed (includes completed medication regime)?  Yes   Does the patient have a primary care provider?   Yes   Does the patient have an appointment with their PCP within 7 days of discharge?  Yes   Has home health visited the patient within 72 hours of discharge?  N/A   Psychosocial issues?  No   Did the patient receive a copy of their discharge instructions?  Yes   Nursing interventions  Reviewed instructions with patient   What is the patient's perception of their health status since discharge?  Improving   Is the patient/caregiver able to teach back signs and symptoms related to disease process for when to call PCP?  Yes   Is the patient/caregiver able to teach back signs and symptoms related to disease process for when to call 911?  Yes   Is the patient/caregiver able to teach back the hierarchy of who to call/visit for symptoms/problems? PCP, Specialist, Home health nurse, Urgent Care, ED, 911  Yes   If the patient is a current smoker, are they able to teach back resources for cessation?  Not a smoker   TCM call completed?  Yes          Maribel Sherwood RN    7/20/2021, 10:56 EDT

## 2021-07-22 ENCOUNTER — OFFICE VISIT (OUTPATIENT)
Dept: FAMILY MEDICINE CLINIC | Facility: CLINIC | Age: 66
End: 2021-07-22

## 2021-07-22 VITALS
SYSTOLIC BLOOD PRESSURE: 122 MMHG | TEMPERATURE: 97.3 F | HEIGHT: 71 IN | DIASTOLIC BLOOD PRESSURE: 64 MMHG | RESPIRATION RATE: 18 BRPM | WEIGHT: 242 LBS | HEART RATE: 68 BPM | BODY MASS INDEX: 33.88 KG/M2

## 2021-07-22 DIAGNOSIS — I48.91 ATRIAL FIBRILLATION, UNSPECIFIED TYPE (HCC): ICD-10-CM

## 2021-07-22 DIAGNOSIS — Z09 HOSPITAL DISCHARGE FOLLOW-UP: Primary | ICD-10-CM

## 2021-07-22 PROCEDURE — 1111F DSCHRG MED/CURRENT MED MERGE: CPT | Performed by: PHYSICIAN ASSISTANT

## 2021-07-22 PROCEDURE — 93000 ELECTROCARDIOGRAM COMPLETE: CPT | Performed by: PHYSICIAN ASSISTANT

## 2021-07-22 PROCEDURE — 99495 TRANSJ CARE MGMT MOD F2F 14D: CPT | Performed by: PHYSICIAN ASSISTANT

## 2021-07-26 ENCOUNTER — TELEPHONE (OUTPATIENT)
Dept: FAMILY MEDICINE CLINIC | Facility: CLINIC | Age: 66
End: 2021-07-26

## 2021-07-26 NOTE — TELEPHONE ENCOUNTER
Pt wants to know which Dr is his cardiologist? He thinks he is seeing two of them. He doesn't know who to give forms to.

## 2021-07-26 NOTE — TELEPHONE ENCOUNTER
His cardiologist will determine when he can return to work. It would be best for them to complete forms, because they'll know dates to release back to work. However, if they are unable to complete, I can fill them out.

## 2021-07-26 NOTE — TELEPHONE ENCOUNTER
Caller: Thien Gray    Relationship: Self    Best call back number:     What is the best time to reach you: ANYTIME    Who are you requesting to speak with (clinical staff, provider,  specific staff member): DR ALDRICH CLINICAL STAFF    Do you know the name of the person who called:PHILOMENA    What was the call regarding: PATIENT HAD A HEART ATTACK LAST MTH AND WENT TO REHAB AFTER HIS HOSPITAL STAY; HE WAS ASKING WHO SHOULD FILL OUT HIS FMLA AND SHORT TERM PAPERWORK AND THAT HE IS CLEAR FOR WORK?     Do you require a callback: YES

## 2021-07-27 NOTE — TELEPHONE ENCOUNTER
When is his release back to work date? This is to be determine per cardiology.  I will complete the forms, but I need to know the planned timeline per cardiology recommendations.

## 2021-07-27 NOTE — TELEPHONE ENCOUNTER
PATIENT CALLED TO FOLLOW UP ON WHO HE IS SUPPOSED TO HAVE HIS FMLA PAPERWORK FILLED OUT BY.  PATIENT STATED HE WAS TOLD FROM HIS CARDIOLOGY TEAMS DISCHARGE COORDINATOR INSTRUCTED PATIENT TO TALK TO HIS PRIMARY CARE DOCTOR.    PLEASE ADVISE     CALL BACK:  [phone]

## 2021-07-27 NOTE — TELEPHONE ENCOUNTER
Patient stated he called cardiology that he is seeing on 8/16 they told him they cant fill out paper they haven't seen him yet. He does not have a return to work date that why he is calling to figure out what he should do. Ask are we going to fill out papers til he sees them?

## 2021-07-29 ENCOUNTER — OFFICE VISIT (OUTPATIENT)
Dept: CARDIAC SURGERY | Facility: CLINIC | Age: 66
End: 2021-07-29

## 2021-07-29 VITALS
SYSTOLIC BLOOD PRESSURE: 90 MMHG | DIASTOLIC BLOOD PRESSURE: 60 MMHG | OXYGEN SATURATION: 99 % | BODY MASS INDEX: 34.36 KG/M2 | HEART RATE: 73 BPM | TEMPERATURE: 96.6 F | HEIGHT: 70 IN | WEIGHT: 240 LBS

## 2021-07-29 DIAGNOSIS — E78.5 DYSLIPIDEMIA: ICD-10-CM

## 2021-07-29 DIAGNOSIS — I25.10 CORONARY ARTERY DISEASE INVOLVING NATIVE CORONARY ARTERY OF NATIVE HEART WITHOUT ANGINA PECTORIS: Primary | ICD-10-CM

## 2021-07-29 DIAGNOSIS — Z87.891 HISTORY OF TOBACCO ABUSE: ICD-10-CM

## 2021-07-29 DIAGNOSIS — I21.21 ST ELEVATION MYOCARDIAL INFARCTION INVOLVING LEFT CIRCUMFLEX CORONARY ARTERY (HCC): ICD-10-CM

## 2021-07-29 PROCEDURE — 71046 X-RAY EXAM CHEST 2 VIEWS: CPT | Performed by: THORACIC SURGERY (CARDIOTHORACIC VASCULAR SURGERY)

## 2021-07-29 PROCEDURE — 99024 POSTOP FOLLOW-UP VISIT: CPT | Performed by: THORACIC SURGERY (CARDIOTHORACIC VASCULAR SURGERY)

## 2021-07-29 NOTE — PROGRESS NOTES
07/29/2021  Patient Information  Thien Gray                                                                                          [address]  [address]  [place] [facility] [address]   1955  'PCP/Referring Physician'  Sanaz Zuluaga,   No ref. provider found  Chief Complaint   Patient presents with   • Post-op Follow-up     Hosp D/C CABG x 4 on 6/22/21-CAD       CC: I feel good I get tired easily    History of Present Illness: 65-year-old  male now 1 month status post emergent CABG x4 (STEMI).  Initially in the immediate postoperative.  The patient remained hemodynamically stable however he had prolonged delirium.  Ultimately he cleared mentally and was transferred to telemetry and then subsequently discharged home in standard fashion.  He states that he currently is doing well.  He denies anginal quality chest pain and shortness of breath.  He has not had fever, chills, or night sweats.  He states that his wounds are healing without erythema and without drainage.  He is slowly returning to full normal activity but states that he would like to wait until he is stronger before considering returning to work.      Patient Active Problem List   Diagnosis   • History of tobacco abuse   • CABG 06/22/21   • Dyspnea on exertion   • RBBB   • Psoriasis   • Primary osteoarthritis of both knees   • STEMI   • Dyslipidemia   • Obesity (BMI 30-39.9)   • Hyperglycemia     Past Medical History:   Diagnosis Date   • Abnormal ECG 1/20/2020    Get from Dr. Kayser   • Coronary artery disease    • History of heart attack 2004   • History of MI (myocardial infarction)    • Hyperlipidemia    • Myocardial infarction (CMS/HCC)    • RBBB 2/10/2020     Past Surgical History:   Procedure Laterality Date   • APPENDECTOMY  1999   • CARDIAC CATHETERIZATION  1/1/2004    They used a catheter to put in my stent.   • CARDIAC CATHETERIZATION N/A 6/22/2021    Procedure: Left Heart Cath;  Surgeon: Mikey Conrad MD;   Location:  [facility];  Service: Cardiovascular;  Laterality: N/A;   • CORONARY ANGIOPLASTY  2004    I think that's what the bill for my stent said.   • CORONARY ARTERY BYPASS BRING BACK FOR EXPLORATION N/A 6/23/2021    Procedure: BRING BACK FOR EXPLORATION OPEN HEART;  Surgeon: Miguel Angel Acosta MD;  Location:  [facility] OR;  Service: Cardiothoracic;  Laterality: N/A;   • CORONARY ARTERY BYPASS GRAFT N/A 6/22/2021    Procedure: MEDIAN STERNOTOMY, CORONARY ARTERY BYPASS GRAFTING X 4 WITH LEFT INTERNAL MAMMARY ARTERY GRAFT, ENDOSCOPIC VEIN HARVESTING OF THE RIGHT GREATER SAPHENOUS VEIN, INTRA-AORTIC BALLOON PUMP INSERTION, IVAN PER ANESTHESIA;  Surgeon: Miguel Angel Acosta MD;  Location:  [facility];  Service: Cardiothoracic;  Laterality: N/A;   • CORONARY STENT PLACEMENT  2004   • INTERVENTIONAL RADIOLOGY PROCEDURE N/A 7/2/2021    Procedure: thrombin injection;  Surgeon: Abdiel Tamez MD;  Location:  [facility];  Service: Interventional Radiology;  Laterality: N/A;       Current Outpatient Medications:   •  amiodarone (Pacerone) 200 MG tablet, Take 1 tablet by mouth Daily., Disp: 30 tablet, Rfl: 6  •  aspirin 325 MG tablet, Take 1 tablet by mouth Daily. (Patient taking differently: Take 325 mg by mouth Daily. 81 mg), Disp: 30 tablet, Rfl: 6  •  atorvastatin (LIPITOR) 40 MG tablet, Take 1 tablet by mouth Every Night., Disp: 30 tablet, Rfl: 6  •  losartan (COZAAR) 25 MG tablet, Take 1 tablet by mouth Daily., Disp: , Rfl:   •  metoprolol tartrate (LOPRESSOR) 50 MG tablet, Take 1 tablet by mouth Every 12 (Twelve) Hours., Disp: 60 tablet, Rfl: 6  •  tamsulosin (FLOMAX) 0.4 MG capsule 24 hr capsule, Take 2 capsules by mouth Daily., Disp: 180 capsule, Rfl: 3  •  albuterol sulfate  (90 Base) MCG/ACT inhaler, Inhale 2 puffs Every 4 (Four) Hours As Needed for Wheezing or Shortness of Air., Disp: 8 g, Rfl: 5  •  amLODIPine (NORVASC) 5 MG tablet, Take 1 tablet by mouth Daily., Disp: 30 tablet,  Rfl: 6  •  meloxicam (MOBIC) 7.5 MG tablet, TAKE 1 TABLET DAILY WITH   FOOD, Disp: 90 tablet, Rfl: 2  •  triamcinolone (KENALOG) 0.025 % ointment, Apply  topically to the appropriate area as directed 2 Times a Day for 10 days. Avoid applying to face, axilla, and groin, Disp: 15 g, Rfl: 1  Allergies   Allergen Reactions   • Bactrim [Sulfamethoxazole-Trimethoprim] Other (See Comments)     Causes weakness, fatigue   • Plavix [Clopidogrel Bisulfate] Hives   • Augmentin [Amoxicillin-Pot Clavulanate] Confusion     Social History     Socioeconomic History   • Marital status:      Spouse name: Not on file   • Number of children: 1   • Years of education: Not on file   • Highest education level: Not on file   Tobacco Use   • Smoking status: Former Smoker     Packs/day: 1.50     Years: 45.00     Pack years: 67.50     Types: Cigarettes     Start date: 1971     Quit date: 2017     Years since quittin.0   • Smokeless tobacco: Never Used   Substance and Sexual Activity   • Alcohol use: Yes     Alcohol/week: 15.0 standard drinks     Types: 15 Standard drinks or equivalent per week     Comment: 3 nights a week   • Drug use: Never   • Sexual activity: Not Currently     Partners: Female     Birth control/protection: Diaphragm, Post-menopausal, Spermicide     Comment:  twice, both . No STVs. Not planning to resume.     Family History   Problem Relation Age of Onset   • Cancer Mother    • Hypertension Mother    • Hyperlipidemia Mother         Takes medication   • Heart attack Mother    • Diabetes Sister    • Heart failure Father         .   • Stroke Father    • Heart attack Maternal Grandfather    • Cancer Paternal Grandmother    • Heart attack Paternal Grandfather        ROS, past medical history, surgical history, family history, social history and vitals  reviewed by me and corrected as needed.        Review of Systems   Constitutional: Negative for chills, fever, malaise/fatigue, night sweats and  "weight loss.        Patient states he fatigues easily.   HENT: Negative for hearing loss, odynophagia and sore throat.    Eyes: Negative for blurred vision, vision loss in left eye, vision loss in right eye and visual disturbance.   Cardiovascular: Negative for chest pain, dyspnea on exertion, leg swelling, orthopnea and palpitations.   Respiratory: Negative for cough, hemoptysis, shortness of breath and wheezing.    Endocrine: Negative for cold intolerance, heat intolerance, polydipsia, polyphagia and polyuria.   Hematologic/Lymphatic: Does not bruise/bleed easily.   Skin: Negative for itching and rash.   Musculoskeletal: Negative for joint pain, joint swelling and myalgias.   Gastrointestinal: Negative for abdominal pain, constipation, diarrhea, hematemesis, hematochezia, melena, nausea and vomiting.   Genitourinary: Negative for dysuria, frequency and hematuria.   Neurological: Negative for focal weakness, headaches, numbness and seizures.   Psychiatric/Behavioral: Negative for altered mental status and suicidal ideas. The patient is not nervous/anxious.    All other systems reviewed and are negative.      Vitals:    07/29/21 0842   BP: 90/60   BP Location: Right arm   Patient Position: Sitting   Pulse: 73   Temp: 96.6 °F (35.9 °C)   SpO2: 99%   Weight: 109 kg (240 lb)   Height: 177.8 cm (70\")        Physical Exam  Vitals and nursing note reviewed.   Constitutional:       General: He is not in acute distress.     Appearance: Normal appearance. He is normal weight.   HENT:      Head: Normocephalic and atraumatic.      Right Ear: External ear normal.      Left Ear: External ear normal.      Nose: Nose normal.      Mouth/Throat:      Mouth: Mucous membranes are moist.   Eyes:      Extraocular Movements: Extraocular movements intact.      Pupils: Pupils are equal, round, and reactive to light.   Neck:      Vascular: No carotid bruit.   Cardiovascular:      Rate and Rhythm: Normal rate and regular rhythm.      Pulses: " Normal pulses.      Heart sounds: Normal heart sounds. No murmur heard.     Pulmonary:      Effort: Pulmonary effort is normal. No respiratory distress.      Breath sounds: No wheezing, rhonchi or rales.   Abdominal:      General: Abdomen is flat. Bowel sounds are normal.      Palpations: Abdomen is soft. There is no mass.      Tenderness: There is no abdominal tenderness. There is no guarding.   Musculoskeletal:         General: No swelling or tenderness.      Cervical back: Normal range of motion. No rigidity. No muscular tenderness.      Right lower leg: No edema.      Left lower leg: No edema.      Comments: Sternal wound is healing there is no erythema no areas of drainage.  Sternum is stable.   Lymphadenopathy:      Cervical: No cervical adenopathy.   Skin:     General: Skin is warm and dry.      Capillary Refill: Capillary refill takes less than 2 seconds.      Coloration: Skin is not jaundiced.      Findings: No erythema.   Neurological:      General: No focal deficit present.      Mental Status: He is alert and oriented to person, place, and time. Mental status is at baseline.   Psychiatric:         Mood and Affect: Mood normal.         Behavior: Behavior normal.         Thought Content: Thought content normal.         Judgment: Judgment normal.         Labs: None    Imaging: PA and lateral chest x-ray is consistent with the patient's current postoperative state.  Mediastinum is normal lung fields are clear there are no pleural effusions    Assessment: Stable postoperative course    Plan: Follow-up with Dr. Conrad as scheduled.  Return to this office in 3 months.

## 2021-08-18 ENCOUNTER — TELEPHONE (OUTPATIENT)
Dept: FAMILY MEDICINE CLINIC | Facility: CLINIC | Age: 66
End: 2021-08-18

## 2021-08-18 NOTE — TELEPHONE ENCOUNTER
He will need an appointment with myself prior to his return to work date to provide a letter to return to work. Please schedule.

## 2021-08-18 NOTE — TELEPHONE ENCOUNTER
Caller: Thien Gray    Relationship: Self    Best call back number: [phone]    What is the best time to reach you: ANYTIME    Who are you requesting to speak with (clinical staff, provider,  specific staff member): CLINICAL STAFF OR PROVIDER    What was the call regarding: PATIENT HAS QUESTIONS REGARDING FMLA PAPERWORK AND WHEN GOES BACK TO WORK    Do you require a callback: YES

## 2021-08-18 NOTE — TELEPHONE ENCOUNTER
Pt seen Dr. Conrad on 8-16-21 and stated he could return to work in one month with no restrictions. His next fu with them is 11-16-21. Pt said, you needed this information to complete his FMLA forms

## 2021-08-23 ENCOUNTER — TREATMENT (OUTPATIENT)
Dept: CARDIAC REHAB | Facility: HOSPITAL | Age: 66
End: 2021-08-23

## 2021-08-23 DIAGNOSIS — Z95.1 S/P CABG (CORONARY ARTERY BYPASS GRAFT): Primary | ICD-10-CM

## 2021-08-23 DIAGNOSIS — R22.1 NECK MASS: ICD-10-CM

## 2021-08-23 PROCEDURE — 93798 PHYS/QHP OP CAR RHAB W/ECG: CPT

## 2021-08-23 NOTE — PROGRESS NOTES
Cardiac Rehab Initial Assessment      Name: Thien Gray  :1955 Allergies:Bactrim [sulfamethoxazole-trimethoprim], Plavix [clopidogrel bisulfate], and Augmentin [amoxicillin-pot clavulanate]   MRN: [id number] 66 y.o. Physician: Sanaz Zuluaga DO   Primary Diagnosis:    Diagnosis Plan   1. S/P CABG (coronary artery bypass graft)     2. Neck mass  US Head Neck Soft Tissue    Event Date: 21   Specialist: Dr. Conrad   Secondary Diagnosis: STEMI Risk Stratification:High Risk Note Author: LORENA Trejo     Cardiovascular History: Previous MI and Previous PCI     EXERCISE AT HOME  no  0  N/A    EF: 42%      Source: 21          Ambulatory Status:Cane  Ambulatory Fall Risk Assessed on Initial Visit: yes 6 Minute Walk Pre- Cardiac Rehab:  Distance:1226ft      RPE:10  Max. HR: 90       SPO2:96%    MET: 2.7                 Resting BP: N/A LA, 126/76 RA    Peak BP: 144/70  Recovery BP: 114/72  Comments: Pt. Cannot have a blood pressure in the left arm due to a blockage in his shoulder blade.       NUTRITION  Lipids:yes If yes, labs as follows;  Total: No components found for: CHOLESTEROL  HDL:   HDL Cholesterol   Date Value Ref Range Status   2021 53 40 - 60 mg/dL Final    Lipids continued:  LDL:  LDL Chol Calc ([facility])   Date Value Ref Range Status   2021 52 0 - 100 mg/dL Final     Triglyceride: No components found for: TRIGLYCERIDE   Weight Management:                 Weight: 250  Height: 70inches                                   BMI: There is no height or weight on file to calculate BMI.     Alcohol Use: > 5 beers per day(s) Diabetes:No    Last HGBA1C with date if applicable:No components found for: A1C         SOCIAL HISTORY  Social History     Socioeconomic History   • Marital status:      Spouse name: Not on file   • Number of children: 1   • Years of education: Not on file   • Highest education level: Not on file   Tobacco Use   • Smoking status: Former Smoker      Packs/day: 1.50     Years: 45.00     Pack years: 67.50     Types: Cigarettes     Start date: 1971     Quit date: 2017     Years since quittin.1   • Smokeless tobacco: Never Used   Substance and Sexual Activity   • Alcohol use: Yes     Alcohol/week: 15.0 standard drinks     Types: 15 Standard drinks or equivalent per week     Comment: 3 nights a week   • Drug use: Never   • Sexual activity: Not Currently     Partners: Female     Birth control/protection: Diaphragm, Post-menopausal, Spermicide     Comment:  twice, both . No STVs. Not planning to resume.       Educational Level (choose one that applies) college graduate Learning Barriers:Ready to Learn    Family Support:yes    Living Arrangement: lives alone    Risk Factors: Heredity  Yes If Yes Father  of an MI. One brother had a stent, second brother had a CABG, Hyperlipidemia  Yes and Obesity  Yes     Tobacco Adjunct: No  Former Smoker 1.5 PPD from 7635-6884.  Smoked for 46 years.      Comorbidities: Previous MI     PSYCHOSOCIAL  Clinical Depression: no    Stress: no     Assess presence or absence of depression using a valid screening tool: yes      PHYSICAL ASSESSMENT  Influenza vaccine: yes  Pneumococcal vaccine: yes          Angina: yes    Describe angina scale of 0 - 4: 1 = light    Today are you having incisional pain? No. If, Yes, Scale: 0        Today are you having any other pain? Yes. If, Yes, Scale: 3    Has a knee that needs surgery.  Diagnosed with Hypertension:no    Heart Sounds: S1S2 quiet      Lung Sounds: normal air entry, lungs clear to auscultation. Decreased all bases.          Orthopedic Problems: Has a bad knee that needs to be looked at. Struggles walking on it.     Are you being hurt, hit, or frightened by anyone at home or in your life? no    Are you being neglected by a caregiver? No Shoulder flexibility/Range of motion: Average     Recommended arm activity: Any       Leg flexibility: Average      Chose one: Fall  Risk    Recommended stretching: Chair    Assessment: Pt. Has a problem with his knee. He is a fall risk due to not being stable on his knee.     Family attends IA: no Time of arrival: 1300  Time of departure: 1410     Patient Goals: Meet with RD to lose 10 pounds toward IBW by DC.         8/23/2021  14:23 EDT  LORENA Trejo

## 2021-08-25 ENCOUNTER — TREATMENT (OUTPATIENT)
Dept: CARDIAC REHAB | Facility: HOSPITAL | Age: 66
End: 2021-08-25

## 2021-08-25 DIAGNOSIS — Z95.1 S/P CABG (CORONARY ARTERY BYPASS GRAFT): ICD-10-CM

## 2021-08-25 PROCEDURE — 93798 PHYS/QHP OP CAR RHAB W/ECG: CPT

## 2021-08-25 NOTE — PROGRESS NOTES
Adult Outpatient Nutrition  Cardiac rehab    Patient Name:  Thien Gray  YOB: 1955  MRN: [id number]    Assessment Date:  8/25/2021  3:45pm      Spoke with pt on phone in an effort to schedule nutrition education session. Pt wishes to meet with RD on 9/8/21 @ 12:00pm; RD scheduled pt accordingly.      Electronically signed by:  Lila Dougherty MS,HAO,LD  08/25/21 15:57 EDT

## 2021-08-27 ENCOUNTER — TREATMENT (OUTPATIENT)
Dept: CARDIAC REHAB | Facility: HOSPITAL | Age: 66
End: 2021-08-27

## 2021-08-27 DIAGNOSIS — Z95.1 S/P CABG (CORONARY ARTERY BYPASS GRAFT): Primary | ICD-10-CM

## 2021-08-27 PROCEDURE — 93798 PHYS/QHP OP CAR RHAB W/ECG: CPT

## 2021-08-30 ENCOUNTER — TREATMENT (OUTPATIENT)
Dept: CARDIAC REHAB | Facility: HOSPITAL | Age: 66
End: 2021-08-30

## 2021-08-30 ENCOUNTER — APPOINTMENT (OUTPATIENT)
Dept: ULTRASOUND IMAGING | Facility: HOSPITAL | Age: 66
End: 2021-08-30

## 2021-08-30 DIAGNOSIS — Z95.1 S/P CABG (CORONARY ARTERY BYPASS GRAFT): Primary | ICD-10-CM

## 2021-08-30 PROCEDURE — 93798 PHYS/QHP OP CAR RHAB W/ECG: CPT

## 2021-09-01 ENCOUNTER — TREATMENT (OUTPATIENT)
Dept: CARDIAC REHAB | Facility: HOSPITAL | Age: 66
End: 2021-09-01

## 2021-09-01 DIAGNOSIS — Z95.1 S/P CABG (CORONARY ARTERY BYPASS GRAFT): Primary | ICD-10-CM

## 2021-09-01 PROCEDURE — 93798 PHYS/QHP OP CAR RHAB W/ECG: CPT

## 2021-09-03 ENCOUNTER — TREATMENT (OUTPATIENT)
Dept: CARDIAC REHAB | Facility: HOSPITAL | Age: 66
End: 2021-09-03

## 2021-09-03 DIAGNOSIS — Z95.1 S/P CABG (CORONARY ARTERY BYPASS GRAFT): Primary | ICD-10-CM

## 2021-09-03 PROCEDURE — 93798 PHYS/QHP OP CAR RHAB W/ECG: CPT

## 2021-09-08 ENCOUNTER — TREATMENT (OUTPATIENT)
Dept: CARDIAC REHAB | Facility: HOSPITAL | Age: 66
End: 2021-09-08

## 2021-09-08 DIAGNOSIS — Z95.1 S/P CABG (CORONARY ARTERY BYPASS GRAFT): Primary | ICD-10-CM

## 2021-09-08 PROCEDURE — 93798 PHYS/QHP OP CAR RHAB W/ECG: CPT

## 2021-09-08 NOTE — PROGRESS NOTES
CARDIAC/PULMONARY REHAB NUTRITION EDUCATION/ASSESSMENT      66 y.o.         Height:  70 in    Weight: 250 lb     BMI: 35.8 IBW:  166lb      %IBW: 151      Adj IBW:  187lb           Time seen: 12:05-12:50 PM   Dx: CAD/ STEMI, and CABG 6/22/21; h/o pre-DM.  Cardiac Risk Factors: preDM Weight Assessment: Obese  Weight Change: pt states he lost 15lb around time of hospitalization in June/3months ago, and states he has regained 10lb unintentionally over the past 2 weeks.  Pt states h/o going to a medically-supervised keto diet class connected to cardiology office in 2016; pt states he had rapid wt loss during that program, but regained the wt lost.       Appetite: good     Taste/smell changes:  No Food records reviewed? Yes; discussed written food records which record pt eating fried chicken 2x/d but pt states this is not typical.     Occupation: [employer]     Who does the patient live with: alone  Who does the cooking at home:  pt    Patient actively receiving lifestyle support from others at home? Yes per pt       Pertinent Lab Values: 6/16/21  Total cholesterol: 125mg/dl   HDL:   HDL Cholesterol   Date Value Ref Range Status   06/16/2021 53 40 - 60 mg/dL Final     LDL:  LDL Chol Calc ([facility])   Date Value Ref Range Status   06/16/2021 52 0 - 100 mg/dL Final     Triglyceride: 111mg/dl  Last HGBA1C with date if applicable: 5.7%  Glucose:   Glucose   Date Value Ref Range Status   07/09/2021 106 (H) 65 - 99 mg/dL Final                    Assessment / Recommendations: Rec that pt follow 1800 teresa/50-70g total fat and no more than 10g saturated fat/d plan. Pt states goal to lose wt towards healthy BMI range, and RD supports pt in this goal and advised pt of current obesity with BMI of 35.8. RD recs that pt increase fruit/vegetable consumption, decrease butter use and assure adequate fluid daily; pt willing to consider. Pt states he has used apple cider vinegar (diluted) previously and is considering starting to drink this again;  RD supported pt if he so chooses. RD Discussed heart healthy eating at restaurants. Pt gave account of typical day's po intake, and RD discussed ways in which pt could alter current habits to improve compliance with guidelines taught in session today. Pt states he often cooks food on the weekends that he will eat throughout the week. Pt with positive attitude toward change and with approp questions.    Motivation level toward diet compliance: strong       Education:      Previous cardiac diet education prior to coming to Cardiac Rehab? no  Instructed on:  Cardiac/mediterranean diet  Pre-diabetic diet  Weight management  Lipid management  3593-5975 mg/day Na restriction  Label reading for heart health  Written materials given:  yes         Plan: RD avail for f/u prn.                 14:36 EDT  9/8/2021  Lila Dougherty, MS,RD,LD

## 2021-09-09 ENCOUNTER — OFFICE VISIT (OUTPATIENT)
Dept: FAMILY MEDICINE CLINIC | Facility: CLINIC | Age: 66
End: 2021-09-09

## 2021-09-09 VITALS
WEIGHT: 251 LBS | RESPIRATION RATE: 22 BRPM | HEART RATE: 80 BPM | BODY MASS INDEX: 35.93 KG/M2 | TEMPERATURE: 97.8 F | HEIGHT: 70 IN | DIASTOLIC BLOOD PRESSURE: 74 MMHG | SYSTOLIC BLOOD PRESSURE: 106 MMHG | OXYGEN SATURATION: 95 %

## 2021-09-09 DIAGNOSIS — R20.2 PARESTHESIA OF BOTH LOWER EXTREMITIES: ICD-10-CM

## 2021-09-09 DIAGNOSIS — R22.1 NECK MASS: ICD-10-CM

## 2021-09-09 DIAGNOSIS — I25.10 CORONARY ARTERY DISEASE INVOLVING NATIVE CORONARY ARTERY OF NATIVE HEART WITHOUT ANGINA PECTORIS: Primary | Chronic | ICD-10-CM

## 2021-09-09 PROCEDURE — 99214 OFFICE O/P EST MOD 30 MIN: CPT | Performed by: FAMILY MEDICINE

## 2021-09-09 RX ORDER — PRAVASTATIN SODIUM 20 MG
20 TABLET ORAL DAILY
COMMUNITY
End: 2021-12-14 | Stop reason: HOSPADM

## 2021-09-09 NOTE — PROGRESS NOTES
"Chief Complaint  RTW from [facility]-heart attack    Subjective          Thien Gray presents to [facility]  History of Present Illness  He had a MI in June 2021, treated at [facility].   He has followed up with Dr. Acosta and Dr. Conrad since then. Scheduled to follow up with Dr. Conrad 11/16/21.   Is in cardiac rehabilitation, has 7 or so weeks remaining.   Cardiologist has released him to go back to work on 9/16/21. He feels that he is capable of returning and completing job duties. He works at [employer].    Since he was in the hospital, he developed numbness and tingling of bilateral anterior thighs.  Denies pain in legs.  Walking or exertion does not cause pain in legs.  Denies back pain.  While he was in the hospital, he was restrained due to to delirium.    He previously had ultrasound ordered of his neck to evaluate lymphadenopathy.  He was unable to complete this and needs this reordered.    Answers for HPI/ROS submitted by the patient on 9/7/2021  What is the primary reason for your visit?: Other  Please describe your symptoms.: 1. I recently was hospitalized for a heart attack. My short-term disability insurance requires that a physician fill out [facility] paperwork saying that I was disabled and when I would be able to return to work. The cardiologist says \"September 16\" but Dr Zuluaga needs to fill out the report. Dr Zuluaga said she wanted to examine me before filling out the paperwork., 2. I also have some numbness in the outer thighs of both legs ever since the surgery.  I wonder if it is a circulation problem, and whether I should follow up with a cardiologist?, 3. I need to reschedule my colonoscopy -- my previous appointment came due when I was in the hospital. When will I be able to do that?  Have you had these symptoms before?: No  How long have you been having these symptoms?: Greater than 2 weeks      The following portions of the patient's history were reviewed " "and updated as appropriate: allergies, current medications, past family history, past medical history, past social history, past surgical history and problem list.    Objective   Vital Signs:   /74   Pulse 80   Temp 97.8 °F (36.6 °C)   Resp 22   Ht 177.8 cm (70\")   Wt 114 kg (251 lb)   SpO2 95%   BMI 36.01 kg/m²     Physical Exam  Vitals and nursing note reviewed.   Constitutional:       General: He is not in acute distress.     Appearance: Normal appearance. He is well-developed.   HENT:      Head: Normocephalic and atraumatic.      Right Ear: External ear normal.      Left Ear: External ear normal.      Nose: Nose normal.   Eyes:      Conjunctiva/sclera: Conjunctivae normal.   Neck:     Cardiovascular:      Rate and Rhythm: Normal rate and regular rhythm.      Heart sounds: Normal heart sounds.   Pulmonary:      Effort: Pulmonary effort is normal. No respiratory distress.      Breath sounds: Normal breath sounds. No wheezing.   Musculoskeletal:         General: No swelling.      Cervical back: Neck supple.   Skin:     General: Skin is warm and dry.   Neurological:      Mental Status: He is alert and oriented to person, place, and time.      Cranial Nerves: No cranial nerve deficit.   Psychiatric:         Mood and Affect: Mood normal.         Behavior: Behavior normal.        Result Review :                 Assessment and Plan    Diagnoses and all orders for this visit:    1. CABG 06/22/21 (Primary)  Comments:  Doing well with recovery. Complete cardiac rehab and plan to RTW 9/16/21.  Keep scheduled follow-ups with cardiology    2. Paresthesia of both lower extremities  Comments:  EMG/NCV to evaluate   Orders:  -     EMG & Nerve Conduction Test; Future    3. Neck mass  Comments:  Reordered ultrasound  Orders:  -     US Head Neck Soft Tissue; Future        Follow Up   Return in about 6 months (around 3/9/2022) for Recheck.  Patient was given instructions and counseling regarding his condition or for " health maintenance advice. Please see specific information pulled into the AVS if appropriate.

## 2021-09-10 ENCOUNTER — TREATMENT (OUTPATIENT)
Dept: CARDIAC REHAB | Facility: HOSPITAL | Age: 66
End: 2021-09-10

## 2021-09-10 DIAGNOSIS — Z95.1 S/P CABG (CORONARY ARTERY BYPASS GRAFT): Primary | ICD-10-CM

## 2021-09-10 PROCEDURE — 93798 PHYS/QHP OP CAR RHAB W/ECG: CPT

## 2021-09-13 ENCOUNTER — TREATMENT (OUTPATIENT)
Dept: CARDIAC REHAB | Facility: HOSPITAL | Age: 66
End: 2021-09-13

## 2021-09-13 DIAGNOSIS — Z95.1 S/P CABG (CORONARY ARTERY BYPASS GRAFT): Primary | ICD-10-CM

## 2021-09-13 PROCEDURE — 93798 PHYS/QHP OP CAR RHAB W/ECG: CPT

## 2021-09-15 ENCOUNTER — TREATMENT (OUTPATIENT)
Dept: CARDIAC REHAB | Facility: HOSPITAL | Age: 66
End: 2021-09-15

## 2021-09-15 DIAGNOSIS — Z95.1 S/P CABG (CORONARY ARTERY BYPASS GRAFT): Primary | ICD-10-CM

## 2021-09-15 PROCEDURE — 93798 PHYS/QHP OP CAR RHAB W/ECG: CPT

## 2021-09-17 ENCOUNTER — TREATMENT (OUTPATIENT)
Dept: CARDIAC REHAB | Facility: HOSPITAL | Age: 66
End: 2021-09-17

## 2021-09-17 DIAGNOSIS — Z95.1 S/P CABG (CORONARY ARTERY BYPASS GRAFT): Primary | ICD-10-CM

## 2021-09-17 PROCEDURE — 93798 PHYS/QHP OP CAR RHAB W/ECG: CPT

## 2021-09-20 ENCOUNTER — TREATMENT (OUTPATIENT)
Dept: CARDIAC REHAB | Facility: HOSPITAL | Age: 66
End: 2021-09-20

## 2021-09-20 DIAGNOSIS — Z95.1 S/P CABG (CORONARY ARTERY BYPASS GRAFT): Primary | ICD-10-CM

## 2021-09-20 PROCEDURE — 93798 PHYS/QHP OP CAR RHAB W/ECG: CPT

## 2021-09-22 ENCOUNTER — TREATMENT (OUTPATIENT)
Dept: CARDIAC REHAB | Facility: HOSPITAL | Age: 66
End: 2021-09-22

## 2021-09-22 DIAGNOSIS — Z95.1 S/P CABG (CORONARY ARTERY BYPASS GRAFT): Primary | ICD-10-CM

## 2021-09-22 PROCEDURE — 93798 PHYS/QHP OP CAR RHAB W/ECG: CPT

## 2021-09-24 ENCOUNTER — TREATMENT (OUTPATIENT)
Dept: CARDIAC REHAB | Facility: HOSPITAL | Age: 66
End: 2021-09-24

## 2021-09-24 DIAGNOSIS — Z95.1 S/P CABG (CORONARY ARTERY BYPASS GRAFT): Primary | ICD-10-CM

## 2021-09-24 PROCEDURE — 93798 PHYS/QHP OP CAR RHAB W/ECG: CPT

## 2021-09-27 ENCOUNTER — TREATMENT (OUTPATIENT)
Dept: CARDIAC REHAB | Facility: HOSPITAL | Age: 66
End: 2021-09-27

## 2021-09-27 DIAGNOSIS — Z95.1 S/P CABG (CORONARY ARTERY BYPASS GRAFT): Primary | ICD-10-CM

## 2021-09-27 PROCEDURE — 93798 PHYS/QHP OP CAR RHAB W/ECG: CPT

## 2021-09-29 ENCOUNTER — TREATMENT (OUTPATIENT)
Dept: CARDIAC REHAB | Facility: HOSPITAL | Age: 66
End: 2021-09-29

## 2021-09-29 DIAGNOSIS — Z95.1 S/P CABG (CORONARY ARTERY BYPASS GRAFT): Primary | ICD-10-CM

## 2021-09-29 PROCEDURE — 93798 PHYS/QHP OP CAR RHAB W/ECG: CPT

## 2021-10-01 ENCOUNTER — TREATMENT (OUTPATIENT)
Dept: CARDIAC REHAB | Facility: HOSPITAL | Age: 66
End: 2021-10-01

## 2021-10-01 DIAGNOSIS — Z95.1 S/P CABG (CORONARY ARTERY BYPASS GRAFT): Primary | ICD-10-CM

## 2021-10-01 PROCEDURE — 93798 PHYS/QHP OP CAR RHAB W/ECG: CPT

## 2021-10-04 ENCOUNTER — TELEPHONE (OUTPATIENT)
Dept: ORTHOPEDIC SURGERY | Facility: CLINIC | Age: 66
End: 2021-10-04

## 2021-10-04 ENCOUNTER — TREATMENT (OUTPATIENT)
Dept: CARDIAC REHAB | Facility: HOSPITAL | Age: 66
End: 2021-10-04

## 2021-10-04 DIAGNOSIS — Z95.1 S/P CABG (CORONARY ARTERY BYPASS GRAFT): Primary | ICD-10-CM

## 2021-10-04 PROCEDURE — 93798 PHYS/QHP OP CAR RHAB W/ECG: CPT

## 2021-10-04 NOTE — TELEPHONE ENCOUNTER
Caller: PATT LINARES    Relationship to patient: SELF    Best call back number:     Chief complaint: RT KNEE PAIN    Type of visit: CORTISONE INJECTION    Requested date: ASAP    If rescheduling, when is the original appointment:     Additional notes:

## 2021-10-06 ENCOUNTER — TREATMENT (OUTPATIENT)
Dept: CARDIAC REHAB | Facility: HOSPITAL | Age: 66
End: 2021-10-06

## 2021-10-06 DIAGNOSIS — Z95.1 S/P CABG (CORONARY ARTERY BYPASS GRAFT): Primary | ICD-10-CM

## 2021-10-06 PROCEDURE — 93798 PHYS/QHP OP CAR RHAB W/ECG: CPT

## 2021-10-08 ENCOUNTER — TREATMENT (OUTPATIENT)
Dept: CARDIAC REHAB | Facility: HOSPITAL | Age: 66
End: 2021-10-08

## 2021-10-08 DIAGNOSIS — Z95.1 S/P CABG (CORONARY ARTERY BYPASS GRAFT): Primary | ICD-10-CM

## 2021-10-08 PROCEDURE — 93798 PHYS/QHP OP CAR RHAB W/ECG: CPT

## 2021-10-11 ENCOUNTER — TREATMENT (OUTPATIENT)
Dept: CARDIAC REHAB | Facility: HOSPITAL | Age: 66
End: 2021-10-11

## 2021-10-11 DIAGNOSIS — Z95.1 S/P CABG (CORONARY ARTERY BYPASS GRAFT): Primary | ICD-10-CM

## 2021-10-11 PROCEDURE — 93798 PHYS/QHP OP CAR RHAB W/ECG: CPT

## 2021-10-13 ENCOUNTER — TREATMENT (OUTPATIENT)
Dept: CARDIAC REHAB | Facility: HOSPITAL | Age: 66
End: 2021-10-13

## 2021-10-13 DIAGNOSIS — Z95.1 S/P CABG (CORONARY ARTERY BYPASS GRAFT): Primary | ICD-10-CM

## 2021-10-13 PROCEDURE — 93798 PHYS/QHP OP CAR RHAB W/ECG: CPT

## 2021-10-15 ENCOUNTER — TREATMENT (OUTPATIENT)
Dept: CARDIAC REHAB | Facility: HOSPITAL | Age: 66
End: 2021-10-15

## 2021-10-15 ENCOUNTER — OFFICE VISIT (OUTPATIENT)
Dept: ORTHOPEDIC SURGERY | Facility: CLINIC | Age: 66
End: 2021-10-15

## 2021-10-15 VITALS
DIASTOLIC BLOOD PRESSURE: 71 MMHG | HEIGHT: 70 IN | SYSTOLIC BLOOD PRESSURE: 158 MMHG | HEART RATE: 66 BPM | BODY MASS INDEX: 37.08 KG/M2 | WEIGHT: 259 LBS

## 2021-10-15 DIAGNOSIS — M17.11 PRIMARY OSTEOARTHRITIS OF RIGHT KNEE: Primary | ICD-10-CM

## 2021-10-15 DIAGNOSIS — Z95.1 S/P CABG (CORONARY ARTERY BYPASS GRAFT): Primary | ICD-10-CM

## 2021-10-15 PROCEDURE — 93798 PHYS/QHP OP CAR RHAB W/ECG: CPT

## 2021-10-15 PROCEDURE — 20610 DRAIN/INJ JOINT/BURSA W/O US: CPT | Performed by: ORTHOPAEDIC SURGERY

## 2021-10-15 PROCEDURE — 99213 OFFICE O/P EST LOW 20 MIN: CPT | Performed by: ORTHOPAEDIC SURGERY

## 2021-10-15 RX ORDER — PRAVASTATIN SODIUM 40 MG
TABLET ORAL
COMMUNITY
Start: 2021-10-09 | End: 2021-11-08

## 2021-10-15 RX ORDER — TRIAMCINOLONE ACETONIDE 40 MG/ML
80 INJECTION, SUSPENSION INTRA-ARTICULAR; INTRAMUSCULAR
Status: COMPLETED | OUTPATIENT
Start: 2021-10-15 | End: 2021-10-15

## 2021-10-15 RX ORDER — BUPIVACAINE HYDROCHLORIDE 2.5 MG/ML
3 INJECTION, SOLUTION EPIDURAL; INFILTRATION; INTRACAUDAL
Status: COMPLETED | OUTPATIENT
Start: 2021-10-15 | End: 2021-10-15

## 2021-10-15 RX ORDER — LIDOCAINE HYDROCHLORIDE 10 MG/ML
3 INJECTION, SOLUTION EPIDURAL; INFILTRATION; INTRACAUDAL; PERINEURAL
Status: COMPLETED | OUTPATIENT
Start: 2021-10-15 | End: 2021-10-15

## 2021-10-15 RX ADMIN — LIDOCAINE HYDROCHLORIDE 3 ML: 10 INJECTION, SOLUTION EPIDURAL; INFILTRATION; INTRACAUDAL; PERINEURAL at 08:59

## 2021-10-15 RX ADMIN — TRIAMCINOLONE ACETONIDE 80 MG: 40 INJECTION, SUSPENSION INTRA-ARTICULAR; INTRAMUSCULAR at 08:59

## 2021-10-15 RX ADMIN — BUPIVACAINE HYDROCHLORIDE 3 ML: 2.5 INJECTION, SOLUTION EPIDURAL; INFILTRATION; INTRACAUDAL at 08:59

## 2021-10-15 NOTE — PROGRESS NOTES
Procedure   Large Joint Arthrocentesis: R knee  Date/Time: 10/15/2021 8:59 AM  Consent given by: patient  Site marked: site marked  Timeout: Immediately prior to procedure a time out was called to verify the correct patient, procedure, equipment, support staff and site/side marked as required   Supporting Documentation  Indications: pain   Procedure Details  Location: knee - R knee  Preparation: Patient was prepped and draped in the usual sterile fashion  Needle size: 22 G  Approach: anterolateral  Medications administered: 3 mL bupivacaine (PF) 0.25 %; 3 mL lidocaine PF 1% 1 %; 80 mg triamcinolone acetonide 40 MG/ML  Patient tolerance: patient tolerated the procedure well with no immediate complications

## 2021-10-15 NOTE — PROGRESS NOTES
Orthopaedic Clinic Note: Knee Established Patient    Chief Complaint   Patient presents with   • Follow-up     4 month follow up; Primary osteoarthritis of both knees         HPI    It has been 4  month(s) since Mr. Gray's last visit. He returns to clinic today for follow-up osteoarthritis of bilateral knees.  He comes clinic today complaining of worsening right knee pain that he rates a 5/10 on the pain scale.  At his last visit, he was scheduled for total knee arthroplasty.  Unfortunately he subsequently sustained a heart attack and his surgery was canceled.  He is currently undergoing cardiac rehab but is having difficulty completing the cardiac rehab exercises secondary to his worsening right knee pain.  He is ambulatory with assistance of a cane.  He denies fevers chills or constitutional symptoms.  He is taking meloxicam with slight improvement.  He is here today to discuss treatment options due to his ongoing right knee pain that is limiting daily activities as well as ability to do cardiac rehab.  He remains overweight with a BMI 37.16.    Past Medical History:   Diagnosis Date   • Abnormal ECG 1/20/2020    Get from Dr. Kayser   • Coronary artery disease    • History of heart attack 2004   • History of MI (myocardial infarction)    • Hyperlipidemia    • Myocardial infarction (HCC)    • RBBB 2/10/2020      Past Surgical History:   Procedure Laterality Date   • APPENDECTOMY  1999   • CARDIAC CATHETERIZATION  1/1/2004    They used a catheter to put in my stent.   • CARDIAC CATHETERIZATION N/A 6/22/2021    Procedure: Left Heart Cath;  Surgeon: Mikey Conrad MD;  Location: [facility] CATH INVASIVE LOCATION;  Service: Cardiovascular;  Laterality: N/A;   • CORONARY ANGIOPLASTY  2004    I think that's what the bill for my stent said.   • CORONARY ARTERY BYPASS BRING BACK FOR EXPLORATION N/A 6/23/2021    Procedure: BRING BACK FOR EXPLORATION OPEN HEART;  Surgeon: Miguel Angel Acosta MD;  Location: [facility] OR;   Service: Cardiothoracic;  Laterality: N/A;   • CORONARY ARTERY BYPASS GRAFT N/A 2021    Procedure: MEDIAN STERNOTOMY, CORONARY ARTERY BYPASS GRAFTING X 4 WITH LEFT INTERNAL MAMMARY ARTERY GRAFT, ENDOSCOPIC VEIN HARVESTING OF THE RIGHT GREATER SAPHENOUS VEIN, INTRA-AORTIC BALLOON PUMP INSERTION, IVAN PER ANESTHESIA;  Surgeon: Miguel Angel Acosta MD;  Location: [facility] OR;  Service: Cardiothoracic;  Laterality: N/A;   • CORONARY STENT PLACEMENT     • INTERVENTIONAL RADIOLOGY PROCEDURE N/A 2021    Procedure: thrombin injection;  Surgeon: Abdiel Tamez MD;  Location:  [facility] CATH INVASIVE LOCATION;  Service: Interventional Radiology;  Laterality: N/A;      Family History   Problem Relation Age of Onset   • Cancer Mother    • Hypertension Mother    • Hyperlipidemia Mother         Takes medication   • Heart attack Mother    • Diabetes Sister    • Heart failure Father         .   • Stroke Father    • Heart attack Maternal Grandfather    • Cancer Paternal Grandmother    • Heart attack Paternal Grandfather      Social History     Socioeconomic History   • Marital status:    • Number of children: 1   Tobacco Use   • Smoking status: Former Smoker     Packs/day: 1.50     Years: 45.00     Pack years: 67.50     Types: Cigarettes     Start date: 1971     Quit date: 2017     Years since quittin.2   • Smokeless tobacco: Never Used   Substance and Sexual Activity   • Alcohol use: Yes     Alcohol/week: 15.0 standard drinks     Types: 15 Standard drinks or equivalent per week     Comment: 3 nights a week   • Drug use: Never   • Sexual activity: Not Currently     Partners: Female     Birth control/protection: Diaphragm, Post-menopausal, Spermicide     Comment:  twice, both . No STVs. Not planning to resume.      Current Outpatient Medications on File Prior to Visit   Medication Sig Dispense Refill   • losartan (COZAAR) 25 MG tablet Take 1 tablet by mouth Daily.     • meloxicam (MOBIC) 7.5 MG  "tablet TAKE 1 TABLET DAILY WITH   FOOD 90 tablet 2   • metoprolol tartrate (LOPRESSOR) 50 MG tablet Take 1 tablet by mouth Every 12 (Twelve) Hours. 60 tablet 6   • pravastatin (PRAVACHOL) 20 MG tablet Take 20 mg by mouth Daily.     • pravastatin (PRAVACHOL) 40 MG tablet      • tamsulosin (FLOMAX) 0.4 MG capsule 24 hr capsule Take 2 capsules by mouth Daily. 180 capsule 3   • triamcinolone (KENALOG) 0.025 % ointment Apply  topically to the appropriate area as directed 2 Times a Day for 10 days. Avoid applying to face, axilla, and groin 15 g 1     No current facility-administered medications on file prior to visit.      Allergies   Allergen Reactions   • Bactrim [Sulfamethoxazole-Trimethoprim] Other (See Comments)     Causes weakness, fatigue   • Plavix [Clopidogrel Bisulfate] Hives   • Augmentin [Amoxicillin-Pot Clavulanate] Confusion        Review of Systems   Constitutional: Negative.    HENT: Negative.    Eyes: Negative.    Respiratory: Negative.    Cardiovascular: Negative.    Gastrointestinal: Negative.    Endocrine: Negative.    Genitourinary: Negative.    Musculoskeletal: Positive for arthralgias.   Skin: Negative.    Allergic/Immunologic: Negative.    Neurological: Negative.    Hematological: Negative.    Psychiatric/Behavioral: Negative.         The patient's Review of Systems was personally reviewed and confirmed as accurate.    Physical Exam  Blood pressure 158/71, pulse 66, height 177.8 cm (70\"), weight 117 kg (259 lb).    Body mass index is 37.16 kg/m².    GENERAL APPEARANCE: awake, alert, oriented, in no acute distress and well developed, well nourished  LUNGS:  breathing nonlabored  EXTREMITIES: no clubbing, cyanosis  PERIPHERAL PULSES: palpable dorsalis pedis and posterior tibial pulses bilaterally.    GAIT:  Antalgic        ----------  Right Knee Exam:  ----------  ALIGNMENT: severe varus, correctable to neutral  ----------  RANGE OF MOTION:  Decreased (5 - 115 degrees) with no extensor " lag  LIGAMENTOUS STABILITY:   stable to varus and valgus stress at terminal extension and 30 degrees; retensioning of the MCL is appreciated with valgus stress at 30 degrees consistent with medial compartment degeneration  ----------  STRENGTH:  KNEE FLEXION 4/5  KNEE EXTENSION  4/5  ANKLE DORSIFLEXION  5/5  ANKLE PLANTARFLEXION  5/5  ----------  PAIN WITH PALPATION:global  KNEE EFFUSION: yes,  moderate effusion  PAIN WITH KNEE ROM: yes  PATELLAR CREPITUS:  yes, painful and symptomatic  ----------  SENSATION TO LIGHT TOUCH:  DEEP PERONEAL/SUPERFICIAL PERONEAL/SURAL/SAPHENOUS/TIBIAL:    intact  ----------  EDEMA:   1+ edema in ankle  ERYTHEMA:    no  WOUNDS/INCISIONS:   No  -------  Left knee Exam:  ----------  ALIGNMENT: Moderate varus, correctable to neutral  ----------  RANGE OF MOTION:  Decreased (3 - 120 degrees) with no extensor lag  LIGAMENTOUS STABILITY:   stable to varus and valgus stress at terminal extension and 30 degrees; retensioning of the MCL is appreciated with valgus stress at 30 degrees consistent with medial compartment degeneration  ----------  STRENGTH:  KNEE FLEXION 5/5  KNEE EXTENSION  5/5  ANKLE DORSIFLEXION  5/5  ANKLE PLANTARFLEXION  5/5  ----------  PAIN WITH PALPATION: Medially  KNEE EFFUSION: yes, trace effusion  PAIN WITH KNEE ROM: yes  PATELLAR CREPITUS:  yes, painful and symptomatic  ----------  SENSATION TO LIGHT TOUCH:  DEEP PERONEAL/SUPERFICIAL PERONEAL/SURAL/SAPHENOUS/TIBIAL:    intact  ----------  EDEMA:   1+ edema in ankle  ERYTHEMA:    no  WOUNDS/INCISIONS:   no  _____________________________________________________________________  _____________________________________________________________________    RADIOGRAPHIC FINDINGS:   Indication: Right knee pain    Comparison: Todays xrays were compared to previous xrays from 4/20/2021    Knee films: moderate to severe tricompartmental arthritis with genu varum alignment, periarticular osteophytes visualized in all compartments and  No significant changes compared to prior radiographs.    Assessment/Plan:   Diagnosis Plan   1. Primary osteoarthritis of right knee  XR Knee 4+ View Right     The patient has failed conservative treatment measures and is a candidate for joint arthroplasty.  Unfortunately, until he is cleared by his cardiologist he is unable to proceed to surgical intervention. Alternative conservative treatment measures were discussed including bracing, therapy, topical/oral anti-inflammatories, activity modification, and weight loss.  The patient considered these treatment options and wishes to proceed with corticosteroid injection(s) today.  Therefore we will proceed with corticosteroid injection(s) today.  Follow-up 3 months for repeat assessment.    Patient has an elevated BMI. The patient has been instructed on various weight loss avenues including diet, portion control, calorie restriction, low/no impact exercise, referral to weight loss management and/or bariatric surgery.  It was explained that weight loss can improve joint pain alone by decreasing the joint reaction forces.  For every pound of weight change, the knee and hip joints see a 4 to 5 fold change in pressure.  Given these options, the patient will proceed with low calorie diet and low impact exercise.    Procedure Note:  I discussed with the patient the potential benefits of performing a therapeutic injection of the right knee as well as potential risks including but not limited to infection, swelling, pain, bleeding, bruising, nerve/vessel damage, skin color changes, transient elevation in blood glucose levels, and fat atrophy. After informed consent and verifying correct patient, procedure site, and type of procedure, the area was prepped with alcohol, ethyl chloride was used to numb the skin. Via the superior lateral approach, 3cc of 1% lidocaine, 3cc of 0.25% bupivicaine and 2 cc of 40mg/ml of Kenalog were injected into the right knee. The patient tolerated  the procedure well. There were no complications. A sterile dressing was placed over the injection site.        Pavan Suazo MD  10/15/21  09:02 EDT

## 2021-10-18 ENCOUNTER — TREATMENT (OUTPATIENT)
Dept: CARDIAC REHAB | Facility: HOSPITAL | Age: 66
End: 2021-10-18

## 2021-10-18 DIAGNOSIS — Z95.1 S/P CABG (CORONARY ARTERY BYPASS GRAFT): Primary | ICD-10-CM

## 2021-10-18 PROCEDURE — 93798 PHYS/QHP OP CAR RHAB W/ECG: CPT

## 2021-10-20 ENCOUNTER — TREATMENT (OUTPATIENT)
Dept: CARDIAC REHAB | Facility: HOSPITAL | Age: 66
End: 2021-10-20

## 2021-10-20 DIAGNOSIS — Z95.1 S/P CABG (CORONARY ARTERY BYPASS GRAFT): Primary | ICD-10-CM

## 2021-10-20 PROCEDURE — 93798 PHYS/QHP OP CAR RHAB W/ECG: CPT

## 2021-10-21 ENCOUNTER — HOSPITAL ENCOUNTER (OUTPATIENT)
Dept: NEUROLOGY | Facility: HOSPITAL | Age: 66
Discharge: HOME | End: 2021-10-21

## 2021-10-21 ENCOUNTER — HOSPITAL ENCOUNTER (OUTPATIENT)
Dept: ULTRASOUND IMAGING | Facility: HOSPITAL | Age: 66
Discharge: HOME | End: 2021-10-21

## 2021-10-21 DIAGNOSIS — R22.1 NECK MASS: ICD-10-CM

## 2021-10-21 DIAGNOSIS — R20.2 PARESTHESIA OF BOTH LOWER EXTREMITIES: ICD-10-CM

## 2021-10-21 PROCEDURE — 95886 MUSC TEST DONE W/N TEST COMP: CPT

## 2021-10-21 PROCEDURE — 95911 NRV CNDJ TEST 9-10 STUDIES: CPT

## 2021-10-21 PROCEDURE — 76536 US EXAM OF HEAD AND NECK: CPT

## 2021-10-22 ENCOUNTER — TREATMENT (OUTPATIENT)
Dept: CARDIAC REHAB | Facility: HOSPITAL | Age: 66
End: 2021-10-22

## 2021-10-22 DIAGNOSIS — Z95.1 S/P CABG (CORONARY ARTERY BYPASS GRAFT): Primary | ICD-10-CM

## 2021-10-22 PROCEDURE — 93798 PHYS/QHP OP CAR RHAB W/ECG: CPT

## 2021-10-25 ENCOUNTER — TREATMENT (OUTPATIENT)
Dept: CARDIAC REHAB | Facility: HOSPITAL | Age: 66
End: 2021-10-25

## 2021-10-25 DIAGNOSIS — Z95.1 S/P CABG (CORONARY ARTERY BYPASS GRAFT): Primary | ICD-10-CM

## 2021-10-25 PROCEDURE — 93798 PHYS/QHP OP CAR RHAB W/ECG: CPT

## 2021-10-26 DIAGNOSIS — R20.2 PARESTHESIA OF BOTH LOWER EXTREMITIES: Primary | ICD-10-CM

## 2021-10-26 DIAGNOSIS — G62.9 SENSORIMOTOR NEUROPATHY: ICD-10-CM

## 2021-10-27 ENCOUNTER — TREATMENT (OUTPATIENT)
Dept: CARDIAC REHAB | Facility: HOSPITAL | Age: 66
End: 2021-10-27

## 2021-10-27 DIAGNOSIS — Z95.1 S/P CABG (CORONARY ARTERY BYPASS GRAFT): Primary | ICD-10-CM

## 2021-10-27 PROCEDURE — 93798 PHYS/QHP OP CAR RHAB W/ECG: CPT

## 2021-10-28 ENCOUNTER — OFFICE VISIT (OUTPATIENT)
Dept: CARDIAC SURGERY | Facility: CLINIC | Age: 66
End: 2021-10-28

## 2021-10-28 VITALS
BODY MASS INDEX: 36.79 KG/M2 | OXYGEN SATURATION: 97 % | HEART RATE: 77 BPM | TEMPERATURE: 97.8 F | SYSTOLIC BLOOD PRESSURE: 113 MMHG | HEIGHT: 70 IN | WEIGHT: 257 LBS | DIASTOLIC BLOOD PRESSURE: 73 MMHG

## 2021-10-28 DIAGNOSIS — Z95.1 S/P CABG X 4: Primary | ICD-10-CM

## 2021-10-28 DIAGNOSIS — I21.21 ST ELEVATION MYOCARDIAL INFARCTION INVOLVING LEFT CIRCUMFLEX CORONARY ARTERY (HCC): ICD-10-CM

## 2021-10-28 DIAGNOSIS — R35.0 BENIGN PROSTATIC HYPERPLASIA WITH URINARY FREQUENCY: ICD-10-CM

## 2021-10-28 DIAGNOSIS — N40.1 BENIGN PROSTATIC HYPERPLASIA WITH URINARY FREQUENCY: ICD-10-CM

## 2021-10-28 PROCEDURE — 99213 OFFICE O/P EST LOW 20 MIN: CPT | Performed by: THORACIC SURGERY (CARDIOTHORACIC VASCULAR SURGERY)

## 2021-10-28 RX ORDER — TAMSULOSIN HYDROCHLORIDE 0.4 MG/1
CAPSULE ORAL
Qty: 180 CAPSULE | Refills: 1 | Status: SHIPPED | OUTPATIENT
Start: 2021-10-28 | End: 2022-04-26

## 2021-10-28 NOTE — PROGRESS NOTES
10/28/2021  Patient Information  Thien Gray                                                                                          [address]  [address]  [facility] [address]   1955  'PCP/Referring Physician'  Sanaz Zuluaga,   No ref. provider found  Chief Complaint   Patient presents with   • Follow-up     3 month follow up for coronary artery disease s/p CABG 6/22/21. Complains of still being a little low on enegry but is doing his cardiac rehab and it seems to be helping.   • Coronary Artery Disease       CC: I feel good I have not had any chest pain    History of Present Illness: 66-year-old  male now 4 months status post CABG following an ST elevation myocardial infarction.  The patient has done well.  He has no specific complaints at this point in time.  He has returned to full normal activity without difficulty.  He denies anginal quality chest pain and shortness of breath.  He has not had fever, chills, or night sweats.  He denies wound erythema and wound drainage.  He continues close follow-up with his cardiologist Dr. Conrad.      Patient Active Problem List   Diagnosis   • History of tobacco abuse   • CABG 06/22/21   • Dyspnea on exertion   • RBBB   • Psoriasis   • Primary osteoarthritis of both knees   • STEMI   • Dyslipidemia   • Obesity (BMI 30-39.9)   • Hyperglycemia     Past Medical History:   Diagnosis Date   • Abnormal ECG 1/20/2020    Get from Dr. Kayser   • Coronary artery disease    • History of heart attack 2004   • History of MI (myocardial infarction)    • Hyperlipidemia    • Myocardial infarction (HCC)    • RBBB 2/10/2020     Past Surgical History:   Procedure Laterality Date   • APPENDECTOMY  1999   • CARDIAC CATHETERIZATION  1/1/2004    They used a catheter to put in my stent.   • CARDIAC CATHETERIZATION N/A 6/22/2021    Procedure: Left Heart Cath;  Surgeon: Mikey Conrad MD;  Location: [facility] CATH INVASIVE LOCATION;  Service: Cardiovascular;   Laterality: N/A;   • CORONARY ANGIOPLASTY  2004    I think that's what the bill for my stent said.   • CORONARY ARTERY BYPASS BRING BACK FOR EXPLORATION N/A 6/23/2021    Procedure: BRING BACK FOR EXPLORATION OPEN HEART;  Surgeon: Miguel Angel Acosta MD;  Location:  [facility];  Service: Cardiothoracic;  Laterality: N/A;   • CORONARY ARTERY BYPASS GRAFT N/A 6/22/2021    Procedure: MEDIAN STERNOTOMY, CORONARY ARTERY BYPASS GRAFTING X 4 WITH LEFT INTERNAL MAMMARY ARTERY GRAFT, ENDOSCOPIC VEIN HARVESTING OF THE RIGHT GREATER SAPHENOUS VEIN, INTRA-AORTIC BALLOON PUMP INSERTION, IVAN PER ANESTHESIA;  Surgeon: Miguel Angel Acosta MD;  Location:  [facility];  Service: Cardiothoracic;  Laterality: N/A;   • CORONARY STENT PLACEMENT  2004   • INTERVENTIONAL RADIOLOGY PROCEDURE N/A 7/2/2021    Procedure: thrombin injection;  Surgeon: Abdiel Tamez MD;  Location:  [facility] CATH INVASIVE LOCATION;  Service: Interventional Radiology;  Laterality: N/A;       Current Outpatient Medications:   •  losartan (COZAAR) 25 MG tablet, Take 1 tablet by mouth Daily., Disp: , Rfl:   •  meloxicam (MOBIC) 7.5 MG tablet, TAKE 1 TABLET DAILY WITH   FOOD, Disp: 90 tablet, Rfl: 2  •  metoprolol tartrate (LOPRESSOR) 50 MG tablet, Take 1 tablet by mouth Every 12 (Twelve) Hours., Disp: 60 tablet, Rfl: 6  •  pravastatin (PRAVACHOL) 40 MG tablet, , Disp: , Rfl:   •  tamsulosin (FLOMAX) 0.4 MG capsule 24 hr capsule, Take 2 capsules by mouth Daily., Disp: 180 capsule, Rfl: 3  •  pravastatin (PRAVACHOL) 20 MG tablet, Take 20 mg by mouth Daily. (Patient not taking: Reported on 10/28/2021), Disp: , Rfl:   •  triamcinolone (KENALOG) 0.025 % ointment, Apply  topically to the appropriate area as directed 2 Times a Day for 10 days. Avoid applying to face, axilla, and groin (Patient not taking: Reported on 10/28/2021), Disp: 15 g, Rfl: 1  Allergies   Allergen Reactions   • Bactrim [Sulfamethoxazole-Trimethoprim] Other (See Comments)     Causes weakness, fatigue   • Plavix  [Clopidogrel Bisulfate] Hives   • Augmentin [Amoxicillin-Pot Clavulanate] Confusion     Social History     Socioeconomic History   • Marital status:    • Number of children: 1   Tobacco Use   • Smoking status: Former Smoker     Packs/day: 1.50     Years: 45.00     Pack years: 67.50     Types: Cigarettes     Start date: 1971     Quit date: 2017     Years since quittin.2   • Smokeless tobacco: Never Used   Substance and Sexual Activity   • Alcohol use: Yes     Alcohol/week: 15.0 standard drinks     Types: 15 Standard drinks or equivalent per week     Comment: 3 nights a week   • Drug use: Never   • Sexual activity: Not Currently     Partners: Female     Birth control/protection: Diaphragm, Post-menopausal, Spermicide     Comment:  twice, both . No STVs. Not planning to resume.     Family History   Problem Relation Age of Onset   • Cancer Mother    • Hypertension Mother    • Hyperlipidemia Mother         Takes medication   • Heart attack Mother    • Diabetes Sister    • Heart failure Father         .   • Stroke Father    • Heart attack Maternal Grandfather    • Cancer Paternal Grandmother    • Heart attack Paternal Grandfather        ROS, past medical history, surgical history, family history, social history and vitals  reviewed by me and corrected as needed.        Review of Systems   Constitutional: Positive for malaise/fatigue. Negative for chills, fever, night sweats and weight loss.   HENT: Negative.  Negative for hearing loss, odynophagia and sore throat.    Cardiovascular: Negative for chest pain, dyspnea on exertion, leg swelling, orthopnea and palpitations.   Respiratory: Negative.  Negative for cough and hemoptysis.    Endocrine: Negative for cold intolerance, heat intolerance, polydipsia, polyphagia and polyuria.   Hematologic/Lymphatic: Does not bruise/bleed easily.   Skin: Positive for rash. Negative for itching.   Musculoskeletal: Positive for arthritis, joint pain and  "muscle cramps. Negative for joint swelling and myalgias.   Gastrointestinal: Positive for diarrhea. Negative for abdominal pain, constipation, hematemesis, hematochezia, melena, nausea and vomiting.   Genitourinary: Positive for frequency. Negative for dysuria and hematuria.   Neurological: Positive for loss of balance and numbness. Negative for focal weakness, headaches and seizures.   Psychiatric/Behavioral: Negative.  Negative for suicidal ideas.   All other systems reviewed and are negative.      Vitals:    10/28/21 0931   BP: 113/73   BP Location: Right arm   Patient Position: Sitting   Pulse: 77   Temp: 97.8 °F (36.6 °C)   SpO2: 97%   Weight: 117 kg (257 lb)   Height: 177.8 cm (70\")        Physical Exam  Vitals and nursing note reviewed.   Constitutional:       General: He is not in acute distress.     Appearance: Normal appearance. He is normal weight.   HENT:      Head: Normocephalic and atraumatic.      Right Ear: External ear normal.      Left Ear: External ear normal.      Nose: Nose normal.      Mouth/Throat:      Mouth: Mucous membranes are moist.   Eyes:      Extraocular Movements: Extraocular movements intact.      Pupils: Pupils are equal, round, and reactive to light.   Neck:      Vascular: No carotid bruit.   Cardiovascular:      Rate and Rhythm: Normal rate and regular rhythm.      Pulses: Normal pulses.      Heart sounds: Normal heart sounds. No murmur heard.      Pulmonary:      Effort: Pulmonary effort is normal. No respiratory distress.      Breath sounds: No wheezing, rhonchi or rales.   Abdominal:      General: Abdomen is flat. Bowel sounds are normal.      Palpations: Abdomen is soft. There is no mass.      Tenderness: There is no abdominal tenderness. There is no guarding.   Musculoskeletal:         General: No swelling or tenderness.      Cervical back: Normal range of motion. No rigidity. No muscular tenderness.      Right lower leg: No edema.      Left lower leg: No edema.      " Comments: Sternum stable no erythema no drainage.  No edema in the lower extremities.   Lymphadenopathy:      Cervical: No cervical adenopathy.   Skin:     General: Skin is warm and dry.      Capillary Refill: Capillary refill takes less than 2 seconds.      Coloration: Skin is not jaundiced.      Findings: No erythema.   Neurological:      General: No focal deficit present.      Mental Status: He is alert and oriented to person, place, and time. Mental status is at baseline.   Psychiatric:         Mood and Affect: Mood normal.         Behavior: Behavior normal.         Thought Content: Thought content normal.         Judgment: Judgment normal.         Labs: None    Imaging: None    Assessment: Stable postoperative course.        Plan:Patient will return to this clinic as needed.  He will continue follow-up with Dr. Conrad as scheduled.      Electronically signed by Miguel Angel Acosta MD, 10/28/21, 11:29 AM EDT.

## 2021-10-29 ENCOUNTER — TREATMENT (OUTPATIENT)
Dept: CARDIAC REHAB | Facility: HOSPITAL | Age: 66
End: 2021-10-29

## 2021-10-29 DIAGNOSIS — Z95.1 S/P CABG (CORONARY ARTERY BYPASS GRAFT): Primary | ICD-10-CM

## 2021-10-29 PROCEDURE — 93798 PHYS/QHP OP CAR RHAB W/ECG: CPT

## 2021-11-01 ENCOUNTER — TREATMENT (OUTPATIENT)
Dept: CARDIAC REHAB | Facility: HOSPITAL | Age: 66
End: 2021-11-01

## 2021-11-01 DIAGNOSIS — Z95.1 S/P CABG (CORONARY ARTERY BYPASS GRAFT): Primary | ICD-10-CM

## 2021-11-01 PROCEDURE — 93798 PHYS/QHP OP CAR RHAB W/ECG: CPT

## 2021-11-03 ENCOUNTER — TREATMENT (OUTPATIENT)
Dept: CARDIAC REHAB | Facility: HOSPITAL | Age: 66
End: 2021-11-03

## 2021-11-03 DIAGNOSIS — Z95.1 S/P CABG (CORONARY ARTERY BYPASS GRAFT): Primary | ICD-10-CM

## 2021-11-03 PROCEDURE — 93798 PHYS/QHP OP CAR RHAB W/ECG: CPT

## 2021-11-03 NOTE — PATIENT INSTRUCTIONS
The American Heart Association recommends 150 minutes of aerobic exercise per week above and beyond your activities of daily living.  Your target heart rate is .  Your maximum heart rate for exercise is 152 and should not be sustained for long periods of time.  These numbers are based on age and should be evaluated yearly.      Exercise Guidelines   When you are recovering from a heart condition or surgery, it is important to have heart-healthy habits, including exercise routines. Discuss an appropriate exercise program with your heart specialist (cardiologist) and rehabilitation therapist.  The program should meet your specific abilities and needs. Walking, biking, jogging, and swimming are all good aerobic activities and take light to moderate effort. Aerobic activities cause your heart to beat faster. Adding some light resistance training is also good for you. Even simple lifestyle changes can help. These lifestyle changes may include parking farther from the store or taking the stairs instead of the elevator.  At first, you may begin exercising under supervision, such as at a hospital or clinic. Over time, you may begin exercising at home if your health care provider approves.  Types of exercise  Below are types of exercises that are an important part of cardiac rehabilitation. Follow your health care provider's instructions on what types of exercises are good for you and your heart.  Aerobic exercise  Aerobic exercise keeps joints and muscles moving and is important to keep your heart healthy. It involves large muscle groups and improves blood flow (circulation) and endurance. It is also rhythmic and must be done for a longer period of time. Examples of aerobic exercise include:  · Swimming.  · Walking.  · Hiking.  · Jogging.  · Cross-country skiing.  · Dancing.  · Biking.    Static exercise  Static exercise (isometric exercise) uses muscles at high intensities without moving the joints. Some examples of  static exercise include pushing against a heavy couch that does not move, doing a wall sit, or holding a plank position. Static exercise improves strength but also quickly increases blood pressure. Follow these guidelines:  · If you have circulation problems or high blood pressure, talk with your health care provider before starting any static exercise routines. Do not do static exercises if your health care provider tells you not to.  · Do not hold your breath while doing static exercises. Holding your breath during static exercises can raise your blood pressure to a dangerously high level.    Weight-resistance exercise  Weight-resistance exercises are another important part of rehabilitation. These exercises strengthen your muscles by making them work against resistance. Resistance exercises may help you return to activities of daily living sooner and improve your quality of life. They also help reduce cardiac risk factors. Examples of weight-resistance exercise include using:  · Free weights.  · Weight-lifting machines.  · Large, specially designed rubber bands.  You will usually do weight-resistance exercises 2 times a week, with a 2-day rest period between workouts.  Stretching  Stretching before you exercise warms up your muscles and prevents injury. Stretching also improves your flexibility, balance, coordination, and range of motion. Follow these guidelines:  · Stretch before and after exercising.  · Do not force a muscle or joint into a painful angle. Stretching should be a relaxing part of your exercise routine.  · When you feel resistance in your muscle, hold the stretch for a few seconds. Make sure you keep breathing while you hold the stretch.  · Go slowly when doing all stretches.  Setting a pace  · Choose a pace that is comfortable for you.  ? You should be able to talk while exercising. If you are short of breath or unable to speak while you exercise, slow down.  ? If you can sing while exercising,  you are not exercising hard enough.  · Keep track of how hard you are working as you exercise (exertion level). Your rehabilitation therapist can teach you to use a mental scale to measure your level of exertion (perceived exertion). Using a mental scale, you will think about your exertion level and rate it in a range from 6 to 20.  ? A rating of 6 to 10. This means that you are doing very light exercise and are not exerting yourself enough. For a healthy person, this may be walking at a slow pace.  ? A rating of 11 to 15. This is exercise that is somewhat hard. For a healthy exercise session, you should aim for an exertion rate that is within this range.  ? A rating of 16 to 18. This is considered very hard or strenuous. For a healthy person, exercise at this rating may start to feel heavy and difficult.  ? A rating of 19 or 20. This means that you are working extremely hard. For most people, these numbers represent the hardest you have ever worked to exercise.  · Your health care provider or cardiac rehabilitation specialist may also recommend that you wear a heart rate monitor while you exercise. This will help keep track of your heart rate zones and how hard your heart is working.  Frequency  As you are recovering, it is important to start exercising slowly and to gradually work up to your goal. Work with your health care provider to set up an exercise routine that works for you. Generally, cardiac rehabilitation exercise should include:  · 40 minutes of aerobic activity 3-4 days a week.  · Stretching and strength exercises 2-3 days a week.  Contact a health care provider if:  · You have any of the following symptoms while exercising:  ? Pain, pressure, or burning in your chest, jaw, shoulder, or back (angina).  ? Feeling light-headed or dizzy.  ? Irregular or fast heartbeats (palpitations).  ? Shortness of breath.  · You are extremely tired after exercising.  Get help right away if you:  · Have angina that  "lasts for longer than 5 minutes and medicine does not help.  · Have severe chest discomfort, especially if the pain is crushing or pressure-like and spreads to your arms, back, neck, or jaw. Do not wait to see if the pain will go away.  · Have weakness or numbness in one or both legs.  · Are confused.  · Have trouble breathing or shortness of breath.  · Have excessive sweating that is not caused by exercise.  · Have any symptoms of a stroke. \"BE FAST\" is an easy way to remember the main warning signs of a stroke:  ? B - Balance. Signs are dizziness, sudden trouble walking, or loss of balance.  ? E - Eyes. Signs are trouble seeing or a sudden change in vision.  ? F - Face. Signs are sudden weakness or numbness of the face, or the face or eyelid drooping on one side.  ? A - Arms. Signs are weakness or numbness in an arm. This happens suddenly and usually on one side of the body.  ? S - Speech. Signs are sudden trouble speaking, slurred speech, or trouble understanding what people say.  ? T - Time. Time to call emergency services. Write down what time symptoms started.  · Have other signs of a stroke, such as:  ? A sudden, severe headache with no known cause.  ? Nausea or vomiting.  ? Seizure.  These symptoms may represent a serious problem that is an emergency. Do not wait to see if the symptoms will go away. Get medical help right away. Call your local emergency services (911 in the [place] Do not drive yourself to the hospital.  Summary  · When you are recovering from a heart condition, it is important to have heart-healthy habits, including exercise routines.  · At first, you may begin exercising under supervision, such as at a hospital or clinic. Over time, you may begin exercising at home if your health care provider approves.  · Choose a pace that is comfortable for you. You should be able to talk while exercising.  · Aim for 40 minutes of aerobic exercises 3-4 days a week.  · Aim to do stretching and strength " "exercises 2-3 days a week.  This information is not intended to replace advice given to you by your health care provider. Make sure you discuss any questions you have with your health care provider.  Document Revised: 09/09/2020 Document Reviewed: 09/09/2020  [employer] Patient Education © 2021 [employer]    https://www.nhlbi.nih.gov/files/docs/public/heart/dash_brief.pdf\">   DASH Eating Plan  DASH stands for Dietary Approaches to Stop Hypertension. The DASH eating plan is a healthy eating plan that has been shown to:  · Reduce high blood pressure (hypertension).  · Reduce your risk for type 2 diabetes, heart disease, and stroke.  · Help with weight loss.  What are tips for following this plan?  Reading food labels  · Check food labels for the amount of salt (sodium) per serving. Choose foods with less than 5 percent of the Daily Value of sodium. Generally, foods with less than 300 milligrams (mg) of sodium per serving fit into this eating plan.  · To find whole grains, look for the word \"whole\" as the first word in the ingredient list.  Shopping  · Buy products labeled as \"low-sodium\" or \"no salt added.\"  · Buy fresh foods. Avoid canned foods and pre-made or frozen meals.  Cooking  · Avoid adding salt when cooking. Use salt-free seasonings or herbs instead of table salt or sea salt. Check with your health care provider or pharmacist before using salt substitutes.  · Do not de santiago foods. Cook foods using healthy methods such as baking, boiling, grilling, roasting, and broiling instead.  · Cook with heart-healthy oils, such as olive, canola, avocado, soybean, or sunflower oil.  Meal planning    · Eat a balanced diet that includes:  ? 4 or more servings of fruits and 4 or more servings of vegetables each day. Try to fill one-half of your plate with fruits and vegetables.  ? 6-8 servings of whole grains each day.  ? Less than 6 oz (170 g) of lean meat, poultry, or fish each day. A 3-oz (85-g) serving of meat is about the " same size as a deck of cards. One egg equals 1 oz (28 g).  ? 2-3 servings of low-fat dairy each day. One serving is 1 cup (237 mL).  ? 1 serving of nuts, seeds, or beans 5 times each week.  ? 2-3 servings of heart-healthy fats. Healthy fats called omega-3 fatty acids are found in foods such as walnuts, flaxseeds, fortified milks, and eggs. These fats are also found in cold-water fish, such as sardines, salmon, and mackerel.  · Limit how much you eat of:  ? Canned or prepackaged foods.  ? Food that is high in trans fat, such as some fried foods.  ? Food that is high in saturated fat, such as fatty meat.  ? Desserts and other sweets, sugary drinks, and other foods with added sugar.  ? Full-fat dairy products.  · Do not salt foods before eating.  · Do not eat more than 4 egg yolks a week.  · Try to eat at least 2 vegetarian meals a week.  · Eat more home-cooked food and less restaurant, buffet, and fast food.    Lifestyle  · When eating at a restaurant, ask that your food be prepared with less salt or no salt, if possible.  · If you drink alcohol:  ? Limit how much you use to:  § 0-1 drink a day for women who are not pregnant.  § 0-2 drinks a day for men.  ? Be aware of how much alcohol is in your drink. In the [place] one drink equals one 12 oz bottle of beer (355 mL), one 5 oz glass of wine (148 mL), or one 1½ oz glass of hard liquor (44 mL).  General information  · Avoid eating more than 2,300 mg of salt a day. If you have hypertension, you may need to reduce your sodium intake to 1,500 mg a day.  · Work with your health care provider to maintain a healthy body weight or to lose weight. Ask what an ideal weight is for you.  · Get at least 30 minutes of exercise that causes your heart to beat faster (aerobic exercise) most days of the week. Activities may include walking, swimming, or biking.  · Work with your health care provider or dietitian to adjust your eating plan to your individual calorie needs.  What foods  should I eat?  Fruits  All fresh, dried, or frozen fruit. Canned fruit in natural juice (without added sugar).  Vegetables  Fresh or frozen vegetables (raw, steamed, roasted, or grilled). Low-sodium or reduced-sodium tomato and vegetable juice. Low-sodium or reduced-sodium tomato sauce and tomato paste. Low-sodium or reduced-sodium canned vegetables.  Grains  Whole-grain or whole-wheat bread. Whole-grain or whole-wheat pasta. Brown rice. Oatmeal. Quinoa. Bulgur. Whole-grain and low-sodium cereals. Mary bread. Low-fat, low-sodium crackers. Whole-wheat flour tortillas.  Meats and other proteins  Skinless chicken or turkey. Ground chicken or turkey. Pork with fat trimmed off. Fish and seafood. Egg whites. Dried beans, peas, or lentils. Unsalted nuts, nut butters, and seeds. Unsalted canned beans. Lean cuts of beef with fat trimmed off. Low-sodium, lean precooked or cured meat, such as sausages or meat loaves.  Dairy  Low-fat (1%) or fat-free (skim) milk. Reduced-fat, low-fat, or fat-free cheeses. Nonfat, low-sodium ricotta or cottage cheese. Low-fat or nonfat yogurt. Low-fat, low-sodium cheese.  Fats and oils  Soft margarine without trans fats. Vegetable oil. Reduced-fat, low-fat, or light mayonnaise and salad dressings (reduced-sodium). Canola, safflower, olive, avocado, soybean, and sunflower oils. Avocado.  Seasonings and condiments  Herbs. Spices. Seasoning mixes without salt.  Other foods  Unsalted popcorn and pretzels. Fat-free sweets.  The items listed above may not be a complete list of foods and beverages you can eat. Contact a dietitian for more information.  What foods should I avoid?  Fruits  Canned fruit in a light or heavy syrup. Fried fruit. Fruit in cream or butter sauce.  Vegetables  Creamed or fried vegetables. Vegetables in a cheese sauce. Regular canned vegetables (not low-sodium or reduced-sodium). Regular canned tomato sauce and paste (not low-sodium or reduced-sodium). Regular tomato and  vegetable juice (not low-sodium or reduced-sodium). Pickles. Olives.  Grains  Baked goods made with fat, such as croissants, muffins, or some breads. Dry pasta or rice meal packs.  Meats and other proteins  Fatty cuts of meat. Ribs. Fried meat. Campos. Bologna, salami, and other precooked or cured meats, such as sausages or meat loaves. Fat from the back of a pig (fatback). Bratwurst. Salted nuts and seeds. Canned beans with added salt. Canned or smoked fish. Whole eggs or egg yolks. Chicken or turkey with skin.  Dairy  Whole or 2% milk, cream, and half-and-half. Whole or full-fat cream cheese. Whole-fat or sweetened yogurt. Full-fat cheese. Nondairy creamers. Whipped toppings. Processed cheese and cheese spreads.  Fats and oils  Butter. Stick margarine. Lard. Shortening. Ghee. Campos fat. Tropical oils, such as coconut, palm kernel, or palm oil.  Seasonings and condiments  Onion salt, garlic salt, seasoned salt, table salt, and sea salt. Worcestershire sauce. Tartar sauce. Barbecue sauce. Teriyaki sauce. Soy sauce, including reduced-sodium. Steak sauce. Canned and packaged gravies. Fish sauce. Oyster sauce. Cocktail sauce. Store-bought horseradish. Ketchup. Mustard. Meat flavorings and tenderizers. Bouillon cubes. Hot sauces. Pre-made or packaged marinades. Pre-made or packaged taco seasonings. Relishes. Regular salad dressings.  Other foods  Salted popcorn and pretzels.  The items listed above may not be a complete list of foods and beverages you should avoid. Contact a dietitian for more information.  Where to find more information  · National Heart, Lung, and Blood [place]: www.nhlbi.nih.gov  · American Heart Association: www.heart.org  · Academy of Nutrition and Dietetics: www.eatright.org  · National Kidney Foundation: www.kidney.org  Summary  · The DASH eating plan is a healthy eating plan that has been shown to reduce high blood pressure (hypertension). It may also reduce your risk for type 2 diabetes, heart  disease, and stroke.  · When on the DASH eating plan, aim to eat more fresh fruits and vegetables, whole grains, lean proteins, low-fat dairy, and heart-healthy fats.  · With the DASH eating plan, you should limit salt (sodium) intake to 2,300 mg a day. If you have hypertension, you may need to reduce your sodium intake to 1,500 mg a day.  · Work with your health care provider or dietitian to adjust your eating plan to your individual calorie needs.  This information is not intended to replace advice given to you by your health care provider. Make sure you discuss any questions you have with your health care provider.  Document Revised: 11/20/2020 Document Reviewed: 11/20/2020  [employer] Patient Education © 2021 [employer]

## 2021-11-04 ENCOUNTER — LAB (OUTPATIENT)
Dept: LAB | Facility: HOSPITAL | Age: 66
End: 2021-11-04

## 2021-11-04 ENCOUNTER — OFFICE VISIT (OUTPATIENT)
Dept: NEUROLOGY | Facility: CLINIC | Age: 66
End: 2021-11-04

## 2021-11-04 VITALS
SYSTOLIC BLOOD PRESSURE: 130 MMHG | OXYGEN SATURATION: 98 % | HEIGHT: 70 IN | TEMPERATURE: 98.2 F | WEIGHT: 265 LBS | DIASTOLIC BLOOD PRESSURE: 74 MMHG | HEART RATE: 78 BPM | BODY MASS INDEX: 37.94 KG/M2

## 2021-11-04 DIAGNOSIS — G57.10 LATERAL FEMORAL CUTANEOUS NEUROPATHY, UNSPECIFIED LATERALITY: ICD-10-CM

## 2021-11-04 DIAGNOSIS — F10.20 ALCOHOL DEPENDENCE, DAILY USE (HCC): ICD-10-CM

## 2021-11-04 DIAGNOSIS — R29.810 FACIAL WEAKNESS: ICD-10-CM

## 2021-11-04 DIAGNOSIS — R41.3 MEMORY LOSS: ICD-10-CM

## 2021-11-04 DIAGNOSIS — G60.9 IDIOPATHIC PERIPHERAL NEUROPATHY: Primary | ICD-10-CM

## 2021-11-04 DIAGNOSIS — G60.9 IDIOPATHIC PERIPHERAL NEUROPATHY: ICD-10-CM

## 2021-11-04 PROBLEM — R26.89 IMPAIRMENT OF BALANCE: Status: ACTIVE | Noted: 2021-11-04

## 2021-11-04 PROBLEM — Z74.09 IMPAIRED MOBILITY: Status: ACTIVE | Noted: 2021-11-04

## 2021-11-04 PROBLEM — Z91.89 AT RISK FOR VENOUS THROMBOEMBOLISM (VTE): Status: ACTIVE | Noted: 2021-07-10

## 2021-11-04 PROBLEM — R41.89 IMPAIRED COGNITION: Status: ACTIVE | Noted: 2021-11-04

## 2021-11-04 PROBLEM — Z74.1 PERSONAL CARE IMPAIRMENT: Status: ACTIVE | Noted: 2021-11-04

## 2021-11-04 LAB — HBA1C MFR BLD: 6.22 % (ref 4.8–5.6)

## 2021-11-04 PROCEDURE — 86334 IMMUNOFIX E-PHORESIS SERUM: CPT

## 2021-11-04 PROCEDURE — 99215 OFFICE O/P EST HI 40 MIN: CPT | Performed by: NURSE PRACTITIONER

## 2021-11-04 PROCEDURE — 84155 ASSAY OF PROTEIN SERUM: CPT

## 2021-11-04 PROCEDURE — 84165 PROTEIN E-PHORESIS SERUM: CPT

## 2021-11-04 PROCEDURE — 84443 ASSAY THYROID STIM HORMONE: CPT

## 2021-11-04 PROCEDURE — 36415 COLL VENOUS BLD VENIPUNCTURE: CPT

## 2021-11-04 PROCEDURE — 82784 ASSAY IGA/IGD/IGG/IGM EACH: CPT

## 2021-11-04 PROCEDURE — 83036 HEMOGLOBIN GLYCOSYLATED A1C: CPT

## 2021-11-04 PROCEDURE — 84439 ASSAY OF FREE THYROXINE: CPT

## 2021-11-04 PROCEDURE — 82565 ASSAY OF CREATININE: CPT

## 2021-11-04 PROCEDURE — 82607 VITAMIN B-12: CPT

## 2021-11-04 PROCEDURE — 83921 ORGANIC ACID SINGLE QUANT: CPT

## 2021-11-04 RX ORDER — ASPIRIN 81 MG/1
1 TABLET ORAL DAILY
COMMUNITY

## 2021-11-04 NOTE — PROGRESS NOTES
Subjective:     Patient ID: Thien Gray is a 66 y.o. male.    CC:   Chief Complaint   Patient presents with   • Numbness       HPI:   History of Present Illness     This is a pleasant 66-year-old male who presents for initial neurological evaluation for concerns of neuropathy.  He was referred by his primary care provider for further evaluation.  He underwent coronary artery bypass x4 surgery on 6/22/2021.  He tells me during his admission he had delirium tremens secondary to daily alcohol use.  He tells me they gave him a beer every evening while in the hospital.  He tells me that he became very agitated in the ICU and had to be restrained.  He tells me about a week after having the open heart surgery, he noticed some numbness from the center of his thigh to his outer thighs and up to the hip region.  He tells me it is not painful but just a numbness and occasionally tingling sensation.  He also tells me for about 2 years he has noticed tingling in the bilateral first through third toes.  He denies any significant low back pain & he tells me that he gets stiff when sitting too long but otherwise has no radicular symptoms.  He did undergo EMG with [facility] completed on 10/21/2021 by Dr. 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  He does tell me that he has been prediabetic in the past.  He tells me that he is needing a right knee replacement and is hoping to have this done in the near future.  No neuropathy runs in his family.  He does admit to drinking 3 alcoholic beverages every evening.  He tells me sometimes this is hard liquor and sometimes it is beer or wine.  He does have his masters and used to work as a  and now works at [employer].  He is hoping to retire in the next 1 to 2 years.  He tells me he is having no  "significant pain at this time.  He does tell me that in 2016 he was told by cardiovascular specialist that he had some type of circulation issue in the left shoulder and arm region causing him to have some numbness at times.  He occasionally has numbness and tingling of his arm if he lays on this at night but when he rolls over this resolves.  He has no significant neck pain.  He has never had a stroke to his knowledge.  He does live alone.  He was evaluated extensively at [facility] and was told that he \"may have some cognitive issues\".  Continues to remain independent.  He continues to drive without any issues.  Denies any significant weakness.  Has not had recent labs since hospitalization. He is on aspirin and statin therapy.      The following portions of the patient's history were reviewed and updated as appropriate: allergies, current medications, past family history, past medical history, past social history, past surgical history and problem list.    Past Medical History:   Diagnosis Date   • Abnormal ECG 1/20/2020    Get from Dr. Kayser   • Coronary artery disease    • History of heart attack 2004   • History of MI (myocardial infarction)    • Hyperlipidemia    • Myocardial infarction (HCC)    • RBBB 2/10/2020       Past Surgical History:   Procedure Laterality Date   • APPENDECTOMY  1999   • CARDIAC CATHETERIZATION  1/1/2004    They used a catheter to put in my stent.   • CARDIAC CATHETERIZATION N/A 6/22/2021    Procedure: Left Heart Cath;  Surgeon: Mikey Conrad MD;  Location:  [facility] INVASIVE [facility];  Service: Cardiovascular;  Laterality: N/A;   • CORONARY ANGIOPLASTY  2004    I think that's what the bill for my stent said.   • CORONARY ARTERY BYPASS BRING BACK FOR EXPLORATION N/A 6/23/2021    Procedure: BRING BACK FOR EXPLORATION OPEN HEART;  Surgeon: Miguel Angel Acosta MD;  Location:  [facility] OR;  Service: Cardiothoracic;  Laterality: N/A;   • CORONARY ARTERY BYPASS GRAFT N/A 6/22/2021    " Procedure: MEDIAN STERNOTOMY, CORONARY ARTERY BYPASS GRAFTING X 4 WITH LEFT INTERNAL MAMMARY ARTERY GRAFT, ENDOSCOPIC VEIN HARVESTING OF THE RIGHT GREATER SAPHENOUS VEIN, INTRA-AORTIC BALLOON PUMP INSERTION, IVAN PER ANESTHESIA;  Surgeon: Miguel Angel Acosta MD;  Location: [facility] OR;  Service: Cardiothoracic;  Laterality: N/A;   • CORONARY STENT PLACEMENT     • INTERVENTIONAL RADIOLOGY PROCEDURE N/A 2021    Procedure: thrombin injection;  Surgeon: Abdiel Tamez MD;  Location: [facility] CATH INVASIVE LOCATION;  Service: Interventional Radiology;  Laterality: N/A;       Social History     Socioeconomic History   • Marital status:    • Number of children: 1   Tobacco Use   • Smoking status: Former Smoker     Packs/day: 1.50     Years: 45.00     Pack years: 67.50     Types: Cigarettes     Start date: 1971     Quit date: 2017     Years since quittin.3   • Smokeless tobacco: Never Used   Vaping Use   • Vaping Use: Never used   Substance and Sexual Activity   • Alcohol use: Yes     Alcohol/week: 15.0 standard drinks     Types: 15 Standard drinks or equivalent per week     Comment: 3 nights a week   • Drug use: Never   • Sexual activity: Not Currently     Partners: Female     Birth control/protection: Diaphragm, Post-menopausal, Spermicide     Comment:  twice, both . No STVs. Not planning to resume.       Family History   Problem Relation Age of Onset   • Cancer Mother    • Hypertension Mother    • Hyperlipidemia Mother         Takes medication   • Heart attack Mother    • Diabetes Sister    • Heart failure Father         .   • Stroke Father    • Heart attack Maternal Grandfather    • Cancer Paternal Grandmother    • Heart attack Paternal Grandfather         Review of Systems   Constitutional: Negative for chills, fatigue, fever and unexpected weight change.   HENT: Negative for ear pain, hearing loss, nosebleeds, rhinorrhea and sore throat.    Eyes: Negative for photophobia, pain,  "discharge, itching and visual disturbance.   Respiratory: Negative for cough, chest tightness, shortness of breath and wheezing.    Cardiovascular: Negative for chest pain, palpitations and leg swelling.   Gastrointestinal: Negative for abdominal pain, blood in stool, constipation, diarrhea, nausea and vomiting.   Genitourinary: Negative for dysuria, frequency, hematuria and urgency.   Musculoskeletal: Negative for arthralgias, back pain, gait problem, joint swelling, myalgias, neck pain and neck stiffness.   Skin: Negative for rash and wound.   Allergic/Immunologic: Negative for environmental allergies and food allergies.   Neurological: Positive for numbness. Negative for dizziness, tremors, seizures, syncope, speech difficulty, weakness, light-headedness and headaches.        THIGHS     Hematological: Negative for adenopathy. Does not bruise/bleed easily.   Psychiatric/Behavioral: Negative for agitation, confusion, decreased concentration, hallucinations, sleep disturbance and suicidal ideas. The patient is not nervous/anxious.    All other systems reviewed and are negative.       Objective:  /74   Pulse 78   Temp 98.2 °F (36.8 °C)   Ht 177.8 cm (70\")   Wt 120 kg (265 lb)   SpO2 98%   BMI 38.02 kg/m²     Neurologic Exam     Mental Status   Oriented to person, place, and time.   Registration: recalls 3 of 3 objects. Recall at 5 minutes: recalls 3 of 3 objects. Follows 3 step commands.   Attention: normal. Concentration: normal.   Speech: speech is normal   Level of consciousness: alert  Knowledge: good and consistent with education. Able to perform simple calculations.   Able to name object. Able to read. Able to repeat. Able to write. Normal comprehension.     Cranial Nerves     CN II   Visual fields full to confrontation.     CN III, IV, VI   Pupils are equal, round, and reactive to light.  Extraocular motions are normal.     CN V   Facial sensation intact.     CN VII   Right facial weakness: central " (not noticed by patient, right lower facial weakness noted)  Left facial weakness: none    CN VIII   CN VIII normal.     CN IX, X   CN IX normal.   CN X normal.     CN XI   CN XI normal.     CN XII   CN XII normal.     Motor Exam   Muscle bulk: normal  Overall muscle tone: normal    Strength   Strength 5/5 except as noted.   Right strength: Limited ROM right knee    Sensory Exam   Right arm light touch: normal  Left arm light touch: normal  Right leg light touch: decreased from toes  Left leg light touch: decreased from toes  Right arm vibration: normal  Left arm vibration: normal  Right leg vibration: decreased from toes  Left leg vibration: decreased from toes  Proprioception normal.   Right arm pinprick: normal  Left arm pinprick: normal  Right leg pinprick: decreased from toes  Left leg pinprick: decreased from toes    Lateral femoral nerve region decreased sensation bilaterally, bilateral toes decreased sensation     Gait, Coordination, and Reflexes     Gait  Gait: wide-based (antalgic)    Coordination   Romberg: negative  Finger to nose coordination: normal  Heel to shin coordination: normal  Tandem walking coordination: abnormal    Tremor   Resting tremor: absent  Intention tremor: absent  Action tremor: absent    Reflexes   Right brachioradialis: 2+  Left brachioradialis: 2+  Right biceps: 2+  Left biceps: 2+  Right patellar: 1+  Left patellar: 1+  Right achilles: 1+  Left achilles: 1+  Right : 2+  Left : 2+  Right plantar: normal  Left plantar: normal  Right Connor: absent  Left Connor: absent  Right ankle clonus: absent  Left ankle clonus: absent      Physical Exam  Constitutional:       Appearance: Normal appearance.   Eyes:      Extraocular Movements: EOM normal.      Pupils: Pupils are equal, round, and reactive to light.   Cardiovascular:      Rate and Rhythm: Normal rate and regular rhythm.      Pulses:           Dorsalis pedis pulses are 2+ on the right side and 2+ on the left side.         Posterior tibial pulses are 2+ on the right side and 2+ on the left side.      Heart sounds: Normal heart sounds, S1 normal and S2 normal.   Pulmonary:      Effort: Pulmonary effort is normal.      Breath sounds: Normal breath sounds.   Musculoskeletal:      Lumbar back: No swelling, edema, deformity, signs of trauma, lacerations, spasms, tenderness or bony tenderness. Decreased range of motion. Negative right straight leg raise test and negative left straight leg raise test. No scoliosis.   Feet:      Right foot:      Protective Sensation: 10 sites tested. 7 sites sensed.      Skin integrity: Skin integrity normal.      Left foot:      Protective Sensation: 10 sites tested. 7 sites sensed.      Skin integrity: Skin integrity normal.   Neurological:      Mental Status: He is alert and oriented to person, place, and time.      Coordination: Finger-Nose-Finger Test, Heel to Shin Test and Romberg Test normal.      Gait: Tandem walk abnormal.      Deep Tendon Reflexes:      Reflex Scores:       Bicep reflexes are 2+ on the right side and 2+ on the left side.       Brachioradialis reflexes are 2+ on the right side and 2+ on the left side.       Patellar reflexes are 1+ on the right side and 1+ on the left side.       Achilles reflexes are 1+ on the right side and 1+ on the left side.  Psychiatric:         Mood and Affect: Mood and affect normal.         Speech: Speech normal.         Behavior: Behavior normal.         Thought Content: Thought content normal.         Cognition and Memory: Cognition and memory normal.         Judgment: Judgment normal.         Assessment/Plan:       Diagnoses and all orders for this visit:    1. Idiopathic peripheral neuropathy (Primary)  -     Methylmalonic Acid, Serum; Future  -     Vitamin B12; Future  -     GABI + PE; Future  -     Hemoglobin A1c; Future    2. Facial weakness  -     MRI Brain With & Without Contrast; Future  -     Creatinine, Serum; Future    3. Memory loss  -     MRI  Brain With & Without Contrast; Future  -     Creatinine, Serum; Future  -     TSH; Future  -     T4, free; Future    4. Alcohol dependence, daily use (HCC)    5. Lateral femoral cutaneous neuropathy, unspecified laterality  Comments:  BILATERAL         Total time of visit today 45 minutes including review of hospital admission records, PCP notes, EMG/NCVS and obtaining history from the patient, completing physical neurological evaluation, discussion of plan of care and treatment recommendations moving forward along with documentation of today's visit. findings consistent with lateral femoral cutaneous neuropathy bilaterally, also idiopathic peripheral neuropathy.  We will get some screening labs.  I did also explain that alcohol dependence can also lead to neuropathy.  Also recommend MRI of the brain with and without contrast to evaluate for difficulties per patient reporting from evaluation for [facility].  Patient to follow-up.  I also explained that some weight loss may be beneficial and not wearing tight clothing to help with neuropathy in the thighs.  He does continue to function independently.  He is can continue cardiac rehab recommends slowly cutting back on alcohol use over time to avoid significant withdrawal symptoms.  Continue as written pravastatin.  He does not feel that he has significant pain.  I did explain he could try some Voltaren gel over the areas over-the-counter as directed on box instructions.  We will follow-up in 3 to 4 months for reevaluation.  Reviewed medications, potential side effects and signs and symptoms to report. Discussed risk versus benefits of treatment plan with patient and/or family-including medications, labs and radiology that may be ordered. Addressed questions and concerns during visit. Patient and/or family verbalized understanding and agree with plan.    AS THE PROVIDER, I PERSONALLY WORE PPE DURING ENTIRE FACE TO FACE ENCOUNTER IN CLINIC WITH THE PATIENT. PATIENT  ALSO WORE PPE DURING ENTIRE FACE TO FACE ENCOUNTER EXCEPT FOR A MAX OF 30 SECONDS DURING NEUROLOGICAL EVALUATION OF CRANIAL NERVES AND THEN MASK WAS PLACED BACK OVER PATIENT FACE FOR REMAINDER OF VISIT. I WASHED MY HANDS BEFORE AND AFTER VISIT.    During this visit the following were done:  Labs Reviewed [x]    Labs Ordered [x]    Radiology Reports Reviewed []    Radiology Ordered [x]    PCP Records Reviewed [x]    Referring Provider Records Reviewed []    ER Records Reviewed [x]    Hospital Records Reviewed [x]    History Obtained From Family []    Radiology Images Reviewed []    Other Reviewed []    Records Requested []      Guera Baer, GEE  11/4/2021

## 2021-11-05 ENCOUNTER — TREATMENT (OUTPATIENT)
Dept: CARDIAC REHAB | Facility: HOSPITAL | Age: 66
End: 2021-11-05

## 2021-11-05 ENCOUNTER — TELEPHONE (OUTPATIENT)
Dept: NEUROLOGY | Facility: CLINIC | Age: 66
End: 2021-11-05

## 2021-11-05 DIAGNOSIS — Z95.1 S/P CABG (CORONARY ARTERY BYPASS GRAFT): Primary | ICD-10-CM

## 2021-11-05 LAB
CREAT SERPL-MCNC: 0.9 MG/DL (ref 0.76–1.27)
GFR SERPL CREATININE-BSD FRML MDRD: 84 ML/MIN/1.73
T4 FREE SERPL-MCNC: 1.3 NG/DL (ref 0.93–1.7)
TSH SERPL DL<=0.05 MIU/L-ACNC: 1.55 UIU/ML (ref 0.27–4.2)
VIT B12 BLD-MCNC: 456 PG/ML (ref 211–946)

## 2021-11-05 PROCEDURE — 93798 PHYS/QHP OP CAR RHAB W/ECG: CPT

## 2021-11-05 NOTE — TELEPHONE ENCOUNTER
----- Message from GEE Cruz sent at 11/5/2021  2:59 PM EDT -----  Mr. Gray, The test results show that your current treatment is working. Please continue your current medication and plan. We recommend that you repeat the above test(s) in 3-6 months with your primary care. Your thyroid and vitamin b12 levels look excellent. The 3 month diabetes test-hemoglobin [id number] your in the at risk range for diabetes. Recommend primary care following this closely to ensure you do not require medications for this in the future. Recommend watching sugar intake and simple carbohydrate intake such as white breads and pastas. We will follow up as scheduled in our neurology clinic. I forwarded your tests results to pcp. Thanks, GEE Nichols-Please mail results letter to patient.

## 2021-11-08 ENCOUNTER — TREATMENT (OUTPATIENT)
Dept: CARDIAC REHAB | Facility: HOSPITAL | Age: 66
End: 2021-11-08

## 2021-11-08 DIAGNOSIS — Z95.1 S/P CABG (CORONARY ARTERY BYPASS GRAFT): Primary | ICD-10-CM

## 2021-11-08 LAB
ALBUMIN SERPL ELPH-MCNC: 3.7 G/DL (ref 2.9–4.4)
ALBUMIN/GLOB SERPL: 1.3 {RATIO} (ref 0.7–1.7)
ALPHA1 GLOB SERPL ELPH-MCNC: 0.2 G/DL (ref 0–0.4)
ALPHA2 GLOB SERPL ELPH-MCNC: 0.7 G/DL (ref 0.4–1)
B-GLOBULIN SERPL ELPH-MCNC: 1 G/DL (ref 0.7–1.3)
GAMMA GLOB SERPL ELPH-MCNC: 1 G/DL (ref 0.4–1.8)
GLOBULIN SER-MCNC: 2.9 G/DL (ref 2.2–3.9)
IGA SERPL-MCNC: 223 MG/DL (ref 61–437)
IGG SERPL-MCNC: 954 MG/DL (ref 603–1613)
IGM SERPL-MCNC: 146 MG/DL (ref 20–172)
INTERPRETATION SERPL IEP-IMP: NORMAL
LABORATORY COMMENT REPORT: NORMAL
M PROTEIN SERPL ELPH-MCNC: NORMAL G/DL
PROT SERPL-MCNC: 6.6 G/DL (ref 6–8.5)

## 2021-11-08 PROCEDURE — 93798 PHYS/QHP OP CAR RHAB W/ECG: CPT

## 2021-11-08 RX ORDER — PRAVASTATIN SODIUM 40 MG
TABLET ORAL
Qty: 90 TABLET | Refills: 1 | Status: SHIPPED | OUTPATIENT
Start: 2021-11-08 | End: 2022-01-26

## 2021-11-09 NOTE — PATIENT INSTRUCTIONS
The American Heart Association recommends 150 minutes of aerobic exercise per week above and beyond your activities of daily living.  Your target heart rate is .  Your maximum heart rate for exercise is 152 and should not be sustained for long periods of time.  These numbers are based on age and should be evaluated yearly.        Exercise Guidelines During Cardiac Rehabilitation  When you are recovering from a heart condition or surgery, it is important to have heart-healthy habits, including exercise routines. Discuss an appropriate exercise program with your heart specialist (cardiologist) and rehabilitation therapist.  The program should meet your specific abilities and needs. Walking, biking, jogging, and swimming are all good aerobic activities and take light to moderate effort. Aerobic activities cause your heart to beat faster. Adding some light resistance training is also good for you. Even simple lifestyle changes can help. These lifestyle changes may include parking farther from the store or taking the stairs instead of the elevator.  At first, you may begin exercising under supervision, such as at a hospital or clinic. Over time, you may begin exercising at home if your health care provider approves.  Types of exercise  Below are types of exercises that are an important part of cardiac rehabilitation. Follow your health care provider's instructions on what types of exercises are good for you and your heart.  Aerobic exercise  Aerobic exercise keeps joints and muscles moving and is important to keep your heart healthy. It involves large muscle groups and improves blood flow (circulation) and endurance. It is also rhythmic and must be done for a longer period of time. Examples of aerobic exercise include:  · Swimming.  · Walking.  · Hiking.  · Jogging.  · Cross-country skiing.  · Dancing.  · Biking.    Static exercise  Static exercise (isometric exercise) uses muscles at high intensities without  moving the joints. Some examples of static exercise include pushing against a heavy couch that does not move, doing a wall sit, or holding a plank position. Static exercise improves strength but also quickly increases blood pressure. Follow these guidelines:  · If you have circulation problems or high blood pressure, talk with your health care provider before starting any static exercise routines. Do not do static exercises if your health care provider tells you not to.  · Do not hold your breath while doing static exercises. Holding your breath during static exercises can raise your blood pressure to a dangerously high level.    Weight-resistance exercise  Weight-resistance exercises are another important part of rehabilitation. These exercises strengthen your muscles by making them work against resistance. Resistance exercises may help you return to activities of daily living sooner and improve your quality of life. They also help reduce cardiac risk factors. Examples of weight-resistance exercise include using:  · Free weights.  · Weight-lifting machines.  · Large, specially designed rubber bands.  You will usually do weight-resistance exercises 2 times a week, with a 2-day rest period between workouts.  Stretching  Stretching before you exercise warms up your muscles and prevents injury. Stretching also improves your flexibility, balance, coordination, and range of motion. Follow these guidelines:  · Stretch before and after exercising.  · Do not force a muscle or joint into a painful angle. Stretching should be a relaxing part of your exercise routine.  · When you feel resistance in your muscle, hold the stretch for a few seconds. Make sure you keep breathing while you hold the stretch.  · Go slowly when doing all stretches.  Setting a pace  · Choose a pace that is comfortable for you.  ? You should be able to talk while exercising. If you are short of breath or unable to speak while you exercise, slow  down.  ? If you can sing while exercising, you are not exercising hard enough.  · Keep track of how hard you are working as you exercise (exertion level). Your rehabilitation therapist can teach you to use a mental scale to measure your level of exertion (perceived exertion). Using a mental scale, you will think about your exertion level and rate it in a range from 6 to 20.  ? A rating of 6 to 10. This means that you are doing very light exercise and are not exerting yourself enough. For a healthy person, this may be walking at a slow pace.  ? A rating of 11 to 15. This is exercise that is somewhat hard. For a healthy exercise session, you should aim for an exertion rate that is within this range.  ? A rating of 16 to 18. This is considered very hard or strenuous. For a healthy person, exercise at this rating may start to feel heavy and difficult.  ? A rating of 19 or 20. This means that you are working extremely hard. For most people, these numbers represent the hardest you have ever worked to exercise.  · Your health care provider or cardiac rehabilitation specialist may also recommend that you wear a heart rate monitor while you exercise. This will help keep track of your heart rate zones and how hard your heart is working.  Frequency  As you are recovering, it is important to start exercising slowly and to gradually work up to your goal. Work with your health care provider to set up an exercise routine that works for you. Generally, cardiac rehabilitation exercise should include:  · 40 minutes of aerobic activity 3-4 days a week.  · Stretching and strength exercises 2-3 days a week.  Contact a health care provider if:  · You have any of the following symptoms while exercising:  ? Pain, pressure, or burning in your chest, jaw, shoulder, or back (angina).  ? Feeling light-headed or dizzy.  ? Irregular or fast heartbeats (palpitations).  ? Shortness of breath.  · You are extremely tired after exercising.  Get help  "right away if you:  · Have angina that lasts for longer than 5 minutes and medicine does not help.  · Have severe chest discomfort, especially if the pain is crushing or pressure-like and spreads to your arms, back, neck, or jaw. Do not wait to see if the pain will go away.  · Have weakness or numbness in one or both legs.  · Are confused.  · Have trouble breathing or shortness of breath.  · Have excessive sweating that is not caused by exercise.  · Have any symptoms of a stroke. \"BE FAST\" is an easy way to remember the main warning signs of a stroke:  ? B - Balance. Signs are dizziness, sudden trouble walking, or loss of balance.  ? E - Eyes. Signs are trouble seeing or a sudden change in vision.  ? F - Face. Signs are sudden weakness or numbness of the face, or the face or eyelid drooping on one side.  ? A - Arms. Signs are weakness or numbness in an arm. This happens suddenly and usually on one side of the body.  ? S - Speech. Signs are sudden trouble speaking, slurred speech, or trouble understanding what people say.  ? T - Time. Time to call emergency services. Write down what time symptoms started.  · Have other signs of a stroke, such as:  ? A sudden, severe headache with no known cause.  ? Nausea or vomiting.  ? Seizure.  These symptoms may represent a serious problem that is an emergency. Do not wait to see if the symptoms will go away. Get medical help right away. Call your local emergency services (911 in the [place] Do not drive yourself to the hospital.  Summary  · When you are recovering from a heart condition, it is important to have heart-healthy habits, including exercise routines.  · At first, you may begin exercising under supervision, such as at a hospital or clinic. Over time, you may begin exercising at home if your health care provider approves.  · Choose a pace that is comfortable for you. You should be able to talk while exercising.  · Aim for 40 minutes of aerobic exercises 3-4 days a " "week.  · Aim to do stretching and strength exercises 2-3 days a week.  This information is not intended to replace advice given to you by your health care provider. Make sure you discuss any questions you have with your health care provider.  Document Revised: 09/09/2020 Document Reviewed: 09/09/2020  [employer] Patient Education © 2021 [employer]    https://www.nhlbi.nih.gov/files/docs/public/heart/dash_brief.pdf\">   DASH Eating Plan  DASH stands for Dietary Approaches to Stop Hypertension. The DASH eating plan is a healthy eating plan that has been shown to:  · Reduce high blood pressure (hypertension).  · Reduce your risk for type 2 diabetes, heart disease, and stroke.  · Help with weight loss.  What are tips for following this plan?  Reading food labels  · Check food labels for the amount of salt (sodium) per serving. Choose foods with less than 5 percent of the Daily Value of sodium. Generally, foods with less than 300 milligrams (mg) of sodium per serving fit into this eating plan.  · To find whole grains, look for the word \"whole\" as the first word in the ingredient list.  Shopping  · Buy products labeled as \"low-sodium\" or \"no salt added.\"  · Buy fresh foods. Avoid canned foods and pre-made or frozen meals.  Cooking  · Avoid adding salt when cooking. Use salt-free seasonings or herbs instead of table salt or sea salt. Check with your health care provider or pharmacist before using salt substitutes.  · Do not de santiago foods. Cook foods using healthy methods such as baking, boiling, grilling, roasting, and broiling instead.  · Cook with heart-healthy oils, such as olive, canola, avocado, soybean, or sunflower oil.  Meal planning    · Eat a balanced diet that includes:  ? 4 or more servings of fruits and 4 or more servings of vegetables each day. Try to fill one-half of your plate with fruits and vegetables.  ? 6-8 servings of whole grains each day.  ? Less than 6 oz (170 g) of lean meat, poultry, or fish each day. " A 3-oz (85-g) serving of meat is about the same size as a deck of cards. One egg equals 1 oz (28 g).  ? 2-3 servings of low-fat dairy each day. One serving is 1 cup (237 mL).  ? 1 serving of nuts, seeds, or beans 5 times each week.  ? 2-3 servings of heart-healthy fats. Healthy fats called omega-3 fatty acids are found in foods such as walnuts, flaxseeds, fortified milks, and eggs. These fats are also found in cold-water fish, such as sardines, salmon, and mackerel.  · Limit how much you eat of:  ? Canned or prepackaged foods.  ? Food that is high in trans fat, such as some fried foods.  ? Food that is high in saturated fat, such as fatty meat.  ? Desserts and other sweets, sugary drinks, and other foods with added sugar.  ? Full-fat dairy products.  · Do not salt foods before eating.  · Do not eat more than 4 egg yolks a week.  · Try to eat at least 2 vegetarian meals a week.  · Eat more home-cooked food and less restaurant, buffet, and fast food.    Lifestyle  · When eating at a restaurant, ask that your food be prepared with less salt or no salt, if possible.  · If you drink alcohol:  ? Limit how much you use to:  § 0-1 drink a day for women who are not pregnant.  § 0-2 drinks a day for men.  ? Be aware of how much alcohol is in your drink. In the [place] one drink equals one 12 oz bottle of beer (355 mL), one 5 oz glass of wine (148 mL), or one 1½ oz glass of hard liquor (44 mL).  General information  · Avoid eating more than 2,300 mg of salt a day. If you have hypertension, you may need to reduce your sodium intake to 1,500 mg a day.  · Work with your health care provider to maintain a healthy body weight or to lose weight. Ask what an ideal weight is for you.  · Get at least 30 minutes of exercise that causes your heart to beat faster (aerobic exercise) most days of the week. Activities may include walking, swimming, or biking.  · Work with your health care provider or dietitian to adjust your eating plan to  your individual calorie needs.  What foods should I eat?  Fruits  All fresh, dried, or frozen fruit. Canned fruit in natural juice (without added sugar).  Vegetables  Fresh or frozen vegetables (raw, steamed, roasted, or grilled). Low-sodium or reduced-sodium tomato and vegetable juice. Low-sodium or reduced-sodium tomato sauce and tomato paste. Low-sodium or reduced-sodium canned vegetables.  Grains  Whole-grain or whole-wheat bread. Whole-grain or whole-wheat pasta. Brown rice. Oatmeal. Quinoa. Bulgur. Whole-grain and low-sodium cereals. Mary bread. Low-fat, low-sodium crackers. Whole-wheat flour tortillas.  Meats and other proteins  Skinless chicken or turkey. Ground chicken or turkey. Pork with fat trimmed off. Fish and seafood. Egg whites. Dried beans, peas, or lentils. Unsalted nuts, nut butters, and seeds. Unsalted canned beans. Lean cuts of beef with fat trimmed off. Low-sodium, lean precooked or cured meat, such as sausages or meat loaves.  Dairy  Low-fat (1%) or fat-free (skim) milk. Reduced-fat, low-fat, or fat-free cheeses. Nonfat, low-sodium ricotta or cottage cheese. Low-fat or nonfat yogurt. Low-fat, low-sodium cheese.  Fats and oils  Soft margarine without trans fats. Vegetable oil. Reduced-fat, low-fat, or light mayonnaise and salad dressings (reduced-sodium). Canola, safflower, olive, avocado, soybean, and sunflower oils. Avocado.  Seasonings and condiments  Herbs. Spices. Seasoning mixes without salt.  Other foods  Unsalted popcorn and pretzels. Fat-free sweets.  The items listed above may not be a complete list of foods and beverages you can eat. Contact a dietitian for more information.  What foods should I avoid?  Fruits  Canned fruit in a light or heavy syrup. Fried fruit. Fruit in cream or butter sauce.  Vegetables  Creamed or fried vegetables. Vegetables in a cheese sauce. Regular canned vegetables (not low-sodium or reduced-sodium). Regular canned tomato sauce and paste (not low-sodium or  reduced-sodium). Regular tomato and vegetable juice (not low-sodium or reduced-sodium). Pickles. Olives.  Grains  Baked goods made with fat, such as croissants, muffins, or some breads. Dry pasta or rice meal packs.  Meats and other proteins  Fatty cuts of meat. Ribs. Fried meat. Campos. Bologna, salami, and other precooked or cured meats, such as sausages or meat loaves. Fat from the back of a pig (fatback). Bratwurst. Salted nuts and seeds. Canned beans with added salt. Canned or smoked fish. Whole eggs or egg yolks. Chicken or turkey with skin.  Dairy  Whole or 2% milk, cream, and half-and-half. Whole or full-fat cream cheese. Whole-fat or sweetened yogurt. Full-fat cheese. Nondairy creamers. Whipped toppings. Processed cheese and cheese spreads.  Fats and oils  Butter. Stick margarine. Lard. Shortening. Ghee. Campos fat. Tropical oils, such as coconut, palm kernel, or palm oil.  Seasonings and condiments  Onion salt, garlic salt, seasoned salt, table salt, and sea salt. Worcestershire sauce. Tartar sauce. Barbecue sauce. Teriyaki sauce. Soy sauce, including reduced-sodium. Steak sauce. Canned and packaged gravies. Fish sauce. Oyster sauce. Cocktail sauce. Store-bought horseradish. Ketchup. Mustard. Meat flavorings and tenderizers. Bouillon cubes. Hot sauces. Pre-made or packaged marinades. Pre-made or packaged taco seasonings. Relishes. Regular salad dressings.  Other foods  Salted popcorn and pretzels.  The items listed above may not be a complete list of foods and beverages you should avoid. Contact a dietitian for more information.  Where to find more information  · National Heart, Lung, and Blood [place]: www.nhlbi.nih.gov  · American Heart Association: www.heart.org  · Academy of Nutrition and Dietetics: www.eatright.org  · National Kidney Foundation: www.kidney.org  Summary  · The DASH eating plan is a healthy eating plan that has been shown to reduce high blood pressure (hypertension). It may also reduce  your risk for type 2 diabetes, heart disease, and stroke.  · When on the DASH eating plan, aim to eat more fresh fruits and vegetables, whole grains, lean proteins, low-fat dairy, and heart-healthy fats.  · With the DASH eating plan, you should limit salt (sodium) intake to 2,300 mg a day. If you have hypertension, you may need to reduce your sodium intake to 1,500 mg a day.  · Work with your health care provider or dietitian to adjust your eating plan to your individual calorie needs.  This information is not intended to replace advice given to you by your health care provider. Make sure you discuss any questions you have with your health care provider.  Document Revised: 11/20/2020 Document Reviewed: 11/20/2020  [employer]vier Patient Education © 2021 [employer]

## 2021-11-10 ENCOUNTER — TREATMENT (OUTPATIENT)
Dept: CARDIAC REHAB | Facility: HOSPITAL | Age: 66
End: 2021-11-10

## 2021-11-10 DIAGNOSIS — Z95.1 S/P CABG (CORONARY ARTERY BYPASS GRAFT): Primary | ICD-10-CM

## 2021-11-10 PROCEDURE — 93798 PHYS/QHP OP CAR RHAB W/ECG: CPT

## 2021-11-11 ENCOUNTER — PATIENT ROUNDING (BHMG ONLY) (OUTPATIENT)
Dept: NEUROLOGY | Facility: CLINIC | Age: 66
End: 2021-11-11

## 2021-11-11 LAB
Lab: NORMAL
METHYLMALONATE SERPL-SCNC: 314 NMOL/L (ref 0–378)

## 2021-11-11 NOTE — PROGRESS NOTES
November 11, 2021    Hello, may I speak with Thien Gray?    My name is Chichi      I am  with [facility]  [id number]  [facility] [address] [phone].    Before we get started may I verify your date of birth? 1955    I am calling to officially welcome you to our practice and ask about your recent visit. Is this a good time to talk? Yes     Tell me about your visit with us. What things went well?   Very clear with her explanations, easy to understand. Everything went well.        We're always looking for ways to make our patients' experiences even better. Do you have recommendations on ways we may improve? NO     Overall were you satisfied with your first visit to our practice? YES       I appreciate you taking the time to speak with me today. Is there anything else I can do for you? NO       Thank you, and have a great day.

## 2021-11-12 ENCOUNTER — TREATMENT (OUTPATIENT)
Dept: CARDIAC REHAB | Facility: HOSPITAL | Age: 66
End: 2021-11-12

## 2021-11-12 DIAGNOSIS — Z95.1 S/P CABG (CORONARY ARTERY BYPASS GRAFT): Primary | ICD-10-CM

## 2021-11-12 PROCEDURE — 93798 PHYS/QHP OP CAR RHAB W/ECG: CPT

## 2021-11-15 ENCOUNTER — TREATMENT (OUTPATIENT)
Dept: CARDIAC REHAB | Facility: HOSPITAL | Age: 66
End: 2021-11-15

## 2021-11-15 DIAGNOSIS — Z95.1 S/P CABG (CORONARY ARTERY BYPASS GRAFT): Primary | ICD-10-CM

## 2021-11-15 PROCEDURE — 93798 PHYS/QHP OP CAR RHAB W/ECG: CPT

## 2021-11-16 DIAGNOSIS — Z12.11 SCREENING FOR COLON CANCER: Primary | ICD-10-CM

## 2021-11-23 ENCOUNTER — DOCUMENTATION (OUTPATIENT)
Dept: CARDIOVASCULAR ICU | Facility: HOSPITAL | Age: 66
End: 2021-11-23

## 2021-11-23 NOTE — PROGRESS NOTES
Discharge 7/9/2021: Patient discharged on moderate intensity statin due to myalgias on high intensity statin.

## 2021-12-09 ENCOUNTER — HOSPITAL ENCOUNTER (OUTPATIENT)
Dept: MRI IMAGING | Facility: HOSPITAL | Age: 66
Discharge: HOME | End: 2021-12-09
Admitting: NURSE PRACTITIONER

## 2021-12-09 DIAGNOSIS — R41.3 MEMORY LOSS: ICD-10-CM

## 2021-12-09 DIAGNOSIS — R29.810 FACIAL WEAKNESS: ICD-10-CM

## 2021-12-09 DIAGNOSIS — Z12.11 ENCOUNTER FOR SCREENING COLONOSCOPY: Primary | ICD-10-CM

## 2021-12-09 PROCEDURE — 0 GADOBENATE DIMEGLUMINE 529 MG/ML SOLUTION: Performed by: NURSE PRACTITIONER

## 2021-12-09 PROCEDURE — 70553 MRI BRAIN STEM W/O & W/DYE: CPT

## 2021-12-09 PROCEDURE — A9577 INJ MULTIHANCE: HCPCS | Performed by: NURSE PRACTITIONER

## 2021-12-09 RX ADMIN — GADOBENATE DIMEGLUMINE 20 ML: 529 INJECTION, SOLUTION INTRAVENOUS at 15:57

## 2021-12-13 ENCOUNTER — OUTSIDE FACILITY SERVICE (OUTPATIENT)
Dept: GASTROENTEROLOGY | Facility: CLINIC | Age: 66
End: 2021-12-13

## 2021-12-13 ENCOUNTER — HOSPITAL ENCOUNTER (INPATIENT)
Facility: HOSPITAL | Age: 66
LOS: 1 days | Discharge: HOME | End: 2021-12-14
Attending: INTERNAL MEDICINE | Admitting: INTERNAL MEDICINE

## 2021-12-13 ENCOUNTER — HOSPITAL ENCOUNTER (OUTPATIENT)
Dept: GENERAL RADIOLOGY | Facility: HOSPITAL | Age: 66
Discharge: HOME | End: 2021-12-13

## 2021-12-13 ENCOUNTER — TRANSCRIBE ORDERS (OUTPATIENT)
Dept: GENERAL RADIOLOGY | Facility: HOSPITAL | Age: 66
End: 2021-12-13

## 2021-12-13 DIAGNOSIS — Z12.11 SPECIAL SCREENING FOR MALIGNANT NEOPLASMS, COLON: Primary | ICD-10-CM

## 2021-12-13 DIAGNOSIS — Z12.11 SPECIAL SCREENING FOR MALIGNANT NEOPLASMS, COLON: ICD-10-CM

## 2021-12-13 PROBLEM — J69.0 ASPIRATION PNEUMONIA: Status: ACTIVE | Noted: 2021-12-13

## 2021-12-13 LAB
ALBUMIN SERPL-MCNC: 4.5 G/DL (ref 3.5–5.2)
ALBUMIN/GLOB SERPL: 1.7 G/DL
ALP SERPL-CCNC: 80 U/L (ref 39–117)
ALT SERPL W P-5'-P-CCNC: 29 U/L (ref 1–41)
ANION GAP SERPL CALCULATED.3IONS-SCNC: 12 MMOL/L (ref 5–15)
AST SERPL-CCNC: 32 U/L (ref 1–40)
BASOPHILS # BLD AUTO: 0.04 10*3/MM3 (ref 0–0.2)
BASOPHILS NFR BLD AUTO: 0.4 % (ref 0–1.5)
BILIRUB SERPL-MCNC: 0.7 MG/DL (ref 0–1.2)
BUN SERPL-MCNC: 11 MG/DL (ref 8–23)
BUN/CREAT SERPL: 12.8 (ref 7–25)
CALCIUM SPEC-SCNC: 9.1 MG/DL (ref 8.6–10.5)
CHLORIDE SERPL-SCNC: 104 MMOL/L (ref 98–107)
CO2 SERPL-SCNC: 23 MMOL/L (ref 22–29)
CREAT SERPL-MCNC: 0.86 MG/DL (ref 0.76–1.27)
DEPRECATED RDW RBC AUTO: 46.3 FL (ref 37–54)
EOSINOPHIL # BLD AUTO: 0.03 10*3/MM3 (ref 0–0.4)
EOSINOPHIL NFR BLD AUTO: 0.3 % (ref 0.3–6.2)
EOSINOPHIL NFR FLD MANUAL: 5 %
ERYTHROCYTE [DISTWIDTH] IN BLOOD BY AUTOMATED COUNT: 14.1 % (ref 12.3–15.4)
GFR SERPL CREATININE-BSD FRML MDRD: 88 ML/MIN/1.73
GLOBULIN UR ELPH-MCNC: 2.6 GM/DL
GLUCOSE BLDC GLUCOMTR-MCNC: 127 MG/DL (ref 70–130)
GLUCOSE BLDC GLUCOMTR-MCNC: 139 MG/DL (ref 70–130)
GLUCOSE SERPL-MCNC: 125 MG/DL (ref 65–99)
HCT VFR BLD AUTO: 46.9 % (ref 37.5–51)
HGB BLD-MCNC: 16 G/DL (ref 13–17.7)
IMM GRANULOCYTES # BLD AUTO: 0.04 10*3/MM3 (ref 0–0.05)
IMM GRANULOCYTES NFR BLD AUTO: 0.4 % (ref 0–0.5)
LYMPHOCYTES # BLD AUTO: 1.01 10*3/MM3 (ref 0.7–3.1)
LYMPHOCYTES NFR BLD AUTO: 10.6 % (ref 19.6–45.3)
LYMPHOCYTES NFR FLD MANUAL: 21 %
MACROPHAGE FLUID: 8 %
MAGNESIUM SERPL-MCNC: 2.1 MG/DL (ref 1.6–2.4)
MCH RBC QN AUTO: 30.6 PG (ref 26.6–33)
MCHC RBC AUTO-ENTMCNC: 34.1 G/DL (ref 31.5–35.7)
MCV RBC AUTO: 89.7 FL (ref 79–97)
MONOCYTES # BLD AUTO: 0.7 10*3/MM3 (ref 0.1–0.9)
MONOCYTES NFR BLD AUTO: 7.4 % (ref 5–12)
MONOCYTES NFR FLD: 1 %
NEUTROPHILS NFR BLD AUTO: 7.67 10*3/MM3 (ref 1.7–7)
NEUTROPHILS NFR BLD AUTO: 80.9 % (ref 42.7–76)
NEUTROPHILS NFR FLD MANUAL: 64 %
NRBC BLD AUTO-RTO: 0 /100 WBC (ref 0–0.2)
NT-PROBNP SERPL-MCNC: 326.8 PG/ML (ref 0–900)
PHOSPHATE SERPL-MCNC: 4 MG/DL (ref 2.5–4.5)
PLATELET # BLD AUTO: 189 10*3/MM3 (ref 140–450)
PMV BLD AUTO: 10 FL (ref 6–12)
POTASSIUM SERPL-SCNC: 4.2 MMOL/L (ref 3.5–5.2)
PROT SERPL-MCNC: 7.1 G/DL (ref 6–8.5)
RBC # BLD AUTO: 5.23 10*6/MM3 (ref 4.14–5.8)
SARS-COV-2 RDRP RESP QL NAA+PROBE: NORMAL
SODIUM SERPL-SCNC: 139 MMOL/L (ref 136–145)
WBC NRBC COR # BLD: 9.49 10*3/MM3 (ref 3.4–10.8)

## 2021-12-13 PROCEDURE — 96375 TX/PRO/DX INJ NEW DRUG ADDON: CPT

## 2021-12-13 PROCEDURE — 83735 ASSAY OF MAGNESIUM: CPT | Performed by: INTERNAL MEDICINE

## 2021-12-13 PROCEDURE — G0378 HOSPITAL OBSERVATION PER HR: HCPCS

## 2021-12-13 PROCEDURE — 84100 ASSAY OF PHOSPHORUS: CPT | Performed by: INTERNAL MEDICINE

## 2021-12-13 PROCEDURE — 83880 ASSAY OF NATRIURETIC PEPTIDE: CPT | Performed by: INTERNAL MEDICINE

## 2021-12-13 PROCEDURE — 99231 SBSQ HOSP IP/OBS SF/LOW 25: CPT | Performed by: INTERNAL MEDICINE

## 2021-12-13 PROCEDURE — 25010000002 PIPERACILLIN SOD-TAZOBACTAM PER 1 G: Performed by: INTERNAL MEDICINE

## 2021-12-13 PROCEDURE — 45378 DIAGNOSTIC COLONOSCOPY: CPT | Performed by: INTERNAL MEDICINE

## 2021-12-13 PROCEDURE — 94799 UNLISTED PULMONARY SVC/PX: CPT

## 2021-12-13 PROCEDURE — 71045 X-RAY EXAM CHEST 1 VIEW: CPT

## 2021-12-13 PROCEDURE — 94002 VENT MGMT INPAT INIT DAY: CPT

## 2021-12-13 PROCEDURE — 80053 COMPREHEN METABOLIC PANEL: CPT | Performed by: INTERNAL MEDICINE

## 2021-12-13 PROCEDURE — 96365 THER/PROPH/DIAG IV INF INIT: CPT

## 2021-12-13 PROCEDURE — 25010000002 FENTANYL CITRATE (PF) 50 MCG/ML SOLUTION

## 2021-12-13 PROCEDURE — 25010000002 PROPOFOL 10 MG/ML EMULSION: Performed by: NURSE PRACTITIONER

## 2021-12-13 PROCEDURE — 0B9B8ZZ DRAINAGE OF LEFT LOWER LOBE BRONCHUS, VIA NATURAL OR ARTIFICIAL OPENING ENDOSCOPIC: ICD-10-PCS | Performed by: INTERNAL MEDICINE

## 2021-12-13 PROCEDURE — 85025 COMPLETE CBC W/AUTO DIFF WBC: CPT | Performed by: INTERNAL MEDICINE

## 2021-12-13 PROCEDURE — 99291 CRITICAL CARE FIRST HOUR: CPT | Performed by: INTERNAL MEDICINE

## 2021-12-13 PROCEDURE — 25010000002 FUROSEMIDE PER 20 MG: Performed by: INTERNAL MEDICINE

## 2021-12-13 PROCEDURE — 87070 CULTURE OTHR SPECIMN AEROBIC: CPT | Performed by: INTERNAL MEDICINE

## 2021-12-13 PROCEDURE — 96366 THER/PROPH/DIAG IV INF ADDON: CPT

## 2021-12-13 PROCEDURE — 94640 AIRWAY INHALATION TREATMENT: CPT

## 2021-12-13 PROCEDURE — 31645 BRNCHSC W/THER ASPIR 1ST: CPT | Performed by: INTERNAL MEDICINE

## 2021-12-13 PROCEDURE — 87635 SARS-COV-2 COVID-19 AMP PRB: CPT | Performed by: INTERNAL MEDICINE

## 2021-12-13 PROCEDURE — 82962 GLUCOSE BLOOD TEST: CPT

## 2021-12-13 PROCEDURE — 87205 SMEAR GRAM STAIN: CPT | Performed by: INTERNAL MEDICINE

## 2021-12-13 PROCEDURE — 89051 BODY FLUID CELL COUNT: CPT | Performed by: INTERNAL MEDICINE

## 2021-12-13 PROCEDURE — 94761 N-INVAS EAR/PLS OXIMETRY MLT: CPT

## 2021-12-13 RX ORDER — METOPROLOL TARTRATE 50 MG/1
50 TABLET, FILM COATED ORAL EVERY 12 HOURS SCHEDULED
Status: DISCONTINUED | OUTPATIENT
Start: 2021-12-13 | End: 2021-12-14 | Stop reason: HOSPADM

## 2021-12-13 RX ORDER — IPRATROPIUM BROMIDE AND ALBUTEROL SULFATE 2.5; .5 MG/3ML; MG/3ML
3 SOLUTION RESPIRATORY (INHALATION)
Status: DISCONTINUED | OUTPATIENT
Start: 2021-12-13 | End: 2021-12-14 | Stop reason: HOSPADM

## 2021-12-13 RX ORDER — FUROSEMIDE 10 MG/ML
40 INJECTION INTRAMUSCULAR; INTRAVENOUS ONCE
Status: COMPLETED | OUTPATIENT
Start: 2021-12-13 | End: 2021-12-13

## 2021-12-13 RX ORDER — ASPIRIN 81 MG/1
81 TABLET, CHEWABLE ORAL DAILY
Status: DISCONTINUED | OUTPATIENT
Start: 2021-12-13 | End: 2021-12-14 | Stop reason: HOSPADM

## 2021-12-13 RX ORDER — FENTANYL CITRATE 50 UG/ML
INJECTION, SOLUTION INTRAMUSCULAR; INTRAVENOUS
Status: COMPLETED
Start: 2021-12-13 | End: 2021-12-13

## 2021-12-13 RX ORDER — ACETAMINOPHEN 160 MG/5ML
650 SOLUTION ORAL EVERY 4 HOURS PRN
Status: DISCONTINUED | OUTPATIENT
Start: 2021-12-13 | End: 2021-12-14 | Stop reason: HOSPADM

## 2021-12-13 RX ORDER — ONDANSETRON 4 MG/1
4 TABLET, FILM COATED ORAL EVERY 6 HOURS PRN
Status: DISCONTINUED | OUTPATIENT
Start: 2021-12-13 | End: 2021-12-14 | Stop reason: HOSPADM

## 2021-12-13 RX ORDER — ASPIRIN 81 MG/1
81 TABLET, CHEWABLE ORAL DAILY
Status: DISCONTINUED | OUTPATIENT
Start: 2021-12-13 | End: 2021-12-13

## 2021-12-13 RX ORDER — LOSARTAN POTASSIUM 25 MG/1
25 TABLET ORAL
Status: DISCONTINUED | OUTPATIENT
Start: 2021-12-13 | End: 2021-12-13

## 2021-12-13 RX ORDER — MAGNESIUM SULFATE HEPTAHYDRATE 40 MG/ML
2 INJECTION, SOLUTION INTRAVENOUS AS NEEDED
Status: DISCONTINUED | OUTPATIENT
Start: 2021-12-13 | End: 2021-12-14 | Stop reason: HOSPADM

## 2021-12-13 RX ORDER — PRAVASTATIN SODIUM 40 MG
40 TABLET ORAL NIGHTLY
Status: DISCONTINUED | OUTPATIENT
Start: 2021-12-13 | End: 2021-12-14 | Stop reason: HOSPADM

## 2021-12-13 RX ORDER — PRAVASTATIN SODIUM 40 MG
40 TABLET ORAL DAILY
Status: DISCONTINUED | OUTPATIENT
Start: 2021-12-13 | End: 2021-12-13

## 2021-12-13 RX ORDER — TAMSULOSIN HYDROCHLORIDE 0.4 MG/1
0.8 CAPSULE ORAL DAILY
Status: DISCONTINUED | OUTPATIENT
Start: 2021-12-13 | End: 2021-12-13

## 2021-12-13 RX ORDER — ACETAMINOPHEN 650 MG/1
650 SUPPOSITORY RECTAL EVERY 4 HOURS PRN
Status: DISCONTINUED | OUTPATIENT
Start: 2021-12-13 | End: 2021-12-14 | Stop reason: HOSPADM

## 2021-12-13 RX ORDER — SODIUM CHLORIDE 0.9 % (FLUSH) 0.9 %
10 SYRINGE (ML) INJECTION AS NEEDED
Status: DISCONTINUED | OUTPATIENT
Start: 2021-12-13 | End: 2021-12-14 | Stop reason: HOSPADM

## 2021-12-13 RX ORDER — SODIUM CHLORIDE 0.9 % (FLUSH) 0.9 %
10 SYRINGE (ML) INJECTION EVERY 12 HOURS SCHEDULED
Status: DISCONTINUED | OUTPATIENT
Start: 2021-12-13 | End: 2021-12-14 | Stop reason: HOSPADM

## 2021-12-13 RX ORDER — MAGNESIUM SULFATE HEPTAHYDRATE 40 MG/ML
4 INJECTION, SOLUTION INTRAVENOUS AS NEEDED
Status: DISCONTINUED | OUTPATIENT
Start: 2021-12-13 | End: 2021-12-14 | Stop reason: HOSPADM

## 2021-12-13 RX ORDER — ACETAMINOPHEN 325 MG/1
650 TABLET ORAL EVERY 4 HOURS PRN
Status: DISCONTINUED | OUTPATIENT
Start: 2021-12-13 | End: 2021-12-14 | Stop reason: HOSPADM

## 2021-12-13 RX ORDER — POTASSIUM CHLORIDE 1.5 G/1.77G
40 POWDER, FOR SOLUTION ORAL AS NEEDED
Status: DISCONTINUED | OUTPATIENT
Start: 2021-12-13 | End: 2021-12-14 | Stop reason: HOSPADM

## 2021-12-13 RX ORDER — TAMSULOSIN HYDROCHLORIDE 0.4 MG/1
0.8 CAPSULE ORAL DAILY
Status: DISCONTINUED | OUTPATIENT
Start: 2021-12-13 | End: 2021-12-14 | Stop reason: HOSPADM

## 2021-12-13 RX ORDER — MELOXICAM 7.5 MG/1
7.5 TABLET ORAL
Status: DISCONTINUED | OUTPATIENT
Start: 2021-12-14 | End: 2021-12-14 | Stop reason: HOSPADM

## 2021-12-13 RX ORDER — LOSARTAN POTASSIUM 25 MG/1
25 TABLET ORAL
Status: DISCONTINUED | OUTPATIENT
Start: 2021-12-13 | End: 2021-12-14 | Stop reason: HOSPADM

## 2021-12-13 RX ORDER — ONDANSETRON 2 MG/ML
4 INJECTION INTRAMUSCULAR; INTRAVENOUS EVERY 6 HOURS PRN
Status: DISCONTINUED | OUTPATIENT
Start: 2021-12-13 | End: 2021-12-14 | Stop reason: HOSPADM

## 2021-12-13 RX ORDER — POTASSIUM CHLORIDE 750 MG/1
40 CAPSULE, EXTENDED RELEASE ORAL AS NEEDED
Status: DISCONTINUED | OUTPATIENT
Start: 2021-12-13 | End: 2021-12-14 | Stop reason: HOSPADM

## 2021-12-13 RX ORDER — POTASSIUM CHLORIDE 7.45 MG/ML
10 INJECTION INTRAVENOUS
Status: DISCONTINUED | OUTPATIENT
Start: 2021-12-13 | End: 2021-12-14 | Stop reason: HOSPADM

## 2021-12-13 RX ADMIN — FUROSEMIDE 40 MG: 10 INJECTION, SOLUTION INTRAMUSCULAR; INTRAVENOUS at 16:53

## 2021-12-13 RX ADMIN — TAMSULOSIN HYDROCHLORIDE 0.8 MG: 0.4 CAPSULE ORAL at 20:34

## 2021-12-13 RX ADMIN — METOPROLOL TARTRATE 50 MG: 50 TABLET, FILM COATED ORAL at 20:35

## 2021-12-13 RX ADMIN — ASPIRIN 81 MG: 81 TABLET, CHEWABLE ORAL at 20:34

## 2021-12-13 RX ADMIN — PRAVASTATIN SODIUM 40 MG: 40 TABLET ORAL at 20:35

## 2021-12-13 RX ADMIN — FENTANYL CITRATE 100 MCG: 50 INJECTION, SOLUTION INTRAMUSCULAR; INTRAVENOUS at 13:53

## 2021-12-13 RX ADMIN — TAZOBACTAM SODIUM AND PIPERACILLIN SODIUM 3.38 G: 375; 3 INJECTION, SOLUTION INTRAVENOUS at 23:00

## 2021-12-13 RX ADMIN — LOSARTAN POTASSIUM 25 MG: 25 TABLET, FILM COATED ORAL at 20:35

## 2021-12-13 RX ADMIN — PROPOFOL 5 MCG/KG/MIN: 10 INJECTION, EMULSION INTRAVENOUS at 13:15

## 2021-12-13 RX ADMIN — TAZOBACTAM SODIUM AND PIPERACILLIN SODIUM 3.38 G: 375; 3 INJECTION, SOLUTION INTRAVENOUS at 16:54

## 2021-12-13 RX ADMIN — SODIUM CHLORIDE, PRESERVATIVE FREE 10 ML: 5 INJECTION INTRAVENOUS at 22:00

## 2021-12-13 RX ADMIN — IPRATROPIUM BROMIDE AND ALBUTEROL SULFATE 3 ML: 2.5; .5 SOLUTION RESPIRATORY (INHALATION) at 18:47

## 2021-12-13 NOTE — OP NOTE
Pre-Op Diagnosis:    Aspiration Pneumonia    Post-Op Diagnosis:    Same    Procedure:    1) Bronchoscopy w/ Bronch Wash    2) Therapeutic Suctioning of Secretions    Indication:    Witnessed aspiration    Description:    Patient previously intubated with 7-0 ETT by anesthesia.  Patient sedated with propofol gtt.  Disposable therapeutic bronchoscope introduced through left nare since this size scope would not allow for ventilation through ETT during bronchoscopy.  Scope advanced to larynx and advanced around ETT into trachea.  Airways examined to segmental level.  There were thick bilious secretions in LLL that were therapeutically suctioned and sent for bronch wash.  Airways otherwise anatomically normal to segmental level.  Procedure terminated, no immediate complications.

## 2021-12-13 NOTE — PROGRESS NOTES
Mr. Gray is awake and has no complaints.      Physical Exam:  Gen- no acute distress, breathing comfortably                               Chest- clear to auscultation                               Cardiac- s1s2                               Abd- soft, bowel sounds present, no tenderness    Imp: Aspiration pneumonia- patient is status post bronchoscopy. He was extubated and is doing well on nasal cannula oxygen.    Plan: Antibiotics and observation overnight           Discussed the event during the colonoscopy and the findings to the extent of the exam. The procedure will be rescheduled with a different bowel preparation at later date. The patient is agreeable.

## 2021-12-13 NOTE — PROGRESS NOTES
Please notify Mr. Gray that the MRI of his brain appears normal for his age and medical history.  We will follow-up as scheduled in clinic.  Thanks, GEE Nichols.    ASHVIN PCP

## 2021-12-13 NOTE — H&P
INTENSIVIST / PULMONARY INITIAL VISIT (CONSULT / H&P) NOTE     Hospital:  LOS: 0 days   Mr. Thien Gray, 66 y.o. male is followed for:   Aspiration       History of Present Illness   67 y/o WM w/ h/o CAD w/ prior CABG in June 2021, stent, HLD, BPH, Alcohol use (suspected alcohol withdrawal during admission for CABG), prior tobacco abusue ~75 py - quit 2017, who came to outpatient surgery today for an elective colonoscopy.  Apparently at some point during the procedure patient had a significant witnessed aspiration event and was intubated.  He was transferred to ICU post procedure.   CXR suspicious for Left sided atelectasis.  Bronch performed with suctioning of thick / mucoid bilious secretions from distal trachea and LMSB.    Past Medical History:   Diagnosis Date   • Abnormal ECG 1/20/2020    Get from Dr. Kayser   • Coronary artery disease    • History of heart attack 2004   • History of MI (myocardial infarction)    • Hyperlipidemia    • Myocardial infarction (HCC)    • RBBB 2/10/2020     Past Surgical History:   Procedure Laterality Date   • APPENDECTOMY  1999   • CARDIAC CATHETERIZATION  1/1/2004    They used a catheter to put in my stent.   • CARDIAC CATHETERIZATION N/A 6/22/2021    Procedure: Left Heart Cath;  Surgeon: Mikey Conrad MD;  Location:  [facility];  Service: Cardiovascular;  Laterality: N/A;   • CORONARY ANGIOPLASTY  2004    I think that's what the bill for my stent said.   • CORONARY ARTERY BYPASS BRING BACK FOR EXPLORATION N/A 6/23/2021    Procedure: BRING BACK FOR EXPLORATION OPEN HEART;  Surgeon: Miguel Angel Acosta MD;  Location: [facility] OR;  Service: Cardiothoracic;  Laterality: N/A;   • CORONARY ARTERY BYPASS GRAFT N/A 6/22/2021    Procedure: MEDIAN STERNOTOMY, CORONARY ARTERY BYPASS GRAFTING X 4 WITH LEFT INTERNAL MAMMARY ARTERY GRAFT, ENDOSCOPIC VEIN HARVESTING OF THE RIGHT GREATER SAPHENOUS VEIN, INTRA-AORTIC BALLOON PUMP INSERTION, IVAN PER ANESTHESIA;   Surgeon: Miguel Angel Acosta MD;  Location:  [facility] OR;  Service: Cardiothoracic;  Laterality: N/A;   • CORONARY STENT PLACEMENT     • INTERVENTIONAL RADIOLOGY PROCEDURE N/A 2021    Procedure: thrombin injection;  Surgeon: Abdiel Tamez MD;  Location:  [facility];  Service: Interventional Radiology;  Laterality: N/A;     Family History   Problem Relation Age of Onset   • Cancer Mother    • Hypertension Mother    • Hyperlipidemia Mother         Takes medication   • Heart attack Mother    • Diabetes Sister    • Heart failure Father         .   • Stroke Father    • Heart attack Maternal Grandfather    • Cancer Paternal Grandmother    • Heart attack Paternal Grandfather      Social History     Socioeconomic History   • Marital status:    • Number of children: 1   Tobacco Use   • Smoking status: Former Smoker     Packs/day: 1.50     Years: 45.00     Pack years: 67.50     Types: Cigarettes     Start date: 1971     Quit date: 2017     Years since quittin.4   • Smokeless tobacco: Never Used   Vaping Use   • Vaping Use: Never used   Substance and Sexual Activity   • Alcohol use: Yes     Alcohol/week: 15.0 standard drinks     Types: 15 Standard drinks or equivalent per week     Comment: 3 nights a week   • Drug use: Never   • Sexual activity: Not Currently     Partners: Female     Birth control/protection: Diaphragm, Post-menopausal, Spermicide     Comment:  twice, both . No STVs. Not planning to resume.     Allergies   Allergen Reactions   • Bactrim [Sulfamethoxazole-Trimethoprim] Other (See Comments)     Causes weakness, fatigue   • Plavix [Clopidogrel Bisulfate] Hives   • Augmentin [Amoxicillin-Pot Clavulanate] Confusion     No current facility-administered medications on file prior to encounter.     Current Outpatient Medications on File Prior to Encounter   Medication Sig Dispense Refill   • Aspirin 81 MG capsule Take  by mouth.     • losartan (COZAAR) 25 MG  tablet Take 1 tablet by mouth Daily.     • meloxicam (MOBIC) 7.5 MG tablet TAKE 1 TABLET DAILY WITH   FOOD 90 tablet 2   • metoprolol tartrate (LOPRESSOR) 50 MG tablet Take 1 tablet by mouth Every 12 (Twelve) Hours. 60 tablet 6   • pravastatin (PRAVACHOL) 20 MG tablet Take 20 mg by mouth Daily. (Patient not taking: Reported on 10/28/2021)     • pravastatin (PRAVACHOL) 40 MG tablet TAKE 1 TABLET DAILY 90 tablet 1   • Sodium Sulfate-Mag Sulfate-KCl 6528-419-828 MG tablet Take 1 kit by mouth Take As Directed. 24 tablet 0   • tamsulosin (FLOMAX) 0.4 MG capsule 24 hr capsule TAKE 2 CAPSULES DAILY 180 capsule 1   • triamcinolone (KENALOG) 0.025 % ointment Apply  topically to the appropriate area as directed 2 Times a Day for 10 days. Avoid applying to face, axilla, and groin (Patient not taking: Reported on 10/28/2021) 15 g 1       ROS:  Per HPI, all other systems were reviewed and were negative        OBJECTIVE     Vital Sign Min/Max for last 24 hours  Temp  Min: 97.3 °F (36.3 °C)  Max: 97.3 °F (36.3 °C)   BP  Min: 93/54  Max: 138/74   Pulse  Min: 64  Max: 88   No data recorded   SpO2  Min: 96 %  Max: 100 %   No data recorded     Telemetry:              General Appearance:  Intubated, in no acute distress  Eyes:  No scleral icterus or pallor, pupils normal  Ears, Nose, Mouth, Throat:  Atraumatic, oral endotracheal tube  Neck:  Trachea midline, thyroid normal  Respiratory:  Clear to auscultation bilaterally, normal effort  Cardiovascular:  Regular rate and rhythm, no murmurs, no peripheral edema  Gastrointestinal:  Soft, nontender, nondistended, no hepatosplenomegaly  Skin:  Normal temperature, no rash  Psychiatric:  Intubated, sedated, no agitation  Neuro:  No new focal neurologic deficits observed      SpO2: 100 % SpO2  Min: 96 %  Max: 100 %   Device:      Flow Rate:   No data recorded     Mechanical Ventilator Settings:            Resp Rate (Set): (S) 8 (changed by md) Resp Rate (Observed) Vent: 15   FiO2 (%): 40 %     PEEP/CPAP (cm H2O): 5 cm H20      Minute Ventilation (L/min) (Obs): 9.47 L/min    I:E Ratio (Obs): 1:1.30     Intake/Ouptut 24 hrs (7:00AM - 6:59 AM)  Intake & Output (last 3 days)       12/10 0701 12/11 0700 12/11 0701  12/12 0700 12/12 0701  12/13 0700 12/13 0701  12/14 0700            Stool Unmeasured Occurrence    1 x            Lines, Drains & Airways     Active LDAs     Name Placement date Placement time Site Days    Peripheral IV 12/13/21 Posterior; Right Hand 12/13/21  --  Hand  less than 1    Peripheral IV 12/13/21 1330 Anterior; Left Forearm 12/13/21  1330  Forearm  less than 1                Hematology:        Invalid input(s): NEUTOPHILPCT,  EOSPCT  Electrolytes, Magnesium and Phosphorus:      Renal:      CrCl cannot be calculated (Patient's most recent lab result is older than the maximum 30 days allowed.).  CrCl cannot be calculated (Patient's most recent lab result is older than the maximum 30 days allowed.).  Hepatic:      Arterial Blood Gases:              Lab Results   Component Value Date    LACTATE 0.7 10/18/2017       MRI Brain With & Without Contrast    Result Date: 12/10/2021  Narrative: EXAMINATION: MRI BRAIN WWO CONTRAST - 12/09/2021  INDICATION: R29.810-Facial weakness; R41.3-Other amnesia. Memory difficulties.  TECHNIQUE: Multiple imaging is obtained of the brain with and without the administration of gadolinium contrast.  COMPARISON: None  FINDINGS: Mild atrophy of the brain. No hemorrhage or hydrocephalus. Some increased signal seen scattered throughout the periventricular and subcortical white matter suggesting chronic small vessel ischemic change. No evidence of restricted diffusion to suggest evidence of an acute ischemic insult. Flow voids are preserved in the major intracanal vessels. Pituitary and sella are unremarkable. Craniovertebral junction is preserved. No abnormal contrast enhancement is identified. The visualized vascularity is unremarkable in appearance.       Impression: Atrophy and chronic changes seen within the brain with no acute intracranial abnormality. No abnormal contrast enhancement is identified.  DICTATED:   12/09/2021 EDITED/lfs:   12/09/2021    This report was finalized on 12/10/2021 4:44 PM by Dr. Rekha Chaney MD.      XR Chest 1 View    Result Date: 12/13/2021  Narrative: EXAMINATION: XR CHEST 1 VW- 12/13/2021  INDICATION: Z12.11-Encounter for screening for malignant neoplasm of colon  COMPARISON: 07/29/2021  FINDINGS: Portable chest reveals endotracheal tube in satisfactory position above the jacek. Nasogastric tube identified tip below the level of the diaphragm. There is increased pulmonary vascularity seen with volume overload suggested in the right lung. Heart is enlarged. Degenerative changes seen within the spine.        Impression: Increased pulmonary vascularity suggesting volume overload with heart enlarged. Endotracheal tube in satisfactory position above the jaeck with nasogastric tube tip below the level of the diaphragm.  D:  12/13/2021 E:  12/13/2021         Relevant imaging studies and labs from 12/13/21 were reviewed and interpreted by me    Medications (drips):  phenylephrine  propofol, Last Rate: 15 mcg/kg/min (12/13/21 1415)             Assessment/Plan   IMPRESSION / PLAN     Inpatient Problem List:  66 y.o.male:  There are no active hospital problems to display for this patient.       Impression & Plan:  65 y/o WM w/ h/o CAD w/ prior CABG in June 2021, stent, HLD, BPH, Alcohol use (suspected alcohol withdrawal during admission for CABG), prior tobacco abusue ~75 py - quit 2017, who came to outpatient surgery today for an elective colonoscopy.  Apparently at some point during the procedure patient had a significant witnessed aspiration event and was intubated.  He was transferred to ICU post procedure.   CXR suspicious for Left sided atelectasis.  Bronch performed with suctioning of thick / mucoid bilious secretions from  distal trachea and LMSB.    Sedation  Alcohol Abuse - prior withdrawal  Propofol gtt.  Monitor for ETOH withdrawal.  Sedation vacation post bronch.    Acute Hypoxemic Respiratory Failure  Aspiration Pneumonia  75 py smoking - quit 2017  Lung protective settings.  Scheduled nebs.  Diuresis.  Empiric Pip-tazo.  F/u bronch wash culture.  Try to extubate post bronchoscopy.    -stat labs    - f/u colonoscopy results    -resume appropriate home meds    DVT / PUD prophylaxis    Nutrition  Currently NPO, advance diet as tolerated post extubation    Critical Care time spent in direct patient care: 30 minutes (excluding procedure time, if applicable) including high complexity decision making to assess, manipulate, and support vital organ system failure in this individual who has impairment of one or more vital organ systems such that there is a high probability of imminent or life threatening deterioration in the patient’s condition.       Darwin Garcia MD  Intensive Care Medicine  12/13/21 15:53 EST

## 2021-12-14 ENCOUNTER — TELEPHONE (OUTPATIENT)
Dept: NEUROLOGY | Facility: CLINIC | Age: 66
End: 2021-12-14

## 2021-12-14 ENCOUNTER — APPOINTMENT (OUTPATIENT)
Dept: GENERAL RADIOLOGY | Facility: HOSPITAL | Age: 66
End: 2021-12-14

## 2021-12-14 ENCOUNTER — READMISSION MANAGEMENT (OUTPATIENT)
Dept: CALL CENTER | Facility: HOSPITAL | Age: 66
End: 2021-12-14

## 2021-12-14 VITALS
RESPIRATION RATE: 16 BRPM | SYSTOLIC BLOOD PRESSURE: 99 MMHG | OXYGEN SATURATION: 93 % | HEART RATE: 87 BPM | BODY MASS INDEX: 34.64 KG/M2 | WEIGHT: 241.4 LBS | TEMPERATURE: 98.3 F | DIASTOLIC BLOOD PRESSURE: 76 MMHG

## 2021-12-14 LAB
ALBUMIN SERPL-MCNC: 4.2 G/DL (ref 3.5–5.2)
ALBUMIN/GLOB SERPL: 1.8 G/DL
ALP SERPL-CCNC: 71 U/L (ref 39–117)
ALT SERPL W P-5'-P-CCNC: 22 U/L (ref 1–41)
ANION GAP SERPL CALCULATED.3IONS-SCNC: 12 MMOL/L (ref 5–15)
AST SERPL-CCNC: 25 U/L (ref 1–40)
BASOPHILS # BLD AUTO: 0.03 10*3/MM3 (ref 0–0.2)
BASOPHILS NFR BLD AUTO: 0.4 % (ref 0–1.5)
BILIRUB SERPL-MCNC: 1.1 MG/DL (ref 0–1.2)
BUN SERPL-MCNC: 12 MG/DL (ref 8–23)
BUN/CREAT SERPL: 14.1 (ref 7–25)
CALCIUM SPEC-SCNC: 8.6 MG/DL (ref 8.6–10.5)
CHLORIDE SERPL-SCNC: 102 MMOL/L (ref 98–107)
CO2 SERPL-SCNC: 24 MMOL/L (ref 22–29)
CREAT SERPL-MCNC: 0.85 MG/DL (ref 0.76–1.27)
DEPRECATED RDW RBC AUTO: 45.5 FL (ref 37–54)
EOSINOPHIL # BLD AUTO: 0.05 10*3/MM3 (ref 0–0.4)
EOSINOPHIL NFR BLD AUTO: 0.6 % (ref 0.3–6.2)
ERYTHROCYTE [DISTWIDTH] IN BLOOD BY AUTOMATED COUNT: 14.1 % (ref 12.3–15.4)
GFR SERPL CREATININE-BSD FRML MDRD: 90 ML/MIN/1.73
GLOBULIN UR ELPH-MCNC: 2.3 GM/DL
GLUCOSE BLDC GLUCOMTR-MCNC: 138 MG/DL (ref 70–130)
GLUCOSE SERPL-MCNC: 121 MG/DL (ref 65–99)
HCT VFR BLD AUTO: 42.7 % (ref 37.5–51)
HGB BLD-MCNC: 14.5 G/DL (ref 13–17.7)
IMM GRANULOCYTES # BLD AUTO: 0.02 10*3/MM3 (ref 0–0.05)
IMM GRANULOCYTES NFR BLD AUTO: 0.2 % (ref 0–0.5)
LYMPHOCYTES # BLD AUTO: 1.09 10*3/MM3 (ref 0.7–3.1)
LYMPHOCYTES NFR BLD AUTO: 13.2 % (ref 19.6–45.3)
MAGNESIUM SERPL-MCNC: 2.1 MG/DL (ref 1.6–2.4)
MCH RBC QN AUTO: 30.3 PG (ref 26.6–33)
MCHC RBC AUTO-ENTMCNC: 34 G/DL (ref 31.5–35.7)
MCV RBC AUTO: 89.3 FL (ref 79–97)
MONOCYTES # BLD AUTO: 0.87 10*3/MM3 (ref 0.1–0.9)
MONOCYTES NFR BLD AUTO: 10.6 % (ref 5–12)
NEUTROPHILS NFR BLD AUTO: 6.18 10*3/MM3 (ref 1.7–7)
NEUTROPHILS NFR BLD AUTO: 75 % (ref 42.7–76)
NRBC BLD AUTO-RTO: 0 /100 WBC (ref 0–0.2)
PHOSPHATE SERPL-MCNC: 4.6 MG/DL (ref 2.5–4.5)
PLATELET # BLD AUTO: 199 10*3/MM3 (ref 140–450)
PMV BLD AUTO: 10.3 FL (ref 6–12)
POTASSIUM SERPL-SCNC: 3.5 MMOL/L (ref 3.5–5.2)
PROT SERPL-MCNC: 6.5 G/DL (ref 6–8.5)
RBC # BLD AUTO: 4.78 10*6/MM3 (ref 4.14–5.8)
SODIUM SERPL-SCNC: 138 MMOL/L (ref 136–145)
WBC NRBC COR # BLD: 8.24 10*3/MM3 (ref 3.4–10.8)

## 2021-12-14 PROCEDURE — 94761 N-INVAS EAR/PLS OXIMETRY MLT: CPT

## 2021-12-14 PROCEDURE — 80053 COMPREHEN METABOLIC PANEL: CPT | Performed by: INTERNAL MEDICINE

## 2021-12-14 PROCEDURE — 84100 ASSAY OF PHOSPHORUS: CPT | Performed by: INTERNAL MEDICINE

## 2021-12-14 PROCEDURE — 83735 ASSAY OF MAGNESIUM: CPT | Performed by: INTERNAL MEDICINE

## 2021-12-14 PROCEDURE — 94799 UNLISTED PULMONARY SVC/PX: CPT

## 2021-12-14 PROCEDURE — 82962 GLUCOSE BLOOD TEST: CPT

## 2021-12-14 PROCEDURE — 99238 HOSP IP/OBS DSCHRG MGMT 30/<: CPT | Performed by: INTERNAL MEDICINE

## 2021-12-14 PROCEDURE — 25010000002 PIPERACILLIN SOD-TAZOBACTAM PER 1 G: Performed by: INTERNAL MEDICINE

## 2021-12-14 PROCEDURE — G0378 HOSPITAL OBSERVATION PER HR: HCPCS

## 2021-12-14 PROCEDURE — 85025 COMPLETE CBC W/AUTO DIFF WBC: CPT | Performed by: INTERNAL MEDICINE

## 2021-12-14 PROCEDURE — 71045 X-RAY EXAM CHEST 1 VIEW: CPT

## 2021-12-14 RX ORDER — AMOXICILLIN AND CLAVULANATE POTASSIUM 500; 125 MG/1; MG/1
1 TABLET, FILM COATED ORAL 2 TIMES DAILY
Qty: 10 TABLET | Refills: 0 | Status: SHIPPED | OUTPATIENT
Start: 2021-12-14 | End: 2021-12-14 | Stop reason: SDUPTHER

## 2021-12-14 RX ORDER — AMOXICILLIN AND CLAVULANATE POTASSIUM 500; 125 MG/1; MG/1
1 TABLET, FILM COATED ORAL 2 TIMES DAILY
Qty: 10 TABLET | Refills: 0 | Status: SHIPPED | OUTPATIENT
Start: 2021-12-14 | End: 2021-12-19

## 2021-12-14 RX ADMIN — METOPROLOL TARTRATE 50 MG: 50 TABLET, FILM COATED ORAL at 08:33

## 2021-12-14 RX ADMIN — ASPIRIN 81 MG: 81 TABLET, CHEWABLE ORAL at 08:33

## 2021-12-14 RX ADMIN — TAMSULOSIN HYDROCHLORIDE 0.8 MG: 0.4 CAPSULE ORAL at 08:33

## 2021-12-14 RX ADMIN — TAZOBACTAM SODIUM AND PIPERACILLIN SODIUM 3.38 G: 375; 3 INJECTION, SOLUTION INTRAVENOUS at 06:00

## 2021-12-14 RX ADMIN — IPRATROPIUM BROMIDE AND ALBUTEROL SULFATE 3 ML: 2.5; .5 SOLUTION RESPIRATORY (INHALATION) at 11:02

## 2021-12-14 RX ADMIN — LOSARTAN POTASSIUM 25 MG: 25 TABLET, FILM COATED ORAL at 08:33

## 2021-12-14 RX ADMIN — MELOXICAM 7.5 MG: 7.5 TABLET ORAL at 08:33

## 2021-12-14 RX ADMIN — IPRATROPIUM BROMIDE AND ALBUTEROL SULFATE 3 ML: 2.5; .5 SOLUTION RESPIRATORY (INHALATION) at 07:47

## 2021-12-14 RX ADMIN — SODIUM CHLORIDE, PRESERVATIVE FREE 10 ML: 5 INJECTION INTRAVENOUS at 08:33

## 2021-12-14 NOTE — PAYOR COMM NOTE
"Glo Thompson RN   Phone [phone]  Fax [phone]    Patt Linares (66 y.o. Male)             Date of Birth Social Security Number Address Home Phone MRN    1955  [address]  [facility] [address] [phone] [id number]    Rastafari Marital Status             Presbyterian        Admission Date Admission Type Admitting Provider Attending Provider Department, Room/Bed    12/13/21 Urgent Darwin Garcia MD Thompson, Patton Weddington, MD [facility] 2B ICU, N223/1    Discharge Date Discharge Disposition Discharge Destination           Home or Self Care              Attending Provider: Darwin Garcia MD    Allergies: Bactrim [Sulfamethoxazole-trimethoprim], Plavix [Clopidogrel Bisulfate], Augmentin [Amoxicillin-pot Clavulanate]    Isolation: None   Infection: None   Code Status: Prior   Advance Care Planning Activity    Ht: 177.8 cm (70\")   Wt: 110 kg (241 lb 6.5 oz)    Admission Cmt: None   Principal Problem: Aspiration pneumonia (HCC) [J69.0]                 Active Insurance as of 12/13/2021     Primary Coverage     Payor Plan Insurance Group Employer/Plan Group    ANTHEM BLUE CROSS ANTHEM BLUE CROSS BLUE SHIELD PPO [id number]     Payor Plan Address Payor Plan Phone Number Payor Plan Fax Number Effective Dates    [address] [phone]  3/4/2019 - None Entered    [facility] [id number]       Subscriber Name Subscriber Birth Date Member ID       PATT LINARES 1955 [id number]           Secondary Coverage     Payor Plan Insurance Group Employer/Plan Group    AETNA MEDICARE REPLACEMENT AETNA MEDICARE REPLACEMENT [id number]     Payor Plan Address Payor Plan Phone Number Payor Plan Fax Number Effective Dates    [address] [phone]  7/1/2018 - None Entered    [facility] [id number]       Subscriber Name Subscriber Birth Date Member ID       PATT LINARES 1955 [id number]                 Emergency " Contacts      (Rel.) Home Phone Work Phone Mobile Phone    Mikie Gray (Brother) [phone] -- [phone]    ORadha jackson (Sister) -- -- [phone]               History & Physical      Darwin Garcia MD at 12/13/21 1553          INTENSIVIST / PULMONARY INITIAL VISIT (CONSULT / H&P) NOTE     Hospital:  LOS: 0 days   Mr. Thien Gray, 66 y.o. male is followed for:   Aspiration       History of Present Illness   65 y/o WM w/ h/o CAD w/ prior CABG in June 2021, stent, HLD, BPH, Alcohol use (suspected alcohol withdrawal during admission for CABG), prior tobacco abusue ~75 py - quit 2017, who came to outpatient surgery today for an elective colonoscopy.  Apparently at some point during the procedure patient had a significant witnessed aspiration event and was intubated.  He was transferred to ICU post procedure.   CXR suspicious for Left sided atelectasis.  Bronch performed with suctioning of thick / mucoid bilious secretions from distal trachea and LMSB.    Past Medical History:   Diagnosis Date   • Abnormal ECG 1/20/2020    Get from Dr. Kayser   • Coronary artery disease    • History of heart attack 2004   • History of MI (myocardial infarction)    • Hyperlipidemia    • Myocardial infarction (HCC)    • RBBB 2/10/2020     Past Surgical History:   Procedure Laterality Date   • APPENDECTOMY  1999   • CARDIAC CATHETERIZATION  1/1/2004    They used a catheter to put in my stent.   • CARDIAC CATHETERIZATION N/A 6/22/2021    Procedure: Left Heart Cath;  Surgeon: Mikey Conrad MD;  Location:  [facility] CATH INVASIVE LOCATION;  Service: Cardiovascular;  Laterality: N/A;   • CORONARY ANGIOPLASTY  2004    I think that's what the bill for my stent said.   • CORONARY ARTERY BYPASS BRING BACK FOR EXPLORATION N/A 6/23/2021    Procedure: BRING BACK FOR EXPLORATION OPEN HEART;  Surgeon: Miguel Angel Acosta MD;  Location:  [facility] OR;  Service: Cardiothoracic;  Laterality: N/A;   • CORONARY ARTERY  BYPASS GRAFT N/A 2021    Procedure: MEDIAN STERNOTOMY, CORONARY ARTERY BYPASS GRAFTING X 4 WITH LEFT INTERNAL MAMMARY ARTERY GRAFT, ENDOSCOPIC VEIN HARVESTING OF THE RIGHT GREATER SAPHENOUS VEIN, INTRA-AORTIC BALLOON PUMP INSERTION, IVAN PER ANESTHESIA;  Surgeon: Miguel Angel Acosta MD;  Location: [facility] OR;  Service: Cardiothoracic;  Laterality: N/A;   • CORONARY STENT PLACEMENT     • INTERVENTIONAL RADIOLOGY PROCEDURE N/A 2021    Procedure: thrombin injection;  Surgeon: Abdiel Tamez MD;  Location: [facility] CATH INVASIVE LOCATION;  Service: Interventional Radiology;  Laterality: N/A;     Family History   Problem Relation Age of Onset   • Cancer Mother    • Hypertension Mother    • Hyperlipidemia Mother         Takes medication   • Heart attack Mother    • Diabetes Sister    • Heart failure Father         .   • Stroke Father    • Heart attack Maternal Grandfather    • Cancer Paternal Grandmother    • Heart attack Paternal Grandfather      Social History     Socioeconomic History   • Marital status:    • Number of children: 1   Tobacco Use   • Smoking status: Former Smoker     Packs/day: 1.50     Years: 45.00     Pack years: 67.50     Types: Cigarettes     Start date: 1971     Quit date: 2017     Years since quittin.4   • Smokeless tobacco: Never Used   Vaping Use   • Vaping Use: Never used   Substance and Sexual Activity   • Alcohol use: Yes     Alcohol/week: 15.0 standard drinks     Types: 15 Standard drinks or equivalent per week     Comment: 3 nights a week   • Drug use: Never   • Sexual activity: Not Currently     Partners: Female     Birth control/protection: Diaphragm, Post-menopausal, Spermicide     Comment:  twice, both . No STVs. Not planning to resume.     Allergies   Allergen Reactions   • Bactrim [Sulfamethoxazole-Trimethoprim] Other (See Comments)     Causes weakness, fatigue   • Plavix [Clopidogrel Bisulfate] Hives   • Augmentin [Amoxicillin-Pot Clavulanate]  Confusion     No current facility-administered medications on file prior to encounter.     Current Outpatient Medications on File Prior to Encounter   Medication Sig Dispense Refill   • Aspirin 81 MG capsule Take  by mouth.     • losartan (COZAAR) 25 MG tablet Take 1 tablet by mouth Daily.     • meloxicam (MOBIC) 7.5 MG tablet TAKE 1 TABLET DAILY WITH   FOOD 90 tablet 2   • metoprolol tartrate (LOPRESSOR) 50 MG tablet Take 1 tablet by mouth Every 12 (Twelve) Hours. 60 tablet 6   • pravastatin (PRAVACHOL) 20 MG tablet Take 20 mg by mouth Daily. (Patient not taking: Reported on 10/28/2021)     • pravastatin (PRAVACHOL) 40 MG tablet TAKE 1 TABLET DAILY 90 tablet 1   • Sodium Sulfate-Mag Sulfate-KCl [id number] MG tablet Take 1 kit by mouth Take As Directed. 24 tablet 0   • tamsulosin (FLOMAX) 0.4 MG capsule 24 hr capsule TAKE 2 CAPSULES DAILY 180 capsule 1   • triamcinolone (KENALOG) 0.025 % ointment Apply  topically to the appropriate area as directed 2 Times a Day for 10 days. Avoid applying to face, axilla, and groin (Patient not taking: Reported on 10/28/2021) 15 g 1       ROS:  Per HPI, all other systems were reviewed and were negative        OBJECTIVE     Vital Sign Min/Max for last 24 hours  Temp  Min: 97.3 °F (36.3 °C)  Max: 97.3 °F (36.3 °C)   BP  Min: 93/54  Max: 138/74   Pulse  Min: 64  Max: 88   No data recorded   SpO2  Min: 96 %  Max: 100 %   No data recorded     Telemetry:              General Appearance:  Intubated, in no acute distress  Eyes:  No scleral icterus or pallor, pupils normal  Ears, Nose, Mouth, Throat:  Atraumatic, oral endotracheal tube  Neck:  Trachea midline, thyroid normal  Respiratory:  Clear to auscultation bilaterally, normal effort  Cardiovascular:  Regular rate and rhythm, no murmurs, no peripheral edema  Gastrointestinal:  Soft, nontender, nondistended, no hepatosplenomegaly  Skin:  Normal temperature, no rash  Psychiatric:  Intubated, sedated, no agitation  Neuro:  No new focal  neurologic deficits observed      SpO2: 100 % SpO2  Min: 96 %  Max: 100 %   Device:      Flow Rate:   No data recorded     Mechanical Ventilator Settings:            Resp Rate (Set): (S) 8 (changed by md) Resp Rate (Observed) Vent: 15   FiO2 (%): 40 %    PEEP/CPAP (cm H2O): 5 cm H20      Minute Ventilation (L/min) (Obs): 9.47 L/min    I:E Ratio (Obs): 1:1.30     Intake/Ouptut 24 hrs (7:00AM - 6:59 AM)  Intake & Output (last 3 days)       12/10 0701 12/11 0700 12/11 0701 12/12 0700 12/12 0701 12/13 0700 12/13 0701 12/14 0700            Stool Unmeasured Occurrence    1 x            Lines, Drains & Airways     Active LDAs     Name Placement date Placement time Site Days    Peripheral IV 12/13/21 Posterior; Right Hand 12/13/21  --  Hand  less than 1    Peripheral IV 12/13/21 1330 Anterior; Left Forearm 12/13/21  1330  Forearm  less than 1                Hematology:        Invalid input(s): NEUTOPHILPCT,  EOSPCT  Electrolytes, Magnesium and Phosphorus:      Renal:      CrCl cannot be calculated (Patient's most recent lab result is older than the maximum 30 days allowed.).  CrCl cannot be calculated (Patient's most recent lab result is older than the maximum 30 days allowed.).  Hepatic:      Arterial Blood Gases:              Lab Results   Component Value Date    LACTATE 0.7 10/18/2017       MRI Brain With & Without Contrast    Result Date: 12/10/2021  Narrative: EXAMINATION: MRI BRAIN WWO CONTRAST - 12/09/2021  INDICATION: R29.810-Facial weakness; R41.3-Other amnesia. Memory difficulties.  TECHNIQUE: Multiple imaging is obtained of the brain with and without the administration of gadolinium contrast.  COMPARISON: None  FINDINGS: Mild atrophy of the brain. No hemorrhage or hydrocephalus. Some increased signal seen scattered throughout the periventricular and subcortical white matter suggesting chronic small vessel ischemic change. No evidence of restricted diffusion to suggest evidence of an acute ischemic insult.  Flow voids are preserved in the major intracanal vessels. Pituitary and sella are unremarkable. Craniovertebral junction is preserved. No abnormal contrast enhancement is identified. The visualized vascularity is unremarkable in appearance.      Impression: Atrophy and chronic changes seen within the brain with no acute intracranial abnormality. No abnormal contrast enhancement is identified.  DICTATED:   12/09/2021 EDITED/lfs:   12/09/2021    This report was finalized on 12/10/2021 4:44 PM by Dr. Rekha Chaney MD.      XR Chest 1 View    Result Date: 12/13/2021  Narrative: EXAMINATION: XR CHEST 1 VW- 12/13/2021  INDICATION: Z12.11-Encounter for screening for malignant neoplasm of colon  COMPARISON: 07/29/2021  FINDINGS: Portable chest reveals endotracheal tube in satisfactory position above the jacek. Nasogastric tube identified tip below the level of the diaphragm. There is increased pulmonary vascularity seen with volume overload suggested in the right lung. Heart is enlarged. Degenerative changes seen within the spine.        Impression: Increased pulmonary vascularity suggesting volume overload with heart enlarged. Endotracheal tube in satisfactory position above the jacek with nasogastric tube tip below the level of the diaphragm.  D:  12/13/2021 E:  12/13/2021         Relevant imaging studies and labs from 12/13/21 were reviewed and interpreted by me    Medications (drips):  phenylephrine  propofol, Last Rate: 15 mcg/kg/min (12/13/21 1415)             Assessment/Plan   IMPRESSION / PLAN     Inpatient Problem List:  66 y.o.male:  There are no active hospital problems to display for this patient.       Impression & Plan:  67 y/o WM w/ h/o CAD w/ prior CABG in June 2021, stent, HLD, BPH, Alcohol use (suspected alcohol withdrawal during admission for CABG), prior tobacco abusue ~75 py - quit 2017, who came to outpatient surgery today for an elective colonoscopy.  Apparently at some point during the  procedure patient had a significant witnessed aspiration event and was intubated.  He was transferred to ICU post procedure.   CXR suspicious for Left sided atelectasis.  Bronch performed with suctioning of thick / mucoid bilious secretions from distal trachea and LMSB.    Sedation  Alcohol Abuse - prior withdrawal  Propofol gtt.  Monitor for ETOH withdrawal.  Sedation vacation post bronch.    Acute Hypoxemic Respiratory Failure  Aspiration Pneumonia  75 py smoking - quit 2017  Lung protective settings.  Scheduled nebs.  Diuresis.  Empiric Pip-tazo.  F/u bronch wash culture.  Try to extubate post bronchoscopy.    -stat labs    - f/u colonoscopy results    -resume appropriate home meds    DVT / PUD prophylaxis    Nutrition  Currently NPO, advance diet as tolerated post extubation    Critical Care time spent in direct patient care: 30 minutes (excluding procedure time, if applicable) including high complexity decision making to assess, manipulate, and support vital organ system failure in this individual who has impairment of one or more vital organ systems such that there is a high probability of imminent or life threatening deterioration in the patient’s condition.       Darwin Garcia MD  Intensive Care Medicine  12/13/21 15:53 EST         Electronically signed by Darwin Garcia MD at 12/13/21 1612       Vital Signs (last day)     Date/Time Temp Temp src Pulse Resp BP Patient Position SpO2    12/14/21 1102 -- -- 87 16 -- -- --    12/14/21 0900 -- -- 87 16 99/76 -- 93    12/14/21 0800 98.3 (36.8) Oral 83 16 120/90 -- 93    12/14/21 0747 -- -- 82 18 -- -- 96    12/14/21 0700 -- -- 82 18 111/75 -- 97    12/14/21 0600 -- -- 93 18 100/61 Lying 93    12/14/21 0500 -- -- 76 -- 107/61 -- 95    12/14/21 0400 -- -- 89 -- 83/57 -- 96    12/14/21 0300 -- -- 74 -- 89/38 -- 94    12/14/21 0200 -- -- 83 18 88/55 Lying 96    12/14/21 0100 -- -- 87 -- 94/67 -- 87    12/14/21 0000 -- -- 75 20 96/70 Lying 96     12/13/21 2200 -- -- 71 18 109/68 Lying 99    12/13/21 2100 -- -- 96 -- 113/73 -- 98    12/13/21 2000 99.9 (37.7) Axillary 98 20 122/73 Lying 97    12/13/21 1900 -- -- 98 -- 119/75 -- 96    12/13/21 1846 -- -- 97 18 -- -- 97    12/13/21 1800 -- -- 92 -- 151/82 Lying 99    12/13/21 1700 -- -- 95 -- 113/77 -- 99    12/13/21 1645 -- -- 82 -- 120/65 -- 100    12/13/21 1630 -- -- 82 -- 128/67 -- 100    12/13/21 1625 -- -- 79 20 -- -- 100    12/13/21 1615 -- -- 79 -- 125/60 -- 98    12/13/21 1600 97.7 (36.5) Axillary 89 -- 139/124 Lying 100    12/13/21 1545 -- -- 70 -- 118/59 -- 100    12/13/21 1530 -- -- 66 -- 106/57 -- 100    12/13/21 1515 -- -- 69 -- 104/55 -- 100    12/13/21 1500 -- -- 67 -- 100/54 -- 100    12/13/21 1445 -- -- 65 -- 97/54 -- 99    12/13/21 1430 -- -- 65 -- 97/57 -- 98    12/13/21 1415 -- -- 64 -- 95/51 -- 97    12/13/21 1402 -- -- -- -- 103/63 -- --    12/13/21 1400 -- -- 73 -- 93/54 Lying 96    12/13/21 1358 -- -- -- -- 96/51 -- --    12/13/21 1356 -- -- 73 -- 114/53 -- 100    12/13/21 1354 -- -- 81 -- 138/74 -- 100    12/13/21 1352 -- -- 80 -- 127/80 -- 99    12/13/21 1350 -- -- 83 -- 127/68 -- 100    12/13/21 1348 -- -- 71 -- 110/76 -- 100    12/13/21 1345 -- -- 78 -- 110/64 -- 100    12/13/21 1341 -- -- 80 -- 130/68 -- 100    12/13/21 1330 -- -- 88 -- 135/84 -- 100    12/13/21 1315 -- -- 81 -- 126/72 -- 99    12/13/21 1301 97.3 (36.3) Axillary 79 -- 106/69 Lying 100          Oxygen Therapy (last day)     Date/Time SpO2 Device (Oxygen Therapy) Flow (L/min) Oxygen Concentration (%) ETCO2 (mmHg)    12/14/21 1102 -- room air -- -- --    12/14/21 1100 -- humidified; nasal cannula 1 -- --    12/14/21 1000 -- humidified; nasal cannula 1 -- --    12/14/21 0900 93 humidified; nasal cannula 1 -- --    12/14/21 0800 93 humidified; nasal cannula 1 -- --    12/14/21 0747 96 humidified; nasal cannula 3 -- --    12/14/21 0700 97 humidified; nasal cannula 1 -- --    12/14/21 0600 93 humidified; nasal cannula 3 --  --    12/14/21 0500 95 -- -- -- --    12/14/21 0400 96 humidified; nasal cannula 3 -- --    12/14/21 0300 94 -- -- -- --    12/14/21 0200 96 humidified; nasal cannula 3 -- --    12/14/21 0100 87 -- -- -- --    12/14/21 0000 96 humidified; nasal cannula 3 -- --    12/13/21 2200 99 humidified; nasal cannula 3 -- --    12/13/21 2100 98 -- -- -- --    12/13/21 2000 97 humidified; nasal cannula 3 100 --    12/13/21 1900 96 -- -- -- --    12/13/21 1846 97 nasal cannula; humidified 3 -- --    12/13/21 1800 99 humidified; nasal cannula 3 -- --    12/13/21 1700 99 -- -- -- --    12/13/21 1645 100 -- -- -- --    12/13/21 1630 100 -- -- -- --    12/13/21 1625 100 nasal cannula 3 -- 24    12/13/21 1615 98 -- -- -- 27    12/13/21 1600 100 ventilator -- -- 27    12/13/21 1545 100 -- -- -- 33    12/13/21 1530 100 -- -- -- 34    12/13/21 1515 100 -- -- -- 33    12/13/21 1509 -- -- -- -- 33    12/13/21 1500 100 -- -- -- 33    12/13/21 1445 99 -- -- -- 22    12/13/21 1430 98 -- -- -- 36    12/13/21 1415 97 -- -- -- 37    12/13/21 1400 96 ventilator -- 100 34    12/13/21 1359 -- -- -- -- 39    12/13/21 1356 100 -- -- -- 38    12/13/21 1354 100 -- -- -- 36    12/13/21 1352 99 -- -- -- 28    12/13/21 1350 100 -- -- -- 32    12/13/21 1348 100 -- -- -- 30    12/13/21 1345 100 -- -- -- 29    12/13/21 1341 100 -- -- -- 30    12/13/21 1330 100 -- -- -- 33    12/13/21 1315 99 -- -- -- 31    12/13/21 1311 -- -- -- -- 28    12/13/21 1301 100 -- -- -- --            Facility-Administered Medications as of 12/14/2021   Medication Dose Route Frequency Provider Last Rate Last Admin   • acetaminophen (TYLENOL) tablet 650 mg  650 mg Oral Q4H PRN Darwin Garcia MD        Or   • acetaminophen (TYLENOL) 160 MG/5ML solution 650 mg  650 mg Oral Q4H PRN Darwin Garcia MD        Or   • acetaminophen (TYLENOL) suppository 650 mg  650 mg Rectal Q4H PRN Darwin Garcia MD       • aspirin chewable tablet 81 mg  81 mg Oral  Daily Darwin Garcia MD   81 mg at 12/14/21 0833   • [COMPLETED] fentaNYL citrate (PF) (SUBLIMAZE) 50 mcg/mL injection  - ADS Override Pull        100 mcg at 12/13/21 1353   • [COMPLETED] furosemide (LASIX) injection 40 mg  40 mg Intravenous Once Darwin Garcia MD   40 mg at 12/13/21 1653   • ipratropium-albuterol (DUO-NEB) nebulizer solution 3 mL  3 mL Nebulization 4x Daily - RT Darwin Garcia MD   3 mL at 12/14/21 1102   • losartan (COZAAR) tablet 25 mg  25 mg Oral Q24H Darwin Garcia MD   25 mg at 12/14/21 0833   • Magnesium Sulfate 2 gram Bolus, followed by 8 gram infusion (total Mg dose 10 grams)- Mg less than or equal to 1mg/dL  2 g Intravenous PRN Darwin Garcia MD        Or   • Magnesium Sulfate 2 gram / 50mL Infusion (GIVE X 3 BAGS TO EQUAL 6GM TOTAL DOSE) - Mg 1.1 - 1.5 mg/dl  2 g Intravenous PRN Darwin Garcia MD        Or   • Magnesium Sulfate 4 gram infusion- Mg 1.6-1.9 mg/dL  4 g Intravenous PRN Darwin Garcia MD       • meloxicam (MOBIC) tablet 7.5 mg  7.5 mg Oral Daily With Breakfast Darwin Garcia MD   7.5 mg at 12/14/21 0833   • metoprolol tartrate (LOPRESSOR) tablet 50 mg  50 mg Oral Q12H Darwin Garcia MD   50 mg at 12/14/21 0833   • ondansetron (ZOFRAN) tablet 4 mg  4 mg Oral Q6H PRN Darwin Garcia MD        Or   • ondansetron (ZOFRAN) injection 4 mg  4 mg Intravenous Q6H PRN Darwin Garcia MD       • Pharmacy Consult - Pharmacy to dose zosyn   Does not apply Continuous PRN Darwin Garcia MD       • phenylephrine (JORY-SYNEPHRINE) 50 mg in sodium chloride 0.9 % 250 mL infusion  0.5-3 mcg/kg/min Intravenous Titrated Natalia De La Rosa APRN       • [COMPLETED] piperacillin-tazobactam (ZOSYN) 3.375 g in iso-osmotic dextrose 50 ml (premix)  3.375 g Intravenous Once Darwin Garcia MD   3.375 g at 12/13/21 5558   •  piperacillin-tazobactam (ZOSYN) 3.375 g in iso-osmotic dextrose 50 ml (premix)  3.375 g Intravenous Q8H Darwin Garcia MD   3.375 g at 12/14/21 0600   • potassium & sodium phosphates (PHOS-NAK) 280-160-250 MG packet - for Phosphorus less than 1.25 mg/dL  2 packet Oral Q6H PRN Darwin Garcia MD        Or   • potassium & sodium phosphates (PHOS-NAK) 280-160-250 MG packet - for Phosphorus 1.25 - 2.5 mg/dL  2 packet Oral Q6H PRN Darwin Garcia MD       • potassium chloride (MICRO-K) CR capsule 40 mEq  40 mEq Oral PRN Darwin Garcia MD        Or   • potassium chloride (KLOR-CON) packet 40 mEq  40 mEq Oral PRN Darwin Garcia MD        Or   • potassium chloride 10 mEq in 100 mL IVPB  10 mEq Intravenous Q1H PRN Darwin Garcia MD       • pravastatin (PRAVACHOL) tablet 40 mg  40 mg Oral Nightly Darwin Garcia MD   40 mg at 12/13/21 2035   • sodium chloride 0.9 % flush 10 mL  10 mL Intravenous Q12H Darwin Garcia MD   10 mL at 12/14/21 0833   • sodium chloride 0.9 % flush 10 mL  10 mL Intravenous PRN Darwin Garcia MD       • tamsulosin (FLOMAX) 24 hr capsule 0.8 mg  0.8 mg Oral Daily Darwin Garcia MD   0.8 mg at 12/14/21 0833        Operative/Procedure Notes (last 24 hours)      Darwin Garcia MD at 12/13/21 1649        Pre-Op Diagnosis:    Aspiration Pneumonia    Post-Op Diagnosis:    Same    Procedure:    1) Bronchoscopy w/ Bronch Wash    2) Therapeutic Suctioning of Secretions    Indication:    Witnessed aspiration    Description:    Patient previously intubated with 7-0 ETT by anesthesia.  Patient sedated with propofol gtt.  Disposable therapeutic bronchoscope introduced through left nare since this size scope would not allow for ventilation through ETT during bronchoscopy.  Scope advanced to larynx and advanced around ETT into trachea.  Airways examined to segmental  level.  There were thick bilious secretions in LLL that were therapeutically suctioned and sent for bronch wash.  Airways otherwise anatomically normal to segmental level.  Procedure terminated, no immediate complications.        Electronically signed by Darwin Garcia MD at 12/13/21 [id number]

## 2021-12-14 NOTE — PLAN OF CARE
Goal Outcome Evaluation:        Patient remained hemodynamically stable overnight. Patient rested well, w/ no complaints of pain. L Lung sounds remain coarse upon auscultation, and patient remained on 3L O2. Adequate urine output for shift. Continue to monitor.

## 2021-12-14 NOTE — DISCHARGE SUMMARY
Discharge Summary    Patient name: Thien Gray  CSN: [id number]  MRN: [id number]  : 1955  Today's date: 2021     Date of Admission: 2021    Date of Discharge:  2021    Admitting Physician: ZEE Garcia MD    Attending Physician: CONRADO Ruiz MD    Primary Care Provider: Sanaz Zuluaga DO    Admission Diagnosis:   Acute hypoxic respiratory failure  Aspiration pneumonia  Alcohol abuse-prior withdrawal    Discharge Diagnoses:     Aspiration pneumonia (HCC)    CABG 21    Dyslipidemia    Alcohol dependence, daily use (HCC)    History of tobacco abuse    Procedures:  Colonoscopy  Bronchoscopy     History of Present Illness:  67 y/o WM w/ h/o CAD w/ prior CABG in 2021, stent, HLD, BPH, Alcohol use (suspected alcohol withdrawal during admission for CABG), prior tobacco abusue ~75 py - quit , who came to outpatient surgery today for an elective colonoscopy.  Apparently at some point during the procedure patient had a significant witnessed aspiration event and was intubated.  He was transferred to ICU post procedure.   CXR suspicious for Left sided atelectasis.  Bronch performed with suctioning of thick / mucoid bilious secretions from distal trachea and LMSB    Hospital Course:  Thien Gray is a 66 y.o. male who presented to [facility] as discussed in HPI.  He was admitted to ICU, started on empiric antimicrobials (Zosyn), propofol, and underwent bronchoscopy.  He was subsequently extubated and placed on nasal cannula.  He remained stable overnight and was without complaints.  Patient today morning was weaned off oxygen and his saturations remained above 93%.  Patient will be discharged home.  Does have minimal cough but not producing any sputum.  Cultures thus far are negative.    He is to follow-up with Dr. Iglesias for colonoscopy.    He will be discharged with 5 days of Augmentin to cover for potential infection.  We will keep eye on cultures  and if anything else shows up we will contact the patient.  Discussed with patient he was tolerating Zosyn okay.  Previously confusion with Augmentin but he feels he should be okay taking.      Vitals:  BP 99/76 (BP Location: Right arm)   Pulse 87   Temp 99.9 °F (37.7 °C) (Axillary)   Resp 16   Wt 110 kg (241 lb 6.5 oz)   SpO2 93%   BMI 34.64 kg/m²     Advance Directives: There are no questions and answers to display.        Physical Exam:  General Appearance: Awake, alert, in no acute distress  Head:    Atraumatic, Normocephalic, without obvious abnormality, Pupils reactive & symmetrical B/L.  Neck:   Supple   Lungs:   B/L Breath sounds present with decreased breath sounds on bases, no wheezing heard, no crackles.   Heart: S1 and S2 present, no murmur, rub  Abdomen: Soft, nontender, no guarding or rigidity, bowel sounds positive, no masses.  Extremities:  no cyanosis or clubbing,  no edema, warm to touch.  Pulses: Positive and symmetric.  Neurologic:  Moving all four extremities. Good strength bilaterally. No focal deficit.   Psychological: Normal affect, Cooperative    Chest x-ray done yesterday reviewed and showed significant improvement in infiltrate in the left lung base.    Respiratory cultures so far no organisms.    Labs:  Results from last 7 days   Lab Units 12/14/21  0411   WBC 10*3/mm3 8.24   HEMOGLOBIN g/dL 14.5   HEMATOCRIT % 42.7   PLATELETS 10*3/mm3 199     Results from last 7 days   Lab Units 12/14/21  0411   SODIUM mmol/L 138   POTASSIUM mmol/L 3.5   CHLORIDE mmol/L 102   CO2 mmol/L 24.0   BUN mg/dL 12   CREATININE mg/dL 0.85   CALCIUM mg/dL 8.6   BILIRUBIN mg/dL 1.1   ALK PHOS U/L 71   ALT (SGPT) U/L 22   AST (SGOT) U/L 25   GLUCOSE mg/dL 121*         Magnesium   Date Value Ref Range Status   12/14/2021 2.1 1.6 - 2.4 mg/dL Final   12/13/2021 2.1 1.6 - 2.4 mg/dL Final     Phosphorus   Date Value Ref Range Status   12/14/2021 4.6 (H) 2.5 - 4.5 mg/dL Final   12/13/2021 4.0 2.5 - 4.5 mg/dL Final                  Discharge Medications:     Discharge Medications      New Medications      Instructions Start Date   amoxicillin-clavulanate 500-125 MG per tablet  Commonly known as: Augmentin   500 mg, Oral, 2 Times Daily         Changes to Medications      Instructions Start Date   pravastatin 40 MG tablet  Commonly known as: PRAVACHOL  What changed: Another medication with the same name was removed. Continue taking this medication, and follow the directions you see here.   TAKE 1 TABLET DAILY         Continue These Medications      Instructions Start Date   Aspirin 81 MG capsule   Oral      losartan 25 MG tablet  Commonly known as: COZAAR   25 mg, Oral, Every 24 Hours Scheduled      meloxicam 7.5 MG tablet  Commonly known as: MOBIC   TAKE 1 TABLET DAILY WITH   FOOD      metoprolol tartrate 50 MG tablet  Commonly known as: LOPRESSOR   50 mg, Oral, Every 12 Hours Scheduled      Sodium Sulfate-Mag Sulfate-KCl 7078-872-518 MG tablet   1 kit, Oral, Take As Directed      tamsulosin 0.4 MG capsule 24 hr capsule  Commonly known as: FLOMAX   TAKE 2 CAPSULES DAILY      triamcinolone 0.025 % ointment  Commonly known as: KENALOG   Apply  topically to the appropriate area as directed 2 Times a Day for 10 days. Avoid applying to face, axilla, and groin             Discharge Diet:      Diet Order   Procedures   • Diet Regular; Cardiac, Consistent Carbohydrate       Activity at Discharge:   With assistance    Follow-up Appointments  Future Appointments   Date Time Provider Department Center   1/18/2022  9:00 AM Pavan Suazo MD [facility]   1/26/2022  9:15 AM Phillip Eason III, MD [facility]   3/4/2022  1:00 PM Guera Baer APRN [facility]   3/9/2022  8:15 AM Sanaz Zuluaga DO [facility]     Additional Instructions for the Follow-ups that You Need to Schedule     Discharge Follow-up with PCP   As directed       Currently Documented PCP:    Sanaz Zuluaga DO    PCP Phone Number:     [phone]     Follow Up Details: ASAP for transition of care.               Discharge Instructions:  Home with assistance  Follow-up as above  Medications E prescribed       GEE Romero, ACNP-BC  Pulmonary & Critical Care Medicine      Time: I spent 35 minutes on this discharge activity which included: face-to-face encounter with the patient, reviewing the data in the system, coordination of the care with the nursing staff as well as consultants, documentation, and entering orders.       CC: Sanaz Zuluaga DO         [unfilled]    ADDENDUM:  Reviewed and agree.

## 2021-12-14 NOTE — TELEPHONE ENCOUNTER
----- Message from GEE Cruz sent at 12/13/2021  9:30 AM EST -----  Please notify Mr. Gray that the MRI of his brain appears normal for his age and medical history.  We will follow-up as scheduled in clinic.  Thanks, GEE Nichols.    ASHVIN DAVID

## 2021-12-14 NOTE — CASE MANAGEMENT/SOCIAL WORK
Discharge Planning Assessment  [facility]     Patient Name: Thien Gray  MRN: [id number]  Today's Date: 12/14/2021    Admit Date: 12/13/2021     Discharge Needs Assessment     Row Name 12/14/21 1109       Living Environment    Lives With alone    Current Living Arrangements home/apartment/condo  Lives in [facility]    Primary Care Provided by self    Provides Primary Care For no one    Family Caregiver if Needed none    Quality of Family Relationships unable to assess    Able to Return to Prior Arrangements yes       Resource/Environmental Concerns    Resource/Environmental Concerns none    Transportation Concerns other (see comments)       Transition Planning    Patient/Family Anticipates Transition to home    Patient/Family Anticipated Services at Transition     Transportation Anticipated other (see comments)       Discharge Needs Assessment    Equipment Currently Used at Home cane, straight    Concerns to be Addressed other (see comments)  transportation    Anticipated Changes Related to Illness none    Equipment Needed After Discharge none    Current Discharge Risk lives alone; dependent with mobility/activities of daily living; chronically ill               Discharge Plan     Row Name 12/14/21 1110       Plan    Plan Home    Patient/Family in Agreement with Plan yes    Plan Comments Spoke to pt at . He lives alone in [place] and employed. Visiting Vicky brought pt in yesterday for his colonoscopy and he doesn't have transport home. CM can arrange a lyft when pt is ready for d/c. Aaliyah ext. [phone]    Final Discharge Disposition Code 01 - home or self-care              Continued Care and Services - Admitted Since 12/13/2021    Coordination has not been started for this encounter.       Expected Discharge Date and Time     Expected Discharge Date Expected Discharge Time    Dec 14, 2021          Demographic Summary     Row Name 12/14/21 1102       General Information    Referral  Source admission list    Preferred Language English     Used During This Interaction no    General Information Comments PCP Is Sanaz Zuluaga. No POA.       Contact Information    Permission Granted to Share Info With                Functional Status     Row Name 12/14/21 1106       Functional Status    Usual Activity Tolerance good    Current Activity Tolerance good       Functional Status, IADL    Medications independent    Meal Preparation independent    Housekeeping independent    Laundry independent    Shopping independent       Employment/    Employment Status employed full-time    Employment/ Comments Has [employer] and no issues affording meds.               Psychosocial    No documentation.                Abuse/Neglect    No documentation.                Legal    No documentation.                Substance Abuse    No documentation.                Patient Forms    No documentation.                   Aaliyah Alvarado RN

## 2021-12-15 ENCOUNTER — TELEPHONE (OUTPATIENT)
Dept: GASTROENTEROLOGY | Facility: CLINIC | Age: 66
End: 2021-12-15

## 2021-12-15 ENCOUNTER — TRANSITIONAL CARE MANAGEMENT TELEPHONE ENCOUNTER (OUTPATIENT)
Dept: CALL CENTER | Facility: HOSPITAL | Age: 66
End: 2021-12-15

## 2021-12-15 LAB
BACTERIA SPEC RESP CULT: NORMAL
GRAM STN SPEC: NORMAL

## 2021-12-15 NOTE — OUTREACH NOTE
Prep Survey      Responses   [facility] patient discharged from? [place]   Is LACE score < 7 ? Yes   Emergency Room discharge w/ pulse ox? No   Eligibility [facility]   Date of Admission 12/13/21   Date of Discharge 12/14/21   Discharge Disposition Home or Self Care   Discharge diagnosis Aspiration pneumonia    Does the patient have one of the following disease processes/diagnoses(primary or secondary)? COPD/Pneumonia   Does the patient have Home health ordered? No   Is there a DME ordered? No   Prep survey completed? Yes          Jeimy Chester RN

## 2021-12-15 NOTE — TELEPHONE ENCOUNTER
Patient called in to reschedule his colonoscopy.  Does he need to be done in Endo this time?      Also he said he had trouble with the tablets and is requesting the liquid prep next time.

## 2021-12-15 NOTE — TELEPHONE ENCOUNTER
Called patient to schedule colonoscopy.  He said it was a bad time as he was waiting on another call, but would call us back in about a half hour.

## 2021-12-15 NOTE — OUTREACH NOTE
Call Center TCM Note      Responses   [facility] patient discharged from? [place]   Does the patient have one of the following disease processes/diagnoses(primary or secondary)? COPD/Pneumonia   Was the primary reason for admission: COPD exacerbation   TCM attempt successful? Yes   Call start time 1703   Call end time 1708   Discharge diagnosis Aspiration pneumonia    Meds reviewed with patient/caregiver? Yes   Is the patient having any side effects they believe may be caused by any medication additions or changes? No   Does the patient have all medications ordered at discharge? Yes   Is the patient taking all medications as directed (includes completed medication regime)? Yes   Does the patient have a primary care provider?  Yes   Does the patient have an appointment with their PCP or specialist within 7 days of discharge? Yes   Comments regarding PCP Hospital d/c f/u appt is on 12/21/21 at 12:00 pm    Has the patient kept scheduled appointments due by today? N/A   Pulse Ox monitoring None   Psychosocial issues? No   Did the patient receive a copy of their discharge instructions? Yes   Nursing interventions Reviewed instructions with patient   What is the patient's perception of their health status since discharge? Worsening  [r/t sore throat and he has pain while breathing ]   Nursing Interventions Nurse provided patient education   Are the patient's immunizations up to date?  Yes   If the patient is a current smoker, are they able to teach back resources for cessation? Not a smoker   Is the patient/caregiver able to teach back the hierarchy of who to call/visit for symptoms/problems? PCP, Specialist, Home health nurse, Urgent Care, ED, 911 Yes   Is the patient/caregiver able to teach back signs and symptoms of worsening condition: Shortness of breath,  Fever/chills,  Chest pain   Is the patient/caregiver able to teach back importance of completing antibiotic course of treatment? Yes   TCM call completed?  Yes          Uzma Chung RN    12/15/2021, 17:08 EST

## 2021-12-15 NOTE — OUTREACH NOTE
Call Center TCM Note      Responses   [facility] patient discharged from? [place]   Does the patient have one of the following disease processes/diagnoses(primary or secondary)? COPD/Pneumonia   Was the primary reason for admission: Pneumonia   TCM attempt successful? No  [Mikie-brother and Sana-sister]   Unsuccessful attempts Attempt 1          Uzma Chung RN    12/15/2021, 15:05 EST

## 2021-12-16 DIAGNOSIS — Z12.11 COLON CANCER SCREENING: Primary | ICD-10-CM

## 2021-12-20 NOTE — PAYOR COMM NOTE
"Patt Linares (66 y.o. Male)             Date of Birth Social Security Number Address Home Phone MRN    1955  [address]  [place] [id number] [phone] [id number]    [employer] Mar[employer] Status             [facility]        Admission Date Admission Type Admitting Provider Attending Provider Department, Room/Bed    12/13/21 Urgent Darwin Garcia MD  [facility] 2B ICU, N223/1    Discharge Date Discharge Disposition Discharge Destination          12/14/2021 Home or Self Care              Attending Provider: (none)   Allergies: Bactrim [Sulfamethoxazole-trimethoprim], Plavix [Clopidogrel Bisulfate], Augmentin [Amoxicillin-pot Clavulanate]    Isolation: None   Infection: None   Code Status: Prior   Advance Care Planning Activity    Ht: 177.8 cm (70\")   Wt: 110 kg (241 lb 6.5 oz)    Admission Cmt: None   Principal Problem: Aspiration pneumonia (HCC) [J69.0]                 Active Insurance as of 12/13/2021     Primary Coverage     Payor Plan Insurance Group Employer/Plan Group    [email or web address] BLUE SHIELD PPO [id number]     Payor Plan Address Payor Plan Phone Number Payor Plan Fax Number Effective Dates    [address] [phone]  3/4/2019 - None Entered    [facility] [id number]       Subscriber Name Subscriber Birth Date Member ID       PATT LINARES 1955 [id number]           Secondary Coverage     Payor Plan Insurance Group Employer/Plan Group    [employer]ETNA MEDICARE REPLACEMENT AETNA MEDICARE REPLACEMENT [id number]     Payor Plan Address Payor Plan Phone Number Payor Plan Fax Number Effective Dates    [address] [phone]  7/1/2018 - None Entered    [facility] [id number]       Subscriber Name Subscriber Birth Date Member ID       PATT LINARES 1955 [id number]                 Emergency Contacts      (Rel.) Home Phone Work Phone Mobile Phone    Mikie Linares (Brother) [phone] -- " [phone]    Radha Be (Sister) -- -- [phone]               Discharge Summary      Shad Ruiz MD at 21 1046          Discharge Summary    Patient name: Thien Gray  CSN: [id number]  MRN: [id number]  : 1955  Today's date: 2021     Date of Admission: 2021    Date of Discharge:  2021    Admitting Physician: ZEE Garcia MD    Attending Physician: CONRADO Ruiz MD    Primary Care Provider: Sanaz Zuluaga DO    Admission Diagnosis:   Acute hypoxic respiratory failure  Aspiration pneumonia  Alcohol abuse-prior withdrawal    Discharge Diagnoses:     Aspiration pneumonia (HCC)    CABG 21    Dyslipidemia    Alcohol dependence, daily use (HCC)    History of tobacco abuse    Procedures:  Colonoscopy  Bronchoscopy     History of Present Illness:  67 y/o WM w/ h/o CAD w/ prior CABG in 2021, stent, HLD, BPH, Alcohol use (suspected alcohol withdrawal during admission for CABG), prior tobacco abusue ~75 py - quit , who came to outpatient surgery today for an elective colonoscopy.  Apparently at some point during the procedure patient had a significant witnessed aspiration event and was intubated.  He was transferred to ICU post procedure.   CXR suspicious for Left sided atelectasis.  Bronch performed with suctioning of thick / mucoid bilious secretions from distal trachea and LMSB    Hospital Course:  Thien Gray is a 66 y.o. male who presented to [facility] as discussed in HPI.  He was admitted to ICU, started on empiric antimicrobials (Zosyn), propofol, and underwent bronchoscopy.  He was subsequently extubated and placed on nasal cannula.  He remained stable overnight and was without complaints.  Patient today morning was weaned off oxygen and his saturations remained above 93%.  Patient will be discharged home.  Does have minimal cough but not producing any sputum.  Cultures thus far are negative.    He is to follow-up with   [facility] for colonoscopy.    He will be discharged with 5 days of Augmentin to cover for potential infection.  We will keep eye on cultures and if anything else shows up we will contact the patient.  Discussed with patient he was tolerating Zosyn okay.  Previously confusion with Augmentin but he feels he should be okay taking.      Vitals:  BP 99/76 (BP Location: Right arm)   Pulse 87   Temp 99.9 °F (37.7 °C) (Axillary)   Resp 16   Wt 110 kg (241 lb 6.5 oz)   SpO2 93%   BMI 34.64 kg/m²     Advance Directives: There are no questions and answers to display.        Physical Exam:  General Appearance: Awake, alert, in no acute distress  Head:    Atraumatic, Normocephalic, without obvious abnormality, Pupils reactive & symmetrical B/L.  Neck:   Supple   Lungs:   B/L Breath sounds present with decreased breath sounds on bases, no wheezing heard, no crackles.   Heart: S1 and S2 present, no murmur, rub  Abdomen: Soft, nontender, no guarding or rigidity, bowel sounds positive, no masses.  Extremities:  no cyanosis or clubbing,  no edema, warm to touch.  Pulses: Positive and symmetric.  Neurologic:  Moving all four extremities. Good strength bilaterally. No focal deficit.   Psychological: Normal affect, Cooperative    Chest x-ray done yesterday reviewed and showed significant improvement in infiltrate in the left lung base.    Respiratory cultures so far no organisms.    Labs:  Results from last 7 days   Lab Units 12/14/21  0411   WBC 10*3/mm3 8.24   HEMOGLOBIN g/dL 14.5   HEMATOCRIT % 42.7   PLATELETS 10*3/mm3 199     Results from last 7 days   Lab Units 12/14/21  0411   SODIUM mmol/L 138   POTASSIUM mmol/L 3.5   CHLORIDE mmol/L 102   CO2 mmol/L 24.0   BUN mg/dL 12   CREATININE mg/dL 0.85   CALCIUM mg/dL 8.6   BILIRUBIN mg/dL 1.1   ALK PHOS U/L 71   ALT (SGPT) U/L 22   AST (SGOT) U/L 25   GLUCOSE mg/dL 121*         Magnesium   Date Value Ref Range Status   12/14/2021 2.1 1.6 - 2.4 mg/dL Final   12/13/2021 2.1 1.6 - 2.4  mg/dL Final     Phosphorus   Date Value Ref Range Status   12/14/2021 4.6 (H) 2.5 - 4.5 mg/dL Final   12/13/2021 4.0 2.5 - 4.5 mg/dL Final                 Discharge Medications:     Discharge Medications      New Medications      Instructions Start Date   amoxicillin-clavulanate 500-125 MG per tablet  Commonly known as: Augmentin   500 mg, Oral, 2 Times Daily         Changes to Medications      Instructions Start Date   pravastatin 40 MG tablet  Commonly known as: PRAVACHOL  What changed: Another medication with the same name was removed. Continue taking this medication, and follow the directions you see here.   TAKE 1 TABLET DAILY         Continue These Medications      Instructions Start Date   Aspirin 81 MG capsule   Oral      losartan 25 MG tablet  Commonly known as: COZAAR   25 mg, Oral, Every 24 Hours Scheduled      meloxicam 7.5 MG tablet  Commonly known as: MOBIC   TAKE 1 TABLET DAILY WITH   FOOD      metoprolol tartrate 50 MG tablet  Commonly known as: LOPRESSOR   50 mg, Oral, Every 12 Hours Scheduled      Sodium Sulfate-Mag Sulfate-KCl 8168-369-472 MG tablet   1 kit, Oral, Take As Directed      tamsulosin 0.4 MG capsule 24 hr capsule  Commonly known as: FLOMAX   TAKE 2 CAPSULES DAILY      triamcinolone 0.025 % ointment  Commonly known as: KENALOG   Apply  topically to the appropriate area as directed 2 Times a Day for 10 days. Avoid applying to face, axilla, and groin             Discharge Diet:      Diet Order   Procedures   • Diet Regular; Cardiac, Consistent Carbohydrate       Activity at Discharge:   With assistance    Follow-up Appointments  Future Appointments   Date Time Provider Department Center   1/18/2022  9:00 AM Pavan Suazo MD [facility]   1/26/2022  9:15 AM Phillip Eason III, MD [facility]   3/4/2022  1:00 PM Guera Baer APRN [facility]   3/9/2022  8:15 AM Sanaz Zuluaga DO [facility]     Additional Instructions for the Follow-ups that You Need to  Schedule     Discharge Follow-up with PCP   As directed       Currently Documented PCP:    Sanaz Zuluaga DO    PCP Phone Number:    [phone]     Follow Up Details: ASAP for transition of care.               Discharge Instructions:  Home with assistance  Follow-up as above  Medications E prescribed       GEE Romero, ACNP-BC  Pulmonary & Critical Care Medicine      Time: I spent 35 minutes on this discharge activity which included: face-to-face encounter with the patient, reviewing the data in the system, coordination of the care with the nursing staff as well as consultants, documentation, and entering orders.       CC: Sanaz Zuluaga DO         [unfilled]    ADDENDUM:  Reviewed and agree.     Electronically signed by Shad Ruiz MD at 12/14/21 8715

## 2021-12-21 ENCOUNTER — OFFICE VISIT (OUTPATIENT)
Dept: FAMILY MEDICINE CLINIC | Facility: CLINIC | Age: 66
End: 2021-12-21

## 2021-12-21 VITALS
BODY MASS INDEX: 38.08 KG/M2 | RESPIRATION RATE: 18 BRPM | WEIGHT: 266 LBS | DIASTOLIC BLOOD PRESSURE: 80 MMHG | HEIGHT: 70 IN | SYSTOLIC BLOOD PRESSURE: 118 MMHG | HEART RATE: 78 BPM | TEMPERATURE: 97.1 F

## 2021-12-21 DIAGNOSIS — J69.0 ASPIRATION PNEUMONIA, UNSPECIFIED ASPIRATION PNEUMONIA TYPE, UNSPECIFIED LATERALITY, UNSPECIFIED PART OF LUNG (HCC): ICD-10-CM

## 2021-12-21 DIAGNOSIS — Z09 HOSPITAL DISCHARGE FOLLOW-UP: Primary | ICD-10-CM

## 2021-12-21 DIAGNOSIS — J34.89 RHINORRHEA: ICD-10-CM

## 2021-12-21 PROCEDURE — 99495 TRANSJ CARE MGMT MOD F2F 14D: CPT | Performed by: FAMILY MEDICINE

## 2021-12-21 NOTE — PROGRESS NOTES
Transitional Care Follow Up Visit  Subjective     Thien Gray is a 66 y.o. male who presents for a transitional care management visit.    Within 48 business hours after discharge our office contacted him via telephone to coordinate his care and needs.      I reviewed and discussed the details of that call along with the discharge summary, hospital problems, inpatient lab results, inpatient diagnostic studies, and consultation reports with Thien.     Current outpatient and discharge medications have been reconciled for the patient.  Reviewed by: Taiwo Kelsey MD      Date of TCM Phone Call 12/14/2021   [facility]   Date of Admission 12/13/2021   Date of Discharge 12/14/2021   Discharge Disposition Home or Self Care     Risk for Readmission (LACE) Score: 6 (12/14/2021  6:01 AM)      History of Present Illness   Course During Hospital Stay:      He has some aspiration pneumonia after a colonoscopy and was admitted overnight to   Then sent home on augmentin  Never really had any symptoms of pneumonia at all  No fever  No issues since he got home    Had some rhinorrhea this AM but otherwise was fine without any issues     The following portions of the patient's history were reviewed and updated as appropriate: allergies, current medications, past family history, past medical history, past social history, past surgical history and problem list.    Review of Systems   Constitutional: Negative.    HENT: Positive for rhinorrhea.    Eyes: Negative.    Respiratory: Negative.  Negative for cough and shortness of breath.    Cardiovascular: Negative.  Negative for chest pain.   Gastrointestinal: Negative.    Musculoskeletal: Negative.    Skin: Negative.    Neurological: Negative.    Psychiatric/Behavioral: Negative.  The patient is not nervous/anxious.    All other systems reviewed and are negative.      Objective   Physical Exam  Vitals and nursing note reviewed.   Constitutional:        Appearance: He is well-developed.   HENT:      Head: Normocephalic and atraumatic.      Right Ear: Hearing, tympanic membrane, ear canal and external ear normal.      Left Ear: Hearing, tympanic membrane, ear canal and external ear normal.      Nose: Nose normal.      Mouth/Throat:      Pharynx: Uvula midline.   Eyes:      Conjunctiva/sclera: Conjunctivae normal.   Cardiovascular:      Rate and Rhythm: Normal rate and regular rhythm.      Heart sounds: Normal heart sounds.   Pulmonary:      Effort: Pulmonary effort is normal.      Breath sounds: Normal breath sounds.   Musculoskeletal:      Cervical back: Normal range of motion.   Lymphadenopathy:      Cervical: No cervical adenopathy.   Psychiatric:         Behavior: Behavior normal.         Assessment/Plan   Diagnoses and all orders for this visit:    1. Hospital discharge follow-up (Primary)    2. Aspiration pneumonia, unspecified aspiration pneumonia type, unspecified laterality, unspecified part of lung (HCC)    3. Rhinorrhea  -     Chlorcyclizine-Pseudoephed 25-60 MG tablet; 1/2-1 po q 8 hours PRN  Dispense: 30 tablet; Refill: 1    lungs are clear, reviewed hospital note with pt.  He will f/u with GI as scheduled  stahist PRN congestion, f/u INB

## 2021-12-23 ENCOUNTER — OUTSIDE FACILITY SERVICE (OUTPATIENT)
Dept: GASTROENTEROLOGY | Facility: CLINIC | Age: 66
End: 2021-12-23

## 2021-12-23 ENCOUNTER — ANESTHESIA EVENT (OUTPATIENT)
Dept: GASTROENTEROLOGY | Facility: HOSPITAL | Age: 66
End: 2021-12-23

## 2021-12-23 ENCOUNTER — ANESTHESIA (OUTPATIENT)
Dept: GASTROENTEROLOGY | Facility: HOSPITAL | Age: 66
End: 2021-12-23

## 2021-12-23 ENCOUNTER — HOSPITAL ENCOUNTER (OUTPATIENT)
Facility: HOSPITAL | Age: 66
Setting detail: HOSPITAL OUTPATIENT SURGERY
Discharge: HOME | End: 2021-12-23
Attending: INTERNAL MEDICINE | Admitting: INTERNAL MEDICINE

## 2021-12-23 VITALS
RESPIRATION RATE: 16 BRPM | SYSTOLIC BLOOD PRESSURE: 111 MMHG | OXYGEN SATURATION: 96 % | DIASTOLIC BLOOD PRESSURE: 70 MMHG | HEART RATE: 89 BPM

## 2021-12-23 DIAGNOSIS — K63.5 COLON POLYPS: ICD-10-CM

## 2021-12-23 LAB — SARS-COV-2 RDRP RESP QL NAA+PROBE: NORMAL

## 2021-12-23 PROCEDURE — 25010000002 PROPOFOL 10 MG/ML EMULSION: Performed by: NURSE ANESTHETIST, CERTIFIED REGISTERED

## 2021-12-23 PROCEDURE — 87635 SARS-COV-2 COVID-19 AMP PRB: CPT | Performed by: ANESTHESIOLOGY

## 2021-12-23 PROCEDURE — C1889 IMPLANT/INSERT DEVICE, NOC: HCPCS | Performed by: INTERNAL MEDICINE

## 2021-12-23 PROCEDURE — 25010000002 ONDANSETRON PER 1 MG: Performed by: NURSE ANESTHETIST, CERTIFIED REGISTERED

## 2021-12-23 PROCEDURE — 88305 TISSUE EXAM BY PATHOLOGIST: CPT | Performed by: INTERNAL MEDICINE

## 2021-12-23 PROCEDURE — 45385 COLONOSCOPY W/LESION REMOVAL: CPT | Performed by: INTERNAL MEDICINE

## 2021-12-23 PROCEDURE — C9803 HOPD COVID-19 SPEC COLLECT: HCPCS | Performed by: ANESTHESIOLOGY

## 2021-12-23 PROCEDURE — 25010000002 EPINEPHRINE 1 MG/10ML SOLUTION PREFILLED SYRINGE: Performed by: NURSE ANESTHETIST, CERTIFIED REGISTERED

## 2021-12-23 DEVICE — DEV CLIP ENDO RESOLUTION360 CONTRL ROT 235CM: Type: IMPLANTABLE DEVICE | Site: ASCENDING COLON | Status: FUNCTIONAL

## 2021-12-23 RX ORDER — ROCURONIUM BROMIDE 10 MG/ML
INJECTION, SOLUTION INTRAVENOUS AS NEEDED
Status: DISCONTINUED | OUTPATIENT
Start: 2021-12-23 | End: 2021-12-23 | Stop reason: SURG

## 2021-12-23 RX ORDER — EPHEDRINE SULFATE 50 MG/ML
INJECTION, SOLUTION INTRAVENOUS AS NEEDED
Status: DISCONTINUED | OUTPATIENT
Start: 2021-12-23 | End: 2021-12-23 | Stop reason: SURG

## 2021-12-23 RX ORDER — LIDOCAINE HYDROCHLORIDE 20 MG/ML
INJECTION, SOLUTION INFILTRATION; PERINEURAL AS NEEDED
Status: DISCONTINUED | OUTPATIENT
Start: 2021-12-23 | End: 2021-12-23 | Stop reason: SURG

## 2021-12-23 RX ORDER — PROPOFOL 10 MG/ML
VIAL (ML) INTRAVENOUS AS NEEDED
Status: DISCONTINUED | OUTPATIENT
Start: 2021-12-23 | End: 2021-12-23 | Stop reason: SURG

## 2021-12-23 RX ORDER — HYDROMORPHONE HYDROCHLORIDE 1 MG/ML
0.5 INJECTION, SOLUTION INTRAMUSCULAR; INTRAVENOUS; SUBCUTANEOUS
Status: DISCONTINUED | OUTPATIENT
Start: 2021-12-23 | End: 2021-12-23 | Stop reason: HOSPADM

## 2021-12-23 RX ORDER — BUPIVACAINE HCL/0.9 % NACL/PF 0.125 %
PLASTIC BAG, INJECTION (ML) EPIDURAL AS NEEDED
Status: DISCONTINUED | OUTPATIENT
Start: 2021-12-23 | End: 2021-12-23 | Stop reason: SURG

## 2021-12-23 RX ORDER — DEXAMETHASONE SODIUM PHOSPHATE 4 MG/ML
8 INJECTION, SOLUTION INTRA-ARTICULAR; INTRALESIONAL; INTRAMUSCULAR; INTRAVENOUS; SOFT TISSUE ONCE AS NEEDED
Status: DISCONTINUED | OUTPATIENT
Start: 2021-12-23 | End: 2021-12-23 | Stop reason: HOSPADM

## 2021-12-23 RX ORDER — EPINEPHRINE 0.1 MG/ML
SYRINGE (ML) INJECTION AS NEEDED
Status: DISCONTINUED | OUTPATIENT
Start: 2021-12-23 | End: 2021-12-23 | Stop reason: SURG

## 2021-12-23 RX ORDER — ONDANSETRON 2 MG/ML
INJECTION INTRAMUSCULAR; INTRAVENOUS AS NEEDED
Status: DISCONTINUED | OUTPATIENT
Start: 2021-12-23 | End: 2021-12-23 | Stop reason: SURG

## 2021-12-23 RX ORDER — GLYCOPYRROLATE 0.2 MG/ML
INJECTION INTRAMUSCULAR; INTRAVENOUS AS NEEDED
Status: DISCONTINUED | OUTPATIENT
Start: 2021-12-23 | End: 2021-12-23 | Stop reason: SURG

## 2021-12-23 RX ORDER — SODIUM CHLORIDE, SODIUM LACTATE, POTASSIUM CHLORIDE, CALCIUM CHLORIDE 600; 310; 30; 20 MG/100ML; MG/100ML; MG/100ML; MG/100ML
INJECTION, SOLUTION INTRAVENOUS CONTINUOUS PRN
Status: DISCONTINUED | OUTPATIENT
Start: 2021-12-23 | End: 2021-12-23 | Stop reason: SURG

## 2021-12-23 RX ORDER — ONDANSETRON 2 MG/ML
4 INJECTION INTRAMUSCULAR; INTRAVENOUS ONCE AS NEEDED
Status: DISCONTINUED | OUTPATIENT
Start: 2021-12-23 | End: 2021-12-23 | Stop reason: HOSPADM

## 2021-12-23 RX ORDER — FENTANYL CITRATE 50 UG/ML
50 INJECTION, SOLUTION INTRAMUSCULAR; INTRAVENOUS
Status: DISCONTINUED | OUTPATIENT
Start: 2021-12-23 | End: 2021-12-23 | Stop reason: HOSPADM

## 2021-12-23 RX ADMIN — SODIUM CHLORIDE, POTASSIUM CHLORIDE, SODIUM LACTATE AND CALCIUM CHLORIDE: 600; 310; 30; 20 INJECTION, SOLUTION INTRAVENOUS at 12:56

## 2021-12-23 RX ADMIN — ROCURONIUM BROMIDE 3 MG: 10 INJECTION, SOLUTION INTRAVENOUS at 12:12

## 2021-12-23 RX ADMIN — Medication 200 MCG: at 12:50

## 2021-12-23 RX ADMIN — Medication 100 MCG: at 12:24

## 2021-12-23 RX ADMIN — Medication 200 MCG: at 12:30

## 2021-12-23 RX ADMIN — Medication 100 MCG: at 12:23

## 2021-12-23 RX ADMIN — EPHEDRINE SULFATE 10 MG: 50 INJECTION, SOLUTION INTRAVENOUS at 12:20

## 2021-12-23 RX ADMIN — Medication 200 MCG: at 12:48

## 2021-12-23 RX ADMIN — Medication 200 MCG: at 12:27

## 2021-12-23 RX ADMIN — EPINEPHRINE 0.02 MG: 0.1 INJECTION, SOLUTION ENDOTRACHEAL; INTRACARDIAC; INTRAVENOUS at 12:41

## 2021-12-23 RX ADMIN — EPINEPHRINE 0.02 MG: 0.1 INJECTION, SOLUTION ENDOTRACHEAL; INTRACARDIAC; INTRAVENOUS at 13:04

## 2021-12-23 RX ADMIN — EPHEDRINE SULFATE 10 MG: 50 INJECTION, SOLUTION INTRAVENOUS at 13:02

## 2021-12-23 RX ADMIN — EPINEPHRINE 0.02 MG: 0.1 INJECTION, SOLUTION ENDOTRACHEAL; INTRACARDIAC; INTRAVENOUS at 12:55

## 2021-12-23 RX ADMIN — Medication 100 MCG: at 12:55

## 2021-12-23 RX ADMIN — GLYCOPYRROLATE 0.2 MG: 0.4 INJECTION INTRAMUSCULAR; INTRAVENOUS at 12:39

## 2021-12-23 RX ADMIN — ONDANSETRON 4 MG: 2 INJECTION INTRAMUSCULAR; INTRAVENOUS at 13:05

## 2021-12-23 RX ADMIN — Medication 200 MCG: at 13:02

## 2021-12-23 RX ADMIN — LIDOCAINE HYDROCHLORIDE 40 MG: 20 INJECTION, SOLUTION INFILTRATION; PERINEURAL at 12:13

## 2021-12-23 RX ADMIN — Medication 100 MCG: at 12:25

## 2021-12-23 RX ADMIN — SODIUM CHLORIDE, POTASSIUM CHLORIDE, SODIUM LACTATE AND CALCIUM CHLORIDE: 600; 310; 30; 20 INJECTION, SOLUTION INTRAVENOUS at 12:08

## 2021-12-23 RX ADMIN — EPINEPHRINE 0.01 MG: 0.1 INJECTION, SOLUTION ENDOTRACHEAL; INTRACARDIAC; INTRAVENOUS at 12:34

## 2021-12-23 RX ADMIN — Medication 100 MCG: at 12:20

## 2021-12-23 RX ADMIN — EPINEPHRINE 0.01 MG: 0.1 INJECTION, SOLUTION ENDOTRACHEAL; INTRACARDIAC; INTRAVENOUS at 12:37

## 2021-12-23 RX ADMIN — PROPOFOL 150 MG: 10 INJECTION, EMULSION INTRAVENOUS at 12:13

## 2021-12-23 RX ADMIN — EPHEDRINE SULFATE 10 MG: 50 INJECTION, SOLUTION INTRAVENOUS at 12:25

## 2021-12-23 RX ADMIN — EPINEPHRINE 0.01 MG: 0.1 INJECTION, SOLUTION ENDOTRACHEAL; INTRACARDIAC; INTRAVENOUS at 12:35

## 2021-12-23 RX ADMIN — Medication 200 MCG: at 12:29

## 2021-12-23 NOTE — ANESTHESIA PROCEDURE NOTES
Airway  Urgency: elective    Date/Time: 12/23/2021 12:14 PM  Airway not difficult    General Information and Staff    Patient location during procedure: OR    Indications and Patient Condition  Indications for airway management: airway protection    Preoxygenated: yes  MILS not maintained throughout  Mask difficulty assessment: 0 - not attempted    Final Airway Details  Final airway type: endotracheal airway      Successful airway: ETT  Cuffed: yes   Successful intubation technique: direct laryngoscopy and RSI  Facilitating devices/methods: intubating stylet and cricoid pressure  Endotracheal tube insertion site: oral  Blade: Hull  Blade size: 2  ETT size (mm): 7.0  Cormack-Lehane Classification: grade I - full view of glottis  Placement verified by: chest auscultation and capnometry   Measured from: lips  ETT/EBT  to lips (cm): 22  Number of attempts at approach: 1  Assessment: lips, teeth, and gum same as pre-op and atraumatic intubation

## 2021-12-23 NOTE — INTERVAL H&P NOTE
H&P reviewed. The patient was examined and there are no changes to the H&P.  He presents for colonoscopy to surgery Center, but felt safer for procedure at hospital.  He has known colon polyps which need removed.

## 2021-12-23 NOTE — ANESTHESIA PREPROCEDURE EVALUATION
Anesthesia Evaluation     Patient summary reviewed and Nursing notes reviewed   NPO Solid Status: > 8 hours  NPO Liquid Status: > 8 hours           Airway   Mallampati: II  TM distance: >3 FB  Neck ROM: full  No difficulty expected  Dental      Pulmonary    (+) pneumonia (aspiration pna) resolved , a smoker Former, shortness of breath, recent URI (current viral syndrome ),   Cardiovascular     ECG reviewed    (+) past MI , CAD, CABG >6 Months, hyperlipidemia,   (-) dysrhythmias (RBBB)    ROS comment: ECG NSR LAD RBBB 7/21   ECHO 7/21 EF >41%  mild concentricLVH   RV mildly dilated.RVSP (TR)  normal 29  MPS 2020 was normal   Cath 6/21 LM/3 V CAD -sent for emergent CABG     Neuro/Psych  GI/Hepatic/Renal/Endo    (+) obesity, morbid obesity,      Musculoskeletal     Abdominal    Substance History      OB/GYN          Other   arthritis,          Phys Exam Other: Hull 2 grade 1 view Celestine CABG 6/21              Anesthesia Plan    ASA 3     general   Rapid sequence(RSI CP ETt in view of aspiration last EGD  Covid test NEG)  intravenous induction     Anesthetic plan, all risks, benefits, and alternatives have been provided, discussed and informed consent has been obtained with: patient.    Plan discussed with CRNA.

## 2021-12-23 NOTE — ANESTHESIA POSTPROCEDURE EVALUATION
Patient: Thien Gray    Procedure Summary     Date: 12/23/21 Room / Location:  [facility]    Anesthesia Start: 1208 Anesthesia Stop:     Procedure: COLONOSCOPY (N/A ) Diagnosis:     Surgeons: Brunner, Mark I, MD Provider: Rigoberto Hooper MD    Anesthesia Type: general ASA Status: 3          Anesthesia Type: general    Vitals  No vitals data found for the desired time range.      BP 86/45  SPO2 96%  HR 90    Post Anesthesia Care and Evaluation    Patient location during evaluation: PACU  Patient participation: complete - patient participated  Level of consciousness: awake and alert  Pain management: adequate  Airway patency: patent  Anesthetic complications: No anesthetic complications  PONV Status: none  Cardiovascular status: hemodynamically stable and acceptable  Respiratory status: nonlabored ventilation, acceptable and nasal cannula  Hydration status: acceptable    Comments: PT A/O X 4, baseline hypotensive per pre-op

## 2021-12-23 NOTE — BRIEF OP NOTE
COLONOSCOPY  Progress Note    Thien Morgan Marina  12/23/2021    Several polyps removed from cecum, ascending, transverse, and sigmoid.   6 endoclips applied to polypectomy sites to ensure hemostasis.    >> Anticipate surveillance colonoscopy in 3 years.    Mark I. Brunner, MD     Date: 12/23/2021  Time: 13:39 EST

## 2021-12-24 LAB
CYTO UR: NORMAL
LAB AP CASE REPORT: NORMAL
LAB AP CLINICAL INFORMATION: NORMAL
PATH REPORT.FINAL DX SPEC: NORMAL
PATH REPORT.GROSS SPEC: NORMAL

## 2022-01-04 ENCOUNTER — TELEPHONE (OUTPATIENT)
Dept: ORTHOPEDIC SURGERY | Facility: CLINIC | Age: 67
End: 2022-01-04

## 2022-01-04 NOTE — TELEPHONE ENCOUNTER
I don't see in your note where you are needing labs.      Did you want him to have labs done before her returns?    Michelle Caballero

## 2022-01-04 NOTE — TELEPHONE ENCOUNTER
Mr. Gray is asking about lab work.  He said he thought Dr. Suazo may have asked him to get lab work performed but was not certain.  He has an upcoming visit with Dr. Suazo in 2 weeks and wants to know if he needs to get blood work done prior to coming in.  Can someone contact him to answer his question please?    Thanks!  Frida

## 2022-01-20 ENCOUNTER — OFFICE VISIT (OUTPATIENT)
Dept: ORTHOPEDIC SURGERY | Facility: CLINIC | Age: 67
End: 2022-01-20

## 2022-01-20 VITALS
HEIGHT: 70 IN | SYSTOLIC BLOOD PRESSURE: 131 MMHG | BODY MASS INDEX: 38.19 KG/M2 | WEIGHT: 266.76 LBS | DIASTOLIC BLOOD PRESSURE: 78 MMHG

## 2022-01-20 DIAGNOSIS — M17.11 PRIMARY OSTEOARTHRITIS OF RIGHT KNEE: Primary | ICD-10-CM

## 2022-01-20 PROCEDURE — 99214 OFFICE O/P EST MOD 30 MIN: CPT | Performed by: ORTHOPAEDIC SURGERY

## 2022-01-20 RX ORDER — PREGABALIN 75 MG/1
75 CAPSULE ORAL ONCE
Status: CANCELLED | OUTPATIENT
Start: 2022-01-20 | End: 2022-01-20

## 2022-01-20 RX ORDER — MELOXICAM 7.5 MG/1
15 TABLET ORAL ONCE
Status: CANCELLED | OUTPATIENT
Start: 2022-01-20 | End: 2022-01-20

## 2022-01-20 RX ORDER — ACETAMINOPHEN 325 MG/1
1000 TABLET ORAL ONCE
Status: CANCELLED | OUTPATIENT
Start: 2022-01-20 | End: 2022-01-20

## 2022-01-20 NOTE — PROGRESS NOTES
Orthopaedic Clinic Note: Knee Established Patient    Chief Complaint   Patient presents with   • Follow-up     3 months follow up Primary osteoarthritis of right knee         HPI    It has been 3  month(s) since Mr. Gray's last visit. He returns to clinic today for follow-up right knee osteoarthritis.  He underwent cortisone injection right knee 3 months ago.  The injection worked well for about 2 months.  His pain is returned.  He is continue to complain of swelling and stiffness in the knee and pain that he rates a 7/10 on the pain scale.  He is ambulating with no assistive device.  Denies fevers chills or constitutional symptoms.  He is here today to discuss proceeding to surgical intervention as conservative management has failed to adequately improve his pain.  He is continue to have significant limitations in daily activities as a result of his ongoing pain and is wishing to pursue surgery.    Past Medical History:   Diagnosis Date   • Abnormal ECG 1/20/2020    Get from Dr. Kayser   • Coronary artery disease    • History of heart attack 2004   • History of MI (myocardial infarction)    • Hyperlipidemia    • Myocardial infarction (HCC)    • RBBB 2/10/2020      Past Surgical History:   Procedure Laterality Date   • APPENDECTOMY  1999   • CARDIAC CATHETERIZATION  1/1/2004    They used a catheter to put in my stent.   • CARDIAC CATHETERIZATION N/A 6/22/2021    Procedure: Left Heart Cath;  Surgeon: Mikey Conrad MD;  Location:  [facility];  Service: Cardiovascular;  Laterality: N/A;   • COLONOSCOPY N/A 12/23/2021    Procedure: COLONOSCOPY;  Surgeon: Brunner, Mark I, MD;  Location:  [facility];  Service: Gastroenterology;  Laterality: N/A;  several polyps removed. 2 clips placed at ascending colon, 2 clips in transverse colon, and 2 clips in sigmoid colon.     • CORONARY ANGIOPLASTY  2004    I think that's what the bill for my stent said.   • CORONARY ARTERY BYPASS BRING BACK FOR  EXPLORATION N/A 2021    Procedure: BRING BACK FOR EXPLORATION OPEN HEART;  Surgeon: Miguel Angel Acosta MD;  Location:  [facility];  Service: Cardiothoracic;  Laterality: N/A;   • CORONARY ARTERY BYPASS GRAFT N/A 2021    Procedure: MEDIAN STERNOTOMY, CORONARY ARTERY BYPASS GRAFTING X 4 WITH LEFT INTERNAL MAMMARY ARTERY GRAFT, ENDOSCOPIC VEIN HARVESTING OF THE RIGHT GREATER SAPHENOUS VEIN, INTRA-AORTIC BALLOON PUMP INSERTION, IVAN PER ANESTHESIA;  Surgeon: Miguel Angel Acosta MD;  Location:  [facility];  Service: Cardiothoracic;  Laterality: N/A;   • CORONARY STENT PLACEMENT     • INTERVENTIONAL RADIOLOGY PROCEDURE N/A 2021    Procedure: thrombin injection;  Surgeon: Abdiel Tamez MD;  Location:  [facility];  Service: Interventional Radiology;  Laterality: N/A;      Family History   Problem Relation Age of Onset   • Cancer Mother    • Hypertension Mother    • Hyperlipidemia Mother         Takes medication   • Heart attack Mother    • Diabetes Sister    • Heart failure Father         .   • Stroke Father    • Heart attack Maternal Grandfather    • Cancer Paternal Grandmother    • Heart attack Paternal Grandfather      Social History     Socioeconomic History   • Marital status:    • Number of children: 1   Tobacco Use   • Smoking status: Former Smoker     Packs/day: 1.50     Years: 45.00     Pack years: 67.50     Types: Cigarettes     Start date: 1971     Quit date: 2017     Years since quittin.5   • Smokeless tobacco: Never Used   Vaping Use   • Vaping Use: Never used   Substance and Sexual Activity   • Alcohol use: Yes     Alcohol/week: 15.0 standard drinks     Types: 15 Standard drinks or equivalent per week     Comment: 3 nights a week   • Drug use: Never   • Sexual activity: Not Currently     Partners: Female     Birth control/protection: Diaphragm, Post-menopausal, Spermicide     Comment:  twice, both . No STVs. Not planning to resume.      Current  "Outpatient Medications on File Prior to Visit   Medication Sig Dispense Refill   • Aspirin 81 MG capsule Take  by mouth.     • Chlorcyclizine-Pseudoephed 25-60 MG tablet 1/2-1 po q 8 hours PRN 30 tablet 1   • losartan (COZAAR) 25 MG tablet Take 1 tablet by mouth Daily.     • meloxicam (MOBIC) 7.5 MG tablet TAKE 1 TABLET DAILY WITH   FOOD 90 tablet 2   • metoprolol tartrate (LOPRESSOR) 50 MG tablet Take 1 tablet by mouth Every 12 (Twelve) Hours. 60 tablet 6   • polyethylene glycol (GoLYTELY) 236 g solution Follow instructions mailed to your home. I also sent instructions to your my chart. 4000 mL 0   • pravastatin (PRAVACHOL) 40 MG tablet TAKE 1 TABLET DAILY 90 tablet 1   • Sodium Sulfate-Mag Sulfate-KCl 0966-292-626 MG tablet Take 1 kit by mouth Take As Directed. 24 tablet 0   • tamsulosin (FLOMAX) 0.4 MG capsule 24 hr capsule TAKE 2 CAPSULES DAILY 180 capsule 1   • triamcinolone (KENALOG) 0.025 % ointment Apply  topically to the appropriate area as directed 2 Times a Day for 10 days. Avoid applying to face, axilla, and groin 15 g 1     No current facility-administered medications on file prior to visit.      Allergies   Allergen Reactions   • Bactrim [Sulfamethoxazole-Trimethoprim] Other (See Comments)     Causes weakness, fatigue   • Plavix [Clopidogrel Bisulfate] Hives   • Augmentin [Amoxicillin-Pot Clavulanate] Confusion        Review of Systems   Constitutional: Negative.    HENT: Negative.    Eyes: Negative.    Respiratory: Negative.    Cardiovascular: Negative.    Gastrointestinal: Negative.    Endocrine: Negative.    Genitourinary: Negative.    Musculoskeletal: Positive for arthralgias.   Skin: Negative.    Allergic/Immunologic: Negative.    Neurological: Negative.    Hematological: Negative.    Psychiatric/Behavioral: Negative.         The patient's Review of Systems was personally reviewed and confirmed as accurate.    Physical Exam  Blood pressure 131/78, height 177.8 cm (70\"), weight 121 kg (266 lb 12.1 " oz).    Body mass index is 38.28 kg/m².    GENERAL APPEARANCE: awake, alert, oriented, in no acute distress and well developed, well nourished  LUNGS:  breathing nonlabored  EXTREMITIES: no clubbing, cyanosis  PERIPHERAL PULSES: palpable dorsalis pedis and posterior tibial pulses bilaterally.    GAIT:  Antalgic        ----------  Right Knee Exam:  ----------  ALIGNMENT: severe varus, correctable to neutral  ----------  RANGE OF MOTION:  Decreased (5 - 115 degrees) with no extensor lag  LIGAMENTOUS STABILITY:   stable to varus and valgus stress at terminal extension and 30 degrees; retensioning of the MCL is appreciated with valgus stress at 30 degrees consistent with medial compartment degeneration  ----------  STRENGTH:  KNEE FLEXION 4/5  KNEE EXTENSION  4/5  ANKLE DORSIFLEXION  5/5  ANKLE PLANTARFLEXION  5/5  ----------  PAIN WITH PALPATION:global  KNEE EFFUSION: yes,   mild effusion  PAIN WITH KNEE ROM: yes  PATELLAR CREPITUS:  yes, painful and symptomatic  ----------  SENSATION TO LIGHT TOUCH:  DEEP PERONEAL/SUPERFICIAL PERONEAL/SURAL/SAPHENOUS/TIBIAL:    intact  ----------  EDEMA:   1+ edema in ankle  ERYTHEMA:    no  WOUNDS/INCISIONS:   No  _____________________________________________________________________  _____________________________________________________________________    RADIOGRAPHIC FINDINGS:   No new imaging today.  Prior imaging of the right knee from October 15, 2021 demonstrates end-stage osteoarthritis of the right knee with bone-on-bone articulation medial compartment and severe tricompartmental degenerative changes.    Assessment/Plan:   Diagnosis Plan   1. Primary osteoarthritis of right knee  XR Knee 4+ View Right    Case Request    COVID PRE-OP / PRE-PROCEDURE SCREENING ORDER (NO ISOLATION) - Swab, Nasopharynx    CBC and Differential    Basic metabolic panel    Protime-INR    APTT    Hemoglobin A1c    ECG 12 Lead    Nicotine & Metabolite, Quant    Case Request    mupirocin (Bactroban  Nasal) 2 % nasal ointment    chlorhexidine (HIBICLENS) 4 % external liquid     The patient has clinical and radiographic evidence of end-stage right knee joint degeneration. Conservative measures have been tried for 3 months or longer, but have failed to adequately treat or improve the patient's symptoms. Pain is restricting the patient's daily activities as well as quality of life. The recommendation at this time is to proceed with a right total knee arthroplasty with the goal to improve patient function and pain. The risks, benefits, potential complications, and alternatives were discussed with the patient in detail. Risks included but were not limited to bleeding, infection, anesthesia risks, damage to neurovascular structures, osteolysis, aseptic loosening, instability, dislocation, pain, continued pain, iatrogenic fracture, possible need for future surgery including the potential for amputation, blood clots, myocardial infarction, stroke, and death. Yulissa-operative blood management and the potential for blood transfusion were discussed with risks and options clearly outlined. Specific details of the surgical procedure, hospitalization, recovery, rehabilitation, and long-term precautions were also presented. Pre-operative teaching was provided. Implant/prosthesis selection was outlined, and the many options available were explained; the final choice will be made at the time of the procedure to match the anatomy and condition of the bone, ligaments, tendons, and muscles. Given this instruction, the patient elected to proceed with the right total knee arthroplasty. The patient will be seen by pre-admission testing for pre-operative optimization and risk assessment and will be scheduled for surgery once this is completed.    The patient is considered standard risk for DVT based on patient risk factors and will be placed on aspirin postoperatively for DVT prophylaxis.        Pavan Suazo MD  01/20/22  11:33  EST

## 2022-01-26 ENCOUNTER — OFFICE VISIT (OUTPATIENT)
Dept: CARDIOLOGY | Facility: CLINIC | Age: 67
End: 2022-01-26

## 2022-01-26 VITALS
SYSTOLIC BLOOD PRESSURE: 124 MMHG | WEIGHT: 270 LBS | HEIGHT: 70 IN | BODY MASS INDEX: 38.65 KG/M2 | OXYGEN SATURATION: 98 % | HEART RATE: 89 BPM | DIASTOLIC BLOOD PRESSURE: 70 MMHG

## 2022-01-26 DIAGNOSIS — I45.10 RBBB: ICD-10-CM

## 2022-01-26 DIAGNOSIS — I25.10 CORONARY ARTERY DISEASE INVOLVING NATIVE CORONARY ARTERY OF NATIVE HEART WITHOUT ANGINA PECTORIS: Primary | Chronic | ICD-10-CM

## 2022-01-26 DIAGNOSIS — E78.5 DYSLIPIDEMIA: Chronic | ICD-10-CM

## 2022-01-26 PROCEDURE — 99214 OFFICE O/P EST MOD 30 MIN: CPT | Performed by: INTERNAL MEDICINE

## 2022-01-26 RX ORDER — METOPROLOL SUCCINATE 50 MG/1
50 TABLET, EXTENDED RELEASE ORAL DAILY
Qty: 90 TABLET | Refills: 1 | Status: ON HOLD | OUTPATIENT
Start: 2022-01-26 | End: 2022-07-19 | Stop reason: SDUPTHER

## 2022-01-26 RX ORDER — ROSUVASTATIN CALCIUM 40 MG/1
40 TABLET, COATED ORAL DAILY
Qty: 90 TABLET | Refills: 1 | Status: SHIPPED | OUTPATIENT
Start: 2022-01-26 | End: 2022-07-05 | Stop reason: SDUPTHER

## 2022-01-26 NOTE — PROGRESS NOTES
[facility] at [facility]  Office visit  Thien Gray  1955    There is no work phone number on file.    VISIT DATE:  1/26/2022    PCP: Sanaz Zuluaga  [address]  [place] [address]    CC:  Chief Complaint   Patient presents with   • Coronary Artery Disease       Previous cardiac studies and procedures:  March 2020 transthoracic echo  · Left ventricular systolic function is hyperdynamic (EF > 70).  · Left ventricular diastolic dysfunction (grade I) consistent with impaired relaxation.  · Left ventricular wall thickness is consistent with mild concentric hypertrophy.    July 2020 myocardial perfusion imaging  · Extensive calcification of all 3 major epicardial vessels.  · REST EF = 61% STRESS EF = 68%.  · Left ventricular ejection fraction is normal.  · Myocardial perfusion imaging indicates a normal myocardial perfusion study with no evidence of ischemia.    June 2020  cardiac catheterization  Left main coronary artery: Eccentric 60-70 percent ostial stenosis in the proximal third of the left main coronary.  There is haziness in the proximal left main suggesting probable thrombus.  Left anterior descending coronary artery: Large vessel that wraps around the apex.  There is a long tubular 70 to 80% stenosis in the mid left anterior descending coronary artery.  The distal vessel normalizes and terminates around the apex.  The major diagonal branch has luminal irregularity.  Circumflex coronary artery: Moderate sized nondominant vessel.  There is ROSALIE II/III flow into a near subtotally occluded large first OM.  The second OM is free of disease  Right coronary artery: Large dominant vessel for the posterior circulation.  There is a 70% stenosis in the proximal right coronary prior to a previously deployed stent in the right coronary artery.  There is otherwise ectasia and luminal irregularity throughout the mid and distal right coronary.  The PDA appears free of disease as  does the posterior lateral branch    CABG  Ao/SV--->RCA  3.0 mm  Ao/SV--->D1 1.5 mm            --->OM2 2.0 mm  Ao/LIMA---> LAD 1.5 MM    TTE 1 week postop:  · Left ventricular wall thickness is consistent with mild concentric hypertrophy.  · Left ventricular ejection fraction appears to be 41 - 45%. Left ventricular systolic function is mildly decreased.  · Left ventricular diastolic dysfunction is noted.  · The right ventricular cavity is mildly dilated.  · Estimated right ventricular systolic pressure from tricuspid regurgitation is normal (<35 mmHg). Calculated right ventricular systolic pressure from tricuspid regurgitation is 29 mmHg.    Developed left groin pseudoaneurysm which was effectively treated with thrombin injection.    ASSESSMENT:   Diagnosis Plan   1. CABG 06/22/21     2. Dyslipidemia     3. RBBB         PLAN:  Coronary artery disease: Continue aspirin, statin and afterload reduction.  Currently appears stable and asymptomatic.     Right bundle branch block: Stable, continue clinical follow-up.  No evidence of significant structural or functional heart disease noted on most recent transthoracic echo.     Hyperlipidemia: Goal LDL less than 70, switching pravastatin to rosuvastatin 40 mg p.o. daily.     Cardiomyopathy, ischemic: EF 41 to 45%: Currently euvolemic and compensated.  Continue current dose of losartan.  Switching metoprolol tartrate to metoprolol succinate.  We'll continue to gradually titrate medical therapy as blood pressure allows.  Repeat echocardiographic evaluation if EF once on maximally tolerated medical therapy.      Subjective  Interval assessment: Stabilizing functional capacity after emergent coronary bypass grafting this past summer.  Intermittent gait instability mild orthostasis.  Denies chest pain, palpitations or dyspnea on exertion.  Reviewed most recent CMP, lipid profile, CBC and hemoglobin A1c.  Compliant with medical therapy.    Initial evaluation: 64-year-old  "gentleman with a history of coronary artery disease, status post remote percutaneous intervention with stenting in the setting of myocardial infarction in 2004.  Currently denies chest discomfort, palpitations, presyncope or syncope.  Has some shortness of breath and a class II pattern.  Previous history of mild venous insufficiency.  Previous smoker.  Blood pressures running less than 130/80 mmHg.  He is compliant with medical therapy.  Reviewed most recent twelve-lead EKG which revealed a new onset right bundle branch block compared to EKG obtained in 2017.  He is unclear whether he is a prominent snorer or stops breathing in his sleep.  No previous sleep evaluation.  Upcoming cataract surgery.    PHYSICAL EXAMINATION:  Vitals:    01/26/22 0921   BP: 124/70   BP Location: Right arm   Patient Position: Sitting   Pulse: 89   SpO2: 98%   Weight: 122 kg (270 lb)   Height: 177.8 cm (70\")     General Appearance:    Alert, cooperative, no distress, appears stated age   Head:    Normocephalic, without obvious abnormality, atraumatic   Eyes:    conjunctiva/corneas clear   Nose:   Nares normal, septum midline, mucosa normal, no drainage   Throat:   Lips, teeth and gums normal   Neck:   Supple, symmetrical, trachea midline, no carotid    bruit or JVD   Lungs:     Clear to auscultation bilaterally, respirations unlabored   Chest Wall:    No tenderness or deformity    Heart:    Regular rate and rhythm, S1 and S2 normal, no murmur, rub   or gallop, normal carotid impulse bilaterally without bruit.   Abdomen:     Soft, non-tender   Extremities:   Extremities normal, atraumatic, no cyanosis or edema   Pulses:   2+ and symmetric all extremities   Skin:   Skin color, texture, turgor normal, no rashes or lesions       Diagnostic Data:  Procedures  Lab Results   Component Value Date    CHLPL 125 06/16/2021    TRIG 70 06/28/2021    HDL 53 06/16/2021     Lab Results   Component Value Date    GLUCOSE 121 (H) 12/14/2021    BUN 12 " 12/14/2021    CREATININE 0.85 12/14/2021     12/14/2021    K 3.5 12/14/2021     12/14/2021    CO2 24.0 12/14/2021     Lab Results   Component Value Date    HGBA1C 6.22 (H) 11/04/2021     Lab Results   Component Value Date    WBC 8.24 12/14/2021    HGB 14.5 12/14/2021    HCT 42.7 12/14/2021     12/14/2021       Allergies  Allergies   Allergen Reactions   • Bactrim [Sulfamethoxazole-Trimethoprim] Other (See Comments)     Causes weakness, fatigue   • Plavix [Clopidogrel Bisulfate] Hives   • Augmentin [Amoxicillin-Pot Clavulanate] Confusion       Current Medications    Current Outpatient Medications:   •  Aspirin 81 MG capsule, Take  by mouth Daily., Disp: , Rfl:   •  Chlorcyclizine-Pseudoephed 25-60 MG tablet, 1/2-1 po q 8 hours PRN, Disp: 30 tablet, Rfl: 1  •  losartan (COZAAR) 25 MG tablet, Take 1 tablet by mouth Daily., Disp: , Rfl:   •  meloxicam (MOBIC) 7.5 MG tablet, TAKE 1 TABLET DAILY WITH   FOOD, Disp: 90 tablet, Rfl: 2  •  mupirocin (BACTROBAN) 2 % ointment, Apply to the inside of each nostril with a cotton swab 2 (Two) Times a Day, morning and evening, x 5 days before surgery., Disp: 22 g, Rfl: 0  •  polyethylene glycol (GoLYTELY) 236 g solution, Follow instructions mailed to your home. I also sent instructions to your my chart., Disp: 4000 mL, Rfl: 0  •  tamsulosin (FLOMAX) 0.4 MG capsule 24 hr capsule, TAKE 2 CAPSULES DAILY, Disp: 180 capsule, Rfl: 1  •  triamcinolone (KENALOG) 0.025 % ointment, Apply  topically to the appropriate area as directed 2 Times a Day for 10 days. Avoid applying to face, axilla, and groin, Disp: 15 g, Rfl: 1  •  metoprolol succinate XL (TOPROL-XL) 50 MG 24 hr tablet, Take 1 tablet by mouth Daily., Disp: 90 tablet, Rfl: 1  •  rosuvastatin (CRESTOR) 40 MG tablet, Take 1 tablet by mouth Daily., Disp: 90 tablet, Rfl: 1          ROS  Review of Systems   Cardiovascular: Negative for chest pain, dyspnea on exertion and irregular heartbeat.   Respiratory: Negative for  shortness of breath.          SOCIAL HX  Social History     Socioeconomic History   • Marital status:    • Number of children: 1   Tobacco Use   • Smoking status: Former Smoker     Packs/day: 1.50     Years: 45.00     Pack years: 67.50     Types: Cigarettes     Start date: 1971     Quit date: 2017     Years since quittin.5   • Smokeless tobacco: Never Used   Vaping Use   • Vaping Use: Never used   Substance and Sexual Activity   • Alcohol use: Yes     Alcohol/week: 0.0 standard drinks     Comment: 2 drinks a night   • Drug use: No   • Sexual activity: Not Currently     Partners: Female     Birth control/protection: Diaphragm, Post-menopausal, Spermicide     Comment:  twice, both . No STVs. Not planning to resume.       FAMILY HX  Family History   Problem Relation Age of Onset   • Cancer Mother    • Hypertension Mother    • Hyperlipidemia Mother         Takes medication   • Heart attack Mother    • Diabetes Sister    • Heart failure Father         .   • Stroke Father    • Heart attack Maternal Grandfather    • Cancer Paternal Grandmother    • Heart attack Paternal Grandfather              Phillip Eason III, MD, FACC

## 2022-02-01 ENCOUNTER — TELEPHONE (OUTPATIENT)
Dept: FAMILY MEDICINE CLINIC | Facility: CLINIC | Age: 67
End: 2022-02-01

## 2022-02-01 NOTE — TELEPHONE ENCOUNTER
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 ER if difficulty breathing develops.

## 2022-02-01 NOTE — TELEPHONE ENCOUNTER
Caller: Thien Gray    Relationship to patient: Self    Best call back number:[phone]    Date of exposure: NA    Date of positive COVID19 test: 2/01/22    Date if possible COVID19 exposure: NA    COVID19 symptoms:  SYMPTOMS STARTED ON Friday, STATED MILD SYMPTOMS FOR NOW; HEAD/NASAL CONGESTION, SCRATCHY THROAT, SLIGHT SHORTNESS OF BREATH    Date of initial quarantine: NA    Additional information or concerns: PATIENT REQUESTING ADVICE ON WHETHER THERE IS MORE HE SHOULD DO.    What is the patients preferred pharmacy: [id number] [address]  AT [facility] - [phone] [facility] [phone]   [phone]

## 2022-02-19 NOTE — PROGRESS NOTES
"Transitional Care Follow Up Visit  Subjective     Thien Gray is a 65 y.o. male who presents for a transitional care management visit.    Within 48 business hours after discharge our office contacted him via telephone to coordinate his care and needs.      I reviewed and discussed the details of that call along with the discharge summary, hospital problems, inpatient lab results, inpatient diagnostic studies, and consultation reports with Thien.     Current outpatient and discharge medications have been reconciled for the patient.  Reviewed by: GEO Hernandez      Date of TCM Phone Call 7/15/2021   [facility]    Date of Admission 7/9/2021   Date of Discharge 7/16/2021     Risk for Readmission (LACE) Score: 10 (7/9/2021  6:01 AM)      History of Present Illness   Course During Hospital Stay:  Per hospital provider discharge summary:    \"65-year-old man admitted on June 22 with an inferior STEMI.  He underwent cardiac catheterization revealing left main and three-vessel disease with preserved LV function and ultimately underwent emergent bypass surgery.  He was taken back to the OR 12 hours postop for bleeding.  Subsequently he pursued a stable hemodynamic course.  He did have to have a left femoral pseudoaneurysm repaired on July 1 with thrombin occlusion.  Otherwise blood pressure heart rate and hemodynamics remained stable.  Atrial fibrillation occurred postoperatively and this was treated with amiodarone with restoration of sinus rhythm.  The patient had a significant amount of delirium attributed to acute illness as well as possible EtOH withdrawal.  This resolved throughout the course of his hospital stay.  Today the patient was felt stable and ready for discharge for intensive rehab.\"    Per recent cardiology visit:   \"Thien Gray, 65 y.o. male with CAD s/p CABG 6/22/21, mixed hyperlipidemia, pAF presents to [facility] telemedicine visit for " Normal CPKHemoglobin A1c is 7.2 previous was 6.5 strict blood sugar control continue Metformin "Establish Care and Post-op (CABG).     Patient recently hospitalized at [facility] for chest pain.  EKG revealed STEMI.  He ultimately underwent CABGx4 on 6/22/21. Hospital course complicated gated by postop AF treated by amiodarone with restoration of NSR, left femoral pseudoaneurysm, and alcohol withdrawal.  He was discharged to a rehab facility, and was discharged home from the rehab facility today, 7/16/21.     He states that since discharge she has been doing overall well.  He is ambulating without difficulty, and happy to be home.  He was unable to take his blood pressure at time of visit, but reports blood pressures have been stable.  He denies S/S infection at surgical sites, fever/chills.  He denies edema, and other signs of hypervolemia. He denies chest pain, dyspnea, palpitation, racing heart, and syncope.  Upon review of medications he has not yet started his amlodipine, amiodarone, and atorvastatin prescribed at discharge.  Reviewed medication ordering and instructions with patient, and that prescriptions were sent to [facility].  Patient appreciative of medication update.  He has an upcoming appointment with his PCP next week, and with CT surgery on 7/29/2021.  He was instructed to continue to watch surgical sites for s/s infection, and signs of hypervolemia.\"    Patient notes that he is doing pretty good considering everything he has been through within the last month.  Energy still little low.  Appetite is been improving.  Still some sternal discomfort from surgery.  No concerns with shortness of breath or chest pains.  Patient is not able to drive pending clearance from cardiothoracic surgeon.  Patient notes that he lives alone in apartment.  Does not have a close support system.  Has hired [employer] service to take him to and from appointments.  Patient notes he is taking all medications as prescribed.  Blood pressure well controlled in office.    Dr. Zuluaga placed " "order for ultrasound of neck prior to him going into the hospital for palpable mass of right side of neck.  Patient would like to reschedule this for the next month.    The following portions of the patient's history were reviewed and updated as appropriate: allergies, current medications, past family history, past medical history, past social history, past surgical history and problem list.    Review of Systems   Constitutional: Positive for fatigue. Negative for chills, diaphoresis and fever.   HENT: Negative.  Negative for congestion, ear discharge, ear pain, hearing loss, nosebleeds, postnasal drip, sinus pressure, sneezing and sore throat.    Eyes: Negative.    Respiratory: Negative.  Negative for cough, chest tightness, shortness of breath and wheezing.    Cardiovascular: Negative for chest pain, palpitations and leg swelling.        Chest wall pain   Gastrointestinal: Negative for abdominal distention, abdominal pain, blood in stool, constipation, diarrhea, nausea and vomiting.   Genitourinary: Negative.  Negative for difficulty urinating, dysuria, flank pain, frequency, hematuria and urgency.   Skin: Negative.  Negative for color change, pallor, rash and wound.   Neurological: Negative for dizziness, syncope, weakness, light-headedness, numbness and headaches.       Objective    Blood pressure 122/64, pulse 68, temperature 97.3 °F (36.3 °C), resp. rate 18, height 180 cm (70.87\"), weight 110 kg (242 lb).     Physical Exam  Vitals and nursing note reviewed.   Constitutional:       Appearance: Normal appearance. He is well-developed.   HENT:      Head: Normocephalic and atraumatic.      Right Ear: External ear normal.      Left Ear: External ear normal.      Nose: Nose normal.   Eyes:      Conjunctiva/sclera: Conjunctivae normal.   Neck:      Thyroid: No thyromegaly.      Trachea: No tracheal deviation.   Cardiovascular:      Rate and Rhythm: Normal rate and regular rhythm.      Heart sounds: Normal heart " sounds.   Pulmonary:      Effort: Pulmonary effort is normal. No respiratory distress.      Breath sounds: Normal breath sounds. No wheezing or rales.   Chest:      Chest wall: Tenderness present.      Comments: Vertical sternal scar noted from recent surgery.  Some smaller incision sites along upper abdomen noted.  Scabbing present.  Musculoskeletal:      Cervical back: Normal range of motion and neck supple.      Comments: Ambulating with walker   Lymphadenopathy:      Cervical: No cervical adenopathy.   Skin:     General: Skin is warm and dry.   Neurological:      Mental Status: He is alert and oriented to person, place, and time.   Psychiatric:         Behavior: Behavior normal.         Thought Content: Thought content normal.         Judgment: Judgment normal.             ECG 12 Lead    Date/Time: 7/22/2021 2:25 PM  Performed by: Shakir Velazquez PA  Authorized by: Shakir Velazquez PA   Comparison: compared with previous ECG from 7/2/2021  Similar to previous ECG  Rhythm: sinus rhythm  Rate: normal  Conduction: right bundle branch block  ST Segments: ST segments normal  QRS axis: left    Clinical impression: abnormal EKG  Comments: Sinus rhythm  Left axis deviation  Right bundle branch block with left anterior fascicular block  Lateral T wave changes  Inferior infarct-not acute            Assessment/Plan   Diagnoses and all orders for this visit:    1. Hospital discharge follow-up (Primary)  -     ECG 12 Lead    2. Atrial fibrillation, unspecified type (CMS/HCC)  -     ECG 12 Lead    EKG stable.  Continue current medications.  Continue with cardiology and cardiothoracic surgery follow-ups as scheduled.  Offered patient contact to the  to arrange [employer] transportation.  Patient denies at this time, will [facility].

## 2022-02-21 ENCOUNTER — TELEPHONE (OUTPATIENT)
Dept: FAMILY MEDICINE CLINIC | Facility: CLINIC | Age: 67
End: 2022-02-21

## 2022-02-21 NOTE — TELEPHONE ENCOUNTER
PATIENT 30 DAYS PAST INITIAL SYMPTOMS OF COVID BUT STILL TESTING POSITIVE.  IS THIS NORMAL?  A LITTLE TIGHTNESS IN CHEST AND DRIPPY NOSE.      PLEASE CALL [phone]

## 2022-02-28 ENCOUNTER — OFFICE VISIT (OUTPATIENT)
Dept: FAMILY MEDICINE CLINIC | Facility: CLINIC | Age: 67
End: 2022-02-28

## 2022-02-28 ENCOUNTER — HOSPITAL ENCOUNTER (OUTPATIENT)
Dept: GENERAL RADIOLOGY | Facility: HOSPITAL | Age: 67
Discharge: HOME | End: 2022-02-28
Admitting: FAMILY MEDICINE

## 2022-02-28 VITALS
OXYGEN SATURATION: 99 % | WEIGHT: 278 LBS | BODY MASS INDEX: 39.8 KG/M2 | HEIGHT: 70 IN | DIASTOLIC BLOOD PRESSURE: 72 MMHG | RESPIRATION RATE: 20 BRPM | HEART RATE: 79 BPM | SYSTOLIC BLOOD PRESSURE: 120 MMHG | TEMPERATURE: 97.5 F

## 2022-02-28 DIAGNOSIS — R06.02 SHORTNESS OF BREATH: ICD-10-CM

## 2022-02-28 DIAGNOSIS — R35.0 BENIGN PROSTATIC HYPERPLASIA WITH URINARY FREQUENCY: ICD-10-CM

## 2022-02-28 DIAGNOSIS — N40.1 BENIGN PROSTATIC HYPERPLASIA WITH URINARY FREQUENCY: ICD-10-CM

## 2022-02-28 DIAGNOSIS — R73.03 PREDIABETES: ICD-10-CM

## 2022-02-28 DIAGNOSIS — Z86.16 HISTORY OF COVID-19: ICD-10-CM

## 2022-02-28 DIAGNOSIS — I25.10 CORONARY ARTERY DISEASE INVOLVING NATIVE HEART WITHOUT ANGINA PECTORIS, UNSPECIFIED VESSEL OR LESION TYPE: ICD-10-CM

## 2022-02-28 DIAGNOSIS — Z12.5 PROSTATE CANCER SCREENING: ICD-10-CM

## 2022-02-28 DIAGNOSIS — E78.2 MIXED HYPERLIPIDEMIA: ICD-10-CM

## 2022-02-28 DIAGNOSIS — R91.8 PULMONARY NODULES: ICD-10-CM

## 2022-02-28 DIAGNOSIS — R06.02 SHORTNESS OF BREATH: Primary | ICD-10-CM

## 2022-02-28 PROCEDURE — 99214 OFFICE O/P EST MOD 30 MIN: CPT | Performed by: FAMILY MEDICINE

## 2022-02-28 PROCEDURE — 71046 X-RAY EXAM CHEST 2 VIEWS: CPT

## 2022-02-28 NOTE — PROGRESS NOTES
Chief Complaint  Shortness of Breath    Subjective          Thien Gray presents to [facility]  History of Present Illness    Shortness of breath and history of COVID-19  The patient reports that he tested positive for COVID-19 on 02/01/2022. He states that he received all 3 doses of the COVID-19 vaccine. The patient reports that he had been symptomatic since 01/28/2022 or 02/01/2022. He states that he initially had nasal congestion, mild chest congestion, and difficulty swallowing. The patient reports that he stayed home from work for another 3 days and his symptoms improved. He states that for the past 2 weeks, he has been having a little bit of trouble breathing. The patient reports that sometimes at night, he will get a sharp pain in his left chest. The patient reports that the pain is worse at night. He denies any fever. The patient reports that he quit smoking 3 to 4 years ago.     Medical history  He states that he had a quadruple bypass in 06/2021. The patient reports that he had an artifact. He states that he went in for a colonoscopy just before 12/25/2021. The patient reports that while he was under sedation, he aspirated some emesis. He states that he had to keep him for a day or so. The patient reports that he saw his cardiologist on 01/26/2022. He states that everything was good. The patient reports that he had a CT scan of his chest in 06/2020 which showed two small nodules that appeared benign. The patient reports that he checks it once a year. He states that he has an appointment with Dr. Zuluaga in 03/2022. The patient reports that he is supposed to get knee replacement surgery done eventually, but he thinks he is going to put it off until early next year. He states that he is retiring at the end of this year.    Prediabetes  The patient reports that he does not check his blood glucose at home. He states that his cardiologist just changed his pravastatin to  "some other statin. The patient reports that he is taking Tamsulosin. He states that he feels like he is urinating better with the Tamsulosin. The patient reports that he forgot to take his morning dose yesterday.      Review of Systems   Constitutional: Negative.    HENT: Negative.    Respiratory: Positive for shortness of breath.    Gastrointestinal: Negative.       A review of systems was performed, and the pertinent positives are noted in the HPI.    Objective       Vital Signs:   /72   Pulse 79   Temp 97.5 °F (36.4 °C)   Resp 20   Ht 177.8 cm (70\")   Wt 126 kg (278 lb)   SpO2 99%   BMI 39.89 kg/m²     Physical Exam  Vitals and nursing note reviewed.   Constitutional:       General: He is not in acute distress.     Appearance: Normal appearance. He is well-developed. He is not ill-appearing.   HENT:      Head: Normocephalic and atraumatic.      Right Ear: Hearing, tympanic membrane, ear canal and external ear normal.      Left Ear: Hearing, tympanic membrane, ear canal and external ear normal.      Nose: Nose normal. No congestion or rhinorrhea.      Mouth/Throat:      Mouth: Mucous membranes are moist.      Pharynx: No oropharyngeal exudate or posterior oropharyngeal erythema.   Eyes:      General: Lids are normal.      Conjunctiva/sclera: Conjunctivae normal.      Pupils: Pupils are equal, round, and reactive to light.   Neck:      Thyroid: No thyromegaly.   Cardiovascular:      Rate and Rhythm: Normal rate and regular rhythm.      Heart sounds: Normal heart sounds. No murmur heard.  No friction rub.   Pulmonary:      Effort: Pulmonary effort is normal. No respiratory distress.      Breath sounds: Normal breath sounds. No wheezing or rales.   Abdominal:      General: Bowel sounds are normal. There is no distension.      Palpations: Abdomen is soft. There is no mass.      Tenderness: There is no abdominal tenderness. There is no guarding or rebound.   Musculoskeletal:      Cervical back: Normal " range of motion and neck supple.      Right lower leg: No edema.      Left lower leg: No edema.   Skin:     General: Skin is warm and dry.   Neurological:      General: No focal deficit present.      Mental Status: He is alert.   Psychiatric:         Mood and Affect: Mood normal.         Speech: Speech normal.         Behavior: Behavior normal.          Result Review :                     Assessment and Plan    Diagnoses and all orders for this visit:    1. Shortness of breath (Primary)  Comments:  - Advised the patient to wait a couple of months before we obtain a CT scan to make sure there is no COVID-19 infection.  Orders:  -     CBC & Differential  -     Comprehensive Metabolic Panel  -     XR Chest PA & Lateral; Future    2. History of COVID-19  -     XR Chest PA & Lateral; Future    3. Pulmonary nodules    4. Prediabetes  -     Hemoglobin A1c    5. Coronary artery disease involving native heart without angina pectoris, unspecified vessel or lesion type  -     CBC & Differential  -     Comprehensive Metabolic Panel    6. Mixed hyperlipidemia  -     Comprehensive Metabolic Panel  -     Lipid Panel With / Chol / HDL Ratio    7. Benign prostatic hyperplasia with urinary frequency  -     PSA Screen    8. Prostate cancer screening  -     PSA Screen          DISCUSSION    Pulmonary nodules.  Will await to do CT scan till he is completely over Covid.  He was due last June when he had coronary artery bypass grafting done.  He expressed understanding.            Follow Up   No follow-ups on file.    Patient was given instructions and counseling regarding his condition or for health maintenance advice. Please see specific information pulled into the AVS if appropriate.       Braden Trimble MD     Transcribed from ambient dictation for Braden Trimble MD by Tahmina Thomas.  02/28/22   13:32 EST    Patient verbalized consent to the visit recording.  I have personally performed the services described in this document as  transcribed by the above individual, and it is both accurate and complete.  Tahmina Thomas  2/28/2022  13:32 EST

## 2022-03-01 LAB
ALBUMIN SERPL-MCNC: 4.4 G/DL (ref 3.8–4.8)
ALBUMIN/GLOB SERPL: 1.5 {RATIO} (ref 1.2–2.2)
ALP SERPL-CCNC: 70 IU/L (ref 44–121)
ALT SERPL-CCNC: 36 IU/L (ref 0–44)
AST SERPL-CCNC: 30 IU/L (ref 0–40)
BASOPHILS # BLD AUTO: 0 X10E3/UL (ref 0–0.2)
BASOPHILS NFR BLD AUTO: 1 %
BILIRUB SERPL-MCNC: 0.5 MG/DL (ref 0–1.2)
BUN SERPL-MCNC: 14 MG/DL (ref 8–27)
BUN/CREAT SERPL: 19 (ref 10–24)
CALCIUM SERPL-MCNC: 9.6 MG/DL (ref 8.6–10.2)
CHLORIDE SERPL-SCNC: 102 MMOL/L (ref 96–106)
CHOLEST SERPL-MCNC: 117 MG/DL (ref 100–199)
CHOLEST/HDLC SERPL: 2.1 RATIO (ref 0–5)
CO2 SERPL-SCNC: 18 MMOL/L (ref 20–29)
CREAT SERPL-MCNC: 0.74 MG/DL (ref 0.76–1.27)
EGFR GENE MUT ANL BLD/T: 99 ML/MIN/1.73
EOSINOPHIL # BLD AUTO: 0.4 X10E3/UL (ref 0–0.4)
EOSINOPHIL NFR BLD AUTO: 6 %
ERYTHROCYTE [DISTWIDTH] IN BLOOD BY AUTOMATED COUNT: 13.1 % (ref 11.6–15.4)
GLOBULIN SER CALC-MCNC: 2.9 G/DL (ref 1.5–4.5)
GLUCOSE SERPL-MCNC: 107 MG/DL (ref 65–99)
HBA1C MFR BLD: 6.1 % (ref 4.8–5.6)
HCT VFR BLD AUTO: 44.2 % (ref 37.5–51)
HDLC SERPL-MCNC: 57 MG/DL
HGB BLD-MCNC: 15.1 G/DL (ref 13–17.7)
IMM GRANULOCYTES # BLD AUTO: 0.1 X10E3/UL (ref 0–0.1)
IMM GRANULOCYTES NFR BLD AUTO: 1 %
LDLC SERPL CALC-MCNC: 39 MG/DL (ref 0–99)
LYMPHOCYTES # BLD AUTO: 2 X10E3/UL (ref 0.7–3.1)
LYMPHOCYTES NFR BLD AUTO: 30 %
MCH RBC QN AUTO: 30.6 PG (ref 26.6–33)
MCHC RBC AUTO-ENTMCNC: 34.2 G/DL (ref 31.5–35.7)
MCV RBC AUTO: 90 FL (ref 79–97)
MONOCYTES # BLD AUTO: 0.6 X10E3/UL (ref 0.1–0.9)
MONOCYTES NFR BLD AUTO: 9 %
NEUTROPHILS # BLD AUTO: 3.7 X10E3/UL (ref 1.4–7)
NEUTROPHILS NFR BLD AUTO: 53 %
PLATELET # BLD AUTO: 209 X10E3/UL (ref 150–450)
POTASSIUM SERPL-SCNC: 4.4 MMOL/L (ref 3.5–5.2)
PROT SERPL-MCNC: 7.3 G/DL (ref 6–8.5)
PSA SERPL-MCNC: 2.3 NG/ML (ref 0–4)
RBC # BLD AUTO: 4.93 X10E6/UL (ref 4.14–5.8)
SODIUM SERPL-SCNC: 138 MMOL/L (ref 134–144)
TRIGL SERPL-MCNC: 121 MG/DL (ref 0–149)
VLDLC SERPL CALC-MCNC: 21 MG/DL (ref 5–40)
WBC # BLD AUTO: 6.8 X10E3/UL (ref 3.4–10.8)

## 2022-03-02 DIAGNOSIS — M17.0 PRIMARY OSTEOARTHRITIS OF BOTH KNEES: ICD-10-CM

## 2022-03-02 RX ORDER — MELOXICAM 7.5 MG/1
TABLET ORAL
Qty: 90 TABLET | Refills: 2 | Status: ON HOLD | OUTPATIENT
Start: 2022-03-02 | End: 2022-07-19

## 2022-03-04 ENCOUNTER — OFFICE VISIT (OUTPATIENT)
Dept: NEUROLOGY | Facility: CLINIC | Age: 67
End: 2022-03-04

## 2022-03-04 VITALS
OXYGEN SATURATION: 98 % | DIASTOLIC BLOOD PRESSURE: 88 MMHG | HEART RATE: 100 BPM | BODY MASS INDEX: 39.8 KG/M2 | SYSTOLIC BLOOD PRESSURE: 148 MMHG | WEIGHT: 278 LBS | HEIGHT: 70 IN

## 2022-03-04 DIAGNOSIS — R41.9 COGNITIVE COMPLAINTS: ICD-10-CM

## 2022-03-04 DIAGNOSIS — F10.20 ALCOHOL DEPENDENCE, DAILY USE: ICD-10-CM

## 2022-03-04 DIAGNOSIS — G57.10 LATERAL FEMORAL CUTANEOUS NEUROPATHY, UNSPECIFIED LATERALITY: ICD-10-CM

## 2022-03-04 DIAGNOSIS — R29.810 FACIAL WEAKNESS: ICD-10-CM

## 2022-03-04 DIAGNOSIS — G60.9 IDIOPATHIC PERIPHERAL NEUROPATHY: Primary | ICD-10-CM

## 2022-03-04 PROCEDURE — 99214 OFFICE O/P EST MOD 30 MIN: CPT | Performed by: NURSE PRACTITIONER

## 2022-03-04 NOTE — PROGRESS NOTES
Subjective:     Patient ID: Thien Gray is a 66 y.o. male.    CC:   Chief Complaint   Patient presents with   • Numbness   • Peripheral Neuropathy   • Memory Loss       HPI:   History of Present Illness     This is a pleasant 66-year-old male who presents for 3-month neurology follow-up on neuropathy with no significant pain.  He did have additional labs in regards to his neuropathy last visit November 4, 2021 with normal methylmalonic acid, normal vitamin B12 and folate, immunofixation and protein electrophoresis normal, hemoglobin A1c 6.22%, serum creatinine normal, TSH and free T4 normal.  He has been prediabetic for years.  He does have his labs checked regularly with PCP.    Since last visit we did complete an MRI of the brain with and without contrast on 12/9/2021 when we observe facial weakness on the right side on his exam.  Also some memory difficulties.  He does have mild atrophy of the brain and some minimal chronic small vessel ischemic changes with no acute abnormalities and no abnormal contrast-enhancement.  We reviewed MRI of the brain today and essentially to me appears unremarkable for his age.He also has some difficulty with focus and concentration but overall his memory has been okay.  He does continue to work as a .  He does plan to retire in December 2022.      He tells me a long time ago he was diagnosed with sleep apnea but he does not have the same symptoms anymore.  He wakes up about every 2 hours related to his prostate but goes right back to bed.  He feels well rested in the mornings.  He is currently not interested in a sleep study.  His siblings have had the sleep studies and have not been able to tolerate the CPAP. Continues to remain independent.  He continues to drive without any issues. He continues to function independently.  He does continue to drink 2-3 alcoholic beverages every evening.  Typically he drinks beer or wine.    Significant medical history  and workup:  He underwent coronary artery bypass x4 surgery on 6/22/2021.  He tells me during his admission he had delirium tremens secondary to daily alcohol use.  He tells me they gave him a beer every evening while in the hospital.  He tells me that he became very agitated in the ICU and had to be restrained.  He did undergo EMG with [facility] completed on 10/21/2021 by Dr. 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  He does tell me that he has been prediabetic in the past.     The following portions of the patient's history were reviewed and updated as appropriate: allergies, current medications, past family history, past medical history, past social history, past surgical history and problem list.    Past Medical History:   Diagnosis Date   • Abnormal ECG 1/20/2020    Get from Dr. Kayser   • Benign prostatic hyperplasia    • Cataract    • Coronary artery disease    • History of heart attack 2004   • History of MI (myocardial infarction)    • Hyperlipidemia    • Myocardial infarction (HCC)    • Obesity    • Peripheral neuropathy 11/4/2021    diagnosed on previous visit   • RBBB 2/10/2020       Past Surgical History:   Procedure Laterality Date   • APPENDECTOMY  1999   • CARDIAC CATHETERIZATION  1/1/2004    They used a catheter to put in my stent.   • CARDIAC CATHETERIZATION N/A 6/22/2021    Procedure: Left Heart Cath;  Surgeon: Mikey Conrad MD;  Location:  [facility] CATH INVASIVE LOCATION;  Service: Cardiovascular;  Laterality: N/A;   • COLONOSCOPY N/A 12/23/2021    Procedure: COLONOSCOPY;  Surgeon: Brunner, Mark I, MD;  Location:  [facility] ENDOSCOPY;  Service: Gastroenterology;  Laterality: N/A;  several polyps removed. 2 clips placed at ascending colon, 2 clips in transverse colon, and 2 clips in sigmoid colon.     • CORONARY ANGIOPLASTY  2004    I  think that's what the bill for my stent said.   • CORONARY ARTERY BYPASS BRING BACK FOR EXPLORATION N/A 2021    Procedure: BRING BACK FOR EXPLORATION OPEN HEART;  Surgeon: Miguel Angel Acosta MD;  Location:  [facility];  Service: Cardiothoracic;  Laterality: N/A;   • CORONARY ARTERY BYPASS GRAFT N/A 2021    Procedure: MEDIAN STERNOTOMY, CORONARY ARTERY BYPASS GRAFTING X 4 WITH LEFT INTERNAL MAMMARY ARTERY GRAFT, ENDOSCOPIC VEIN HARVESTING OF THE RIGHT GREATER SAPHENOUS VEIN, INTRA-AORTIC BALLOON PUMP INSERTION, IVAN PER ANESTHESIA;  Surgeon: Miguel Angel Acosta MD;  Location:  [facility];  Service: Cardiothoracic;  Laterality: N/A;   • CORONARY STENT PLACEMENT     • INTERVENTIONAL RADIOLOGY PROCEDURE N/A 2021    Procedure: thrombin injection;  Surgeon: Abdiel Tamez MD;  Location: [facility] CATH INVASIVE LOCATION;  Service: Interventional Radiology;  Laterality: N/A;       Social History     Socioeconomic History   • Marital status:    • Number of children: 1   Tobacco Use   • Smoking status: Former Smoker     Packs/day: 1.50     Years: 45.00     Pack years: 67.50     Types: Cigarettes     Start date: 1971     Quit date: 2017     Years since quittin.6   • Smokeless tobacco: Never Used   Vaping Use   • Vaping Use: Never used   Substance and Sexual Activity   • Alcohol use: Yes     Alcohol/week: 2.0 standard drinks     Types: 2 Glasses of wine per week     Comment: 2 drinks a night   • Drug use: No   • Sexual activity: Not Currently     Partners: Female     Birth control/protection: Diaphragm, Post-menopausal, Spermicide     Comment:  twice, both . No STVs. Not planning to resume.       Family History   Problem Relation Age of Onset   • Cancer Mother         Sinus tumor; had radiation therapy & surgery; tumor all gone.   • Hypertension Mother    • Hyperlipidemia Mother         Takes medication   • Heart attack Mother    • Diabetes Sister    • Heart failure Father         .   •  "Stroke Father    • Heart attack Maternal Grandfather    • Cancer Paternal Grandmother    • Heart attack Paternal Grandfather    • Diabetes Sister         Diabetic from childhood        Review of Systems   Constitutional: Negative for chills, fatigue, fever and unexpected weight change.   HENT: Negative for ear pain, hearing loss, nosebleeds, rhinorrhea and sore throat.    Eyes: Negative for photophobia, pain, discharge, itching and visual disturbance.   Respiratory: Negative for cough, chest tightness, shortness of breath and wheezing.    Cardiovascular: Negative for chest pain, palpitations and leg swelling.   Gastrointestinal: Negative for abdominal pain, blood in stool, constipation, diarrhea, nausea and vomiting.   Genitourinary: Negative for dysuria, frequency, hematuria and urgency.   Musculoskeletal: Negative for arthralgias, back pain, gait problem, joint swelling, myalgias, neck pain and neck stiffness.   Skin: Negative for rash and wound.   Allergic/Immunologic: Negative for environmental allergies and food allergies.   Neurological: Positive for numbness. Negative for dizziness, tremors, seizures, syncope, speech difficulty, weakness, light-headedness and headaches.   Hematological: Negative for adenopathy. Does not bruise/bleed easily.   Psychiatric/Behavioral: Positive for decreased concentration. Negative for agitation, confusion, hallucinations, sleep disturbance and suicidal ideas. The patient is not nervous/anxious.    All other systems reviewed and are negative.       Objective:  /88   Pulse 100   Ht 177.8 cm (70\")   Wt 126 kg (278 lb)   SpO2 98%   BMI 39.89 kg/m²     Neurologic Exam     Mental Status   Oriented to person, place, and time.   Speech: speech is normal   Level of consciousness: alert    Cranial Nerves     CN II   Visual fields full to confrontation.     CN III, IV, VI   Pupils are equal, round, and reactive to light.  Extraocular motions are normal.     CN V   Facial " sensation intact.     CN VII   Right facial weakness: central (right lower facial droop chronic)  Left facial weakness: none    CN VIII   CN VIII normal.     CN IX, X   CN IX normal.   CN X normal.     CN XI   CN XI normal.     CN XII   CN XII normal.     Motor Exam   Muscle bulk: normal  Overall muscle tone: normal    Strength   Strength 5/5 throughout.     Sensory Exam   Right leg light touch: decreased from toes  Left leg light touch: decreased from toes  Right leg vibration: decreased from toes  Left leg vibration: decreased from toes  Right leg pinprick: decreased from toes  Left leg pinprick: decreased from toes    Gait, Coordination, and Reflexes     Gait  Gait: (antalgic)    Coordination   Finger to nose coordination: normal    Tremor   Resting tremor: absent  Intention tremor: present (mild bue kinetic fine hand tremor)  Action tremor: absent    Reflexes   Reflexes 2+ except as noted.       Physical Exam  Constitutional:       Appearance: Normal appearance. He is obese.      Comments: BMI 39.9   Eyes:      Extraocular Movements: EOM normal.      Pupils: Pupils are equal, round, and reactive to light.   Neurological:      Mental Status: He is alert and oriented to person, place, and time.      Coordination: Finger-Nose-Finger Test normal.      Deep Tendon Reflexes: Strength normal.   Psychiatric:         Mood and Affect: Mood and affect normal.         Speech: Speech normal.         Behavior: Behavior normal.         Thought Content: Thought content normal.         Cognition and Memory: Cognition and memory normal.         Judgment: Judgment normal.     MOCA 28/30 Recall 3/5-3/4/2022    Assessment/Plan:       Diagnoses and all orders for this visit:    1. Idiopathic peripheral neuropathy (Primary)  Comments:  no pain, monitor    2. Lateral femoral cutaneous neuropathy, unspecified laterality  Comments:  encouraged weight loss, avoid tight fitting clothes    3. Facial weakness  Comments:  chronic, mri brain  no stroke    4. Cognitive complaints  Comments:  MOCA normal, will follow for now, likely 2nd to alcohol dependence.    5. Alcohol dependence, daily use (HCC)  Comments:  encouraged to slowly cut back-he does not have plans to do so at this time           MRI of the brain essentially unremarkable here.  No evidence of stroke.  Suspect possible facial nerve injury related to possible palsy in the past.  No definitive known cause of the right facial weakness.  His neuropathy is there but is not bothersome or painful so we will not use medication at this time.  We will continue to monitor his cognitive concerns.  Cognitive testing today is reassuring.  We did discuss the option of speech-language pathology for cognitive therapy or referral for formal neurocognitive testing but he would like to wait on this for now.  I also made a recommendation of referral for sleep apnea but he would like to wait on this as well.  We also discussed the benefits of weight loss as well as cutting back on alcohol intake.  We are going to follow-up in 6 months with reevaluation of his cognitive complaints.  We plan to follow-up yearly after that unless there is a significant change.  At any point he changes his mind about referrals he is welcome to call us for referral prior to his follow-up appointment.  Reviewed medications, potential side effects and signs and symptoms to report. Discussed risk versus benefits of treatment plan with patient and/or family-including medications, labs and radiology that may be ordered. Addressed questions and concerns during visit. Patient and/or family verbalized understanding and agree with plan.    AS THE PROVIDER, I PERSONALLY WORE PPE DURING ENTIRE FACE TO FACE ENCOUNTER IN CLINIC WITH THE PATIENT. PATIENT ALSO WORE PPE DURING ENTIRE FACE TO FACE ENCOUNTER EXCEPT FOR A MAX OF 30 SECONDS DURING NEUROLOGICAL EVALUATION OF CRANIAL NERVES AND THEN MASK WAS PLACED BACK OVER PATIENT FACE FOR REMAINDER OF  VISIT. I WASHED MY HANDS BEFORE AND AFTER VISIT.    During this visit the following were done:  Labs Reviewed [x]    Labs Ordered []    Radiology Reports Reviewed [x]    Radiology Ordered []    PCP Records Reviewed []    Referring Provider Records Reviewed []    ER Records Reviewed []    Hospital Records Reviewed []    History Obtained From Family []    Radiology Images Reviewed [x]    Other Reviewed []    Records Requested []      Guera Baer, GEE  3/4/2022

## 2022-03-09 ENCOUNTER — OFFICE VISIT (OUTPATIENT)
Dept: FAMILY MEDICINE CLINIC | Facility: CLINIC | Age: 67
End: 2022-03-09

## 2022-03-09 VITALS
BODY MASS INDEX: 39.97 KG/M2 | DIASTOLIC BLOOD PRESSURE: 86 MMHG | OXYGEN SATURATION: 98 % | HEIGHT: 70 IN | WEIGHT: 279.2 LBS | TEMPERATURE: 97.1 F | SYSTOLIC BLOOD PRESSURE: 128 MMHG | RESPIRATION RATE: 18 BRPM | HEART RATE: 82 BPM

## 2022-03-09 DIAGNOSIS — R06.02 SHORTNESS OF BREATH: Primary | ICD-10-CM

## 2022-03-09 DIAGNOSIS — R73.03 PREDIABETES: ICD-10-CM

## 2022-03-09 DIAGNOSIS — E78.2 MIXED HYPERLIPIDEMIA: ICD-10-CM

## 2022-03-09 PROCEDURE — 99214 OFFICE O/P EST MOD 30 MIN: CPT | Performed by: FAMILY MEDICINE

## 2022-03-09 PROCEDURE — 93000 ELECTROCARDIOGRAM COMPLETE: CPT | Performed by: FAMILY MEDICINE

## 2022-03-09 NOTE — PROGRESS NOTES
"Chief Complaint  6m f/up (Recently saw Dr. Trimble for evaluation of shortness of breath ), Shortness of Breath (Had covid in jan.), and Chest Pain (Going on for 3 weeks)    Subjective          Thien Gray presents to [facility]  History of Present Illness  He had Covid in Jan 2022  Shortness of breath developed during that time frame. He states that SOA resolved, but within the last 3 weeks, has returned.   Denies shortness of breath at rest, usually present at night and with exertion, however states today it seems to have worsened.   Last night, he had sharp left sided chest pain along lateral chest wall. This resolved quickly  Weight is going up, but states that he is not active. Hoping to be more active once weather warms up.   He is scheduled with cardiology in April 2022  He has an albuterol inhaler, however not using this.   CXR completed 2/28/22 without acute findings.     Labs completed recently  Cholesterol panel well controlled with rosuvastatin  HgA1c continues to be elevated, 6.1%. He is not diabetic, aware to start working on dietary improvement.      The following portions of the patient's history were reviewed and updated as appropriate: allergies, current medications, past family history, past medical history, past social history, past surgical history and problem list.    Objective   Vital Signs:   /86   Pulse 82   Temp 97.1 °F (36.2 °C)   Resp 18   Ht 177.8 cm (70\")   Wt 127 kg (279 lb 3.2 oz)   SpO2 98%   BMI 40.06 kg/m²     Physical Exam  Vitals and nursing note reviewed.   Constitutional:       Appearance: He is well-developed.   HENT:      Head: Normocephalic and atraumatic.   Eyes:      Conjunctiva/sclera: Conjunctivae normal.   Cardiovascular:      Rate and Rhythm: Normal rate and regular rhythm.      Heart sounds: Normal heart sounds.   Pulmonary:      Effort: Pulmonary effort is normal. No respiratory distress.      Breath sounds: " Normal breath sounds. No wheezing.   Musculoskeletal:         General: No deformity.   Skin:     General: Skin is warm and dry.   Neurological:      General: No focal deficit present.      Mental Status: He is alert and oriented to person, place, and time.   Psychiatric:         Behavior: Behavior normal.        Result Review :            ECG 12 Lead    Date/Time: 3/9/2022 9:08 AM  Performed by: Sanaz Zuluaga DO  Authorized by: Sanaz Zuluaga DO   Comparison: compared with previous ECG from 7/2/2021  Similar to previous ECG  Comparison to previous ECG: RBBB now present, which has been present on other EKGs  Rhythm: sinus rhythm  Rate: normal  BPM: 84  Conduction: right bundle branch block  Q waves: II, III and aVF    QRS axis: left    Clinical impression: abnormal EKG              Assessment and Plan    Diagnoses and all orders for this visit:    1. Shortness of breath (Primary)  Comments:  Stable EKG  Advised to start using albuterol inhaler to improve  Will check BNP and D-dimer today, consider CT vs CTA chest  Orders:  -     Cancel: D-dimer, Quantitative  -     D-dimer, Quantitative  -     BNP ([facility] Only)  -     ECG 12 Lead    2. Prediabetes  Comments:  Avoid simple sugars and limit intake of starches  Start routine exercise as tolerated     3. Mixed hyperlipidemia  Comments:  Controlled with rosuvastatin, no changes.         Follow Up   Return in about 6 months (around 9/9/2022) for Recheck.  Patient was given instructions and counseling regarding his condition or for health maintenance advice. Please see specific information pulled into the AVS if appropriate.

## 2022-03-10 DIAGNOSIS — R91.1 LUNG NODULE: Primary | ICD-10-CM

## 2022-03-10 LAB
BNP SERPL-MCNC: 66.1 PG/ML (ref 0–100)
D DIMER PPP FEU-MCNC: <0.27 MCGFEU/ML (ref 0.01–0.5)

## 2022-03-22 ENCOUNTER — HOSPITAL ENCOUNTER (OUTPATIENT)
Dept: CT IMAGING | Facility: HOSPITAL | Age: 67
Discharge: HOME | End: 2022-03-22
Admitting: FAMILY MEDICINE

## 2022-03-22 PROCEDURE — 71250 CT THORAX DX C-: CPT

## 2022-04-26 DIAGNOSIS — N40.1 BENIGN PROSTATIC HYPERPLASIA WITH URINARY FREQUENCY: ICD-10-CM

## 2022-04-26 DIAGNOSIS — R35.0 BENIGN PROSTATIC HYPERPLASIA WITH URINARY FREQUENCY: ICD-10-CM

## 2022-04-26 RX ORDER — TAMSULOSIN HYDROCHLORIDE 0.4 MG/1
CAPSULE ORAL
Qty: 180 CAPSULE | Refills: 1 | Status: SHIPPED | OUTPATIENT
Start: 2022-04-26 | End: 2022-10-17

## 2022-05-10 ENCOUNTER — TELEPHONE (OUTPATIENT)
Dept: FAMILY MEDICINE CLINIC | Facility: CLINIC | Age: 67
End: 2022-05-10

## 2022-05-10 RX ORDER — ALBUTEROL SULFATE 90 UG/1
2 AEROSOL, METERED RESPIRATORY (INHALATION) EVERY 4 HOURS PRN
Qty: 8 G | Refills: 5 | Status: SHIPPED | OUTPATIENT
Start: 2022-05-10

## 2022-05-10 NOTE — TELEPHONE ENCOUNTER
Caller: Thien Gray    Relationship: Self    Best call back number: [phone]     Requested Prescriptions:   Requested Prescriptions      No prescriptions requested or ordered in this encounter      ALBUTEROL INHALER        Pharmacy where request should be sent: [id number] [address]  AT [facility] - [phone] [facility] [phone] FX     Additional details provided by patient: PATIENT STATES IT HAS 58 DOSES LEFT BUT NOTHING IS COMING OUT AND HE IS REQUESTING A NEW ONE    Does the patient have less than a 3 day supply:  [x] Yes  [] No    Huey Zarate Rep   05/10/22 08:25 EDT

## 2022-06-06 ENCOUNTER — OFFICE VISIT (OUTPATIENT)
Dept: CARDIOLOGY | Facility: CLINIC | Age: 67
End: 2022-06-06

## 2022-06-06 VITALS
OXYGEN SATURATION: 98 % | HEIGHT: 70 IN | HEART RATE: 100 BPM | BODY MASS INDEX: 40.8 KG/M2 | DIASTOLIC BLOOD PRESSURE: 68 MMHG | SYSTOLIC BLOOD PRESSURE: 114 MMHG | WEIGHT: 285 LBS

## 2022-06-06 DIAGNOSIS — I10 PRIMARY HYPERTENSION: ICD-10-CM

## 2022-06-06 DIAGNOSIS — E78.5 DYSLIPIDEMIA: Chronic | ICD-10-CM

## 2022-06-06 DIAGNOSIS — R40.0 DAYTIME SOMNOLENCE: ICD-10-CM

## 2022-06-06 DIAGNOSIS — I25.10 CORONARY ARTERY DISEASE INVOLVING NATIVE CORONARY ARTERY OF NATIVE HEART WITHOUT ANGINA PECTORIS: Primary | Chronic | ICD-10-CM

## 2022-06-06 PROCEDURE — 99214 OFFICE O/P EST MOD 30 MIN: CPT | Performed by: INTERNAL MEDICINE

## 2022-06-06 RX ORDER — PSEUDOEPHEDRINE HCL 30 MG
1 TABLET ORAL EVERY OTHER DAY
COMMUNITY
End: 2022-07-28

## 2022-06-06 NOTE — PROGRESS NOTES
[facility] at [facility]  Office visit  Thien Gray  1955    There is no work phone number on file.    VISIT DATE:  6/6/2022    PCP: Sanaz Zuluaga  [address] [place] [address]    CC:  Chief Complaint   Patient presents with   • cabg 06/22/21       Previous cardiac studies and procedures:  March 2020 transthoracic echo  · Left ventricular systolic function is hyperdynamic (EF > 70).  · Left ventricular diastolic dysfunction (grade I) consistent with impaired relaxation.  · Left ventricular wall thickness is consistent with mild concentric hypertrophy.    July 2020 myocardial perfusion imaging  · Extensive calcification of all 3 major epicardial vessels.  · REST EF = 61% STRESS EF = 68%.  · Left ventricular ejection fraction is normal.  · Myocardial perfusion imaging indicates a normal myocardial perfusion study with no evidence of ischemia.    June 2020  cardiac catheterization  Left main coronary artery: Eccentric 60-70 percent ostial stenosis in the proximal third of the left main coronary.  There is haziness in the proximal left main suggesting probable thrombus.  Left anterior descending coronary artery: Large vessel that wraps around the apex.  There is a long tubular 70 to 80% stenosis in the mid left anterior descending coronary artery.  The distal vessel normalizes and terminates around the apex.  The major diagonal branch has luminal irregularity.  Circumflex coronary artery: Moderate sized nondominant vessel.  There is ROSALIE II/III flow into a near subtotally occluded large first OM.  The second OM is free of disease  Right coronary artery: Large dominant vessel for the posterior circulation.  There is a 70% stenosis in the proximal right coronary prior to a previously deployed stent in the right coronary artery.  There is otherwise ectasia and luminal irregularity throughout the mid and distal right coronary.  The PDA appears free of disease as does the  posterior lateral branch    CABG  Ao/SV--->RCA  3.0 mm  Ao/SV--->D1 1.5 mm            --->OM2 2.0 mm  Ao/LIMA---> LAD 1.5 MM    TTE 1 week postop:  · Left ventricular wall thickness is consistent with mild concentric hypertrophy.  · Left ventricular ejection fraction appears to be 41 - 45%. Left ventricular systolic function is mildly decreased.  · Left ventricular diastolic dysfunction is noted.  · The right ventricular cavity is mildly dilated.  · Estimated right ventricular systolic pressure from tricuspid regurgitation is normal (<35 mmHg). Calculated right ventricular systolic pressure from tricuspid regurgitation is 29 mmHg.    Developed left groin pseudoaneurysm which was effectively treated with thrombin injection.    ASSESSMENT:   Diagnosis Plan   1. CABG 06/22/21     2. Dyslipidemia     3. Primary hypertension         PLAN:  Coronary artery disease: Continue aspirin, statin and afterload reduction.  Currently appears stable and asymptomatic.     Right bundle branch block: Stable, continue clinical follow-up.  No evidence of significant structural or functional heart disease noted on most recent transthoracic echo.     Hyperlipidemia: Goal LDL less than 70, continue rosuvastatin 40 mg p.o. daily.     Cardiomyopathy, ischemic: EF 41 to 45%: Currently euvolemic and compensated.  Continue current medical therapy.  Recent repeat echocardiographic evaluation with the patient's primary cardiologist.    Daytime somnolence: High risk for obstructive sleep apnea, sleep medicine referral pending.    -Patient will follow-up with Dr. Conrad on a regular basis in the future.    Subjective  Interval assessment: Stable functional capacity.  Some mild increase in his baseline shortness of breath following COVID-19 in January of this year.  Sees Dr. Conrad on a regular basis who recently checked a transthoracic echocardiogram, results not available for review today, and has the patient scheduled for upcoming stress  "test.  Blood pressures running less than 120/80 mmHg.  He is compliant with medical therapy.  Describes daytime somnolence.    Initial evaluation: 64-year-old gentleman with a history of coronary artery disease, status post remote percutaneous intervention with stenting in the setting of myocardial infarction in 2004.  Currently denies chest discomfort, palpitations, presyncope or syncope.  Has some shortness of breath and a class II pattern.  Previous history of mild venous insufficiency.  Previous smoker.  Blood pressures running less than 130/80 mmHg.  He is compliant with medical therapy.  Reviewed most recent twelve-lead EKG which revealed a new onset right bundle branch block compared to EKG obtained in 2017.  He is unclear whether he is a prominent snorer or stops breathing in his sleep.  No previous sleep evaluation.  Upcoming cataract surgery.    PHYSICAL EXAMINATION:  Vitals:    06/06/22 1017   BP: 114/68   BP Location: Right arm   Patient Position: Sitting   Pulse: 100   SpO2: 98%   Weight: 129 kg (285 lb)   Height: 177.8 cm (70\")     General Appearance:    Alert, cooperative, no distress, appears stated age   Head:    Normocephalic, without obvious abnormality, atraumatic   Eyes:    conjunctiva/corneas clear   Nose:   Nares normal, septum midline, mucosa normal, no drainage   Throat:   Lips, teeth and gums normal   Neck:   Supple, symmetrical, trachea midline, no carotid    bruit or JVD   Lungs:     Clear to auscultation bilaterally, respirations unlabored   Chest Wall:    No tenderness or deformity    Heart:    Regular rate and rhythm, S1 and S2 normal, no murmur, rub   or gallop, normal carotid impulse bilaterally without bruit.   Abdomen:     Soft, non-tender   Extremities:   Extremities normal, atraumatic, no cyanosis or edema   Pulses:   2+ and symmetric all extremities   Skin:   Skin color, texture, turgor normal, no rashes or lesions       Diagnostic Data:  Procedures  Lab Results   Component " Value Date    CHLPL 117 02/28/2022    TRIG 121 02/28/2022    HDL 57 02/28/2022     Lab Results   Component Value Date    GLUCOSE 107 (H) 02/28/2022    BUN 14 02/28/2022    CREATININE 0.74 (L) 02/28/2022     02/28/2022    K 4.4 02/28/2022     02/28/2022    CO2 18 (L) 02/28/2022     Lab Results   Component Value Date    HGBA1C 6.1 (H) 02/28/2022     Lab Results   Component Value Date    WBC 6.8 02/28/2022    HGB 15.1 02/28/2022    HCT 44.2 02/28/2022     02/28/2022       Allergies  Allergies   Allergen Reactions   • Bactrim [Sulfamethoxazole-Trimethoprim] Other (See Comments)     Causes weakness, fatigue   • Plavix [Clopidogrel Bisulfate] Hives   • Augmentin [Amoxicillin-Pot Clavulanate] Confusion       Current Medications    Current Outpatient Medications:   •  albuterol sulfate  (90 Base) MCG/ACT inhaler, Inhale 2 puffs Every 4 (Four) Hours As Needed for Wheezing or Shortness of Air., Disp: 8 g, Rfl: 5  •  Aspirin 81 MG capsule, Take  by mouth Daily., Disp: , Rfl:   •  losartan (COZAAR) 25 MG tablet, Take 1 tablet by mouth Daily., Disp: , Rfl:   •  meloxicam (MOBIC) 7.5 MG tablet, TAKE 1 TABLET DAILY WITH   FOOD, Disp: 90 tablet, Rfl: 2  •  metoprolol succinate XL (TOPROL-XL) 50 MG 24 hr tablet, Take 1 tablet by mouth Daily., Disp: 90 tablet, Rfl: 1  •  Pseudoephedrine HCl (SUDAFED PO), Take 1 tablet by mouth Every Other Day., Disp: , Rfl:   •  rosuvastatin (CRESTOR) 40 MG tablet, Take 1 tablet by mouth Daily., Disp: 90 tablet, Rfl: 1  •  tamsulosin (FLOMAX) 0.4 MG capsule 24 hr capsule, TAKE 2 CAPSULES DAILY, Disp: 180 capsule, Rfl: 1  •  triamcinolone (KENALOG) 0.025 % ointment, Apply  topically to the appropriate area as directed 2 Times a Day for 10 days. Avoid applying to face, axilla, and groin, Disp: 15 g, Rfl: 1          ROS  Review of Systems   Cardiovascular: Negative for chest pain, dyspnea on exertion and irregular heartbeat.   Respiratory: Negative for shortness of breath.           SOCIAL HX  Social History     Socioeconomic History   • Marital status:    • Number of children: 1   Tobacco Use   • Smoking status: Former Smoker     Packs/day: 1.50     Years: 45.00     Pack years: 67.50     Types: Cigarettes     Start date: 1971     Quit date: 2017     Years since quittin.8   • Smokeless tobacco: Never Used   Vaping Use   • Vaping Use: Never used   Substance and Sexual Activity   • Alcohol use: Yes     Alcohol/week: 2.0 standard drinks     Types: 2 Glasses of wine per week     Comment: 2 drinks a night   • Drug use: No   • Sexual activity: Not Currently     Partners: Female     Birth control/protection: Diaphragm, Post-menopausal, Spermicide     Comment:  twice, both . No STVs. Not planning to resume.       FAMILY HX  Family History   Problem Relation Age of Onset   • Cancer Mother         Sinus tumor; had radiation therapy & surgery; tumor all gone.   • Hypertension Mother    • Hyperlipidemia Mother         Takes medication   • Heart attack Mother    • Diabetes Sister    • Heart failure Father         .   • Stroke Father    • Heart attack Maternal Grandfather    • Cancer Paternal Grandmother    • Heart attack Paternal Grandfather    • Diabetes Sister         Diabetic from childhood             Phillip Eason III, MD, [facility]

## 2022-07-05 RX ORDER — PSEUDOEPHEDRINE HCL 30 MG
TABLET ORAL EVERY OTHER DAY
Status: CANCELLED | OUTPATIENT
Start: 2022-07-05

## 2022-07-05 NOTE — TELEPHONE ENCOUNTER
Caller: Thien Gray    Relationship: Self    Best call back number: [phone]    Requested Prescriptions:   Requested Prescriptions     Pending Prescriptions Disp Refills   • pseudoephedrine (Sudafed) 30 MG tablet       Sig: Take  by mouth Every Other Day.   • rosuvastatin (CRESTOR) 40 MG tablet 90 tablet 1     Sig: Take 1 tablet by mouth Daily.        Pharmacy where request should be sent: [id number] [address]  AT [facility] [phone] [facility] [phone] FX     Additional details provided by patient: PATIENT STATED THAT HIS CARDIOLOGIST PROVIDER DOES NOT WANT TO SEE HIM ANY LONGER AND WOULD NEED REFILL ON ROSUVASTATIN MEDICATION AND WANTED TO SEE IF PROVIDER WOULD REFILL FOR HIM PLEASE SEND TO [facility] AS WELL      PLEASE ADVISE       Does the patient have less than a 3 day supply:  [] Yes  [] No    Huey Cruz Rep   07/05/22 10:29 EDT

## 2022-07-06 RX ORDER — ROSUVASTATIN CALCIUM 40 MG/1
40 TABLET, COATED ORAL DAILY
Qty: 90 TABLET | Refills: 1 | Status: SHIPPED | OUTPATIENT
Start: 2022-07-06 | End: 2022-09-09 | Stop reason: SDUPTHER

## 2022-07-06 NOTE — TELEPHONE ENCOUNTER
"Refilled the medication, however he is still to follow with cardiology.   He saw Dr. Eason in June, \"Patient will follow-up with Dr. Conrad (cardiology) on a regular basis in the future.\"    Rosuvastatin refilled.   Sudafed is an over the counter medication and would only advise that he take this sparingly.   "

## 2022-07-08 ENCOUNTER — TELEPHONE (OUTPATIENT)
Dept: FAMILY MEDICINE CLINIC | Facility: CLINIC | Age: 67
End: 2022-07-08

## 2022-07-08 NOTE — TELEPHONE ENCOUNTER
Pt informed and understood-pt wants stahist ad sent to pharmacy -jay- as well-stated he is now seeing Dr Zabala his cardiologist

## 2022-07-18 ENCOUNTER — APPOINTMENT (OUTPATIENT)
Dept: GENERAL RADIOLOGY | Facility: HOSPITAL | Age: 67
End: 2022-07-18

## 2022-07-18 ENCOUNTER — HOSPITAL ENCOUNTER (OUTPATIENT)
Facility: HOSPITAL | Age: 67
Setting detail: OBSERVATION
Discharge: HOME | End: 2022-07-19
Attending: EMERGENCY MEDICINE | Admitting: INTERNAL MEDICINE

## 2022-07-18 ENCOUNTER — APPOINTMENT (OUTPATIENT)
Dept: CT IMAGING | Facility: HOSPITAL | Age: 67
End: 2022-07-18

## 2022-07-18 DIAGNOSIS — I95.89 HYPOTENSION DUE TO HYPOVOLEMIA: ICD-10-CM

## 2022-07-18 DIAGNOSIS — E86.0 ACUTE DEHYDRATION: ICD-10-CM

## 2022-07-18 DIAGNOSIS — U07.1 COVID-19: Primary | ICD-10-CM

## 2022-07-18 DIAGNOSIS — E86.1 HYPOTENSION DUE TO HYPOVOLEMIA: ICD-10-CM

## 2022-07-18 PROBLEM — I95.9 HYPOTENSION: Status: ACTIVE | Noted: 2022-07-18

## 2022-07-18 LAB
ALBUMIN SERPL-MCNC: 4.6 G/DL (ref 3.5–5.2)
ALBUMIN/GLOB SERPL: 1.6 G/DL
ALP SERPL-CCNC: 82 U/L (ref 39–117)
ALT SERPL W P-5'-P-CCNC: 41 U/L (ref 1–41)
ANION GAP SERPL CALCULATED.3IONS-SCNC: 9 MMOL/L (ref 5–15)
AST SERPL-CCNC: 66 U/L (ref 1–40)
BASOPHILS # BLD AUTO: 0.03 10*3/MM3 (ref 0–0.2)
BASOPHILS NFR BLD AUTO: 0.3 % (ref 0–1.5)
BILIRUB SERPL-MCNC: 1 MG/DL (ref 0–1.2)
BILIRUB UR QL STRIP: NEGATIVE
BUN SERPL-MCNC: 15 MG/DL (ref 8–23)
BUN/CREAT SERPL: 16.1 (ref 7–25)
CALCIUM SPEC-SCNC: 9 MG/DL (ref 8.6–10.5)
CHLORIDE SERPL-SCNC: 97 MMOL/L (ref 98–107)
CLARITY UR: CLEAR
CO2 SERPL-SCNC: 27 MMOL/L (ref 22–29)
COLOR UR: YELLOW
CREAT SERPL-MCNC: 0.93 MG/DL (ref 0.76–1.27)
D-LACTATE SERPL-SCNC: 1 MMOL/L (ref 0.5–2)
DEPRECATED RDW RBC AUTO: 41.3 FL (ref 37–54)
EGFRCR SERPLBLD CKD-EPI 2021: 89.4 ML/MIN/1.73
EOSINOPHIL # BLD AUTO: 0.21 10*3/MM3 (ref 0–0.4)
EOSINOPHIL NFR BLD AUTO: 2 % (ref 0.3–6.2)
ERYTHROCYTE [DISTWIDTH] IN BLOOD BY AUTOMATED COUNT: 12.8 % (ref 12.3–15.4)
FLUAV SUBTYP SPEC NAA+PROBE: NOT DETECTED
FLUBV RNA ISLT QL NAA+PROBE: NOT DETECTED
GLOBULIN UR ELPH-MCNC: 2.9 GM/DL
GLUCOSE SERPL-MCNC: 151 MG/DL (ref 65–99)
GLUCOSE UR STRIP-MCNC: NEGATIVE MG/DL
HCT VFR BLD AUTO: 45.4 % (ref 37.5–51)
HGB BLD-MCNC: 16 G/DL (ref 13–17.7)
HGB UR QL STRIP.AUTO: NEGATIVE
IMM GRANULOCYTES # BLD AUTO: 0.06 10*3/MM3 (ref 0–0.05)
IMM GRANULOCYTES NFR BLD AUTO: 0.6 % (ref 0–0.5)
KETONES UR QL STRIP: ABNORMAL
LEUKOCYTE ESTERASE UR QL STRIP.AUTO: NEGATIVE
LIPASE SERPL-CCNC: 27 U/L (ref 13–60)
LYMPHOCYTES # BLD AUTO: 0.78 10*3/MM3 (ref 0.7–3.1)
LYMPHOCYTES NFR BLD AUTO: 7.4 % (ref 19.6–45.3)
MCH RBC QN AUTO: 31.3 PG (ref 26.6–33)
MCHC RBC AUTO-ENTMCNC: 35.2 G/DL (ref 31.5–35.7)
MCV RBC AUTO: 88.8 FL (ref 79–97)
MONOCYTES # BLD AUTO: 0.72 10*3/MM3 (ref 0.1–0.9)
MONOCYTES NFR BLD AUTO: 6.9 % (ref 5–12)
NEUTROPHILS NFR BLD AUTO: 8.69 10*3/MM3 (ref 1.7–7)
NEUTROPHILS NFR BLD AUTO: 82.8 % (ref 42.7–76)
NITRITE UR QL STRIP: NEGATIVE
NRBC BLD AUTO-RTO: 0 /100 WBC (ref 0–0.2)
NT-PROBNP SERPL-MCNC: 249.7 PG/ML (ref 0–900)
PH UR STRIP.AUTO: 6 [PH] (ref 5–8)
PLATELET # BLD AUTO: 259 10*3/MM3 (ref 140–450)
PMV BLD AUTO: 9.9 FL (ref 6–12)
POTASSIUM SERPL-SCNC: 4.4 MMOL/L (ref 3.5–5.2)
PROT SERPL-MCNC: 7.5 G/DL (ref 6–8.5)
PROT UR QL STRIP: NEGATIVE
RBC # BLD AUTO: 5.11 10*6/MM3 (ref 4.14–5.8)
SARS-COV-2 RNA PNL SPEC NAA+PROBE: DETECTED
SODIUM SERPL-SCNC: 133 MMOL/L (ref 136–145)
SP GR UR STRIP: 1.02 (ref 1–1.03)
TROPONIN T SERPL-MCNC: <0.01 NG/ML (ref 0–0.03)
UROBILINOGEN UR QL STRIP: ABNORMAL
WBC NRBC COR # BLD: 10.49 10*3/MM3 (ref 3.4–10.8)

## 2022-07-18 PROCEDURE — C9803 HOPD COVID-19 SPEC COLLECT: HCPCS

## 2022-07-18 PROCEDURE — G0378 HOSPITAL OBSERVATION PER HR: HCPCS

## 2022-07-18 PROCEDURE — 96372 THER/PROPH/DIAG INJ SC/IM: CPT

## 2022-07-18 PROCEDURE — 81003 URINALYSIS AUTO W/O SCOPE: CPT | Performed by: EMERGENCY MEDICINE

## 2022-07-18 PROCEDURE — 80053 COMPREHEN METABOLIC PANEL: CPT | Performed by: EMERGENCY MEDICINE

## 2022-07-18 PROCEDURE — 83690 ASSAY OF LIPASE: CPT | Performed by: EMERGENCY MEDICINE

## 2022-07-18 PROCEDURE — 87040 BLOOD CULTURE FOR BACTERIA: CPT | Performed by: EMERGENCY MEDICINE

## 2022-07-18 PROCEDURE — 87636 SARSCOV2 & INF A&B AMP PRB: CPT | Performed by: EMERGENCY MEDICINE

## 2022-07-18 PROCEDURE — 83605 ASSAY OF LACTIC ACID: CPT | Performed by: EMERGENCY MEDICINE

## 2022-07-18 PROCEDURE — 25010000002 HEPARIN (PORCINE) PER 1000 UNITS: Performed by: NURSE PRACTITIONER

## 2022-07-18 PROCEDURE — 84484 ASSAY OF TROPONIN QUANT: CPT | Performed by: EMERGENCY MEDICINE

## 2022-07-18 PROCEDURE — 83880 ASSAY OF NATRIURETIC PEPTIDE: CPT | Performed by: EMERGENCY MEDICINE

## 2022-07-18 PROCEDURE — 85025 COMPLETE CBC W/AUTO DIFF WBC: CPT | Performed by: EMERGENCY MEDICINE

## 2022-07-18 PROCEDURE — 99220 PR INITIAL OBSERVATION CARE/DAY 70 MINUTES: CPT | Performed by: HOSPITALIST

## 2022-07-18 PROCEDURE — 71045 X-RAY EXAM CHEST 1 VIEW: CPT

## 2022-07-18 PROCEDURE — 99284 EMERGENCY DEPT VISIT MOD MDM: CPT

## 2022-07-18 PROCEDURE — 93005 ELECTROCARDIOGRAM TRACING: CPT | Performed by: EMERGENCY MEDICINE

## 2022-07-18 PROCEDURE — 74176 CT ABD & PELVIS W/O CONTRAST: CPT

## 2022-07-18 RX ORDER — SODIUM CHLORIDE 9 MG/ML
100 INJECTION, SOLUTION INTRAVENOUS CONTINUOUS
Status: DISCONTINUED | OUTPATIENT
Start: 2022-07-18 | End: 2022-07-19

## 2022-07-18 RX ORDER — ACETAMINOPHEN 650 MG/1
650 SUPPOSITORY RECTAL EVERY 4 HOURS PRN
Status: DISCONTINUED | OUTPATIENT
Start: 2022-07-18 | End: 2022-07-19 | Stop reason: HOSPADM

## 2022-07-18 RX ORDER — ACETAMINOPHEN 325 MG/1
650 TABLET ORAL EVERY 4 HOURS PRN
Status: DISCONTINUED | OUTPATIENT
Start: 2022-07-18 | End: 2022-07-19 | Stop reason: HOSPADM

## 2022-07-18 RX ORDER — LORAZEPAM 2 MG/ML
0.5 INJECTION INTRAMUSCULAR
Status: DISCONTINUED | OUTPATIENT
Start: 2022-07-18 | End: 2022-07-18 | Stop reason: RX

## 2022-07-18 RX ORDER — SODIUM CHLORIDE 0.9 % (FLUSH) 0.9 %
10 SYRINGE (ML) INJECTION AS NEEDED
Status: DISCONTINUED | OUTPATIENT
Start: 2022-07-18 | End: 2022-07-19 | Stop reason: HOSPADM

## 2022-07-18 RX ORDER — ACETAMINOPHEN 160 MG/5ML
650 SOLUTION ORAL EVERY 4 HOURS PRN
Status: DISCONTINUED | OUTPATIENT
Start: 2022-07-18 | End: 2022-07-19 | Stop reason: HOSPADM

## 2022-07-18 RX ORDER — DIAZEPAM 5 MG/ML
5 INJECTION, SOLUTION INTRAMUSCULAR; INTRAVENOUS
Status: DISCONTINUED | OUTPATIENT
Start: 2022-07-18 | End: 2022-07-19 | Stop reason: HOSPADM

## 2022-07-18 RX ORDER — LORAZEPAM 1 MG/1
1 TABLET ORAL
Status: DISCONTINUED | OUTPATIENT
Start: 2022-07-18 | End: 2022-07-19 | Stop reason: HOSPADM

## 2022-07-18 RX ORDER — LORAZEPAM 0.5 MG/1
0.5 TABLET ORAL
Status: DISCONTINUED | OUTPATIENT
Start: 2022-07-18 | End: 2022-07-18 | Stop reason: RX

## 2022-07-18 RX ORDER — ASPIRIN 81 MG/1
81 TABLET, CHEWABLE ORAL DAILY
Status: DISCONTINUED | OUTPATIENT
Start: 2022-07-19 | End: 2022-07-19 | Stop reason: HOSPADM

## 2022-07-18 RX ORDER — LORAZEPAM 0.5 MG/1
0.5 TABLET ORAL
Status: DISCONTINUED | OUTPATIENT
Start: 2022-07-18 | End: 2022-07-19 | Stop reason: HOSPADM

## 2022-07-18 RX ORDER — HEPARIN SODIUM 5000 [USP'U]/ML
5000 INJECTION, SOLUTION INTRAVENOUS; SUBCUTANEOUS EVERY 8 HOURS SCHEDULED
Status: DISCONTINUED | OUTPATIENT
Start: 2022-07-18 | End: 2022-07-19 | Stop reason: HOSPADM

## 2022-07-18 RX ORDER — LORAZEPAM 1 MG/1
1 TABLET ORAL
Status: DISCONTINUED | OUTPATIENT
Start: 2022-07-18 | End: 2022-07-18 | Stop reason: RX

## 2022-07-18 RX ORDER — LORAZEPAM 2 MG/ML
1 INJECTION INTRAMUSCULAR
Status: DISCONTINUED | OUTPATIENT
Start: 2022-07-18 | End: 2022-07-18 | Stop reason: RX

## 2022-07-18 RX ORDER — SODIUM CHLORIDE 0.9 % (FLUSH) 0.9 %
10 SYRINGE (ML) INJECTION EVERY 12 HOURS SCHEDULED
Status: DISCONTINUED | OUTPATIENT
Start: 2022-07-18 | End: 2022-07-19 | Stop reason: HOSPADM

## 2022-07-18 RX ORDER — FOLIC ACID 1 MG/1
1 TABLET ORAL DAILY
Status: DISCONTINUED | OUTPATIENT
Start: 2022-07-19 | End: 2022-07-19 | Stop reason: HOSPADM

## 2022-07-18 RX ORDER — DIPHENOXYLATE HYDROCHLORIDE AND ATROPINE SULFATE 2.5; .025 MG/1; MG/1
1 TABLET ORAL DAILY
Status: DISCONTINUED | OUTPATIENT
Start: 2022-07-19 | End: 2022-07-19 | Stop reason: HOSPADM

## 2022-07-18 RX ORDER — DIAZEPAM 5 MG/ML
2.5 INJECTION, SOLUTION INTRAMUSCULAR; INTRAVENOUS
Status: DISCONTINUED | OUTPATIENT
Start: 2022-07-18 | End: 2022-07-19 | Stop reason: HOSPADM

## 2022-07-18 RX ORDER — TAMSULOSIN HYDROCHLORIDE 0.4 MG/1
0.8 CAPSULE ORAL DAILY
Status: DISCONTINUED | OUTPATIENT
Start: 2022-07-19 | End: 2022-07-19 | Stop reason: HOSPADM

## 2022-07-18 RX ORDER — ALBUTEROL SULFATE 90 UG/1
2 AEROSOL, METERED RESPIRATORY (INHALATION) EVERY 4 HOURS PRN
Status: DISCONTINUED | OUTPATIENT
Start: 2022-07-18 | End: 2022-07-19 | Stop reason: HOSPADM

## 2022-07-18 RX ORDER — ONDANSETRON 2 MG/ML
4 INJECTION INTRAMUSCULAR; INTRAVENOUS EVERY 6 HOURS PRN
Status: DISCONTINUED | OUTPATIENT
Start: 2022-07-18 | End: 2022-07-19 | Stop reason: HOSPADM

## 2022-07-18 RX ORDER — ROSUVASTATIN CALCIUM 20 MG/1
40 TABLET, COATED ORAL DAILY
Status: DISCONTINUED | OUTPATIENT
Start: 2022-07-19 | End: 2022-07-19 | Stop reason: HOSPADM

## 2022-07-18 RX ADMIN — Medication 10 ML: at 21:35

## 2022-07-18 RX ADMIN — SODIUM CHLORIDE 1000 ML: 9 INJECTION, SOLUTION INTRAVENOUS at 17:26

## 2022-07-18 RX ADMIN — HEPARIN SODIUM 5000 UNITS: 5000 INJECTION INTRAVENOUS; SUBCUTANEOUS at 21:35

## 2022-07-18 RX ADMIN — SODIUM CHLORIDE 2000 ML: 9 INJECTION, SOLUTION INTRAVENOUS at 15:15

## 2022-07-18 RX ADMIN — SODIUM CHLORIDE 100 ML/HR: 9 INJECTION, SOLUTION INTRAVENOUS at 21:35

## 2022-07-18 NOTE — ED PROVIDER NOTES
Subjective   66-year-old male who presents for evaluation of fatigue.  The patient reports that he has felt fatigued and has had decreased oral intake, nausea, and diarrhea that has been present for the last several days.  He admits that he has been trying to diet recently and does state that he is lost roughly 15 pounds within the last couple weeks.  He also reports he has had a mild nonproductive cough.  He denies any significant shortness of breath.  He states that he is tested for COVID-19 at home and it was negative.  He is unaware of any definitive sick contacts.  He has been previously vaccinated against COVID-19.  In addition to his mild shortness of breath he does report some chest pain but denies persistent or severe chest pain.  He denies any new or different medications.  He denies any blood in his stool.  No reported urinary symptoms include no dysuria, frequency, or urgency.  His urine output has been decreased.  He exhibits normal mentation and answers questions well upon arrival.  He was delivered by EMS with initial blood pressure of 80/60 for the EMS service.          Review of Systems   Constitutional: Positive for activity change, appetite change and fatigue. Negative for chills and fever.   HENT: Negative for congestion, ear pain, postnasal drip, sinus pressure and sore throat.    Eyes: Negative for pain, redness and visual disturbance.   Respiratory: Positive for cough. Negative for chest tightness and shortness of breath.    Cardiovascular: Negative for chest pain, palpitations and leg swelling.   Gastrointestinal: Positive for diarrhea, nausea and vomiting. Negative for abdominal pain, anal bleeding and blood in stool.   Endocrine: Negative for polydipsia and polyuria.   Genitourinary: Negative for difficulty urinating, dysuria, frequency and urgency.   Musculoskeletal: Negative for arthralgias, back pain and neck pain.   Skin: Negative for pallor and rash.   Allergic/Immunologic: Negative  for environmental allergies and immunocompromised state.   Neurological: Negative for dizziness, weakness and headaches.   Hematological: Negative for adenopathy.   Psychiatric/Behavioral: Negative for confusion, self-injury and suicidal ideas. The patient is not nervous/anxious.    All other systems reviewed and are negative.      Past Medical History:   Diagnosis Date   • Abnormal ECG 01/20/2020    Get from Dr. Kayser   • Benign prostatic hyperplasia    • Cataract    • Coronary artery disease    • COVID-19 2022   • History of heart attack 2004   • History of MI (myocardial infarction)    • Hyperlipidemia    • Myocardial infarction (HCC)    • Obesity    • Peripheral neuropathy 11/04/2021    diagnosed on previous visit   • Primary hypertension 6/6/2022   • RBBB 02/10/2020       Allergies   Allergen Reactions   • Bactrim [Sulfamethoxazole-Trimethoprim] Other (See Comments)     Causes weakness, fatigue   • Plavix [Clopidogrel Bisulfate] Hives   • Augmentin [Amoxicillin-Pot Clavulanate] Confusion       Past Surgical History:   Procedure Laterality Date   • APPENDECTOMY  1999   • CARDIAC CATHETERIZATION  1/1/2004    They used a catheter to put in my stent.   • CARDIAC CATHETERIZATION N/A 6/22/2021    Procedure: Left Heart Cath;  Surgeon: Mikey Conrad MD;  Location:  [facility] CATH INVASIVE LOCATION;  Service: Cardiovascular;  Laterality: N/A;   • COLONOSCOPY N/A 12/23/2021    Procedure: COLONOSCOPY;  Surgeon: Brunner, Mark I, MD;  Location:  [facility] ENDOSCOPY;  Service: Gastroenterology;  Laterality: N/A;  several polyps removed. 2 clips placed at ascending colon, 2 clips in transverse colon, and 2 clips in sigmoid colon.     • CORONARY ANGIOPLASTY  2004    I think that's what the bill for my stent said.   • CORONARY ARTERY BYPASS BRING BACK FOR EXPLORATION N/A 6/23/2021    Procedure: BRING BACK FOR EXPLORATION OPEN HEART;  Surgeon: Miguel Angel Acosta MD;  Location:  [facility] OR;  Service: Cardiothoracic;  Laterality: N/A;    • CORONARY ARTERY BYPASS GRAFT N/A 2021    Procedure: MEDIAN STERNOTOMY, CORONARY ARTERY BYPASS GRAFTING X 4 WITH LEFT INTERNAL MAMMARY ARTERY GRAFT, ENDOSCOPIC VEIN HARVESTING OF THE RIGHT GREATER SAPHENOUS VEIN, INTRA-AORTIC BALLOON PUMP INSERTION, IVAN PER ANESTHESIA;  Surgeon: Miguel Angel Acosta MD;  Location: [facility] OR;  Service: Cardiothoracic;  Laterality: N/A;   • CORONARY STENT PLACEMENT     • INTERVENTIONAL RADIOLOGY PROCEDURE N/A 2021    Procedure: thrombin injection;  Surgeon: Abdiel Tamez MD;  Location: [facility] CATH INVASIVE LOCATION;  Service: Interventional Radiology;  Laterality: N/A;       Family History   Problem Relation Age of Onset   • Cancer Mother         Sinus tumor; had radiation therapy & surgery; tumor all gone.   • Hypertension Mother    • Hyperlipidemia Mother         Takes medication   • Heart attack Mother    • Diabetes Sister    • Heart failure Father         .   • Stroke Father    • Heart attack Maternal Grandfather    • Cancer Paternal Grandmother    • Heart attack Paternal Grandfather    • Diabetes Sister         Diabetic from childhood       Social History     Socioeconomic History   • Marital status:    • Number of children: 1   Tobacco Use   • Smoking status: Former Smoker     Packs/day: 1.50     Years: 45.00     Pack years: 67.50     Types: Cigarettes     Start date: 1971     Quit date: 2017     Years since quittin.0   • Smokeless tobacco: Never Used   Vaping Use   • Vaping Use: Never used   Substance and Sexual Activity   • Alcohol use: Yes     Alcohol/week: 2.0 standard drinks     Types: 2 Glasses of wine per week     Comment: 2 drinks a night   • Drug use: No   • Sexual activity: Not Currently     Partners: Female     Birth control/protection: Diaphragm, Post-menopausal, Spermicide     Comment:  twice, both . No STVs. Not planning to resume.           Objective   Physical Exam  Vitals and nursing note reviewed.    Constitutional:       General: He is in acute distress.      Appearance: Normal appearance. He is well-developed. He is ill-appearing. He is not toxic-appearing or diaphoretic.   HENT:      Head: Normocephalic and atraumatic.      Right Ear: External ear normal.      Left Ear: External ear normal.      Nose: Nose normal.   Eyes:      General: Lids are normal.      Pupils: Pupils are equal, round, and reactive to light.   Neck:      Trachea: No tracheal deviation.   Cardiovascular:      Rate and Rhythm: Normal rate and regular rhythm.      Pulses: No decreased pulses.      Heart sounds: Normal heart sounds. No murmur heard.    No friction rub. No gallop.   Pulmonary:      Effort: Pulmonary effort is normal. No respiratory distress.      Breath sounds: Normal breath sounds. No decreased breath sounds, wheezing, rhonchi or rales.   Abdominal:      General: Bowel sounds are normal.      Palpations: Abdomen is soft.      Tenderness: There is no abdominal tenderness. There is no guarding or rebound.   Musculoskeletal:         General: No deformity. Normal range of motion.      Cervical back: Normal range of motion and neck supple.   Lymphadenopathy:      Cervical: No cervical adenopathy.   Skin:     General: Skin is warm and dry.      Findings: No rash.   Neurological:      Mental Status: He is alert and oriented to person, place, and time.      Cranial Nerves: No cranial nerve deficit.      Sensory: No sensory deficit.   Psychiatric:         Speech: Speech normal.         Behavior: Behavior normal.         Thought Content: Thought content normal.         Judgment: Judgment normal.         Critical Care  Performed by: Magali Marion MD  Authorized by: Magali Marion MD     Critical care provider statement:     Critical care time (minutes):  45    Critical care time was exclusive of:  Separately billable procedures and treating other patients    Critical care was necessary to treat or prevent imminent or  life-threatening deterioration of the following conditions:  Dehydration    Critical care was time spent personally by me on the following activities:  Development of treatment plan with patient or surrogate, discussions with consultants, evaluation of patient's response to treatment, examination of patient, obtaining history from patient or surrogate, ordering and performing treatments and interventions, ordering and review of laboratory studies, ordering and review of radiographic studies, pulse oximetry, re-evaluation of patient's condition and review of old charts               ED Course                                           MDM  Number of Diagnoses or Management Options  Acute dehydration: new and requires workup  COVID-19: new and requires workup  Hypotension due to hypovolemia: new and requires workup  Diagnosis management comments: Patient is felt to be dehydrated in association with COVID-19.  Oxygen saturations have remained normal.    After greater than 2 L of IV fluid he did have blood pressures that were above 100 systolic, but even while 1/3 L of IV fluids was running he continued to have systolics in the mid 90s.  I feel he needs admission for further fluid resuscitation and observation.    Chest x-ray and CT scan abdomen pelvis are relatively nonrevealing.    I discussed the patient with hospitalist, Dr. Shin.  The hospital service will consult on the patient to determine status of admission.           Amount and/or Complexity of Data Reviewed  Clinical lab tests: ordered and reviewed  Tests in the radiology section of CPT®: ordered and reviewed  Decide to obtain previous medical records or to obtain history from someone other than the patient: yes  Obtain history from someone other than the patient: yes  Review and summarize past medical records: yes  Discuss the patient with other providers: yes  Independent visualization of images, tracings, or specimens: yes        Final diagnoses:    COVID-19   Hypotension due to hypovolemia   Acute dehydration       ED Disposition  ED Disposition     ED Disposition   Decision to Admit    Condition   --    Comment   Level of Care: Observation Unit [28]   Diagnosis: Hypotension [638026]   Admitting Physician: CLAYTON CRUZ [[id number]]   Bed Request Comments: COVID +, CDU appropriate               No follow-up provider specified.       Medication List      No changes were made to your prescriptions during this visit.          Magali Marion MD  07/18/22 3500

## 2022-07-18 NOTE — H&P
[facility] Medicine Services  HISTORY AND PHYSICAL    Patient Name: Thien Gray  : 1955  MRN: [id number]  Primary Care Physician: Sanaz Zuluaga DO  Date of admission: 2022    Subjective   Subjective     Chief Complaint:  Fatigue, nausea, diarrhea, decreased appetite    HPI:  Thien Gray is a 66 y.o. male CAD s/p CABG, HTN/HLD,daily alcohol use, obesity presenting with fatigue, nausea, diarrhea, decrease appetite, cough, and burning pain in his right lung for 3 days. Pt tested for COVID at home and it was negative twice. He was positive for COVID here. He is hypotensive today with BP 80/60. He has received 2 COVID vaccines and 1 booster. He report having had COVID in January.     Review of Systems   Constitutional: Positive for activity change, appetite change, fatigue and fever.   HENT: Negative.    Eyes: Negative.    Respiratory: Positive for cough. Negative for shortness of breath.         Burning sensation in his right lung with deep breathing   Cardiovascular: Negative.    Gastrointestinal: Positive for diarrhea and nausea. Negative for abdominal pain and vomiting.   Endocrine: Negative.    Genitourinary: Negative.    Musculoskeletal: Negative.    Skin: Negative.    Allergic/Immunologic: Negative.    Neurological: Negative.    Hematological: Negative.    Psychiatric/Behavioral: Negative.       All other systems reviewed and are negative.     Personal History     Past Medical History:   Diagnosis Date   • Abnormal ECG 2020    Get from Dr. Kayser   • Benign prostatic hyperplasia    • Cataract    • Coronary artery disease    • COVID-2022   • History of heart attack    • History of MI (myocardial infarction)    • Hyperlipidemia    • Myocardial infarction (HCC)    • Obesity    • Peripheral neuropathy 2021    diagnosed on previous visit   • Primary hypertension 2022   • RBBB 02/10/2020             Past Surgical History:   Procedure  Laterality Date   • APPENDECTOMY  1999   • CARDIAC CATHETERIZATION  1/1/2004    They used a catheter to put in my stent.   • CARDIAC CATHETERIZATION N/A 6/22/2021    Procedure: Left Heart Cath;  Surgeon: Mikey Conrad MD;  Location:  [facility];  Service: Cardiovascular;  Laterality: N/A;   • COLONOSCOPY N/A 12/23/2021    Procedure: COLONOSCOPY;  Surgeon: Brunner, Mark I, MD;  Location: [facility] ENDOSCOPY;  Service: Gastroenterology;  Laterality: N/A;  several polyps removed. 2 clips placed at ascending colon, 2 clips in transverse colon, and 2 clips in sigmoid colon.     • CORONARY ANGIOPLASTY  2004    I think that's what the bill for my stent said.   • CORONARY ARTERY BYPASS BRING BACK FOR EXPLORATION N/A 6/23/2021    Procedure: BRING BACK FOR EXPLORATION OPEN HEART;  Surgeon: Miguel Angel Acosta MD;  Location: [facility] OR;  Service: Cardiothoracic;  Laterality: N/A;   • CORONARY ARTERY BYPASS GRAFT N/A 6/22/2021    Procedure: MEDIAN STERNOTOMY, CORONARY ARTERY BYPASS GRAFTING X 4 WITH LEFT INTERNAL MAMMARY ARTERY GRAFT, ENDOSCOPIC VEIN HARVESTING OF THE RIGHT GREATER SAPHENOUS VEIN, INTRA-AORTIC BALLOON PUMP INSERTION, IVAN PER ANESTHESIA;  Surgeon: Miguel Angel Acosta MD;  Location:  [facility] OR;  Service: Cardiothoracic;  Laterality: N/A;   • CORONARY STENT PLACEMENT  2004   • INTERVENTIONAL RADIOLOGY PROCEDURE N/A 7/2/2021    Procedure: thrombin injection;  Surgeon: Abdiel Tamez MD;  Location:  [facility];  Service: Interventional Radiology;  Laterality: N/A;       Family History:  family history includes Cancer in his mother and paternal grandmother; Diabetes in his sister and sister; Heart attack in his maternal grandfather, mother, and paternal grandfather; Heart failure in his father; Hyperlipidemia in his mother; Hypertension in his mother; Stroke in his father. Otherwise pertinent FHx was reviewed and unremarkable.     Social History:  reports that he quit smoking about 5  years ago. His smoking use included cigarettes. He started smoking about 51 years ago. He has a 67.50 pack-year smoking history. He has never used smokeless tobacco. He reports current alcohol use of about 2.0 standard drinks of alcohol per week. He reports that he does not use drugs.  Social History     Social History Narrative    Caffeine: 2 cups of coffee daily.  Lives in [place].         Medications:  Aspirin, Pseudoephedrine HCl, albuterol sulfate HFA, losartan, meloxicam, metoprolol succinate XL, rosuvastatin, tamsulosin, and triamcinolone    Allergies   Allergen Reactions   • Bactrim [Sulfamethoxazole-Trimethoprim] Other (See Comments)     Causes weakness, fatigue   • Plavix [Clopidogrel Bisulfate] Hives   • Augmentin [Amoxicillin-Pot Clavulanate] Confusion       Objective   Objective     Vital Signs:   Temp:  [98.2 °F (36.8 °C)] 98.2 °F (36.8 °C)  Heart Rate:  [80-94] 80  Resp:  [20] 20  BP: ()/(51-84) 129/84    Physical Exam  Vitals reviewed.   Constitutional:       General: He is not in acute distress.     Appearance: He is obese. He is not ill-appearing.   HENT:      Head: Normocephalic and atraumatic.      Mouth/Throat:      Mouth: Mucous membranes are dry.   Eyes:      General: No scleral icterus.     Conjunctiva/sclera: Conjunctivae normal.   Cardiovascular:      Rate and Rhythm: Normal rate and regular rhythm.      Heart sounds: No murmur heard.    No friction rub. No gallop.   Pulmonary:      Effort: Pulmonary effort is normal.      Breath sounds: Normal breath sounds.   Abdominal:      General: Bowel sounds are normal. There is no distension.      Palpations: Abdomen is soft.      Tenderness: There is no abdominal tenderness.   Musculoskeletal:      Cervical back: Neck supple.      Right lower leg: Edema present.      Left lower leg: Edema present.   Skin:     General: Skin is warm and dry.   Neurological:      General: No focal deficit present.      Mental Status: He is alert and oriented  to person, place, and time.   Psychiatric:         Mood and Affect: Mood normal.         Behavior: Behavior normal.        Results Reviewed:  I have personally reviewed most recent indicated data and agree with findings including:  [x]  Laboratory  [x]  Radiology  [x]  EKG/Telemetry  []  Pathology  []  Cardiac/Vascular Studies  [x]  Old records  []  Other:  Most pertinent findings include:  (+) COVID, hypotension    LAB RESULTS:      Lab 07/18/22  1517   WBC 10.49   HEMOGLOBIN 16.0   HEMATOCRIT 45.4   PLATELETS 259   NEUTROS ABS 8.69*   IMMATURE GRANS (ABS) 0.06*   LYMPHS ABS 0.78   MONOS ABS 0.72   EOS ABS 0.21   MCV 88.8   LACTATE 1.0         Lab 07/18/22  1517   SODIUM 133*   POTASSIUM 4.4   CHLORIDE 97*   CO2 27.0   ANION GAP 9.0   BUN 15   CREATININE 0.93   EGFR 89.4   GLUCOSE 151*   CALCIUM 9.0         Lab 07/18/22  1517   TOTAL PROTEIN 7.5   ALBUMIN 4.60   GLOBULIN 2.9   ALT (SGPT) 41   AST (SGOT) 66*   BILIRUBIN 1.0   ALK PHOS 82   LIPASE 27         Lab 07/18/22  1517   PROBNP 249.7   TROPONIN T <0.010                 Brief Urine Lab Results  (Last result in the past 365 days)      Color   Clarity   Blood   Leuk Est   Nitrite   Protein   CREAT   Urine HCG        07/18/22 1612 Yellow   Clear   Negative   Negative   Negative   Negative               Microbiology Results (last 10 days)     Procedure Component Value - Date/Time    COVID PRE-OP / PRE-PROCEDURE SCREENING ORDER (NO ISOLATION) - Swab, Nasopharynx [[id number]]  (Abnormal) Collected: 07/18/22 1518    Lab Status: Final result Specimen: Swab from Nasopharynx Updated: 07/18/22 1619    Narrative:      The following orders were created for panel order COVID PRE-OP / PRE-PROCEDURE SCREENING ORDER (NO ISOLATION) - Swab, Nasopharynx.  Procedure                               Abnormality         Status                     ---------                               -----------         ------                     COVID-19 and FLU A/B PCR...[[id number]]  Abnormal             Final result                 Please view results for these tests on the individual orders.    COVID-19 and FLU A/B PCR - Swab, Nasopharynx [[id number]]  (Abnormal) Collected: 07/18/22 1518    Lab Status: Final result Specimen: Swab from Nasopharynx Updated: 07/18/22 1619     COVID19 Detected     Influenza A PCR Not Detected     Influenza B PCR Not Detected    Narrative:      Fact sheet for providers: https://www.fda.gov/media/021969/download    Fact sheet for patients: https://www.fda.gov/media/647538/download    Test performed by PCR.  Influenza A and Influenza B negative results should be considered presumptive in samples that have a positive SARS-CoV-2 result.    Competitive inhibition studies showed that SARS-CoV-2 virus, when present at concentrations above 3.6E+04 copies/mL, can inhibit the detection and amplification of influenza A and influenza B virus RNA if present at or below 1.8E+02 copies/mL or 4.9E+02 copies/mL, respectively, and may lead to false negative influenza virus results. If co-infection with influenza A or influenza B virus is suspected in samples with a positive SARS-CoV-2 result, the sample should be re-tested with another FDA cleared, approved, or authorized influenza test, if influenza virus detection would change clinical management.          CT Abdomen Pelvis Without Contrast    Result Date: 7/18/2022  DATE OF EXAM: 7/18/2022 3:29 PM  PROCEDURE: CT ABDOMEN PELVIS WO CONTRAST-  INDICATIONS: abdominal pain  COMPARISON: No comparisons available.  TECHNIQUE: Routine transaxial slices were obtained through the abdomen and pelvis without the administration of intravenous contrast. Reconstructed coronal and sagittal images were also obtained. Automated exposure control and iterative construction methods were used.  The radiation dose reduction device was turned on for each scan per the ALARA (As Low as Reasonably Achievable) protocol.  FINDINGS: There is a partially calcified granuloma in  the left lower lobe with otherwise unremarkable appearance of the partially imaged lower thorax. Unremarkable appearance of the liver. There is cholelithiasis without CT evidence of cholecystitis. Unremarkable appearance of the spleen, pancreas, adrenal glands, kidneys, ureters, and bladder. The prostate is enlarged measuring 6.6 cm in maximal transverse dimension. There is severe sigmoid colonic diverticulosis without evidence of diverticulitis. Normal appendix. No bowel obstruction or definite inflammatory change of the GI tract. No free abdominopelvic fluid or conspicuous fat stranding. No pneumoperitoneum. Scattered atherosclerosis. The body wall soft tissues are unremarkable. No lymphadenopathy. No acute osseous findings.      Impression: No acute abdominopelvic findings.  Sigmoid colonic diverticulosis without diverticulitis.  Cholelithiasis without CT evidence of cholecystitis.  Enlarged prostate.  This report was finalized on 7/18/2022 4:19 PM by Hitesh Mcmahan MD.      XR Chest 1 View    Result Date: 7/18/2022  XR CHEST 1 VW-  COMPARISON: 2/28/2022  HISTORY: chest pain  FINDINGS: Technical adequacy: Adequate Central airways: Unremarkable Heart: Borderline heart size, likely accentuated by the technique. Great vessels: Unremarkable Mediastinum: Unremarkable Lungs: Unremarkable Pulmonary carmine:Unremarkable Pleura: Unremarkable Skeletal structures: Degenerative changes Extrathoracic soft tissues:Unremarkable Additional comments: Status post median sternotomy and CABG      Impression: Impression: As above. No acute cardiopulmonary disease.  This report was finalized on 7/18/2022 4:00 PM by Pavan Lucia MD.        Results for orders placed during the hospital encounter of 06/22/21    Adult Transthoracic Echo Complete W/ Cont if Necessary Per Protocol    Interpretation Summary  · Left ventricular wall thickness is consistent with mild concentric hypertrophy.  · Left ventricular ejection fraction appears to be  41 - 45%. Left ventricular systolic function is mildly decreased.  · Left ventricular diastolic dysfunction is noted.  · The right ventricular cavity is mildly dilated.  · Estimated right ventricular systolic pressure from tricuspid regurgitation is normal (<35 mmHg). Calculated right ventricular systolic pressure from tricuspid regurgitation is 29 mmHg.      Assessment & Plan   Assessment & Plan       CAD (coronary artery disease)    HLD (hyperlipidemia)    HTN (hypertension)    Hypotension    COVID-19 virus detected    Hospital Course to date:  Thien Gray is a 66 y.o. male CAD s/p CABG, HTN/HLD,daily alcohol use, obesity presenting with fatigue, nausea, diarrhea, decrease appetite, cough, and burning pain in his right lung for 3 days.    Dehydration secondary to N/D, decreased appetite  hypotension  (+) COVID  - fluid resuscitation  - Strict I&O with daily weight  - inhaler  - monitor BP  - zofran prn    HTN/HLD/CAD/HFrEF   - hold BP meds at this time as patient is hypotensive  - continue crestor, ASA  - ECHO 06/2021 LVEF 41-45%. Monitor for fluid vol overload.     Elevated Glucose  - A1C    Daily Alcohol use  - [facility] protocol for ativan PRN. No hx of alcohol withdrawal seizures    DVT prophylaxis: [facility]    CODE STATUS:    Level Of Support Discussed With: Patient  Code Status (Patient has no pulse and is not breathing): CPR (Attempt to Resuscitate)  Medical Interventions (Patient has pulse or is breathing): Full Support      This note has been completed as part of a split-shared workflow.     Electronically signed by GEE Medrano, 07/18/22, 6:19 PM EDT.      Attending   Admission Attestation       I have seen and examined the patient, performing an independent face-to-face diagnostic evaluation with plan of care reviewed and developed with the advanced practice clinician (APC).      Brief Summary Statement:   Thien Gray is a 66 y.o. male with a past medical history of CAD s/p CABG, HTN,  "HLD and daily alcohol use that tested positive for COVID at home and has had increased fatigue, nausea, diarrhea, decreased PO intake, cough and \"lung pain\" for the past 3 days. On presentation to the ED the patient was found to be hypotensive with a BP of 80/60 and is currently receiving fluid resuscitation. The patient is vaccinated and boosted. The patient states he has not been able to eat or drink much and thinks this is contributing to his low blood pressure. Patient has minimal dyspnea. Patient will be admitted to the hospitalist service for further evaluation.    Remainder of detailed HPI is as noted by APC and has been reviewed and/or edited by me for completeness.    Attending Physical Exam:  Constitutional: Awake, alert  Eyes: PERRLA, sclerae anicteric, no conjunctival injection  HENT: NCAT, mucous membranes moist  Neck: Supple, no thyromegaly, no lymphadenopathy, trachea midline  Respiratory: Clear to auscultation bilaterally, nonlabored respirations   Cardiovascular: RRR, no murmurs, rubs, or gallops, palpable pedal pulses bilaterally  Gastrointestinal: Positive bowel sounds, soft, nontender, nondistended  Musculoskeletal: trace bilateral ankle edema, no clubbing or cyanosis to extremities  Psychiatric: Appropriate affect, cooperative  Neurologic: Oriented x 3, strength symmetric in all extremities, Cranial Nerves grossly intact to confrontation, speech clear  Skin: No rashes    Brief Assessment/Plan :  See detailed assessment and plan developed with APC which I have reviewed and/or edited for completeness.        Admission Status: I believe that this patient meets OBSERVATION status, however if further evaluation or treatment plans warrant, status may change.  Based upon current information, I predict patient's care encounter to be less than or equal to 2 midnights.          Talia Shin DO  07/18/22                        "

## 2022-07-19 ENCOUNTER — READMISSION MANAGEMENT (OUTPATIENT)
Dept: CALL CENTER | Facility: HOSPITAL | Age: 67
End: 2022-07-19

## 2022-07-19 VITALS
HEART RATE: 86 BPM | TEMPERATURE: 98 F | SYSTOLIC BLOOD PRESSURE: 115 MMHG | WEIGHT: 270.1 LBS | HEIGHT: 70 IN | OXYGEN SATURATION: 96 % | RESPIRATION RATE: 18 BRPM | DIASTOLIC BLOOD PRESSURE: 70 MMHG | BODY MASS INDEX: 38.67 KG/M2

## 2022-07-19 PROBLEM — I95.9 HYPOTENSION: Status: RESOLVED | Noted: 2022-07-18 | Resolved: 2022-07-19

## 2022-07-19 LAB
ANION GAP SERPL CALCULATED.3IONS-SCNC: 11 MMOL/L (ref 5–15)
BASOPHILS # BLD AUTO: 0.03 10*3/MM3 (ref 0–0.2)
BASOPHILS NFR BLD AUTO: 0.4 % (ref 0–1.5)
BUN SERPL-MCNC: 9 MG/DL (ref 8–23)
BUN/CREAT SERPL: 11.3 (ref 7–25)
CALCIUM SPEC-SCNC: 8.6 MG/DL (ref 8.6–10.5)
CHLORIDE SERPL-SCNC: 100 MMOL/L (ref 98–107)
CO2 SERPL-SCNC: 22 MMOL/L (ref 22–29)
CREAT SERPL-MCNC: 0.8 MG/DL (ref 0.76–1.27)
DEPRECATED RDW RBC AUTO: 40.4 FL (ref 37–54)
EGFRCR SERPLBLD CKD-EPI 2021: 96.4 ML/MIN/1.73
EOSINOPHIL # BLD AUTO: 0.5 10*3/MM3 (ref 0–0.4)
EOSINOPHIL NFR BLD AUTO: 7 % (ref 0.3–6.2)
ERYTHROCYTE [DISTWIDTH] IN BLOOD BY AUTOMATED COUNT: 12.8 % (ref 12.3–15.4)
GLUCOSE SERPL-MCNC: 142 MG/DL (ref 65–99)
HBA1C MFR BLD: 6 % (ref 4.8–5.6)
HCT VFR BLD AUTO: 43.4 % (ref 37.5–51)
HGB BLD-MCNC: 15.5 G/DL (ref 13–17.7)
IMM GRANULOCYTES # BLD AUTO: 0.04 10*3/MM3 (ref 0–0.05)
IMM GRANULOCYTES NFR BLD AUTO: 0.6 % (ref 0–0.5)
LYMPHOCYTES # BLD AUTO: 1.32 10*3/MM3 (ref 0.7–3.1)
LYMPHOCYTES NFR BLD AUTO: 18.4 % (ref 19.6–45.3)
MCH RBC QN AUTO: 31 PG (ref 26.6–33)
MCHC RBC AUTO-ENTMCNC: 35.7 G/DL (ref 31.5–35.7)
MCV RBC AUTO: 86.8 FL (ref 79–97)
MONOCYTES # BLD AUTO: 0.61 10*3/MM3 (ref 0.1–0.9)
MONOCYTES NFR BLD AUTO: 8.5 % (ref 5–12)
NEUTROPHILS NFR BLD AUTO: 4.66 10*3/MM3 (ref 1.7–7)
NEUTROPHILS NFR BLD AUTO: 65.1 % (ref 42.7–76)
NRBC BLD AUTO-RTO: 0 /100 WBC (ref 0–0.2)
PLATELET # BLD AUTO: 237 10*3/MM3 (ref 140–450)
PMV BLD AUTO: 9.6 FL (ref 6–12)
POTASSIUM SERPL-SCNC: 3.9 MMOL/L (ref 3.5–5.2)
RBC # BLD AUTO: 5 10*6/MM3 (ref 4.14–5.8)
SODIUM SERPL-SCNC: 133 MMOL/L (ref 136–145)
WBC NRBC COR # BLD: 7.16 10*3/MM3 (ref 3.4–10.8)

## 2022-07-19 PROCEDURE — 96372 THER/PROPH/DIAG INJ SC/IM: CPT

## 2022-07-19 PROCEDURE — 85025 COMPLETE CBC W/AUTO DIFF WBC: CPT | Performed by: NURSE PRACTITIONER

## 2022-07-19 PROCEDURE — G0378 HOSPITAL OBSERVATION PER HR: HCPCS

## 2022-07-19 PROCEDURE — 80048 BASIC METABOLIC PNL TOTAL CA: CPT | Performed by: NURSE PRACTITIONER

## 2022-07-19 PROCEDURE — 25010000002 HEPARIN (PORCINE) PER 1000 UNITS: Performed by: NURSE PRACTITIONER

## 2022-07-19 PROCEDURE — 83036 HEMOGLOBIN GLYCOSYLATED A1C: CPT | Performed by: NURSE PRACTITIONER

## 2022-07-19 PROCEDURE — 99217 PR OBSERVATION CARE DISCHARGE MANAGEMENT: CPT | Performed by: INTERNAL MEDICINE

## 2022-07-19 RX ORDER — METOPROLOL SUCCINATE 50 MG/1
50 TABLET, EXTENDED RELEASE ORAL DAILY
Qty: 90 TABLET | Refills: 1 | Status: SHIPPED | OUTPATIENT
Start: 2022-07-19

## 2022-07-19 RX ORDER — METHION/INOS/CHOL BT/B COM/LIV 110MG-86MG
100 CAPSULE ORAL DAILY
Qty: 30 TABLET | Refills: 0 | Status: SHIPPED | OUTPATIENT
Start: 2022-07-19 | End: 2022-09-08 | Stop reason: SDUPTHER

## 2022-07-19 RX ORDER — PSEUDOEPHEDRINE HCL 30 MG
30 TABLET ORAL 2 TIMES DAILY PRN
Status: DISCONTINUED | OUTPATIENT
Start: 2022-07-19 | End: 2022-07-19 | Stop reason: HOSPADM

## 2022-07-19 RX ADMIN — FOLIC ACID 1 MG: 1 TABLET ORAL at 08:48

## 2022-07-19 RX ADMIN — HEPARIN SODIUM 5000 UNITS: 5000 INJECTION INTRAVENOUS; SUBCUTANEOUS at 14:48

## 2022-07-19 RX ADMIN — THIAMINE HCL TAB 100 MG 100 MG: 100 TAB at 08:48

## 2022-07-19 RX ADMIN — HEPARIN SODIUM 5000 UNITS: 5000 INJECTION INTRAVENOUS; SUBCUTANEOUS at 05:32

## 2022-07-19 RX ADMIN — ROSUVASTATIN 40 MG: 20 TABLET, FILM COATED ORAL at 08:48

## 2022-07-19 RX ADMIN — Medication 10 ML: at 08:51

## 2022-07-19 RX ADMIN — MULTIVITAMIN TABLET 1 TABLET: TABLET at 08:48

## 2022-07-19 RX ADMIN — TAMSULOSIN HYDROCHLORIDE 0.8 MG: 0.4 CAPSULE ORAL at 08:48

## 2022-07-19 RX ADMIN — ASPIRIN 81 MG CHEWABLE TABLET 81 MG: 81 TABLET CHEWABLE at 08:48

## 2022-07-19 NOTE — CASE MANAGEMENT/SOCIAL WORK
Case Management Discharge Note      Final Note: Plan is for pt. to dc to home. No needs noted at this time. Have arranged for transport with [facility] by  at 1800pm. Please have pt. at [facility] by 1750p.    Provided Post Acute Provider List?: N/A    Selected Continued Care - Admitted Since 7/18/2022     Destination    No services have been selected for the patient.              Durable Medical Equipment    No services have been selected for the patient.              Dialysis/Infusion    No services have been selected for the patient.              Home Medical Care    No services have been selected for the patient.              Therapy    No services have been selected for the patient.              Community Resources    No services have been selected for the patient.              Community & DME    No services have been selected for the patient.                       Final Discharge Disposition Code: 01 - home or self-care

## 2022-07-19 NOTE — OUTREACH NOTE
Prep Survey    Flowsheet Row Responses   [facility] patient discharged from? [place]   Is LACE score < 7 ? Yes   Emergency Room discharge w/ pulse ox? No   Eligibility [facility]   Date of Admission 07/18/22   Date of Discharge 07/19/22   Discharge Disposition Home or Self Care   Discharge diagnosis Dehydration secondary to N/D, decreased appetite,    hypotension,    (+) COVID    Does the patient have one of the following disease processes/diagnoses(primary or secondary)? COVID-19   Does the patient have Home health ordered? No   Is there a DME ordered? No   Prep survey completed? Yes          ALIA IRBY - Registered Nurse

## 2022-07-19 NOTE — CASE MANAGEMENT/SOCIAL WORK
Discharge Planning Assessment  [facility]     Patient Name: Thien Gray  MRN: [id number]  Today's Date: 7/19/2022    Admit Date: 7/18/2022     Discharge Needs Assessment     Row Name 07/19/22 1110       Living Environment    People in Home alone    Current Living Arrangements home    Primary Care Provided by self    Provides Primary Care For no one    Family Caregiver if Needed sibling(s)    Family Caregiver Names Mikie- brother    Quality of Family Relationships supportive    Able to Return to Prior Arrangements yes       Resource/Environmental Concerns    Resource/Environmental Concerns none    Transportation Concerns rides, unreliable from others       Transition Planning    Patient/Family Anticipates Transition to home    Patient/Family Anticipated Services at Transition none    Transportation Anticipated health plan transportation       Discharge Needs Assessment    Equipment Currently Used at Home walker, standard    Concerns to be Addressed denies needs/concerns at this time;no discharge needs identified    Equipment Needed After Discharge none    Provided Post Acute Provider List? N/A               Discharge Plan     Row Name 07/19/22 1112       Plan    Plan Home    Patient/Family in Agreement with Plan yes    Plan Comments Spoke with pt. by phone due to Covid isolation. Lives alone in [place] Independent with ADL's at baseline. Has a walker but not used. Denies other DME. His plan is to dc to home. Will need transport arranged at DC.    Final Discharge Disposition Code 01 - home or self-care              Continued Care and Services - Admitted Since 7/18/2022    Coordination has not been started for this encounter.          Demographic Summary    No documentation.                Functional Status     Row Name 07/19/22 1110       Functional Status    Usual Activity Tolerance good       Assessment of Health Literacy    How often do you have someone help you read hospital materials? Occasionally     How often do you have problems learning about your medical condition because of difficulty understanding written information? Never    How often do you have a problem understanding what is told to you about your medical condition? Never    How confident are you filling out medical forms by yourself? Somewhat    Health Literacy Moderate       Functional Status, IADL    Medications independent    Meal Preparation independent    Housekeeping independent    Laundry independent    Shopping independent       Mental Status Summary    Recent Changes in Mental Status/Cognitive Functioning no changes               Psychosocial    No documentation.                Abuse/Neglect    No documentation.                Legal    No documentation.                Substance Abuse    No documentation.                Patient Forms    No documentation.                   Ivone Velasquez RN

## 2022-07-19 NOTE — PROGRESS NOTES
"                    Clinical Nutrition     Patient Name: Thien Gray  YOB: 1955  MRN: [id number]  Date of Encounter: 07/19/22 10:55 EDT  Admission date: 7/18/2022    Reason for Visit   Identified at risk by screening criteria     Patient was identified as having a slight chewing/swallowing issue     EMR Reviewed: Yes       Diet Nutrition Related History:    Patient reported having a slight chewing/swallowing issues in the past but is currently doing well with his soft diet.  He reports his appetite has come back and he is eating well. Weight is stable.       Current Nutrition Prescription     Diet Soft Texture; Whole Foods; Thin    No active supplement orders      Average Intake from Charting:     Only one snack documented at 100%  (Patient states his appetite is back and he's eating well)    Intake History:     Intake Category  Unable to determine at this time.    Anthropometrics   Height: Height: 177.8 cm (70\")  Admission Weight:   Flowsheet Rows    Flowsheet Row First Filed Value   Admission Height 177.8 cm (70\") Documented at 07/18/2022 1412   Admission Weight 120 kg (265 lb) Documented at 07/18/2022 1412        Last Filed Weight: Weight: 123 kg (270 lb 1.6 oz) (07/19/22 0458)  BMI: BMI (Calculated): 38.8  BMI classification: Obese Class II: 35-39.9kg/m2   IBW: Ideal Body Weight (IBW) (kg): 76.48      Weight Loss Criteria  Acute:   N/A    Chronic/Social Environmental   N/A    Actions   Criteria for further malnutrition exam not met at this time. Follow per protocol.       Jocelyn Craig,   Time Spent: 30 minutes  "

## 2022-07-19 NOTE — PLAN OF CARE
Goal Outcome Evaluation:         Pt admitted from ED VSS, on RA, NSR overnight. CIWA scores completed Q 4 hours, pt rested comfortably overnight. No c/o of pain or SOA overnight. BP stable overnight.

## 2022-07-20 ENCOUNTER — TRANSITIONAL CARE MANAGEMENT TELEPHONE ENCOUNTER (OUTPATIENT)
Dept: CALL CENTER | Facility: HOSPITAL | Age: 67
End: 2022-07-20

## 2022-07-20 NOTE — DISCHARGE SUMMARY
[facility]  DISCHARGE SUMMARY    Patient Name: Thien Gray  : 1955  MRN: [id number]    Date of Admission: 2022  2:08 PM  Date of Discharge:  22  Primary Care Physician: Sanaz Zuluaga DO    Consults     No orders found from 2022 to 2022.          Hospital Course     Presenting Problem:   Hypotension [I95.9]    Active Hospital Problems    Diagnosis  POA   • COVID-19 virus detected [U07.1]  Yes   • HTN (hypertension) [I10]  Yes   • HLD (hyperlipidemia) [E78.5]  Yes   • CAD (coronary artery disease) [I25.10]  Yes      Resolved Hospital Problems    Diagnosis Date Resolved POA   • Hypotension [I95.9] 2022 Yes          Hospital Course:  Thien Gray is a 66 y.o. male CAD s/p CABG, HTN/HLD,daily alcohol use, obesity presenting with fatigue, nausea, diarrhea, decrease appetite, cough, and burning pain in his right lung for 3 days.     Dehydration secondary to N/D, decreased appetite  hypotension  (+) COVID  - COVID +  -- planned isolation 10 days as not hypoxic   - CT abd/pel with no acute findings  - CXR with no acute findings  - Remains on room air  - Patient improved and tolerated PO intake. Overall improved and felt comfortable for discharge today.      HTN/HLD/CAD/HFrEF   - Metoprolol was held on admission and resumed with hold parameters on discharge.   - continue crestor, ASA  - ECHO 2021 LVEF 41-45%. Monitor volume status      Elevated Glucose     Daily Alcohol use  - WA protocol for ativan PRN. No hx of alcohol withdrawal seizures    Discharge Follow Up Recommendations for outpatient labs/diagnostics:  PCP 1 week     Day of Discharge     Please see day of discharge progress note     Pertinent  and/or Most Recent Results     LAB RESULTS:      Lab 22  0913 22  1517   WBC 7.16 10.49   HEMOGLOBIN 15.5 16.0   HEMATOCRIT 43.4 45.4   PLATELETS 237 259   NEUTROS ABS 4.66 8.69*   IMMATURE GRANS (ABS)  0.04 0.06*   LYMPHS ABS 1.32 0.78   MONOS ABS 0.61 0.72   EOS ABS 0.50* 0.21   MCV 86.8 88.8   LACTATE  --  1.0         Lab 07/19/22  0913 07/18/22  1517   SODIUM 133* 133*   POTASSIUM 3.9 4.4   CHLORIDE 100 97*   CO2 22.0 27.0   ANION GAP 11.0 9.0   BUN 9 15   CREATININE 0.80 0.93   EGFR 96.4 89.4   GLUCOSE 142* 151*   CALCIUM 8.6 9.0   HEMOGLOBIN A1C 6.00*  --          Lab 07/18/22  1517   TOTAL PROTEIN 7.5   ALBUMIN 4.60   GLOBULIN 2.9   ALT (SGPT) 41   AST (SGOT) 66*   BILIRUBIN 1.0   ALK PHOS 82   LIPASE 27         Lab 07/18/22  1517   PROBNP 249.7   TROPONIN T <0.010                 Brief Urine Lab Results  (Last result in the past 365 days)      Color   Clarity   Blood   Leuk Est   Nitrite   Protein   CREAT   Urine HCG        07/18/22 1612 Yellow   Clear   Negative   Negative   Negative   Negative               Microbiology Results (last 10 days)     Procedure Component Value - Date/Time    Blood Culture - Blood, Hand, Left [[id number]]  (Normal) Collected: 07/18/22 1650    Lab Status: Preliminary result Specimen: Blood from Hand, Left Updated: 07/19/22 1717     Blood Culture No growth at 24 hours    Blood Culture - Blood, Hand, Right [[id number]]  (Normal) Collected: 07/18/22 1640    Lab Status: Preliminary result Specimen: Blood from Hand, Right Updated: 07/19/22 1717     Blood Culture No growth at 24 hours    COVID PRE-OP / PRE-PROCEDURE SCREENING ORDER (NO ISOLATION) - Swab, Nasopharynx [[id number]]  (Abnormal) Collected: 07/18/22 1518    Lab Status: Final result Specimen: Swab from Nasopharynx Updated: 07/18/22 1619    Narrative:      The following orders were created for panel order COVID PRE-OP / PRE-PROCEDURE SCREENING ORDER (NO ISOLATION) - Swab, Nasopharynx.  Procedure                               Abnormality         Status                     ---------                               -----------         ------                     COVID-19 and FLU A/B PCR...[[id number]]  Abnormal            Final result                  Please view results for these tests on the individual orders.    COVID-19 and FLU A/B PCR - Swab, Nasopharynx [[id number]]  (Abnormal) Collected: 07/18/22 1518    Lab Status: Final result Specimen: Swab from Nasopharynx Updated: 07/18/22 1619     COVID19 Detected     Influenza A PCR Not Detected     Influenza B PCR Not Detected    Narrative:      Fact sheet for providers: https://www.fda.gov/media/316946/download    Fact sheet for patients: https://www.fda.gov/media/538764/download    Test performed by PCR.  Influenza A and Influenza B negative results should be considered presumptive in samples that have a positive SARS-CoV-2 result.    Competitive inhibition studies showed that SARS-CoV-2 virus, when present at concentrations above 3.6E+04 copies/mL, can inhibit the detection and amplification of influenza A and influenza B virus RNA if present at or below 1.8E+02 copies/mL or 4.9E+02 copies/mL, respectively, and may lead to false negative influenza virus results. If co-infection with influenza A or influenza B virus is suspected in samples with a positive SARS-CoV-2 result, the sample should be re-tested with another FDA cleared, approved, or authorized influenza test, if influenza virus detection would change clinical management.          CT Abdomen Pelvis Without Contrast    Result Date: 7/18/2022  DATE OF EXAM: 7/18/2022 3:29 PM  PROCEDURE: CT ABDOMEN PELVIS WO CONTRAST-  INDICATIONS: abdominal pain  COMPARISON: No comparisons available.  TECHNIQUE: Routine transaxial slices were obtained through the abdomen and pelvis without the administration of intravenous contrast. Reconstructed coronal and sagittal images were also obtained. Automated exposure control and iterative construction methods were used.  The radiation dose reduction device was turned on for each scan per the ALARA (As Low as Reasonably Achievable) protocol.  FINDINGS: There is a partially calcified granuloma in the left lower lobe  with otherwise unremarkable appearance of the partially imaged lower thorax. Unremarkable appearance of the liver. There is cholelithiasis without CT evidence of cholecystitis. Unremarkable appearance of the spleen, pancreas, adrenal glands, kidneys, ureters, and bladder. The prostate is enlarged measuring 6.6 cm in maximal transverse dimension. There is severe sigmoid colonic diverticulosis without evidence of diverticulitis. Normal appendix. No bowel obstruction or definite inflammatory change of the GI tract. No free abdominopelvic fluid or conspicuous fat stranding. No pneumoperitoneum. Scattered atherosclerosis. The body wall soft tissues are unremarkable. No lymphadenopathy. No acute osseous findings.      No acute abdominopelvic findings.  Sigmoid colonic diverticulosis without diverticulitis.  Cholelithiasis without CT evidence of cholecystitis.  Enlarged prostate.  This report was finalized on 7/18/2022 4:19 PM by Hitesh Mcmahan MD.      XR Chest 1 View    Result Date: 7/18/2022  XR CHEST 1 VW-  COMPARISON: 2/28/2022  HISTORY: chest pain  FINDINGS: Technical adequacy: Adequate Central airways: Unremarkable Heart: Borderline heart size, likely accentuated by the technique. Great vessels: Unremarkable Mediastinum: Unremarkable Lungs: Unremarkable Pulmonary carmine:Unremarkable Pleura: Unremarkable Skeletal structures: Degenerative changes Extrathoracic soft tissues:Unremarkable Additional comments: Status post median sternotomy and CABG      Impression: As above. No acute cardiopulmonary disease.  This report was finalized on 7/18/2022 4:00 PM by Pavan Lucia MD.        Results for orders placed during the hospital encounter of 06/22/21    Duplex Groin Pseudoaneurysm unilateral CAR    Interpretation Summary  · Thrombosed pseudoaneurysm following thrombin injection.  ·      Results for orders placed during the hospital encounter of 06/22/21    Duplex Groin Pseudoaneurysm unilateral CAR    Interpretation  Summary  · Thrombosed pseudoaneurysm following thrombin injection.  ·      Results for orders placed during the hospital encounter of 06/22/21    Adult Transthoracic Echo Complete W/ Cont if Necessary Per Protocol    Interpretation Summary  · Left ventricular wall thickness is consistent with mild concentric hypertrophy.  · Left ventricular ejection fraction appears to be 41 - 45%. Left ventricular systolic function is mildly decreased.  · Left ventricular diastolic dysfunction is noted.  · The right ventricular cavity is mildly dilated.  · Estimated right ventricular systolic pressure from tricuspid regurgitation is normal (<35 mmHg). Calculated right ventricular systolic pressure from tricuspid regurgitation is 29 mmHg.      Plan for Follow-up of Pending Labs/Results:   Pending Labs     Order Current Status    Blood Culture - Blood, Hand, Left Preliminary result    Blood Culture - Blood, Hand, Right Preliminary result        Discharge Details        Discharge Medications      New Medications      Instructions Start Date   thiamine 100 MG tablet tablet  Commonly known as: VITAMIN B-1   100 mg, Oral, Daily         Changes to Medications      Instructions Start Date   metoprolol succinate XL 50 MG 24 hr tablet  Commonly known as: TOPROL-XL  What changed: additional instructions   50 mg, Oral, Daily, Hold for blood pressure less than 100/60.      rosuvastatin 40 MG tablet  Commonly known as: CRESTOR  What changed: when to take this   40 mg, Oral, Daily         Continue These Medications      Instructions Start Date   albuterol sulfate  (90 Base) MCG/ACT inhaler  Commonly known as: PROVENTIL HFA;VENTOLIN HFA;PROAIR HFA   2 puffs, Inhalation, Every 4 Hours PRN      aspirin 81 MG EC tablet   1 tablet, Oral, Daily      pseudoephedrine 30 MG tablet  Commonly known as: SUDAFED   1 tablet, Oral, Every Other Day      tamsulosin 0.4 MG capsule 24 hr capsule  Commonly known as: FLOMAX   TAKE 2 CAPSULES DAILY       triamcinolone 0.025 % ointment  Commonly known as: KENALOG   Apply  topically to the appropriate area as directed 2 Times a Day for 10 days. Avoid applying to face, axilla, and groin             Allergies   Allergen Reactions   • Bactrim [Sulfamethoxazole-Trimethoprim] Other (See Comments)     Causes weakness, fatigue   • Plavix [Clopidogrel Bisulfate] Hives   • Augmentin [Amoxicillin-Pot Clavulanate] Confusion         Discharge Disposition:  Home or Self Care    Diet:  Hospital:No active diet order      Activity:  Activity Instructions     Activity as Tolerated            Restrictions or Other Recommendations:         CODE STATUS:    Code Status and Medical Interventions:   Ordered at: 07/18/22 1819     Level Of Support Discussed With:    Patient     Code Status (Patient has no pulse and is not breathing):    CPR (Attempt to Resuscitate)     Medical Interventions (Patient has pulse or is breathing):    Full Support       Future Appointments   Date Time Provider Department Center   7/28/2022  8:00 AM Mark Anthony Courtney MD [facility]   9/8/2022  1:30 PM Guera Baer APRN [facility]   9/9/2022  8:15 AM Sanaz Zuluaga DO [facility]   9/27/2022  9:30 AM Shad Ruiz MD [facility]       Additional Instructions for the Follow-ups that You Need to Schedule     Discharge Follow-up with PCP   As directed       Currently Documented PCP:    Sanaz Zuluaga DO    PCP Phone Number:    [phone]     Follow Up Details: PCP Dr. Zuluaga 1 week         Discharge Follow-up with Specified Provider: Cardiology Dr. Conrad 2-4 weeks   As directed      To: Cardiology Dr. Conrad 2-4 weeks                     Negrita Meneses DO  07/20/22      Time Spent on Discharge:  I spent  35  minutes on this discharge activity which included: face-to-face encounter with the patient, reviewing the data in the system, coordination of the care with the nursing staff as well as consultants,  documentation, and entering orders.

## 2022-07-20 NOTE — OUTREACH NOTE
Call Center TCM Note    Flowsheet Row Responses   [facility] patient discharged from? [place]   Does the patient have one of the following disease processes/diagnoses(primary or secondary)? COVID-19   COVID-19 underlying condition? None   TCM attempt successful? Yes   Call start time 1008   Call end time 1012   Discharge diagnosis Dehydration secondary to N/D, decreased appetite,    hypotension,    (+) COVID    Meds reviewed with patient/caregiver? Yes   Is the patient having any side effects they believe may be caused by any medication additions or changes? No   Does the patient have all medications ordered at discharge? Yes   Is the patient taking all medications as directed (includes completed medication regime)? Yes   Does the patient have a primary care provider?  Yes   Comments regarding PCP hospital f/u with Dr. Courtney on 7/27   Does the patient have an appointment with their PCP or specialist within 7 days of discharge? Yes   Has the patient kept scheduled appointments due by today? N/A   Has home health visited the patient within 72 hours of discharge? N/A   Psychosocial issues? No   Did the patient receive a copy of their discharge instructions? Yes   Did the patient receive a copy of COVID-19 specific instructions? Yes   What is the patient's perception of their health status since discharge? Improving   Is the patient/caregiver able to teach back the hierarchy of who to call/visit for symptoms/problems? PCP, Specialist, Home health nurse, Urgent Care, ED, 911 Yes   TCM call completed? Yes   Wrap up additional comments Doing well, all questions addressed, confirmed appt for 7/27 but states he may have to change date due to isolation, he will call office.          Jen Ashby RN    7/20/2022, 10:13 EDT

## 2022-07-21 ENCOUNTER — TELEPHONE (OUTPATIENT)
Dept: FAMILY MEDICINE CLINIC | Facility: CLINIC | Age: 67
End: 2022-07-21

## 2022-07-21 LAB
QT INTERVAL: 454 MS
QTC INTERVAL: 530 MS

## 2022-07-21 NOTE — TELEPHONE ENCOUNTER
Was in ER with valorie for covid. Was released on 7/19. Bp has been 83/68 and 100/81. Bp machine is now not reading right. No symptoms just tired but no more then usually. But is worried about it being low. Stated he is ok with waiting a day or so to see how it goes or come in. He just wants to know what to do?     [phone]

## 2022-07-22 ENCOUNTER — READMISSION MANAGEMENT (OUTPATIENT)
Dept: CALL CENTER | Facility: HOSPITAL | Age: 67
End: 2022-07-22

## 2022-07-22 NOTE — OUTREACH NOTE
COVID-19 Week 1 Survey    Flowsheet Row Responses   [facility] facility patient discharged from? [place]   Does the patient have one of the following disease processes/diagnoses(primary or secondary)? COVID-19   COVID-19 underlying condition? None   Call Number Call 2   Week 1 Call successful? No  [Reached brother, Mikie, who has not talked with patient today. States will alert patient to future f/u calls.]          ZENON ZAVALA - Registered Nurse

## 2022-07-23 LAB
BACTERIA SPEC AEROBE CULT: NORMAL
BACTERIA SPEC AEROBE CULT: NORMAL

## 2022-07-28 ENCOUNTER — OFFICE VISIT (OUTPATIENT)
Dept: FAMILY MEDICINE CLINIC | Facility: CLINIC | Age: 67
End: 2022-07-28

## 2022-07-28 VITALS
RESPIRATION RATE: 20 BRPM | HEIGHT: 70 IN | DIASTOLIC BLOOD PRESSURE: 70 MMHG | WEIGHT: 265.2 LBS | BODY MASS INDEX: 37.97 KG/M2 | OXYGEN SATURATION: 98 % | HEART RATE: 61 BPM | SYSTOLIC BLOOD PRESSURE: 100 MMHG | TEMPERATURE: 97.1 F

## 2022-07-28 DIAGNOSIS — N40.1 BPH WITH OBSTRUCTION/LOWER URINARY TRACT SYMPTOMS: ICD-10-CM

## 2022-07-28 DIAGNOSIS — U07.1 COVID-19 VIRUS INFECTION: ICD-10-CM

## 2022-07-28 DIAGNOSIS — Z09 HOSPITAL DISCHARGE FOLLOW-UP: Primary | ICD-10-CM

## 2022-07-28 DIAGNOSIS — E66.01 CLASS 2 SEVERE OBESITY DUE TO EXCESS CALORIES WITH SERIOUS COMORBIDITY AND BODY MASS INDEX (BMI) OF 38.0 TO 38.9 IN ADULT: ICD-10-CM

## 2022-07-28 DIAGNOSIS — E86.0 DEHYDRATION: ICD-10-CM

## 2022-07-28 DIAGNOSIS — R73.02 IMPAIRED GLUCOSE TOLERANCE: ICD-10-CM

## 2022-07-28 DIAGNOSIS — I95.9 HYPOTENSION, UNSPECIFIED HYPOTENSION TYPE: ICD-10-CM

## 2022-07-28 DIAGNOSIS — N13.8 BPH WITH OBSTRUCTION/LOWER URINARY TRACT SYMPTOMS: ICD-10-CM

## 2022-07-28 DIAGNOSIS — I25.10 CORONARY ARTERY DISEASE INVOLVING NATIVE CORONARY ARTERY OF NATIVE HEART WITHOUT ANGINA PECTORIS: ICD-10-CM

## 2022-07-28 PROBLEM — E66.09 CLASS 2 OBESITY DUE TO EXCESS CALORIES WITH BODY MASS INDEX (BMI) OF 38.0 TO 38.9 IN ADULT: Status: ACTIVE | Noted: 2022-07-28

## 2022-07-28 PROBLEM — E66.812 CLASS 2 OBESITY DUE TO EXCESS CALORIES WITH BODY MASS INDEX (BMI) OF 38.0 TO 38.9 IN ADULT: Status: ACTIVE | Noted: 2022-07-28

## 2022-07-28 PROCEDURE — 99495 TRANSJ CARE MGMT MOD F2F 14D: CPT | Performed by: FAMILY MEDICINE

## 2022-07-28 RX ORDER — FLUTICASONE PROPIONATE 50 MCG
2 SPRAY, SUSPENSION (ML) NASAL DAILY
COMMUNITY

## 2022-07-28 NOTE — PROGRESS NOTES
Transitional Care Follow Up Visit  Subjective     Thien Gray is a 66 y.o. male who presents for a transitional care management visit.    Within 48 business hours after discharge our office contacted him via telephone to coordinate his care and needs.      I reviewed and discussed the details of that call along with the discharge summary, hospital problems, inpatient lab results, inpatient diagnostic studies, and consultation reports with Thien.     Current outpatient and discharge medications have been reconciled for the patient.  Reviewed by: Mark Anthony Courtney MD      Date of TCM Phone Call 7/19/2022   [facility]   Date of Admission 7/18/2022   Date of Discharge 7/19/2022   Discharge Disposition Home or Self Care     Risk for Readmission (LACE) Score: 2 (7/19/2022  6:01 AM)      History of Present Illness  Course During Hospital Stay: Patient was admitted to [facility] for dehydration caused by COVID-19 infection.  Patient was rehydrated but had persistently low blood pressures which resulted in modification of instructions on how to take his Toprol-XL 50 mg.  Patient is only taking his Toprol-XL months since discharge from the hospital 8 days ago as his blood pressure remains low at home with systolics consistently less than 100.    Patient was also documented to have hyperglycemia with A1c of 6.    Patient has additional follow-up with his cardiologist, Dr. Conrad, in 2 to 4 weeks.  Patient has history of coronary artery disease as well as as left ventricular systolic dysfunction on echocardiogram from last year.  Ejection fraction was 41 to 45%.     The following portions of the patient's history were reviewed and updated as appropriate: allergies, current medications, past family history, past medical history, past social history, past surgical history, and problem list.    Objective   Physical Exam  Constitutional:       Appearance: He is obese.   Cardiovascular:      Rate and  Rhythm: Normal rate and regular rhythm.      Pulses: Normal pulses.      Heart sounds: Normal heart sounds.   Pulmonary:      Effort: Pulmonary effort is normal.      Breath sounds: Normal breath sounds.   Neurological:      Mental Status: He is alert.         Assessment & Plan   Diagnoses and all orders for this visit:    1. Hospital discharge follow-up (Primary)    2. COVID-19 virus infection  Comments:  Patient is completed 10-day quarantine.  Recommended fourth dose of COVID-vaccine in 30 to 60 days    3. Impaired glucose tolerance  Comments:  Monitor glucose every 6 months.  Begin SGLT2 in the future if blood pressure allows  Orders:  -     Cancel: POC Glycosylated Hemoglobin (Hb A1C)    4. BPH with obstruction/lower urinary tract symptoms  Comments:  Refer to urology.  Patient continues to have symptoms despite maximum Flomax therapy  Orders:  -     Ambulatory Referral to Urology    5. Hypotension, unspecified hypotension type  Comments:  Continue to hold Toprol-XL for systolic blood pressure less than 100    6. Dehydration  Comments:  Resolved.  Encourage fluid intake.  Monitoring daily weights    7. Class 2 severe obesity due to excess calories with serious comorbidity and body mass index (BMI) of 38.0 to 38.9 in adult (HCC)  Assessment & Plan:  Patient's (Body mass index is 38.05 kg/m².) indicates that they are morbidly obese (BMI > 40 or > 35 with obesity - related health condition) with health conditions that include coronary heart disease . Weight is unchanged. BMI is is above average; BMI management plan is completed. We discussed low calorie, low carb based diet program, portion control and pharmacologic options including GLP-1 agonist due to concurrent coronary artery disease.       8. Coronary artery disease involving native coronary artery of native heart without angina pectoris  Comments:  Stable.  Follow-up with Dr. Pace as advised           Return if symptoms worsen or fail to  improve.  Mark Anthony Courtney MD

## 2022-07-28 NOTE — ASSESSMENT & PLAN NOTE
Patient's (Body mass index is 38.05 kg/m².) indicates that they are morbidly obese (BMI > 40 or > 35 with obesity - related health condition) with health conditions that include coronary heart disease . Weight is unchanged. BMI is is above average; BMI management plan is completed. We discussed low calorie, low carb based diet program, portion control and pharmacologic options including GLP-1 agonist due to concurrent coronary artery disease.

## 2022-08-24 ENCOUNTER — TELEPHONE (OUTPATIENT)
Dept: FAMILY MEDICINE CLINIC | Facility: CLINIC | Age: 67
End: 2022-08-24

## 2022-08-24 DIAGNOSIS — E78.2 MIXED HYPERLIPIDEMIA: Primary | ICD-10-CM

## 2022-08-24 DIAGNOSIS — I25.10 CORONARY ARTERY DISEASE INVOLVING NATIVE HEART WITHOUT ANGINA PECTORIS, UNSPECIFIED VESSEL OR LESION TYPE: ICD-10-CM

## 2022-08-24 DIAGNOSIS — R73.03 PREDIABETES: ICD-10-CM

## 2022-08-24 NOTE — TELEPHONE ENCOUNTER
Caller: Thien Gray    Relationship: Self    Best call back number: [phone]    What orders are you requesting (i.e. lab or imaging): bloodwork    In what timeframe would the patient need to come in:     Where will you receive your lab/imaging services:     Additional notes:

## 2022-08-25 NOTE — TELEPHONE ENCOUNTER
Called patient is just wanting some routine labs done before appointment. Stated he had labs done in July while in the hospital. But would like to check his liver, kidney, sugar. Stated if unable to order ok to wait til appointment.

## 2022-09-06 ENCOUNTER — LAB (OUTPATIENT)
Dept: FAMILY MEDICINE CLINIC | Facility: CLINIC | Age: 67
End: 2022-09-06

## 2022-09-06 DIAGNOSIS — R73.03 PREDIABETES: ICD-10-CM

## 2022-09-06 DIAGNOSIS — I25.10 CORONARY ARTERY DISEASE INVOLVING NATIVE HEART WITHOUT ANGINA PECTORIS, UNSPECIFIED VESSEL OR LESION TYPE: ICD-10-CM

## 2022-09-06 DIAGNOSIS — E78.2 MIXED HYPERLIPIDEMIA: ICD-10-CM

## 2022-09-08 ENCOUNTER — OFFICE VISIT (OUTPATIENT)
Dept: NEUROLOGY | Facility: CLINIC | Age: 67
End: 2022-09-08

## 2022-09-08 VITALS
HEART RATE: 112 BPM | SYSTOLIC BLOOD PRESSURE: 130 MMHG | WEIGHT: 262 LBS | OXYGEN SATURATION: 99 % | BODY MASS INDEX: 37.51 KG/M2 | DIASTOLIC BLOOD PRESSURE: 82 MMHG | HEIGHT: 70 IN

## 2022-09-08 DIAGNOSIS — R41.9 COGNITIVE COMPLAINTS WITH NORMAL EXAM: ICD-10-CM

## 2022-09-08 DIAGNOSIS — R29.898 COMPLAINTS OF LEG WEAKNESS: ICD-10-CM

## 2022-09-08 DIAGNOSIS — R26.89 IMPAIRMENT OF BALANCE: ICD-10-CM

## 2022-09-08 DIAGNOSIS — G89.29 CHRONIC BILATERAL LOW BACK PAIN WITHOUT SCIATICA: ICD-10-CM

## 2022-09-08 DIAGNOSIS — G57.10 LATERAL FEMORAL CUTANEOUS NEUROPATHY, UNSPECIFIED LATERALITY: ICD-10-CM

## 2022-09-08 DIAGNOSIS — G60.9 IDIOPATHIC PERIPHERAL NEUROPATHY: ICD-10-CM

## 2022-09-08 DIAGNOSIS — M54.50 CHRONIC BILATERAL LOW BACK PAIN WITHOUT SCIATICA: ICD-10-CM

## 2022-09-08 DIAGNOSIS — F10.20 ALCOHOL DEPENDENCE, DAILY USE: Primary | ICD-10-CM

## 2022-09-08 PROCEDURE — 99214 OFFICE O/P EST MOD 30 MIN: CPT | Performed by: NURSE PRACTITIONER

## 2022-09-08 RX ORDER — METHION/INOS/CHOL BT/B COM/LIV 110MG-86MG
100 CAPSULE ORAL DAILY
Qty: 90 TABLET | Refills: 3 | Status: SHIPPED | OUTPATIENT
Start: 2022-09-08 | End: 2023-03-06 | Stop reason: SDUPTHER

## 2022-09-08 RX ORDER — CHOLECALCIFEROL (VITAMIN D3) 1250 MCG
CAPSULE ORAL
COMMUNITY
Start: 2022-08-09

## 2022-09-08 NOTE — PROGRESS NOTES
Subjective:     Patient ID: Thien Gray is a 67 y.o. male.    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     At last visit in clinic on 03/04/2022, we were monitoring his neuropathy due to no reports of pain. We discussed weight loss and avoiding tight fitting clothes.     He has had chronic facial weakness with MRI of the brain showing no stroke. We have discussed cutting back on his alcohol use.     Unfortunately, he was admitted to [facility] from 07/18/2022 to 07/20/2022 for hypotension with COVID-19 infection. He was admitted and essentially stabilized and discharged home with thiamine supplementation. He did see his primary care provider office on 07/28/2022 for follow-up and has also been referred to urology for BPH symptoms.     He does continue to follow with cardiology for coronary artery disease. He is scheduled to see his primary care provider tomorrow for routine follow-up. He is also scheduled to see pulmonology on 09/27/2022 for sleep study for daytime somnolence evaluation.    The patient reports that he stopped drinking alcohol on 07/16/2022 because he decided to lose weight, and he went for 40 days until approximately 1.5 weeks ago without alcohol. He had cut back his intake before stopping. He plans to stop again this weekend, 09/10/2022. He denies any withdrawal symptoms when he stopped drinking and states he had previously cut down to 2 drinks per night.    The patient was admitted to the hospital for COVID-19 at the same time and he was given Iv ativan to prevent alcohol withdrawal. He states he has completed the thiamine supplementation and inquires if he should  "continue it. He denies any pain in his lower extremities.    He reports 50 percent improvement in the neuropathy symptoms in both thighs and currently he localizes it to the medial aspect of the bilateral thighs. He denies using any compounded cream for this. He denies any numbness or tingling in the lower calf area of his bilateral lower extremities.     He denies any falls in the last year and notes he has had some close calls in the last couple of weeks. He reports being less stable and feeling heaviness and weakness in his lower extremities when ambulating, which is worsening. He denies any shooting pain into the lower extremities from his back. He states that this resolves when he is resting. He denies doing any recent physical therapy for balance and in the past, it aggravated his knee problem. He recently had a series of viscosupplementation injections in the right knee and he feels he may be able to get more exercise at this point. He will pass on physical therapy at present.  He states he can squat but has difficulty getting back up from a squatting position because of knee pain with known arthritis.     He confirms he has low back pain only in the morning when he wakes up and starts getting up, which resolves within 10 to 15 minutes. Denies perineal numbness, urinary or bowel incontinence or hesitancy that is new and denies atrophy in legs.     He reports he will be following-up with urology in a couple of weeks because he has a benign enlarged prostate, however, Flomax is no longer effective, it was in the past. They ordered some blood work and did an ultrasound of his kidneys which was normal.    The patient has noticed that he gets distracted when he gets up to do something, and he will start doing something and then go back and do it. He states he is developing \"compensation habits,\" like keeping the task in his mind or making a list. He denies any hallucinations, delusions, confusion, or getting lost " while driving.    The patient is scheduled to see pulmonary at the end of 09/2022 for a sleep study. He has a history of sleep apnea.    He reports that he will be seeing Dr. Zuluaga tomorrow on 09/09/2022, and he will complete his fasting blood work then.    He confirms he continues to see Cardiology every 6 months. He denies taking metoprolol because when he was hospitalized for COVID-19 he had very low blood pressure and he was told not to take metoprolol if his systolic blood pressure is below 100 mm/Hg. He checks his blood pressure at home in the morning, and it is never above 100 systolic.     Social history: The patient will be retiring in 12/2022. He will be moving to either [facility] or [place] within 6 months after that.    Family history: The patient reports a family history of dementia in his mother and she got it in the last years of her life and she lived to be 97 years-old. He denies any family history of early Alzheimer's or dementia.    Significant prior neurology and medical history with workup:  He did have additional labs in regards to his neuropathy November 4, 2021 with normal methylmalonic acid, normal vitamin B12 and folate, immunofixation and protein electrophoresis normal, hemoglobin A1c 6.22%, serum creatinine normal, TSH and free T4 normal.  He has been prediabetic for years.  He does have his labs checked regularly with PCP.     MRI of the brain with and without contrast on 12/9/2021 when we observed facial weakness on the right side on his exam.  Also some memory difficulties.  He does have mild atrophy of the brain and some minimal chronic small vessel ischemic changes with no acute abnormalities and no abnormal contrast-enhancement.  We reviewed MRI of the brain previously and essentially to me appears unremarkable for his age. He also has some difficulty with focus and concentration but overall his memory has been okay.  He does continue to work as a .   He does plan to retire in December 2022.       He tells me a long time ago he was diagnosed with sleep apnea but he does not have the same symptoms anymore.  He wakes up about every 2 hours related to his prostate but goes right back to bed.  He feels well rested in the mornings.  He is currently not interested in a sleep study.  His siblings have had the sleep studies and have not been able to tolerate the CPAP. Continues to remain independent.  He continues to drive without any issues. He continues to function independently.  He does continue to drink 2-3 alcoholic beverages every evening. Typically he drinks beer or wine.     He underwent coronary artery bypass x4 surgery on 6/22/2021.  He tells me during his admission he had delirium tremens secondary to daily alcohol use.  He tells me they gave him a beer every evening while in the hospital.  He tells me that he became very agitated in the ICU and had to be restrained.  He did undergo EMG with [facility] completed on 10/21/2021 by Dr. 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  He does tell me that he has been prediabetic in the past.     The following portions of the patient's history were reviewed and updated as appropriate: allergies, current medications, past family history, past medical history, past social history, past surgical history and problem list.    Past Medical History:   Diagnosis Date   • Abnormal ECG 01/20/2020    Get from Dr. Kayser   • Arthritis 2013    osteoarthritis (knees)   • Asthma 2/15/2022    Since COVID: short of breath, mild pains   • Benign prostatic hyperplasia    • Cataract    • Coronary artery disease    • COVID-19 2022   • History of heart attack 2004   • History of MI (myocardial infarction)    • Hyperlipidemia    • Myocardial infarction (HCC)    • Obesity    •  Peripheral neuropathy 11/04/2021    diagnosed on previous visit   • Primary hypertension 06/06/2022   • RBBB 02/10/2020       Past Surgical History:   Procedure Laterality Date   • APPENDECTOMY  1999   • CARDIAC CATHETERIZATION  01/01/2004    They used a catheter to put in my stent.   • CARDIAC CATHETERIZATION N/A 06/22/2021    Procedure: Left Heart Cath;  Surgeon: Mikey Conrad MD;  Location: [facility] CATH INVASIVE LOCATION;  Service: Cardiovascular;  Laterality: N/A;   • COLONOSCOPY N/A 12/23/2021    Procedure: COLONOSCOPY;  Surgeon: Brunner, Mark I, MD;  Location: [facility] ENDOSCOPY;  Service: Gastroenterology;  Laterality: N/A;  several polyps removed. 2 clips placed at ascending colon, 2 clips in transverse colon, and 2 clips in sigmoid colon.     • CORONARY ANGIOPLASTY  2004    I think that's what the bill for my stent said.   • CORONARY ARTERY BYPASS BRING BACK FOR EXPLORATION N/A 06/23/2021    Procedure: BRING BACK FOR EXPLORATION OPEN HEART;  Surgeon: Miguel Angel Acosta MD;  Location: [facility] OR;  Service: Cardiothoracic;  Laterality: N/A;   • CORONARY ARTERY BYPASS GRAFT N/A 06/22/2021    Procedure: MEDIAN STERNOTOMY, CORONARY ARTERY BYPASS GRAFTING X 4 WITH LEFT INTERNAL MAMMARY ARTERY GRAFT, ENDOSCOPIC VEIN HARVESTING OF THE RIGHT GREATER SAPHENOUS VEIN, INTRA-AORTIC BALLOON PUMP INSERTION, IVAN PER ANESTHESIA;  Surgeon: Miguel Angel Acosta MD;  Location: [facility] OR;  Service: Cardiothoracic;  Laterality: N/A;   • CORONARY ARTERY BYPASS GRAFT  6/22/2021   • CORONARY STENT PLACEMENT  2004   • INTERVENTIONAL RADIOLOGY PROCEDURE N/A 07/02/2021    Procedure: thrombin injection;  Surgeon: Abdiel Tamez MD;  Location:  [facility] CATH INVASIVE LOCATION;  Service: Interventional Radiology;  Laterality: N/A;       Social History     Socioeconomic History   • Marital status:    • Number of children: 1   Tobacco Use   • Smoking status: Former Smoker     Packs/day: 1.50     Years: 45.00     Pack years: 67.50      Types: Cigarettes     Start date: 1971     Quit date: 2017     Years since quittin.1   • Smokeless tobacco: Never Used   Vaping Use   • Vaping Use: Never used   Substance and Sexual Activity   • Alcohol use: Not Currently     Alcohol/week: 2.0 standard drinks     Types: 2 Glasses of wine per week     Comment: 2 drinks a night   • Drug use: No   • Sexual activity: Not Currently     Partners: Female     Birth control/protection: Diaphragm, Spermicide, Post-menopausal     Comment:  twice, both . No STVs. Not planning to resume.       Family History   Problem Relation Age of Onset   • Cancer Mother         Sinus tumor; had radiation therapy & surgery; tumor all gone.   • Hypertension Mother    • Hyperlipidemia Mother         Takes medication   • Heart attack Mother    • Diabetes Sister    • Heart failure Father         .   • Stroke Father    • Heart attack Maternal Grandfather    • Cancer Paternal Grandmother    • Heart attack Paternal Grandfather    • Diabetes Sister         Diabetic from childhood          Current Outpatient Medications:   •  albuterol sulfate  (90 Base) MCG/ACT inhaler, Inhale 2 puffs Every 4 (Four) Hours As Needed for Wheezing or Shortness of Air., Disp: 8 g, Rfl: 5  •  aspirin 81 MG EC tablet, Take 1 tablet by mouth Daily., Disp: , Rfl:   •  Cholecalciferol (Vitamin D3) 1.25 MG (94481 UT) capsule, , Disp: , Rfl:   •  fluticasone (FLONASE) 50 MCG/ACT nasal spray, 2 sprays into the nostril(s) as directed by provider Daily., Disp: , Rfl:   •  metoprolol succinate XL (TOPROL-XL) 50 MG 24 hr tablet, Take 1 tablet by mouth Daily. Hold for blood pressure less than 100/60., Disp: 90 tablet, Rfl: 1  •  rosuvastatin (CRESTOR) 40 MG tablet, Take 1 tablet by mouth Daily. (Patient taking differently: Take 40 mg by mouth Every Night.), Disp: 90 tablet, Rfl: 1  •  tamsulosin (FLOMAX) 0.4 MG capsule 24 hr capsule, TAKE 2 CAPSULES DAILY, Disp: 180 capsule, Rfl: 1  •  thiamine  "(thiamine) 100 MG tablet tablet, Take 1 tablet by mouth Daily., Disp: 90 tablet, Rfl: 3  •  triamcinolone (KENALOG) 0.025 % ointment, Apply  topically to the appropriate area as directed 2 Times a Day for 10 days. Avoid applying to face, axilla, and groin, Disp: 15 g, Rfl: 1     Review of Systems   Constitutional: Negative for chills, fatigue, fever and unexpected weight change.   HENT: Negative for ear pain, hearing loss, nosebleeds, rhinorrhea and sore throat.    Eyes: Negative for photophobia, pain, discharge, itching and visual disturbance.   Respiratory: Negative for cough, chest tightness, shortness of breath and wheezing.    Cardiovascular: Negative for chest pain, palpitations and leg swelling.   Gastrointestinal: Negative for abdominal pain, blood in stool, constipation, diarrhea, nausea and vomiting.   Genitourinary: Negative for dysuria, frequency, hematuria and urgency.   Musculoskeletal: Positive for back pain and gait problem. Negative for arthralgias, joint swelling, myalgias, neck pain and neck stiffness.   Skin: Negative for rash and wound.   Allergic/Immunologic: Negative for environmental allergies and food allergies.   Neurological: Positive for weakness. Negative for dizziness, tremors, seizures, syncope, speech difficulty, light-headedness, numbness and headaches.   Hematological: Negative for adenopathy. Does not bruise/bleed easily.   Psychiatric/Behavioral: Negative for agitation, confusion, decreased concentration, hallucinations, sleep disturbance and suicidal ideas. The patient is not nervous/anxious.    All other systems reviewed and are negative.       Objective:  /82   Pulse 112   Ht 177.8 cm (70\")   Wt 119 kg (262 lb)   SpO2 99%   BMI 37.59 kg/m²     Neurologic Exam     Mental Status   Oriented to person, place, and time.   Speech: speech is normal   Level of consciousness: alert  Knowledge: good.     Cranial Nerves   Cranial nerves II through XII intact.     CN VII   Right " facial weakness: right lower facial droop chronic.    Motor Exam   Muscle bulk: normal  Overall muscle tone: normal    Strength   Strength 5/5 throughout.   Arthritis right knee chronic     Sensory Exam   Right leg light touch: decreased from toes  Left leg light touch: decreased from toes  Right leg vibration: decreased from toes  Left leg vibration: decreased from toes  Right leg pinprick: decreased from toes  Left leg pinprick: decreased from toes    Decreased stocking sensation BLE     Gait, Coordination, and Reflexes     Gait  Gait: (antalgic but improved, no assistive device)    Coordination   Finger to nose coordination: normal    Tremor   Resting tremor: absent  Intention tremor: absent  Action tremor: absent    Reflexes   Right brachioradialis: 2+  Left brachioradialis: 2+  Right biceps: 2+  Left biceps: 2+  Right triceps: 2+  Right patellar: 1+  Left patellar: 1+  Right achilles: 1+  Left achilles: 1+  Right : 2+  Left : 2+  Right ankle clonus: absent  Left ankle clonus: absent      Physical Exam  Constitutional:       Appearance: Normal appearance. He is obese.      Comments: BMI 37.6   Musculoskeletal:      Lumbar back: Decreased range of motion. Negative right straight leg raise test and negative left straight leg raise test.   Neurological:      Mental Status: He is alert and oriented to person, place, and time.      Coordination: Finger-Nose-Finger Test normal.      Deep Tendon Reflexes: Strength normal.      Reflex Scores:       Tricep reflexes are 2+ on the right side.       Bicep reflexes are 2+ on the right side and 2+ on the left side.       Brachioradialis reflexes are 2+ on the right side and 2+ on the left side.       Patellar reflexes are 1+ on the right side and 1+ on the left side.       Achilles reflexes are 1+ on the right side and 1+ on the left side.  Psychiatric:         Mood and Affect: Mood and affect normal.         Speech: Speech normal.         Behavior: Behavior normal.          Thought Content: Thought content normal.         Cognition and Memory: Cognition and memory normal.         Judgment: Judgment normal.     Derik Cognitive Assessment score is a 29 out of 30 with 4 out of 5 for uncued word recall and 5 out of 5 for cued word recall.    Results:  Laboratory results from 07/19/2022 show hemoglobin A1c was 6.0 percent, blood counts looked good, and sodium was a little low.      Assessment/Plan:       Diagnoses and all orders for this visit:    1. Alcohol dependence, daily use (HCC) (Primary)  -     thiamine (thiamine) 100 MG tablet tablet; Take 1 tablet by mouth Daily.  Dispense: 90 tablet; Refill: 3    2. Idiopathic peripheral neuropathy  -     CK; Future    3. Lateral femoral cutaneous neuropathy, unspecified laterality  Comments:  bilateral    4. Impairment of balance  -     CK; Future    5. Cognitive complaints with normal exam  Comments:  MOCA 29/30    6. Complaints of leg weakness  -     CK; Future    7. Chronic bilateral low back pain without sciatica    He declines any neuroimaging, but we did discuss signs and symptoms of cauda equina or a reason to go to the hospital for emergency. He prefers conservative therapy.    We also offered physical therapy to help with strengthening his legs and he declines.    I did order a CK level since this has not been done, and he can have this obtained with PCP laboratory testing scheduled for tomorrow.    He is not really having any pain with his neuropathy symptoms.    He does have significant arthritis in the right knee, which has improved some with recent injections.    We are planning to follow up in 6 months or sooner if needed. He will call us with any additional questions or concerns. Continue monitoring and close follow up with all specialist.    No concerns of neurodegenerative etiology of cognitive complaints at this time with normal cognitive evaluation.    He does plan to be moving sometime in the spring 2023, and will  let us know when he does relocate. Recommended he obtain records for future.    Put in a prescription for thiamine.     Reviewed medications, potential side effects and signs and symptoms to report. Discussed risk versus benefits of treatment plan with patient and/or family-including medications, labs and radiology that may be ordered. Addressed questions and concerns during visit. Patient and/or family verbalized understanding and agree with plan.    AS THE PROVIDER, I PERSONALLY WORE PPE DURING ENTIRE FACE TO FACE ENCOUNTER IN CLINIC WITH THE PATIENT. PATIENT ALSO WORE PPE DURING ENTIRE FACE TO FACE ENCOUNTER EXCEPT FOR A MAX OF 30 SECONDS DURING NEUROLOGICAL EVALUATION OF CRANIAL NERVES AND THEN MASK WAS PLACED BACK OVER PATIENT FACE FOR REMAINDER OF VISIT. I WASHED MY HANDS BEFORE AND AFTER VISIT.    During this visit the following were done:  Labs Reviewed [x]    Labs Ordered [x]    Radiology Reports Reviewed [x]    Radiology Ordered []    PCP Records Reviewed []    Referring Provider Records Reviewed []    ER Records Reviewed [x]    Hospital Records Reviewed [x]    History Obtained From Family []    Radiology Images Reviewed []    Other Reviewed [x]    Records Requested []      Transcribed from ambient dictation for GEE Cruz by Cristiana Davis.  09/08/22   16:35 EDT    Patient verbalized consent to the visit recording.  I have personally performed the services described in this document as transcribed by the above individual, and it is both accurate and complete.  GEE Cruz  9/8/2022  17:02 EDT

## 2022-09-09 ENCOUNTER — OFFICE VISIT (OUTPATIENT)
Dept: FAMILY MEDICINE CLINIC | Facility: CLINIC | Age: 67
End: 2022-09-09

## 2022-09-09 VITALS
TEMPERATURE: 97.1 F | BODY MASS INDEX: 37.08 KG/M2 | HEIGHT: 70 IN | SYSTOLIC BLOOD PRESSURE: 98 MMHG | OXYGEN SATURATION: 100 % | RESPIRATION RATE: 20 BRPM | DIASTOLIC BLOOD PRESSURE: 64 MMHG | WEIGHT: 259 LBS | HEART RATE: 104 BPM

## 2022-09-09 DIAGNOSIS — R29.898 COMPLAINTS OF LEG WEAKNESS: ICD-10-CM

## 2022-09-09 DIAGNOSIS — R91.1 LUNG NODULE: ICD-10-CM

## 2022-09-09 DIAGNOSIS — G60.9 IDIOPATHIC PERIPHERAL NEUROPATHY: ICD-10-CM

## 2022-09-09 DIAGNOSIS — E78.2 MIXED HYPERLIPIDEMIA: ICD-10-CM

## 2022-09-09 DIAGNOSIS — I10 PRIMARY HYPERTENSION: Primary | ICD-10-CM

## 2022-09-09 PROCEDURE — 99214 OFFICE O/P EST MOD 30 MIN: CPT | Performed by: FAMILY MEDICINE

## 2022-09-09 RX ORDER — ROSUVASTATIN CALCIUM 40 MG/1
40 TABLET, COATED ORAL NIGHTLY
Qty: 30 TABLET | Refills: 0 | Status: SHIPPED | OUTPATIENT
Start: 2022-09-09

## 2022-09-09 RX ORDER — ROSUVASTATIN CALCIUM 40 MG/1
40 TABLET, COATED ORAL NIGHTLY
Qty: 90 TABLET | Refills: 3 | Status: SHIPPED | OUTPATIENT
Start: 2022-09-09 | End: 2022-09-09 | Stop reason: SDUPTHER

## 2022-09-09 NOTE — PROGRESS NOTES
"Chief Complaint  6mo f/u HTN     Subjective          Thien Grya presents to [facility]  Hypertension  This is a chronic problem. The current episode started more than 1 year ago. The problem is controlled. Risk factors for coronary artery disease include male gender, obesity, dyslipidemia and diabetes mellitus. Past treatments include beta blockers. Current antihypertension treatment includes beta blockers. The current treatment provides significant improvement. There are no compliance problems.  Hypertensive end-organ damage includes CAD/MI.     He saw urology for evaluation of nocturia and BPH, even with highest dose of Tamsulosin.  He completed renal ultrasound and reports that this was normal.   He is scheduled to follow up with urology in near future, Dr. Beasley.     He is planning to retire in Jan 2023 and will likely move out of state after.     He saw neurology yesterday for follow up of neuropathy.   They are requesting CK lab, will check with his labs in our office.     Answers for HPI/ROS submitted by the patient on 9/8/2022  What is the primary reason for your visit?: Other  Please describe your symptoms.: Heaviness in lower limbs; unrelated, shortness of breath with occasional pangs in breathing; ongoing prostate problems. On the whole, pretty good. This is just a a semiannual follow-up visit.  Have you had these symptoms before?: Yes  How long have you been having these symptoms?: Greater than 2 weeks  Please list any medications you are currently taking for this condition.: None.      The following portions of the patient's history were reviewed and updated as appropriate: allergies, current medications, past family history, past medical history, past social history, past surgical history and problem list.    Objective   Vital Signs:   BP 98/64   Pulse 104   Temp 97.1 °F (36.2 °C)   Resp 20   Ht 177.8 cm (70\")   Wt 117 kg (259 lb)   SpO2 100%   BMI " 37.16 kg/m²     Physical Exam  Vitals and nursing note reviewed.   Constitutional:       General: He is not in acute distress.     Appearance: He is well-developed.   HENT:      Head: Normocephalic and atraumatic.      Right Ear: External ear normal.      Left Ear: External ear normal.   Eyes:      Conjunctiva/sclera: Conjunctivae normal.   Cardiovascular:      Rate and Rhythm: Normal rate and regular rhythm.   Pulmonary:      Effort: Pulmonary effort is normal.      Breath sounds: Normal breath sounds.   Musculoskeletal:         General: No deformity.   Neurological:      Mental Status: He is alert and oriented to person, place, and time.   Psychiatric:         Mood and Affect: Mood normal.         Behavior: Behavior normal.        Result Review :                 Assessment and Plan    Diagnoses and all orders for this visit:    1. Primary hypertension (Primary)    2. Mixed hyperlipidemia  -     Discontinue: rosuvastatin (CRESTOR) 40 MG tablet; Take 1 tablet by mouth Every Night.  Dispense: 90 tablet; Refill: 3  -     rosuvastatin (CRESTOR) 40 MG tablet; Take 1 tablet by mouth Every Night.  Dispense: 30 tablet; Refill: 0    3. Idiopathic peripheral neuropathy  -     CK    4. Complaints of leg weakness  -     CK    5. Lung nodule  -     CT Chest Without Contrast; Future    He will return to complete fasting labs, orders pending in chart  6-month follow-up CT chest is due to monitor lung nodule  Hypertension is controlled.  If hypotensive, he is aware to hold metoprolol succinate.      Follow Up   Return in about 6 months (around 3/9/2023).  Patient was given instructions and counseling regarding his condition or for health maintenance advice. Please see specific information pulled into the AVS if appropriate.

## 2022-09-14 LAB
ALBUMIN SERPL-MCNC: 4.8 G/DL (ref 3.5–5.2)
ALBUMIN/GLOB SERPL: 2.7 G/DL
ALP SERPL-CCNC: 69 U/L (ref 39–117)
ALT SERPL-CCNC: 23 U/L (ref 1–41)
AST SERPL-CCNC: 24 U/L (ref 1–40)
BASOPHILS # BLD AUTO: 0.03 10*3/MM3 (ref 0–0.2)
BASOPHILS NFR BLD AUTO: 0.4 % (ref 0–1.5)
BILIRUB SERPL-MCNC: 0.7 MG/DL (ref 0–1.2)
BUN SERPL-MCNC: 9 MG/DL (ref 8–23)
BUN/CREAT SERPL: 12 (ref 7–25)
CALCIUM SERPL-MCNC: 9.5 MG/DL (ref 8.6–10.5)
CHLORIDE SERPL-SCNC: 100 MMOL/L (ref 98–107)
CHOLEST SERPL-MCNC: 100 MG/DL (ref 0–200)
CHOLEST/HDLC SERPL: 2.33 {RATIO}
CK SERPL-CCNC: 114 U/L (ref 20–200)
CO2 SERPL-SCNC: 26 MMOL/L (ref 22–29)
CREAT SERPL-MCNC: 0.75 MG/DL (ref 0.76–1.27)
EGFRCR-CYS SERPLBLD CKD-EPI 2021: 97.7 ML/MIN/1.73
EOSINOPHIL # BLD AUTO: 0.66 10*3/MM3 (ref 0–0.4)
EOSINOPHIL NFR BLD AUTO: 9.5 % (ref 0.3–6.2)
ERYTHROCYTE [DISTWIDTH] IN BLOOD BY AUTOMATED COUNT: 13.2 % (ref 12.3–15.4)
GLOBULIN SER CALC-MCNC: 1.8 GM/DL
GLUCOSE SERPL-MCNC: 118 MG/DL (ref 65–99)
HBA1C MFR BLD: 5.8 % (ref 4.8–5.6)
HCT VFR BLD AUTO: 45.5 % (ref 37.5–51)
HDLC SERPL-MCNC: 43 MG/DL (ref 40–60)
HGB BLD-MCNC: 15.3 G/DL (ref 13–17.7)
IMM GRANULOCYTES # BLD AUTO: 0.04 10*3/MM3 (ref 0–0.05)
IMM GRANULOCYTES NFR BLD AUTO: 0.6 % (ref 0–0.5)
LDLC SERPL CALC-MCNC: 35 MG/DL (ref 0–100)
LYMPHOCYTES # BLD AUTO: 1.82 10*3/MM3 (ref 0.7–3.1)
LYMPHOCYTES NFR BLD AUTO: 26.3 % (ref 19.6–45.3)
MCH RBC QN AUTO: 30.4 PG (ref 26.6–33)
MCHC RBC AUTO-ENTMCNC: 33.6 G/DL (ref 31.5–35.7)
MCV RBC AUTO: 90.3 FL (ref 79–97)
MONOCYTES # BLD AUTO: 0.56 10*3/MM3 (ref 0.1–0.9)
MONOCYTES NFR BLD AUTO: 8.1 % (ref 5–12)
NEUTROPHILS # BLD AUTO: 3.81 10*3/MM3 (ref 1.7–7)
NEUTROPHILS NFR BLD AUTO: 55.1 % (ref 42.7–76)
NRBC BLD AUTO-RTO: 0 /100 WBC (ref 0–0.2)
PLATELET # BLD AUTO: 239 10*3/MM3 (ref 140–450)
POTASSIUM SERPL-SCNC: 4.6 MMOL/L (ref 3.5–5.2)
PROT SERPL-MCNC: 6.6 G/DL (ref 6–8.5)
RBC # BLD AUTO: 5.04 10*6/MM3 (ref 4.14–5.8)
SODIUM SERPL-SCNC: 136 MMOL/L (ref 136–145)
TRIGL SERPL-MCNC: 125 MG/DL (ref 0–150)
TSH SERPL DL<=0.005 MIU/L-ACNC: 1.92 UIU/ML (ref 0.27–4.2)
VLDLC SERPL CALC-MCNC: 22 MG/DL (ref 5–40)
WBC # BLD AUTO: 6.92 10*3/MM3 (ref 3.4–10.8)

## 2022-09-14 NOTE — MBS/VFSS/FEES
Acute Care - Speech Language Pathology   Swallow Re-Evaluation [facility]   Fiberoptic Endoscopic Evaluation of Swallowing (FEES)       Patient Name: Thien Gray  : 1955  MRN: [id number]  Today's Date: 2021               Admit Date: 2021    Visit Dx:     ICD-10-CM ICD-9-CM   1. ST elevation myocardial infarction (STEMI), unspecified artery (CMS/HCC)  I21.3 410.90   2. Chest pain, unspecified type  R07.9 786.50   3. Cognitive communication deficit  R41.841 799.52     Patient Active Problem List   Diagnosis   • History of tobacco abuse   • CABG 21   • Dyspnea on exertion   • RBBB   • Psoriasis   • Primary osteoarthritis of both knees   • STEMI   • Dyslipidemia   • Obesity (BMI 30-39.9)   • ST elevation myocardial infarction (STEMI) (CMS/HCC)   • Hyperglycemia     Past Medical History:   Diagnosis Date   • Abnormal ECG 2020    Get from Dr. Kayser   • Coronary artery disease    • History of heart attack    • History of MI (myocardial infarction)    • Hyperlipidemia    • Myocardial infarction (CMS/HCC)    • RBBB 2/10/2020     Past Surgical History:   Procedure Laterality Date   • APPENDECTOMY     • CARDIAC CATHETERIZATION  2004    They used a catheter to put in my stent.   • CARDIAC CATHETERIZATION N/A 2021    Procedure: Left Heart Cath;  Surgeon: Mikey Conrad MD;  Location: [facility] CATH INVASIVE LOCATION;  Service: Cardiovascular;  Laterality: N/A;   • CORONARY ANGIOPLASTY      I think that's what the bill for my stent said.   • CORONARY ARTERY BYPASS BRING BACK FOR EXPLORATION N/A 2021    Procedure: BRING BACK FOR EXPLORATION OPEN HEART;  Surgeon: Miguel Angel Acosta MD;  Location: [facility] OR;  Service: Cardiothoracic;  Laterality: N/A;   • CORONARY ARTERY BYPASS GRAFT N/A 2021    Procedure: MEDIAN STERNOTOMY, CORONARY ARTERY BYPASS GRAFTING X 4 WITH LEFT INTERNAL MAMMARY ARTERY GRAFT, ENDOSCOPIC VEIN HARVESTING OF THE RIGHT GREATER SAPHENOUS VEIN,  INTRA-AORTIC BALLOON PUMP INSERTION, IVAN PER ANESTHESIA;  Surgeon: Miguel Angel Acosta MD;  Location: [facility];  Service: Cardiothoracic;  Laterality: N/A;   • CORONARY STENT PLACEMENT  2004        SWALLOW EVALUATION (last 72 hours)      SLP Adult Swallow Evaluation     Row Name 07/05/21 1015 07/04/21 1315 07/02/21 1625             Rehab Evaluation    Document Type  re-evaluation  -VO  re-evaluation  -VO  therapy note (daily note)  -      Subjective Information  no complaints  -VO  no complaints  -VO  no complaints  -AC      Patient Observations  alert;cooperative  -VO  alert;cooperative  -VO  lethargic  -      Patient/Family/Caregiver Comments/Observations  sitting in chair  -VO  sitting in chair  -VO  VASILE arrived near end of tx.  -AC      Care Plan Review  evaluation/treatment results reviewed  -VO  evaluation/treatment results reviewed  -VO  evaluation/treatment results reviewed;risks/benefits reviewed;current/potential barriers reviewed;care plan/treatment goals reviewed;patient/other agree to care plan  -AC      Care Plan Review, Other Participant(s)  --  --  family  -AC      Patient Effort  good  -VO  good  -VO  poor  -AC         General Information    Patient Profile Reviewed  --  yes  -VO  --      Pertinent History Of Current Problem  --  s/p CABGx4 6/22, returned to OR 6/23 for mediastinal exploration w/ evacuation of blood & clot from R pleural space. Intubated 6/22 1108 - 6/24 1105. Hx HTN, CAD, former tobacco use.  -VO  --      Current Method of Nutrition  --  NPO;nasogastric feedings  -VO  --      Precautions/Limitations, Vision  --  WFL with corrective lenses  -VO  --      Precautions/Limitations, Hearing  --  WFL;for purposes of eval  -VO  --      Prior Level of Function-Communication  --  WFL  -VO  --      Prior Level of Function-Swallowing  --  no diet consistency restrictions  -VO  --      Plans/Goals Discussed with  --  patient;agreed upon  -VO  --      Barriers to Rehab  --  medically complex   -VO  --      Patient's Goals for Discharge  --  return to PO diet  -VO  --         Pain Scale: Numbers Pre/Post-Treatment    Pretreatment Pain Rating  0/10 - no pain  -VO  0/10 - no pain  -VO  --      Posttreatment Pain Rating  0/10 - no pain  -VO  0/10 - no pain  -VO  --         Pain Scale: FACES Pre/Post-Treatment    Pain: FACES Scale, Pretreatment  --  --  0-->no hurt  -AC      Posttreatment Pain Rating  --  --  0-->no hurt  -AC         Oral Motor Structure and Function    Dentition Assessment  --  natural, present and adequate  -VO  --      Secretion Management  --  WNL/WFL  -VO  --      Mucosal Quality  --  moist, healthy  -VO  --         Oral Musculature and Cranial Nerve Assessment    Oral Motor General Assessment  --  WFL  -VO  --         General Eating/Swallowing Observations    Respiratory Support Currently in Use  --  room air  -VO  --      Eating/Swallowing Skills  --  self-fed;fed by SLP  -VO  --      Positioning During Eating  --  upright in chair  -VO  --      Utensils Used  --  spoon;cup;straw  -VO  --      Consistencies Trialed  --  ice chips;thin liquids;nectar/syrup-thick liquids;pureed  -VO  --         Respiratory    Respiratory Status  --  WFL  -VO  --         Clinical Swallow Eval    Oral Prep Phase  --  WFL for consistencies trialed  -VO  --      Oral Residue  --  WFL for consistencies trialed  -VO  --      Pharyngeal Phase  --  suspected pharyngeal impairment  -VO  --      Clinical Swallow Evaluation Summary  --  Repeat CSE per RN request. Reports drastic improvement in alertness and status. Upon arrival, pt alert and sitting in chair. Trialed thins via ice/cup/str, nectar via str, and puree. Delayed cough w/ thins via straw. No overt s/sxs aspiration w/ ice, nectar, or puree. Ok'd ice chips after oral care and will plan for FEES tomorrow to assess swallow function.  -VO  --         Pharyngeal Phase Concerns    Pharyngeal Phase Concerns  --  cough  -VO  --      Cough  --  thin  -VO  --          Fiberoptic Endoscopic Evaluation of Swallowing (FEES)    Risks/Benefits Reviewed  risks/benefits explained;patient;agreed to eval  -VO  --  --      Nasal Entry  right:  -VO  --  --      Scope serial number/identification  [id number]  -VO  --  --         Anatomy and Physiology    Anatomic Considerations  anatomic deviation observed (see comments) irregular tissue in supraglottic area  -VO  --  --      Velopharyngeal  WFL  -VO  --  --      Base of Tongue  symmetrical  -VO  --  --      Epiglottis  WFL  -VO  --  --      Laryngeal Function Breathing  symmetrical  -VO  --  --      Laryngeal Function Phonation  symmetrical  -VO  --  --      Laryngeal Function to Breath Hold  TVF/FVF/Arytenoid;sustained closure  -VO  --  --      Secretion Rating Scale (Rome et al. 1996)  1- secretions present around the laryngeal vestibule  -VO  --  --      Secretion Description  thin;clear  -VO  --  --      Ice Chips  DNA  -VO  --  --      Spontaneous Swallow  frequency adequate  -VO  --  --      Sensory  sensed scope  -VO  --  --      Utensils Used  Spoon;Cup;Straw  -VO  --  --      Consistencies Trialed  thin liquids;pudding/puree;regular textures  -VO  --  --         FEES Interpretation    Oral Phase  WFL  -VO  --  --         Initiation of Pharyngeal Swallow    Initiation of Pharyngeal Swallow  WFL  -VO  --  --      FEES Summary  Pt w/ improved swallow function. Functional oral and pharyngeal phase of swallow. There were no episodes of penetration or aspiration observed w/ any consistencies trialed. No significant pharyngeal residue. Of note, there was an irregular tissue observed in the supraglottic area. Picture for reference. No further dysphagia services warranted, may benefit from ENT consult if irregular tissue observed needs to be evaluated further.Safe for regular diet, thin liquids.  -VO  --  --         Clinical Impression    Daily Summary of Progress (SLP)  --  --  progress towards functional goals is fair  -AC      Barriers to  Overall Progress (SLP)  --  --  Lethargy  -AC      SLP Swallowing Diagnosis  swallow WFL  -VO  --  --      Functional Impact  no impact on function  -VO  --  --      Swallow Criteria for Skilled Therapeutic Interventions Met  no problems identified which require skilled intervention  -VO  --  --      Plan for Continued Treatment (SLP)  --  --  Not safe for PO diet. Pt would benefit from cont'd dysphagia and cognitive-communication tx. Rec: NPO, alternate means of nutrition.   -AC         Recommendations    Therapy Frequency (Swallow)  evaluation only  -VO  5 days per week  -VO  --      Predicted Duration Therapy Intervention (Days)  --  until discharge  -VO  --      SLP Diet Recommendation  regular textures;thin liquids  -VO  NPO;temporary alternate methods of nutrition/hydration;other (see comments) ok for ice chips after oral care  -VO  --      Recommended Diagnostics  --  reassess via FEES  -VO  --      Recommended Precautions and Strategies  general aspiration precautions  -VO  --  --      Oral Care Recommendations  Oral Care BID/PRN;Toothbrush;Denture Care  -VO  Oral Care BID/PRN;Suction toothbrush  -VO  --      SLP Rec. for Method of Medication Administration  meds whole;with thin liquids;as tolerated  -VO  meds via alternate route  -VO  --      Monitor for Signs of Aspiration  yes  -VO  yes  -VO  --      Anticipated Discharge Disposition (SLP)  unknown  -VO  anticipate therapy at next level of care  -VO  anticipate therapy at next level of care  -AC      Demonstrates Need for Referral to Another Service  otolaryngology (ENT)  -VO  --  --        User Key  (r) = Recorded By, (t) = Taken By, (c) = Cosigned By    Initials Name Effective Dates    AC Calista Agarwal MS CCC-SLP 06/16/21 -     VO Karolina Jiang MA,CCC-SLP 06/16/21 -           EDUCATION  The patient has been educated in the following areas:   Dysphagia (Swallowing Impairment) Oral Care/Hydration.    SLP Recommendation and Plan  SLP Swallowing  Diagnosis: swallow WFL  SLP Diet Recommendation: regular textures, thin liquids  Recommended Precautions and Strategies: general aspiration precautions  SLP Rec. for Method of Medication Administration: meds whole, with thin liquids, as tolerated     Monitor for Signs of Aspiration: yes     Swallow Criteria for Skilled Therapeutic Interventions Met: no problems identified which require skilled intervention  Anticipated Discharge Disposition (SLP): unknown     Therapy Frequency (Swallow): evaluation only     Demonstrates Need for Referral to Another Service: otolaryngology (ENT)                      Plan of Care Reviewed With: patient    SLP GOALS     Row Name 07/05/21 1015 07/02/21 1625          Oral Nutrition/Hydration Goal 1 (SLP)    Oral Nutrition/Hydration Goal 1, SLP  LTG: Pt will improve swallow function in order to return to PO diet w/ no overt s/sxs aspiration/distress w/ 100% acc and no cues  -VO  LTG: Pt will improve swallow function in order to return to PO diet w/ no overt s/sxs aspiration/distress w/ 100% acc and no cues  -AC     Time Frame (Oral Nutrition/Hydration Goal 1, SLP)  by discharge  -VO  by discharge  -AC     Progress/Outcomes (Oral Nutrition/Hydration Goal 1, SLP)  goal met  -VO  continuing progress toward goal  -AC        Oral Nutrition/Hydration Goal 2 (SLP)    Oral Nutrition/Hydration Goal 2, SLP  Pt will tolerate therapeutic trials of thin H2O & puree w/ no overt s/sxs aspiration/distress w/ 60% acc and no cues in order to assess appropriateness for repeat instrumental eval.  -VO  Pt will tolerate therapeutic trials of thin H2O & puree w/ no overt s/sxs aspiration/distress w/ 60% acc and no cues in order to assess appropriateness for repeat instrumental eval.  -AC     Time Frame (Oral Nutrition/Hydration Goal 2, SLP)  short term goal (STG)  -VO  short term goal (STG)  -AC     Barriers (Oral Nutrition/Hydration Goal 2, SLP)  --  Trialed ice chip x2. Required max cues to manipulate ice &  initiate swallow. U/a to initiate swallow 1/2 chips. DNT further 2' high risk for aspiration/lethargy.  -AC     Progress/Outcomes (Oral Nutrition/Hydration Goal 2, SLP)  goal met  -VO  progress slower than expected  -AC        Pharyngeal Strengthening Exercise Goal 1 (SLP)    Activity (Pharyngeal Strengthening Goal 1, SLP)  increase superior movement of the hyolaryngeal complex;increase anterior movement of the hyolaryngeal complex;increase closure at entrance to airway/closure of airway at glottis;increase squeeze/positive pressure generation  -VO  --     Increase Superior Movement of the Hyolaryngeal Complex  effortful pitch glide (falsetto + pharyngeal squeeze)  -VO  effortful pitch glide (falsetto + pharyngeal squeeze)  -AC     Increase Anterior Movement of the Hyolaryngeal Complex  chin tuck against resistance (CTAR)  -VO  chin tuck against resistance (CTAR)  -AC     Increase Closure at Entrance to Airway/Closure of Airway at Glottis  supraglottic swallow  -VO  supraglottic swallow  -AC     Increase Squeeze/Positive Pressure Generation  hard effortful swallow  -VO  hard effortful swallow  -AC     [place]/Accuracy (Pharyngeal Strengthening Goal 1, SLP)  with minimal cues (75-90% accuracy)  -VO  with minimal cues (75-90% accuracy)  -AC     Time Frame (Pharyngeal Strengthening Goal 1, SLP)  short term goal (STG)  -VO  short term goal (STG)  -AC     Barriers (Pharyngeal Strengthening Goal 1, SLP)  --  Too lethargic. Effortful swallow: 0% w/ max cues.  -AC     Progress/Outcomes (Pharyngeal Strengthening Goal 1, SLP)  goal no longer appropriate  -VO  progress slower than expected  -AC        Articulation Goal 1 (SLP)    Improve Articulation Goal 1 (SLP)  --  by over-articulating at word level;90%;with minimal cues (75-90%)  -AC     Time Frame (Articulation Goal 1, SLP)  --  short term goal (STG)  -AC     Progress/Outcomes (Articulation Goal 1, SLP)  --  goal ongoing  -AC        Attention Goal 1 (SLP)    Improve  Attention by Goal 1 (SLP)  --  looking at speaker;attending to task;90%;with moderate cues (50-74%)  -AC     Time Frame (Attention Goal 1, SLP)  --  short term goal (STG)  -AC     Progress/Outcomes (Attention Goal 1, SLP)  --  goal ongoing  -AC        Orientation Goal 1 (SLP)    Improve Orientation Through Goal 1 (SLP)  --  demonstrating orientation to year;demonstrating orientation to place;demonstrating orientation to disease/impairment;demonstrating orientation to month;demonstrating orientation to day;90%;with moderate cues (50-74%)  -AC     Time Frame (Orientation Goal 1, SLP)  --  short term goal (STG)  -AC     Progress/Outcomes (Orientation Goal 1, SLP)  --  goal ongoing  -AC        Additional Goal 1 (SLP)    Additional Goal 1, SLP  --  LTG: Pt will improve cognitive-communication skills in order to participate in care w/ 100% acc w/o cues.  -AC     Time Frame (Additional Goal 1, SLP)  --  by discharge  -AC     Progress/Outcomes (Additional Goal 1, SLP)  --  goal ongoing  -AC       User Key  (r) = Recorded By, (t) = Taken By, (c) = Cosigned By    Initials Name Provider Type    AC Calista Agarwal, MS CCC-SLP Speech and Language Pathologist    VO Karolina Jiang MA,CCC-SLP Speech and Language Pathologist             Time Calculation:   Time Calculation- SLP     Row Name 07/05/21 1045             Time Calculation- SLP    SLP Start Time  1015  -VO      SLP Received On  07/05/21  -VO         Untimed Charges    10694-OQ Fiberoptic Endo Eval Swallow Minutes  60  -VO         Total Minutes    Untimed Charges Total Minutes  60  -VO       Total Minutes  60  -VO        User Key  (r) = Recorded By, (t) = Taken By, (c) = Cosigned By    Initials Name Provider Type    VO Karolina Jiang MA,CCC-SLP Speech and Language Pathologist          Therapy Charges for Today     Code Description Service Date Service Provider Modifiers Qty    [id number] HC ST EVAL ORAL PHARYNG SWALLOW 3 7/4/2021 Karolina Jiang MA,CCC-SLP  GN 1    [id number] [facility] FIBEROPTIC ENDO EVAL SWALL 4 7/5/2021 Karolina Jiang MA,CCC-SLP GN 1               Karolina Jiang MA,BRIANNE-BEATA  7/5/2021   PAST SURGICAL HISTORY:  History of blood transfusion     S/P  Section ,,

## 2022-09-15 ENCOUNTER — HOSPITAL ENCOUNTER (OUTPATIENT)
Dept: CT IMAGING | Facility: HOSPITAL | Age: 67
Discharge: HOME | End: 2022-09-15
Admitting: FAMILY MEDICINE

## 2022-09-15 DIAGNOSIS — R91.1 LUNG NODULE: ICD-10-CM

## 2022-09-15 PROCEDURE — 71250 CT THORAX DX C-: CPT

## 2022-09-27 ENCOUNTER — OFFICE VISIT (OUTPATIENT)
Dept: SLEEP MEDICINE | Facility: HOSPITAL | Age: 67
End: 2022-09-27

## 2022-09-27 VITALS
HEIGHT: 70 IN | DIASTOLIC BLOOD PRESSURE: 57 MMHG | OXYGEN SATURATION: 98 % | SYSTOLIC BLOOD PRESSURE: 103 MMHG | HEART RATE: 107 BPM | WEIGHT: 262 LBS | BODY MASS INDEX: 37.51 KG/M2

## 2022-09-27 DIAGNOSIS — R06.81 WITNESSED EPISODE OF APNEA: ICD-10-CM

## 2022-09-27 DIAGNOSIS — R06.83 SNORING: Primary | ICD-10-CM

## 2022-09-27 DIAGNOSIS — E66.9 OBESITY (BMI 30-39.9): ICD-10-CM

## 2022-09-27 DIAGNOSIS — G47.10 HYPERSOMNOLENCE: ICD-10-CM

## 2022-09-27 DIAGNOSIS — J01.91 ACUTE RECURRENT SINUSITIS, UNSPECIFIED LOCATION: ICD-10-CM

## 2022-09-27 PROCEDURE — 99204 OFFICE O/P NEW MOD 45 MIN: CPT | Performed by: INTERNAL MEDICINE

## 2022-09-27 RX ORDER — DOXYCYCLINE HYCLATE 100 MG/1
100 CAPSULE ORAL 2 TIMES DAILY
Qty: 20 CAPSULE | Refills: 0 | Status: SHIPPED | OUTPATIENT
Start: 2022-09-27 | End: 2023-01-25

## 2022-09-27 RX ORDER — MELOXICAM 7.5 MG/1
7.5 TABLET ORAL DAILY
COMMUNITY
End: 2023-01-25

## 2022-09-27 NOTE — PROGRESS NOTES
New Sleep Patient Office Visit      Patient Name: Thien Gray    Referring Physician: Phillip Eason III, MD    Chief Complaint:    Chief Complaint   Patient presents with   • Sleeping Problem       History of Present Illness: Thien Gray is a 67 y.o. male who is here today to establish care with Sleep Medicine.     67-year-old male with past medical history of hypertension, dyslipidemia, BPH, coronary disease, allergies with recurrent sinusitis presenting for initial evaluation.  Patient states that he had heart attack about a year ago and then he had COVID infection in January and since that he has been feeling extremely fatigued.  He has been told that he snores loudly.  He also has significantly enlarged prostate and has frequent urinary urgency and has to wake up every 2 hours during the night.  He is undergoing work-up with urology and has been on medication but they do not seem to be working well.  He does wake up with dry mouth as well.  Denies any headaches in the morning.  Does not feel rested.  He works as a  and mostly desk job.  He states that he comes home at lunchtime and ends up taking a nap for about half an hour.  Does not feel rested after nap either.  Denies any other parasomnias during sleep.  Denies any REM behavior disorder.  Denies any restless leg symptoms.  No history of head injury or head trauma.  Denies any driving problems or sleep-related accidents.  He has been working toward weight loss and has lost about 20 pounds of weight in last few months.    Patient also struggles with nasal congestion which is recurrent sinusitis.  He had of his nasal congestion going on for about a week.  States that his eyes were bloodshot and he was having a lot of face pain.  Some of it is better but still having significant nasal congestion.  He is not using Flonase nasal spray.  I did advise him to start that to try to improve nasal patency.  Given his ongoing symptoms and  significant purulent drainage I will treat her with antibiotics for acute bacterial sinusitis.  Denies any fevers currently.  Denies any headaches or significant face pain or any vision problems currently.    Patient drinks 2-3 drinks of alcohol every night sometimes wine or beer or bourbon.  Denies any illicit drug use.  Used to smoke for about 45 years 1-1/2 pack/day but quit back in 2017.    Further sleep history is as below.      [place] Scale: 9/24    Estimated average amount of sleep per night: 7-8  Number of times  he wakes up at night: 3-4  Difficulty falling back asleep: no  It usually takes 10 minutes to go to sleep.  He feels sleepy upon waking up: yes  Rotating or night shift work: no    Drowsiness/Sleepiness:  He exhibits the following:  excessive daytime sleepiness  excessive daytime fatigue  takes scheduled naps during the day    Snoring/Breathing:  He exhibits the following:  loud snoring  snores in all sleep positions  awoken with dry mouth    Reflux:  He describes the following:  NA    Narcolepsy:  He exhibits the following:  none    RLS/PLMs:  He describes the following:  none    Insomnia:  He describes the following:  frequent awakenings  restless sleep    Parasomnia:  He exhibits the following:  NA    Weight:  Weight change in the last year:  loss: 20 lbs    Subjective      Review of Systems:   Review of Systems   Constitutional: Positive for fatigue.   HENT: Positive for congestion, rhinorrhea and sneezing.    Respiratory: Positive for apnea and chest tightness.    Cardiovascular: Negative.    Gastrointestinal: Negative.    Endocrine: Negative.    Genitourinary: Positive for difficulty urinating and frequency.   Musculoskeletal: Negative.    Skin: Negative.    Allergic/Immunologic: Positive for environmental allergies.   Neurological: Negative.    Hematological: Negative.    Psychiatric/Behavioral: Positive for sleep disturbance.   All other systems reviewed and are negative.      Past Medical  History:   Past Medical History:   Diagnosis Date   • Abnormal ECG 01/20/2020    Get from Dr. Kayser   • Arthritis 2013    osteoarthritis (knees)   • Asthma 2/15/2022    Since COVID: short of breath, mild pains   • Benign prostatic hyperplasia    • Cataract    • Coronary artery disease    • COVID-19 2022   • History of heart attack 2004   • History of MI (myocardial infarction)    • Hyperlipidemia    • Myocardial infarction (HCC)    • Obesity    • Peripheral neuropathy 11/04/2021    diagnosed on previous visit   • Primary hypertension 06/06/2022   • RBBB 02/10/2020       Past Surgical History:   Past Surgical History:   Procedure Laterality Date   • APPENDECTOMY  1999   • CARDIAC CATHETERIZATION  01/01/2004    They used a catheter to put in my stent.   • CARDIAC CATHETERIZATION N/A 06/22/2021    Procedure: Left Heart Cath;  Surgeon: Mikey Conrad MD;  Location: [facility] CATH INVASIVE LOCATION;  Service: Cardiovascular;  Laterality: N/A;   • COLONOSCOPY N/A 12/23/2021    Procedure: COLONOSCOPY;  Surgeon: Brunner, Mark I, MD;  Location: [facility] ENDOSCOPY;  Service: Gastroenterology;  Laterality: N/A;  several polyps removed. 2 clips placed at ascending colon, 2 clips in transverse colon, and 2 clips in sigmoid colon.     • CORONARY ANGIOPLASTY  2004    I think that's what the bill for my stent said.   • CORONARY ARTERY BYPASS BRING BACK FOR EXPLORATION N/A 06/23/2021    Procedure: BRING BACK FOR EXPLORATION OPEN HEART;  Surgeon: Miguel Angel Acosta MD;  Location: [facility] OR;  Service: Cardiothoracic;  Laterality: N/A;   • CORONARY ARTERY BYPASS GRAFT N/A 06/22/2021    Procedure: MEDIAN STERNOTOMY, CORONARY ARTERY BYPASS GRAFTING X 4 WITH LEFT INTERNAL MAMMARY ARTERY GRAFT, ENDOSCOPIC VEIN HARVESTING OF THE RIGHT GREATER SAPHENOUS VEIN, INTRA-AORTIC BALLOON PUMP INSERTION, IVAN PER ANESTHESIA;  Surgeon: Miguel Angel Acosta MD;  Location:  [facility] OR;  Service: Cardiothoracic;  Laterality: N/A;   • CORONARY ARTERY BYPASS GRAFT   2021   • CORONARY STENT PLACEMENT     • INTERVENTIONAL RADIOLOGY PROCEDURE N/A 2021    Procedure: thrombin injection;  Surgeon: Abdiel Tamez MD;  Location: [facility] INVASIVE LOCATION;  Service: Interventional Radiology;  Laterality: N/A;   • WISDOM TOOTH EXTRACTION         Family History:   Family History   Problem Relation Age of Onset   • Cancer Mother         Sinus tumor; had radiation therapy & surgery; tumor all gone.   • Hypertension Mother    • Hyperlipidemia Mother         Takes medication   • Heart attack Mother    • Diabetes Sister    • Heart failure Father         .   • Stroke Father    • Heart attack Maternal Grandfather    • Cancer Paternal Grandmother    • Heart attack Paternal Grandfather    • Diabetes Sister         Diabetic from childhood       Social History:   Social History     Socioeconomic History   • Marital status:    • Number of children: 1   Tobacco Use   • Smoking status: Former Smoker     Packs/day: 1.50     Years: 45.00     Pack years: 67.50     Types: Cigarettes     Start date: 1971     Quit date: 2017     Years since quittin.2   • Smokeless tobacco: Never Used   Vaping Use   • Vaping Use: Never used   Substance and Sexual Activity   • Alcohol use: Yes     Alcohol/week: 2.0 standard drinks     Types: 2 Glasses of wine per week     Comment: 2 drinks a night   • Drug use: No   • Sexual activity: Not Currently     Partners: Female     Birth control/protection: Diaphragm, Spermicide, Post-menopausal     Comment:  twice, both . No STVs. Not planning to resume.       Medications:     Current Outpatient Medications:   •  albuterol sulfate  (90 Base) MCG/ACT inhaler, Inhale 2 puffs Every 4 (Four) Hours As Needed for Wheezing or Shortness of Air., Disp: 8 g, Rfl: 5  •  aspirin 81 MG EC tablet, Take 1 tablet by mouth Daily., Disp: , Rfl:   •  Cholecalciferol (Vitamin D3) 1.25 MG (49645 UT) capsule, , Disp: , Rfl:   •  fluticasone  "(FLONASE) 50 MCG/ACT nasal spray, 2 sprays into the nostril(s) as directed by provider Daily., Disp: , Rfl:   •  meloxicam (MOBIC) 7.5 MG tablet, Take 7.5 mg by mouth Daily., Disp: , Rfl:   •  metoprolol succinate XL (TOPROL-XL) 50 MG 24 hr tablet, Take 1 tablet by mouth Daily. Hold for blood pressure less than 100/60., Disp: 90 tablet, Rfl: 1  •  rosuvastatin (CRESTOR) 40 MG tablet, Take 1 tablet by mouth Every Night., Disp: 30 tablet, Rfl: 0  •  tamsulosin (FLOMAX) 0.4 MG capsule 24 hr capsule, TAKE 2 CAPSULES DAILY, Disp: 180 capsule, Rfl: 1  •  thiamine (thiamine) 100 MG tablet tablet, Take 1 tablet by mouth Daily., Disp: 90 tablet, Rfl: 3  •  triamcinolone (KENALOG) 0.025 % ointment, Apply  topically to the appropriate area as directed 2 Times a Day for 10 days. Avoid applying to face, axilla, and groin, Disp: 15 g, Rfl: 1  •  doxycycline (VIBRAMYCIN) 100 MG capsule, Take 1 capsule by mouth 2 (Two) Times a Day., Disp: 20 capsule, Rfl: 0    Allergies:   Allergies   Allergen Reactions   • Bactrim [Sulfamethoxazole-Trimethoprim] Other (See Comments)     Causes weakness, fatigue   • Plavix [Clopidogrel Bisulfate] Hives   • Augmentin [Amoxicillin-Pot Clavulanate] Confusion       Objective     Physical Exam:  Vital Signs:   Vitals:    09/27/22 0909   BP: 103/57   Pulse: 107   SpO2: 98%   Weight: 119 kg (262 lb)   Height: 177.8 cm (70\")     Body mass index is 37.59 kg/m².    Physical Exam  Vitals and nursing note reviewed.   Constitutional:       General: He is not in acute distress.     Appearance: He is well-developed. He is not diaphoretic.   HENT:      Head: Normocephalic and atraumatic.      Comments: Mallampati 3 airway.   Large scalloped tongue.      Nose: Nose normal.      Comments: Bilateral turbinate bogginess with purulent drainage. Mild sinus tenderness, scleral erythema noted.      Mouth/Throat:      Pharynx: No oropharyngeal exudate.   Eyes:      General:         Right eye: No discharge.         Left " eye: No discharge.      Pupils: Pupils are equal, round, and reactive to light.   Neck:      Thyroid: No thyromegaly.      Trachea: No tracheal deviation.   Cardiovascular:      Rate and Rhythm: Normal rate and regular rhythm.      Heart sounds: Normal heart sounds. No murmur heard.    No friction rub. No gallop.   Pulmonary:      Effort: Pulmonary effort is normal. No respiratory distress.      Breath sounds: Normal breath sounds. No wheezing or rales.   Musculoskeletal:         General: No tenderness.      Cervical back: Neck supple.      Right lower leg: No edema.      Left lower leg: No edema.      Comments: Clubbing: none   Neurological:      Mental Status: He is alert and oriented to person, place, and time.      Cranial Nerves: No cranial nerve deficit.      Coordination: Coordination normal.   Psychiatric:         Behavior: Behavior normal.         Thought Content: Thought content normal.         Judgment: Judgment normal.         Results Review:   I reviewed the patient's new clinical results.  Lab Results   Component Value Date    TSH 1.920 09/13/2022       Assessment / Plan      Assessment:   Problem List Items Addressed This Visit        Endocrine and Metabolic    Obesity (BMI 30-39.9)      Other Visit Diagnoses     Snoring    -  Primary    Relevant Orders    Home Sleep Study    Witnessed episode of apnea        Relevant Orders    Home Sleep Study    Hypersomnolence        Relevant Orders    Home Sleep Study    Acute recurrent sinusitis, unspecified location                Plan:   1.  Patient with snoring, witnessed apnea, excessive daytime sleepiness, obesity, Mallampati 3 airway. All this put him at high risk of sleep disordered breathing.  Discussed at length about pathophysiology of sleep apnea.  Discussed side effects of untreated sleep apnea including poor quality sleep, cardiovascular, neurologic and metabolic side effects also discussed.  Further diagnostic testing recommended.  Patient is  amenable to proceed with further testing and treatment as needed.  We discussed home study versus in lab study.  Patient is amenable to proceeding with home-based testing after our discussion.  We will set him up for that and follow-up closely after.     2.  If found to have significant sleep apnea available treatment options discussed including CPAP therapy, oral appliance, surgical options.  Weight loss as a treatment option for sleep apnea also discussed and counseled.     3.  Patient will need alcohol cessation counseling as that can also worsen sleep disordered breathing.    4.  Patient has likely acute bacterial sinusitis with significant purulent drainage, nasal congestion, face pain.  Discussed with patient and will treat him with doxycycline as he is allergic to penicillins.  Side effects of medication reviewed.  Advised to start using Flonase nasal spray as well.  If infection/inflammation does not clear or any further worsening he will need to be reevaluated.  He verbalized understanding.  Correct technique of using the Flonase nasal spray reviewed with him.    We will set him up for sleep study and I will follow him closely to go over the results and discuss further management options.  Thank you for allowing me to participate in the care of this patient.      Follow Up:   After HST    Discussed plan of care in detail with patient today. Patient verbally understands and agrees. I spent 50 minutes on this date of service. This time includes time spent by me in the following activities:preparing for the visit, reviewing tests, obtaining and/or reviewing a separately obtained history, performing a medically appropriate examination, counseling the patient, ordering medications, tests, or procedures, and/or documenting information in the medical record.       Shad Ruiz MD  Pulmonary Critical Care and Sleep Medicine

## 2022-10-16 DIAGNOSIS — N40.1 BENIGN PROSTATIC HYPERPLASIA WITH URINARY FREQUENCY: ICD-10-CM

## 2022-10-16 DIAGNOSIS — R35.0 BENIGN PROSTATIC HYPERPLASIA WITH URINARY FREQUENCY: ICD-10-CM

## 2022-10-17 RX ORDER — TAMSULOSIN HYDROCHLORIDE 0.4 MG/1
CAPSULE ORAL
Qty: 180 CAPSULE | Refills: 1 | Status: SHIPPED | OUTPATIENT
Start: 2022-10-17

## 2022-10-19 ENCOUNTER — TELEPHONE (OUTPATIENT)
Dept: FAMILY MEDICINE CLINIC | Facility: CLINIC | Age: 67
End: 2022-10-19

## 2022-10-19 DIAGNOSIS — J32.9 CHRONIC SINUSITIS, UNSPECIFIED LOCATION: Primary | ICD-10-CM

## 2022-10-19 NOTE — TELEPHONE ENCOUNTER
Caller: Thien Gray    Relationship: Self    Best call back number: [phone]    What is the medical concern/diagnosis: SINUS CONDITION    What specialty or service is being requested: ENT    What is the office location: [place]    Any additional details: PATIENT HAS HAD THIS SINUS CONDITION FOR ABOUT A YEAR AND WOULD LIKE TO SEE AN ENT FOR HIS CONDITION. PLEASE ADVISE AND CALL PATIENT BACK

## 2022-11-29 RX ORDER — TRIAMCINOLONE ACETONIDE 0.25 MG/G
OINTMENT TOPICAL
Qty: 15 G | Refills: 1 | Status: SHIPPED | OUTPATIENT
Start: 2022-11-29

## 2023-01-25 ENCOUNTER — OFFICE VISIT (OUTPATIENT)
Dept: FAMILY MEDICINE CLINIC | Facility: CLINIC | Age: 68
End: 2023-01-25
Payer: COMMERCIAL

## 2023-01-25 VITALS
BODY MASS INDEX: 37.22 KG/M2 | OXYGEN SATURATION: 98 % | SYSTOLIC BLOOD PRESSURE: 100 MMHG | TEMPERATURE: 96.4 F | HEIGHT: 70 IN | RESPIRATION RATE: 16 BRPM | WEIGHT: 260 LBS | DIASTOLIC BLOOD PRESSURE: 66 MMHG | HEART RATE: 70 BPM

## 2023-01-25 DIAGNOSIS — L40.9 PSORIASIS OF SCALP: ICD-10-CM

## 2023-01-25 DIAGNOSIS — H43.392 VITREOUS FLOATERS OF LEFT EYE: ICD-10-CM

## 2023-01-25 DIAGNOSIS — M17.11 PRIMARY OSTEOARTHRITIS OF RIGHT KNEE: Primary | ICD-10-CM

## 2023-01-25 PROCEDURE — 99214 OFFICE O/P EST MOD 30 MIN: CPT | Performed by: FAMILY MEDICINE

## 2023-01-25 RX ORDER — CLOBETASOL PROPIONATE 0.5 MG/G
1 AEROSOL, FOAM TOPICAL 2 TIMES DAILY
Qty: 50 G | Refills: 0 | Status: SHIPPED | OUTPATIENT
Start: 2023-01-25

## 2023-01-25 RX ORDER — FINASTERIDE 5 MG/1
5 TABLET, FILM COATED ORAL DAILY
COMMUNITY
Start: 2022-12-29 | End: 2023-03-06

## 2023-01-25 NOTE — PROGRESS NOTES
"Chief Complaint  Floater in L eye  (Previous cataract sx w/ [facility] in 2021. He has not reached out to them but suggested he do so. He understood. ) and Referral to Ortho (R knee pain. Doesn't want who we referred him to in 2020. (See the referral, unable to find the name of Dr) )    Subjective          Thien Gray presents to [facility]  History of Present Illness  Thien Gray is a 67-year-old male presents today with complaints of floaters in his left eye and right knee pain.    The patient is doing well overall.  The patient states that he has been retired for approximately 2 weeks.   He states that he tested positive for COVID-19 for the 3rd time, 16 days ago.  He states that it was mild with a little bit of \"sniffling\".    The patient reports that he has an appointment on 01/27/2023 at ENT for a CT scan.  He notes that he has a deviated septum.  He reports that for 1 year, both of his nostrils have been clogged.  The patient states that he had to breath out of his nostrils, and by summer it briefly cleared for 1 month.   He states it sound like a growth, which reappeared but was manageable.  The patient currently uses Flonase.  He has tried Zyrtec and Allegra but did not work well.   The patient notes that he has a bottle of Claritin.    Having intermittent blurred vision and floater in left eye.   The patient previously had cataract surgery with [facility] in 2021.   He has not reached out to them, but suggested he do so.    The patient states he is having prostate surgery at the end of 02/2023 at [facility] in [place].   The patient reports a few months ago he was unable to pass in the middle of the night.  He had to visit the emergency room, where he was catharized for approximately 1 week.     He reports that he had injections in his knee.   He seen Dr. Suazo in 01/2022 for his right knee pain.  Would like to see a different orthopedic group to discuss " treatment options.  He is thinking he will likely have to have surgery on his right knee    The patient reports that he has a small rash on the back of his scalp.   He is not using anything for treatment.   He notes that he washes his hair daily.    Answers for HPI/ROS submitted by the patient on 1/18/2023  What is the primary reason for your visit?: Other  Please describe your symptoms.: Vision problems in left eye, rash on scalp.  Have you had these symptoms before?: No  How long have you been having these symptoms?: Greater than 2 weeks  Please list any medications you are currently taking for this condition.: none  Please describe any probable cause for these symptoms. : had cataract surgery in spring of 2021. Problem started around autumn of 2022. The surgeon warned that I might experience a problem up to a year or so after surgery.    The following portions of the patient's history were reviewed and updated as appropriate: allergies, current medications, past family history, past medical history, past social history, past surgical history and problem list.    Objective      Physical Exam  Vitals reviewed.   Constitutional:       Appearance: He is well-developed.   HENT:      Head: Normocephalic and atraumatic.        Comments: Localized psoriasis plaque posterior left scalp   Pulmonary:      Effort: Pulmonary effort is normal. No respiratory distress.   Neurological:      Mental Status: He is alert and oriented to person, place, and time.        Result Review :                Assessment and Plan    Diagnoses and all orders for this visit:    1. Primary osteoarthritis of right knee (Primary)  -     Ambulatory Referral to Orthopedic Surgery    2. Vitreous floaters of left eye  -     Ambulatory Referral to Ophthalmology    3. Psoriasis of scalp  -     clobetasol (Olux) 0.05 % topical foam; Apply 1 application topically to the appropriate area as directed 2 (Two) Times a Day.  Dispense: 50 g; Refill: 0      1.  Primary osteoarthritis of right knee.  - Referral sent for Orthopedic Surgery.    2. Vitreous floaters of left eye  - Referral sent to the Ophthalmology.  - will call to schedule appointment.    3. Psoriasis of scalp  - will send a prescription for clobetasol topical foam to his preferred pharmacy.      Follow Up   Return if symptoms worsen or fail to improve.  Patient was given instructions and counseling regarding his condition or for health maintenance advice. Please see specific information pulled into the AVS if appropriate.     Transcribed from ambient dictation for Sanaz Zuluaga DO by Lucio Yoder, Quality .  01/25/23   14:23 EST    Patient or patient representative verbalized consent to the visit recording.  I have personally performed the services described in this document as transcribed by the above individual, and it is both accurate and complete.  Sanaz Zuluaga DO  1/25/2023  18:20 EST

## 2023-03-06 ENCOUNTER — OFFICE VISIT (OUTPATIENT)
Dept: NEUROLOGY | Facility: CLINIC | Age: 68
End: 2023-03-06
Payer: COMMERCIAL

## 2023-03-06 VITALS
BODY MASS INDEX: 39.51 KG/M2 | HEIGHT: 70 IN | WEIGHT: 276 LBS | DIASTOLIC BLOOD PRESSURE: 80 MMHG | SYSTOLIC BLOOD PRESSURE: 130 MMHG | OXYGEN SATURATION: 95 % | HEART RATE: 99 BPM

## 2023-03-06 DIAGNOSIS — G60.9 IDIOPATHIC PERIPHERAL NEUROPATHY: ICD-10-CM

## 2023-03-06 DIAGNOSIS — G57.10 LATERAL FEMORAL CUTANEOUS NEUROPATHY, UNSPECIFIED LATERALITY: Primary | ICD-10-CM

## 2023-03-06 DIAGNOSIS — F10.20 ALCOHOL DEPENDENCE, DAILY USE: ICD-10-CM

## 2023-03-06 DIAGNOSIS — R41.89 IMPAIRED COGNITION: ICD-10-CM

## 2023-03-06 DIAGNOSIS — R26.89 IMPAIRMENT OF BALANCE: ICD-10-CM

## 2023-03-06 PROBLEM — N39.43 BENIGN PROSTATIC HYPERPLASIA (BPH) WITH POST-VOID DRIBBLING: Status: ACTIVE | Noted: 2023-02-23

## 2023-03-06 PROBLEM — N40.1 BENIGN PROSTATIC HYPERPLASIA (BPH) WITH POST-VOID DRIBBLING: Status: ACTIVE | Noted: 2023-02-23

## 2023-03-06 PROCEDURE — 99214 OFFICE O/P EST MOD 30 MIN: CPT | Performed by: NURSE PRACTITIONER

## 2023-03-06 RX ORDER — METHION/INOS/CHOL BT/B COM/LIV 110MG-86MG
100 CAPSULE ORAL DAILY
Qty: 90 TABLET | Refills: 3 | Status: SHIPPED | OUTPATIENT
Start: 2023-03-06

## 2023-03-06 NOTE — PROGRESS NOTES
Subjective:     Patient ID: Thien Gray is a 67 y.o. male.    CC:   Chief Complaint   Patient presents with   • Peripheral Neuropathy       HPI:   History of Present Illness   Today 3/6/2023-    This is a pleasant 67-year-old male who presents for 6-month neurology follow-up. 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 He has also had some cognitive complaints with prior Derik cognitive assessment score of 29 out of 30. He has had some difficulty with balance and reported leg weakness and lower extremity symptoms for some time now. At his last visit in clinic on 09/08/2022, he felt that neuropathy symptoms had improved by about 50 percent. He had reported no actual falls or injuries. He has been following closely with urology and pulmonology. He also follows cardiology. He has not been taking medication for his neuropathy. He has been on the thiamine supplement 100 mg daily. We discussed some low back imaging, but he declined to have this done as he felt like his symptoms were improving. He is here for follow-up and reevaluation of symptoms today. He has been offered physical therapy previously and declined this as well.    Today, he reports he is doing well. He states the neuropathy in his legs is 80 percent improved. He continues to experience issues with his right knee.  He continues to experience weakness in his legs. He has to be careful when he stands up at night, and takes a second before he starts walking. He feels the weakness is only related to his right knee arthritis. He denies any falls since his last visit.    He had a transurethral resection of his prostate approximately 1.5 weeks ago, performed by Dr. Mallory.  He denies any sudden loss of bowel or bladder control or numbness in his private area.    He continues to take thiamine supplements, but had to suspend it for the last couple of  weeks due to his surgery.    He states his sinus condition has cleared up. He has to be evaluated for sleep apnea.    He lost a significant amount of weight, but has gained it back since he retired recently.    He lives alone. He denies anyone around him being concerned about his memory. He retired from his job 6 weeks ago, so he is not keeping track of what day of the week it is. He is planning to move to [place] or [place] in July 2023.    He admits to relapsing with alcohol and would like to quit again. He confirms he had 2 beers with his lunch and 2 glasses of wine with dinner on 03/05/2023. He knows he must reduce this slowly.    Significant prior neurology and medical history with workup:  He did have additional labs in regards to his neuropathy November 4, 2021 with normal methylmalonic acid, normal vitamin B12 and folate, immunofixation and protein electrophoresis normal, hemoglobin A1c 6.22%, serum creatinine normal, TSH and free T4 normal.  He has been prediabetic for years.  He does have his labs checked regularly with PCP.     MRI of the brain with and without contrast on 12/9/2021 when we observed facial weakness on the right side on his exam.  Also some memory difficulties.  He does have mild atrophy of the brain and some minimal chronic small vessel ischemic changes with no acute abnormalities and no abnormal contrast-enhancement.  We reviewed MRI of the brain previously and essentially to me appears unremarkable for his age. He also has some difficulty with focus and concentration but overall his memory has been okay.  Retired from [employer] Early 2023.      He tells me a long time ago he was diagnosed with sleep apnea but he does not have the same symptoms anymore.  He wakes up about every 2 hours related to his prostate but goes right back to bed.  He feels well rested in the mornings.  He is currently not interested in a sleep study.  His siblings have had the sleep studies and  have not been able to tolerate the CPAP. Continues to remain independent.  He continues to drive without any issues. He continues to function independently.  He does continue to drink 2-3 alcoholic beverages every evening. Typically he drinks beer or wine.     He underwent coronary artery bypass x4 surgery on 6/22/2021.  He tells me during his admission he had delirium tremens secondary to daily alcohol use.  He tells me they gave him a beer every evening while in the hospital.  He tells me that he became very agitated in the ICU and had to be restrained.  He did undergo EMG with [facility] completed on 10/21/2021 by Dr. 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  He does tell me that he has been prediabetic in the past.     Social history: He retired 1/2023. He will be moving to either [facility] or [place] July 2023.     Family history: The patient reports a family history of dementia in his mother and she got it in the last years of her life and she lived to be 97 years-old. He denies any family history of early Alzheimer's or dementia.    The following portions of the patient's history were reviewed and updated as appropriate: allergies, current medications, past family history, past medical history, past social history, past surgical history and problem list.    Past Medical History:   Diagnosis Date   • Abnormal ECG 01/20/2020    Get from Dr. Kayser   • Arthritis 2013    osteoarthritis (knees)   • Asthma 2/15/2022    Since COVID: short of breath, mild pains   • Benign prostatic hyperplasia    • Cataract    • Coronary artery disease    • COVID-19 2022   • History of heart attack 2004   • History of MI (myocardial infarction)    • Hyperlipidemia    • Myocardial infarction (HCC)    • Obesity    • Peripheral neuropathy 11/04/2021    diagnosed  on previous visit   • Primary hypertension 06/06/2022   • RBBB 02/10/2020       Past Surgical History:   Procedure Laterality Date   • APPENDECTOMY  1999   • CARDIAC CATHETERIZATION  01/01/2004    They used a catheter to put in my stent.   • CARDIAC CATHETERIZATION N/A 06/22/2021    Procedure: Left Heart Cath;  Surgeon: Mikey Conrad MD;  Location:  [facility];  Service: Cardiovascular;  Laterality: N/A;   • COLONOSCOPY N/A 12/23/2021    Procedure: COLONOSCOPY;  Surgeon: Brunner, Mark I, MD;  Location: [facility] END[facility];  Service: Gastroenterology;  Laterality: N/A;  several polyps removed. 2 clips placed at ascending colon, 2 clips in transverse colon, and 2 clips in sigmoid colon.     • CORONARY ANGIOPLASTY  2004    I think that's what the bill for my stent said.   • CORONARY ARTERY BYPASS BRING BACK FOR EXPLORATION N/A 06/23/2021    Procedure: BRING BACK FOR EXPLORATION OPEN HEART;  Surgeon: Miguel Angel Acosta MD;  Location: [facility] OR;  Service: Cardiothoracic;  Laterality: N/A;   • CORONARY ARTERY BYPASS GRAFT N/A 06/22/2021    Procedure: MEDIAN STERNOTOMY, CORONARY ARTERY BYPASS GRAFTING X 4 WITH LEFT INTERNAL MAMMARY ARTERY GRAFT, ENDOSCOPIC VEIN HARVESTING OF THE RIGHT GREATER SAPHENOUS VEIN, INTRA-AORTIC BALLOON PUMP INSERTION, IVAN PER ANESTHESIA;  Surgeon: Miguel Angel Acosta MD;  Location: [facility] OR;  Service: Cardiothoracic;  Laterality: N/A;   • CORONARY ARTERY BYPASS GRAFT  6/22/2021   • CORONARY STENT PLACEMENT  2004   • INTERVENTIONAL RADIOLOGY PROCEDURE N/A 07/02/2021    Procedure: thrombin injection;  Surgeon: Abdiel Taemz MD;  Location:  [facility];  Service: Interventional Radiology;  Laterality: N/A;   • PROSTATE SURGERY      Dr. Garcia   • WISDOM TOOTH EXTRACTION         Social History     Socioeconomic History   • Marital status:    • Number of children: 1   Tobacco Use   • Smoking status: Former     Packs/day: 1.50     Years: 46.00     Pack years:  69.00     Types: Cigarettes     Start date: 1971     Quit date: 2017     Years since quittin.6   • Smokeless tobacco: Never   • Tobacco comments:     This is a really dumb questionaire. You need the MM/DD/YY when I started?   Vaping Use   • Vaping Use: Never used   Substance and Sexual Activity   • Alcohol use: Yes     Alcohol/week: 14.0 standard drinks     Types: 14 Glasses of wine per week     Comment: Varies a lot.   • Drug use: Never   • Sexual activity: Not Currently     Partners: Female     Birth control/protection: Diaphragm, Spermicide, Post-menopausal     Comment:  twice, both . No STVs. Not planning to resume.       Family History   Problem Relation Age of Onset   • Cancer Mother         Sinus tumor; had radiation therapy & surgery; tumor all gone.   • Hypertension Mother    • Hyperlipidemia Mother         Takes medication   • Heart attack Mother    • Diabetes Sister    • Heart failure Father         .   • Stroke Father    • Heart attack Maternal Grandfather    • Cancer Paternal Grandmother    • Heart attack Paternal Grandfather    • Diabetes Sister         Diabetic from childhood          Current Outpatient Medications:   •  albuterol sulfate  (90 Base) MCG/ACT inhaler, Inhale 2 puffs Every 4 (Four) Hours As Needed for Wheezing or Shortness of Air., Disp: 8 g, Rfl: 5  •  aspirin 81 MG EC tablet, Take 1 tablet by mouth Daily., Disp: , Rfl:   •  Cholecalciferol (Vitamin D3) 1.25 MG (69230 UT) capsule, , Disp: , Rfl:   •  clobetasol (Olux) 0.05 % topical foam, Apply 1 application topically to the appropriate area as directed 2 (Two) Times a Day., Disp: 50 g, Rfl: 0  •  fluticasone (FLONASE) 50 MCG/ACT nasal spray, 2 sprays into the nostril(s) as directed by provider Daily., Disp: , Rfl:   •  metoprolol succinate XL (TOPROL-XL) 50 MG 24 hr tablet, Take 1 tablet by mouth Daily. Hold for blood pressure less than 100/60., Disp: 90 tablet, Rfl: 1  •  rosuvastatin (CRESTOR) 40 MG  "tablet, Take 1 tablet by mouth Every Night., Disp: 30 tablet, Rfl: 0  •  thiamine (VITAMIN B-1) 100 MG tablet tablet, Take 1 tablet by mouth Daily., Disp: 90 tablet, Rfl: 3  •  triamcinolone (KENALOG) 0.025 % ointment, Apply  topically to the appropriate area as directed 2 Times a Day for 10 days. Avoid applying to face, axilla, and groin, Disp: 15 g, Rfl: 1  •  tamsulosin (FLOMAX) 0.4 MG capsule 24 hr capsule, TAKE 2 CAPSULES DAILY, Disp: 180 capsule, Rfl: 1     Review of Systems   Constitutional: Negative for chills, fatigue, fever and unexpected weight change.   HENT: Negative for ear pain, hearing loss, nosebleeds, rhinorrhea and sore throat.    Eyes: Negative for photophobia, pain, discharge, itching and visual disturbance.   Respiratory: Negative for cough, chest tightness, shortness of breath and wheezing.    Cardiovascular: Negative for chest pain, palpitations and leg swelling.   Gastrointestinal: Negative for abdominal pain, blood in stool, constipation, diarrhea, nausea and vomiting.   Genitourinary: Negative for dysuria, frequency, hematuria and urgency.   Musculoskeletal: Positive for gait problem. Negative for arthralgias, back pain, joint swelling, myalgias, neck pain and neck stiffness.        Due to right knee arthritis   Skin: Negative for rash and wound.   Allergic/Immunologic: Negative for environmental allergies and food allergies.   Neurological: Negative for dizziness, tremors, seizures, syncope, speech difficulty, weakness, light-headedness, numbness and headaches.   Hematological: Negative for adenopathy. Does not bruise/bleed easily.   Psychiatric/Behavioral: Negative for agitation, confusion, decreased concentration, hallucinations, sleep disturbance and suicidal ideas. The patient is not nervous/anxious.    All other systems reviewed and are negative.       Objective:  /80   Pulse 99   Ht 177.8 cm (70\")   Wt 125 kg (276 lb)   SpO2 95%   BMI 39.60 kg/m²     Neurologic Exam "     Mental Status   Oriented to person, place, and time.   Speech: speech is normal   Level of consciousness: alert  Knowledge: good.   Normal comprehension.     Cranial Nerves   Cranial nerves II through XII intact.     CN VII   Right facial weakness: central (mild and chronic at baseline)    Motor Exam   Muscle bulk: normal  Overall muscle tone: normal    Strength   Strength 5/5 except as noted.   Right strength: Right knee pain and limited ROM 4/5 chronic reported    Sensory Exam   Right leg vibration: decreased from ankle  Left leg vibration: decreased from ankle    Decreased stocking sensation BLE      Gait, Coordination, and Reflexes     Gait  Gait: (antalgic due to right knee pain, no assistive device)    Coordination   Finger to nose coordination: normal    Tremor   Resting tremor: absent  Intention tremor: present (mild BUE fine kinetic hand tremor)  Action tremor: absent    Reflexes   Right brachioradialis: 2+  Left brachioradialis: 2+  Right biceps: 2+  Left biceps: 2+  Right patellar: 1+  Left patellar: 1+  Right achilles: 1+  Left achilles: 1+  Right : 2+  Left : 2+        Physical Exam  Constitutional:       Appearance: Normal appearance. He is obese.      Comments: BMI 39.6   Neurological:      Mental Status: He is alert and oriented to person, place, and time.      Cranial Nerves: Cranial nerves 2-12 are intact.      Coordination: Finger-Nose-Finger Test normal.      Deep Tendon Reflexes:      Reflex Scores:       Bicep reflexes are 2+ on the right side and 2+ on the left side.       Brachioradialis reflexes are 2+ on the right side and 2+ on the left side.       Patellar reflexes are 1+ on the right side and 1+ on the left side.       Achilles reflexes are 1+ on the right side and 1+ on the left side.  Psychiatric:         Mood and Affect: Mood and affect normal.         Speech: Speech normal.         Behavior: Behavior normal.         Thought Content: Thought content normal.          Cognition and Memory: Cognition and memory normal.         Judgment: Judgment normal.     [place] cognitive assessment score today is stable at 29 out of 30. The only point he missed was regarding the date.     Results    His last CK level on 9/13/2022 was normal at 114. TSH normal.    Assessment/Plan:       Diagnoses and all orders for this visit:    1. Lateral femoral cutaneous neuropathy, unspecified laterality (Primary)  Comments:  encouraged weight loss, denies pain    2. Idiopathic peripheral neuropathy  Comments:  continue to monitor, reduce alcohol intake, hgba1c 5.8% 9/2022 with PCP    3. Impairment of balance  Comments:  declines physical therapy, plans to move to [place] in July 2023    4. Impaired cognition  Comments:  MOCA 29/30 stable    5. Alcohol dependence, daily use (HCC)  -     thiamine (VITAMIN B-1) 100 MG tablet tablet; Take 1 tablet by mouth Daily.  Dispense: 90 tablet; Refill: 3         He has no new concerns today. His CK level recently is normal. He has recently had prostate surgery. He feels that his weakness is only related to his right knee arthritis. He is planning to move to [place] or [place] in 07/2023. He is going to follow up with our clinic as needed. We did provide him with medical records phone number so that he can request his neurology records and any other records with [facility]. His memory has been good and stable overall. I have encouraged him to slowly cut back on his alcohol intake, decreasing by 1 serving per week every few weeks to avoid significant alcohol withdrawal. In regards to his neuropathy, he feels that it is 80 percent improved. He is really not having any pain. We discussed losing weight again as he was previously down to 259 pounds and currently at 276 pounds. He should continue following with PCP until he establishes care with a new provider. He declines any medications for neuropathy. He declined physical therapy today. We reviewed all of our  recommendations in detail.     He will call us with any questions or concerns and will follow up as needed.    Reviewed medications, potential side effects and signs and symptoms to report. Discussed risk versus benefits of treatment plan with patient and/or family-including medications, labs and radiology that may be ordered. Addressed questions and concerns during visit. Patient and/or family verbalized understanding and agree with plan.    AS THE PROVIDER, I PERSONALLY WORE PPE DURING ENTIRE FACE TO FACE ENCOUNTER IN CLINIC WITH THE PATIENT. PATIENT ALSO WORE PPE DURING ENTIRE FACE TO FACE ENCOUNTER EXCEPT FOR A MAX OF 30 SECONDS DURING NEUROLOGICAL EVALUATION OF CRANIAL NERVES AND THEN MASK WAS PLACED BACK OVER PATIENT FACE FOR REMAINDER OF VISIT. I WASHED MY HANDS BEFORE AND AFTER VISIT.    During this visit the following were done:  Labs Reviewed [x]    Labs Ordered []    Radiology Reports Reviewed [x]    Radiology Ordered []    PCP Records Reviewed [x]    Referring Provider Records Reviewed []    ER Records Reviewed []    Hospital Records Reviewed []    History Obtained From Family []    Radiology Images Reviewed []    Other Reviewed [x]    Records Requested []      Transcribed from ambient dictation for GEE Cruz by Abiola Saldivar.  03/06/23   13:25 EST    Patient or patient representative verbalized consent to the visit recording.  I have personally performed the services described in this document as transcribed by the above individual, and it is both accurate and complete.  GEE Cruz  3/6/2023  15:02 EST

## 2023-05-25 ENCOUNTER — OFFICE VISIT (OUTPATIENT)
Dept: FAMILY MEDICINE CLINIC | Facility: CLINIC | Age: 68
End: 2023-05-25
Payer: COMMERCIAL

## 2023-05-25 VITALS
HEIGHT: 70 IN | HEART RATE: 72 BPM | WEIGHT: 283.6 LBS | TEMPERATURE: 97.1 F | RESPIRATION RATE: 16 BRPM | DIASTOLIC BLOOD PRESSURE: 70 MMHG | SYSTOLIC BLOOD PRESSURE: 102 MMHG | OXYGEN SATURATION: 95 % | BODY MASS INDEX: 40.6 KG/M2

## 2023-05-25 DIAGNOSIS — F10.20 ALCOHOL DEPENDENCE, DAILY USE: ICD-10-CM

## 2023-05-25 DIAGNOSIS — E78.2 MIXED HYPERLIPIDEMIA: ICD-10-CM

## 2023-05-25 DIAGNOSIS — E66.01 MORBID (SEVERE) OBESITY DUE TO EXCESS CALORIES: ICD-10-CM

## 2023-05-25 DIAGNOSIS — Z00.00 ANNUAL PHYSICAL EXAM: Primary | ICD-10-CM

## 2023-05-25 DIAGNOSIS — I10 PRIMARY HYPERTENSION: ICD-10-CM

## 2023-05-25 DIAGNOSIS — R73.03 PREDIABETES: ICD-10-CM

## 2023-05-25 NOTE — PROGRESS NOTES
"Chief Complaint  Annual Exam (Pt is fasting.)    Subjective          Thien Gray presents to [facility]  History of Present Illness  Here for annual exam  Vision exam: up to date   Dental exam: overdue   Colonoscopy: Dec 2021, repeat in 3 years due to polyps    He underwent TURP in Feb 2023 at [place], Dr. Mallory  Still following up, having some bladder spasms.     He has gained weight over the last year.   Lost 30 lbs last year after he stopped consuming alcohol, however regained when he resumed alcohol. He is cutting back gradually, down to 3 drinks a day. He states that he likes \"all alcohol\", so type of consumption varies. He is aware to not stop alcohol consumption abruptly.     He is planning to move in the future, however recently extended his lease for 9 months.     The following portions of the patient's history were reviewed and updated as appropriate: allergies, current medications, past family history, past medical history, past social history, past surgical history and problem list.    Objective      Physical Exam  Vitals reviewed.   Constitutional:       Appearance: He is obese.   Cardiovascular:      Rate and Rhythm: Normal rate.      Heart sounds: Normal heart sounds.   Pulmonary:      Effort: Pulmonary effort is normal.      Breath sounds: Normal breath sounds.   Musculoskeletal:         General: No swelling.      Cervical back: Neck supple.   Neurological:      Mental Status: He is alert.        Result Review :                Assessment and Plan    Diagnoses and all orders for this visit:    1. Annual physical exam (Primary)  -     CBC & Differential  -     Cancel: Comprehensive Metabolic Panel  -     Cancel: Hemoglobin A1c  -     Cancel: Lipid Panel With / Chol / HDL Ratio  -     Comprehensive Metabolic Panel  -     Hemoglobin A1c  -     Lipid Panel With / Chol / HDL Ratio    2. Prediabetes  -     CBC & Differential  -     Cancel: Comprehensive Metabolic " Panel  -     Cancel: Hemoglobin A1c  -     Cancel: Lipid Panel With / Chol / HDL Ratio  -     Comprehensive Metabolic Panel  -     Hemoglobin A1c  -     Lipid Panel With / Chol / HDL Ratio    3. Mixed hyperlipidemia  -     CBC & Differential  -     Cancel: Comprehensive Metabolic Panel  -     Cancel: Hemoglobin A1c  -     Cancel: Lipid Panel With / Chol / HDL Ratio  -     Comprehensive Metabolic Panel  -     Hemoglobin A1c  -     Lipid Panel With / Chol / HDL Ratio    4. Primary hypertension  -     CBC & Differential  -     Cancel: Comprehensive Metabolic Panel  -     Cancel: Hemoglobin A1c  -     Cancel: Lipid Panel With / Chol / HDL Ratio  -     Comprehensive Metabolic Panel  -     Hemoglobin A1c  -     Lipid Panel With / Chol / HDL Ratio    5. Alcohol dependence, daily use  Comments:  Disccused referral to psychiatry to help with alcohol cessation, along with AA meetings. He will consider. Continue gradually decreasing use    6. Morbid (severe) obesity due to excess calories  Comments:  Work on dietary improvement and start routine exercise. Will notify me if he would like nutritionist consult.    7. Body mass index (BMI) of 40.0 to 44.9 in adult    Health advice: healthy food choices with fresh fruits and vegetables, maintain sleep pattern at least 8 hours, avoid texting and distracted driving practices; wear safety belt, engage in regular exercise, maintain healthy weight. Maintain immunizations that are up to date. Maintain health maintenance.  Follow up with PCP if struggling with depression or anxiety. Keep regular dental and eye exams. Brush and floss teeth daily.           Follow Up   Return in about 6 months (around 11/25/2023) for Recheck.  Patient was given instructions and counseling regarding his condition or for health maintenance advice. Please see specific information pulled into the AVS if appropriate.

## 2023-05-26 LAB
ALBUMIN SERPL-MCNC: 5 G/DL (ref 3.5–5.2)
ALBUMIN/GLOB SERPL: 2 G/DL
ALP SERPL-CCNC: 70 U/L (ref 39–117)
ALT SERPL-CCNC: 40 U/L (ref 1–41)
AST SERPL-CCNC: 34 U/L (ref 1–40)
BASOPHILS # BLD AUTO: 0.04 10*3/MM3 (ref 0–0.2)
BASOPHILS NFR BLD AUTO: 0.6 % (ref 0–1.5)
BILIRUB SERPL-MCNC: 0.6 MG/DL (ref 0–1.2)
BUN SERPL-MCNC: 11 MG/DL (ref 8–23)
BUN/CREAT SERPL: 14.9 (ref 7–25)
CALCIUM SERPL-MCNC: 10.2 MG/DL (ref 8.6–10.5)
CHLORIDE SERPL-SCNC: 102 MMOL/L (ref 98–107)
CHOLEST SERPL-MCNC: 119 MG/DL (ref 0–200)
CHOLEST/HDLC SERPL: 2.59 {RATIO}
CO2 SERPL-SCNC: 28.3 MMOL/L (ref 22–29)
CREAT SERPL-MCNC: 0.74 MG/DL (ref 0.76–1.27)
EGFRCR SERPLBLD CKD-EPI 2021: 98.1 ML/MIN/1.73
EOSINOPHIL # BLD AUTO: 0.16 10*3/MM3 (ref 0–0.4)
EOSINOPHIL NFR BLD AUTO: 2.3 % (ref 0.3–6.2)
ERYTHROCYTE [DISTWIDTH] IN BLOOD BY AUTOMATED COUNT: 12.8 % (ref 12.3–15.4)
GLOBULIN SER CALC-MCNC: 2.5 GM/DL
GLUCOSE SERPL-MCNC: 132 MG/DL (ref 65–99)
HBA1C MFR BLD: 6.7 % (ref 4.8–5.6)
HCT VFR BLD AUTO: 47.6 % (ref 37.5–51)
HDLC SERPL-MCNC: 46 MG/DL (ref 40–60)
HGB BLD-MCNC: 16.6 G/DL (ref 13–17.7)
IMM GRANULOCYTES # BLD AUTO: 0.05 10*3/MM3 (ref 0–0.05)
IMM GRANULOCYTES NFR BLD AUTO: 0.7 % (ref 0–0.5)
LDLC SERPL CALC-MCNC: 40 MG/DL (ref 0–100)
LYMPHOCYTES # BLD AUTO: 1.92 10*3/MM3 (ref 0.7–3.1)
LYMPHOCYTES NFR BLD AUTO: 28.2 % (ref 19.6–45.3)
MCH RBC QN AUTO: 30.5 PG (ref 26.6–33)
MCHC RBC AUTO-ENTMCNC: 34.9 G/DL (ref 31.5–35.7)
MCV RBC AUTO: 87.5 FL (ref 79–97)
MONOCYTES # BLD AUTO: 0.58 10*3/MM3 (ref 0.1–0.9)
MONOCYTES NFR BLD AUTO: 8.5 % (ref 5–12)
NEUTROPHILS # BLD AUTO: 4.06 10*3/MM3 (ref 1.7–7)
NEUTROPHILS NFR BLD AUTO: 59.7 % (ref 42.7–76)
NRBC BLD AUTO-RTO: 0 /100 WBC (ref 0–0.2)
PLATELET # BLD AUTO: 234 10*3/MM3 (ref 140–450)
POTASSIUM SERPL-SCNC: 4.6 MMOL/L (ref 3.5–5.2)
PROT SERPL-MCNC: 7.5 G/DL (ref 6–8.5)
RBC # BLD AUTO: 5.44 10*6/MM3 (ref 4.14–5.8)
SODIUM SERPL-SCNC: 141 MMOL/L (ref 136–145)
TRIGL SERPL-MCNC: 205 MG/DL (ref 0–150)
VLDLC SERPL CALC-MCNC: 33 MG/DL (ref 5–40)
WBC # BLD AUTO: 6.81 10*3/MM3 (ref 3.4–10.8)

## 2023-05-29 DIAGNOSIS — E11.9 TYPE 2 DIABETES MELLITUS WITHOUT COMPLICATION, WITHOUT LONG-TERM CURRENT USE OF INSULIN: Primary | ICD-10-CM

## 2023-05-30 RX ORDER — BLOOD-GLUCOSE METER
KIT MISCELLANEOUS
Qty: 1 EACH | Refills: 0 | Status: SHIPPED | OUTPATIENT
Start: 2023-05-30

## 2023-05-30 RX ORDER — METFORMIN HYDROCHLORIDE 500 MG/1
500 TABLET, EXTENDED RELEASE ORAL
Qty: 90 TABLET | Refills: 1 | Status: SHIPPED | OUTPATIENT
Start: 2023-05-30

## 2023-05-30 RX ORDER — SYRING-NEEDL,DISP,INSUL,0.3 ML 30 GX5/16"
SYRINGE, EMPTY DISPOSABLE MISCELLANEOUS
Qty: 50 EACH | Refills: 5 | Status: SHIPPED | OUTPATIENT
Start: 2023-05-30

## 2023-07-27 DIAGNOSIS — E11.9 TYPE 2 DIABETES MELLITUS WITHOUT COMPLICATION, WITHOUT LONG-TERM CURRENT USE OF INSULIN: ICD-10-CM

## 2023-07-27 RX ORDER — METFORMIN HYDROCHLORIDE 500 MG/1
500 TABLET, EXTENDED RELEASE ORAL
Qty: 90 TABLET | Refills: 1 | Status: SHIPPED | OUTPATIENT
Start: 2023-07-27

## 2023-07-28 ENCOUNTER — TELEPHONE (OUTPATIENT)
Dept: FAMILY MEDICINE CLINIC | Facility: CLINIC | Age: 68
End: 2023-07-28
Payer: COMMERCIAL

## 2023-07-28 DIAGNOSIS — E11.9 TYPE 2 DIABETES MELLITUS WITHOUT COMPLICATION, WITHOUT LONG-TERM CURRENT USE OF INSULIN: Primary | ICD-10-CM

## 2023-07-28 DIAGNOSIS — E11.9 TYPE 2 DIABETES MELLITUS WITHOUT COMPLICATION, WITHOUT LONG-TERM CURRENT USE OF INSULIN: ICD-10-CM

## 2023-08-29 DIAGNOSIS — E11.9 TYPE 2 DIABETES MELLITUS WITHOUT COMPLICATION, WITHOUT LONG-TERM CURRENT USE OF INSULIN: ICD-10-CM

## 2023-08-29 RX ORDER — METFORMIN HYDROCHLORIDE 500 MG/1
500 TABLET, EXTENDED RELEASE ORAL
Qty: 90 TABLET | Refills: 1 | Status: SHIPPED | OUTPATIENT
Start: 2023-08-29

## 2023-08-29 NOTE — TELEPHONE ENCOUNTER
Caller: MarinaThien    Relationship: Self    Best call back number: [phone]     Requested Prescriptions:   Requested Prescriptions     Pending Prescriptions Disp Refills    metFORMIN ER (GLUCOPHAGE-XR) 500 MG 24 hr tablet 90 tablet 1     Sig: Take 1 tablet by mouth Daily With Breakfast.        Pharmacy where request should be sent: [facility] - [address] AT [address] TO [facility] - [phone]  - [phone] FX     Last office visit with prescribing clinician: 5/25/2023   Last telemedicine visit with prescribing clinician: Visit date not found   Next office visit with prescribing clinician: 11/27/2023     Additional details provided by patient: PATIENT STATES HIS [employer] INSURANCE IS REQUESTING TO SPEAK HIS PROVIDER SO HE CAN HAVE HIS MEDICATION REFILLED. HE STATES [facility] TOLD HIM HIS PRESCRIPTION IS NO LONGER VALID AND HAS A NON CLINICAL INTERVENTION.     PATIENT HAS 1-2 PILLS LEFT.     Huey Franklin Rep   08/29/23 14:30 EDT

## 2023-09-01 ENCOUNTER — HOSPITAL ENCOUNTER (OUTPATIENT)
Dept: SLEEP MEDICINE | Facility: HOSPITAL | Age: 68
End: 2023-09-01
Payer: COMMERCIAL

## 2023-09-01 DIAGNOSIS — I21.21 ST ELEVATION MYOCARDIAL INFARCTION INVOLVING LEFT CIRCUMFLEX CORONARY ARTERY: ICD-10-CM

## 2023-09-01 DIAGNOSIS — I10 HYPERTENSION, UNSPECIFIED TYPE: ICD-10-CM

## 2023-09-01 DIAGNOSIS — R41.3 MEMORY LOSS: ICD-10-CM

## 2023-09-01 DIAGNOSIS — R29.818 SUSPECTED SLEEP APNEA: ICD-10-CM

## 2023-09-01 PROCEDURE — 95811 POLYSOM 6/>YRS CPAP 4/> PARM: CPT

## 2023-09-02 VITALS — WEIGHT: 262.35 LBS | BODY MASS INDEX: 37.56 KG/M2 | HEIGHT: 70 IN

## 2023-09-06 ENCOUNTER — TELEPHONE (OUTPATIENT)
Dept: SLEEP MEDICINE | Facility: HOSPITAL | Age: 68
End: 2023-09-06
Payer: COMMERCIAL

## 2023-09-06 DIAGNOSIS — G47.33 OSA (OBSTRUCTIVE SLEEP APNEA): Primary | ICD-10-CM

## 2023-09-07 ENCOUNTER — TELEPHONE (OUTPATIENT)
Dept: FAMILY MEDICINE CLINIC | Facility: CLINIC | Age: 68
End: 2023-09-07
Payer: COMMERCIAL

## 2023-09-07 DIAGNOSIS — E78.2 MIXED HYPERLIPIDEMIA: ICD-10-CM

## 2023-09-07 DIAGNOSIS — L40.9 PSORIASIS OF SCALP: ICD-10-CM

## 2023-09-07 RX ORDER — ROSUVASTATIN CALCIUM 40 MG/1
40 TABLET, COATED ORAL NIGHTLY
Qty: 90 TABLET | Refills: 0 | Status: SHIPPED | OUTPATIENT
Start: 2023-09-07

## 2023-09-07 RX ORDER — CLOBETASOL PROPIONATE 0.5 MG/G
AEROSOL, FOAM TOPICAL
Qty: 50 G | Refills: 0 | Status: SHIPPED | OUTPATIENT
Start: 2023-09-07

## 2023-09-07 NOTE — TELEPHONE ENCOUNTER
Caller: Marina Thien Morgan    Relationship: Self    Best call back number: [phone]    Requested Prescriptions:   Requested Prescriptions      No prescriptions requested or ordered in this encounter      rosuvastatin (CRESTOR) 40 MG tablet     Pharmacy where request should be sent:      [facility] - [place] - [address] AT [address] to [facility] - [phone]  - [phone] FX  .    Last office visit with prescribing clinician: 5/25/2023   Last telemedicine visit with prescribing clinician: Visit date not found   Next office visit with prescribing clinician: 11/27/2023     Additional details provided by patient:     PLEASE SEND 90 DAY SUPPLY    Does the patient have less than a 3 day supply:  [] Yes  [x] No    Would you like a call back once the refill request has been completed: [] Yes [x] No    If the office needs to give you a call back, can they leave a voicemail: [] Yes [x] No    Asia Ruiz, PCT   09/07/23 13:25 EDT

## 2023-09-14 DIAGNOSIS — E78.2 MIXED HYPERLIPIDEMIA: ICD-10-CM

## 2023-09-14 RX ORDER — ROSUVASTATIN CALCIUM 40 MG/1
TABLET, COATED ORAL
Qty: 90 TABLET | Refills: 3 | Status: SHIPPED | OUTPATIENT
Start: 2023-09-14

## 2023-10-02 ENCOUNTER — TELEPHONE (OUTPATIENT)
Dept: SLEEP MEDICINE | Facility: HOSPITAL | Age: 68
End: 2023-10-02

## 2023-10-02 NOTE — TELEPHONE ENCOUNTER
PT CALLED AND IS VERY UPSET ABOUT THIS SERVICE AT [facility]. PT HAS CALLED DME MULTIPLE TIMES AND HAS HEARD NOTHING BACK. HE WOULD LIKE TO GET HIS PAP THROUGH [facility] IF POSSIBLE.

## 2023-10-05 ENCOUNTER — OFFICE VISIT (OUTPATIENT)
Dept: ENDOCRINOLOGY | Facility: CLINIC | Age: 68
End: 2023-10-05
Payer: COMMERCIAL

## 2023-10-05 VITALS
SYSTOLIC BLOOD PRESSURE: 126 MMHG | DIASTOLIC BLOOD PRESSURE: 76 MMHG | OXYGEN SATURATION: 97 % | BODY MASS INDEX: 40.8 KG/M2 | WEIGHT: 285 LBS | HEART RATE: 90 BPM | HEIGHT: 70 IN

## 2023-10-05 DIAGNOSIS — E78.5 HYPERLIPIDEMIA, UNSPECIFIED HYPERLIPIDEMIA TYPE: ICD-10-CM

## 2023-10-05 DIAGNOSIS — E11.9 TYPE 2 DIABETES MELLITUS WITHOUT COMPLICATION, UNSPECIFIED WHETHER LONG TERM INSULIN USE: Primary | ICD-10-CM

## 2023-10-05 LAB
EXPIRATION DATE: ABNORMAL
EXPIRATION DATE: NORMAL
GLUCOSE BLDC GLUCOMTR-MCNC: 167 MG/DL (ref 70–130)
HBA1C MFR BLD: 6.7 %
Lab: ABNORMAL
Lab: NORMAL

## 2023-10-05 RX ORDER — TOBRAMYCIN 40 MG/ML
80 INJECTION INTRAMUSCULAR; INTRAVENOUS
COMMUNITY
Start: 2023-06-30

## 2023-10-05 RX ORDER — MELOXICAM 7.5 MG/1
7.5 TABLET ORAL DAILY
COMMUNITY

## 2023-10-05 RX ORDER — TIRZEPATIDE 2.5 MG/.5ML
2.5 INJECTION, SOLUTION SUBCUTANEOUS WEEKLY
Qty: 2 ML | Refills: 1 | Status: SHIPPED | OUTPATIENT
Start: 2023-10-05

## 2023-10-05 NOTE — PROGRESS NOTES
Chief Complaint   Patient presents with    Diabetes        New patient who is being seen in consultation regarding diabetes at the request of Sanaz Zuluaga DO HPI   Thien Gray is a 68 y.o. male who presents for evaluation of diabetes.    Patient has a history of type 2 diabetes.  This was initially diagnosed in June.     Current medical therapy includes the following:  Metformin 500 mg     Patient has also previously taken the following medications which were discontinued:  None    Patient denies hypoglycemia.  Patient monitors blood glucose generally once daily, in the morning with values reported around 160.  Patient does not exercise regularly. Ability to exercise is limited by knee pain.    Patient reports interest in adding medication which could also aid in weight loss, specifically Mounjaro.    Patient reports he has had an eye exam within the past year, he is unsure if a dilated retinal exam was performed as this was before his diagnosis of diabetes.  He will reach out to his optometrist to verify he had appropriate screening.  History of dyslipidemia: Yes  History of renal dysfunction: No  Patient reports a history of cardiac bypass surgery.      Past Medical History:   Diagnosis Date    Abnormal ECG 01/20/2020    Get from Dr. Kayser    Arthritis 2013    osteoarthritis (knees)    Asthma 2/15/2022    Since COVID: short of breath, mild pains    Benign prostatic hyperplasia     Cataract     Coronary artery disease     COVID-19 2022    History of heart attack 2004    History of MI (myocardial infarction)     Hyperlipidemia     Myocardial infarction     Obesity     SOM (obstructive sleep apnea) 9/1/2023    Peripheral neuropathy 11/04/2021    diagnosed on previous visit    Primary hypertension 06/06/2022    RBBB 02/10/2020     Past Surgical History:   Procedure Laterality Date    APPENDECTOMY  1999    CARDIAC CATHETERIZATION  01/01/2004    They used a catheter to put in my stent.    CARDIAC  CATHETERIZATION N/A 2021    Procedure: Left Heart Cath;  Surgeon: Mikey Conrad MD;  Location: [facility] CATH INVASIVE LOCATION;  Service: Cardiovascular;  Laterality: N/A;    COLONOSCOPY N/A 2021    Procedure: COLONOSCOPY;  Surgeon: Brunner, Mark I, MD;  Location: [facility] ENDOSCOPY;  Service: Gastroenterology;  Laterality: N/A;  several polyps removed. 2 clips placed at ascending colon, 2 clips in transverse colon, and 2 clips in sigmoid colon.      CORONARY ANGIOPLASTY      I think that's what the bill for my stent said.    CORONARY ARTERY BYPASS BRING BACK FOR EXPLORATION N/A 2021    Procedure: BRING BACK FOR EXPLORATION OPEN HEART;  Surgeon: Miguel Angel Acosta MD;  Location: [facility] OR;  Service: Cardiothoracic;  Laterality: N/A;    CORONARY ARTERY BYPASS GRAFT N/A 2021    Procedure: MEDIAN STERNOTOMY, CORONARY ARTERY BYPASS GRAFTING X 4 WITH LEFT INTERNAL MAMMARY ARTERY GRAFT, ENDOSCOPIC VEIN HARVESTING OF THE RIGHT GREATER SAPHENOUS VEIN, INTRA-AORTIC BALLOON PUMP INSERTION, IVAN PER ANESTHESIA;  Surgeon: Miguel Angel Acosta MD;  Location: [facility] OR;  Service: Cardiothoracic;  Laterality: N/A;    CORONARY ARTERY BYPASS GRAFT  2021    CORONARY STENT PLACEMENT      INTERVENTIONAL RADIOLOGY PROCEDURE N/A 2021    Procedure: thrombin injection;  Surgeon: Abdiel Tamez MD;  Location: [facility] CATH INVASIVE LOCATION;  Service: Interventional Radiology;  Laterality: N/A;    PROSTATE SURGERY      Dr. Garcia    WISDOM TOOTH EXTRACTION        Family History   Problem Relation Age of Onset    Cancer Mother         Sinus tumor; had radiation therapy & surgery; tumor all gone.    Hypertension Mother     Hyperlipidemia Mother         Takes medication    Heart attack Mother     Heart failure Father         .    Stroke Father     Diabetes Sister     Diabetes Sister         Diabetic from childhood    Heart attack Maternal Grandfather     Cancer Paternal Grandmother     Heart attack  Paternal Grandfather     Heart disease Brother       Social History     Socioeconomic History    Marital status:     Number of children: 1   Tobacco Use    Smoking status: Former     Packs/day: 1.50     Years: 46.00     Pack years: 69.00     Types: Cigarettes     Start date: 1971     Quit date: 2017     Years since quittin.2    Smokeless tobacco: Never    Tobacco comments:     This is a really dumb questionaire. You need the MM/DD/YY when I started?   Vaping Use    Vaping Use: Never used   Substance and Sexual Activity    Alcohol use: Yes     Alcohol/week: 14.0 standard drinks     Types: 14 Glasses of wine per week     Comment: Varies a lot.    Drug use: Never    Sexual activity: Not Currently     Partners: Female     Birth control/protection: Diaphragm, Spermicide, Post-menopausal     Comment:  twice, both . No STVs. Not planning to resume.      Allergies   Allergen Reactions    Bactrim [Sulfamethoxazole-Trimethoprim] Other (See Comments)     Causes weakness, fatigue    Plavix [Clopidogrel Bisulfate] Hives    Augmentin [Amoxicillin-Pot Clavulanate] Confusion      Current Outpatient Medications on File Prior to Visit   Medication Sig Dispense Refill    albuterol sulfate  (90 Base) MCG/ACT inhaler Inhale 2 puffs Every 4 (Four) Hours As Needed for Wheezing or Shortness of Air. 8 g 5    aspirin 81 MG EC tablet Take 1 tablet by mouth Daily.      clobetasol (OLUX) 0.05 % topical foam APPLY 1 APPLICATION        TOPICALLY TO THE           APPROPRIATE AREA AS        DIRECTED TWO TIMES A DAY 50 g 0    fluticasone (FLONASE) 50 MCG/ACT nasal spray 2 sprays into the nostril(s) as directed by provider Daily.      glucose blood test strip Check glucose daily 50 each 5    glucose monitor monitoring kit Check glucose daily 1 each 0    Lancet Device misc Check glucose daily 50 each 5    meloxicam (MOBIC) 7.5 MG tablet Take 1 tablet by mouth Daily.      metFORMIN ER (GLUCOPHAGE-XR) 500 MG 24 hr  "tablet Take 1 tablet by mouth Daily With Breakfast. 90 tablet 1    metoprolol succinate XL (TOPROL-XL) 50 MG 24 hr tablet Take 1 tablet by mouth Daily. Hold for blood pressure less than 100/60. 90 tablet 1    rosuvastatin (CRESTOR) 40 MG tablet TAKE 1 TABLET EVERY NIGHT 90 tablet 3    thiamine (VITAMIN B-1) 100 MG tablet tablet Take 1 tablet by mouth Daily. 90 tablet 3    tobramycin (NEBCIN) 80 MG/2ML solution injection Inject 2 mL into the appropriate muscle as directed by prescriber Daily. Sinus rinse      triamcinolone (KENALOG) 0.025 % ointment Apply  topically to the appropriate area as directed 2 Times a Day for 10 days. Avoid applying to face, axilla, and groin 15 g 1    [DISCONTINUED] losartan (COZAAR) 25 MG tablet Take 1 tablet by mouth Daily.      [DISCONTINUED] Mirabegron ER (MYRBETRIQ) 25 MG tablet sustained-release 24 hour 24 hr tablet Take 1 tablet by mouth Daily.       No current facility-administered medications on file prior to visit.        Review of Systems   Constitutional:  Positive for fatigue.   HENT:  Positive for sneezing.    Gastrointestinal:  Negative for diarrhea, nausea and vomiting.      Vitals:    10/05/23 1103   BP: 126/76   BP Location: Right arm   Patient Position: Sitting   Cuff Size: Adult   Pulse: 90   SpO2: 97%   Weight: 129 kg (285 lb)   Height: 177.8 cm (70\")   Body mass index is 40.89 kg/m².     Physical Exam  Vitals reviewed.   Constitutional:       Appearance: He is obese.   Cardiovascular:      Rate and Rhythm: Normal rate and regular rhythm.   Pulmonary:      Effort: Pulmonary effort is normal.      Breath sounds: Normal breath sounds.   Neurological:      Mental Status: He is alert.   Psychiatric:         Mood and Affect: Mood and affect normal.         Behavior: Behavior is cooperative.          Labs/Imaging  CMP:  Lab Results   Component Value Date    BUN 11 05/25/2023    CREATININE 0.74 (L) 05/25/2023    EGFRIFNONA 90 12/14/2021    EGFRIFAFRI 122 06/16/2021    BCR " 14.9 05/25/2023     05/25/2023    K 4.6 05/25/2023    CO2 28.3 05/25/2023    CALCIUM 10.2 05/25/2023    PROTENTOTREF 7.5 05/25/2023    ALBUMIN 5.0 05/25/2023    LABGLOBREF 2.5 05/25/2023    LABIL2 2.0 05/25/2023    BILITOT 0.6 05/25/2023    ALKPHOS 70 05/25/2023    AST 34 05/25/2023    ALT 40 05/25/2023     HbA1c:  Lab Results   Component Value Date    HGBA1C 6.7 10/05/2023    HGBA1C 6.70 (H) 05/25/2023      Latest Reference Range & Units 05/25/23 10:39   Total Cholesterol 0 - 200 mg/dL 119   HDL Cholesterol 40 - 60 mg/dL 46   LDL Cholesterol  0 - 100 mg/dL 40   Triglycerides 0 - 150 mg/dL 205 (H)   Chol/HDL Ratio  2.59   VLDL Cholesterol Rodrigo 5 - 40 mg/dL 33   (H): Data is abnormally high    Assessment and Plan    Diagnoses and all orders for this visit:    1. Type 2 diabetes mellitus without complication, unspecified whether long term insulin use (Primary)  -     POC Glucose, Blood  -     POC Glycosylated Hemoglobin (Hb A1C)  Hemoglobin A1c is 6.7%.  Patient is currently taking metformin extended release 500 mg daily.  He is specifically interested in diabetes medication which may also aid with weight loss.  He had questions about utilization of Mounjaro.  We discussed potential use of GLP-1 receptor agonist and potential adverse effects which include but are not limited to nausea, heartburn, other gastrointestinal symptoms.  We discussed risk of pancreatitis as well as the data regarding medullary thyroid cancer.  Plan to start Mounjaro 2.5 mg daily or formulary equivalent.  Reviewed with patient this medication may be increased monthly if tolerating without issue.  Discussed potential long-term adverse effects of uncontrolled diabetes.  Reviewed recommendation for annual eye exam  Recent labs reviewed including lipid panel, CMP.  Obtain urine microalbumin next visit    2. Hyperlipidemia, unspecified hyperlipidemia type  Lipid panel is up-to-date, most recent LDL at goal, continue rosuvastatin.    Other  orders  -     Tirzepatide (Mounjaro) 2.5 MG/0.5ML solution pen-injector; Inject 0.5 mL under the skin into the appropriate area as directed 1 (One) Time Per Week.  Dispense: 2 mL; Refill: 1      Return in about 4 months (around 2/5/2024) for Next scheduled follow up. The patient was instructed to contact the clinic with any interval questions or concerns.    Chetna Yin MD     Dictated Utilizing Dragon Dictation

## 2023-10-06 ENCOUNTER — TELEPHONE (OUTPATIENT)
Dept: ENDOCRINOLOGY | Facility: CLINIC | Age: 68
End: 2023-10-06
Payer: COMMERCIAL

## 2023-10-06 NOTE — TELEPHONE ENCOUNTER
Pt called wanted to know as of 10/05/23 pt is taking mounjaro 2.5 mg pt wants to know if he was to stop taking metformin  mg or does pt need to continue taking with mourjaro. Pt wasn't sure and forgot to ask at his appointment. Please contact pt

## 2023-10-08 DIAGNOSIS — L40.9 PSORIASIS OF SCALP: ICD-10-CM

## 2023-10-09 RX ORDER — CLOBETASOL PROPIONATE 0.5 MG/G
AEROSOL, FOAM TOPICAL
Qty: 50 G | Refills: 0 | Status: SHIPPED | OUTPATIENT
Start: 2023-10-09

## 2023-10-11 RX ORDER — TRIAMCINOLONE ACETONIDE 0.25 MG/G
OINTMENT TOPICAL
Qty: 15 G | Refills: 1 | Status: SHIPPED | OUTPATIENT
Start: 2023-10-11

## 2023-10-14 DIAGNOSIS — L40.9 PSORIASIS OF SCALP: ICD-10-CM

## 2023-10-16 RX ORDER — TRIAMCINOLONE ACETONIDE 0.25 MG/G
OINTMENT TOPICAL
Qty: 15 G | Refills: 1 | OUTPATIENT
Start: 2023-10-16

## 2023-10-16 RX ORDER — CLOBETASOL PROPIONATE 0.5 MG/G
AEROSOL, FOAM TOPICAL
Qty: 50 G | Refills: 0 | OUTPATIENT
Start: 2023-10-16

## 2023-10-16 NOTE — TELEPHONE ENCOUNTER
Caller: Thien Gray    Relationship: Self    Best call back number: [phone]     Requested Prescriptions:   Requested Prescriptions     Pending Prescriptions Disp Refills    triamcinolone (KENALOG) 0.025 % ointment 15 g 1     Sig: Apply  topically to the appropriate area as directed 2 Times a Day for 10 days. Avoid applying to face, axilla, and groin        [place] [facility] [place] [facility] [place] [facility] [place] [facility] AT PORTAL TO [facility] - [phone]  - [phone] FX     Last office visit with prescribing clinician: 5/25/2023   Last telemedicine visit with prescribing clinician: Visit date not found   Next office visit with prescribing clinician: 11/27/2023     Additional details provided by patient:     Does the patient have less than a 3 day supply:  [] Yes  [x] No    Would you like a call back once the refill request has been completed: [] Yes [x] No    If the office needs to give you a call back, can they leave a voicemail: [] Yes [x] No    Huey Berry Rep   10/16/23 09:03 EDT

## 2023-10-25 ENCOUNTER — TELEPHONE (OUTPATIENT)
Dept: ENDOCRINOLOGY | Facility: CLINIC | Age: 68
End: 2023-10-25
Payer: COMMERCIAL

## 2023-10-25 NOTE — TELEPHONE ENCOUNTER
Caller: MarinaThien    Relationship: Self    Best call back number: [phone]    Requested Prescriptions: Tirzepatide (Mounjaro) 2.5 MG/0.5ML solution pen-injector    Requested Prescriptions      No prescriptions requested or ordered in this encounter        Pharmacy where request should be sent:  [id number] - [place], [place] - [address] [place] [address]  AT [facility] [phone]  - [phone]   [address] [facility] [address]  Phone: [phone]  Fax: [phone]      Last office visit with prescribing clinician: 10/5/2023   Last telemedicine visit with prescribing clinician: Visit date not found   Next office visit with prescribing clinician: 2/28/2024     Additional details provided by patient: PT HAD NO ILL EFFECTS WITH THE MOUNJARO AND WOULD LIKE TO HAVE THE DOSAGE OF THE MEDICATION INCREASED.HE DID MESS UP ONE SO HE DID MISS ONE DAY    Does the patient have less than a 3 day supply:  [x] Yes  [] No    Would you like a call back once the refill request has been completed: [x] Yes [] No    If the office needs to give you a call back, can they leave a voicemail: [x] Yes [] No    Huey Rangel Rep   10/25/23 13:12 EDT

## 2023-11-03 RX ORDER — TIRZEPATIDE 2.5 MG/.5ML
2.5 INJECTION, SOLUTION SUBCUTANEOUS WEEKLY
Qty: 2 ML | Refills: 0 | Status: SHIPPED | OUTPATIENT
Start: 2023-11-03

## 2023-11-03 NOTE — TELEPHONE ENCOUNTER
Caller: MarinaThien    Relationship: Self    Best call back number: [phone]    Requested Prescriptions: Tirzepatide (Mounjaro) 2.5 MG/0.5ML solution pen-injector   Requested Prescriptions      No prescriptions requested or ordered in this encounter        Pharmacy where request should be sent:  [id number] - [place], [place] - [address] [place] [address]  AT [facility] [phone]  - [phone]   [address] [facility] [address]  Phone: [phone]  Fax: [phone]      Last office visit with prescribing clinician: 10/5/2023   Last telemedicine visit with prescribing clinician: Visit date not found   Next office visit with prescribing clinician: 2/28/2024     Additional details provided by patient: PT HAD NO ILL EFFECTS WITH THE MOUNJARO AND WOULD LIKE TO HAVE THE DOSAGE OF THE MEDICATION INCREASED.HE DID MESS UP ONE SO HE DID MISS ONE DAY. PLEASE SEND 1 MONTH SUPPLY TO [facility] AND AFTER THAT PLEASE SEND TO [facility]. [facility] [place] - [facility] AT Portal to [facility] - [phone]  - [phone] [facility] [phone] [facility]  [place] [address]. PATIENT IS OUT OF MEDICATION AND HAS NOW MISSED 2 DAYS OF TAKING MEDICATION      Does the patient have less than a 3 day supply:  [x] Yes  [] No    Would you like a call back once the refill request has been completed: [x] Yes [] No    If the office needs to give you a call back, can they leave a voicemail: [x] Yes [] No    Huey Rangel Rep   11/03/23 11:12 EDT

## 2023-11-07 RX ORDER — TIRZEPATIDE 2.5 MG/.5ML
2.5 INJECTION, SOLUTION SUBCUTANEOUS WEEKLY
Qty: 2 ML | Refills: 0 | Status: SHIPPED | OUTPATIENT
Start: 2023-11-07

## 2023-11-07 NOTE — TELEPHONE ENCOUNTER
Pt stopped by requesting prescription [facility] Pt last ov 10/05/23 pt next ov 02/28/24. Pt has not received mail order yet and has been without his shot.

## 2023-11-13 ENCOUNTER — TELEPHONE (OUTPATIENT)
Dept: ENDOCRINOLOGY | Facility: CLINIC | Age: 68
End: 2023-11-13

## 2023-11-13 NOTE — TELEPHONE ENCOUNTER
Called the pt and he would like to have a dose increase. He stated he has not been taking the Mounjaro for a month. But up until then it was working great.    He is aware that you are out of the office until tomorrow. I offered to send to another provider and he wants you to.

## 2023-11-13 NOTE — TELEPHONE ENCOUNTER
PT CAME INTO THE OFFICE REQUESTING RX FOR MOUNJARO WITH INCREASED DOSE TO BE SENT IN TO [facility] ON [address]. PT STATED IT WILL REQUIRE A PA.

## 2023-11-14 RX ORDER — TIRZEPATIDE 5 MG/.5ML
5 INJECTION, SOLUTION SUBCUTANEOUS WEEKLY
Qty: 2 ML | Refills: 2 | Status: SHIPPED | OUTPATIENT
Start: 2023-11-14

## 2023-11-27 ENCOUNTER — OFFICE VISIT (OUTPATIENT)
Dept: FAMILY MEDICINE CLINIC | Facility: CLINIC | Age: 68
End: 2023-11-27
Payer: COMMERCIAL

## 2023-11-27 VITALS
HEART RATE: 78 BPM | SYSTOLIC BLOOD PRESSURE: 102 MMHG | HEIGHT: 70 IN | WEIGHT: 282 LBS | RESPIRATION RATE: 16 BRPM | TEMPERATURE: 96.9 F | BODY MASS INDEX: 40.37 KG/M2 | DIASTOLIC BLOOD PRESSURE: 68 MMHG | OXYGEN SATURATION: 94 %

## 2023-11-27 DIAGNOSIS — E11.9 TYPE 2 DIABETES MELLITUS WITHOUT COMPLICATION, WITHOUT LONG-TERM CURRENT USE OF INSULIN: Primary | ICD-10-CM

## 2023-11-27 DIAGNOSIS — I10 PRIMARY HYPERTENSION: ICD-10-CM

## 2023-11-27 DIAGNOSIS — E78.2 MIXED HYPERLIPIDEMIA: ICD-10-CM

## 2023-11-27 DIAGNOSIS — F10.20 ALCOHOL DEPENDENCE, DAILY USE: ICD-10-CM

## 2023-11-27 LAB
EXPIRATION DATE: ABNORMAL
Lab: ABNORMAL
POC CREATININE URINE: ABNORMAL
POC MICROALBUMIN URINE: ABNORMAL

## 2023-11-27 RX ORDER — METFORMIN HYDROCHLORIDE 500 MG/1
1000 TABLET, EXTENDED RELEASE ORAL
Qty: 180 TABLET | Refills: 1 | Status: SHIPPED | OUTPATIENT
Start: 2023-11-27

## 2023-11-27 NOTE — PROGRESS NOTES
Chief Complaint  6mo f/u HTN  (160 FBS this am. Pt is fasting. Pt moving to [place] in Dec. )    Subjective          Thien Gray presents to [facility]  History of Present Illness  He is planning to move to [place] in December     Diabetes  FBG has been averaging around 160, was around 140 while taking Mounjaro. His insurance isn't covering Mounjaro now, but will have new insurance at beginning of the year. States that his insurance notified him that they needed a statement to continue coverage, he has notified his endocrinology office on needing this.   He is taking metformin  mg. He hasn't been on higher doses of metformin.   Eye exam is up to date.    He is now utilizing CPAP for SOM. Feels that this has benefited him, has improved daytime sleepiness.      Working on gradually cutting back on alcohol consumption.     The following portions of the patient's history were reviewed and updated as appropriate: allergies, current medications, past family history, past medical history, past social history, past surgical history, and problem list.    Objective      Physical Exam  Vitals reviewed.   Constitutional:       Appearance: He is obese.   Cardiovascular:      Rate and Rhythm: Normal rate.      Heart sounds: Normal heart sounds.   Pulmonary:      Effort: Pulmonary effort is normal.      Breath sounds: Normal breath sounds.   Neurological:      Mental Status: He is alert.   Psychiatric:         Mood and Affect: Mood normal.         Behavior: Behavior normal.        Result Review :                Assessment and Plan    Diagnoses and all orders for this visit:    1. Type 2 diabetes mellitus without complication, without long-term current use of insulin (Primary)  -     POCT microalbumin  -     metFORMIN ER (GLUCOPHAGE-XR) 500 MG 24 hr tablet; Take 2 tablets by mouth Daily With Breakfast.  Dispense: 180 tablet; Refill: 1  -     Comprehensive Metabolic Panel  -     Hemoglobin  A1c  -     Lipid Panel With / Chol / HDL Ratio  -     Vitamin B12  -     Vitamin B1, Whole Blood    2. Mixed hyperlipidemia  -     Comprehensive Metabolic Panel  -     Hemoglobin A1c  -     Lipid Panel With / Chol / HDL Ratio  -     Vitamin B12  -     Vitamin B1, Whole Blood    3. Primary hypertension  -     Comprehensive Metabolic Panel  -     Hemoglobin A1c  -     Lipid Panel With / Chol / HDL Ratio  -     Vitamin B12  -     Vitamin B1, Whole Blood    4. Alcohol dependence, daily use  -     Comprehensive Metabolic Panel  -     Hemoglobin A1c  -     Lipid Panel With / Chol / HDL Ratio  -     Vitamin B12  -     Vitamin B1, Whole Blood    Microalbumin is elevated. He was taking losartan, however this is no longer active. Blood pressure is low today, but he is asymptomatic. Will need to resume ACE/ARB in future.   Labs updated. He will continue to check on coverage for Mounjaro, insurance may prefer Ozempic instead. He will increase metformin to 1000 mg daily.       Follow Up   Return if symptoms worsen or fail to improve.  Patient was given instructions and counseling regarding his condition or for health maintenance advice. Please see specific information pulled into the AVS if appropriate.

## 2023-11-29 LAB
ALBUMIN SERPL-MCNC: 4.9 G/DL (ref 3.9–4.9)
ALBUMIN/GLOB SERPL: 1.9 {RATIO} (ref 1.2–2.2)
ALP SERPL-CCNC: 67 IU/L (ref 44–121)
ALT SERPL-CCNC: 45 IU/L (ref 0–44)
AST SERPL-CCNC: 36 IU/L (ref 0–40)
BILIRUB SERPL-MCNC: 0.5 MG/DL (ref 0–1.2)
BUN SERPL-MCNC: 16 MG/DL (ref 8–27)
BUN/CREAT SERPL: 22 (ref 10–24)
CALCIUM SERPL-MCNC: 9.9 MG/DL (ref 8.6–10.2)
CHLORIDE SERPL-SCNC: 100 MMOL/L (ref 96–106)
CHOLEST SERPL-MCNC: 119 MG/DL (ref 100–199)
CHOLEST/HDLC SERPL: 2.5 RATIO (ref 0–5)
CO2 SERPL-SCNC: 23 MMOL/L (ref 20–29)
CREAT SERPL-MCNC: 0.72 MG/DL (ref 0.76–1.27)
EGFRCR SERPLBLD CKD-EPI 2021: 98 ML/MIN/1.73
GLOBULIN SER CALC-MCNC: 2.6 G/DL (ref 1.5–4.5)
GLUCOSE SERPL-MCNC: 138 MG/DL (ref 70–99)
HBA1C MFR BLD: 7 % (ref 4.8–5.6)
HDLC SERPL-MCNC: 47 MG/DL
LDLC SERPL CALC-MCNC: 41 MG/DL (ref 0–99)
POTASSIUM SERPL-SCNC: 4.6 MMOL/L (ref 3.5–5.2)
PROT SERPL-MCNC: 7.5 G/DL (ref 6–8.5)
SODIUM SERPL-SCNC: 137 MMOL/L (ref 134–144)
TRIGL SERPL-MCNC: 196 MG/DL (ref 0–149)
VIT B1 BLD-SCNC: 192.6 NMOL/L (ref 66.5–200)
VIT B12 SERPL-MCNC: 549 PG/ML (ref 232–1245)
VLDLC SERPL CALC-MCNC: 31 MG/DL (ref 5–40)

## 2023-12-14 ENCOUNTER — OFFICE VISIT (OUTPATIENT)
Dept: SLEEP MEDICINE | Facility: CLINIC | Age: 68
End: 2023-12-14
Payer: COMMERCIAL

## 2023-12-14 VITALS
OXYGEN SATURATION: 96 % | DIASTOLIC BLOOD PRESSURE: 76 MMHG | SYSTOLIC BLOOD PRESSURE: 132 MMHG | HEART RATE: 105 BPM | WEIGHT: 284.2 LBS | BODY MASS INDEX: 40.69 KG/M2 | TEMPERATURE: 96 F | HEIGHT: 70 IN

## 2023-12-14 DIAGNOSIS — G47.33 OSA (OBSTRUCTIVE SLEEP APNEA): Primary | ICD-10-CM

## 2023-12-14 PROCEDURE — 99213 OFFICE O/P EST LOW 20 MIN: CPT | Performed by: NURSE PRACTITIONER

## 2023-12-14 NOTE — PROGRESS NOTES
Chief Complaint:   Chief Complaint   Patient presents with    Follow-up    Sleeping Problem       HPI:    Thien Gray is a 68 y.o. male here for follow-up of sleep apnea.  Patient was last seen 9/27/2022.  Patient's last sleep study was 9/1/2023 and did show severe obstructive sleep apnea.  Patient states he continues to do well with CPAP therapy.  Patient is sleeping 8 hours nightly and does feel rested upon awakening.  He will go to sleep anywhere between 5 minutes to 1 hour.  He does get up every 2 hours to use the restroom.  Patient states he has recently had prostate surgery.  Patient has an [place] score of 10/24.  Patient states he is doing well, has no concerns or complaints, wishes to continue therapy.        Current medications are:   Current Outpatient Medications:     albuterol sulfate  (90 Base) MCG/ACT inhaler, Inhale 2 puffs Every 4 (Four) Hours As Needed for Wheezing or Shortness of Air., Disp: 8 g, Rfl: 5    aspirin 81 MG EC tablet, Take 1 tablet by mouth Daily., Disp: , Rfl:     clobetasol (OLUX) 0.05 % topical foam, APPLY 1 APPLICATION        TOPICALLY TO THE           APPROPRIATE AREA AS        DIRECTED TWO TIMES A DAY, Disp: 50 g, Rfl: 0    fluticasone (FLONASE) 50 MCG/ACT nasal spray, 2 sprays into the nostril(s) as directed by provider Daily., Disp: , Rfl:     glucose blood test strip, Check glucose daily, Disp: 50 each, Rfl: 5    glucose monitor monitoring kit, Check glucose daily, Disp: 1 each, Rfl: 0    Lancet Device misc, Check glucose daily, Disp: 50 each, Rfl: 5    meloxicam (MOBIC) 7.5 MG tablet, Take 1 tablet by mouth Daily., Disp: , Rfl:     metFORMIN ER (GLUCOPHAGE-XR) 500 MG 24 hr tablet, Take 2 tablets by mouth Daily With Breakfast., Disp: 180 tablet, Rfl: 1    metoprolol succinate XL (TOPROL-XL) 50 MG 24 hr tablet, Take 1 tablet by mouth Daily. Hold for blood pressure less than 100/60., Disp: 90 tablet, Rfl: 1    rosuvastatin (CRESTOR) 40 MG tablet, TAKE 1 TABLET  EVERY NIGHT, Disp: 90 tablet, Rfl: 3    thiamine (VITAMIN B-1) 100 MG tablet tablet, Take 1 tablet by mouth Daily., Disp: 90 tablet, Rfl: 3    tobramycin (NEBCIN) 80 MG/2ML solution injection, Inject 2 mL into the appropriate muscle as directed by prescriber Daily. Sinus rinse, Disp: , Rfl:     triamcinolone (KENALOG) 0.025 % ointment, Apply  topically to the appropriate area as directed 2 Times a Day for 10 days. Avoid applying to face, axilla, and groin, Disp: 15 g, Rfl: 1    Tirzepatide (Mounjaro) 5 MG/0.5ML solution pen-injector, Inject 0.5 mL under the skin into the appropriate area as directed 1 (One) Time Per Week., Disp: 2 mL, Rfl: 2.      The patient's relevant past medical, surgical, family and social history were reviewed and updated in Epic as appropriate.       Review of Systems   HENT:  Positive for congestion, rhinorrhea and sneezing.    Respiratory:  Positive for apnea and chest tightness.    Genitourinary:  Positive for difficulty urinating, frequency and urgency.   Allergic/Immunologic: Positive for environmental allergies.   Psychiatric/Behavioral:  Positive for sleep disturbance.    All other systems reviewed and are negative.        Objective:    Physical Exam  Constitutional:       Appearance: Normal appearance.   HENT:      Head: Normocephalic and atraumatic.      Mouth/Throat:      Comments: Mallampati 3 anatomy  Cardiovascular:      Rate and Rhythm: Normal rate and regular rhythm.   Pulmonary:      Effort: Pulmonary effort is normal.      Breath sounds: Normal breath sounds.   Skin:     General: Skin is warm and dry.   Neurological:      Mental Status: He is alert and oriented to person, place, and time.   Psychiatric:         Mood and Affect: Mood normal.         Behavior: Behavior normal.         Thought Content: Thought content normal.         Judgment: Judgment normal.         CPAP Report    26/30 is of use  Greater than 4-hour use 67%  Setting 10-20  95th percentile pressure 11.8  AHI  3.3  The patient continues to use and benefit from CPAP therapy.    ASSESSMENT/PLAN    Diagnoses and all orders for this visit:    1. SOM (obstructive sleep apnea) (Primary)  -     PAP Therapy        Counseled patient regarding multimodal approach with healthy nutrition, healthy sleep, regular physical activity, social activities, counseling, and medications. Encouraged to practice lateral sleep position. Avoid alcohol and sedatives close to bedtime.  Refill supplies x 1 year.  Return to clinic as needed patient is moving to [place] next month.      I have reviewed the results of my evaluation and impression and discussed my recommendations in detail with the patient.      Signed by  Cassidy Baez, GEE    December 14, 2023      CC: Sanaz Zuluaga,          No ref. provider found

## 2024-07-05 RX ORDER — LANCETS 33 GAUGE
EACH MISCELLANEOUS
Qty: 100 EACH | OUTPATIENT
Start: 2024-07-05

## 2025-05-16 RX ORDER — ALBUTEROL SULFATE 90 UG/1
2 INHALANT RESPIRATORY (INHALATION) EVERY 4 HOURS PRN
Qty: 8.5 G | OUTPATIENT
Start: 2025-05-16

## 2025-07-25 NOTE — PROGRESS NOTES
[facility] Medicine Services  PROGRESS NOTE    Patient Name: Thien Gray  : 1955  MRN: [id number]    Date of Admission: 2022  Primary Care Physician: Sanaz Zuluaga DO    Subjective   Subjective     CC:  N/v     HPI:  No acute events. Feeling improved. Was able to shower. Tolerated PO intake.     ROS:  Gen- No fevers, chills  CV- No chest pain, palpitations  Resp- No cough, dyspnea  GI- No N/V/D, abd pain     Objective   Objective     Vital Signs:   Temp:  [98.2 °F (36.8 °C)-98.5 °F (36.9 °C)] 98.5 °F (36.9 °C)  Heart Rate:  [77-98] 89  Resp:  [18-20] 18  BP: ()/(51-87) 103/71     Physical Exam:  Constitutional: No acute distress, awake, alert; obese   HENT: NCAT, mucous membranes moist  Respiratory: Clear to auscultation bilaterally, respiratory effort normal   Cardiovascular: RRR, no murmurs, rubs, or gallops  Gastrointestinal: Positive bowel sounds, soft, nontender, nondistended  Musculoskeletal: No bilateral ankle edema  Psychiatric: Appropriate affect, cooperative  Neurologic: Oriented x 3, strength symmetric in all extremities, Cranial Nerves grossly intact to confrontation, speech clear  Skin: No rashes    Results Reviewed:  LAB RESULTS:      Lab 22  1517   WBC 10.49   HEMOGLOBIN 16.0   HEMATOCRIT 45.4   PLATELETS 259   NEUTROS ABS 8.69*   IMMATURE GRANS (ABS) 0.06*   LYMPHS ABS 0.78   MONOS ABS 0.72   EOS ABS 0.21   MCV 88.8   LACTATE 1.0         Lab 22  1517   SODIUM 133*   POTASSIUM 4.4   CHLORIDE 97*   CO2 27.0   ANION GAP 9.0   BUN 15   CREATININE 0.93   EGFR 89.4   GLUCOSE 151*   CALCIUM 9.0         Lab 22  1517   TOTAL PROTEIN 7.5   ALBUMIN 4.60   GLOBULIN 2.9   ALT (SGPT) 41   AST (SGOT) 66*   BILIRUBIN 1.0   ALK PHOS 82   LIPASE 27         Lab 22  1517   PROBNP 249.7   TROPONIN T <0.010                 Brief Urine Lab Results  (Last result in the past 365 days)      Color   Clarity   Blood   Leuk Est   Nitrite    EF 50% patient would benefit from a GLP1 for weight loss and cardiac protection.   Start wegovy 0.25mg subcutaneous q7ds.   Will monitor weight and GI upset.    Protein   CREAT   Urine HCG        07/18/22 1612 Yellow   Clear   Negative   Negative   Negative   Negative                 Microbiology Results Abnormal     None          CT Abdomen Pelvis Without Contrast    Result Date: 7/18/2022  DATE OF EXAM: 7/18/2022 3:29 PM  PROCEDURE: CT ABDOMEN PELVIS WO CONTRAST-  INDICATIONS: abdominal pain  COMPARISON: No comparisons available.  TECHNIQUE: Routine transaxial slices were obtained through the abdomen and pelvis without the administration of intravenous contrast. Reconstructed coronal and sagittal images were also obtained. Automated exposure control and iterative construction methods were used.  The radiation dose reduction device was turned on for each scan per the ALARA (As Low as Reasonably Achievable) protocol.  FINDINGS: There is a partially calcified granuloma in the left lower lobe with otherwise unremarkable appearance of the partially imaged lower thorax. Unremarkable appearance of the liver. There is cholelithiasis without CT evidence of cholecystitis. Unremarkable appearance of the spleen, pancreas, adrenal glands, kidneys, ureters, and bladder. The prostate is enlarged measuring 6.6 cm in maximal transverse dimension. There is severe sigmoid colonic diverticulosis without evidence of diverticulitis. Normal appendix. No bowel obstruction or definite inflammatory change of the GI tract. No free abdominopelvic fluid or conspicuous fat stranding. No pneumoperitoneum. Scattered atherosclerosis. The body wall soft tissues are unremarkable. No lymphadenopathy. No acute osseous findings.      Impression: No acute abdominopelvic findings.  Sigmoid colonic diverticulosis without diverticulitis.  Cholelithiasis without CT evidence of cholecystitis.  Enlarged prostate.  This report was finalized on 7/18/2022 4:19 PM by Hitesh Mcmahan MD.      XR Chest 1 View    Result Date: 7/18/2022  XR CHEST 1 VW-  COMPARISON: 2/28/2022  HISTORY: chest pain  FINDINGS: Technical  adequacy: Adequate Central airways: Unremarkable Heart: Borderline heart size, likely accentuated by the technique. Great vessels: Unremarkable Mediastinum: Unremarkable Lungs: Unremarkable Pulmonary carmine:Unremarkable Pleura: Unremarkable Skeletal structures: Degenerative changes Extrathoracic soft tissues:Unremarkable Additional comments: Status post median sternotomy and CABG      Impression: Impression: As above. No acute cardiopulmonary disease.  This report was finalized on 7/18/2022 4:00 PM by Pavan Lucia MD.        Results for orders placed during the hospital encounter of 06/22/21    Adult Transthoracic Echo Complete W/ Cont if Necessary Per Protocol    Interpretation Summary  · Left ventricular wall thickness is consistent with mild concentric hypertrophy.  · Left ventricular ejection fraction appears to be 41 - 45%. Left ventricular systolic function is mildly decreased.  · Left ventricular diastolic dysfunction is noted.  · The right ventricular cavity is mildly dilated.  · Estimated right ventricular systolic pressure from tricuspid regurgitation is normal (<35 mmHg). Calculated right ventricular systolic pressure from tricuspid regurgitation is 29 mmHg.      I have reviewed the medications:  Scheduled Meds:aspirin, 81 mg, Oral, Daily  thiamine, 100 mg, Oral, Daily   And  multivitamin, 1 tablet, Oral, Daily   And  folic acid, 1 mg, Oral, Daily  heparin (porcine), 5,000 Units, Subcutaneous, Q8H  rosuvastatin, 40 mg, Oral, Daily  sodium chloride, 10 mL, Intravenous, Q12H  tamsulosin, 0.8 mg, Oral, Daily      Continuous Infusions:sodium chloride, 100 mL/hr, Last Rate: 100 mL/hr (07/18/22 8115)      PRN Meds:.•  acetaminophen **OR** acetaminophen **OR** acetaminophen  •  albuterol sulfate HFA  •  LORazepam **OR** diazePAM **OR** LORazepam **OR** diazePAM **OR** diazePAM  •  ondansetron  •  [COMPLETED] Insert peripheral IV **AND** sodium chloride  •  sodium chloride    Assessment & Plan   Assessment & Plan      Active Hospital Problems    Diagnosis  POA   • Hypotension [I95.9]  Yes   • COVID-19 virus detected [U07.1]  Unknown   • HTN (hypertension) [I10]  Unknown   • HLD (hyperlipidemia) [E78.5]  Unknown   • CAD (coronary artery disease) [I25.10]  Unknown      Resolved Hospital Problems   No resolved problems to display.        Brief Hospital Course to date:  Thien Gray is a 66 y.o. male CAD s/p CABG, HTN/HLD,daily alcohol use, obesity presenting with fatigue, nausea, diarrhea, decrease appetite, cough, and burning pain in his right lung for 3 days.     Dehydration secondary to N/D, decreased appetite  hypotension  (+) COVID  - COVID + 7/18 -- planned isolation 10 days as not hypoxic   - CT abd/pel with no acute findings  - CXR with no acute findings  - Remains on room air  - S/p IVF. Monitor PO intake.   - PRN zofran.   ** CM is working on transportation home      HTN/HLD/CAD/HFrEF   - hold home metoprolol as borderline hypotensive. Resume when able.   - continue crestor, ASA  - ECHO 06/2021 LVEF 41-45%. Monitor volume status      Elevated Glucose  - A1C     Daily Alcohol use  - [facility] protocol for ativan PRN. No hx of alcohol withdrawal seizures       Expected Discharge Location and Transportation: home -- transportation pending  Expected Discharge Date: 7/20    DVT prophylaxis:  Medical DVT prophylaxis orders are present.          CODE STATUS:   Code Status and Medical Interventions:   Ordered at: 07/18/22 5147     Level Of Support Discussed With:    Patient     Code Status (Patient has no pulse and is not breathing):    CPR (Attempt to Resuscitate)     Medical Interventions (Patient has pulse or is breathing):    Full Support       Negrita Meneses,   07/19/22

## (undated) DEVICE — DEV INFL MONARCH 25W

## (undated) DEVICE — CATHETER,URETHRAL,REDRUBBER,STERILE,22FR: Brand: MEDLINE

## (undated) DEVICE — SUT PROLN 4/0 V7 36IN 8975H

## (undated) DEVICE — PK CATH CARD 10

## (undated) DEVICE — KT ORCA ORCAPOD DISP STRL

## (undated) DEVICE — TEMP PACING WIRE: Brand: MYO/WIRE

## (undated) DEVICE — HYBRID CO2 TUBING/CAP SET FOR OLYMPUS® SCOPES & CO2 SOURCE: Brand: ERBE

## (undated) DEVICE — CANN AORT/ROOT DLP W/VNT/LN 14G 7F 14.6CM

## (undated) DEVICE — TBG SXN INTRACARD RIDGID FLUT 24F .25X13IN A/

## (undated) DEVICE — DRP SLUSH MACH

## (undated) DEVICE — PK PERFUS CUST W/CARDIOPLEGIA

## (undated) DEVICE — PK HEART OPN 10

## (undated) DEVICE — KT MANIFOLD CATHLAB CUST

## (undated) DEVICE — SOL IRR H2O BTL 1000ML STRL

## (undated) DEVICE — CLTH CLENS READYCLEANSE PERI CARE PK/5

## (undated) DEVICE — SINGLE-USE BIOPSY FORCEPS: Brand: RADIAL JAW 4

## (undated) DEVICE — RADIFOCUS GLIDEWIRE: Brand: GLIDEWIRE

## (undated) DEVICE — TUBING, SUCTION, 1/4" X 10', STRAIGHT: Brand: MEDLINE

## (undated) DEVICE — SWAN-GANZ CCOMBO V THERMODILUTION CATHETER: Brand: SWAN-GANZ CCOMBO V

## (undated) DEVICE — SUT SILK 2 SUTUPAK TIE 60IN SA8H 2STRAND

## (undated) DEVICE — BALN IAB SENSATION F/O W/CONN 7F 40CC LF

## (undated) DEVICE — DRAPE,SPLIT,CV,CLR ANES,STERILE: Brand: MEDLINE

## (undated) DEVICE — SPNG GZ WOVN 4X4IN 12PLY 10/BX STRL

## (undated) DEVICE — EZ GLIDE AORTIC CANNULA: Brand: EDWARDS LIFESCIENCES EZ GLIDE AORTIC CANNULA

## (undated) DEVICE — SYR LUERLOK 50ML

## (undated) DEVICE — GLV SURG BIOGEL LTX PF 7 1/2

## (undated) DEVICE — Device

## (undated) DEVICE — INTRAOPERATIVE COVER KIT, 10 PACK: Brand: SITE-RITE

## (undated) DEVICE — LEVEL SENSORS PADS ARE USED TO ATTACH THE LEVEL SENSORS TO A HARD SHELL RESERVOIR. INCLUDES COUPLING GEL.: Brand: TERUMO® ADVANCED PERFUSION SYSTEM 1

## (undated) DEVICE — SUT PROLN 7/0 BV1 D/A 24IN 8702H

## (undated) DEVICE — SUT ETHIB 2/0 SH SH 36IN X523H

## (undated) DEVICE — SUT SILK 0/0 CT2 18IN C027D

## (undated) DEVICE — PINNACLE INTRODUCER SHEATH: Brand: PINNACLE

## (undated) DEVICE — INTRO ACCSR BLNT TP

## (undated) DEVICE — SUT SILK 4/0 TIES 18IN A183H

## (undated) DEVICE — BLD SCLPL BEAVR MINI STR 2BVL 180D LF

## (undated) DEVICE — FLTR RESERV PERFUS INTERSEPT 02 STRL

## (undated) DEVICE — SENSR CERBRL O2 PK/2

## (undated) DEVICE — SINGLE-USE POLYPECTOMY SNARE: Brand: SENSATION SHORT THROW

## (undated) DEVICE — ANTIBACTERIAL UNDYED BRAIDED (POLYGLACTIN 910), SYNTHETIC ABSORBABLE SUTURE: Brand: COATED VICRYL

## (undated) DEVICE — SYS VASOVIEW HEMOPRO ENDOSCOPIC HARVST VESL

## (undated) DEVICE — SUT PROLN 4/0 V5 36IN 8935H

## (undated) DEVICE — INTENDED FOR TISSUE SEPARATION, AND OTHER PROCEDURES THAT REQUIRE A SHARP SURGICAL BLADE TO PUNCTURE OR CUT.: Brand: BARD-PARKER ® STAINLESS STEEL BLADES

## (undated) DEVICE — SUT PROLN 6/0 C1 D/A 30IN 8706H

## (undated) DEVICE — PATIENT RETURN ELECTRODE, SINGLE-USE, CONTACT QUALITY MONITORING, ADULT, WITH 9FT CORD, FOR PATIENTS WEIGING OVER 33LBS. (15KG): Brand: MEGADYNE

## (undated) DEVICE — LUBE GEL ENDOGLIDE 1.1OZ

## (undated) DEVICE — 28 FR RIGHT ANGLE – SOFT PVC CATHETER: Brand: PVC THORACIC CATHETERS

## (undated) DEVICE — GW INQWIRE FC PTFE STD J/1.5 .035 260

## (undated) DEVICE — ADAPT/Y PERFUS DLP FML/LUER COLR/CODE/CLMP 8.9AND25.4CM

## (undated) DEVICE — SPK TRANSFR TRANSOFIX DBL STRL

## (undated) DEVICE — PK ATS CUST W CARDIOTOMY RESEVOIR

## (undated) DEVICE — AVID DUAL STAGE VENOUS DRAINAGE CANNULA: Brand: AVID DUAL STAGE VENOUS DRAINAGE CANNULA

## (undated) DEVICE — PAD ARMBRD SURG CONVOL 7.5X20X2IN

## (undated) DEVICE — SUT MNCRYL 4/0 PS2 18 IN

## (undated) DEVICE — PENCL ROCKRSWCH MEGADYNE W/HOLSTR 10FT SS

## (undated) DEVICE — KT INTRO SHEATH PERC W/FULL DRP 9F

## (undated) DEVICE — CONN REDUCING CANN/PUMP 3/8X3/8X3/8

## (undated) DEVICE — SUT SILK 2/0 CT1 CR8 18IN C022D

## (undated) DEVICE — TBG SXN RIGD MINI/SUCKER 9F 4.75IN

## (undated) DEVICE — UNDERGLV SURG BIOGEL INDICAT PI SZ8 BLU

## (undated) DEVICE — CATH DIAG EXPO M/ PK 6FR FL4/FR4 PIG 3PK

## (undated) DEVICE — THE SINGLE USE ETRAP – POLYP TRAP IS USED FOR SUCTION RETRIEVAL OF ENDOSCOPICALLY REMOVED POLYPS.: Brand: ETRAP

## (undated) DEVICE — SUT VIC PLS 0 CTX 36IN UD VCP978H

## (undated) DEVICE — 32 FR RIGHT ANGLE – SOFT PVC CATHETER: Brand: PVC THORACIC CATHETERS

## (undated) DEVICE — GRADUATE CONTN 1000ML

## (undated) DEVICE — DR ROGERS OH: Brand: MEDLINE INDUSTRIES, INC.

## (undated) DEVICE — 12 FOOT DISPOSABLE EXTENSION CABLE WITH SAFE CONNECT / SCREW-DOWN

## (undated) DEVICE — TTL1LYR 16FR10ML 100%SIL TMPST TR: Brand: MEDLINE

## (undated) DEVICE — SUCTION CANISTER, 2500CC, RIGID: Brand: DEROYAL

## (undated) DEVICE — CANN VESL DLP 1WY BLNT/TP 3MM

## (undated) DEVICE — SPNG ENDO BEDSIDE TUB ENZYM

## (undated) DEVICE — 3M™ TEGADERM™ CHG DRESSING 25/CARTON 4 CARTONS/CASE 1658: Brand: TEGADERM™

## (undated) DEVICE — ELECTRD BLD EZ CLN STD 6.5IN

## (undated) DEVICE — 3M™ MEDIPORE™ H SOFT CLOTH SURGICAL TAPE, 2863, 3 IN X 10 YD, 12/CASE: Brand: 3M™ MEDIPORE™

## (undated) DEVICE — SPNG LAP PREWSH SFTPK 18X18IN STRL PK/5